# Patient Record
Sex: MALE | Race: BLACK OR AFRICAN AMERICAN | NOT HISPANIC OR LATINO | Employment: OTHER | URBAN - METROPOLITAN AREA
[De-identification: names, ages, dates, MRNs, and addresses within clinical notes are randomized per-mention and may not be internally consistent; named-entity substitution may affect disease eponyms.]

---

## 2017-03-13 RX ORDER — TAMSULOSIN HYDROCHLORIDE 0.4 MG/1
CAPSULE ORAL
Qty: 30 CAPSULE | Refills: 11 | Status: SHIPPED | OUTPATIENT
Start: 2017-03-13 | End: 2019-02-26

## 2017-04-25 ENCOUNTER — OFFICE VISIT (OUTPATIENT)
Dept: UROLOGY | Facility: CLINIC | Age: 72
End: 2017-04-25
Payer: MEDICARE

## 2017-04-25 VITALS
HEIGHT: 65 IN | SYSTOLIC BLOOD PRESSURE: 133 MMHG | WEIGHT: 145.5 LBS | DIASTOLIC BLOOD PRESSURE: 78 MMHG | HEART RATE: 53 BPM | BODY MASS INDEX: 24.24 KG/M2

## 2017-04-25 DIAGNOSIS — N40.1 BPH WITH URINARY OBSTRUCTION: Primary | ICD-10-CM

## 2017-04-25 DIAGNOSIS — N13.8 BPH WITH URINARY OBSTRUCTION: Primary | ICD-10-CM

## 2017-04-25 PROCEDURE — 1159F MED LIST DOCD IN RCRD: CPT | Mod: S$GLB,,, | Performed by: UROLOGY

## 2017-04-25 PROCEDURE — 1126F AMNT PAIN NOTED NONE PRSNT: CPT | Mod: S$GLB,,, | Performed by: UROLOGY

## 2017-04-25 PROCEDURE — 1160F RVW MEDS BY RX/DR IN RCRD: CPT | Mod: S$GLB,,, | Performed by: UROLOGY

## 2017-04-25 PROCEDURE — 99213 OFFICE O/P EST LOW 20 MIN: CPT | Mod: S$GLB,,, | Performed by: UROLOGY

## 2017-04-25 PROCEDURE — 99999 PR PBB SHADOW E&M-EST. PATIENT-LVL III: CPT | Mod: PBBFAC,,, | Performed by: UROLOGY

## 2017-04-25 NOTE — PROGRESS NOTES
Subjective:       Patient ID: Reed Torres is a 72 y.o. male.    Chief Complaint: Annual Exam (bph, weak urine stream, forgets to take flomax, flomax does help though)    HPI patient is here follow-up for BPH.  He has a slow stream and takes the Flomax intermittently.  His PVR is 390 cc and his urine is clear.  He had cystoscopy last year which demonstrated possible need for laser prostatectomy.  We discussed pros and cons of laser prostatectomy and he is ready to have this performed.  He is still very active cutting grass etc.  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     Social History Main Topics    Smoking status: Former Smoker    Smokeless tobacco: Former User      Comment: stopped smoking 20 yrs ago.    Alcohol use 3.0 oz/week     6 Standard drinks or equivalent per week      Comment: Daily    Drug use: No    Sexual activity: Yes     Partners: Female     Birth control/ protection: None     Other Topics Concern    Not on file     Social History Narrative       Allergies:  Review of patient's allergies indicates no known allergies.    Medications:    Current Outpatient Prescriptions:     simvastatin (ZOCOR) 10 MG tablet, Take 1 tablet (10 mg total) by mouth every evening., Disp: 30 tablet, Rfl: 5    tamsulosin (FLOMAX) 0.4 mg Cp24, TAKE ONE CAPSULE BY MOUTH EVERY DAY, Disp: 30 capsule, Rfl: 11    Review of  Systems   Constitutional: Negative for activity change, appetite change, chills, diaphoresis, fatigue, fever and unexpected weight change.   HENT: Negative for congestion, dental problem, hearing loss, mouth sores, postnasal drip, rhinorrhea, sinus pressure and trouble swallowing.    Eyes: Negative for pain, discharge and itching.   Respiratory: Negative for apnea, cough, choking, chest tightness, shortness of breath and wheezing.    Cardiovascular: Negative for chest pain, palpitations and leg swelling.   Gastrointestinal: Negative for abdominal distention, abdominal pain, anal bleeding, blood in stool, constipation, diarrhea, nausea, rectal pain and vomiting.   Endocrine: Negative for polydipsia and polyuria.   Genitourinary: Positive for decreased urine volume and difficulty urinating. Negative for discharge, dysuria, enuresis, flank pain, frequency, genital sores, hematuria, penile pain, penile swelling, scrotal swelling, testicular pain and urgency.   Musculoskeletal: Negative for arthralgias, back pain and myalgias.   Skin: Negative for color change, rash and wound.   Neurological: Negative for dizziness, syncope, speech difficulty, light-headedness and headaches.   Hematological: Negative for adenopathy. Does not bruise/bleed easily.   Psychiatric/Behavioral: Negative for behavioral problems, confusion, hallucinations and sleep disturbance.       Objective:      Physical Exam   Constitutional: He appears well-developed.   HENT:   Head: Normocephalic.   Cardiovascular: Normal rate.    Pulmonary/Chest: Effort normal.   Abdominal: Soft.   Genitourinary: Prostate normal.   Genitourinary Comments: 30 g benign   Neurological: He is alert.   Skin: Skin is warm.     Psychiatric: He has a normal mood and affect.       Assessment:       1. BPH with urinary obstruction        Plan:       Rede was seen today for annual exam.    Diagnoses and all orders for this visit:    BPH with urinary obstruction     schedule  removal with laser prostatectomy  transurethral prostatectomy  All risks and benefits including the risk of death and stroke secondary to anesthesia, urethral stricture need for future procedures incontinence erectile dysfunction and fistula were discussed

## 2017-04-26 ENCOUNTER — TELEPHONE (OUTPATIENT)
Dept: UROLOGY | Facility: CLINIC | Age: 72
End: 2017-04-26

## 2017-04-26 DIAGNOSIS — N40.0 BENIGN NODULAR PROSTATIC HYPERPLASIA WITHOUT LOWER URINARY TRACT SYMPTOMS: Primary | ICD-10-CM

## 2017-05-03 ENCOUNTER — ANESTHESIA EVENT (OUTPATIENT)
Dept: SURGERY | Facility: HOSPITAL | Age: 72
End: 2017-05-03
Payer: MEDICARE

## 2017-05-03 DIAGNOSIS — Z01.818 PRE-OP TESTING: Primary | ICD-10-CM

## 2017-05-03 NOTE — ANESTHESIA PREPROCEDURE EVALUATION
Anesthesia Assessment: Preoperative EQUATION    Planned Procedure: Procedure(s) (LRB):  TURP W LASER (N/A)  Requested Anesthesia Type:General  Surgeon: Charlie Walters Jr., MD  Service: Urology  Known or anticipated Date of Surgery:5/19/2017    Surgeon notes: reviewed    Triage considerations:     The patient has no apparent active cardiac condition (No unstable coronary Syndrome such as severe unstable angina or recent [<1 month] myocardial infarction, decompensated CHF, severe valvular   disease or significant arrhythmia)    Previous anesthesia records:Not available    Last PCP note: 3-6 months ago , outside [facility]  REQUESTED COPY OF LAST NOTE  Other important co-morbidities:      Tests already available:  No recent tests.            Instructions given. (See in Nurse's note)    Optimization:  Anesthesia Preop Clinic AssessmentNOT  Indicated  Consult      Plan:    Testing:  Hematology Profile, BMP and EKG 5/10results receivedd    Navigation: Tests Scheduled.                         Results will be tracked by Preop Clinic.                         No anesthesia preop clinic visit, or consult required.                                                                                                              05/03/2017  Reed Torres is a 72 y.o., male.    Anesthesia Evaluation    I have reviewed the Patient Summary Reports.    I have reviewed the Nursing Notes.   I have reviewed the Medications.     Review of Systems  Anesthesia Hx:  No problems with previous Anesthesia  History of prior surgery of interest to airway management or planning: Denies Family Hx of Anesthesia complications.   Denies Personal Hx of Anesthesia complications.   Social:  Social Alcohol Use, Former Smoker  Tobacco Use: Former smoker for 30 years, 1 pack per day, quit smoking >10 years ago Alcohol Use:    Hematology/Oncology:     Oncology Normal     EENT/Dental:EENT/Dental Normal   Cardiovascular:   Exercise tolerance: good ECG has  been reviewed.    Pulmonary:   Denies Shortness of breath.    Hepatic/GI:   GERD, well controlled    Musculoskeletal:   Arthritis     Neurological:  Neurology Normal    Endocrine:  Endocrine Normal        Physical Exam  General:  Well nourished    Airway/Jaw/Neck:  Airway Findings: Mouth Opening: Normal Tongue: Normal  General Airway Assessment: Adult  Mallampati: I      Dental:  Dental Findings: Edentulous   Chest/Lungs:  Chest/Lungs Findings: Clear to auscultation, Normal Respiratory Rate     Heart/Vascular:  Heart Findings: Rate: Normal, Bradycardia        Mental Status:  Mental Status Findings:  Cooperative, Alert and Oriented     TALON SALINAS 5/3/17    Anesthesia Plan  Type of Anesthesia, risks & benefits discussed:  Anesthesia Type:  general  Patient's Preference:   Intra-op Monitoring Plan:   Intra-op Monitoring Plan Comments:   Post Op Pain Control Plan:   Post Op Pain Control Plan Comments:   Induction:   IV  Beta Blocker:  Patient is not currently on a Beta-Blocker (No further documentation required).       Informed Consent: Patient understands risks and agrees with Anesthesia plan.  Questions answered. Anesthesia consent signed with patient.  ASA Score: 2     Day of Surgery Review of History & Physical:    H&P update referred to the surgeon.         Ready For Surgery From Anesthesia Perspective.

## 2017-05-08 ENCOUNTER — HOSPITAL ENCOUNTER (OUTPATIENT)
Dept: CARDIOLOGY | Facility: CLINIC | Age: 72
Discharge: HOME | End: 2017-05-08
Payer: MEDICARE

## 2017-05-08 ENCOUNTER — OFFICE VISIT (OUTPATIENT)
Dept: UROLOGY | Facility: CLINIC | Age: 72
End: 2017-05-08
Payer: MEDICARE

## 2017-05-08 DIAGNOSIS — N40.1 BPH WITH URINARY OBSTRUCTION: Primary | ICD-10-CM

## 2017-05-08 DIAGNOSIS — N13.8 BPH WITH URINARY OBSTRUCTION: Primary | ICD-10-CM

## 2017-05-08 DIAGNOSIS — Z01.818 PRE-OP TESTING: ICD-10-CM

## 2017-05-08 PROCEDURE — 99499 UNLISTED E&M SERVICE: CPT | Mod: S$GLB,,, | Performed by: STUDENT IN AN ORGANIZED HEALTH CARE EDUCATION/TRAINING PROGRAM

## 2017-05-08 PROCEDURE — 93000 ELECTROCARDIOGRAM COMPLETE: CPT | Mod: S$GLB,,, | Performed by: INTERNAL MEDICINE

## 2017-05-08 PROCEDURE — 99999 PR PBB SHADOW E&M-EST. PATIENT-LVL I: CPT | Mod: PBBFAC,,,

## 2017-05-08 NOTE — PROGRESS NOTES
Urology ([facility]) H&P  Staff: Dr. Charlie Walters MD    CC: LUTS    HPI: Reed Torres is a 72 y.o. male with incomplete bladder emptying. He complains of nocturia, frequency, urgency, urge incontinence. He takes Flomax inconsistently. PVR has been over 350 cc.     - Indications for TURP: obstructive urinary symptoms refractory to medical therapy.    - has not been any been on finasteride.  - has been on flomax.  - does not have a catheter in place.   - has had cystoscopy. This demonstrated 4 cm prostate with bilateral obstruction and high bladder neck, grade 3 trabeculations.  - has not urodynamics.   - has not had history of elevated PSA.     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    Alcohol use 3.0 oz/week     6 Standard drinks or equivalent per week      Comment: Daily    Drug use: No    Sexual activity: Yes     Partners: Female     Birth control/ protection: None     Other Topics Concern    None     Social History Narrative       Family History   Problem Relation Age of Onset    COPD Mother     Diabetes Mother     Hypertension Mother     COPD Father     Heart disease Brother        Review of patient's allergies indicates:  No Known Allergies    Current Outpatient Prescriptions on File Prior to Visit   Medication Sig Dispense Refill    simvastatin (ZOCOR) 10 MG tablet Take 1 tablet (10 mg total) by mouth every evening. 30 tablet 5    tamsulosin (FLOMAX) 0.4 mg Cp24 TAKE ONE CAPSULE BY  MOUTH EVERY DAY 30 capsule 11     No current facility-administered medications on file prior to visit.        Anticoagulation:  No    Physical Exam:  Weight 145lb/66kg  BMI 24.2    AAOx4, NAD, WDWN  NC/AT, EOMI, PER, sclerae anicteric, speech normal, tongue midline  Nl effort  RRR  Soft, non-tender, non-distended  Phallus is circumcised.    Labs:    Urine dipstick today - negative for all components    Lab Results   Component Value Date    WBC 5.49 06/04/2013    HGB 14.1 06/04/2013    HCT 42.1 06/04/2013    MCV 89.2 06/04/2013     06/04/2013       BMP  Lab Results   Component Value Date     11/26/2013    K 4.5 11/26/2013     11/26/2013    CO2 23 11/26/2013    BUN 16 11/26/2013    CREATININE 0.8 11/26/2013    CALCIUM 9.6 11/26/2013    ANIONGAP 13 11/26/2013    ESTGFRAFRICA >60 11/26/2013    EGFRNONAA >60 11/26/2013       Lab Results   Component Value Date    PSA 1.70 01/15/2013       Imaging:   US Retroperitoneal 2/12/16: no hydro or solid masses bilaterally. Bladder wall thickening.     Assessment: Reed Torres is a 72 y.o. male with LUTS secondary to BPH    Plan:     1. To OR on 5/19/17 for laser TURP.  2. Consents signed for surgery.  3. I have explained the risk, benefits, and alternatives of the procedure in detail. The patient voices understanding and all questions have been answered. The patient agrees to proceed as planned.        Anca Gant MD

## 2017-05-18 ENCOUNTER — TELEPHONE (OUTPATIENT)
Dept: UROLOGY | Facility: CLINIC | Age: 72
End: 2017-05-18

## 2017-05-19 ENCOUNTER — ANESTHESIA (OUTPATIENT)
Dept: SURGERY | Facility: HOSPITAL | Age: 72
End: 2017-05-19
Payer: MEDICARE

## 2017-05-19 ENCOUNTER — HOSPITAL ENCOUNTER (OUTPATIENT)
Facility: HOSPITAL | Age: 72
Discharge: HOME | End: 2017-05-19
Attending: UROLOGY | Admitting: UROLOGY
Payer: MEDICARE

## 2017-05-19 ENCOUNTER — SURGERY (OUTPATIENT)
Age: 72
End: 2017-05-19

## 2017-05-19 VITALS
SYSTOLIC BLOOD PRESSURE: 118 MMHG | HEIGHT: 64 IN | BODY MASS INDEX: 24.24 KG/M2 | TEMPERATURE: 98 F | HEART RATE: 50 BPM | DIASTOLIC BLOOD PRESSURE: 81 MMHG | OXYGEN SATURATION: 96 % | RESPIRATION RATE: 12 BRPM | WEIGHT: 142 LBS

## 2017-05-19 DIAGNOSIS — N40.1 BPH WITH URINARY OBSTRUCTION: ICD-10-CM

## 2017-05-19 DIAGNOSIS — N13.8 BPH WITH URINARY OBSTRUCTION: ICD-10-CM

## 2017-05-19 PROCEDURE — 63600175 PHARM REV CODE 636 W HCPCS: Performed by: STUDENT IN AN ORGANIZED HEALTH CARE EDUCATION/TRAINING PROGRAM

## 2017-05-19 PROCEDURE — 25000003 PHARM REV CODE 250: Performed by: NURSE ANESTHETIST, CERTIFIED REGISTERED

## 2017-05-19 PROCEDURE — D9220A PRA ANESTHESIA: Mod: CRNA,,, | Performed by: NURSE ANESTHETIST, CERTIFIED REGISTERED

## 2017-05-19 PROCEDURE — 63600175 PHARM REV CODE 636 W HCPCS: Performed by: NURSE ANESTHETIST, CERTIFIED REGISTERED

## 2017-05-19 PROCEDURE — 63600175 PHARM REV CODE 636 W HCPCS

## 2017-05-19 PROCEDURE — 71000015 HC POSTOP RECOV 1ST HR: Performed by: UROLOGY

## 2017-05-19 PROCEDURE — 36000706: Performed by: UROLOGY

## 2017-05-19 PROCEDURE — 37000009 HC ANESTHESIA EA ADD 15 MINS: Performed by: UROLOGY

## 2017-05-19 PROCEDURE — 36000707: Performed by: UROLOGY

## 2017-05-19 PROCEDURE — 71000039 HC RECOVERY, EACH ADD'L HOUR: Performed by: UROLOGY

## 2017-05-19 PROCEDURE — 52649 PROSTATE LASER ENUCLEATION: CPT | Mod: ,,, | Performed by: UROLOGY

## 2017-05-19 PROCEDURE — 25000003 PHARM REV CODE 250: Performed by: ANESTHESIOLOGY

## 2017-05-19 PROCEDURE — 63600175 PHARM REV CODE 636 W HCPCS: Performed by: ANESTHESIOLOGY

## 2017-05-19 PROCEDURE — 25000003 PHARM REV CODE 250: Performed by: STUDENT IN AN ORGANIZED HEALTH CARE EDUCATION/TRAINING PROGRAM

## 2017-05-19 PROCEDURE — D9220A PRA ANESTHESIA: Mod: ANES,,, | Performed by: ANESTHESIOLOGY

## 2017-05-19 PROCEDURE — 71000033 HC RECOVERY, INTIAL HOUR: Performed by: UROLOGY

## 2017-05-19 PROCEDURE — 37000008 HC ANESTHESIA 1ST 15 MINUTES: Performed by: UROLOGY

## 2017-05-19 RX ORDER — ONDANSETRON 2 MG/ML
INJECTION INTRAMUSCULAR; INTRAVENOUS
Status: DISCONTINUED | OUTPATIENT
Start: 2017-05-19 | End: 2017-05-19

## 2017-05-19 RX ORDER — HYDROMORPHONE HYDROCHLORIDE 1 MG/ML
INJECTION, SOLUTION INTRAMUSCULAR; INTRAVENOUS; SUBCUTANEOUS
Status: COMPLETED
Start: 2017-05-19 | End: 2017-05-19

## 2017-05-19 RX ORDER — FUROSEMIDE 10 MG/ML
INJECTION INTRAMUSCULAR; INTRAVENOUS
Status: DISCONTINUED | OUTPATIENT
Start: 2017-05-19 | End: 2017-05-19

## 2017-05-19 RX ORDER — HYDROMORPHONE HYDROCHLORIDE 1 MG/ML
0.2 INJECTION, SOLUTION INTRAMUSCULAR; INTRAVENOUS; SUBCUTANEOUS EVERY 5 MIN PRN
Status: DISCONTINUED | OUTPATIENT
Start: 2017-05-19 | End: 2017-05-19 | Stop reason: HOSPADM

## 2017-05-19 RX ORDER — LIDOCAINE HYDROCHLORIDE 10 MG/ML
1 INJECTION, SOLUTION EPIDURAL; INFILTRATION; INTRACAUDAL; PERINEURAL ONCE
Status: COMPLETED | OUTPATIENT
Start: 2017-05-19 | End: 2017-05-19

## 2017-05-19 RX ORDER — SODIUM CHLORIDE 0.9 % (FLUSH) 0.9 %
3 SYRINGE (ML) INJECTION
Status: DISCONTINUED | OUTPATIENT
Start: 2017-05-19 | End: 2017-05-19 | Stop reason: HOSPADM

## 2017-05-19 RX ORDER — MIDAZOLAM HYDROCHLORIDE 1 MG/ML
INJECTION, SOLUTION INTRAMUSCULAR; INTRAVENOUS
Status: DISCONTINUED | OUTPATIENT
Start: 2017-05-19 | End: 2017-05-19

## 2017-05-19 RX ORDER — SODIUM CHLORIDE 9 MG/ML
INJECTION, SOLUTION INTRAVENOUS CONTINUOUS
Status: DISCONTINUED | OUTPATIENT
Start: 2017-05-19 | End: 2017-05-19 | Stop reason: HOSPADM

## 2017-05-19 RX ORDER — ONDANSETRON 2 MG/ML
4 INJECTION INTRAMUSCULAR; INTRAVENOUS ONCE AS NEEDED
Status: DISCONTINUED | OUTPATIENT
Start: 2017-05-19 | End: 2017-05-19 | Stop reason: HOSPADM

## 2017-05-19 RX ORDER — PROPOFOL 10 MG/ML
VIAL (ML) INTRAVENOUS
Status: DISCONTINUED | OUTPATIENT
Start: 2017-05-19 | End: 2017-05-19

## 2017-05-19 RX ORDER — OXYCODONE AND ACETAMINOPHEN 5; 325 MG/1; MG/1
1-2 TABLET ORAL EVERY 4 HOURS PRN
Qty: 21 TABLET | Refills: 0 | Status: SHIPPED | OUTPATIENT
Start: 2017-05-19 | End: 2019-02-26

## 2017-05-19 RX ORDER — CEFAZOLIN SODIUM 2 G/50ML
2 SOLUTION INTRAVENOUS
Status: COMPLETED | OUTPATIENT
Start: 2017-05-19 | End: 2017-05-19

## 2017-05-19 RX ORDER — LIDOCAINE HCL/PF 100 MG/5ML
SYRINGE (ML) INTRAVENOUS
Status: DISCONTINUED | OUTPATIENT
Start: 2017-05-19 | End: 2017-05-19

## 2017-05-19 RX ORDER — POLYETHYLENE GLYCOL 3350 17 G/17G
17 POWDER, FOR SOLUTION ORAL DAILY
Qty: 30 PACKET | Refills: 0 | Status: SHIPPED | OUTPATIENT
Start: 2017-05-19 | End: 2019-02-26

## 2017-05-19 RX ORDER — FENTANYL CITRATE 50 UG/ML
INJECTION, SOLUTION INTRAMUSCULAR; INTRAVENOUS
Status: DISCONTINUED | OUTPATIENT
Start: 2017-05-19 | End: 2017-05-19

## 2017-05-19 RX ORDER — OXYCODONE AND ACETAMINOPHEN 5; 325 MG/1; MG/1
1 TABLET ORAL EVERY 4 HOURS PRN
Status: DISCONTINUED | OUTPATIENT
Start: 2017-05-19 | End: 2017-05-19 | Stop reason: HOSPADM

## 2017-05-19 RX ADMIN — SODIUM CHLORIDE, SODIUM GLUCONATE, SODIUM ACETATE, POTASSIUM CHLORIDE, MAGNESIUM CHLORIDE, SODIUM PHOSPHATE, DIBASIC, AND POTASSIUM PHOSPHATE: .53; .5; .37; .037; .03; .012; .00082 INJECTION, SOLUTION INTRAVENOUS at 07:05

## 2017-05-19 RX ADMIN — HYDROMORPHONE HYDROCHLORIDE 0.2 MG: 1 INJECTION, SOLUTION INTRAMUSCULAR; INTRAVENOUS; SUBCUTANEOUS at 09:05

## 2017-05-19 RX ADMIN — ONDANSETRON 4 MG: 2 INJECTION INTRAMUSCULAR; INTRAVENOUS at 07:05

## 2017-05-19 RX ADMIN — FENTANYL CITRATE 25 MCG: 50 INJECTION, SOLUTION INTRAMUSCULAR; INTRAVENOUS at 07:05

## 2017-05-19 RX ADMIN — DEXTROSE 2 G: 50 INJECTION, SOLUTION INTRAVENOUS at 07:05

## 2017-05-19 RX ADMIN — FUROSEMIDE 20 MG: 10 INJECTION, SOLUTION INTRAMUSCULAR; INTRAVENOUS at 08:05

## 2017-05-19 RX ADMIN — SODIUM CHLORIDE: 0.9 INJECTION, SOLUTION INTRAVENOUS at 06:05

## 2017-05-19 RX ADMIN — MIDAZOLAM HYDROCHLORIDE 2 MG: 1 INJECTION, SOLUTION INTRAMUSCULAR; INTRAVENOUS at 06:05

## 2017-05-19 RX ADMIN — OXYCODONE HYDROCHLORIDE AND ACETAMINOPHEN 1 TABLET: 5; 325 TABLET ORAL at 08:05

## 2017-05-19 RX ADMIN — LIDOCAINE HYDROCHLORIDE 0.1 ML: 10 INJECTION, SOLUTION EPIDURAL; INFILTRATION; INTRACAUDAL; PERINEURAL at 06:05

## 2017-05-19 RX ADMIN — LIDOCAINE HYDROCHLORIDE 100 MG: 20 INJECTION, SOLUTION INTRAVENOUS at 07:05

## 2017-05-19 RX ADMIN — HYDROMORPHONE HYDROCHLORIDE 0.2 MG: 1 INJECTION, SOLUTION INTRAMUSCULAR; INTRAVENOUS; SUBCUTANEOUS at 08:05

## 2017-05-19 RX ADMIN — PROPOFOL 150 MG: 10 INJECTION, EMULSION INTRAVENOUS at 07:05

## 2017-05-19 NOTE — OP NOTE
[facility]  Operative Note    Date: 05/19/2017    Pre-Op Diagnosis: BPH    Patient Active Problem List    Diagnosis Date Noted    BPH with urinary obstruction 05/19/2017    DJD (degenerative joint disease) of lumbar spine 07/21/2014    BPH (benign prostatic hyperplasia) 11/22/2013    Body mass index (BMI) of 23.0-23.9 in adult 11/22/2013    Hyperlipidemia     Tuberculosis exposure          Post-Op Diagnosis: same    Procedure(s) Performed:   1.  Laser vaporization of the prostate    Specimen(s): none    Staff Surgeon: Charlie Walters MD    Assistant Surgeon: Raghu Li MD    Anesthesia: Monitored Local Anesthesia with Sedation    Indications: Reed Torres is a 72 y.o. male with BPH    Findings:   - trilobar hypertrophy   - short prostate, approximately 4 cm  - severe trabeculations     Estimated Blood Loss: min    Drains: 24 Fr bustos catheter    Procedure in Detail:  After risks, benefits and possible complications of Laser TURP were discussed, the patient elected to undergo the procedure and informed consent was obtained. All questions were answered in the bharathi-operative area. The patient was transferred to the cystoscopy suite and placed on the fluoroscopy table in the supine position. Anesthesia was administered.  When the patient was adequately sedated he was placed in the dorsal lithotomy position and prepped and draped in the usual sterile fashion.  Time out was performed, bharathi-procedural antibiotics were confirmed.      A 22 Amharic revolix laser scope was introduced into the bladder per urethra. Trilobar hyperplasia was seen. Formal cystoscopy was performed which revealed no tumors or lesions suspicious for malignancy, no bladder stones, and severe trabeculations.  The right and left ureteral orifices were visualized in the normal anatomic position and clear efflux of urine seen bilaterally.     Our attention was first turned to enucleating the median lobe of the prostate. This  was accomplished with a 800 micro Quanta laser fiber set at 200 buck. An incision in the prostate was made with the laser fiber at the bladder neck at the 5 o'clock position and brought just proximal to the verumontanum to the depth of the prostatic capsule. A right counter incision was made in the prostate at the 7 o'clock position and brought to just proximal of the verumontanum. The median lobe was then vaporized.      Next lateral incisions were made from the 2 o'clock position and brought down to the proximal verumontanum to the level of the prostatic capsule. The lateral lobe was then vaporized superficially to deep in a stepwise fashion. This was repeated from the 10' o'clock position to just proximal to the veru. After all hyperplasia had been vaporized the bladder was drained. A good channel was seen. All bleeding was coagulated with the quanta laser and adequate hemostasis was obtained.      The patient tolerated the procedure well and was transferred to the recovery room in stable condition.      Follow up care:  The patient will follow up with Dr. Charlie Walters in 2 weeks.  He was given prescriptions for percocet and miralax.     Raghu Li MD

## 2017-05-19 NOTE — ANESTHESIA RELEASE NOTE
"Anesthesia Release from PACU Note    Patient: Reed Torres    Procedure(s) Performed: Procedure(s) (LRB):  TURP W LASER (N/A)    Anesthesia type: general    Post pain: Adequate analgesia    Post assessment: no apparent anesthetic complications and tolerated procedure well    Last Vitals:   Visit Vitals    /81 (BP Location: Right arm, Patient Position: Sitting, BP Method: Automatic)    Pulse (!) 50    Temp 36.5 °C (97.7 °F) (Temporal)    Resp 12    Ht 5' 4" (1.626 m)    Wt 64.4 kg (142 lb)    SpO2 96%    BMI 24.37 kg/m2       Post vital signs: stable    Level of consciousness: awake and alert     Nausea/Vomiting: no nausea/no vomiting    Complications: none    Airway Patency: patent    Respiratory: unassisted, spontaneous ventilation, room air    Cardiovascular: stable and blood pressure at baseline    Hydration: euvolemic  "

## 2017-05-19 NOTE — TRANSFER OF CARE
"Anesthesia Transfer of Care Note    Patient: Reed Torres    Procedure(s) Performed: Procedure(s) (LRB):  TURP W LASER (N/A)    Patient location: PACU    Anesthesia Type: general    Transport from OR: Transported from OR on 6-10 L/min O2 by face mask with adequate spontaneous ventilation    Post pain: adequate analgesia    Post assessment: no apparent anesthetic complications    Post vital signs: stable    Level of consciousness: sedated and responds to stimulation    Nausea/Vomiting: no nausea/vomiting    Complications: none          Last vitals:   Visit Vitals    /79 (BP Location: Left arm, Patient Position: Lying, BP Method: Automatic)    Pulse (!) 55    Temp 36.7 °C (98 °F) (Oral)    Resp 16    Ht 5' 4" (1.626 m)    Wt 64.4 kg (142 lb)    SpO2 98%    BMI 24.37 kg/m2     "

## 2017-05-19 NOTE — INTERVAL H&P NOTE
The patient has been examined and the H&P has been reviewed:    I concur with the findings and no changes have occurred since H&P was written.   To OR for laser TURP  Urine dipstick negative for all components       Anesthesia/Surgery risks, benefits and alternative options discussed and understood by patient/family.          Active Hospital Problems    Diagnosis  POA    BPH with urinary obstruction [N40.1, N13.8]  Yes      Resolved Hospital Problems    Diagnosis Date Resolved POA   No resolved problems to display.

## 2017-05-19 NOTE — DISCHARGE INSTRUCTIONS
Discharge Instructions: Caring for Your Indwelling Urinary Catheter  You have been discharged with an indwelling urinary catheter (also called a Nuñez catheter). A catheter is a thin, flexible tube. An indwelling urinary catheter has two parts. The first part is a tube that drains urine from your bladder. The second part is a bag or other device that collects the urine.  The most important thing to remember is that you want to prevent infection. Always wash your hands before handling your catheter bag or tubing.  Draining the bedside bag  · Wash your hands with soap and clean, running water or an alcohol-based hand  that contains at least 60% alcohol.  · Hold the drainage tube over a toilet or measuring container.  · Unclamp the tube and let the bag drain.  · Dont touch the tip of the drainage tube or let it touch the toilet or container.  Cleaning the drainage tube  · When the bag is empty, clean the tip of the drainage tube with an alcohol wipe.  · Clamp the tube.  · Reinsert the tube into the pocket on the drainage bag.  Cleaning your skin and tubing  · Clean the skin near the catheter with soap and water.  · Wash your genital area from front to back.  · Wash the catheter tubing. Always wash the catheter in the direction away from your body.  · You will be told when and how to change your bag and tubing.  · Dont try to remove the catheter by yourself.  · You may shower with the catheter in place.  Emptying a leg bag  · Wash your hands.  · Remove the stopper on the bag.  · Drain the bag into the toilet or a measuring container. Dont let the tip of the drainage tube touch anything, including your fingers.  · Clean the tip of the drainage tube with alcohol.  · Replace the stopper.  Follow-up  Make a follow-up appointment as directed by your healthcare provider  When to call your healthcare provider  Call your healthcare provider right away if you have any of the following:  · Chills or fever above  100.4°F (38°C)  · Leakage around the catheter insertion site  · Increased spasms (uncontrollable twitching) in your legs, abdomen, or bladder. Occasional mild spasms are normal.  · Burning in the urinary tract, penis, or genital area  · Nausea and vomiting  · Aching in the lower back  · Cloudy or bloody (pink or red) urine, sediment or mucus in the urine, or foul-smelling urine   Date Last Reviewed: 9/24/2014 © 2000-2016 [employer]. [address] [place] [address]. All rights reserved. This information is not intended as a substitute for professional medical care. Always follow your healthcare professional's instructions.        Transurethral Resection of the Prostate (TURP): Home Recovery  Take it easy for the first month or so while you heal. During the first few weeks, you may feel burning when you pass urine. You may also feel like you have to urinate often. These sensations will go away. If your urine becomes bright red, it means that the treated area is bleeding. This may happen on and off for a month or so after a TURP. If this occurs, rest and drink plenty of fluids until the bleeding stops.    While you are healing  To help prevent problems during the first month after your surgery, follow these tips:  · Drink plenty of fluids.  · Avoid strenuous exercise.  · Dont lift anything over 10 pounds.  · Avoid sexual activity and strenuous exercise.  · Avoid straining at stool. If you are constipated, take stool softeners or laxatives for a few days.  · Talk to your doctor about when you can return to work.  · Ask your doctor when you can begin driving again.  · Dont sit for more than 60 minutes without getting up.  · Check with your doctor before taking over-the-counter pain relievers. These include aspirin, ibuprofen, and naproxen.  Follow-up visits  You will visit your doctor to make sure you are healing without problems. If tests were done on your prostate tissue your doctor will  discuss the results with you.  When to call your doctor  · Youre not able to urinate, or notice a decrease in urine flow  · You have a fever of 100.4°F (38°C) or higher, or as directed by your doctor  · You have severe pain that is not relieved by prescription pain medication  · You have bleeding that doesnt stop within 12 hours  · You have bleeding with clots, or blood plugs up the catheter. (A little blood in the urine is normal.)  · The catheter falls out   Getting back to sex  BPH and its treatments rarely cause problems with sex. Even if you have retrograde ejaculation, your erection or orgasm shouldnt feel any different than it used to. Retrograde ejaculation can result in infertility, as the semen will not come out of the penis. If you notice any problems with sex, talk to your doctor. Help may be available.   Difficulty controlling your urine  Some men will have difficulty controlling their urine (urinary incontinence) after this surgery. This may last for a few days, weeks, or months, but it will improve with time. You may also pass your urine more often (urinary frequency), like you did before the surgery. This will also improve as you begin to heal.  Date Last Reviewed: 9/20/2014  © 0000-7006 [employer]. [address] [place] [address], [place] [address]. All rights reserved. This information is not intended as a substitute for professional medical care. Always follow your healthcare professional's instructions.

## 2017-05-19 NOTE — H&P (VIEW-ONLY)
Urology ([facility]) H&P  Staff: Dr. Charlie Walters MD    CC: LUTS    HPI: Reed Torres is a 72 y.o. male with incomplete bladder emptying. He complains of nocturia, frequency, urgency, urge incontinence. He takes Flomax inconsistently. PVR has been over 350 cc.     - Indications for TURP: obstructive urinary symptoms refractory to medical therapy.    - has not been any been on finasteride.  - has been on flomax.  - does not have a catheter in place.   - has had cystoscopy. This demonstrated 4 cm prostate with bilateral obstruction and high bladder neck, grade 3 trabeculations.  - has not urodynamics.   - has not had history of elevated PSA.     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    Alcohol use 3.0 oz/week     6 Standard drinks or equivalent per week      Comment: Daily    Drug use: No    Sexual activity: Yes     Partners: Female     Birth control/ protection: None     Other Topics Concern    None     Social History Narrative       Family History   Problem Relation Age of Onset    COPD Mother     Diabetes Mother     Hypertension Mother     COPD Father     Heart disease Brother        Review of patient's allergies indicates:  No Known Allergies    Current Outpatient Prescriptions on File Prior to Visit   Medication Sig Dispense Refill    simvastatin (ZOCOR) 10 MG tablet Take 1 tablet (10 mg total) by mouth every evening. 30 tablet 5    tamsulosin (FLOMAX) 0.4 mg Cp24 TAKE ONE CAPSULE BY  MOUTH EVERY DAY 30 capsule 11     No current facility-administered medications on file prior to visit.        Anticoagulation:  No    Physical Exam:  Weight 145lb/66kg  BMI 24.2    AAOx4, NAD, WDWN  NC/AT, EOMI, PER, sclerae anicteric, speech normal, tongue midline  Nl effort  RRR  Soft, non-tender, non-distended  Phallus is circumcised.    Labs:    Urine dipstick today - negative for all components    Lab Results   Component Value Date    WBC 5.49 06/04/2013    HGB 14.1 06/04/2013    HCT 42.1 06/04/2013    MCV 89.2 06/04/2013     06/04/2013       BMP  Lab Results   Component Value Date     11/26/2013    K 4.5 11/26/2013     11/26/2013    CO2 23 11/26/2013    BUN 16 11/26/2013    CREATININE 0.8 11/26/2013    CALCIUM 9.6 11/26/2013    ANIONGAP 13 11/26/2013    ESTGFRAFRICA >60 11/26/2013    EGFRNONAA >60 11/26/2013       Lab Results   Component Value Date    PSA 1.70 01/15/2013       Imaging:   US Retroperitoneal 2/12/16: no hydro or solid masses bilaterally. Bladder wall thickening.     Assessment: Reed Torres is a 72 y.o. male with LUTS secondary to BPH    Plan:     1. To OR on 5/19/17 for laser TURP.  2. Consents signed for surgery.  3. I have explained the risk, benefits, and alternatives of the procedure in detail. The patient voices understanding and all questions have been answered. The patient agrees to proceed as planned.        Anca Gant MD

## 2017-05-19 NOTE — DISCHARGE SUMMARY
[facility]  Discharge Note  Short Stay    Admit Date: 5/19/2017    Discharge Date and Time: 5/19/2017       Attending Physician: Charlie Walters Jr., MD     Discharge Provider: Raghu Li MD    Diagnoses:  Active Hospital Problems    Diagnosis  POA    BPH with urinary obstruction [N40.1, N13.8]  Yes      Resolved Hospital Problems    Diagnosis Date Resolved POA   No resolved problems to display.       Discharged Condition: good    Hospital Course: Patient was admitted for a laser TURP and tolerated the procedure well with no complications.    Final Diagnoses: Same as principal problem.    Disposition: Home or Self Care    Follow up/Patient Instructions:    Medications:  Reconciled Home Medications:   Current Discharge Medication List      START taking these medications    Details   oxycodone-acetaminophen (PERCOCET) 5-325 mg per tablet Take 1-2 tablets by mouth every 4 (four) hours as needed.  Qty: 21 tablet, Refills: 0      polyethylene glycol (GLYCOLAX) 17 gram PwPk Take 17 g by mouth once daily.  Qty: 30 packet, Refills: 0         CONTINUE these medications which have NOT CHANGED    Details   simvastatin (ZOCOR) 10 MG tablet Take 1 tablet (10 mg total) by mouth every evening.  Qty: 30 tablet, Refills: 5    Associated Diagnoses: Hyperlipidemia      tamsulosin (FLOMAX) 0.4 mg Cp24 TAKE ONE CAPSULE BY MOUTH EVERY DAY  Qty: 30 capsule, Refills: 11             Discharge Procedure Orders  Diet general     Activity as tolerated     Call MD for:  temperature >100.4     Call MD for:  persistent nausea and vomiting or diarrhea     Call MD for:  severe uncontrolled pain     Call MD for:  redness, tenderness, or signs of infection (pain, swelling, redness, odor or green/yellow discharge around incision site)     Call MD for:  difficulty breathing or increased cough     Call MD for:  severe persistent headache     Call MD for:  worsening rash     Call MD for:  persistent dizziness, light-headedness, or  visual disturbances     Call MD for:  increased confusion or weakness     No dressing needed       Follow-up Information     Follow up with Charlie Walters Jr, MD.    Specialty:  Urology    Why:  post op laser TURP    Contact information:    [address]FF[address]  [facility] [id number] [phone]            Discharge Procedure Orders (must include Diet, Follow-up, Activity):    Discharge Procedure Orders (must include Diet, Follow-up, Activity)  Diet general     Activity as tolerated     Call MD for:  temperature >100.4     Call MD for:  persistent nausea and vomiting or diarrhea     Call MD for:  severe uncontrolled pain     Call MD for:  redness, tenderness, or signs of infection (pain, swelling, redness, odor or green/yellow discharge around incision site)     Call MD for:  difficulty breathing or increased cough     Call MD for:  severe persistent headache     Call MD for:  worsening rash     Call MD for:  persistent dizziness, light-headedness, or visual disturbances     Call MD for:  increased confusion or weakness     No dressing needed

## 2017-05-19 NOTE — PLAN OF CARE
"Discharge instructions reviewed with pt and spouse, handouts/prescription/ bustos cathter supplies given (leg bag, alcohol wipes and urinal) given,  verbalized understanding with no further questions at this time. Bustos catheter to be removed in clinic on Monday morning scheduled  per AVS sheet with MD telephone number provided. Bustos catheter released from traction by MD, observation complete. VSS on RA, pain medication administered per MAR- now "5/10" on pain scale, states comfortable to go home, no nausea noted, tolerating po fluids without difficulty, no other complaints noted. Fall precautions reviewed, consents in chart, PIV removed.   "

## 2017-05-19 NOTE — ANESTHESIA POSTPROCEDURE EVALUATION
"Anesthesia Post Evaluation    Patient: Reed Torres    Procedure(s) Performed: Procedure(s) (LRB):  TURP W LASER (N/A)    Final Anesthesia Type: general  Patient location during evaluation: PACU  Patient participation: Yes- Able to Participate  Level of consciousness: awake and alert  Post-procedure vital signs: reviewed and stable  Pain management: adequate  Airway patency: patent  PONV status at discharge: No PONV  Anesthetic complications: no      Cardiovascular status: blood pressure returned to baseline  Respiratory status: unassisted, spontaneous ventilation and room air  Hydration status: euvolemic  Follow-up not needed.        Visit Vitals    /81 (BP Location: Right arm, Patient Position: Sitting, BP Method: Automatic)    Pulse (!) 50    Temp 36.5 °C (97.7 °F) (Temporal)    Resp 12    Ht 5' 4" (1.626 m)    Wt 64.4 kg (142 lb)    SpO2 96%    BMI 24.37 kg/m2       Pain/Nieves Score: Pain Assessment Performed: Yes (5/19/2017  8:28 AM)  Presence of Pain: non-verbal indicators absent (5/19/2017  8:28 AM)  Pain Rating Prior to Med Admin: 6 (5/19/2017  9:25 AM)  Nieves Score: 6 (5/19/2017  8:28 AM)      "

## 2017-05-22 ENCOUNTER — OFFICE VISIT (OUTPATIENT)
Dept: UROLOGY | Facility: CLINIC | Age: 72
End: 2017-05-22
Payer: MEDICARE

## 2017-05-22 VITALS
SYSTOLIC BLOOD PRESSURE: 132 MMHG | DIASTOLIC BLOOD PRESSURE: 76 MMHG | BODY MASS INDEX: 24.21 KG/M2 | WEIGHT: 145.31 LBS | HEART RATE: 59 BPM | HEIGHT: 65 IN

## 2017-05-22 DIAGNOSIS — Z98.890 POST-OPERATIVE STATE: Primary | ICD-10-CM

## 2017-05-22 PROCEDURE — 99999 PR PBB SHADOW E&M-EST. PATIENT-LVL III: CPT | Mod: PBBFAC,,, | Performed by: UROLOGY

## 2017-05-22 PROCEDURE — 87086 URINE CULTURE/COLONY COUNT: CPT

## 2017-05-22 PROCEDURE — 99024 POSTOP FOLLOW-UP VISIT: CPT | Mod: S$GLB,,, | Performed by: UROLOGY

## 2017-05-22 RX ORDER — SULFAMETHOXAZOLE AND TRIMETHOPRIM 800; 160 MG/1; MG/1
1 TABLET ORAL 2 TIMES DAILY
Qty: 14 TABLET | Refills: 0 | Status: SHIPPED | OUTPATIENT
Start: 2017-05-22 | End: 2017-05-27

## 2017-05-22 NOTE — PROGRESS NOTES
Subjective:       Patient ID: Reed Torres is a 72 y.o. male.    Chief Complaint: Post-op Evaluation (rtc turp w/laser poss removal of bustos catheter. c/io buring and feeling bloat, urine have a foul odor.)    HPI she is postop TURP with laser on 3 days ago.  He is here for Bustos catheter removal.  He had some burning over the weekend.  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     Social History Main Topics    Smoking status: Former Smoker    Smokeless tobacco: Former User      Comment: stopped smoking 20 yrs ago.    Alcohol use 3.0 oz/week     6 Standard drinks or equivalent per week      Comment: Daily    Drug use: No    Sexual activity: Yes     Partners: Female     Birth control/ protection: None     Other Topics Concern    Not on file     Social History Narrative    No narrative on file       Allergies:  Review of patient's allergies indicates no known allergies.    Medications:    Current Outpatient Prescriptions:     NAPROXEN SODIUM (ALEVE ORAL), Take by mouth., Disp: , Rfl:     oxycodone-acetaminophen (PERCOCET) 5-325 mg per tablet, Take 1-2 tablets by mouth every 4 (four) hours as needed., Disp: 21 tablet, Rfl: 0    polyethylene glycol (GLYCOLAX) 17 gram PwPk, Take 17 g by mouth once daily., Disp: 30 packet, Rfl: 0    simvastatin (ZOCOR) 10 MG tablet, Take 1 tablet (10  mg total) by mouth every evening., Disp: 30 tablet, Rfl: 5    tamsulosin (FLOMAX) 0.4 mg Cp24, TAKE ONE CAPSULE BY MOUTH EVERY DAY, Disp: 30 capsule, Rfl: 11    sulfamethoxazole-trimethoprim 800-160mg (BACTRIM DS) 800-160 mg Tab, Take 1 tablet by mouth 2 (two) times daily., Disp: 14 tablet, Rfl: 0    Review of Systems   Constitutional: Negative for activity change, appetite change, chills, diaphoresis, fatigue, fever and unexpected weight change.   HENT: Negative for congestion, dental problem, hearing loss, mouth sores, postnasal drip, rhinorrhea, sinus pressure and trouble swallowing.    Eyes: Negative for pain, discharge and itching.   Respiratory: Negative for apnea, cough, choking, chest tightness, shortness of breath and wheezing.    Cardiovascular: Negative for chest pain, palpitations and leg swelling.   Gastrointestinal: Negative for abdominal distention, abdominal pain, anal bleeding, blood in stool, constipation, diarrhea, nausea, rectal pain and vomiting.   Endocrine: Negative for polydipsia and polyuria.   Genitourinary: Positive for dysuria. Negative for decreased urine volume, difficulty urinating, discharge, enuresis, flank pain, frequency, genital sores, hematuria, penile pain, penile swelling, scrotal swelling, testicular pain and urgency.   Musculoskeletal: Negative for arthralgias, back pain and myalgias.   Skin: Negative for color change, rash and wound.   Neurological: Negative for dizziness, syncope, speech difficulty, light-headedness and headaches.   Hematological: Negative for adenopathy. Does not bruise/bleed easily.   Psychiatric/Behavioral: Negative for behavioral problems, confusion, hallucinations and sleep disturbance.       Objective:      Physical Exam   Constitutional: He appears well-developed.   HENT:   Head: Normocephalic.   Cardiovascular: Normal rate.    Pulmonary/Chest: Effort normal.   Abdominal: Soft.   Genitourinary:   Genitourinary Comments: Nuñez in place    Neurological: He is alert.   Skin: Skin is warm.     Psychiatric: He has a normal mood and affect.       Assessment:       1. Post-operative state        Plan:       Reed was seen today for post-op evaluation.    Diagnoses and all orders for this visit:    Post-operative state    Other orders  -     sulfamethoxazole-trimethoprim 800-160mg (BACTRIM DS) 800-160 mg Tab; Take 1 tablet by mouth 2 (two) times daily.     Nuñez catheter was removed.  Patient will take Bactrim and Azo-Standard and he will return to clinic in 1 month or sooner when necessary inability to void

## 2017-05-23 ENCOUNTER — TELEPHONE (OUTPATIENT)
Dept: UROLOGY | Facility: CLINIC | Age: 72
End: 2017-05-23

## 2017-05-23 LAB — BACTERIA UR CULT: NO GROWTH

## 2017-05-23 NOTE — TELEPHONE ENCOUNTER
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 Cell [phone]

## 2017-05-23 NOTE — TELEPHONE ENCOUNTER
Pt advised that p/  case he can cit grass with riding lawnmower but should avoid lifting for 1-2 weeks. Pt is agreeable

## 2017-06-22 ENCOUNTER — OFFICE VISIT (OUTPATIENT)
Dept: UROLOGY | Facility: CLINIC | Age: 72
End: 2017-06-22
Payer: MEDICARE

## 2017-06-22 VITALS
DIASTOLIC BLOOD PRESSURE: 78 MMHG | HEART RATE: 68 BPM | HEIGHT: 65 IN | SYSTOLIC BLOOD PRESSURE: 122 MMHG | BODY MASS INDEX: 23.88 KG/M2 | WEIGHT: 143.31 LBS

## 2017-06-22 DIAGNOSIS — Z98.890 POST-OPERATIVE STATE: Primary | ICD-10-CM

## 2017-06-22 DIAGNOSIS — N40.1 BPH WITH URINARY OBSTRUCTION: ICD-10-CM

## 2017-06-22 DIAGNOSIS — N13.8 BPH WITH URINARY OBSTRUCTION: ICD-10-CM

## 2017-06-22 PROCEDURE — 99999 PR PBB SHADOW E&M-EST. PATIENT-LVL III: CPT | Mod: PBBFAC,,, | Performed by: UROLOGY

## 2017-06-22 PROCEDURE — 99024 POSTOP FOLLOW-UP VISIT: CPT | Mod: S$GLB,,, | Performed by: UROLOGY

## 2017-06-22 PROCEDURE — 87086 URINE CULTURE/COLONY COUNT: CPT

## 2017-06-22 RX ORDER — PHENAZOPYRIDINE HYDROCHLORIDE 100 MG/1
100 TABLET, FILM COATED ORAL 2 TIMES DAILY
Qty: 60 TABLET | Refills: 0 | Status: SHIPPED | OUTPATIENT
Start: 2017-06-22 | End: 2017-08-21

## 2017-06-22 NOTE — PROGRESS NOTES
Subjective:       Patient ID: Reed Torres is a 72 y.o. male.    Chief Complaint: No chief complaint on file.    HPI she is postop TURP with laser on 5/19/17.  He has done very well since his procedure. He is complaining of some minor terminal dysuria and occasional light hematuria. Denies frequency and urgency. Has nocturia x 1 and still has minor weak stream however it is much improved. He is satisfied with his results. He is still taking flomax.     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     Social History Main Topics    Smoking status: Former Smoker    Smokeless tobacco: Former User      Comment: stopped smoking 20 yrs ago.    Alcohol use 3.0 oz/week     6 Standard drinks or equivalent per week      Comment: Daily    Drug use: No    Sexual activity: Yes     Partners: Female     Birth control/ protection: None     Other Topics Concern    Not on file     Social History Narrative    No narrative on file       Allergies:  Review of patient's allergies indicates no known allergies.    Medications:    Current Outpatient Prescriptions:     NAPROXEN SODIUM (ALEVE ORAL), Take by mouth., Disp: , Rfl:     simvastatin (ZOCOR) 10 MG tablet, Take 1 tablet (10 mg total) by mouth every evening., Disp: 30 tablet, Rfl: 5    tamsulosin (FLOMAX) 0.4 mg Cp24, TAKE ONE CAPSULE BY MOUTH EVERY DAY, Disp: 30 capsule, Rfl: 11    oxycodone-acetaminophen (PERCOCET) 5-325 mg per  tablet, Take 1-2 tablets by mouth every 4 (four) hours as needed., Disp: 21 tablet, Rfl: 0    phenazopyridine (PYRIDIUM) 100 MG tablet, Take 1 tablet (100 mg total) by mouth 2 (two) times daily., Disp: 60 tablet, Rfl: 0    polyethylene glycol (GLYCOLAX) 17 gram PwPk, Take 17 g by mouth once daily., Disp: 30 packet, Rfl: 0    Review of Systems   Constitutional: Negative for activity change, appetite change, chills, diaphoresis, fatigue, fever and unexpected weight change.   HENT: Negative for congestion, dental problem, hearing loss, mouth sores, postnasal drip, rhinorrhea, sinus pressure and trouble swallowing.    Eyes: Negative for pain, discharge and itching.   Respiratory: Negative for apnea, cough, choking, chest tightness, shortness of breath and wheezing.    Cardiovascular: Negative for chest pain, palpitations and leg swelling.   Gastrointestinal: Negative for abdominal distention, abdominal pain, anal bleeding, blood in stool, constipation, diarrhea, nausea, rectal pain and vomiting.   Endocrine: Negative for polydipsia and polyuria.   Genitourinary: Positive for dysuria. Negative for decreased urine volume, difficulty urinating, discharge, enuresis, flank pain, frequency, genital sores, hematuria, penile pain, penile swelling, scrotal swelling, testicular pain and urgency.   Musculoskeletal: Negative for arthralgias, back pain and myalgias.   Skin: Negative for color change, rash and wound.   Neurological: Negative for dizziness, syncope, speech difficulty, light-headedness and headaches.   Hematological: Negative for adenopathy. Does not bruise/bleed easily.   Psychiatric/Behavioral: Negative for behavioral problems, confusion, hallucinations and sleep disturbance.       Objective:      Physical Exam   Constitutional: He appears well-developed and well-nourished. No distress.   HENT:   Head: Normocephalic and atraumatic.   Eyes: No scleral icterus.   Cardiovascular: Normal rate.    Pulmonary/Chest:  Effort normal.   Abdominal: Soft. He exhibits no distension. There is no tenderness. There is no rebound.   Neurological: He is alert.   Skin: Skin is warm. He is not diaphoretic. No pallor.     Psychiatric: He has a normal mood and affect.       Assessment:       1. Post-operative state    2. BPH with urinary obstruction        Plan:       Diagnoses and all orders for this visit:    Post-operative state  -     Urine culture    BPH with urinary obstruction    Other orders  -     phenazopyridine (PYRIDIUM) 100 MG tablet; Take 1 tablet (100 mg total) by mouth 2 (two) times daily.    Urine sent for culture today  Prescription for pyridium given for dysuria.    patient seen by me and I agree with the above.  He will return in 3 months or sooner when necessary should continue taking the Flomax for now but I intend on stopping that

## 2017-06-23 LAB — BACTERIA UR CULT: NO GROWTH

## 2017-07-04 ENCOUNTER — HOSPITAL ENCOUNTER (INPATIENT)
Facility: HOSPITAL | Age: 72
LOS: 1 days | Discharge: HOME | DRG: 684 | End: 2017-07-05
Attending: EMERGENCY MEDICINE | Admitting: INTERNAL MEDICINE
Payer: MEDICARE

## 2017-07-04 DIAGNOSIS — N17.9 ACUTE RENAL FAILURE, UNSPECIFIED ACUTE RENAL FAILURE TYPE: Primary | ICD-10-CM

## 2017-07-04 DIAGNOSIS — R25.2 MUSCLE CRAMPS: ICD-10-CM

## 2017-07-04 DIAGNOSIS — N30.00 ACUTE CYSTITIS WITHOUT HEMATURIA: ICD-10-CM

## 2017-07-04 LAB
ALBUMIN SERPL BCP-MCNC: 4.3 G/DL
ALP SERPL-CCNC: 67 U/L
ALT SERPL W/O P-5'-P-CCNC: 15 U/L
ANION GAP SERPL CALC-SCNC: 14 MMOL/L
AST SERPL-CCNC: 13 U/L
BACTERIA #/AREA URNS HPF: ABNORMAL /HPF
BASOPHILS # BLD AUTO: 0.02 K/UL
BASOPHILS NFR BLD: 0.3 %
BILIRUB SERPL-MCNC: 1.7 MG/DL
BILIRUB UR QL STRIP: NEGATIVE
BUN SERPL-MCNC: 44 MG/DL
CALCIUM SERPL-MCNC: 9.6 MG/DL
CHLORIDE SERPL-SCNC: 103 MMOL/L
CK MB SERPL-MCNC: 3.6 NG/ML
CK MB SERPL-RTO: 3.6 %
CK SERPL-CCNC: 100 U/L
CLARITY UR: ABNORMAL
CO2 SERPL-SCNC: 18 MMOL/L
COLOR UR: YELLOW
CREAT SERPL-MCNC: 2.3 MG/DL
CREAT UR-MCNC: 141.2 MG/DL
DIFFERENTIAL METHOD: NORMAL
EOSINOPHIL # BLD AUTO: 0.1 K/UL
EOSINOPHIL NFR BLD: 0.9 %
ERYTHROCYTE [DISTWIDTH] IN BLOOD BY AUTOMATED COUNT: 14.2 %
EST. GFR  (AFRICAN AMERICAN): 31 ML/MIN/1.73 M^2
EST. GFR  (NON AFRICAN AMERICAN): 27 ML/MIN/1.73 M^2
GLUCOSE SERPL-MCNC: 131 MG/DL
GLUCOSE UR QL STRIP: NEGATIVE
HCT VFR BLD AUTO: 43.3 %
HGB BLD-MCNC: 15.2 G/DL
HGB UR QL STRIP: ABNORMAL
HYALINE CASTS #/AREA URNS LPF: 6 /LPF
KETONES UR QL STRIP: NEGATIVE
LACTATE SERPL-SCNC: 1.9 MMOL/L
LEUKOCYTE ESTERASE UR QL STRIP: ABNORMAL
LYMPHOCYTES # BLD AUTO: 2.3 K/UL
LYMPHOCYTES NFR BLD: 30.6 %
MAGNESIUM SERPL-MCNC: 2.6 MG/DL
MCH RBC QN AUTO: 30.6 PG
MCHC RBC AUTO-ENTMCNC: 35.1 %
MCV RBC AUTO: 87 FL
MICROSCOPIC COMMENT: ABNORMAL
MONOCYTES # BLD AUTO: 0.6 K/UL
MONOCYTES NFR BLD: 7.8 %
NEUTROPHILS # BLD AUTO: 4.6 K/UL
NEUTROPHILS NFR BLD: 60.4 %
NITRITE UR QL STRIP: NEGATIVE
PH UR STRIP: 5 [PH] (ref 5–8)
PLATELET # BLD AUTO: 195 K/UL
PMV BLD AUTO: 11.4 FL
POCT GLUCOSE: 135 MG/DL (ref 70–110)
POTASSIUM SERPL-SCNC: 4.5 MMOL/L
PROT SERPL-MCNC: 7.9 G/DL
PROT UR QL STRIP: ABNORMAL
RBC # BLD AUTO: 4.97 M/UL
RBC #/AREA URNS HPF: 15 /HPF (ref 0–4)
SODIUM SERPL-SCNC: 135 MMOL/L
SODIUM UR-SCNC: 42 MMOL/L
SP GR UR STRIP: 1.01 (ref 1–1.03)
SQUAMOUS #/AREA URNS HPF: 5 /HPF
TROPONIN I SERPL DL<=0.01 NG/ML-MCNC: <0.006 NG/ML
URN SPEC COLLECT METH UR: ABNORMAL
UROBILINOGEN UR STRIP-ACNC: NEGATIVE EU/DL
WBC # BLD AUTO: 7.61 K/UL
WBC #/AREA URNS HPF: 80 /HPF (ref 0–5)
WBC CASTS #/AREA URNS LPF: 1 /LPF

## 2017-07-04 PROCEDURE — 12000002 HC ACUTE/MED SURGE SEMI-PRIVATE ROOM

## 2017-07-04 PROCEDURE — 93005 ELECTROCARDIOGRAM TRACING: CPT

## 2017-07-04 PROCEDURE — 83735 ASSAY OF MAGNESIUM: CPT

## 2017-07-04 PROCEDURE — 84484 ASSAY OF TROPONIN QUANT: CPT

## 2017-07-04 PROCEDURE — 85025 COMPLETE CBC W/AUTO DIFF WBC: CPT

## 2017-07-04 PROCEDURE — 25000003 PHARM REV CODE 250: Performed by: EMERGENCY MEDICINE

## 2017-07-04 PROCEDURE — 82553 CREATINE MB FRACTION: CPT

## 2017-07-04 PROCEDURE — 82570 ASSAY OF URINE CREATININE: CPT

## 2017-07-04 PROCEDURE — 82962 GLUCOSE BLOOD TEST: CPT

## 2017-07-04 PROCEDURE — 83605 ASSAY OF LACTIC ACID: CPT

## 2017-07-04 PROCEDURE — 99284 EMERGENCY DEPT VISIT MOD MDM: CPT | Mod: 25

## 2017-07-04 PROCEDURE — 63600175 PHARM REV CODE 636 W HCPCS: Performed by: EMERGENCY MEDICINE

## 2017-07-04 PROCEDURE — 25000003 PHARM REV CODE 250: Performed by: INTERNAL MEDICINE

## 2017-07-04 PROCEDURE — 96374 THER/PROPH/DIAG INJ IV PUSH: CPT

## 2017-07-04 PROCEDURE — 80053 COMPREHEN METABOLIC PANEL: CPT

## 2017-07-04 PROCEDURE — 96361 HYDRATE IV INFUSION ADD-ON: CPT

## 2017-07-04 PROCEDURE — 84300 ASSAY OF URINE SODIUM: CPT

## 2017-07-04 PROCEDURE — 81000 URINALYSIS NONAUTO W/SCOPE: CPT

## 2017-07-04 RX ORDER — ONDANSETRON 2 MG/ML
8 INJECTION INTRAMUSCULAR; INTRAVENOUS
Status: COMPLETED | OUTPATIENT
Start: 2017-07-04 | End: 2017-07-04

## 2017-07-04 RX ORDER — ACETAMINOPHEN 500 MG
500 TABLET ORAL EVERY 6 HOURS PRN
Status: DISCONTINUED | OUTPATIENT
Start: 2017-07-04 | End: 2017-07-05 | Stop reason: HOSPADM

## 2017-07-04 RX ORDER — POLYETHYLENE GLYCOL 3350 17 G/17G
17 POWDER, FOR SOLUTION ORAL DAILY
Status: CANCELLED | OUTPATIENT
Start: 2017-07-05

## 2017-07-04 RX ORDER — HEPARIN SODIUM 5000 [USP'U]/ML
5000 INJECTION, SOLUTION INTRAVENOUS; SUBCUTANEOUS EVERY 12 HOURS
Status: DISCONTINUED | OUTPATIENT
Start: 2017-07-04 | End: 2017-07-05 | Stop reason: HOSPADM

## 2017-07-04 RX ORDER — ONDANSETRON 2 MG/ML
4 INJECTION INTRAMUSCULAR; INTRAVENOUS EVERY 8 HOURS PRN
Status: DISCONTINUED | OUTPATIENT
Start: 2017-07-04 | End: 2017-07-04

## 2017-07-04 RX ORDER — SIMVASTATIN 10 MG/1
10 TABLET, FILM COATED ORAL NIGHTLY
Status: DISCONTINUED | OUTPATIENT
Start: 2017-07-04 | End: 2017-07-05 | Stop reason: HOSPADM

## 2017-07-04 RX ORDER — PANTOPRAZOLE SODIUM 40 MG/1
80 TABLET, DELAYED RELEASE ORAL
Status: COMPLETED | OUTPATIENT
Start: 2017-07-04 | End: 2017-07-04

## 2017-07-04 RX ORDER — ONDANSETRON 2 MG/ML
8 INJECTION INTRAMUSCULAR; INTRAVENOUS EVERY 8 HOURS PRN
Status: DISCONTINUED | OUTPATIENT
Start: 2017-07-04 | End: 2017-07-05 | Stop reason: HOSPADM

## 2017-07-04 RX ORDER — TAMSULOSIN HYDROCHLORIDE 0.4 MG/1
1 CAPSULE ORAL DAILY
Status: DISCONTINUED | OUTPATIENT
Start: 2017-07-05 | End: 2017-07-05 | Stop reason: HOSPADM

## 2017-07-04 RX ORDER — RAMELTEON 8 MG/1
8 TABLET ORAL NIGHTLY PRN
Status: DISCONTINUED | OUTPATIENT
Start: 2017-07-04 | End: 2017-07-05 | Stop reason: HOSPADM

## 2017-07-04 RX ORDER — PANTOPRAZOLE SODIUM 40 MG/1
40 TABLET, DELAYED RELEASE ORAL DAILY
Status: DISCONTINUED | OUTPATIENT
Start: 2017-07-05 | End: 2017-07-05 | Stop reason: HOSPADM

## 2017-07-04 RX ORDER — SODIUM CHLORIDE 9 MG/ML
INJECTION, SOLUTION INTRAVENOUS CONTINUOUS
Status: DISCONTINUED | OUTPATIENT
Start: 2017-07-04 | End: 2017-07-05 | Stop reason: HOSPADM

## 2017-07-04 RX ORDER — HYDROCODONE BITARTRATE AND ACETAMINOPHEN 5; 325 MG/1; MG/1
1 TABLET ORAL EVERY 4 HOURS PRN
Status: CANCELLED | OUTPATIENT
Start: 2017-07-04

## 2017-07-04 RX ADMIN — SODIUM CHLORIDE: 0.9 INJECTION, SOLUTION INTRAVENOUS at 08:07

## 2017-07-04 RX ADMIN — ONDANSETRON 8 MG: 2 INJECTION INTRAMUSCULAR; INTRAVENOUS at 02:07

## 2017-07-04 RX ADMIN — SODIUM CHLORIDE 1000 ML: 0.9 INJECTION, SOLUTION INTRAVENOUS at 02:07

## 2017-07-04 RX ADMIN — PANTOPRAZOLE SODIUM 80 MG: 40 TABLET, DELAYED RELEASE ORAL at 02:07

## 2017-07-04 RX ADMIN — HEPARIN SODIUM 5000 UNITS: 5000 INJECTION, SOLUTION INTRAVENOUS; SUBCUTANEOUS at 08:07

## 2017-07-04 RX ADMIN — SIMVASTATIN 10 MG: 10 TABLET, FILM COATED ORAL at 08:07

## 2017-07-04 NOTE — ED TRIAGE NOTES
Pt presents to ED c/o generalized body cramping while working out in the yard today.  Pt thinks he is dehydrated.  Denies any nvd, ha, chest pains.

## 2017-07-04 NOTE — ED PROVIDER NOTES
"Encounter Date: 7/4/2017    SCRIBE #1 NOTE: I, Chantal Evans, am scribing for, and in the presence of,  Parminder Rey MD. I have scribed the following portions of the note - Other sections scribed: HPI/ROS.       History     Chief Complaint   Patient presents with    Muscle Pain     " I have been having muscle cramps all over, I have been in the heat, I am in lawn service." Pt reports nausea, denies vomiting     CC: Muscle Cramping    HPI: This 72 y.o. M who has history of HLD presents to the ED for evaluation of muscle cramps to the bilateral calves and bilateral arms with associated nausea after eating for 2 days. The pt reports an associated episode of moderate chest pain that lasted about 30 minutes around 0600 this morning. He is not currently experiencing chest pain. The pt's wife states that she noticed that his voice is hoarse, and he doesn't hear her as well when she speaks to him since they returned from [place] 3 days ago. The pt works in the lawn service cutting grass outside. He denies fever, chills, diaphoresis, vomiting, diarrhea, and any other associated symptoms. No alleviating or exacerbating factors reported.       The history is provided by the patient. No  was used.     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    Alcohol use 3.0 oz/week     6 Standard drinks or equivalent per week      Comment: " Daily     Review of Systems   Constitutional: Negative for chills, diaphoresis and fever.   HENT: Negative for ear pain and sore throat.    Eyes: Negative for pain.   Respiratory: Negative for cough and shortness of breath.    Cardiovascular: Negative for chest pain.   Gastrointestinal: Positive for nausea. Negative for abdominal pain, diarrhea and vomiting.   Genitourinary: Negative for dysuria.   Musculoskeletal: Positive for myalgias (cramping to the bilateral calves and arms). Negative for back pain.   Skin: Negative for rash.   Neurological: Negative for headaches.       Physical Exam     Initial Vitals [07/04/17 1342]   BP Pulse Resp Temp SpO2   113/70 84 18 97.9 °F (36.6 °C) 96 %      MAP       84.33         Physical Exam  The patient was examined specifically for the following:   General:No significant distress, Good color, Warm and dry. Head and neck:Scalp atraumatic, Neck supple. Neurological:Appropriate conversation, Gross motor deficits. Eyes:Conjugate gaze, Clear corneas. ENT: No epistaxis. Cardiac: Regular rate and rhythm, Grossly normal heart tones. Pulmonary: Wheezing, Rales. Gastrointestinal: Abdominal tenderness, Abdominal distention. Musculoskeletal: Extremity deformity, Apparent pain with range of motion of the joints. Skin: Rash.   The findings on examination were normal.  The lungs are clear.  The heart tones are normal.  The abdomen is soft.  Extremities are nontender.    ED Course   Critical Care  Date/Time: 7/15/2017 1:31 AM  Performed by: ROEL COE  Authorized by: ASH RAJPUT   Direct patient critical care time: 22 minutes  Additional history critical care time: 11 minutes  Ordering / reviewing critical care time: 11 minutes  Documentation critical care time: 17 minutes  Consulting other physicians critical care time: 5 minutes  Consult with family critical care time: 4 minutes  Total critical care time (exclusive of procedural time) : 70 minutes  Critical care time was  exclusive of separately billable procedures and treating other patients and teaching time.  Critical care was necessary to treat or prevent imminent or life-threatening deterioration of the following conditions: metabolic crisis and renal failure.  Critical care was time spent personally by me on the following activities: discussions with primary provider, examination of patient, obtaining history from patient or surrogate, ordering and performing treatments and interventions, ordering and review of laboratory studies, ordering and review of radiographic studies, re-evaluation of patient's condition and review of old charts.        Labs Reviewed   COMPREHENSIVE METABOLIC PANEL - Abnormal; Notable for the following:        Result Value    Sodium 135 (*)     CO2 18 (*)     Glucose 131 (*)     BUN, Bld 44 (*)     Creatinine 2.3 (*)     Total Bilirubin 1.7 (*)     eGFR if  31 (*)     eGFR if non  27 (*)     All other components within normal limits   URINALYSIS - Abnormal; Notable for the following:     Appearance, UA Cloudy (*)     Protein, UA 1+ (*)     Occult Blood UA 3+ (*)     Leukocytes, UA 3+ (*)     All other components within normal limits   URINALYSIS MICROSCOPIC - Abnormal; Notable for the following:     RBC, UA 15 (*)     WBC, UA 80 (*)     Hyaline Casts, UA 6 (*)     WBC Casts, UA 1 (*)     All other components within normal limits   LACTIC ACID, PLASMA   MAGNESIUM   TROPONIN I   CBC W/ AUTO DIFFERENTIAL   CK-MB     EKG Readings: (Independently Interpreted)   This patient is in a normal sinus rhythm with a heart rate is 72.  The UT QRS and QT intervals are normal.  There is no evidence of acute myocardial infarction or malignant arrhythmia.     Medical decision making: Given the above, this patient presents to the emergency room with weakness fatigue generalized body cramps.  The patient is found to have acute renal failure with a BUN of 44 creatinine 2.3, up from 0.7.  The  urinalysis reveals evidence of acute urinary tract infection.  The patient was treated with IV Rocephin and rehydrated.  I discussed this case at length with internal medicine.  I wrote orders on behalf of Dr. Cruz.  There is no evidence of rhabdomyolysis or hypomagnesemia or other significant electrolyte abnormalities.  This may be simple dehydration.                  Scribe Attestation:   Scribe #1: I performed the above scribed service and the documentation accurately describes the services I performed. I attest to the accuracy of the note.    Attending Attestation:           Physician Attestation for Scribe:  Physician Attestation Statement for Scribe #1: I, Parminder Rey MD, reviewed documentation, as scribed by Chantal Krueger in my presence, and it is both accurate and complete.                 ED Course     Clinical Impression:   The primary encounter diagnosis was Acute renal failure, unspecified acute renal failure type. Diagnoses of Acute cystitis without hematuria and Muscle cramps were also pertinent to this visit.                           Parminder Rey MD  07/04/17 5361       Parminder Rey MD  07/15/17 9731

## 2017-07-05 VITALS
OXYGEN SATURATION: 96 % | HEART RATE: 64 BPM | WEIGHT: 138.88 LBS | RESPIRATION RATE: 19 BRPM | SYSTOLIC BLOOD PRESSURE: 117 MMHG | BODY MASS INDEX: 22.32 KG/M2 | DIASTOLIC BLOOD PRESSURE: 76 MMHG | HEIGHT: 66 IN | TEMPERATURE: 98 F

## 2017-07-05 PROBLEM — N17.9 ACUTE RENAL FAILURE: Status: RESOLVED | Noted: 2017-07-04 | Resolved: 2017-07-05

## 2017-07-05 LAB
ALBUMIN SERPL BCP-MCNC: 3.5 G/DL
ALP SERPL-CCNC: 52 U/L
ALT SERPL W/O P-5'-P-CCNC: 14 U/L
ANION GAP SERPL CALC-SCNC: 6 MMOL/L
AST SERPL-CCNC: 12 U/L
BASOPHILS # BLD AUTO: 0.02 K/UL
BASOPHILS NFR BLD: 0.4 %
BILIRUB SERPL-MCNC: 1.5 MG/DL
BUN SERPL-MCNC: 30 MG/DL
CALCIUM SERPL-MCNC: 8.8 MG/DL
CHLORIDE SERPL-SCNC: 110 MMOL/L
CO2 SERPL-SCNC: 24 MMOL/L
CREAT SERPL-MCNC: 1 MG/DL
DIFFERENTIAL METHOD: ABNORMAL
EOSINOPHIL # BLD AUTO: 0.1 K/UL
EOSINOPHIL NFR BLD: 1.8 %
ERYTHROCYTE [DISTWIDTH] IN BLOOD BY AUTOMATED COUNT: 14.3 %
EST. GFR  (AFRICAN AMERICAN): >60 ML/MIN/1.73 M^2
EST. GFR  (NON AFRICAN AMERICAN): >60 ML/MIN/1.73 M^2
GLUCOSE SERPL-MCNC: 98 MG/DL
HCT VFR BLD AUTO: 40.1 %
HGB BLD-MCNC: 13.3 G/DL
LYMPHOCYTES # BLD AUTO: 1.9 K/UL
LYMPHOCYTES NFR BLD: 41.5 %
MAGNESIUM SERPL-MCNC: 2.4 MG/DL
MCH RBC QN AUTO: 29.5 PG
MCHC RBC AUTO-ENTMCNC: 33.2 %
MCV RBC AUTO: 89 FL
MONOCYTES # BLD AUTO: 0.4 K/UL
MONOCYTES NFR BLD: 9.3 %
NEUTROPHILS # BLD AUTO: 2.1 K/UL
NEUTROPHILS NFR BLD: 47 %
PHOSPHATE SERPL-MCNC: 3.4 MG/DL
PLATELET # BLD AUTO: 165 K/UL
PMV BLD AUTO: 11.6 FL
POTASSIUM SERPL-SCNC: 4.8 MMOL/L
PROT SERPL-MCNC: 6.3 G/DL
RBC # BLD AUTO: 4.51 M/UL
SODIUM SERPL-SCNC: 140 MMOL/L
WBC # BLD AUTO: 4.53 K/UL

## 2017-07-05 PROCEDURE — 25000003 PHARM REV CODE 250: Performed by: INTERNAL MEDICINE

## 2017-07-05 PROCEDURE — 87086 URINE CULTURE/COLONY COUNT: CPT

## 2017-07-05 PROCEDURE — 25000003 PHARM REV CODE 250: Performed by: EMERGENCY MEDICINE

## 2017-07-05 PROCEDURE — 84100 ASSAY OF PHOSPHORUS: CPT

## 2017-07-05 PROCEDURE — 80053 COMPREHEN METABOLIC PANEL: CPT

## 2017-07-05 PROCEDURE — 36415 COLL VENOUS BLD VENIPUNCTURE: CPT

## 2017-07-05 PROCEDURE — 85025 COMPLETE CBC W/AUTO DIFF WBC: CPT

## 2017-07-05 PROCEDURE — 83735 ASSAY OF MAGNESIUM: CPT

## 2017-07-05 RX ADMIN — TAMSULOSIN HYDROCHLORIDE 0.4 MG: 0.4 CAPSULE ORAL at 10:07

## 2017-07-05 RX ADMIN — SODIUM CHLORIDE: 0.9 INJECTION, SOLUTION INTRAVENOUS at 12:07

## 2017-07-05 RX ADMIN — HEPARIN SODIUM 5000 UNITS: 5000 INJECTION, SOLUTION INTRAVENOUS; SUBCUTANEOUS at 10:07

## 2017-07-05 RX ADMIN — PANTOPRAZOLE SODIUM 40 MG: 40 TABLET, DELAYED RELEASE ORAL at 10:07

## 2017-07-05 NOTE — NURSING
Received shift-report from BRAD Graves. Note that patient awake, alert and fully oriented. Denies pain at this time.

## 2017-07-05 NOTE — PLAN OF CARE
DYLAN reviewed with patient the things he will be responsible for to manage his healthcare at home by utilizing teach back method. Patient verbalized understanding. SW informed nurse Kim  completed all discharge planning with patient and she could complete her nursing education/discharge when ready.         07/05/17 1554   Final Note   Assessment Type Final Discharge Note   Discharge Disposition Home   Discharge plans and expectations educations in teach back method with documentation complete? No   Offered [facility] -- Bedside Delivery? n/a   Discharge/Hospital Encounter Summary to (non[facility]) PCP n/a   Referral to Outpatient Case Management complete? n/a   Referral to / orders for Home Health Complete? n/a   30 day supply of medicines given at discharge, if documented non-compliance / non-adherence? n/a   Any social issues identified prior to discharge? n/a   Did you assess the readiness or willingness of the family or caregiver to support self management of care? n/a   Right Care Referral Info   Post Acute Recommendation No Care

## 2017-07-05 NOTE — PLAN OF CARE
"SW met with patient to complete discharge needs assessment. SW provided and reviewed with patient '[facility] Packet", "Discharge Planning Begins on Admission" brochure and "Help At Home" handout. SW discussed with patient the things he will be responsible for to manage healthcare at home. Patient reported he prefers morning doctor appointments.        [facility] [place] [facility] - [place], [place] - [address]  [address] [address]  Phone: [phone] Fax: [phone]         07/05/17 8577   Discharge Assessment   Assessment Type Discharge Planning Assessment   Confirmed/corrected address and phone number on facesheet? Yes   Assessment information obtained from? Patient   Type of Healthcare Directive Received (None)   Prior to hospitilization cognitive status: Alert/Oriented;No Deficits   Prior to hospitalization functional status: Independent   Current cognitive status: Alert/Oriented;No Deficits   Current Functional Status: Independent   Arrived From home or self-care   Lives With spouse   Able to Return to Prior Arrangements yes   Is patient able to care for self after discharge? Yes   How many people do you have in your home that can help with your care after discharge? 1   Who are your caregiver(s) and their phone number(s)? Spouse   Patient's perception of discharge disposition home or selfcare   Readmission Within The Last 30 Days no previous admission in last 30 days   Patient currently being followed by outpatient case management? No   Patient currently receives home health services? No   Does the patient currently use HME? No   Patient currently receives private duty nursing? No   Patient currently receives any other outside agency services? No   Equipment Currently Used at Home none   Do you have any problems affording any of your prescribed medications? No   Is the patient taking medications as prescribed? yes   Do you have any financial concerns preventing you from receiving the " healthcare you need? No   Does the patient have transportation to healthcare appointments? Yes   Transportation Available car;family or friend will provide   On Dialysis? No   Does the patient receive services at the Coumadin Clinic? No   Are there any open cases? No   Discharge Plan A Home with family  (PCP F/U)   Discharge Plan B Home with family   Patient/Family In Agreement With Plan yes

## 2017-07-05 NOTE — H&P
[facility] Hospital Medicine  History & Physical    Patient Name: Reed Torres  MRN: [id number]  Admission Date: 7/4/2017  Attending Physician: Grace Noel MD   Primary Care Provider: Parminder Ortega MD         Patient information was obtained from patient.     Subjective:     Principal Problem:Acute renal failure    Chief Complaint: Cramps for two days.    HPI: Mr. Reed Torres is a 72 y.o. male with hyperlipidemia (.2 Nov 2013) and BPH who presents to [facility] ED with complaints of generalized myalgias for two days.  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  He was able to cut one lot on Saturday and two on Sunday.  Later that day he started to have cramping in both of his arms, hands, and calves.  He also started to feel weak and fatigued.  He actually came to the ED here last night around 9:00 PM but left after he was still sitting in the waiting room after three hours.  He went to his grandson's birthday party this afternoon but started to feel even worse, prompting his re-presentation to the ED tonight.  He has had some nausea but denies any vomiting, fevers, chills, chest pain, shortness of breath, diaphoresis, nor any palpitations.  His appetite is good and he has not experienced any abdominal pain or diarrhea.      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      Current Outpatient Prescriptions on File Prior to Encounter   Medication Sig    NAPROXEN SODIUM (ALEVE ORAL) Take by mouth.    oxycodone-acetaminophen (PERCOCET) 5-325 mg per tablet Take 1-2 tablets by mouth every 4 (four) hours as needed.    phenazopyridine (PYRIDIUM) 100 MG tablet Take 1 tablet (100 mg total) by mouth 2 (two) times daily.    polyethylene glycol (GLYCOLAX) 17 gram PwPk Take 17 g by mouth once daily.    simvastatin (ZOCOR) 10 MG tablet Take 1 tablet (10 mg total) by mouth every evening.    tamsulosin (FLOMAX) 0.4 mg Cp24 TAKE ONE CAPSULE BY MOUTH EVERY DAY     Family History     Problem Relation (Age of Onset)    COPD Mother, Father    Diabetes Mother    Heart disease Brother    Hypertension Mother        Social History Main Topics    Smoking status: Former Smoker    Smokeless tobacco: Former User      Comment: stopped smoking 20 yrs ago.    Alcohol use 3.0 oz/week     6 Standard drinks or equivalent per week      Comment: Daily    Drug use: No    Sexual activity: Yes     Partners: Female     Birth control/ protection: None     Review of Systems   Constitutional: Positive for activity change and fatigue. Negative for appetite change, chills, diaphoresis, fever and unexpected weight change.   HENT: Negative.    Eyes: Negative.    Respiratory: Negative for cough, chest tightness, shortness of breath and wheezing.    Cardiovascular: Negative for chest pain, palpitations and leg swelling.   Gastrointestinal: Negative for abdominal distention, abdominal pain, blood in stool, constipation, diarrhea, nausea and vomiting.   Genitourinary: Negative for dysuria and hematuria.   Musculoskeletal: Positive for myalgias.   Neurological: Positive for weakness. Negative for dizziness, seizures, syncope and light-headedness.   Psychiatric/Behavioral: Negative.      Objective:     Vital Signs (Most Recent):  Temp: 98 °F (36.7 °C)  (07/04/17 2133)  Pulse: 74 (07/04/17 2133)  Resp: 18 (07/04/17 2133)  BP: 120/79 (07/04/17 2133)  SpO2: 97 % (07/04/17 2133) Vital Signs (24h Range):  Temp:  [97.9 °F (36.6 °C)-98.4 °F (36.9 °C)] 98 °F (36.7 °C)  Pulse:  [58-84] 74  Resp:  [18] 18  SpO2:  [96 %-100 %] 97 %  BP: (104-124)/(68-79) 120/79     Weight: 64.9 kg (143 lb)  Body mass index is 23.08 kg/m².    Physical Exam   Constitutional: He is oriented to person, place, and time. He appears well-developed and well-nourished. No distress.   HENT:   Head: Normocephalic and atraumatic.   Right Ear: External ear normal.   Left Ear: External ear normal.   Nose: Nose normal.   Eyes: Right eye exhibits no discharge. Left eye exhibits no discharge.   Neck: Normal range of motion.   Cardiovascular: Normal rate, regular rhythm, normal heart sounds and intact distal pulses.  Exam reveals no gallop and no friction rub.    No murmur heard.  Pulmonary/Chest: Effort normal and breath sounds normal. No respiratory distress. He has no wheezes. He has no rales. He exhibits no tenderness.   Abdominal: Soft. Bowel sounds are normal. He exhibits no distension. There is no tenderness. There is no rebound and no guarding.   Musculoskeletal: Normal range of motion. He exhibits no edema.   Neurological: He is alert and oriented to person, place, and time.   Skin: Skin is warm and dry. He is not diaphoretic. No erythema.   Psychiatric: He has a normal mood and affect. His behavior is normal. Judgment and thought content normal.   Nursing note and vitals reviewed.       Significant Labs: All pertinent labs within the past 24 hours have been reviewed.    Significant Imaging: I have reviewed and interpreted all pertinent imaging results/findings within the past 24 hours.    Assessment/Plan:     * Acute renal failure    Patient's urinalysis is significant for a specific gravity of 1.015, cloudy appearance, 3+ occult blood, 3+ leukocytes, 80 WBCs, and 6 hyaline casts.  Urine output has  been fair.  Will obtain additional urine studies; provide aggressive IV fluid hydration; monitor the urine output; recheck the renal function in the morning; and avoid nephrotoxins.        Hyperlipidemia    Poorly-controlled; will continue patient's home regimen of simvastatin.        BPH (benign prostatic hyperplasia)    Stable; will continue his home regimen of tamsulosin.          VTE Risk Mitigation         Ordered     heparin (porcine) injection 5,000 Units  Every 12 hours     Route:  Subcutaneous        07/04/17 2011     Low Risk of VTE  Once      07/04/17 2011            Total time spent on case: 45 minutes.        Radha Bhakta M.D.  Staff Nocturnist  Department of Hospital Medicine  [facility]  Pager: [phone]

## 2017-07-05 NOTE — PROGRESS NOTES
DYLAN contacted Dr. Ortega office @ [phone] to schedule PCP follow-up, DYLAN was unable to speak with anyone to schedule appointment. SW met with patient to provide barrier to scheduling PCP follow-up, patient stated he could call and schedule his own appointment or he could drive by the office because he lives close by.

## 2017-07-05 NOTE — SUBJECTIVE & OBJECTIVE
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      Current Outpatient Prescriptions on File Prior to Encounter   Medication Sig    NAPROXEN SODIUM (ALEVE ORAL) Take by mouth.    oxycodone-acetaminophen (PERCOCET) 5-325 mg per tablet Take 1-2 tablets by mouth every 4 (four) hours as needed.    phenazopyridine (PYRIDIUM) 100 MG tablet Take 1 tablet (100 mg total) by mouth 2 (two) times daily.    polyethylene glycol (GLYCOLAX) 17 gram PwPk Take 17 g by mouth once daily.    simvastatin (ZOCOR) 10 MG tablet Take 1 tablet (10 mg total) by mouth every evening.    tamsulosin (FLOMAX) 0.4 mg Cp24 TAKE ONE CAPSULE BY MOUTH EVERY DAY     Family History     Problem Relation (Age of Onset)    COPD Mother, Father    Diabetes Mother    Heart disease Brother    Hypertension Mother        Social History Main Topics    Smoking status: Former Smoker    Smokeless tobacco: Former User      Comment: stopped smoking 20 yrs ago.    Alcohol use 3.0 oz/week     6 Standard drinks or equivalent per week      Comment: Daily    Drug use: No    Sexual activity: Yes     Partners: Female     Birth control/ protection: None     Review of Systems   Constitutional: Positive for activity change and fatigue. Negative for appetite change, chills, diaphoresis, fever and unexpected weight change.   HENT: Negative.    Eyes: Negative.    Respiratory: Negative for cough, chest tightness, shortness of breath and wheezing.    Cardiovascular: Negative for chest pain, palpitations and leg swelling.   Gastrointestinal: Negative for abdominal distention, abdominal pain, blood in stool, constipation, diarrhea, nausea  and vomiting.   Genitourinary: Negative for dysuria and hematuria.   Musculoskeletal: Positive for myalgias.   Neurological: Positive for weakness. Negative for dizziness, seizures, syncope and light-headedness.   Psychiatric/Behavioral: Negative.      Objective:     Vital Signs (Most Recent):  Temp: 98 °F (36.7 °C) (07/04/17 2133)  Pulse: 74 (07/04/17 2133)  Resp: 18 (07/04/17 2133)  BP: 120/79 (07/04/17 2133)  SpO2: 97 % (07/04/17 2133) Vital Signs (24h Range):  Temp:  [97.9 °F (36.6 °C)-98.4 °F (36.9 °C)] 98 °F (36.7 °C)  Pulse:  [58-84] 74  Resp:  [18] 18  SpO2:  [96 %-100 %] 97 %  BP: (104-124)/(68-79) 120/79     Weight: 64.9 kg (143 lb)  Body mass index is 23.08 kg/m².    Physical Exam   Constitutional: He is oriented to person, place, and time. He appears well-developed and well-nourished. No distress.   HENT:   Head: Normocephalic and atraumatic.   Right Ear: External ear normal.   Left Ear: External ear normal.   Nose: Nose normal.   Eyes: Right eye exhibits no discharge. Left eye exhibits no discharge.   Neck: Normal range of motion.   Cardiovascular: Normal rate, regular rhythm, normal heart sounds and intact distal pulses.  Exam reveals no gallop and no friction rub.    No murmur heard.  Pulmonary/Chest: Effort normal and breath sounds normal. No respiratory distress. He has no wheezes. He has no rales. He exhibits no tenderness.   Abdominal: Soft. Bowel sounds are normal. He exhibits no distension. There is no tenderness. There is no rebound and no guarding.   Musculoskeletal: Normal range of motion. He exhibits no edema.   Neurological: He is alert and oriented to person, place, and time.   Skin: Skin is warm and dry. He is not diaphoretic. No erythema.   Psychiatric: He has a normal mood and affect. His behavior is normal. Judgment and thought content normal.   Nursing note and vitals reviewed.       Significant Labs: All pertinent labs within the past 24 hours have been reviewed.    Significant  Imaging: I have reviewed and interpreted all pertinent imaging results/findings within the past 24 hours.

## 2017-07-05 NOTE — ASSESSMENT & PLAN NOTE
Patient's urinalysis is significant for a specific gravity of 1.015, cloudy appearance, 3+ occult blood, 3+ leukocytes, 80 WBCs, and 6 hyaline casts.  Urine output has been fair.  Will obtain additional urine studies; provide aggressive IV fluid hydration; monitor the urine output; recheck the renal function in the morning; and avoid nephrotoxins.

## 2017-07-06 LAB — POCT GLUCOSE: 184 MG/DL (ref 70–110)

## 2017-07-06 NOTE — HOSPITAL COURSE
He quickly improve, faster than expected, with IV fluid resuscitation. His renal function improved to normal, and he was discharged home to continue aggressive PO hydration and to follow up promptly with his PCP.

## 2017-07-06 NOTE — DISCHARGE SUMMARY
[facility] Hospital Medicine  Discharge Summary      Patient Name: Reed Torres  MRN: [id number]  Admission Date: 7/4/2017  Hospital Length of Stay: 1 days  Discharge Date and Time: 7/5/2017  4:36 PM  Attending Physician: No att. providers found   Discharging Provider: Grace Noel MD  Primary Care Provider: Parminder Ortega MD      HPI:   Mr. Reed Torres is a 72 y.o. male with hyperlipidemia (.2 Nov 2013) and BPH who presents to [facility] ED with complaints of generalized myalgias for two days.  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  He was able to cut one lot on Saturday and two on Sunday.  Later that day he started to have cramping in both of his arms, hands, and calves.  He also started to feel weak and fatigued.  He actually came to the ED here last night around 9:00 PM but left after he was still sitting in the waiting room after three hours.  He went to his grandson's birthday party this afternoon but started to feel even worse, prompting his re-presentation to the ED tonight.  He has had some nausea but denies any vomiting, fevers, chills, chest pain, shortness of breath, diaphoresis, nor any palpitations.  His appetite is good and he has not experienced any abdominal pain or diarrhea.      Hospital Course:   He quickly improve, faster than expected, with IV fluid resuscitation. His renal function improved to normal, and he was discharged home to continue aggressive PO hydration and to follow up promptly with his PCP.       Final Active Diagnoses:    Diagnosis Date Noted POA    BPH (benign prostatic hyperplasia) [N40.0] 11/22/2013 Yes     Chronic    Hyperlipidemia [E78.5]  Yes     Chronic      Problems Resolved During this Admission:    Diagnosis Date Noted Date Resolved POA    PRINCIPAL PROBLEM:  Acute renal failure [N17.9] 07/04/2017 07/05/2017 Yes      Discharged  Condition: stable    Disposition: Home or Self Care    Follow Up:  Follow-up Information     Parminder Ortega MD. Schedule an appointment as soon as possible for a visit in 5 days.    Specialty:  Internal Medicine  Why:  follow up on labs, Outpatient Services, PCP Follow-up Appointment  Contact information:  [address]  [address]  [place] [address]  [phone]                 Patient Instructions:     Diet general     Activity as tolerated     Call MD for:  temperature >100.4     Call MD for:  persistent nausea and vomiting or diarrhea     Call MD for:  severe uncontrolled pain     Call MD for:  redness, tenderness, or signs of infection (pain, swelling, redness, odor or green/yellow discharge around incision site)     Call MD for:  difficulty breathing or increased cough     Call MD for:  severe persistent headache     Call MD for:  worsening rash     Call MD for:  persistent dizziness, light-headedness, or visual disturbances     Call MD for:  increased confusion or weakness       Medications:  Reconciled Home Medications:   Discharge Medication List as of 7/5/2017  4:23 PM      CONTINUE these medications which have NOT CHANGED    Details   NAPROXEN SODIUM (ALEVE ORAL) Take by mouth., Historical Med      oxycodone-acetaminophen (PERCOCET) 5-325 mg per tablet Take 1-2 tablets by mouth every 4 (four) hours as needed., Starting 5/19/2017, Until Discontinued, Print      phenazopyridine (PYRIDIUM) 100 MG tablet Take 1 tablet (100 mg total) by mouth 2 (two) times daily., Starting Thu 6/22/2017, Until Mon 8/21/2017, Normal      polyethylene glycol (GLYCOLAX) 17 gram PwPk Take 17 g by mouth once daily., Starting 5/19/2017, Until Discontinued, Print      simvastatin (ZOCOR) 10 MG tablet Take 1 tablet (10 mg total) by mouth every evening., Starting 6/25/2014, Until Discontinued, Normal      tamsulosin (FLOMAX) 0.4 mg Cp24 TAKE ONE CAPSULE BY MOUTH EVERY DAY, Normal           Time spent on the discharge of  patient: 60 minutes    Grace Noel MD  Department of Hospital Medicine  [facility]

## 2017-07-07 LAB — BACTERIA UR CULT: NORMAL

## 2017-07-31 ENCOUNTER — TELEPHONE (OUTPATIENT)
Dept: UROLOGY | Facility: CLINIC | Age: 72
End: 2017-07-31

## 2018-02-07 ENCOUNTER — OFFICE VISIT (OUTPATIENT)
Dept: FAMILY MEDICINE | Facility: CLINIC | Age: 73
End: 2018-02-07
Payer: MEDICARE

## 2018-02-07 VITALS
RESPIRATION RATE: 18 BRPM | WEIGHT: 154.56 LBS | BODY MASS INDEX: 24.84 KG/M2 | HEART RATE: 58 BPM | TEMPERATURE: 98 F | DIASTOLIC BLOOD PRESSURE: 88 MMHG | OXYGEN SATURATION: 97 % | SYSTOLIC BLOOD PRESSURE: 136 MMHG | HEIGHT: 66 IN

## 2018-02-07 DIAGNOSIS — Z20.1 TUBERCULOSIS EXPOSURE: ICD-10-CM

## 2018-02-07 DIAGNOSIS — Z23 NEED FOR ZOSTAVAX ADMINISTRATION: ICD-10-CM

## 2018-02-07 DIAGNOSIS — Z23 FLU VACCINE NEED: ICD-10-CM

## 2018-02-07 DIAGNOSIS — Z23 NEED FOR VACCINATION WITH 13-POLYVALENT PNEUMOCOCCAL CONJUGATE VACCINE: ICD-10-CM

## 2018-02-07 DIAGNOSIS — M47.816 SPONDYLOSIS OF LUMBAR REGION WITHOUT MYELOPATHY OR RADICULOPATHY: ICD-10-CM

## 2018-02-07 DIAGNOSIS — N40.0 BENIGN PROSTATIC HYPERPLASIA WITHOUT LOWER URINARY TRACT SYMPTOMS: Chronic | ICD-10-CM

## 2018-02-07 DIAGNOSIS — E78.5 HYPERLIPIDEMIA, UNSPECIFIED HYPERLIPIDEMIA TYPE: Chronic | ICD-10-CM

## 2018-02-07 DIAGNOSIS — Z00.00 ANNUAL PHYSICAL EXAM: Primary | ICD-10-CM

## 2018-02-07 DIAGNOSIS — Z12.11 SPECIAL SCREENING FOR MALIGNANT NEOPLASMS, COLON: ICD-10-CM

## 2018-02-07 PROCEDURE — 90662 IIV NO PRSV INCREASED AG IM: CPT | Mod: S$GLB,,, | Performed by: FAMILY MEDICINE

## 2018-02-07 PROCEDURE — 90670 PCV13 VACCINE IM: CPT | Mod: S$GLB,,, | Performed by: FAMILY MEDICINE

## 2018-02-07 PROCEDURE — 99213 OFFICE O/P EST LOW 20 MIN: CPT | Mod: 25,S$GLB,, | Performed by: FAMILY MEDICINE

## 2018-02-07 PROCEDURE — G0008 ADMIN INFLUENZA VIRUS VAC: HCPCS | Mod: S$GLB,,, | Performed by: FAMILY MEDICINE

## 2018-02-07 PROCEDURE — G0009 ADMIN PNEUMOCOCCAL VACCINE: HCPCS | Mod: S$GLB,,, | Performed by: FAMILY MEDICINE

## 2018-02-07 PROCEDURE — 99999 PR PBB SHADOW E&M-EST. PATIENT-LVL III: CPT | Mod: PBBFAC,,, | Performed by: FAMILY MEDICINE

## 2018-02-07 NOTE — PROGRESS NOTES
Administered Pneumococcal 13 vaccine IM to right deltoid.  Patient tolerated injection well, no adverse reactions noted.   Administered High Dose Flu vaccine IM to right deltoid.  Patient tolerated injection well.  Patient advised to wait in lobby for 15 minutes for observation and to report any adverse reactions immediately.  Patient verbalized understanding.

## 2018-02-09 ENCOUNTER — LAB VISIT (OUTPATIENT)
Dept: LAB | Facility: HOSPITAL | Age: 73
End: 2018-02-09
Attending: FAMILY MEDICINE
Payer: MEDICARE

## 2018-02-09 DIAGNOSIS — Z00.00 ANNUAL PHYSICAL EXAM: ICD-10-CM

## 2018-02-09 LAB
BACTERIA #/AREA URNS HPF: ABNORMAL /HPF
BILIRUB UR QL STRIP: NEGATIVE
CLARITY UR: CLEAR
COLOR UR: YELLOW
GLUCOSE UR QL STRIP: NEGATIVE
HGB UR QL STRIP: NEGATIVE
KETONES UR QL STRIP: NEGATIVE
LEUKOCYTE ESTERASE UR QL STRIP: ABNORMAL
MICROSCOPIC COMMENT: ABNORMAL
NITRITE UR QL STRIP: NEGATIVE
PH UR STRIP: 5 [PH] (ref 5–8)
PROT UR QL STRIP: NEGATIVE
RBC #/AREA URNS HPF: 2 /HPF (ref 0–4)
SP GR UR STRIP: 1.02 (ref 1–1.03)
SQUAMOUS #/AREA URNS HPF: ABNORMAL /HPF
URN SPEC COLLECT METH UR: ABNORMAL
UROBILINOGEN UR STRIP-ACNC: NEGATIVE EU/DL
WBC #/AREA URNS HPF: 12 /HPF (ref 0–5)

## 2018-02-09 PROCEDURE — 81000 URINALYSIS NONAUTO W/SCOPE: CPT

## 2018-02-10 DIAGNOSIS — E80.6 HYPERBILIRUBINEMIA: ICD-10-CM

## 2018-02-10 PROBLEM — N13.8 BPH WITH URINARY OBSTRUCTION: Status: RESOLVED | Noted: 2017-05-19 | Resolved: 2018-02-10

## 2018-02-10 PROBLEM — N40.1 BPH WITH URINARY OBSTRUCTION: Status: RESOLVED | Noted: 2017-05-19 | Resolved: 2018-02-10

## 2018-02-14 ENCOUNTER — TELEPHONE (OUTPATIENT)
Dept: FAMILY MEDICINE | Facility: CLINIC | Age: 73
End: 2018-02-14

## 2018-02-14 NOTE — TELEPHONE ENCOUNTER
----- Message from Pj Gillette MD sent at 2/7/2018  7:51 PM CST -----  Schedule 6 month recall, thank you!

## 2018-02-23 ENCOUNTER — LAB VISIT (OUTPATIENT)
Dept: LAB | Facility: HOSPITAL | Age: 73
End: 2018-02-23
Attending: FAMILY MEDICINE
Payer: MEDICARE

## 2018-02-23 DIAGNOSIS — Z12.11 SPECIAL SCREENING FOR MALIGNANT NEOPLASMS, COLON: ICD-10-CM

## 2018-02-23 LAB — HEMOCCULT STL QL IA: NEGATIVE

## 2018-02-23 PROCEDURE — 82274 ASSAY TEST FOR BLOOD FECAL: CPT

## 2018-06-08 ENCOUNTER — OFFICE VISIT (OUTPATIENT)
Dept: FAMILY MEDICINE | Facility: CLINIC | Age: 73
End: 2018-06-08
Payer: MEDICARE

## 2018-06-08 VITALS
BODY MASS INDEX: 24.28 KG/M2 | OXYGEN SATURATION: 98 % | DIASTOLIC BLOOD PRESSURE: 70 MMHG | HEART RATE: 65 BPM | SYSTOLIC BLOOD PRESSURE: 124 MMHG | HEIGHT: 64 IN | TEMPERATURE: 98 F | WEIGHT: 142.19 LBS

## 2018-06-08 DIAGNOSIS — H61.22 LEFT EAR IMPACTED CERUMEN: ICD-10-CM

## 2018-06-08 DIAGNOSIS — J30.9 ALLERGIC SINUSITIS: Primary | ICD-10-CM

## 2018-06-08 PROCEDURE — 69210 REMOVE IMPACTED EAR WAX UNI: CPT | Mod: LT,S$GLB,, | Performed by: INTERNAL MEDICINE

## 2018-06-08 PROCEDURE — 99999 PR PBB SHADOW E&M-EST. PATIENT-LVL III: CPT | Mod: PBBFAC,,, | Performed by: INTERNAL MEDICINE

## 2018-06-08 PROCEDURE — 99214 OFFICE O/P EST MOD 30 MIN: CPT | Mod: 25,S$GLB,, | Performed by: INTERNAL MEDICINE

## 2018-06-08 PROCEDURE — 96372 THER/PROPH/DIAG INJ SC/IM: CPT | Mod: 59,S$GLB,, | Performed by: INTERNAL MEDICINE

## 2018-06-08 RX ORDER — TRIAMCINOLONE ACETONIDE 40 MG/ML
40 INJECTION, SUSPENSION INTRA-ARTICULAR; INTRAMUSCULAR
Status: COMPLETED | OUTPATIENT
Start: 2018-06-08 | End: 2018-06-08

## 2018-06-08 RX ORDER — METHYLPREDNISOLONE 4 MG/1
TABLET ORAL
Qty: 1 PACKAGE | Refills: 0 | Status: SHIPPED | OUTPATIENT
Start: 2018-06-09 | End: 2019-02-26

## 2018-06-08 RX ADMIN — TRIAMCINOLONE ACETONIDE 40 MG: 40 INJECTION, SUSPENSION INTRA-ARTICULAR; INTRAMUSCULAR at 02:06

## 2018-06-08 NOTE — PROGRESS NOTES
SUBJECTIVE     Chief Complaint   Patient presents with    Cough    Sore Throat    Nasal Congestion       HPI  Reed Torres is a 73 y.o. male with multiple medical diagnoses as listed in the medical history and problem list that presents for evaluation of URI x 3 days. Pt reports symptoms started with a scratchy throat and have now progressed to nasal congestion, runny nose, and dry cough. Denies any fever, chills, or night sweats. Denies any sick contacts/recent travel. Pt has been taking Mucinex with some improvement.     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     Social History Main Topics    Smoking status: Former Smoker    Smokeless tobacco: Former User      Comment: stopped smoking 20 yrs ago.    Alcohol use 3.0 oz/week     6 Standard drinks or equivalent per week      Comment: Daily    Drug use: No    Sexual activity: Yes     Partners: Female     Birth control/ protection: None     Other Topics Concern    Not on file     Social History Narrative    Still doing lawn service       FAMILY HISTORY:  Family History   Problem Relation Age of Onset    COPD Mother     Diabetes Mother     Hypertension Mother     COPD Father     Heart disease Brother        ALLERGIES AND MEDICATIONS: updated and reviewed.  Review of patient's allergies indicates:  No Known Allergies  Current Outpatient Prescriptions   Medication Sig Dispense Refill    NAPROXEN SODIUM (ALEVE ORAL) Take by mouth.      oxycodone-acetaminophen (PERCOCET) 5-325 mg per tablet Take 1-2 tablets  "by mouth every 4 (four) hours as needed. 21 tablet 0    polyethylene glycol (GLYCOLAX) 17 gram PwPk Take 17 g by mouth once daily. 30 packet 0    simvastatin (ZOCOR) 10 MG tablet Take 1 tablet (10 mg total) by mouth every evening. 30 tablet 5    tamsulosin (FLOMAX) 0.4 mg Cp24 TAKE ONE CAPSULE BY MOUTH EVERY DAY 30 capsule 11    methylPREDNISolone (MEDROL DOSEPACK) 4 mg tablet use as directed 1 Package 0     No current facility-administered medications for this visit.        ROS  Review of Systems   Constitutional: Negative for chills and fever.   HENT: Positive for congestion, rhinorrhea and sore throat. Negative for hearing loss.    Eyes: Negative for visual disturbance.   Respiratory: Positive for cough and chest tightness. Negative for shortness of breath.    Cardiovascular: Negative for chest pain, palpitations and leg swelling.   Gastrointestinal: Negative for abdominal pain, constipation, diarrhea, nausea and vomiting.   Genitourinary: Negative for dysuria, frequency and urgency.   Musculoskeletal: Negative for arthralgias, joint swelling and myalgias.   Skin: Negative for rash and wound.   Neurological: Positive for headaches.   Psychiatric/Behavioral: Negative for agitation and confusion. The patient is not nervous/anxious.          OBJECTIVE     Physical Exam  Vitals:    06/08/18 1340   BP: 124/70   Pulse: 65   Temp: 98.1 °F (36.7 °C)    Body mass index is 24.41 kg/m².  Weight: 64.5 kg (142 lb 3.2 oz)   Height: 5' 4" (162.6 cm)     Physical Exam   Constitutional: He is oriented to person, place, and time. He appears well-developed and well-nourished. No distress.   HENT:   Head: Normocephalic and atraumatic.   Right Ear: Hearing, tympanic membrane and external ear normal.   Left Ear: Hearing, tympanic membrane and external ear normal.   Nose: Nose normal. No rhinorrhea.   Mouth/Throat: No uvula swelling. Posterior oropharyngeal erythema present. No posterior oropharyngeal edema.   L ear cerumen " impaction   Eyes: Conjunctivae and EOM are normal. Right eye exhibits no discharge. Left eye exhibits no discharge. No scleral icterus.   Neck: Normal range of motion. Neck supple. No JVD present. No tracheal deviation present.   Cardiovascular: Normal rate, regular rhythm, normal heart sounds and intact distal pulses.  Exam reveals no gallop and no friction rub.    No murmur heard.  Pulmonary/Chest: Effort normal and breath sounds normal. No respiratory distress. He has no wheezes.   Abdominal: Soft. Bowel sounds are normal. He exhibits no distension and no mass. There is no tenderness. There is no rebound and no guarding.   Musculoskeletal: Normal range of motion. He exhibits no edema, tenderness or deformity.   Neurological: He is alert and oriented to person, place, and time. He exhibits normal muscle tone. Coordination normal.   Skin: Skin is warm and dry. No rash noted. No erythema.   Psychiatric: He has a normal mood and affect. His behavior is normal. Judgment and thought content normal.         Health Maintenance       Date Due Completion Date    Zoster Vaccine 03/04/2003 ---    Influenza Vaccine 08/01/2018 2/7/2018    Fecal Occult Blood Test (FOBT)/[employer]Kit 02/23/2019 2/23/2018    Lipid Panel 02/09/2023 2/9/2018    TETANUS VACCINE 07/05/2023 7/5/2013            ASSESSMENT     73 y.o. male with     1. Allergic sinusitis    2. Left ear impacted cerumen        PLAN:     1. Allergic sinusitis  - Continue symptomatic treatment with rest, increase fluid intake, tylenol or ibuprofen PRN fever(temp >/= 100.4) or body aches. Okay to take OTC antihistamines, i.e. Bendaryl, Claritin, Allegra, etc. as needed.  - Okay to gargle with warm, salt water or use throat lozenges as needed  - Pt to start OTC Delsym prn cough  - Pt to start po steroids tomorrow since received an injection today  - triamcinolone acetonide injection 40 mg; Inject 1 mL (40 mg total) into the muscle one time.  - methylPREDNISolone (MEDROL DOSEPACK) 4  mg tablet; use as directed  Dispense: 1 Package; Refill: 0    2. Left ear impacted cerumen  - s/p removal with curette on exam today        RTC in 1-2 week as needed for any acute worsening of current condition or failure to improve     Kinsey Barreto MD  06/09/2018 1:47 PM        No Follow-up on file.

## 2018-10-24 ENCOUNTER — TELEPHONE (OUTPATIENT)
Dept: FAMILY MEDICINE | Facility: CLINIC | Age: 73
End: 2018-10-24

## 2018-10-24 NOTE — TELEPHONE ENCOUNTER
----- Message from Fatemeh Rod sent at 10/23/2018  3:43 PM CDT -----  Contact: [facility]  States was going to fill medication for pt but pts spouse advise pt feels like he doesn't need the medications and doesn't take them so they wanted to advise Dr Gillette of this. Please follow  Up with pt.

## 2019-02-11 ENCOUNTER — LAB VISIT (OUTPATIENT)
Dept: LAB | Facility: HOSPITAL | Age: 74
End: 2019-02-11
Attending: FAMILY MEDICINE
Payer: MEDICARE

## 2019-02-11 ENCOUNTER — OFFICE VISIT (OUTPATIENT)
Dept: FAMILY MEDICINE | Facility: CLINIC | Age: 74
End: 2019-02-11
Payer: MEDICARE

## 2019-02-11 VITALS
DIASTOLIC BLOOD PRESSURE: 68 MMHG | SYSTOLIC BLOOD PRESSURE: 128 MMHG | TEMPERATURE: 96 F | HEIGHT: 62 IN | HEART RATE: 57 BPM | OXYGEN SATURATION: 98 % | WEIGHT: 147.06 LBS | BODY MASS INDEX: 27.06 KG/M2

## 2019-02-11 DIAGNOSIS — M47.816 SPONDYLOSIS OF LUMBAR REGION WITHOUT MYELOPATHY OR RADICULOPATHY: ICD-10-CM

## 2019-02-11 DIAGNOSIS — E78.5 HYPERLIPIDEMIA, UNSPECIFIED HYPERLIPIDEMIA TYPE: Chronic | ICD-10-CM

## 2019-02-11 DIAGNOSIS — Z00.00 ANNUAL PHYSICAL EXAM: Primary | ICD-10-CM

## 2019-02-11 DIAGNOSIS — N40.0 BENIGN PROSTATIC HYPERPLASIA WITHOUT LOWER URINARY TRACT SYMPTOMS: Chronic | ICD-10-CM

## 2019-02-11 DIAGNOSIS — Z20.1 TUBERCULOSIS EXPOSURE: ICD-10-CM

## 2019-02-11 DIAGNOSIS — Z00.00 ANNUAL PHYSICAL EXAM: ICD-10-CM

## 2019-02-11 DIAGNOSIS — R79.9 ABNORMAL FINDING OF BLOOD CHEMISTRY: ICD-10-CM

## 2019-02-11 DIAGNOSIS — E80.6 HYPERBILIRUBINEMIA: ICD-10-CM

## 2019-02-11 LAB
ALBUMIN SERPL BCP-MCNC: 3.8 G/DL
ALP SERPL-CCNC: 57 U/L
ALT SERPL W/O P-5'-P-CCNC: 13 U/L
ANION GAP SERPL CALC-SCNC: 6 MMOL/L
AST SERPL-CCNC: 13 U/L
BASOPHILS # BLD AUTO: 0.02 K/UL
BASOPHILS NFR BLD: 0.5 %
BILIRUB SERPL-MCNC: 1.7 MG/DL
BUN SERPL-MCNC: 16 MG/DL
CALCIUM SERPL-MCNC: 9.4 MG/DL
CHLORIDE SERPL-SCNC: 109 MMOL/L
CHOLEST SERPL-MCNC: 193 MG/DL
CHOLEST/HDLC SERPL: 3.7 {RATIO}
CO2 SERPL-SCNC: 27 MMOL/L
CREAT SERPL-MCNC: 0.8 MG/DL
DIFFERENTIAL METHOD: NORMAL
EOSINOPHIL # BLD AUTO: 0.1 K/UL
EOSINOPHIL NFR BLD: 1.7 %
ERYTHROCYTE [DISTWIDTH] IN BLOOD BY AUTOMATED COUNT: 13.2 %
EST. GFR  (AFRICAN AMERICAN): >60 ML/MIN/1.73 M^2
EST. GFR  (NON AFRICAN AMERICAN): >60 ML/MIN/1.73 M^2
GLUCOSE SERPL-MCNC: 79 MG/DL
HCT VFR BLD AUTO: 42.8 %
HDLC SERPL-MCNC: 52 MG/DL
HDLC SERPL: 26.9 %
HGB BLD-MCNC: 14.1 G/DL
LDLC SERPL CALC-MCNC: 127.4 MG/DL
LYMPHOCYTES # BLD AUTO: 1.8 K/UL
LYMPHOCYTES NFR BLD: 41.8 %
MCH RBC QN AUTO: 29.9 PG
MCHC RBC AUTO-ENTMCNC: 32.9 G/DL
MCV RBC AUTO: 91 FL
MONOCYTES # BLD AUTO: 0.4 K/UL
MONOCYTES NFR BLD: 8.4 %
NEUTROPHILS # BLD AUTO: 2 K/UL
NEUTROPHILS NFR BLD: 47.6 %
NONHDLC SERPL-MCNC: 141 MG/DL
PLATELET # BLD AUTO: 196 K/UL
PMV BLD AUTO: 11.3 FL
POTASSIUM SERPL-SCNC: 4.5 MMOL/L
PROT SERPL-MCNC: 6.8 G/DL
RBC # BLD AUTO: 4.71 M/UL
SODIUM SERPL-SCNC: 142 MMOL/L
T4 FREE SERPL-MCNC: 0.97 NG/DL
TRIGL SERPL-MCNC: 68 MG/DL
TSH SERPL DL<=0.005 MIU/L-ACNC: 0.38 UIU/ML
WBC # BLD AUTO: 4.19 K/UL

## 2019-02-11 PROCEDURE — 84439 ASSAY OF FREE THYROXINE: CPT

## 2019-02-11 PROCEDURE — 36415 COLL VENOUS BLD VENIPUNCTURE: CPT | Mod: PO

## 2019-02-11 PROCEDURE — 80061 LIPID PANEL: CPT

## 2019-02-11 PROCEDURE — 84443 ASSAY THYROID STIM HORMONE: CPT

## 2019-02-11 PROCEDURE — 83036 HEMOGLOBIN GLYCOSYLATED A1C: CPT

## 2019-02-11 PROCEDURE — 99999 PR PBB SHADOW E&M-EST. PATIENT-LVL III: ICD-10-PCS | Mod: PBBFAC,,, | Performed by: FAMILY MEDICINE

## 2019-02-11 PROCEDURE — 99499 RISK ADDL DX/OHS AUDIT: ICD-10-PCS | Mod: S$GLB,,, | Performed by: FAMILY MEDICINE

## 2019-02-11 PROCEDURE — 99397 PR PREVENTIVE VISIT,EST,65 & OVER: ICD-10-PCS | Mod: S$GLB,,, | Performed by: FAMILY MEDICINE

## 2019-02-11 PROCEDURE — 99499 UNLISTED E&M SERVICE: CPT | Mod: S$GLB,,, | Performed by: FAMILY MEDICINE

## 2019-02-11 PROCEDURE — 99397 PER PM REEVAL EST PAT 65+ YR: CPT | Mod: S$GLB,,, | Performed by: FAMILY MEDICINE

## 2019-02-11 PROCEDURE — 99999 PR PBB SHADOW E&M-EST. PATIENT-LVL III: CPT | Mod: PBBFAC,,, | Performed by: FAMILY MEDICINE

## 2019-02-11 PROCEDURE — 85025 COMPLETE CBC W/AUTO DIFF WBC: CPT

## 2019-02-11 PROCEDURE — 80053 COMPREHEN METABOLIC PANEL: CPT

## 2019-02-11 NOTE — PROGRESS NOTES
"Chief Complaint   Patient presents with    Annual Exam     SUBJECTIVE:   Reed Torres is a 73 y.o. male presenting for his annual checkup.  Current Outpatient Medications   Medication Sig Dispense Refill    NAPROXEN SODIUM (ALEVE ORAL) Take by mouth.      polyethylene glycol (GLYCOLAX) 17 gram PwPk Take 17 g by mouth once daily. 30 packet 0    simvastatin (ZOCOR) 10 MG tablet Take 1 tablet (10 mg total) by mouth every evening. 30 tablet 5    methylPREDNISolone (MEDROL DOSEPACK) 4 mg tablet use as directed 1 Package 0    oxycodone-acetaminophen (PERCOCET) 5-325 mg per tablet Take 1-2 tablets by mouth every 4 (four) hours as needed. 21 tablet 0    tamsulosin (FLOMAX) 0.4 mg Cp24 TAKE ONE CAPSULE BY MOUTH EVERY DAY 30 capsule 11     No current facility-administered medications for this visit.      Allergies: Patient has no known allergies.     ROS:  Feeling well. No dyspnea or chest pain on exertion. No abdominal pain, change in bowel habits, black or bloody stools. No urinary tract or prostatic symptoms. No neurological complaints.    OBJECTIVE:   The patient appears well, alert, oriented x 3, in no distress.   /68   Pulse (!) 57   Temp 96.3 °F (35.7 °C) (Oral)   Ht 5' 2" (1.575 m)   Wt 66.7 kg (147 lb 0.8 oz)   SpO2 98%   BMI 26.90 kg/m²   ENT normal.  Neck supple. No adenopathy or thyromegaly. ELISA. Lungs are clear, good air entry, no wheezes, rhonchi or rales. S1 and S2 normal, no murmurs, regular rate and rhythm. Abdomen is soft without tenderness, guarding, mass or organomegaly.  exam: deferred.  Extremities show no edema, normal peripheral pulses. Neurological is normal without focal findings.    ASSESSMENT:   1. Annual physical exam    2. Tuberculosis exposure    3. Spondylosis of lumbar region without myelopathy or radiculopathy    4. Hyperlipidemia, unspecified hyperlipidemia type    5. Hyperbilirubinemia, long standing, favor GILBERT    6. Benign prostatic hyperplasia without lower " urinary tract symptoms    7. Body mass index (BMI) of 23.0-23.9 in adult        PLAN:   Reed was seen today for annual exam.    Diagnoses and all orders for this visit:    Annual physical exam  Counseled on age appropriate medical preventative services, including age appropriate cancer screenings, over all nutritional health, need for a consistent exercise regimen and an over all push towards maintaining a vigorous and active lifestyle.  Counseled on age appropriate vaccines and discussed upcoming health care needs based on age/gender.  Spent time with patient counseling on need for a good patient/doctor relationship moving forward.  Discussed use of common OTC medications and supplements.  Discussed common dietary aids and use of caffeine and the need for good sleep hygiene and stress management.    Tuberculosis exposure  The current medical regimen is effective;  continue present plan and medications.    Spondylosis of lumbar region without myelopathy or radiculopathy  The current medical regimen is effective;  continue present plan and medications.    Hyperlipidemia, unspecified hyperlipidemia type  The current medical regimen is effective;  continue present plan and medications.    Hyperbilirubinemia, long standing, favor GILBERT  The current medical regimen is effective;  continue present plan and medications.    Benign prostatic hyperplasia without lower urinary tract symptoms  The current medical regimen is effective;  continue present plan and medications.    Body mass index (BMI) of 23.0-23.9 in adult  The patient is asked to make an attempt to improve diet and exercise patterns to aid in medical management of this problem.    Counseled on age appropriate medical preventative services, including age appropriate cancer screenings, over all nutritional health, need for a consistent exercise regimen and an over all push towards maintaining a vigorous and active lifestyle.  Counseled on age appropriate  vaccines and discussed upcoming health care needs based on age/gender.  Spent time with patient counseling on need for a good patient/doctor relationship moving forward.  Discussed use of common OTC medications and supplements.  Discussed common dietary aids and use of caffeine and the need for good sleep hygiene and stress management.

## 2019-02-12 ENCOUNTER — TELEPHONE (OUTPATIENT)
Dept: FAMILY MEDICINE | Facility: CLINIC | Age: 74
End: 2019-02-12

## 2019-02-12 LAB
ESTIMATED AVG GLUCOSE: 105 MG/DL
HBA1C MFR BLD HPLC: 5.3 %

## 2019-02-12 NOTE — TELEPHONE ENCOUNTER
----- Message from Pj Gillette MD sent at 2/12/2019 11:03 AM CST -----  Let him know minor changes from last one and we will repeat in 6 months.  But he is doing well

## 2019-02-26 ENCOUNTER — OFFICE VISIT (OUTPATIENT)
Dept: PODIATRY | Facility: CLINIC | Age: 74
End: 2019-02-26
Payer: MEDICARE

## 2019-02-26 ENCOUNTER — HOSPITAL ENCOUNTER (OUTPATIENT)
Dept: RADIOLOGY | Facility: HOSPITAL | Age: 74
Discharge: HOME | End: 2019-02-26
Attending: PODIATRIST
Payer: MEDICARE

## 2019-02-26 VITALS — HEIGHT: 62 IN | WEIGHT: 147 LBS | BODY MASS INDEX: 27.05 KG/M2

## 2019-02-26 DIAGNOSIS — M25.572 ARTHRALGIA OF LEFT FOOT: ICD-10-CM

## 2019-02-26 DIAGNOSIS — M20.41 HAMMER TOES OF BOTH FEET: ICD-10-CM

## 2019-02-26 DIAGNOSIS — M21.6X9 ACQUIRED CAVUS DEFORMITY OF FOOT: ICD-10-CM

## 2019-02-26 DIAGNOSIS — M20.42 HAMMER TOES OF BOTH FEET: ICD-10-CM

## 2019-02-26 DIAGNOSIS — M20.5X2 HALLUX LIMITUS, ACQUIRED, LEFT: ICD-10-CM

## 2019-02-26 DIAGNOSIS — M79.672 LEFT FOOT PAIN: Primary | ICD-10-CM

## 2019-02-26 DIAGNOSIS — M79.672 LEFT FOOT PAIN: ICD-10-CM

## 2019-02-26 PROCEDURE — 99203 PR OFFICE/OUTPT VISIT, NEW, LEVL III, 30-44 MIN: ICD-10-PCS | Mod: S$GLB,,, | Performed by: PODIATRIST

## 2019-02-26 PROCEDURE — 73630 XR FOOT COMPLETE 3 VIEW LEFT: ICD-10-PCS | Mod: 26,LT,, | Performed by: RADIOLOGY

## 2019-02-26 PROCEDURE — 99999 PR PBB SHADOW E&M-EST. PATIENT-LVL III: CPT | Mod: PBBFAC,,, | Performed by: PODIATRIST

## 2019-02-26 PROCEDURE — 99203 OFFICE O/P NEW LOW 30 MIN: CPT | Mod: S$GLB,,, | Performed by: PODIATRIST

## 2019-02-26 PROCEDURE — 99999 PR PBB SHADOW E&M-EST. PATIENT-LVL III: ICD-10-PCS | Mod: PBBFAC,,, | Performed by: PODIATRIST

## 2019-02-26 PROCEDURE — 73630 X-RAY EXAM OF FOOT: CPT | Mod: TC,FY,PO,LT

## 2019-02-26 PROCEDURE — 1101F PT FALLS ASSESS-DOCD LE1/YR: CPT | Mod: CPTII,S$GLB,, | Performed by: PODIATRIST

## 2019-02-26 PROCEDURE — 73630 X-RAY EXAM OF FOOT: CPT | Mod: 26,LT,, | Performed by: RADIOLOGY

## 2019-02-26 PROCEDURE — 1101F PR PT FALLS ASSESS DOC 0-1 FALLS W/OUT INJ PAST YR: ICD-10-PCS | Mod: CPTII,S$GLB,, | Performed by: PODIATRIST

## 2019-02-26 RX ORDER — MELOXICAM 15 MG/1
15 TABLET ORAL DAILY
Qty: 30 TABLET | Refills: 1 | Status: SHIPPED | OUTPATIENT
Start: 2019-02-26 | End: 2020-02-26

## 2019-02-26 NOTE — PROGRESS NOTES
Subjective:      Patient ID: Reed Torres is a 74 y.o. male.    Chief Complaint: Foot Pain (left foot pain from great toe to side of foot Pcp Dr. Gillette 2/11/19) and Foot Problem    Reed is a 74 y.o. male who presents to the podiatry clinic  with complaint of  left foot pain (medial; midfoot). Onset of the symptoms was several days ago. Precipitating event: none known. Current symptoms include: ability to bear weight, but with some pain and stiffness. Aggravating factors: standing and walking. Symptoms have progressed to a point and plateaued. Patient has had no prior foot problems. Evaluation to date: none. Treatment to date: none. Patients rates pain 4/10 on pain scale.      Current shoe gear:  Flexible tennis shoes    Patient Active Problem List   Diagnosis    Hyperlipidemia    Tuberculosis exposure    BPH (benign prostatic hyperplasia)    Body mass index (BMI) of 23.0-23.9 in adult    Osteoarthritis of lumbar spine    Hyperbilirubinemia, long standing, favor GILBERT       Current Outpatient Medications on File Prior to Visit   Medication Sig Dispense Refill    simvastatin (ZOCOR) 10 MG tablet Take 1 tablet (10 mg total) by mouth every evening. 30 tablet 5     No current facility-administered medications on file prior to visit.        Review of patient's allergies indicates:  No Known Allergies    Past Surgical History:   Procedure Laterality Date    APPENDECTOMY      SHOULDER ARTHROSCOPY W/ ROTATOR CUFF REPAIR      Left shoulder    TURP W LASER N/A 5/19/2017    Performed by Charlie Walters Jr., MD at [facility] OR [address] [place] [address]       Family History   Problem Relation Age of Onset    COPD Mother     Diabetes Mother     Hypertension Mother     COPD Father     Heart disease Brother        Social History     Socioeconomic History    Marital status:      Spouse name: Not on file    Number of children: Not on file    Years of education: Not on file    Highest education level: Not on file   Social Needs  "   Financial resource strain: Not on file    Food insecurity - worry: Not on file    Food insecurity - inability: Not on file    Transportation needs - medical: Not on file    Transportation needs - non-medical: Not on file   Occupational History    Not on file   Tobacco Use    Smoking status: Former Smoker    Smokeless tobacco: Former User    Tobacco comment: stopped smoking 20 yrs ago.   Substance and Sexual Activity    Alcohol use: Yes     Alcohol/week: 3.0 oz     Types: 6 Standard drinks or equivalent per week     Comment: Daily    Drug use: No    Sexual activity: Yes     Partners: Female     Birth control/protection: None   Other Topics Concern    Not on file   Social History Narrative    Still doing lawn service       Review of Systems   Constitution: Negative for chills, fever and weakness.   Cardiovascular: Negative for claudication and leg swelling.   Respiratory: Negative for cough and shortness of breath.    Skin: Positive for dry skin. Negative for itching, nail changes and rash.   Musculoskeletal: Positive for arthritis, back pain and joint pain. Negative for falls, joint swelling and muscle weakness.   Gastrointestinal: Negative for diarrhea, nausea and vomiting.   Neurological: Positive for paresthesias. Negative for numbness and tremors.   Psychiatric/Behavioral: Negative for altered mental status and hallucinations.           Objective:      Vitals:    02/26/19 0810   Weight: 66.7 kg (147 lb)   Height: 5' 2" (1.575 m)   PainSc:   4   PainLoc: Foot       Physical Exam   Constitutional:  Non-toxic appearance. He does not have a sickly appearance. No distress.   Cardiovascular:   Pulses:       Dorsalis pedis pulses are 2+ on the right side, and 2+ on the left side.        Posterior tibial pulses are 2+ on the right side, and 2+ on the left side.   Pulmonary/Chest: No respiratory distress.   Musculoskeletal:        Right ankle: He exhibits decreased range of motion. No tenderness. No " lateral malleolus, no medial malleolus, no AITFL, no CF ligament and no posterior TFL tenderness found. Achilles tendon exhibits no pain, no defect and normal Jacobo's test results.        Left ankle: He exhibits decreased range of motion. No tenderness. No lateral malleolus, no medial malleolus, no AITFL, no CF ligament and no posterior TFL tenderness found. Achilles tendon exhibits no pain, no defect and normal Jacobo's test results.        Right foot: There is no bony tenderness.        Left foot: There is tenderness (dorsal medial midfoot) and crepitus (midfoot). There is no bony tenderness.   Biomechanical exam: Patient has bilateral heel varus. There is a bilateral anterior cavus foot deformity that is rigid with minimal compensation. The patient has equinus deformity bilateral with decreased dorsiflexion at the ankle joint. First MPJ dorsiflexion is ~15 degrees bilateral on WB. Bilateral MTJ is is everted to the rear foot. With first metatarsal with foot loaded having and dorsal and plantar excursion of 5mm dorsiflexion and  5mm plantarflexion. Gait analysis reveals an early heel off with the fore foot bilateral striking longer in midstance. Patient shoes demonstrated medial heel counter wear bilateral.      Patient has hammertoes of digits 2-5 bilateral partially reducible        Skin: Skin is warm, dry and intact. No abrasion, no bruising, no burn, no ecchymosis, no laceration and no rash noted. He is not diaphoretic. No cyanosis or erythema. No pallor. Nails show no clubbing.   Psychiatric: His mood appears not anxious. His affect is not inappropriate. His speech is not slurred. He is not combative. He is communicative. He is attentive.   Nursing note reviewed.            Assessment:       Encounter Diagnoses   Name Primary?    Left foot pain Yes    Arthralgia of left foot     Hallux limitus, acquired, left     Hammer toes of both feet     Acquired cavus deformity of foot          Plan:      Problem List Items Addressed This Visit     None      Visit Diagnoses     Left foot pain    -  Primary    Relevant Orders    X-Ray Foot Complete Left (Completed)    Arthralgia of left foot        Relevant Orders    X-Ray Foot Complete Left (Completed)    Hallux limitus, acquired, left        Relevant Orders    X-Ray Foot Complete Left (Completed)    Hammer toes of both feet        Relevant Orders    X-Ray Foot Complete Left (Completed)    Acquired cavus deformity of foot        Relevant Orders    X-Ray Foot Complete Left (Completed)         Orders Placed This Encounter    X-Ray Foot Complete Left    meloxicam (MOBIC) 15 MG tablet         I counseled the patient on his conditions, their implications and medical management.    Patient education on arthralgias pf the foot. Discussed non-surgical treatment options, including injection, supportive shoegear, inserts.     I did  the patient in detail regarding surgical and conservative treatment measures for hallux limitus. I informed the  patient that the majority of pain is secondary to an arthritic joint with decreased joint spaces. Informed patient that outside of surgical intervention the main goal of therapy is to decreased the  range of motion at the first MPJ joint. This can be done so by utilizing either and extremely hard soled nonflexible shoe or forefoot rocker    I gave written and verbal instructions on stretching exercises. Patient expressed understanding. Discussed icing the affected area as needed and also wearing appropriate shoe gear and avoiding flats, slippers, sandals, and going barefoot. My recommendation for OTC supports is [employer]. Patient instructed on adequate icing techniques. Patient should ice the affected area at least once per day x 10 minutes for 10 days I advised the patient that extra icing would also be beneficial to ensure adequate anti inflammatory effect. We also discussed cortisone injections and NSAID  therapy. Mobic prescribed. Patient was instructed on dosing information. Discontinue if adverse effects occur     RTC in 6-9 weeks if no improvement, at this time patient will receive cortisone injections and referral to PT. Patient is amenable to plan.

## 2019-02-26 NOTE — PATIENT INSTRUCTIONS
Over the counter pain cream: Biofreeze, Bengay, tiger balm, two old goat, lidocaine gel,  Absorbine Veterinary Liniment Gel Topical Analgesic Sore Muscle and Joint Pain Relief    Recommend lotions: eucerin, eucerin for diabetics, aquaphor, A&D ointment, gold bond for diabetics, sween, Bartow's Bees all purpose baby ointment,  urea 40 with aloe (found on amazon.com)    Shoe recommendations: (try 6pm.com, [email or web address] , [email or web address], or shoes.[employer] for discounted prices) you can visit DSW shoes in [place]  or [employer] in the [facility] (there are also several shoe brand outlets in the [facility])    Asics (GT 2000 or gel foundations), new balance stability type shoes (such as the 940 series), saucony (stabil c3),  Garcia (GTS or Beast or transcend), propet (tennis shoe)    Myla Judd (women) Brett&Last (men), clarks, crocs, aerosoles, naturalizers, SAS, ecco, born, chad jeffrey, rockports (dress shoes)    Vionic, burkenstocks, fitflops, propet (sandals)  Nike comfort thong sandals, crocs, propet (house shoes)    Nail Home remedy:  Vicks Vapor rub to nails for easier manageability        What Is Arthritis in the Foot?  Degenerative arthritis is a condition that slowly wears away joints, the area where bones meet and move. In the beginning, you may notice that the affected joint seems stiff. It may even ache. As the joint lining (cartilage) breaks down, the bones rub against each other, causing pain and swelling. Over time, small pieces of rough or splintered bone (bone spurs) develop, and the joints range of motion becomes limited. But movement doesnt have to cause pain. The effects of arthritis can be reduced.    The big-toe joint  When arthritis affects your big toe, your foot hurts when it pushes off the ground. Arthritis often appears in the big-toe joint along with a bunion (a bony bump at the side of the joint) or a bone spur on top of the joint.    Other joints  When arthritis affects the rear or midfoot  joints, you feel pain when you put weight on your foot. Arthritis may affect the joint where the ankle and foot meet. It may also affect other joints nearby.  Date Last Reviewed: 7/1/2016  © 3988-1420 [employer]. [address] [place] [address], [place] [address]. All rights reserved. This information is not intended as a substitute for professional medical care. Always follow your healthcare professional's instructions.        Treating Arthritis in the Foot  If your symptoms are mild, medications may be enough to reduce pain and swelling. For more severe arthritis, surgery may be needed to improve the condition of the joint.    Medicine  Your doctor may prescribe medicine--pills or injections--to limit pain and swelling. Ice, aspirin, acetaminophen, or ibuprofen may help relieve mild symptoms that occur after activity.  Surgery and bone trimming  To ease movement and reduce pain, your doctor may trim damaged bone. If arthritis is severe, the joint may be fused or removed. If the bone is not damaged too badly, your doctor may simply shave away bone spurs. Any excess bone growth related to a bunion may also be trimmed.  Fusing joints  If damage is more severe, your doctor may fuse the joint to prevent the bones from rubbing. Afterward, staples, plates, or screws may hold the bones in place so they heal properly. In some cases, the joint may be removed and replaced with an implant.  After surgery  During the early stages of recovery, your foot is likely to be bandaged and immobilized for a while. For best results, follow up with your doctor as scheduled. These visits help ensure that your foot heals properly.  As you heal  After surgery, youll be told how to care for your incision and how soon to begin walking on the foot. Until the foot can bear weight, you may need to walk with crutches or a cane.  For surgery on the big toe, your foot may be splinted to limit movement for several weeks. Despite this, you  should be able to walk soon after surgery.  For surgery on rear or midfoot joints, you may need to wear a cast or surgical shoe. These joints are fairly large, so full recovery may take a few months. Once the bone has healed, any staples, plates, or screws may be removed.  Date Last Reviewed: 7/1/2016 © 2000-2017 [employer]. [address] [place] [address], [place] [address]. All rights reserved. This information is not intended as a substitute for professional medical care. Always follow your healthcare professional's instructions.        Foot Surgery: Degenerative Joint Disease    Degenerative joint disease (arthritis) often happens in the joint of a big toe. This bone growth may cause pain and stiffness in the joint. Left untreated, arthritis can break down the cartilage and destroy the joint. Your treatment choices depend on how damaged your joint is. There are many nonsurgical treatments, but if these are not helpful, surgery may be considered.    Cheilectomy  This is done when the arthritic joint and cartilage can be saved. A bone spur caused by arthritis may be symptomatic on the top of the big toe joint. The procedure involves removing this bone spur, usually with a small part of the top of the joint itself.  You will need to wear a surgical shoe for several weeks. Once the foot heals, joint movement is restored.    Fusion  In fusion, the cartilage and some bone on both sides of the joint are removed. Then, the big toe and metatarsal bones are held together with staples, screws, or a plate and screws. Your foot may be placed in a cast. While you heal, you will be asked not to bear weight on this foot. You may also need crutches for several weeks. Because the joint has been removed, your toe will be less flexible.    Arthroplasty  During surgery, bone growth caused by the arthritis is trimmed, and part of the joint is removed. A pin can be used to align the bones and to keep them from touching. The pin  is removed after several weeks. In some cases, the entire joint may be replaced with an implant. You may have to wear a splint or a surgical shoe for several weeks. When healed, the bones become connected with scar tissue.  Date Last Reviewed: 10/15/2015  © 1806-5825 [employer]. [address] [place] [address]. All rights reserved. This information is not intended as a substitute for professional medical care. Always follow your healthcare professional's instructions.

## 2019-04-16 ENCOUNTER — OFFICE VISIT (OUTPATIENT)
Dept: FAMILY MEDICINE | Facility: CLINIC | Age: 74
End: 2019-04-16
Payer: MEDICARE

## 2019-04-16 ENCOUNTER — TELEPHONE (OUTPATIENT)
Dept: ADMINISTRATIVE | Facility: HOSPITAL | Age: 74
End: 2019-04-16

## 2019-04-16 VITALS
OXYGEN SATURATION: 98 % | SYSTOLIC BLOOD PRESSURE: 118 MMHG | WEIGHT: 147.25 LBS | HEIGHT: 66 IN | TEMPERATURE: 98 F | HEART RATE: 57 BPM | BODY MASS INDEX: 23.66 KG/M2 | DIASTOLIC BLOOD PRESSURE: 82 MMHG

## 2019-04-16 DIAGNOSIS — J01.90 ACUTE SINUSITIS, RECURRENCE NOT SPECIFIED, UNSPECIFIED LOCATION: Primary | ICD-10-CM

## 2019-04-16 PROCEDURE — 99999 PR PBB SHADOW E&M-EST. PATIENT-LVL III: ICD-10-PCS | Mod: PBBFAC,,, | Performed by: PHYSICIAN ASSISTANT

## 2019-04-16 PROCEDURE — 1101F PT FALLS ASSESS-DOCD LE1/YR: CPT | Mod: CPTII,S$GLB,, | Performed by: PHYSICIAN ASSISTANT

## 2019-04-16 PROCEDURE — 99999 PR PBB SHADOW E&M-EST. PATIENT-LVL III: CPT | Mod: PBBFAC,,, | Performed by: PHYSICIAN ASSISTANT

## 2019-04-16 PROCEDURE — 99213 PR OFFICE/OUTPT VISIT, EST, LEVL III, 20-29 MIN: ICD-10-PCS | Mod: S$GLB,,, | Performed by: PHYSICIAN ASSISTANT

## 2019-04-16 PROCEDURE — 1101F PR PT FALLS ASSESS DOC 0-1 FALLS W/OUT INJ PAST YR: ICD-10-PCS | Mod: CPTII,S$GLB,, | Performed by: PHYSICIAN ASSISTANT

## 2019-04-16 PROCEDURE — 99213 OFFICE O/P EST LOW 20 MIN: CPT | Mod: S$GLB,,, | Performed by: PHYSICIAN ASSISTANT

## 2019-04-16 RX ORDER — ALBUTEROL SULFATE 90 UG/1
2 AEROSOL, METERED RESPIRATORY (INHALATION) EVERY 6 HOURS PRN
Qty: 1 EACH | Refills: 0 | Status: SHIPPED | OUTPATIENT
Start: 2019-04-16 | End: 2020-05-03

## 2019-04-16 RX ORDER — FLUTICASONE PROPIONATE 50 MCG
1 SPRAY, SUSPENSION (ML) NASAL 2 TIMES DAILY
Qty: 16 G | Refills: 0 | Status: SHIPPED | OUTPATIENT
Start: 2019-04-16 | End: 2019-05-16

## 2019-04-16 NOTE — PROGRESS NOTES
Subjective:       Patient ID: Reed Torres is a 74 y.o. male with multiple medical diagnoses as listed in the medical history and problem list that presents for Cough (x the past week. Coughing up mucus) and Wheezing  .    Chief Complaint: Cough (x the past week. Coughing up mucus) and Wheezing      Cough   This is a new problem. The current episode started in the past 7 days. The problem has been gradually improving. Associated symptoms include headaches (intermittnetly ), postnasal drip, rhinorrhea and wheezing. Pertinent negatives include no chest pain, chills, ear pain, eye redness, fever, rash, sore throat or shortness of breath. Treatments tried: mucinex    Wheezing    Associated symptoms include coughing, headaches (intermittnetly ) and rhinorrhea. Pertinent negatives include no abdominal pain, chest pain, chills, diarrhea, ear pain, fever, rash, shortness of breath, sore throat or vomiting.     Review of Systems   Constitutional: Negative for chills, fatigue and fever.   HENT: Positive for congestion, postnasal drip, rhinorrhea and tinnitus. Negative for ear pain, sinus pressure, sinus pain and sore throat.    Eyes: Negative for pain, discharge, redness and itching.   Respiratory: Positive for cough and wheezing. Negative for chest tightness and shortness of breath.    Cardiovascular: Negative for chest pain.   Gastrointestinal: Negative for abdominal pain, constipation, diarrhea, nausea and vomiting.   Skin: Negative for rash.   Neurological: Positive for headaches (intermittnetly ).         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       SOCIAL HISTORY:  Social History     Socioeconomic History    Marital status:      Spouse name: Not on file    Number of children: Not on file    Years of education: Not on file    Highest education level: Not on file   Occupational History    Not on file   Social Needs     "Financial resource strain: Not on file    Food insecurity:     Worry: Not on file     Inability: Not on file    Transportation needs:     Medical: Not on file     Non-medical: Not on file   Tobacco Use    Smoking status: Former Smoker    Smokeless tobacco: Former User    Tobacco comment: stopped smoking 20 yrs ago.   Substance and Sexual Activity    Alcohol use: Yes     Alcohol/week: 3.0 oz     Types: 6 Standard drinks or equivalent per week     Comment: Daily    Drug use: No    Sexual activity: Yes     Partners: Female     Birth control/protection: None   Lifestyle    Physical activity:     Days per week: Not on file     Minutes per session: Not on file    Stress: Not on file   Relationships    Social connections:     Talks on phone: Not on file     Gets together: Not on file     Attends Mormon service: Not on file     Active member of club or organization: Not on file     Attends meetings of clubs or organizations: Not on file     Relationship status: Not on file   Other Topics Concern    Not on file   Social History Narrative    Still doing lawn service       ALLERGIES AND MEDICATIONS: updated and reviewed.  Review of patient's allergies indicates:  No Known Allergies  Current Outpatient Medications   Medication Sig Dispense Refill    meloxicam (MOBIC) 15 MG tablet Take 1 tablet (15 mg total) by mouth once daily. 1 pill per day everyday for the next 3 weeks with food 30 tablet 1    simvastatin (ZOCOR) 10 MG tablet Take 1 tablet (10 mg total) by mouth every evening. 30 tablet 5    albuterol (VENTOLIN HFA) 90 mcg/actuation inhaler Inhale 2 puffs into the lungs every 6 (six) hours as needed for Wheezing. 1 each 0    fluticasone (FLONASE) 50 mcg/actuation nasal spray 1 spray (50 mcg total) by Each Nare route 2 (two) times daily. 16 g 0     No current facility-administered medications for this visit.          Objective:   /82   Pulse (!) 57   Temp 97.8 °F (36.6 °C) (Oral)   Ht 5' 6" (1.676 " m)   Wt 66.8 kg (147 lb 4.3 oz)   SpO2 98%   BMI 23.77 kg/m²      Physical Exam   Constitutional: He is oriented to person, place, and time. No distress.   HENT:   Head: Normocephalic and atraumatic.   Right Ear: External ear and ear canal normal. No middle ear effusion.   Left Ear: External ear and ear canal normal.  No middle ear effusion.   Nose: Rhinorrhea present. No mucosal edema. Right sinus exhibits no maxillary sinus tenderness and no frontal sinus tenderness. Left sinus exhibits no maxillary sinus tenderness and no frontal sinus tenderness.   Mouth/Throat: Uvula is midline and mucous membranes are normal. No oropharyngeal exudate or posterior oropharyngeal erythema. No tonsillar exudate.   Air fluid levels   PND   Eyes: Conjunctivae and EOM are normal.   Cardiovascular: Normal rate and regular rhythm.   Pulmonary/Chest: Effort normal and breath sounds normal. He has no wheezes.   Neg egophony    Lymphadenopathy:     He has no cervical adenopathy.   Neurological: He is alert and oriented to person, place, and time.   Skin: Skin is warm. No erythema.           Assessment:       1. Acute sinusitis, recurrence not specified, unspecified location        Plan:       Acute sinusitis, recurrence not specified, unspecified location  -     fluticasone (FLONASE) 50 mcg/actuation nasal spray; 1 spray (50 mcg total) by Each Nare route 2 (two) times daily.  Dispense: 16 g; Refill: 0  -     albuterol (VENTOLIN HFA) 90 mcg/actuation inhaler; Inhale 2 puffs into the lungs every 6 (six) hours as needed for Wheezing.  Dispense: 1 each; Refill: 0  Pt has anti-histamine he will take at home too   Call for fever 100.4 or higher, no improvement with medication plan in 2-3 days or if you feel better and start to feel worse again.         No follow-ups on file.

## 2019-04-16 NOTE — PATIENT INSTRUCTIONS
Call for fever 100.4 or higher, no improvement with medication plan in 2-3 days or if you feel better and start to feel worse again.

## 2019-05-07 ENCOUNTER — OFFICE VISIT (OUTPATIENT)
Dept: PODIATRY | Facility: CLINIC | Age: 74
End: 2019-05-07
Payer: MEDICARE

## 2019-05-07 VITALS
SYSTOLIC BLOOD PRESSURE: 119 MMHG | WEIGHT: 147 LBS | DIASTOLIC BLOOD PRESSURE: 69 MMHG | BODY MASS INDEX: 23.63 KG/M2 | HEIGHT: 66 IN

## 2019-05-07 DIAGNOSIS — M20.41 HAMMER TOES OF BOTH FEET: ICD-10-CM

## 2019-05-07 DIAGNOSIS — M20.42 HAMMER TOES OF BOTH FEET: ICD-10-CM

## 2019-05-07 DIAGNOSIS — M21.6X9 ACQUIRED CAVUS DEFORMITY OF FOOT: ICD-10-CM

## 2019-05-07 DIAGNOSIS — M79.672 LEFT FOOT PAIN: Primary | ICD-10-CM

## 2019-05-07 DIAGNOSIS — M20.5X2 HALLUX LIMITUS, ACQUIRED, LEFT: ICD-10-CM

## 2019-05-07 DIAGNOSIS — M25.572 ARTHRALGIA OF LEFT FOOT: ICD-10-CM

## 2019-05-07 PROCEDURE — 99212 PR OFFICE/OUTPT VISIT, EST, LEVL II, 10-19 MIN: ICD-10-PCS | Mod: S$GLB,,, | Performed by: PODIATRIST

## 2019-05-07 PROCEDURE — 99999 PR PBB SHADOW E&M-EST. PATIENT-LVL III: ICD-10-PCS | Mod: PBBFAC,,, | Performed by: PODIATRIST

## 2019-05-07 PROCEDURE — 99212 OFFICE O/P EST SF 10 MIN: CPT | Mod: S$GLB,,, | Performed by: PODIATRIST

## 2019-05-07 PROCEDURE — 1101F PR PT FALLS ASSESS DOC 0-1 FALLS W/OUT INJ PAST YR: ICD-10-PCS | Mod: CPTII,S$GLB,, | Performed by: PODIATRIST

## 2019-05-07 PROCEDURE — 99999 PR PBB SHADOW E&M-EST. PATIENT-LVL III: CPT | Mod: PBBFAC,,, | Performed by: PODIATRIST

## 2019-05-07 PROCEDURE — 1101F PT FALLS ASSESS-DOCD LE1/YR: CPT | Mod: CPTII,S$GLB,, | Performed by: PODIATRIST

## 2019-05-07 NOTE — PROGRESS NOTES
Subjective:      Patient ID: Reed Torres is a 74 y.o. male.    Chief Complaint: Foot Pain (left) and Foot Problem    Reed is a 74 y.o. male who presents to the podiatry clinic  with complaint of  left foot pain (medial; midfoot). Onset of the symptoms was several days ago. Precipitating event: none known. Current symptoms include: ability to bear weight, but with some pain and stiffness. Aggravating factors: standing and walking. Symptoms have progressed to a point and plateaued. Patient has had no prior foot problems. Evaluation to date: none. Treatment to date: none. Patients rates pain 4/10 on pain scale.      05/07/19 He has been doing some stretching and wearing supportive shoes while working and relates 70% improvement.    Current shoe gear:  Flexible boat shoes    Patient Active Problem List   Diagnosis    Hyperlipidemia    Tuberculosis exposure    BPH (benign prostatic hyperplasia)    Body mass index (BMI) of 23.0-23.9 in adult    Osteoarthritis of lumbar spine    Hyperbilirubinemia, long standing, favor GILBERT       Current Outpatient Medications on File Prior to Visit   Medication Sig Dispense Refill    albuterol (VENTOLIN HFA) 90 mcg/actuation inhaler Inhale 2 puffs into the lungs every 6 (six) hours as needed for Wheezing. 1 each 0    fluticasone (FLONASE) 50 mcg/actuation nasal spray 1 spray (50 mcg total) by Each Nare route 2 (two) times daily. 16 g 0    meloxicam (MOBIC) 15 MG tablet Take 1 tablet (15 mg total) by mouth once daily. 1 pill per day everyday for the next 3 weeks with food 30 tablet 1    simvastatin (ZOCOR) 10 MG tablet Take 1 tablet (10 mg total) by mouth every evening. 30 tablet 5     No current facility-administered medications on file prior to visit.        Review of patient's allergies indicates:  No Known Allergies    Past Surgical History:   Procedure Laterality Date    APPENDECTOMY      SHOULDER ARTHROSCOPY W/ ROTATOR CUFF REPAIR      Left shoulder    TURP W LASER  N/A 5/19/2017    Performed by Charlie Walters Jr., MD at [facility] [address] [place] [address]       Family History   Problem Relation Age of Onset    COPD Mother     Diabetes Mother     Hypertension Mother     COPD Father     Heart disease Brother        Social History     Socioeconomic History    Marital status:      Spouse name: Not on file    Number of children: Not on file    Years of education: Not on file    Highest education level: Not on file   Occupational History    Not on file   Social Needs    Financial resource strain: Not on file    Food insecurity:     Worry: Not on file     Inability: Not on file    Transportation needs:     Medical: Not on file     Non-medical: Not on file   Tobacco Use    Smoking status: Former Smoker    Smokeless tobacco: Former User    Tobacco comment: stopped smoking 20 yrs ago.   Substance and Sexual Activity    Alcohol use: Yes     Alcohol/week: 3.0 oz     Types: 6 Standard drinks or equivalent per week     Comment: Daily    Drug use: No    Sexual activity: Yes     Partners: Female     Birth control/protection: None   Lifestyle    Physical activity:     Days per week: Not on file     Minutes per session: Not on file    Stress: Not on file   Relationships    Social connections:     Talks on phone: Not on file     Gets together: Not on file     Attends Taoist service: Not on file     Active member of club or organization: Not on file     Attends meetings of clubs or organizations: Not on file     Relationship status: Not on file   Other Topics Concern    Not on file   Social History Narrative    Still doing lawn service       Review of Systems   Constitution: Negative for chills and fever.   Cardiovascular: Negative for claudication and leg swelling.   Respiratory: Negative for cough and shortness of breath.    Skin: Positive for dry skin. Negative for itching, nail changes and rash.   Musculoskeletal: Positive for arthritis, back pain and joint pain. Negative for  "falls, joint swelling and muscle weakness.   Gastrointestinal: Negative for diarrhea, nausea and vomiting.   Neurological: Positive for paresthesias. Negative for numbness, tremors and weakness.   Psychiatric/Behavioral: Negative for altered mental status and hallucinations.           Objective:      Vitals:    05/07/19 0840   BP: 119/69   Weight: 66.7 kg (147 lb)   Height: 5' 6" (1.676 m)   PainSc: 0-No pain       Physical Exam   Constitutional:  Non-toxic appearance. He does not have a sickly appearance. No distress.   Cardiovascular:   Pulses:       Dorsalis pedis pulses are 2+ on the right side, and 2+ on the left side.        Posterior tibial pulses are 2+ on the right side, and 2+ on the left side.   Pulmonary/Chest: No respiratory distress.   Musculoskeletal:        Right ankle: He exhibits decreased range of motion. No tenderness. No lateral malleolus, no medial malleolus, no AITFL, no CF ligament and no posterior TFL tenderness found. Achilles tendon exhibits no pain, no defect and normal Jacobo's test results.        Left ankle: He exhibits decreased range of motion. No tenderness. No lateral malleolus, no medial malleolus, no AITFL, no CF ligament and no posterior TFL tenderness found. Achilles tendon exhibits no pain, no defect and normal Jacobo's test results.        Right foot: There is no bony tenderness.        Left foot: There is tenderness (dorsal medial midfoot) and crepitus (midfoot). There is no bony tenderness.   Biomechanical exam: Patient has bilateral heel varus. There is a bilateral anterior cavus foot deformity that is rigid with minimal compensation. The patient has equinus deformity bilateral with decreased dorsiflexion at the ankle joint. First MPJ dorsiflexion is ~15 degrees bilateral on WB. Bilateral MTJ is is everted to the rear foot. With first metatarsal with foot loaded having and dorsal and plantar excursion of 5mm dorsiflexion and  5mm plantarflexion. Gait analysis reveals " an early heel off with the fore foot bilateral striking longer in midstance. Patient shoes demonstrated medial heel counter wear bilateral.      Patient has hammertoes of digits 2-5 bilateral partially reducible        Skin: Skin is warm, dry and intact. No abrasion, no bruising, no burn, no ecchymosis, no laceration and no rash noted. He is not diaphoretic. No cyanosis or erythema. No pallor. Nails show no clubbing.   Psychiatric: His mood appears not anxious. His affect is not inappropriate. His speech is not slurred. He is not combative. He is communicative. He is attentive.   Nursing note reviewed.            Assessment:       Encounter Diagnoses   Name Primary?    Left foot pain Yes    Arthralgia of left foot     Hallux limitus, acquired, left     Hammer toes of both feet     Acquired cavus deformity of foot          Plan:     Problem List Items Addressed This Visit     None      Visit Diagnoses     Left foot pain    -  Primary    Arthralgia of left foot        Hallux limitus, acquired, left        Hammer toes of both feet        Acquired cavus deformity of foot                    I counseled the patient on his conditions, their implications and medical management.    Significant difference in pain reaction on physical exam today than on previous encounter.    Encouraged patient to stretch aggressively. Continue to ice as needed. Mobic PRN pain.  Discussed wearing appropriate shoe gear and avoiding flats, slippers, sandals, and going barefoot.    RTC PRN

## 2019-09-07 ENCOUNTER — HOSPITAL ENCOUNTER (EMERGENCY)
Facility: HOSPITAL | Age: 74
Discharge: HOME | End: 2019-09-07
Attending: EMERGENCY MEDICINE
Payer: MEDICARE

## 2019-09-07 VITALS
HEIGHT: 65 IN | WEIGHT: 140 LBS | BODY MASS INDEX: 23.32 KG/M2 | DIASTOLIC BLOOD PRESSURE: 70 MMHG | OXYGEN SATURATION: 93 % | TEMPERATURE: 98 F | SYSTOLIC BLOOD PRESSURE: 106 MMHG | RESPIRATION RATE: 18 BRPM | HEART RATE: 68 BPM

## 2019-09-07 DIAGNOSIS — S61.412A LACERATION OF LEFT HAND WITHOUT FOREIGN BODY, INITIAL ENCOUNTER: Primary | ICD-10-CM

## 2019-09-07 PROCEDURE — 25000003 PHARM REV CODE 250: Performed by: EMERGENCY MEDICINE

## 2019-09-07 PROCEDURE — 99283 EMERGENCY DEPT VISIT LOW MDM: CPT | Mod: 25

## 2019-09-07 PROCEDURE — 12001 RPR S/N/AX/GEN/TRNK 2.5CM/<: CPT

## 2019-09-07 RX ORDER — LIDOCAINE HYDROCHLORIDE 10 MG/ML
5 INJECTION INFILTRATION; PERINEURAL
Status: COMPLETED | OUTPATIENT
Start: 2019-09-07 | End: 2019-09-07

## 2019-09-07 RX ADMIN — LIDOCAINE HYDROCHLORIDE 5 ML: 10 INJECTION, SOLUTION INFILTRATION; PERINEURAL at 07:09

## 2019-09-07 NOTE — ED TRIAGE NOTES
Patient was cutting tennis balls in half and sliced thru left 2nd (pointer) finger. 2 inch ragged cut to base of finger. No longer bleeding when paper towels removed.

## 2019-09-08 NOTE — ED PROVIDER NOTES
Encounter Date: 9/7/2019       History     Chief Complaint   Patient presents with    Laceration     cut left index finger with razor, while helping daughter with a project for school     74-year-old male with history of hyperlipidemia presents with laceration to left 2nd digit.  He was using a razor blade to help her his daughter with a school project, he was not wearing protective gloves and the razor slipped and cut him on his left finger.  He confirms he has had a tetanus shot in the last 5 years.  He denies other injuries.  He denies any numbness to his finger.  Bleeding currently controlled.        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     Past Surgical History:   Procedure Laterality Date    APPENDECTOMY      SHOULDER ARTHROSCOPY W/ ROTATOR CUFF REPAIR      Left shoulder    TURP W LASER N/A 5/19/2017    Performed by Charlie Walters Jr., MD at [facility] OR [address] [place] [address]     Family History   Problem Relation Age of Onset    COPD Mother     Diabetes Mother     Hypertension Mother     COPD Father     Heart disease Brother      Social History     Tobacco Use    Smoking status: Former Smoker    Smokeless tobacco: Former User    Tobacco comment: stopped smoking 20 yrs ago.   Substance Use Topics    Alcohol use: Yes     Alcohol/week: 3.0 oz     Types: 6 Standard drinks or equivalent per week     Comment: Daily    Drug use: No     Review of Systems   Constitutional: Negative for chills and fever.   Respiratory: Negative for shortness of breath.    Cardiovascular: Negative for chest pain.   Gastrointestinal: Negative for abdominal pain, nausea and vomiting.   Skin: Positive for wound.   Neurological: Negative for weakness and numbness.       Physical Exam     Initial Vitals [09/07/19 1839]   BP Pulse Resp Temp SpO2   108/70 66 20 98 °F (36.7 °C) (!) 93 %      MAP       --         Physical  Exam    Constitutional: He appears well-developed and well-nourished. He is not diaphoretic. No distress.   HENT:   Mouth/Throat: Oropharynx is clear and moist.   Eyes: Conjunctivae are normal.   Neck: Neck supple.   Cardiovascular: Normal rate and regular rhythm.   Normal cap refill in left 2nd finger distal to wound   Pulmonary/Chest: No respiratory distress.   Abdominal: Soft.   Musculoskeletal: He exhibits no edema.        Hands:  Patient is able to flex and extend against resistance at the MCP, PIP PIP of the left 2nd digit.   Neurological: He is alert.   Sensation to light touch intact in left 2nd digit distal to wound.   Skin: Skin is warm and dry.   2 cm laceration to the dorsal aspect of the left 2nd digit, in between the MCP and PIP joint.  There is no active bleeding, no exposed tendon.   Psychiatric: He has a normal mood and affect.         ED Course   Lac Repair  Date/Time: 9/7/2019 8:00 PM  Performed by: Rivka Esqueda MD  Authorized by: Rivka Esqueda MD   Laceration length: 2 cm  Tendon involvement: none  Anesthesia: local infiltration    Anesthesia:  Local Anesthetic: lidocaine 1% without epinephrine  Anesthetic total: 2 mL  Irrigation solution: saline  Irrigation method: jet lavage  Amount of cleaning: standard  Skin closure: 4-0 nylon  Number of sutures: 2  Technique: simple  Approximation: close  Approximation difficulty: simple  Dressing: 4x4 sterile gauze and antibiotic ointment  Patient tolerance: Patient tolerated the procedure well with no immediate complications        Labs Reviewed - No data to display       Imaging Results    None          Medical Decision Making:   Initial Assessment:   74-year-old male presents with laceration to left 2nd digit, neurovascularly intact, normal range of motion distal to the wound, no exposed tendon.  Wound was cleansed and irrigated, repaired as above.  I reviewed wound care instructions as well as return precautions for any signs of infection.   Patient states he understands and agrees with plan.                      Clinical Impression:       ICD-10-CM ICD-9-CM   1. Laceration of left hand without foreign body, initial encounter S61.412A 882.0                                Rivka Esqueda MD  09/07/19 3727

## 2019-09-17 ENCOUNTER — OFFICE VISIT (OUTPATIENT)
Dept: FAMILY MEDICINE | Facility: CLINIC | Age: 74
End: 2019-09-17
Payer: MEDICARE

## 2019-09-17 VITALS
HEIGHT: 69 IN | BODY MASS INDEX: 20.9 KG/M2 | HEART RATE: 62 BPM | WEIGHT: 141.13 LBS | DIASTOLIC BLOOD PRESSURE: 70 MMHG | OXYGEN SATURATION: 98 % | SYSTOLIC BLOOD PRESSURE: 120 MMHG | TEMPERATURE: 97 F

## 2019-09-17 DIAGNOSIS — E86.0 BODY WATER DEHYDRATION: ICD-10-CM

## 2019-09-17 DIAGNOSIS — Z48.02 VISIT FOR SUTURE REMOVAL: Primary | ICD-10-CM

## 2019-09-17 PROCEDURE — 1101F PR PT FALLS ASSESS DOC 0-1 FALLS W/OUT INJ PAST YR: ICD-10-PCS | Mod: CPTII,S$GLB,, | Performed by: FAMILY MEDICINE

## 2019-09-17 PROCEDURE — 99999 PR PBB SHADOW E&M-EST. PATIENT-LVL III: CPT | Mod: PBBFAC,,, | Performed by: FAMILY MEDICINE

## 2019-09-17 PROCEDURE — 99999 PR PBB SHADOW E&M-EST. PATIENT-LVL III: ICD-10-PCS | Mod: PBBFAC,,, | Performed by: FAMILY MEDICINE

## 2019-09-17 PROCEDURE — 1101F PT FALLS ASSESS-DOCD LE1/YR: CPT | Mod: CPTII,S$GLB,, | Performed by: FAMILY MEDICINE

## 2019-09-17 PROCEDURE — 99214 OFFICE O/P EST MOD 30 MIN: CPT | Mod: S$GLB,,, | Performed by: FAMILY MEDICINE

## 2019-09-17 PROCEDURE — 99214 PR OFFICE/OUTPT VISIT, EST, LEVL IV, 30-39 MIN: ICD-10-PCS | Mod: S$GLB,,, | Performed by: FAMILY MEDICINE

## 2019-09-17 RX ORDER — LATANOPROST 50 UG/ML
SOLUTION/ DROPS OPHTHALMIC NIGHTLY
COMMUNITY
Start: 2019-09-09

## 2019-09-18 ENCOUNTER — PATIENT OUTREACH (OUTPATIENT)
Dept: ADMINISTRATIVE | Facility: HOSPITAL | Age: 74
End: 2019-09-18

## 2019-09-18 PROBLEM — E86.0 BODY WATER DEHYDRATION: Status: ACTIVE | Noted: 2019-09-18

## 2019-10-02 ENCOUNTER — OFFICE VISIT (OUTPATIENT)
Dept: FAMILY MEDICINE | Facility: CLINIC | Age: 74
End: 2019-10-02
Payer: MEDICARE

## 2019-10-02 VITALS
HEART RATE: 65 BPM | SYSTOLIC BLOOD PRESSURE: 102 MMHG | DIASTOLIC BLOOD PRESSURE: 62 MMHG | OXYGEN SATURATION: 95 % | BODY MASS INDEX: 22.46 KG/M2 | TEMPERATURE: 97 F | WEIGHT: 139.75 LBS | HEIGHT: 66 IN

## 2019-10-02 DIAGNOSIS — E86.0 BODY WATER DEHYDRATION: ICD-10-CM

## 2019-10-02 DIAGNOSIS — N40.0 BENIGN PROSTATIC HYPERPLASIA WITHOUT LOWER URINARY TRACT SYMPTOMS: Primary | Chronic | ICD-10-CM

## 2019-10-02 DIAGNOSIS — R79.9 ABNORMAL FINDING OF BLOOD CHEMISTRY, UNSPECIFIED: ICD-10-CM

## 2019-10-02 DIAGNOSIS — Z23 NEEDS FLU SHOT: ICD-10-CM

## 2019-10-02 DIAGNOSIS — E78.5 HYPERLIPIDEMIA, UNSPECIFIED HYPERLIPIDEMIA TYPE: Chronic | ICD-10-CM

## 2019-10-02 PROCEDURE — 90662 IIV NO PRSV INCREASED AG IM: CPT | Mod: S$GLB,,, | Performed by: FAMILY MEDICINE

## 2019-10-02 PROCEDURE — 99214 PR OFFICE/OUTPT VISIT, EST, LEVL IV, 30-39 MIN: ICD-10-PCS | Mod: 25,S$GLB,, | Performed by: FAMILY MEDICINE

## 2019-10-02 PROCEDURE — 1101F PR PT FALLS ASSESS DOC 0-1 FALLS W/OUT INJ PAST YR: ICD-10-PCS | Mod: CPTII,S$GLB,, | Performed by: FAMILY MEDICINE

## 2019-10-02 PROCEDURE — G0008 ADMIN INFLUENZA VIRUS VAC: HCPCS | Mod: S$GLB,,, | Performed by: FAMILY MEDICINE

## 2019-10-02 PROCEDURE — 99999 PR PBB SHADOW E&M-EST. PATIENT-LVL III: CPT | Mod: PBBFAC,,, | Performed by: FAMILY MEDICINE

## 2019-10-02 PROCEDURE — 99999 PR PBB SHADOW E&M-EST. PATIENT-LVL III: ICD-10-PCS | Mod: PBBFAC,,, | Performed by: FAMILY MEDICINE

## 2019-10-02 PROCEDURE — 99499 RISK ADDL DX/OHS AUDIT: ICD-10-PCS | Mod: S$GLB,,, | Performed by: FAMILY MEDICINE

## 2019-10-02 PROCEDURE — 99214 OFFICE O/P EST MOD 30 MIN: CPT | Mod: 25,S$GLB,, | Performed by: FAMILY MEDICINE

## 2019-10-02 PROCEDURE — 1101F PT FALLS ASSESS-DOCD LE1/YR: CPT | Mod: CPTII,S$GLB,, | Performed by: FAMILY MEDICINE

## 2019-10-02 PROCEDURE — G0008 FLU VACCINE - HIGH DOSE (65+) PRESERVATIVE FREE IM: ICD-10-PCS | Mod: S$GLB,,, | Performed by: FAMILY MEDICINE

## 2019-10-02 PROCEDURE — 99499 UNLISTED E&M SERVICE: CPT | Mod: S$GLB,,, | Performed by: FAMILY MEDICINE

## 2019-10-02 PROCEDURE — 90662 FLU VACCINE - HIGH DOSE (65+) PRESERVATIVE FREE IM: ICD-10-PCS | Mod: S$GLB,,, | Performed by: FAMILY MEDICINE

## 2019-10-02 NOTE — PROGRESS NOTES
After obtaining consent, and per orders of Dr. Gillette, injection of high dose flu given into the left deltoid by Heike Dela Cruz. Patient instructed to remain in clinic for 20 minutes afterwards, and to report any adverse reaction to me immediately.

## 2019-10-02 NOTE — PROGRESS NOTES
HISTORY OF PRESENT ILLNESS:  Reed Torres is a 74 y.o. male who presents to the clinic today for Follow-up  .     He appears well, in no apparent distress.  Alert and oriented times three, pleasant and cooperative. Vital signs are as documented in vital signs section.  Doing well over all  The patient is taking hypertensive medications compliantly without side effects.  Denies chest pain, dyspnea, edema, or TIA's.    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       PAST SURGICAL HISTORY:  Past Surgical History:   Procedure Laterality Date    APPENDECTOMY      SHOULDER ARTHROSCOPY W/ ROTATOR CUFF REPAIR      Left shoulder       SOCIAL HISTORY:  Social History     Socioeconomic History    Marital status:      Spouse name: Not on file    Number of children: Not on file    Years of education: Not on file    Highest education level: Not on file   Occupational History    Not on file   Social Needs    Financial resource strain: Not on file    Food insecurity:     Worry: Not on file     Inability: Not on file    Transportation needs:     Medical: Not on file     Non-medical: Not on file   Tobacco Use    Smoking status: Former Smoker    Smokeless tobacco: Former User    Tobacco comment: stopped smoking 20 yrs ago.   Substance and Sexual Activity    Alcohol use: Yes     Alcohol/week: 5.0 standard drinks     Types: 6 Standard drinks or equivalent per week     Comment: Daily    Drug use: No    Sexual activity: Yes     Partners: Female     Birth control/protection: None   Lifestyle    Physical activity:     Days per week: Not on file     Minutes per session: Not on file    Stress: Not on file   Relationships    Social connections:     Talks on phone: Not on file     Gets together: Not on file     Attends [employer] service: Not on file     Active member of club or organization: Not on file      "Attends meetings of clubs or organizations: Not on file     Relationship status: Not on file   Other Topics Concern    Not on file   Social History Narrative    Still doing lawn service       FAMILY HISTORY:  Family History   Problem Relation Age of Onset    COPD Mother     Diabetes Mother     Hypertension Mother     COPD Father     Heart disease Brother        ALLERGIES AND MEDICATIONS: updated and reviewed.  Review of patient's allergies indicates:  No Known Allergies  Medication List with Changes/Refills   Current Medications    ALBUTEROL (VENTOLIN HFA) 90 MCG/ACTUATION INHALER    Inhale 2 puffs into the lungs every 6 (six) hours as needed for Wheezing.    LATANOPROST 0.005 % OPHTHALMIC SOLUTION        MELOXICAM (MOBIC) 15 MG TABLET    Take 1 tablet (15 mg total) by mouth once daily. 1 pill per day everyday for the next 3 weeks with food    SIMVASTATIN (ZOCOR) 10 MG TABLET    Take 1 tablet (10 mg total) by mouth every evening.          CARE TEAM:  Patient Care Team:  Pj Gillette MD as PCP - General (Family Medicine)  Charlie Walters Jr., MD as Consulting Physician (Urology)  Violet Rodriguez MA as Care Coordinator         REVIEW OF SYSTEMS:  Review of Systems   Constitutional: Negative.    HENT: Negative.    Eyes: Negative.    Respiratory: Negative.    Cardiovascular: Negative.    Gastrointestinal: Negative.    Genitourinary: Negative.    Musculoskeletal: Negative.    Neurological: Negative.    Psychiatric/Behavioral: Negative.          PHYSICAL EXAM:  Vitals:    10/02/19 1310   BP: 102/62   Pulse: 65   Temp: 97.4 °F (36.3 °C)     Weight: 63.4 kg (139 lb 12.4 oz)   Height: 5' 6" (167.6 cm)   Body mass index is 22.56 kg/m².    Physical Examination:     He appears well, in no apparent distress.  Alert and oriented times three, pleasant and cooperative. Vital signs are as documented in vital signs section.  S1 and S2 normal, no murmurs, clicks, gallops or rubs. Regular rate and rhythm. Chest is clear; no " wheezes or rales. No edema or JVD.  Laceration is healed       ASSESSMENT AND PLAN:  1. Benign prostatic hyperplasia without lower urinary tract symptoms  The current medical regimen is effective;  continue present plan and medications.    - CBC auto differential; Future  - Comprehensive metabolic panel; Future  - Hemoglobin A1c; Future  - Lipid panel; Future  - Uric acid; Future  - Urinalysis; Future    2. Hyperlipidemia, unspecified hyperlipidemia type  The current medical regimen is effective;  continue present plan and medications.    - CBC auto differential; Future  - Comprehensive metabolic panel; Future  - Hemoglobin A1c; Future  - Lipid panel; Future  - Uric acid; Future  - Urinalysis; Future    3. Body mass index (BMI) of 23.0-23.9 in adult  The current medical regimen is effective;  continue present plan and medications.    - CBC auto differential; Future  - Comprehensive metabolic panel; Future  - Hemoglobin A1c; Future  - Lipid panel; Future  - Uric acid; Future  - Urinalysis; Future    4. Body water dehydration  The current medical regimen is effective;  continue present plan and medications.    - CBC auto differential; Future  - Comprehensive metabolic panel; Future  - Hemoglobin A1c; Future  - Lipid panel; Future  - Uric acid; Future  - Urinalysis; Future    5. Needs flu shot    - Influenza - High Dose (65+) (PF) (IM)    6. Abnormal finding of blood chemistry, unspecified     - Hemoglobin A1c; Future         No future appointments.    No follow-ups on file. or sooner as needed.

## 2019-10-08 ENCOUNTER — LAB VISIT (OUTPATIENT)
Dept: LAB | Facility: HOSPITAL | Age: 74
End: 2019-10-08
Attending: FAMILY MEDICINE
Payer: MEDICARE

## 2019-10-08 DIAGNOSIS — E86.0 BODY WATER DEHYDRATION: ICD-10-CM

## 2019-10-08 DIAGNOSIS — N40.0 BENIGN PROSTATIC HYPERPLASIA WITHOUT LOWER URINARY TRACT SYMPTOMS: Chronic | ICD-10-CM

## 2019-10-08 DIAGNOSIS — R79.9 ABNORMAL FINDING OF BLOOD CHEMISTRY, UNSPECIFIED: ICD-10-CM

## 2019-10-08 DIAGNOSIS — E78.5 HYPERLIPIDEMIA, UNSPECIFIED HYPERLIPIDEMIA TYPE: Chronic | ICD-10-CM

## 2019-10-08 LAB
ALBUMIN SERPL BCP-MCNC: 4 G/DL (ref 3.5–5.2)
ALP SERPL-CCNC: 52 U/L (ref 55–135)
ALT SERPL W/O P-5'-P-CCNC: 12 U/L (ref 10–44)
ANION GAP SERPL CALC-SCNC: 11 MMOL/L (ref 8–16)
AST SERPL-CCNC: 11 U/L (ref 10–40)
BASOPHILS # BLD AUTO: 0.02 K/UL (ref 0–0.2)
BASOPHILS NFR BLD: 0.5 % (ref 0–1.9)
BILIRUB SERPL-MCNC: 1 MG/DL (ref 0.1–1)
BUN SERPL-MCNC: 17 MG/DL (ref 8–23)
CALCIUM SERPL-MCNC: 9 MG/DL (ref 8.7–10.5)
CHLORIDE SERPL-SCNC: 106 MMOL/L (ref 95–110)
CHOLEST SERPL-MCNC: 222 MG/DL (ref 120–199)
CHOLEST/HDLC SERPL: 3.8 {RATIO} (ref 2–5)
CO2 SERPL-SCNC: 23 MMOL/L (ref 23–29)
CREAT SERPL-MCNC: 0.8 MG/DL (ref 0.5–1.4)
DIFFERENTIAL METHOD: NORMAL
EOSINOPHIL # BLD AUTO: 0.1 K/UL (ref 0–0.5)
EOSINOPHIL NFR BLD: 1.2 % (ref 0–8)
ERYTHROCYTE [DISTWIDTH] IN BLOOD BY AUTOMATED COUNT: 13.1 % (ref 11.5–14.5)
EST. GFR  (AFRICAN AMERICAN): >60 ML/MIN/1.73 M^2
EST. GFR  (NON AFRICAN AMERICAN): >60 ML/MIN/1.73 M^2
ESTIMATED AVG GLUCOSE: 105 MG/DL (ref 68–131)
GLUCOSE SERPL-MCNC: 99 MG/DL (ref 70–110)
HBA1C MFR BLD HPLC: 5.3 % (ref 4–5.6)
HCT VFR BLD AUTO: 42.9 % (ref 40–54)
HDLC SERPL-MCNC: 58 MG/DL (ref 40–75)
HDLC SERPL: 26.1 % (ref 20–50)
HGB BLD-MCNC: 14.2 G/DL (ref 14–18)
LDLC SERPL CALC-MCNC: 145.2 MG/DL (ref 63–159)
LYMPHOCYTES # BLD AUTO: 1.7 K/UL (ref 1–4.8)
LYMPHOCYTES NFR BLD: 38.1 % (ref 18–48)
MCH RBC QN AUTO: 29.9 PG (ref 27–31)
MCHC RBC AUTO-ENTMCNC: 33.1 G/DL (ref 32–36)
MCV RBC AUTO: 90 FL (ref 82–98)
MONOCYTES # BLD AUTO: 0.3 K/UL (ref 0.3–1)
MONOCYTES NFR BLD: 6.2 % (ref 4–15)
NEUTROPHILS # BLD AUTO: 2.3 K/UL (ref 1.8–7.7)
NEUTROPHILS NFR BLD: 54 % (ref 38–73)
NONHDLC SERPL-MCNC: 164 MG/DL
PLATELET # BLD AUTO: 173 K/UL (ref 150–350)
PMV BLD AUTO: 11.8 FL (ref 9.2–12.9)
POTASSIUM SERPL-SCNC: 4 MMOL/L (ref 3.5–5.1)
PROT SERPL-MCNC: 6.9 G/DL (ref 6–8.4)
RBC # BLD AUTO: 4.75 M/UL (ref 4.6–6.2)
SODIUM SERPL-SCNC: 140 MMOL/L (ref 136–145)
TRIGL SERPL-MCNC: 94 MG/DL (ref 30–150)
URATE SERPL-MCNC: 7.6 MG/DL (ref 3.4–7)
WBC # BLD AUTO: 4.33 K/UL (ref 3.9–12.7)

## 2019-10-08 PROCEDURE — 83036 HEMOGLOBIN GLYCOSYLATED A1C: CPT

## 2019-10-08 PROCEDURE — 85025 COMPLETE CBC W/AUTO DIFF WBC: CPT

## 2019-10-08 PROCEDURE — 80061 LIPID PANEL: CPT

## 2019-10-08 PROCEDURE — 84550 ASSAY OF BLOOD/URIC ACID: CPT

## 2019-10-08 PROCEDURE — 80053 COMPREHEN METABOLIC PANEL: CPT

## 2019-10-08 PROCEDURE — 36415 COLL VENOUS BLD VENIPUNCTURE: CPT | Mod: PO

## 2019-10-15 ENCOUNTER — TELEPHONE (OUTPATIENT)
Dept: FAMILY MEDICINE | Facility: CLINIC | Age: 74
End: 2019-10-15

## 2019-10-15 DIAGNOSIS — E80.6 HYPERBILIRUBINEMIA: ICD-10-CM

## 2019-10-15 DIAGNOSIS — E86.0 BODY WATER DEHYDRATION: ICD-10-CM

## 2019-10-15 DIAGNOSIS — E78.5 HYPERLIPIDEMIA, UNSPECIFIED HYPERLIPIDEMIA TYPE: Primary | Chronic | ICD-10-CM

## 2019-10-15 NOTE — TELEPHONE ENCOUNTER
He is dehydrated because of too much fluid.  He needs to follow my water plan and then we will repeat labs in 6-8 weeks and then we will see him back after.

## 2019-10-15 NOTE — TELEPHONE ENCOUNTER
Last Office Visit Info:   The patient's last visit with Pj Gillette MD was on 10/2/2019.    The patient's last visit in current department was on 10/2/2019.        Last CBC Results:   Lab Results   Component Value Date    WBC 4.33 10/08/2019    HGB 14.2 10/08/2019    HCT 42.9 10/08/2019     10/08/2019       Last CMP Results  Lab Results   Component Value Date     10/08/2019    K 4.0 10/08/2019     10/08/2019    CO2 23 10/08/2019    BUN 17 10/08/2019    CREATININE 0.8 10/08/2019    CALCIUM 9.0 10/08/2019    ALBUMIN 4.0 10/08/2019    AST 11 10/08/2019    ALT 12 10/08/2019       Last Lipids  Lab Results   Component Value Date    CHOL 222 (H) 10/08/2019    TRIG 94 10/08/2019    HDL 58 10/08/2019    LDLCALC 145.2 10/08/2019       Last A1C  Lab Results   Component Value Date    HGBA1C 5.3 10/08/2019       Last TSH  Lab Results   Component Value Date    TSH 0.376 (L) 02/11/2019         Current Med Refills  Medication List with Changes/Refills   Current Medications    ALBUTEROL (VENTOLIN HFA) 90 MCG/ACTUATION INHALER    Inhale 2 puffs into the lungs every 6 (six) hours as needed for Wheezing.       Start Date: 4/16/2019 End Date: --    LATANOPROST 0.005 % OPHTHALMIC SOLUTION           Start Date: 9/9/2019  End Date: --    MELOXICAM (MOBIC) 15 MG TABLET    Take 1 tablet (15 mg total) by mouth once daily. 1 pill per day everyday for the next 3 weeks with food       Start Date: 2/26/2019 End Date: 2/26/2020    SIMVASTATIN (ZOCOR) 10 MG TABLET    Take 1 tablet (10 mg total) by mouth every evening.       Start Date: 6/25/2014 End Date: --

## 2019-10-15 NOTE — TELEPHONE ENCOUNTER
----- Message from April Banks sent at 10/15/2019  2:35 PM CDT -----  Contact: pt  Type: Patient Call Back    Who called:pt    What is the request in detail:please call pt with test results. Call pt    Can the clinic reply by [facility]?    Would the patient rather a call back or a response via [facility]? Call back    Best call back number:[phone]    Additional Information:

## 2019-10-16 NOTE — TELEPHONE ENCOUNTER
----- Message from rFanny Townsend sent at 10/16/2019  8:20 AM CDT -----  Contact: Patient ph  [phone]  Type:  Test Results    Who Called: Patient    Name of Test (Lab/Mammo/Etc): Blood test    Date of Test: 10-08-19    Where the test was performed: [facility]    Would the patient rather a call back or a response via [facility]? Call back    Best Call Back Number: [phone]    For Clinical Team:Has the provider reviewed the results?

## 2019-10-18 ENCOUNTER — TELEPHONE (OUTPATIENT)
Dept: FAMILY MEDICINE | Facility: CLINIC | Age: 74
End: 2019-10-18

## 2019-10-18 NOTE — TELEPHONE ENCOUNTER
Everything is ok, just needs to work on the fluids and repeat the tests in 6 weeks and then we will go over them.

## 2019-10-18 NOTE — TELEPHONE ENCOUNTER
----- Message from Delma Fong sent at 10/18/2019 12:49 PM CDT -----  Contact: Self   Type:  Test Results    Who Called:  Self     Name of Test (Lab/Mammo/Etc):  Lab     Date of Test: 10/8/19    Ordering Provider:  Dr. Gillette     Where the test was performed: [facility] lab    Would the patient rather a call back or a response via [facility]?  Call     Best Call Back Number: [phone]    Additional Information:      For Clinical Team:Has the provider reviewed the results?

## 2019-10-22 ENCOUNTER — TELEPHONE (OUTPATIENT)
Dept: FAMILY MEDICINE | Facility: CLINIC | Age: 74
End: 2019-10-22

## 2019-10-22 NOTE — TELEPHONE ENCOUNTER
Pt requesting lab results.     Last Office Visit Info:   The patient's last visit with Pj Gillette MD was on 10/2/2019.    The patient's last visit in current department was on 10/2/2019.        Last CBC Results:   Lab Results   Component Value Date    WBC 4.33 10/08/2019    HGB 14.2 10/08/2019    HCT 42.9 10/08/2019     10/08/2019       Last CMP Results  Lab Results   Component Value Date     10/08/2019    K 4.0 10/08/2019     10/08/2019    CO2 23 10/08/2019    BUN 17 10/08/2019    CREATININE 0.8 10/08/2019    CALCIUM 9.0 10/08/2019    ALBUMIN 4.0 10/08/2019    AST 11 10/08/2019    ALT 12 10/08/2019       Last Lipids  Lab Results   Component Value Date    CHOL 222 (H) 10/08/2019    TRIG 94 10/08/2019    HDL 58 10/08/2019    LDLCALC 145.2 10/08/2019       Last A1C  Lab Results   Component Value Date    HGBA1C 5.3 10/08/2019       Last TSH  Lab Results   Component Value Date    TSH 0.376 (L) 02/11/2019         Current Med Refills  Medication List with Changes/Refills   Current Medications    ALBUTEROL (VENTOLIN HFA) 90 MCG/ACTUATION INHALER    Inhale 2 puffs into the lungs every 6 (six) hours as needed for Wheezing.       Start Date: 4/16/2019 End Date: --    LATANOPROST 0.005 % OPHTHALMIC SOLUTION           Start Date: 9/9/2019  End Date: --    MELOXICAM (MOBIC) 15 MG TABLET    Take 1 tablet (15 mg total) by mouth once daily. 1 pill per day everyday for the next 3 weeks with food       Start Date: 2/26/2019 End Date: 2/26/2020    SIMVASTATIN (ZOCOR) 10 MG TABLET    Take 1 tablet (10 mg total) by mouth every evening.       Start Date: 6/25/2014 End Date: --       Order(s) placed per written order guidelines: none    Please advise.

## 2020-03-07 ENCOUNTER — HOSPITAL ENCOUNTER (EMERGENCY)
Facility: HOSPITAL | Age: 75
Discharge: HOME | End: 2020-03-07
Attending: EMERGENCY MEDICINE
Payer: MEDICARE

## 2020-03-07 VITALS
RESPIRATION RATE: 20 BRPM | OXYGEN SATURATION: 96 % | BODY MASS INDEX: 24.49 KG/M2 | TEMPERATURE: 98 F | HEART RATE: 54 BPM | HEIGHT: 65 IN | DIASTOLIC BLOOD PRESSURE: 86 MMHG | WEIGHT: 147 LBS | SYSTOLIC BLOOD PRESSURE: 146 MMHG

## 2020-03-07 DIAGNOSIS — R42 DIZZINESS: ICD-10-CM

## 2020-03-07 DIAGNOSIS — R00.1 SINUS BRADYCARDIA: Primary | ICD-10-CM

## 2020-03-07 LAB
ALBUMIN SERPL BCP-MCNC: 4.1 G/DL (ref 3.5–5.2)
ALP SERPL-CCNC: 65 U/L (ref 55–135)
ALT SERPL W/O P-5'-P-CCNC: 16 U/L (ref 10–44)
ANION GAP SERPL CALC-SCNC: 12 MMOL/L (ref 8–16)
AST SERPL-CCNC: 16 U/L (ref 10–40)
BASOPHILS # BLD AUTO: 0.05 K/UL (ref 0–0.2)
BASOPHILS NFR BLD: 0.8 % (ref 0–1.9)
BILIRUB SERPL-MCNC: 1 MG/DL (ref 0.1–1)
BILIRUB UR QL STRIP: NEGATIVE
BUN SERPL-MCNC: 16 MG/DL (ref 8–23)
CALCIUM SERPL-MCNC: 9.4 MG/DL (ref 8.7–10.5)
CHLORIDE SERPL-SCNC: 110 MMOL/L (ref 95–110)
CLARITY UR: CLEAR
CO2 SERPL-SCNC: 20 MMOL/L (ref 23–29)
COLOR UR: NORMAL
CREAT SERPL-MCNC: 0.7 MG/DL (ref 0.5–1.4)
DIFFERENTIAL METHOD: ABNORMAL
EOSINOPHIL # BLD AUTO: 0.1 K/UL (ref 0–0.5)
EOSINOPHIL NFR BLD: 2.2 % (ref 0–8)
ERYTHROCYTE [DISTWIDTH] IN BLOOD BY AUTOMATED COUNT: 13.3 % (ref 11.5–14.5)
EST. GFR  (AFRICAN AMERICAN): >60 ML/MIN/1.73 M^2
EST. GFR  (NON AFRICAN AMERICAN): >60 ML/MIN/1.73 M^2
GLUCOSE SERPL-MCNC: 85 MG/DL (ref 70–110)
GLUCOSE UR QL STRIP: NEGATIVE
HCT VFR BLD AUTO: 43 % (ref 40–54)
HGB BLD-MCNC: 14.2 G/DL (ref 14–18)
HGB UR QL STRIP: NEGATIVE
IMM GRANULOCYTES # BLD AUTO: 0.01 K/UL (ref 0–0.04)
IMM GRANULOCYTES NFR BLD AUTO: 0.2 % (ref 0–0.5)
INR PPP: 0.9 (ref 0.8–1.2)
KETONES UR QL STRIP: NEGATIVE
LEUKOCYTE ESTERASE UR QL STRIP: NEGATIVE
LYMPHOCYTES # BLD AUTO: 2 K/UL (ref 1–4.8)
LYMPHOCYTES NFR BLD: 34 % (ref 18–48)
MCH RBC QN AUTO: 30.3 PG (ref 27–31)
MCHC RBC AUTO-ENTMCNC: 33 G/DL (ref 32–36)
MCV RBC AUTO: 92 FL (ref 82–98)
MICROSCOPIC COMMENT: NORMAL
MONOCYTES # BLD AUTO: 0.4 K/UL (ref 0.3–1)
MONOCYTES NFR BLD: 7.1 % (ref 4–15)
NEUTROPHILS # BLD AUTO: 3.3 K/UL (ref 1.8–7.7)
NEUTROPHILS NFR BLD: 55.7 % (ref 38–73)
NITRITE UR QL STRIP: NEGATIVE
NRBC BLD-RTO: 0 /100 WBC
PH UR STRIP: 5 [PH] (ref 5–8)
PLATELET # BLD AUTO: 145 K/UL (ref 150–350)
PMV BLD AUTO: 11.4 FL (ref 9.2–12.9)
POTASSIUM SERPL-SCNC: 4.3 MMOL/L (ref 3.5–5.1)
PROT SERPL-MCNC: 6.7 G/DL (ref 6–8.4)
PROT UR QL STRIP: NEGATIVE
PROTHROMBIN TIME: 10.4 SEC (ref 9–12.5)
RBC # BLD AUTO: 4.69 M/UL (ref 4.6–6.2)
SODIUM SERPL-SCNC: 142 MMOL/L (ref 136–145)
SP GR UR STRIP: 1.01 (ref 1–1.03)
TROPONIN I SERPL DL<=0.01 NG/ML-MCNC: <0.006 NG/ML (ref 0–0.03)
TSH SERPL DL<=0.005 MIU/L-ACNC: 0.41 UIU/ML (ref 0.4–4)
URN SPEC COLLECT METH UR: NORMAL
UROBILINOGEN UR STRIP-ACNC: NEGATIVE EU/DL
WBC # BLD AUTO: 5.89 K/UL (ref 3.9–12.7)
WBC #/AREA URNS HPF: 1 /HPF (ref 0–5)

## 2020-03-07 PROCEDURE — 93010 ELECTROCARDIOGRAM REPORT: CPT | Mod: ,,, | Performed by: INTERNAL MEDICINE

## 2020-03-07 PROCEDURE — 93005 ELECTROCARDIOGRAM TRACING: CPT

## 2020-03-07 PROCEDURE — 93010 EKG 12-LEAD: ICD-10-PCS | Mod: ,,, | Performed by: INTERNAL MEDICINE

## 2020-03-07 PROCEDURE — 81000 URINALYSIS NONAUTO W/SCOPE: CPT

## 2020-03-07 PROCEDURE — 93005 ELECTROCARDIOGRAM TRACING: CPT | Performed by: INTERNAL MEDICINE

## 2020-03-07 PROCEDURE — 84443 ASSAY THYROID STIM HORMONE: CPT

## 2020-03-07 PROCEDURE — 80053 COMPREHEN METABOLIC PANEL: CPT

## 2020-03-07 PROCEDURE — 84484 ASSAY OF TROPONIN QUANT: CPT

## 2020-03-07 PROCEDURE — 85025 COMPLETE CBC W/AUTO DIFF WBC: CPT

## 2020-03-07 PROCEDURE — 99285 EMERGENCY DEPT VISIT HI MDM: CPT | Mod: 25

## 2020-03-07 PROCEDURE — 85610 PROTHROMBIN TIME: CPT

## 2020-03-07 NOTE — ED TRIAGE NOTES
Reports stabbing pains to the left side of face with lightheaded type dizziness for a couple weeks. Denies chest pains or SOB.

## 2020-03-07 NOTE — DISCHARGE INSTRUCTIONS
Exact cause of your symptoms is unclear at this time.  Your lab tests and CT scan are within normal ranges.  Is very important that you follow up with your primary physician as well as Cardiology.  Return to the emergency department for any new, worsening or concerning symptoms.  Continue your medications as you have been prescribed.    Thank you for coming to our Emergency Department today. It is important to remember that some problems are difficult to diagnose and may not be found during your first visit. Be sure to follow up with your primary care doctor and review any labs/imaging that was performed with them. If you do not have a primary care doctor, you may contact the one listed on your discharge paperwork or you may also call the [facility] Appointment Desk at [phone] to schedule an appointment with one.     All medications may potentially have side effects and it is impossible to predict which medications may give you side effects. If you feel that you are having a negative effect of any medication you should immediately stop taking them and seek medical attention.    Return to the ER with any questions/concerns, new/concerning symptoms, worsening or failure to improve. Do not drive or make any important decisions for 24 hours if you have received any pain medications, sedatives or mood altering drugs during your ER visit.

## 2020-03-07 NOTE — ED PROVIDER NOTES
Encounter Date: 3/7/2020    SCRIBE #1 NOTE: I, Brandyn Mccormack, am scribing for, and in the presence of,  Krystle Ye MD. I have scribed the following portions of the note - Other sections scribed: HPI, ROS, PE.       History     Chief Complaint   Patient presents with    Dizziness     patient came to the ED with complaint of dizziness, headache, numbness to left side. Patient was sent here from urgent care.    Headache    Numbness       CC: Dizziness    HPI: This is a 74 y/o male with PMHx HLD who presents to the ED complaining of dizziness (no room-spinning sensation) with associated intermittent sharp L sided HA, bilateral hand paresthesia (stinging) for past 1 week.  Pt was evaluated for this earlier today at , had an EKG done, and referred to the ED.   Reports an episode of constant HA one night that lasted until the next morning and was improved after he took Aleve. Last felt lightheaded this morning when he bent over to clean his vehicle that has since resolved.  He denies any HA at this time.  No CP, SOB.  No N/V.  No numbness, weakness, or vision changes.  No dysuria, darkened urine, or frequency changes.  He reports compliance with eye meds and drops.  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      The history is provided by the patient and medical records. No  was used.     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   Substance Use Topics    Alcohol use: Yes     Alcohol/week: 5.0 standard drinks     Types: 6 Standard drinks or equivalent per week     Comment: Daily    Drug use: No     Review of Systems   Constitutional: Negative for fever.   HENT: Negative for sore throat.    Eyes: Negative for visual disturbance.   Respiratory: Negative for shortness of breath.    Cardiovascular: Negative for chest pain.   Gastrointestinal: Negative for abdominal pain, nausea and vomiting.   Genitourinary: Negative for dysuria and frequency.   Musculoskeletal: Negative for back pain.   Skin: Negative for rash.   Neurological: Positive for dizziness and headaches. Negative for weakness, light-headedness and numbness.        (+) Bilateral hand paresthesia.       Physical Exam     Initial Vitals [03/07/20 1155]   BP Pulse Resp Temp SpO2   (!) 156/92 (!) 56 18 97.8 °F (36.6 °C) 99 %      MAP       --         Physical Exam    Nursing note and vitals reviewed.  Constitutional: He is not diaphoretic. No distress.   HENT:   Head: Normocephalic and atraumatic.   Mouth/Throat: Oropharynx is clear and moist.   No temporal artery tenderness.     Eyes: Conjunctivae and EOM are normal. Pupils are equal, round, and reactive to light. No scleral icterus.   Neck: Normal range of motion. Neck supple. Carotid bruit is not present. No JVD present.   Cardiovascular: Regular rhythm and intact distal pulses.   Bradycardic   Pulmonary/Chest: Breath sounds normal. No stridor. No respiratory distress.   Abdominal: Soft. Bowel sounds are normal. He exhibits no distension. There is no tenderness.   Musculoskeletal: Normal range of motion. He exhibits no edema or tenderness.   Neurological: He is alert. He has normal strength. No cranial nerve deficit or sensory deficit. GCS score is 15. GCS eye subscore is 4. GCS verbal subscore is 5. GCS motor subscore is 6.   Skin: Skin is warm and dry. No rash noted.   Psychiatric: He has a normal mood and  affect.         ED Course   Procedures  Labs Reviewed   CBC W/ AUTO DIFFERENTIAL - Abnormal; Notable for the following components:       Result Value    Platelets 145 (*)     All other components within normal limits   COMPREHENSIVE METABOLIC PANEL - Abnormal; Notable for the following components:    CO2 20 (*)     All other components within normal limits   PROTIME-INR   TSH   TROPONIN I   URINALYSIS, REFLEX TO URINE CULTURE    Narrative:     Preferred Collection Type->Urine, Clean Catch   URINALYSIS MICROSCOPIC    Narrative:     Preferred Collection Type->Urine, Clean Catch     EKG Readings: (Independently Interpreted)   Initial Reading: No STEMI. Previous EKG: Compared with most recent EKG Rhythm: Normal Sinus Rhythm. Heart Rate: 55 bpm. Ectopy: No Ectopy. ST Segments: Normal ST Segments. T Waves: Normal.       Imaging Results          CT Head Without Contrast (Final result)  Result time 03/07/20 13:36:42    Final result by Allen Blair MD (03/07/20 13:36:42)                 Impression:      No acute intracranial abnormality or interval change since 06/07/2013      Electronically signed by: Allen Blair MD  Date:    03/07/2020  Time:    13:36             Narrative:    EXAMINATION:  CT HEAD WITHOUT CONTRAST    CLINICAL HISTORY:  Dizziness;    TECHNIQUE:  Low dose axial images were obtained through the head.  Coronal and sagittal reformations were also performed. Contrast was not administered.    COMPARISON:  Head CT from 06/07/2013.    FINDINGS:  Ventricles and basilar cisterns are patent.  There is no mass effect or shift of the midline structures.  No acute intracranial bleed.  No extra-axial collection.  The gray-white matter differentiation is preserved.  Orbits are symmetric in appearance as visualized.  No paranasal sinus disease.  Mastoid air cells are pneumatized.  Skull appears intact.                               X-Ray Chest AP Portable (Final result)  Result time 03/07/20 12:29:30    Final result by  Allen Blair MD (03/07/20 12:29:30)                 Impression:      Stable chest.  No acute findings      Electronically signed by: Allen Blair MD  Date:    03/07/2020  Time:    12:29             Narrative:    EXAMINATION:  XR CHEST AP PORTABLE    CLINICAL HISTORY:  dizziness;    TECHNIQUE:  Single frontal view of the chest was performed.    COMPARISON:  Chest x-ray from 07/04/2017    FINDINGS:  EKG leads overlie chest obscuring detail.  Mild subsegmental atelectasis at the lung bases.  No focal consolidation.  Cardiomediastinal silhouette and osseous structures are stable in appearance.                                 Medical Decision Making:   History:   Old Medical Records: I decided to obtain old medical records.  Old Records Summarized: other records.       <> Summary of Records: Follows with PMD.  Differential Diagnosis:   Includes but not limited to:   Dehydration, orthostatics, electrolyte abnormality, ACS, symptomatic bradycardia, musculoskeletal pain, vertigo.  Independently Interpreted Test(s):   I have ordered and independently interpreted EKG Reading(s) - see prior notes  Clinical Tests:   Lab Tests: Ordered and Reviewed  Radiological Study: Ordered and Reviewed  Medical Tests: Ordered and Reviewed            Scribe Attestation:   Scribe #1: I performed the above scribed service and the documentation accurately describes the services I performed. I attest to the accuracy of the note.            ED Course as of Mar 07 1417   Sat Mar 07, 2020   1222 Patient is afebrile and in no acute distress at time history and physical.  He has no obvious focal neurological deficits.  Triage report of numbness noted.  At time history and physical patient denies history of facial or extremity numbness.  He does complain of occasional pain to the left side of the face.  This is not associated with vision changes.  He denies current headache chest pain, lightheadedness, or current numbness.  He reports symptoms  intermittent.  He has no tenderness over the temporal artery to suggest temporal arteritis.  Unclear etiology of patient's symptoms.  Will obtain lab testing and imaging to further evaluate.    [SG]   1408 labs within acceptable ranges without significant electrolyte abnormality.  UA is not indicative of UTI.  CT of the head does not demonstrate some evidence of acute intracranial abnormality.  Thyroid function is within normal ranges.  Patient remains mildly bradycardic but remains asymptomatic on reassessment.  Unclear etiology of patient's symptoms.  I have low clinical suspicion of ACS or acute CVA as the cause of patient's symptoms. Symptoms not appear consistent with TIA.  Patient appears hemodynamically stable and fit for discharge to follow up with his primary physician as well as Cardiology.  Patient and friend at bedside counseled on supportive care, appropriate medication usage, concerning symptoms for which to return to ER and the importance of follow up. Understanding and agreement with treatment plan was expressed.     [SG]      ED Course User Index  [SG] Krystle Ye MD          This chart was completed using dictation software, as a result there may be some transcription errors.       Clinical Impression:       ICD-10-CM ICD-9-CM   1. Sinus bradycardia R00.1 427.89   2. Dizziness R42 780.4       I, Krystle Ye , personally performed the services described in this documentation. All medical record entries made by the scribe were at my direction and in my presence. I have reviewed the chart and agree that the record reflects my personal performance and is accurate and complete.     Disposition:   Disposition: Discharged  Condition: Stable     ED Disposition Condition    Discharge Stable        ED Prescriptions     None        Follow-up Information     Follow up With Specialties Details Why Contact Info Additional Information    Pj Gillette MD Family Medicine Schedule an appointment as soon as  possible for a visit   [address] [address]  [phone]       [facility] - Cardiology Cardiology Schedule an appointment as soon as possible for a visit   [address] [facility] [address]  [facility] [address] [phone] [address]                                     Krystle Ye MD  03/07/20 2970

## 2020-03-09 NOTE — PROGRESS NOTES
CARDIOLOGY CONSULTATION    REASON FOR CONSULT:   Reed Torres is a 75 y.o. male who presents for for evaluation of bradycardia.      HISTORY OF PRESENT ILLNESS:     Reed Torres is a 75-year-old male who presents for evaluation of bradycardia.  Patient presented on 03/07/2020 to the emergency room with complaints of dizziness.  Associated left-sided headache in bilateral hand paresthesias.  Hx Hyperlipidemia.  EKG from March 7th showed sinus bradycardia, incomplete right bundle branch block.  No previous events.  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  No chest pain or shortness of breath.  No recurrence of the episode.  TSH within normal limits.  EKG from 2017 also shows a heart rate in 50s.  No history of syncope. Orthostatics negative.     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 If you have any questions, ask your nurse or doctor.               CONTINUE taking these medications    albuterol 90 mcg/actuation inhaler  Commonly known as:  VENTOLIN HFA  Inhale 2 puffs into the lungs every 6 (six) hours as needed for Wheezing.     latanoprost 0.005 % ophthalmic solution     simvastatin 10 MG tablet  Commonly known as:  ZOCOR  Take 1 tablet (10 mg total) by mouth every evening.            SOCIAL HISTORY:     Social History     Socioeconomic History    Marital status:      Spouse name: Not on file    Number of children: Not on file    Years of education: Not on file    Highest education level: Not on file   Occupational History    Not  "on file   Social Needs    Financial resource strain: Not on file    Food insecurity:     Worry: Not on file     Inability: Not on file    Transportation needs:     Medical: Not on file     Non-medical: Not on file   Tobacco Use    Smoking status: Former Smoker    Smokeless tobacco: Former User    Tobacco comment: stopped smoking 20 yrs ago.   Substance and Sexual Activity    Alcohol use: Yes     Alcohol/week: 5.0 standard drinks     Types: 6 Standard drinks or equivalent per week     Comment: Daily    Drug use: No    Sexual activity: Yes     Partners: Female     Birth control/protection: None   Lifestyle    Physical activity:     Days per week: Not on file     Minutes per session: Not on file    Stress: Not on file   Relationships    Social connections:     Talks on phone: Not on file     Gets together: Not on file     Attends Voodoo service: Not on file     Active member of club or organization: Not on file     Attends meetings of clubs or organizations: Not on file     Relationship status: Not on file   Other Topics Concern    Not on file   Social History Narrative    Still doing lawn service       FAMILY HISTORY:     Family History   Problem Relation Age of Onset    COPD Mother     Diabetes Mother     Hypertension Mother     COPD Father     Heart disease Brother        REVIEW OF SYSTEMS:   Review of Systems   Respiratory: Negative for sputum production, shortness of breath and wheezing.    Cardiovascular: Negative for chest pain, palpitations, orthopnea, claudication, leg swelling and PND.   Neurological: Positive for dizziness, tingling and headaches. Negative for focal weakness, loss of consciousness and weakness.       PHYSICAL EXAM:     Vitals:    03/10/20 0820   BP: (!) 142/84   Pulse: 72    Body mass index is 24.51 kg/m².  Weight: 66.8 kg (147 lb 4.3 oz)   Height: 5' 5" (165.1 cm)     Physical Exam   Constitutional: He is oriented to person, place, and time. He appears well-developed " and well-nourished. No distress.   Neck: No JVD present. Carotid bruit is not present.   Cardiovascular: Normal rate, regular rhythm, normal heart sounds and normal pulses.   Pulmonary/Chest: Effort normal and breath sounds normal.   Abdominal: Soft. Bowel sounds are normal. There is no tenderness.   Musculoskeletal:        Right lower leg: He exhibits no edema.        Left lower leg: He exhibits no edema.   Neurological: He is alert and oriented to person, place, and time.   Skin: He is not diaphoretic.   Psychiatric: He has a normal mood and affect. His speech is normal and behavior is normal.   Vitals reviewed.      DATA:   EKG: (personally reviewed tracing)  3/7/20 - sinus bradycardia, incomplete right bundle branch block  Laboratory:  CBC:  Recent Labs   Lab 02/11/19  0942 10/08/19  1139 03/07/20  1237   WBC 4.19 4.33 5.89   Hemoglobin 14.1 14.2 14.2   Hematocrit 42.8 42.9 43.0   Platelets 196 173 145 L       CHEMISTRIES:  Recent Labs   Lab 07/04/17  1402 07/05/17  0500  02/11/19  0942 10/08/19  0739 03/07/20  1237   Glucose 131 H 98   < > 79 99 85   Sodium 135 L 140   < > 142 140 142   Potassium 4.5 4.8   < > 4.5 4.0 4.3   BUN, Bld 44 H 30 H   < > 16 17 16   Creatinine 2.3 H 1.0   < > 0.8 0.8 0.7   eGFR if  31 A >60   < > >60 >60 >60   eGFR if non  27 A >60   < > >60 >60 >60   Calcium 9.6 8.8   < > 9.4 9.0 9.4   Magnesium 2.6 2.4  --   --   --   --     < > = values in this interval not displayed.       CARDIAC BIOMARKERS:  Recent Labs   Lab 07/04/17  1402 03/07/20  1237     --    CPK MB 3.6  --    Troponin I <0.006 <0.006       COAGS:  Recent Labs   Lab 03/07/20  1237   INR 0.9       LIPIDS/LFTS:  Recent Labs   Lab 02/09/18  0808 02/11/19  0942 10/08/19  0739 03/07/20  1237   Cholesterol 200 H 193 222 H  --    Triglycerides 78 68 94  --    HDL 53 52 58  --    LDL Cholesterol 131.4 127.4 145.2  --    Non-HDL Cholesterol 147 141 164  --    AST 16 13 11 16   ALT 17 13 12 16        Cardiovascular Testing:    None    ASSESSMENT:     1.  Bradycardia  2.  Dizziness  3.  Hyperlipidemia:   4.  Abnormal EKG  5.  Elevated blood pressure without diagnosis of hypertension    PLAN:     1.  24 hr Holter  2.  2D echocardiogram  3.  Neurology referral  4.  Return to clinic in 1 month.      Keenan Avila MD, MPH, FACC, [facility]

## 2020-03-10 ENCOUNTER — OFFICE VISIT (OUTPATIENT)
Dept: CARDIOLOGY | Facility: CLINIC | Age: 75
End: 2020-03-10
Payer: MEDICARE

## 2020-03-10 VITALS
HEART RATE: 72 BPM | OXYGEN SATURATION: 99 % | HEIGHT: 65 IN | DIASTOLIC BLOOD PRESSURE: 84 MMHG | BODY MASS INDEX: 24.53 KG/M2 | WEIGHT: 147.25 LBS | SYSTOLIC BLOOD PRESSURE: 142 MMHG

## 2020-03-10 DIAGNOSIS — R42 DIZZINESS: ICD-10-CM

## 2020-03-10 DIAGNOSIS — R00.1 SINUS BRADYCARDIA: Primary | ICD-10-CM

## 2020-03-10 DIAGNOSIS — E78.00 PURE HYPERCHOLESTEROLEMIA: ICD-10-CM

## 2020-03-10 DIAGNOSIS — R03.0 ELEVATED BLOOD-PRESSURE READING WITHOUT DIAGNOSIS OF HYPERTENSION: ICD-10-CM

## 2020-03-10 DIAGNOSIS — R51.9 NONINTRACTABLE HEADACHE, UNSPECIFIED CHRONICITY PATTERN, UNSPECIFIED HEADACHE TYPE: ICD-10-CM

## 2020-03-10 PROCEDURE — 99203 PR OFFICE/OUTPT VISIT, NEW, LEVL III, 30-44 MIN: ICD-10-PCS | Mod: S$GLB,,, | Performed by: INTERNAL MEDICINE

## 2020-03-10 PROCEDURE — 1159F PR MEDICATION LIST DOCUMENTED IN MEDICAL RECORD: ICD-10-PCS | Mod: S$GLB,,, | Performed by: INTERNAL MEDICINE

## 2020-03-10 PROCEDURE — 1126F AMNT PAIN NOTED NONE PRSNT: CPT | Mod: S$GLB,,, | Performed by: INTERNAL MEDICINE

## 2020-03-10 PROCEDURE — 1101F PR PT FALLS ASSESS DOC 0-1 FALLS W/OUT INJ PAST YR: ICD-10-PCS | Mod: CPTII,S$GLB,, | Performed by: INTERNAL MEDICINE

## 2020-03-10 PROCEDURE — 99999 PR PBB SHADOW E&M-EST. PATIENT-LVL III: CPT | Mod: PBBFAC,,, | Performed by: INTERNAL MEDICINE

## 2020-03-10 PROCEDURE — 1159F MED LIST DOCD IN RCRD: CPT | Mod: S$GLB,,, | Performed by: INTERNAL MEDICINE

## 2020-03-10 PROCEDURE — 1126F PR PAIN SEVERITY QUANTIFIED, NO PAIN PRESENT: ICD-10-PCS | Mod: S$GLB,,, | Performed by: INTERNAL MEDICINE

## 2020-03-10 PROCEDURE — 1101F PT FALLS ASSESS-DOCD LE1/YR: CPT | Mod: CPTII,S$GLB,, | Performed by: INTERNAL MEDICINE

## 2020-03-10 PROCEDURE — 99203 OFFICE O/P NEW LOW 30 MIN: CPT | Mod: S$GLB,,, | Performed by: INTERNAL MEDICINE

## 2020-03-10 PROCEDURE — 99999 PR PBB SHADOW E&M-EST. PATIENT-LVL III: ICD-10-PCS | Mod: PBBFAC,,, | Performed by: INTERNAL MEDICINE

## 2020-03-10 NOTE — LETTER
March 10, 2020      Krystle Ye MD  [address]  [place] [address]           [facility] - Cardiology  [address]  [facility] [place] [address]  Phone: [phone]          Patient: Reed Torres   MR Number: [id number]   YOB: 1945   Date of Visit: 3/10/2020       Dear Dr. Krystle Ye:    Thank you for referring Reed Torres to me for evaluation. Attached you will find relevant portions of my assessment and plan of care.    If you have questions, please do not hesitate to call me. I look forward to following Reed Torres along with you.    Sincerely,    Keenan Avila MD    Enclosure  CC:  No Recipients    If you would like to receive this communication electronically, please contact [email or web address] or [phone] to request more information on [facility] Link access.    For providers and/or their staff who would like to refer a patient to [facility], please contact us through our one-stop-shop provider referral line, [facility], at [phone].    If you feel you have received this communication in error or would no longer like to receive these types of communications, please e-mail [email or web address]

## 2020-03-17 ENCOUNTER — HOSPITAL ENCOUNTER (OUTPATIENT)
Dept: CARDIOLOGY | Facility: HOSPITAL | Age: 75
Discharge: HOME | End: 2020-03-17
Attending: INTERNAL MEDICINE
Payer: MEDICARE

## 2020-03-17 VITALS
WEIGHT: 147 LBS | SYSTOLIC BLOOD PRESSURE: 142 MMHG | HEART RATE: 62 BPM | HEIGHT: 65 IN | DIASTOLIC BLOOD PRESSURE: 84 MMHG | BODY MASS INDEX: 24.49 KG/M2

## 2020-03-17 DIAGNOSIS — R00.1 SINUS BRADYCARDIA: ICD-10-CM

## 2020-03-17 DIAGNOSIS — R42 DIZZINESS: ICD-10-CM

## 2020-03-17 DIAGNOSIS — R03.0 ELEVATED BLOOD-PRESSURE READING WITHOUT DIAGNOSIS OF HYPERTENSION: ICD-10-CM

## 2020-03-17 LAB
AORTIC ROOT ANNULUS: 3.51 CM
AORTIC VALVE CUSP SEPERATION: 2.39 CM
AV INDEX (PROSTH): 0.88
AV MEAN GRADIENT: 3 MMHG
AV PEAK GRADIENT: 4 MMHG
AV VALVE AREA: 4.25 CM2
AV VELOCITY RATIO: 0.93
BSA FOR ECHO PROCEDURE: 1.75 M2
CV ECHO LV RWT: 0.42 CM
DOP CALC AO PEAK VEL: 1.04 M/S
DOP CALC AO VTI: 20.3 CM
DOP CALC LVOT AREA: 4.8 CM2
DOP CALC LVOT DIAMETER: 2.48 CM
DOP CALC LVOT PEAK VEL: 0.97 M/S
DOP CALC LVOT STROKE VOLUME: 86.28 CM3
DOP CALCLVOT PEAK VEL VTI: 17.87 CM
E WAVE DECELERATION TIME: 305.33 MSEC
E/A RATIO: 0.64
ECHO LV POSTERIOR WALL: 0.98 CM (ref 0.6–1.1)
FRACTIONAL SHORTENING: 41 % (ref 28–44)
INTERVENTRICULAR SEPTUM: 1.13 CM (ref 0.6–1.1)
IVRT: 80.74 MSEC
LA MAJOR: 4.65 CM
LA MINOR: 4.79 CM
LA WIDTH: 4.19 CM
LEFT ATRIUM SIZE: 3.27 CM
LEFT ATRIUM VOLUME INDEX: 31.7 ML/M2
LEFT ATRIUM VOLUME: 54.96 CM3
LEFT INTERNAL DIMENSION IN SYSTOLE: 2.76 CM (ref 2.1–4)
LEFT VENTRICLE DIASTOLIC VOLUME INDEX: 58.32 ML/M2
LEFT VENTRICLE DIASTOLIC VOLUME: 101.22 ML
LEFT VENTRICLE MASS INDEX: 101 G/M2
LEFT VENTRICLE SYSTOLIC VOLUME INDEX: 16.4 ML/M2
LEFT VENTRICLE SYSTOLIC VOLUME: 28.45 ML
LEFT VENTRICULAR INTERNAL DIMENSION IN DIASTOLE: 4.68 CM (ref 3.5–6)
LEFT VENTRICULAR MASS: 175.77 G
MV PEAK A VEL: 0.72 M/S
MV PEAK E VEL: 0.46 M/S
PISA TR MAX VEL: 2.49 M/S
PULM VEIN S/D RATIO: 1.9
PV PEAK D VEL: 0.31 M/S
PV PEAK S VEL: 0.59 M/S
PV PEAK VELOCITY: 0.95 CM/S
RA MAJOR: 4.73 CM
RA PRESSURE: 3 MMHG
RA WIDTH: 3.55 CM
RIGHT VENTRICULAR END-DIASTOLIC DIMENSION: 2.5 CM
RV TISSUE DOPPLER FREE WALL SYSTOLIC VELOCITY 1 (APICAL 4 CHAMBER VIEW): 10.23 CM/S
SINUS: 3.91 CM
STJ: 3.36 CM
TR MAX PG: 25 MMHG
TRICUSPID ANNULAR PLANE SYSTOLIC EXCURSION: 2.19 CM
TV REST PULMONARY ARTERY PRESSURE: 28 MMHG

## 2020-03-17 PROCEDURE — 93227 XTRNL ECG REC<48 HR R&I: CPT | Mod: ,,, | Performed by: INTERNAL MEDICINE

## 2020-03-17 PROCEDURE — 93306 TTE W/DOPPLER COMPLETE: CPT

## 2020-03-17 PROCEDURE — 93306 ECHO (CUPID ONLY): ICD-10-PCS | Mod: 26,,, | Performed by: INTERNAL MEDICINE

## 2020-03-17 PROCEDURE — 93306 TTE W/DOPPLER COMPLETE: CPT | Mod: 26,,, | Performed by: INTERNAL MEDICINE

## 2020-03-17 PROCEDURE — 93227 HOLTER MONITOR - 24 HOUR (CUPID ONLY): ICD-10-PCS | Mod: ,,, | Performed by: INTERNAL MEDICINE

## 2020-03-17 PROCEDURE — 93226 XTRNL ECG REC<48 HR SCAN A/R: CPT

## 2020-03-18 LAB
OHS CV EVENT MONITOR DAY: 0
OHS CV HOLTER LENGTH DECIMAL HOURS: 24.98
OHS CV HOLTER LENGTH HOURS: 24
OHS CV HOLTER LENGTH MINUTES: 59

## 2020-03-29 ENCOUNTER — TELEPHONE (OUTPATIENT)
Dept: FAMILY MEDICINE | Facility: CLINIC | Age: 75
End: 2020-03-29

## 2020-03-29 NOTE — TELEPHONE ENCOUNTER
Pt was scheduled for tomorrow with Dr Gillette. appt need to be reschedule. Pt was upset. Letter mailed.

## 2020-04-07 ENCOUNTER — OFFICE VISIT (OUTPATIENT)
Dept: CARDIOLOGY | Facility: CLINIC | Age: 75
End: 2020-04-07
Payer: MEDICARE

## 2020-04-07 DIAGNOSIS — Z20.822 CLOSE EXPOSURE TO COVID-19 VIRUS: ICD-10-CM

## 2020-04-07 DIAGNOSIS — R03.0 ELEVATED BLOOD-PRESSURE READING WITHOUT DIAGNOSIS OF HYPERTENSION: ICD-10-CM

## 2020-04-07 DIAGNOSIS — R00.1 SINUS BRADYCARDIA: ICD-10-CM

## 2020-04-07 DIAGNOSIS — I47.10 SVT (SUPRAVENTRICULAR TACHYCARDIA): ICD-10-CM

## 2020-04-07 DIAGNOSIS — E78.00 PURE HYPERCHOLESTEROLEMIA: ICD-10-CM

## 2020-04-07 DIAGNOSIS — R42 DIZZINESS: Primary | ICD-10-CM

## 2020-04-07 PROCEDURE — 99499 RISK ADDL DX/OHS AUDIT: ICD-10-PCS | Mod: 95,,, | Performed by: INTERNAL MEDICINE

## 2020-04-07 PROCEDURE — 99499 UNLISTED E&M SERVICE: CPT | Mod: 95,,, | Performed by: INTERNAL MEDICINE

## 2020-04-07 PROCEDURE — 1101F PR PT FALLS ASSESS DOC 0-1 FALLS W/OUT INJ PAST YR: ICD-10-PCS | Mod: CPTII,95,, | Performed by: INTERNAL MEDICINE

## 2020-04-07 PROCEDURE — 99212 PR OFFICE/OUTPT VISIT, EST, LEVL II, 10-19 MIN: ICD-10-PCS | Mod: 95,,, | Performed by: INTERNAL MEDICINE

## 2020-04-07 PROCEDURE — 99212 OFFICE O/P EST SF 10 MIN: CPT | Mod: 95,,, | Performed by: INTERNAL MEDICINE

## 2020-04-07 PROCEDURE — 1159F MED LIST DOCD IN RCRD: CPT | Mod: 95,,, | Performed by: INTERNAL MEDICINE

## 2020-04-07 PROCEDURE — 1159F PR MEDICATION LIST DOCUMENTED IN MEDICAL RECORD: ICD-10-PCS | Mod: 95,,, | Performed by: INTERNAL MEDICINE

## 2020-04-07 PROCEDURE — 1101F PT FALLS ASSESS-DOCD LE1/YR: CPT | Mod: CPTII,95,, | Performed by: INTERNAL MEDICINE

## 2020-04-07 NOTE — PROGRESS NOTES
CARDIOLOGY CLINIC VISIT        HISTORY OF PRESENT ILLNESS:     The patient location is: home  The chief complaint leading to consultation is: follow up  Visit type: Virtual visit with synchronous audio and video  Total time spent with patient: 20 minutes  Each patient to whom he or she provides medical services by telemedicine is:  (1) informed of the relationship between the physician and patient and the respective role of any other health care provider with respect to management of the patient; and (2) notified that he or she may decline to receive medical services by telemedicine and may withdraw from such care at any time.    Notes:     Reed Torres is a 75-year-old male who presents for continued care. Seen on 3/10 for evaluation of bradycardia on 03/07/2020. EKG from March 7th showed sinus bradycardia, incomplete right bundle branch block.  Echo from 3/17/20 showed low normal LVEF of 50%.  Normal PASP. 24 hour holter showed NSR, avg HR 77. Occasional runs SVT. Diary also showed entries of headache that correlated to sinus rhythm. 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  No chest pain or shortness of breath.  No recurrence of the episode.  TSH within normal limits.  He states that he has been feeling well.  His main concern however is roughly 3 weeks ago he came in contact with a friend of his who subsequently tested positive for COVID-19 and passed away.  He has had no fevers.  No shortness of breath.  No GI symptomatology.  He is self isolated for the past number of weeks.  He has been monitoring his blood pressure at home and values are within normal limits.      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 If you have any questions, ask your nurse or doctor.               CONTINUE taking these medications    albuterol 90 mcg/actuation inhaler  Commonly known as:  VENTOLIN HFA  Inhale 2 puffs into the lungs every 6 (six) hours as needed for Wheezing.     latanoprost 0.005 % ophthalmic solution     simvastatin 10 MG tablet  Commonly known as:  ZOCOR  Take 1 tablet (10 mg total) by mouth every evening.            SOCIAL HISTORY:     Social History     Socioeconomic History    Marital status:      Spouse name: Not on file    Number of children: Not on file    Years of education: Not on file    Highest education level: Not on file   Occupational History    Not on file   Social Needs    Financial resource strain: Not on file    Food insecurity:     Worry: Not on file     Inability: Not on file    Transportation needs:     Medical: Not on file     Non-medical: Not on file   Tobacco Use    Smoking status: Former Smoker    Smokeless tobacco: Former User    Tobacco comment: stopped smoking 20 yrs ago.   Substance and Sexual Activity    Alcohol use: Yes     Alcohol/week: 5.0 standard drinks     Types: 6 Standard drinks or equivalent per week     Comment: Daily    Drug use: No    Sexual activity: Yes     Partners: Female     Birth control/protection: None   Lifestyle    Physical activity:     Days per week: Not on file     Minutes per session: Not on file    Stress: Not on file   Relationships    Social connections:     Talks on phone: Not on file     Gets together: Not on file     Attends [employer] service: Not on file     Active member of club or organization: Not on file     Attends meetings of clubs or organizations: Not on file     Relationship status: Not on file   Other Topics Concern    Not on file   Social History Narrative     Still doing lawn service       FAMILY HISTORY:     Family History   Problem Relation Age of Onset    COPD Mother     Diabetes Mother     Hypertension Mother     COPD Father     Heart disease Brother        REVIEW OF SYSTEMS:   Review of Systems   Constitutional: Negative for chills, diaphoresis, fever, malaise/fatigue and weight loss.   Respiratory: Negative for cough, sputum production, shortness of breath and wheezing.    Cardiovascular: Negative for chest pain, palpitations, orthopnea, claudication, leg swelling and PND.   Neurological: Negative for dizziness, sensory change, focal weakness, loss of consciousness, weakness and headaches.       PHYSICAL EXAM:   There were no vitals filed for this visit. There is no height or weight on file to calculate BMI.            Physical Exam    DATA:     Laboratory:  CBC:  Recent Labs   Lab 02/11/19  0942 10/08/19  1139 03/07/20  1237   WBC 4.19 4.33 5.89   Hemoglobin 14.1 14.2 14.2   Hematocrit 42.8 42.9 43.0   Platelets 196 173 145 L       CHEMISTRIES:  Recent Labs   Lab 07/04/17  1402 07/05/17  0500  02/11/19  0942 10/08/19  0739 03/07/20  1237   Glucose 131 H 98   < > 79 99 85   Sodium 135 L 140   < > 142 140 142   Potassium 4.5 4.8   < > 4.5 4.0 4.3   BUN, Bld 44 H 30 H   < > 16 17 16   Creatinine 2.3 H 1.0   < > 0.8 0.8 0.7   eGFR if  31 A >60   < > >60 >60 >60   eGFR if non  27 A >60   < > >60 >60 >60   Calcium 9.6 8.8   < > 9.4 9.0 9.4   Magnesium 2.6 2.4  --   --   --   --     < > = values in this interval not displayed.       CARDIAC BIOMARKERS:  Recent Labs   Lab 07/04/17  1402 03/07/20  1237     --    CPK MB 3.6  --    Troponin I <0.006 <0.006       COAGS:  Recent Labs   Lab 03/07/20  1237   INR 0.9       LIPIDS/LFTS:  Recent Labs   Lab 02/09/18  0808 02/11/19  0942 10/08/19  0739 03/07/20  1237   Cholesterol 200 H 193 222 H  --    Triglycerides 78 68 94  --    HDL 53 52 58  --    LDL Cholesterol 131.4 127.4 145.2  --   "  Non-HDL Cholesterol 147 141 164  --    AST 16 13 11 16   ALT 17 13 12 16       Cardiovascular Testing:    Holter 3/17/20:    · Sinus rhythm with heart rates varying between 48 and 118 bpm with an average of 77 bpm.  · There were occasional PVCs totalling 278 and averaging 11.13 per hour.  · There were rare PACs totalling 81 and averaging 3.24 per hour.  · There were occasional runs of non-sustained SVT and lasting for 2 to 7 beats.  · Diary events ("headache") did not correspond to any arrhythmic events.    Echo 3/17/20:    · Low normal left ventricular systolic function. The estimated ejection fraction is 50%.  · No wall motion abnormalities.  · Normal right ventricular systolic function.  · The sinuses of Valsalva is mildly dilated. 3.9cm.  · The estimated PA systolic pressure is 28 mmHg.    ASSESSMENT:     1.  Bradycardia  2.  Dizziness:  No recurrence  3.  Hyperlipidemia:   4.  COVID-19 exposure  5.  Elevated blood pressure without diagnosis of hypertension:  Patient has been monitoring at home and values have been within normal limits.    PLAN:     1. Continue to monitor blood pressure.  2.  Increase activity as tolerated.  Patient owns a lawn care service.  Discussed as he notes symptoms that recur we will proceed with a stress test.  3.  Tentatively return to clinic 2-3 months.      Keenan Avila MD, MPH, FACC, [facility]  "

## 2020-04-27 ENCOUNTER — HOSPITAL ENCOUNTER (EMERGENCY)
Facility: HOSPITAL | Age: 75
Discharge: HOME | End: 2020-04-27
Attending: EMERGENCY MEDICINE
Payer: MEDICARE

## 2020-04-27 VITALS
BODY MASS INDEX: 24.32 KG/M2 | DIASTOLIC BLOOD PRESSURE: 76 MMHG | SYSTOLIC BLOOD PRESSURE: 126 MMHG | WEIGHT: 146 LBS | RESPIRATION RATE: 16 BRPM | HEART RATE: 54 BPM | TEMPERATURE: 98 F | OXYGEN SATURATION: 95 % | HEIGHT: 65 IN

## 2020-04-27 DIAGNOSIS — R07.89 ATYPICAL CHEST PAIN: Primary | ICD-10-CM

## 2020-04-27 DIAGNOSIS — R07.9 CHEST PAIN: ICD-10-CM

## 2020-04-27 LAB
ALBUMIN SERPL BCP-MCNC: 3.9 G/DL (ref 3.5–5.2)
ALP SERPL-CCNC: 58 U/L (ref 55–135)
ALT SERPL W/O P-5'-P-CCNC: 14 U/L (ref 10–44)
ANION GAP SERPL CALC-SCNC: 12 MMOL/L (ref 8–16)
AST SERPL-CCNC: 12 U/L (ref 10–40)
BASOPHILS # BLD AUTO: 0.03 K/UL (ref 0–0.2)
BASOPHILS NFR BLD: 0.6 % (ref 0–1.9)
BILIRUB SERPL-MCNC: 0.9 MG/DL (ref 0.1–1)
BNP SERPL-MCNC: 49 PG/ML (ref 0–99)
BUN SERPL-MCNC: 20 MG/DL (ref 8–23)
CALCIUM SERPL-MCNC: 8.8 MG/DL (ref 8.7–10.5)
CHLORIDE SERPL-SCNC: 106 MMOL/L (ref 95–110)
CO2 SERPL-SCNC: 24 MMOL/L (ref 23–29)
CREAT SERPL-MCNC: 1.4 MG/DL (ref 0.5–1.4)
DIFFERENTIAL METHOD: ABNORMAL
EOSINOPHIL # BLD AUTO: 0.1 K/UL (ref 0–0.5)
EOSINOPHIL NFR BLD: 1.8 % (ref 0–8)
ERYTHROCYTE [DISTWIDTH] IN BLOOD BY AUTOMATED COUNT: 12.8 % (ref 11.5–14.5)
EST. GFR  (AFRICAN AMERICAN): 56 ML/MIN/1.73 M^2
EST. GFR  (NON AFRICAN AMERICAN): 48 ML/MIN/1.73 M^2
GLUCOSE SERPL-MCNC: 111 MG/DL (ref 70–110)
HCT VFR BLD AUTO: 40.1 % (ref 40–54)
HGB BLD-MCNC: 13.3 G/DL (ref 14–18)
IMM GRANULOCYTES # BLD AUTO: 0.01 K/UL (ref 0–0.04)
IMM GRANULOCYTES NFR BLD AUTO: 0.2 % (ref 0–0.5)
LIPASE SERPL-CCNC: 15 U/L (ref 4–60)
LYMPHOCYTES # BLD AUTO: 2.1 K/UL (ref 1–4.8)
LYMPHOCYTES NFR BLD: 42.5 % (ref 18–48)
MCH RBC QN AUTO: 29.5 PG (ref 27–31)
MCHC RBC AUTO-ENTMCNC: 33.2 G/DL (ref 32–36)
MCV RBC AUTO: 89 FL (ref 82–98)
MONOCYTES # BLD AUTO: 0.3 K/UL (ref 0.3–1)
MONOCYTES NFR BLD: 6.2 % (ref 4–15)
NEUTROPHILS # BLD AUTO: 2.4 K/UL (ref 1.8–7.7)
NEUTROPHILS NFR BLD: 48.7 % (ref 38–73)
NRBC BLD-RTO: 0 /100 WBC
PLATELET # BLD AUTO: 166 K/UL (ref 150–350)
PMV BLD AUTO: 11.2 FL (ref 9.2–12.9)
POTASSIUM SERPL-SCNC: 3.4 MMOL/L (ref 3.5–5.1)
PROT SERPL-MCNC: 6.6 G/DL (ref 6–8.4)
RBC # BLD AUTO: 4.51 M/UL (ref 4.6–6.2)
SARS-COV-2 RDRP RESP QL NAA+PROBE: NEGATIVE
SODIUM SERPL-SCNC: 142 MMOL/L (ref 136–145)
TROPONIN I SERPL DL<=0.01 NG/ML-MCNC: <0.006 NG/ML (ref 0–0.03)
WBC # BLD AUTO: 4.87 K/UL (ref 3.9–12.7)

## 2020-04-27 PROCEDURE — 83690 ASSAY OF LIPASE: CPT

## 2020-04-27 PROCEDURE — 93005 ELECTROCARDIOGRAM TRACING: CPT

## 2020-04-27 PROCEDURE — 84484 ASSAY OF TROPONIN QUANT: CPT

## 2020-04-27 PROCEDURE — 83880 ASSAY OF NATRIURETIC PEPTIDE: CPT

## 2020-04-27 PROCEDURE — 85025 COMPLETE CBC W/AUTO DIFF WBC: CPT

## 2020-04-27 PROCEDURE — 93010 ELECTROCARDIOGRAM REPORT: CPT | Mod: ,,, | Performed by: INTERNAL MEDICINE

## 2020-04-27 PROCEDURE — 36000 PLACE NEEDLE IN VEIN: CPT

## 2020-04-27 PROCEDURE — U0002 COVID-19 LAB TEST NON-CDC: HCPCS

## 2020-04-27 PROCEDURE — 99285 EMERGENCY DEPT VISIT HI MDM: CPT | Mod: 25

## 2020-04-27 PROCEDURE — 80053 COMPREHEN METABOLIC PANEL: CPT

## 2020-04-27 PROCEDURE — 25000003 PHARM REV CODE 250: Performed by: EMERGENCY MEDICINE

## 2020-04-27 PROCEDURE — 93010 EKG 12-LEAD: ICD-10-PCS | Mod: ,,, | Performed by: INTERNAL MEDICINE

## 2020-04-27 RX ORDER — LIDOCAINE HYDROCHLORIDE 20 MG/ML
10 SOLUTION OROPHARYNGEAL
Status: COMPLETED | OUTPATIENT
Start: 2020-04-27 | End: 2020-04-27

## 2020-04-27 RX ORDER — SUCRALFATE 1 G/1
1 TABLET ORAL 4 TIMES DAILY
Qty: 120 TABLET | Refills: 0 | Status: SHIPPED | OUTPATIENT
Start: 2020-04-27 | End: 2020-05-03

## 2020-04-27 RX ORDER — FAMOTIDINE 20 MG/1
20 TABLET, FILM COATED ORAL 2 TIMES DAILY
Qty: 20 TABLET | Refills: 0 | Status: SHIPPED | OUTPATIENT
Start: 2020-04-27 | End: 2020-07-20 | Stop reason: CLARIF

## 2020-04-27 RX ORDER — MAG HYDROX/ALUMINUM HYD/SIMETH 200-200-20
30 SUSPENSION, ORAL (FINAL DOSE FORM) ORAL
Status: COMPLETED | OUTPATIENT
Start: 2020-04-27 | End: 2020-04-27

## 2020-04-27 RX ADMIN — LIDOCAINE HYDROCHLORIDE 10 ML: 20 SOLUTION ORAL; TOPICAL at 07:04

## 2020-04-27 RX ADMIN — ALUMINUM HYDROXIDE, MAGNESIUM HYDROXIDE, AND SIMETHICONE 30 ML: 200; 200; 20 SUSPENSION ORAL at 07:04

## 2020-04-28 NOTE — ED PROVIDER NOTES
Encounter Date: 4/27/2020    SCRIBE #1 NOTE: I, Alaina Mayes, am scribing for, and in the presence of,  Larry Abraham MD. I have scribed the following portions of the note - Other sections scribed: HPI, ROS, PE.       History     Chief Complaint   Patient presents with    Chest Pain     C/o of ingestion after eating and lasting 15-20 minutes.x 1 week     CC: Chest Pain    HPI:  This is a 75 y.o. male who presents to the Emergency Department with a cc of chest pain that began one week ago. The patient reports that his first episode lasted 15-20 minutes after eating. He denies fever, chills, cough, rhinorrhea, or sore throat. The patient reporting taking Rosana-seltzer for his symptoms with some relief. He mentions that he spoke with Dr. Avila about his symptoms and was referred to the ED. The patient was seen by Dr. Avila 3 weeks ago and presented with sinus bradycardia.    The history is provided by the patient.     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   Substance Use Topics    Alcohol use: Yes     Alcohol/week: 5.0 standard drinks     Types: 6 Standard drinks or equivalent per week     Comment: Daily    Drug use: No     Review of Systems   Constitutional: Negative.    HENT: Negative.    Eyes: Negative.    Respiratory: Negative.    Cardiovascular: Positive for chest pain.   Gastrointestinal: Negative.    Genitourinary: Negative.     Musculoskeletal: Negative.    Skin: Negative.    Neurological: Negative.    Psychiatric/Behavioral: Negative.        Physical Exam     Initial Vitals [04/27/20 1847]   BP Pulse Resp Temp SpO2   120/77 63 18 97.7 °F (36.5 °C) 95 %      MAP       --         Physical Exam    Nursing note and vitals reviewed.  Constitutional: He appears well-developed and well-nourished. He is not diaphoretic. No distress.   HENT:   Head: Normocephalic and atraumatic.   Nose: Nose normal.   Mouth/Throat: No oropharyngeal exudate.   Eyes: EOM are normal. Pupils are equal, round, and reactive to light.   Neck: Normal range of motion. Neck supple. No tracheal deviation present. No JVD present.   Cardiovascular: Normal rate, regular rhythm and normal heart sounds.   No murmur heard.  Pulmonary/Chest: Breath sounds normal. No respiratory distress. He has no wheezes. He has no rhonchi. He has no rales.   Abdominal: Soft. Bowel sounds are normal. He exhibits no distension. There is no tenderness. There is no rebound and no guarding.   Musculoskeletal: Normal range of motion. He exhibits no edema or tenderness.   Neurological: He is alert and oriented to person, place, and time. He has normal strength. No cranial nerve deficit.   Skin: Skin is warm and dry. Capillary refill takes less than 2 seconds. No rash noted. No erythema.         ED Course   Procedures  Labs Reviewed   CBC W/ AUTO DIFFERENTIAL - Abnormal; Notable for the following components:       Result Value    RBC 4.51 (*)     Hemoglobin 13.3 (*)     All other components within normal limits   COMPREHENSIVE METABOLIC PANEL - Abnormal; Notable for the following components:    Potassium 3.4 (*)     Glucose 111 (*)     eGFR if  56 (*)     eGFR if non  48 (*)     All other components within normal limits   TROPONIN I   B-TYPE NATRIURETIC PEPTIDE   LIPASE   SARS-COV-2 RNA AMPLIFICATION, QUAL    Narrative:     What symptom criteria does the patient  meet?->Shortness of  breath or difficulty breathing        ECG Results          EKG 12-lead (In process)  Result time 04/28/20 06:19:36    In process by Interface, Lab In [facility] (04/28/20 06:19:36)                 Narrative:    Test Reason : R07.9,    Vent. Rate : 077 BPM     Atrial Rate : 077 BPM     P-R Int : 170 ms          QRS Dur : 094 ms      QT Int : 388 ms       P-R-T Axes : 080 -16 043 degrees     QTc Int : 439 ms    Sinus rhythm with Premature atrial complexes  Incomplete right bundle branch block  Borderline Abnormal ECG  When compared with ECG of 07-MAR-2020 12:24,  Significant changes have occurred    Referred By: [email or web address]   SELF           Confirmed By:                   In process by Interface, Lab In [facility] (04/28/20 06:19:02)                 Narrative:    Test Reason : R07.9,    Vent. Rate : 077 BPM     Atrial Rate : 077 BPM     P-R Int : 170 ms          QRS Dur : 094 ms      QT Int : 388 ms       P-R-T Axes : 080 -16 043 degrees     QTc Int : 439 ms    Sinus rhythm with Premature atrial complexes  Incomplete right bundle branch block  Borderline Abnormal ECG  When compared with ECG of 07-MAR-2020 12:24,  Significant changes have occurred    Referred By: [email or web address]   SELF           Confirmed By:                             Imaging Results          X-Ray Chest AP Portable (Final result)  Result time 04/27/20 19:50:02    Final result by Escobar Willoughby MD (04/27/20 19:50:02)                 Impression:      No detrimental change or radiographic acute intrathoracic process seen on this single view.      Electronically signed by: Escobar Willoughby MD  Date:    04/27/2020  Time:    19:50             Narrative:    EXAMINATION:  XR CHEST AP PORTABLE    CLINICAL HISTORY:  Chest pain, unspecified    TECHNIQUE:  Single frontal view of the chest was performed.    COMPARISON:  Chest radiograph 03/07/2020    FINDINGS:  Monitoring leads overlie the chest.  Chronic mild nonspecific elevation the right hemidiaphragm  similar to prior.  Cardiomediastinal silhouette is midline and within normal limits.  Pulmonary vasculature and hilar contours are within normal limits.  Few scattered linear opacities consistent with platelike scarring versus atelectasis.  The lungs are otherwise well expanded without focal consolidation, pleural effusion or pneumothorax.  No acute osseous process seen.  PA and lateral views can be obtained.                              MDM:    75 y.o.male with PMHx as noted above, presents with chest pain. Physical exam remarkable for well appearing male, conversing with ease, abdomen soft, nt/nd, CTAB, RRR.  ED workup remarkable for EKG - nsr, rate 77bpm, nonspecific ST changes, no ischemic pattern noted, no STEMI, trop - neg, BNP - 49, CBC/CMP - wnl, Lipase - 15, CXR - unremarkable. Pt presentation consistent with chest pain, atypical in presentation, suggesting GERD as etiology with unremarkable workup here.  Discussed further with Dr. Avila and agrees with assessment and labwork and will f/u patient in the next week.  Discussed with patient as well and will start trial of carafate/pepcid.  At this time given patient's history, physical exam, and ED workup do not suspect arrhythmia, MI/ACS, PE, PTX, aortic dissection, pericarditis, PNA, shingles, or any further malignant cause.  Discussed diagnosis and further treatment with patient, return precautions given and all questions answered.  Patient in understanding of plan.  Pt discharged to home improved and stable.                  Scribe Attestation:   Scribe #1: I performed the above scribed service and the documentation accurately describes the services I performed. I attest to the accuracy of the note.                          Clinical Impression:       ICD-10-CM ICD-9-CM   1. Atypical chest pain R07.89 786.59   2. Chest pain R07.9 786.50             ED Disposition Condition    Discharge Stable       Scribe attestation: I, Larry Abraham M.D., personally  performed the services described in this documentation. All medical record entries made by the scribe were at my direction and in my presence. I have reviewed the chart and agree that the record reflects my personal performance and is accurate and complete      ED Prescriptions     Medication Sig Dispense Start Date End Date Auth. Provider    sucralfate (CARAFATE) 1 gram tablet Take 1 tablet (1 g total) by mouth 4 (four) times daily. 120 tablet 4/27/2020 5/27/2020 Larry Abraham MD    famotidine (PEPCID) 20 MG tablet Take 1 tablet (20 mg total) by mouth 2 (two) times daily. 20 tablet 4/27/2020 4/27/2021 Larry Abraham MD        Follow-up Information     Follow up With Specialties Details Why Contact Info    [facility] Emergency Medicine Go to  If symptoms worsen [address]  [facility] [address] [phone]    Pj Gillette MD Family Medicine Go in 1 week As needed [address]  [place] [address]  [phone]                                       Larry Abraham MD  04/28/20 [id number]

## 2020-04-28 NOTE — ED TRIAGE NOTES
Pt presents to er with complaints if intermittent chest discomfort since February. Denies any discomfort at this time.

## 2020-05-03 ENCOUNTER — HOSPITAL ENCOUNTER (EMERGENCY)
Facility: HOSPITAL | Age: 75
Discharge: HOME | End: 2020-05-03
Attending: EMERGENCY MEDICINE
Payer: MEDICARE

## 2020-05-03 VITALS
OXYGEN SATURATION: 99 % | TEMPERATURE: 98 F | HEART RATE: 52 BPM | BODY MASS INDEX: 24.32 KG/M2 | WEIGHT: 146 LBS | RESPIRATION RATE: 20 BRPM | HEIGHT: 65 IN | DIASTOLIC BLOOD PRESSURE: 80 MMHG | SYSTOLIC BLOOD PRESSURE: 139 MMHG

## 2020-05-03 DIAGNOSIS — R10.9 ABDOMINAL PAIN: ICD-10-CM

## 2020-05-03 DIAGNOSIS — R14.2 BELCHING SYMPTOM: Primary | ICD-10-CM

## 2020-05-03 LAB
ALBUMIN SERPL BCP-MCNC: 3.8 G/DL (ref 3.5–5.2)
ALP SERPL-CCNC: 47 U/L (ref 55–135)
ALT SERPL W/O P-5'-P-CCNC: 12 U/L (ref 10–44)
ANION GAP SERPL CALC-SCNC: 8 MMOL/L (ref 8–16)
AST SERPL-CCNC: 10 U/L (ref 10–40)
BACTERIA #/AREA URNS HPF: NORMAL /HPF
BASOPHILS # BLD AUTO: 0.02 K/UL (ref 0–0.2)
BASOPHILS NFR BLD: 0.5 % (ref 0–1.9)
BILIRUB SERPL-MCNC: 1.6 MG/DL (ref 0.1–1)
BILIRUB UR QL STRIP: NEGATIVE
BUN SERPL-MCNC: 13 MG/DL (ref 8–23)
CALCIUM SERPL-MCNC: 8.9 MG/DL (ref 8.7–10.5)
CHLORIDE SERPL-SCNC: 110 MMOL/L (ref 95–110)
CLARITY UR: CLEAR
CO2 SERPL-SCNC: 24 MMOL/L (ref 23–29)
COLOR UR: YELLOW
CREAT SERPL-MCNC: 0.8 MG/DL (ref 0.5–1.4)
DIFFERENTIAL METHOD: ABNORMAL
EOSINOPHIL # BLD AUTO: 0.1 K/UL (ref 0–0.5)
EOSINOPHIL NFR BLD: 2 % (ref 0–8)
ERYTHROCYTE [DISTWIDTH] IN BLOOD BY AUTOMATED COUNT: 12.8 % (ref 11.5–14.5)
EST. GFR  (AFRICAN AMERICAN): >60 ML/MIN/1.73 M^2
EST. GFR  (NON AFRICAN AMERICAN): >60 ML/MIN/1.73 M^2
GLUCOSE SERPL-MCNC: 100 MG/DL (ref 70–110)
GLUCOSE UR QL STRIP: NEGATIVE
HCT VFR BLD AUTO: 41.2 % (ref 40–54)
HGB BLD-MCNC: 13.6 G/DL (ref 14–18)
HGB UR QL STRIP: NEGATIVE
IMM GRANULOCYTES # BLD AUTO: 0.01 K/UL (ref 0–0.04)
IMM GRANULOCYTES NFR BLD AUTO: 0.3 % (ref 0–0.5)
KETONES UR QL STRIP: NEGATIVE
LEUKOCYTE ESTERASE UR QL STRIP: NEGATIVE
LIPASE SERPL-CCNC: 9 U/L (ref 4–60)
LYMPHOCYTES # BLD AUTO: 1.3 K/UL (ref 1–4.8)
LYMPHOCYTES NFR BLD: 33.6 % (ref 18–48)
MCH RBC QN AUTO: 29.8 PG (ref 27–31)
MCHC RBC AUTO-ENTMCNC: 33 G/DL (ref 32–36)
MCV RBC AUTO: 90 FL (ref 82–98)
MICROSCOPIC COMMENT: NORMAL
MONOCYTES # BLD AUTO: 0.3 K/UL (ref 0.3–1)
MONOCYTES NFR BLD: 7.1 % (ref 4–15)
NEUTROPHILS # BLD AUTO: 2.2 K/UL (ref 1.8–7.7)
NEUTROPHILS NFR BLD: 56.5 % (ref 38–73)
NITRITE UR QL STRIP: NEGATIVE
NRBC BLD-RTO: 0 /100 WBC
PH UR STRIP: 5 [PH] (ref 5–8)
PLATELET # BLD AUTO: 150 K/UL (ref 150–350)
PMV BLD AUTO: 11.2 FL (ref 9.2–12.9)
POTASSIUM SERPL-SCNC: 3.6 MMOL/L (ref 3.5–5.1)
PROT SERPL-MCNC: 6.5 G/DL (ref 6–8.4)
PROT UR QL STRIP: NEGATIVE
RBC # BLD AUTO: 4.56 M/UL (ref 4.6–6.2)
RBC #/AREA URNS HPF: 1 /HPF (ref 0–4)
SODIUM SERPL-SCNC: 142 MMOL/L (ref 136–145)
SP GR UR STRIP: 1.01 (ref 1–1.03)
SQUAMOUS #/AREA URNS HPF: 3 /HPF
TROPONIN I SERPL DL<=0.01 NG/ML-MCNC: <0.006 NG/ML (ref 0–0.03)
URN SPEC COLLECT METH UR: NORMAL
UROBILINOGEN UR STRIP-ACNC: NEGATIVE EU/DL
WBC # BLD AUTO: 3.93 K/UL (ref 3.9–12.7)
WBC #/AREA URNS HPF: 2 /HPF (ref 0–5)

## 2020-05-03 PROCEDURE — 80053 COMPREHEN METABOLIC PANEL: CPT

## 2020-05-03 PROCEDURE — 93010 EKG 12-LEAD: ICD-10-PCS | Mod: ,,, | Performed by: INTERNAL MEDICINE

## 2020-05-03 PROCEDURE — 84484 ASSAY OF TROPONIN QUANT: CPT

## 2020-05-03 PROCEDURE — 81000 URINALYSIS NONAUTO W/SCOPE: CPT

## 2020-05-03 PROCEDURE — 83690 ASSAY OF LIPASE: CPT

## 2020-05-03 PROCEDURE — 93010 ELECTROCARDIOGRAM REPORT: CPT | Mod: ,,, | Performed by: INTERNAL MEDICINE

## 2020-05-03 PROCEDURE — 93005 ELECTROCARDIOGRAM TRACING: CPT

## 2020-05-03 PROCEDURE — 85025 COMPLETE CBC W/AUTO DIFF WBC: CPT

## 2020-05-03 PROCEDURE — 99285 EMERGENCY DEPT VISIT HI MDM: CPT | Mod: 25

## 2020-05-03 RX ORDER — SUCRALFATE 1 G/10ML
1 SUSPENSION ORAL 4 TIMES DAILY PRN
Qty: 414 ML | Refills: 0 | Status: SHIPPED | OUTPATIENT
Start: 2020-05-03 | End: 2020-07-20 | Stop reason: CLARIF

## 2020-05-03 NOTE — ED TRIAGE NOTES
"Pt reports to ED via personal transportation; Pt appears and seems to be worried about "the Corona virus"; pt verbalizes that is his biggest concern and reason for coming; pt reports being around someone who "coughed" and then a week later they  from Covid; pt denies fever or cough and states "I just have a funny feeling"; pt was seen recently in ED for abdominal pain, given RX for Carafate & Pepcid, and reports taking them but still intermittently has the abdominal pain; pt currently denies any abdominal pain; pt AAOx4; pt ambulates with steady gait, no distress noted  "

## 2020-05-03 NOTE — ED PROVIDER NOTES
"Encounter Date: 5/3/2020    SCRIBE #1 NOTE: I, Rito Abreu, am scribing for, and in the presence of, Rivka Esqueda MD.       History     Chief Complaint   Patient presents with    Abdominal Pain     Patient arrived with c/o epigastric pain, nausea, and nasal congestion x 1 month.  Reports that he was seen here in the ED on 2020 for same symptoms.  Reports that he was exposed to "a person that  with CoVid."  States, "maybe I'm just stressed and thinking about this coronavirus and keep thinking about my grandchildren."  No aucte distress       Time seen by provider: 6:02 AM on 2020    Reed Torres is a 75 y.o. male who presents to the ED with an onset of upper abdominal discomfort which occasionally radiates to the lower chest for two to three weeks. He has a difficult time describing his symptoms. He does not have any pain currently. He has been eating and drinking well. He was seen in the ED one week ago () with similar symptoms and discharged on Pepcid and Carafate. The patient has upcoming follow-up appointments with neurology and primary care in two and three days respectively. He also expressed concerns for exposure to a patient with COVID-19 three months ago, and reports increasing stress as this person has since . The patient states that he is belching more frequently after meals and has increased urination frequency. He denies any fevers, cough, nausea, vomiting, or current abdominal pain. No prior medical history of acid reflux or cardiopulmonary problems.    The history is provided by the patient.     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   Substance Use Topics    Alcohol use: Yes     Alcohol/week: 5.0 standard drinks     Types: 6 Standard drinks or equivalent per week     Comment: Daily    Drug use: No     Review of Systems   Constitutional: Negative for fever.   HENT: Negative for sore throat.    Respiratory: Negative for cough and shortness of breath.    Cardiovascular: Negative for chest pain.   Gastrointestinal: Positive for abdominal pain (resolved). Negative for diarrhea, nausea and vomiting.   Genitourinary: Positive for frequency. Negative for dysuria.   Musculoskeletal: Negative for back pain.   Skin: Negative for rash.   Neurological: Negative for weakness.   Psychiatric/Behavioral: The patient is nervous/anxious.        Physical Exam     Initial Vitals [05/03/20 0549]   BP Pulse Resp Temp SpO2   (!) 140/88 (!) 58 18 97.7 °F (36.5 °C) 98 %      MAP       --         Physical Exam    Nursing note and vitals reviewed.  Constitutional: He appears well-developed and well-nourished. He is not diaphoretic. No distress.   HENT:   Head: Normocephalic and atraumatic.   Mouth/Throat: Oropharynx is clear and moist.   Eyes: EOM are normal. Pupils are equal, round, and reactive to light.   Neck: Neck supple.   Cardiovascular: Regular rhythm. Bradycardia present.    Pulmonary/Chest: Breath sounds normal. No respiratory distress.   Abdominal: Soft. Bowel sounds are normal. He exhibits no distension. There is no tenderness. There is no rebound and no guarding.   Musculoskeletal: He exhibits no edema.   Neurological: He is alert and oriented to person, place, and time.   Skin: Skin is warm and dry.         ED Course   Procedures  Labs Reviewed   CBC W/ AUTO DIFFERENTIAL - Abnormal; Notable for the following components:        Result Value    RBC 4.56 (*)     Hemoglobin 13.6 (*)     All other components within normal limits   COMPREHENSIVE METABOLIC PANEL - Abnormal; Notable for the following components:    Total Bilirubin 1.6 (*)     Alkaline Phosphatase 47 (*)     All other components within normal limits   TROPONIN I   URINALYSIS, REFLEX TO URINE CULTURE    Narrative:     Preferred Collection Type->Urine, Clean Catch   LIPASE   URINALYSIS MICROSCOPIC    Narrative:     Preferred Collection Type->Urine, Clean Catch        ECG Results          EKG 12-lead (Preliminary result)  Result time 05/03/20 06:27:44    ED Interpretation by Rivka Esqueda MD (05/03/20 06:27:44)    Sinus bradycardia, rate 50 beats per minute, no STEMI.  Normal IN interval,  milliseconds.                            Imaging Results          X-Ray Chest AP Portable (Final result)  Result time 05/03/20 07:19:44    Final result by Cortney Rubalcava MD (05/03/20 07:19:44)                 Impression:      No acute intrathoracic abnormality identified on this single radiographic view of the chest.      Electronically signed by: Cortney Rubalcava MD  Date:    05/03/2020  Time:    07:19             Narrative:    EXAMINATION:  XR CHEST AP PORTABLE    CLINICAL HISTORY:  abdominal pain;    TECHNIQUE:  Single frontal view of the chest was performed.    COMPARISON:  04/27/2020    FINDINGS:  Cardiac monitoring leads overlie the chest.  The cardiomediastinal silhouette is within normal limits. The visualized airway is unremarkable.  The lungs appear symmetrically aerated without definite focal alveolar consolidation. No large pleural effusion or pneumothorax is appreciated.Visualized osseous structures are intact.                                 Medical Decision Making:   Initial Assessment:   75-year-old male presenting with vague abdominal discomfort, belching.  He is having a difficult time describing his symptoms.  Evaluated last week for similar symptoms.  He is  concerned about COVID infection although he tested negative on April 27th.  On exam, he is well-appearing in no distress.  His abdomen is soft and nontender.  Unclear etiology of his symptoms due to vague report.  He seems very anxious about the corona virus infection in the community as he knows someone who passed away pretty suddenly.  He is not hypoxic or febrile here, he has no URI symptoms.  I plan to assess this patient with basic labs including a lipase and a urinalysis, chest x-ray an EKG.  ED Management:  Patient's workup reveals mild elevation in his bilirubin, other LFTs normal, urinalysis negative for infection.  Troponin negative, EKG shows sinus bradycardia without evidence of ischemia.  Chest x-ray without focal finding.  We reviewed his current medications, Pepcid in sucralfate.  He states it may be better to have sucralfate in liquid form which I will prescribe.  He has close outpatient follow-up with his primary care physician on Wednesday.  I advised him to return to the emergency department if needed for any new or worsening symptoms.            Scribe Attestation:   Scribe #1: I performed the above scribed service and the documentation accurately describes the services I performed. I attest to the accuracy of the note.                          Clinical Impression:       ICD-10-CM ICD-9-CM   1. Belching symptom R14.2 787.3   2. Abdominal pain R10.9 789.00         Disposition:   Disposition: Discharged  Condition: Stable     ED Disposition Condition    Discharge Stable        ED Prescriptions     Medication Sig Dispense Start Date End Date Auth. Provider    sucralfate (CARAFATE) 100 mg/mL suspension Take 10 mLs (1 g total) by mouth 4 (four) times daily as needed. 414 mL 5/3/2020  Rivka Esqueda MD        Follow-up Information     Follow up With Specialties Details Why Contact Info    Pj Gillette MD Family Medicine On 5/6/2020  [address] [facility]  [place] [address]  [phone]       Fox Yin MD Neurology On 5/5/2020  [address]  [address]  [facility] [address]  [phone]      [facility] Emergency Medicine  As needed, If symptoms worsen [address]  [facility] [id number] [phone]                      I, Rivka Esqueda MD, personally performed the services described in this documentation. All medical record entries made by the scribe were at my direction and in my presence. I have reviewed the chart and agree that the record reflects my personal performance and is accurate and complete.        This dictation has been generated using M-Modal Fluency Direct dictation; some phonetic errors may occur.          Rivka Esqueda MD  05/03/20 0825

## 2020-05-04 ENCOUNTER — PES CALL (OUTPATIENT)
Dept: ADMINISTRATIVE | Facility: CLINIC | Age: 75
End: 2020-05-04

## 2020-05-06 ENCOUNTER — OFFICE VISIT (OUTPATIENT)
Dept: FAMILY MEDICINE | Facility: CLINIC | Age: 75
End: 2020-05-06
Payer: MEDICARE

## 2020-05-06 ENCOUNTER — PATIENT MESSAGE (OUTPATIENT)
Dept: FAMILY MEDICINE | Facility: CLINIC | Age: 75
End: 2020-05-06

## 2020-05-06 ENCOUNTER — LAB VISIT (OUTPATIENT)
Dept: LAB | Facility: HOSPITAL | Age: 75
End: 2020-05-06
Attending: FAMILY MEDICINE
Payer: MEDICARE

## 2020-05-06 ENCOUNTER — LAB VISIT (OUTPATIENT)
Dept: FAMILY MEDICINE | Facility: CLINIC | Age: 75
End: 2020-05-06
Payer: MEDICARE

## 2020-05-06 DIAGNOSIS — G44.209 TENSION-TYPE HEADACHE, NOT INTRACTABLE, UNSPECIFIED CHRONICITY PATTERN: ICD-10-CM

## 2020-05-06 DIAGNOSIS — Z20.822 EXPOSURE TO COVID-19 VIRUS: ICD-10-CM

## 2020-05-06 DIAGNOSIS — Z20.822 EXPOSURE TO COVID-19 VIRUS: Primary | ICD-10-CM

## 2020-05-06 LAB — SARS-COV-2 IGG SERPLBLD QL IA.RAPID: NEGATIVE

## 2020-05-06 PROCEDURE — 86769 SARS-COV-2 COVID-19 ANTIBODY: CPT

## 2020-05-06 PROCEDURE — 99214 PR OFFICE/OUTPT VISIT, EST, LEVL IV, 30-39 MIN: ICD-10-PCS | Mod: 95,,, | Performed by: FAMILY MEDICINE

## 2020-05-06 PROCEDURE — U0002 COVID-19 LAB TEST NON-CDC: HCPCS

## 2020-05-06 PROCEDURE — 1101F PR PT FALLS ASSESS DOC 0-1 FALLS W/OUT INJ PAST YR: ICD-10-PCS | Mod: CPTII,95,, | Performed by: FAMILY MEDICINE

## 2020-05-06 PROCEDURE — 36415 COLL VENOUS BLD VENIPUNCTURE: CPT

## 2020-05-06 PROCEDURE — 1159F PR MEDICATION LIST DOCUMENTED IN MEDICAL RECORD: ICD-10-PCS | Mod: 95,,, | Performed by: FAMILY MEDICINE

## 2020-05-06 PROCEDURE — 1159F MED LIST DOCD IN RCRD: CPT | Mod: 95,,, | Performed by: FAMILY MEDICINE

## 2020-05-06 PROCEDURE — 1101F PT FALLS ASSESS-DOCD LE1/YR: CPT | Mod: CPTII,95,, | Performed by: FAMILY MEDICINE

## 2020-05-06 PROCEDURE — 99214 OFFICE O/P EST MOD 30 MIN: CPT | Mod: 95,,, | Performed by: FAMILY MEDICINE

## 2020-05-06 RX ORDER — MIRTAZAPINE 7.5 MG/1
7.5 TABLET, FILM COATED ORAL NIGHTLY
Qty: 30 TABLET | Refills: 0 | Status: SHIPPED | OUTPATIENT
Start: 2020-05-06 | End: 2020-05-28

## 2020-05-06 NOTE — PROGRESS NOTES
Chief Complaint   Patient presents with    Headache       SUBJECTIVE:  Reed Torres is a 75 y.o. male here for new problem of UC with the stomach and thought to have PUD and he was treated with sucralfate and famotidine.  He is doing a little better and the chest heaviness and headache is still there..  Currently has co-morbidities including per problem list.        The [place] location is: [place]  The chief complaint leading to consultation is: f/u with the stomach and the headache  Visit type: audiovisual  Total time spent with patient: 12  Each patient to whom he or she provides medical services by telemedicine is:  (1) informed of the relationship between the physician and patient and the respective role of any other health care provider with respect to management of the patient; and (2) notified that he or she may decline to receive medical services by telemedicine and may withdraw from such care at any time.    Notes: he is worried about this with exposure in February.    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     Past Surgical History:   Procedure Laterality Date    APPENDECTOMY      SHOULDER ARTHROSCOPY W/ ROTATOR CUFF REPAIR      Left shoulder     Social History     Socioeconomic History    Marital status:      Spouse name: Not on file    Number of children: Not on file    Years of education: Not on file    Highest education level: Not on file   Occupational History    Not on file   Social Needs    Financial resource strain: Not on file    Food insecurity:     Worry: Not on file     Inability: Not on file    Transportation needs:     Medical: Not on file     Non-medical: Not on file   Tobacco Use    Smoking status: Former Smoker    Smokeless tobacco: Former User    Tobacco comment: stopped smoking 20 yrs ago.   Substance and Sexual Activity    Alcohol use: Yes     Alcohol/week: 5.0 standard drinks     Types: 6 Standard  drinks or equivalent per week     Comment: Daily    Drug use: No    Sexual activity: Yes     Partners: Female     Birth control/protection: None   Lifestyle    Physical activity:     Days per week: Not on file     Minutes per session: Not on file    Stress: Not on file   Relationships    Social connections:     Talks on phone: Not on file     Gets together: Not on file     Attends Caodaism service: Not on file     Active member of club or organization: Not on file     Attends meetings of clubs or organizations: Not on file     Relationship status: Not on file   Other Topics Concern    Not on file   Social History Narrative    Still doing lawn service     Family History   Problem Relation Age of Onset    COPD Mother     Diabetes Mother     Hypertension Mother     COPD Father     Heart disease Brother      Current Outpatient Medications on File Prior to Visit   Medication Sig Dispense Refill    famotidine (PEPCID) 20 MG tablet Take 1 tablet (20 mg total) by mouth 2 (two) times daily. 20 tablet 0    latanoprost 0.005 % ophthalmic solution       sucralfate (CARAFATE) 100 mg/mL suspension Take 10 mLs (1 g total) by mouth 4 (four) times daily as needed. 414 mL 0     No current facility-administered medications on file prior to visit.      Review of patient's allergies indicates:  No Known Allergies      Review of Systems   HENT: Negative for hearing loss.    Eyes: Negative for discharge.   Respiratory: Negative for wheezing.    Cardiovascular: Negative for chest pain and palpitations.   Gastrointestinal: Negative for blood in stool, constipation, diarrhea and vomiting.   Genitourinary: Negative for hematuria and urgency.   Musculoskeletal: Negative for neck pain.   Neurological: Positive for headaches. Negative for weakness.   Endo/Heme/Allergies: Negative for polydipsia.       OBJECTIVE:  There were no vitals taken for this visit.    Wt Readings from Last 3 Encounters:   05/03/20 66.2 kg (146 lb)    04/27/20 66.2 kg (146 lb)   03/17/20 66.7 kg (147 lb)     BP Readings from Last 3 Encounters:   05/03/20 139/80   04/27/20 126/76   03/17/20 (!) 142/84       He appears well, in no apparent distress.  Alert and oriented times three, pleasant and cooperative. Vital signs are as documented in vital signs section.  No abdominal pressure noted.    Review of old Records:  Reviewed per epic and [facility]    Review of old labs:  Lab Results   Component Value Date    TSH 0.412 03/07/2020     Lab Results   Component Value Date    WBC 3.93 05/03/2020    HGB 13.6 (L) 05/03/2020    HCT 41.2 05/03/2020    MCV 90 05/03/2020     05/03/2020       Chemistry        Component Value Date/Time     05/03/2020 0610    K 3.6 05/03/2020 0610     05/03/2020 0610    CO2 24 05/03/2020 0610    BUN 13 05/03/2020 0610    CREATININE 0.8 05/03/2020 0610     05/03/2020 0610        Component Value Date/Time    CALCIUM 8.9 05/03/2020 0610    ALKPHOS 47 (L) 05/03/2020 0610    AST 10 05/03/2020 0610    ALT 12 05/03/2020 0610    BILITOT 1.6 (H) 05/03/2020 0610    ESTGFRAFRICA >60 05/03/2020 0610    EGFRNONAA >60 05/03/2020 0610        Lab Results   Component Value Date    CHOL 222 (H) 10/08/2019    CHOL 193 02/11/2019    CHOL 200 (H) 02/09/2018     Lab Results   Component Value Date    HDL 58 10/08/2019    HDL 52 02/11/2019    HDL 53 02/09/2018     Lab Results   Component Value Date    LDLCALC 145.2 10/08/2019    LDLCALC 127.4 02/11/2019    LDLCALC 131.4 02/09/2018     Lab Results   Component Value Date    TRIG 94 10/08/2019    TRIG 68 02/11/2019    TRIG 78 02/09/2018     Lab Results   Component Value Date    CHOLHDL 26.1 10/08/2019    CHOLHDL 26.9 02/11/2019    CHOLHDL 26.5 02/09/2018         Review of old imaging:  n/a    ASSESSMENT:  Problem List Items Addressed This Visit     None      Visit Diagnoses     Exposure to Covid-19 Virus    -  Primary    Relevant Medications    mirtazapine (REMERON) 7.5 MG Tab    Other Relevant Orders     COVID-19 (SARS CoV-2) IgG Antibody    COVID-19 Routine Screening    Tension-type headache, not intractable, unspecified chronicity pattern        Relevant Medications    mirtazapine (REMERON) 7.5 MG Tab          ICD-10-CM ICD-9-CM   1. Exposure to Covid-19 Virus Z20.828    2. Tension-type headache, not intractable, unspecified chronicity pattern G44.209 339.10         PLAN:  Problem List Items Addressed This Visit     None      Visit Diagnoses     Exposure to Covid-19 Virus    -  Primary    Relevant Medications    mirtazapine (REMERON) 7.5 MG Tab    Other Relevant Orders    COVID-19 (SARS CoV-2) IgG Antibody    COVID-19 Routine Screening    Tension-type headache, not intractable, unspecified chronicity pattern        Relevant Medications    mirtazapine (REMERON) 7.5 MG Tab      seems like stress.  We will have him start remeron to help with mood and sleep.  carafate and famotidine to continue until the 11th.  We had prolonged talk about stress, he will check in in 1 week, if not sleeping better will increase remeron.  Potential medication side effects were discussed with the patient; let me know if any occur.      Medication List with Changes/Refills   New Medications    MIRTAZAPINE (REMERON) 7.5 MG TAB    Take 1 tablet (7.5 mg total) by mouth every evening.   Current Medications    FAMOTIDINE (PEPCID) 20 MG TABLET    Take 1 tablet (20 mg total) by mouth 2 (two) times daily.    LATANOPROST 0.005 % OPHTHALMIC SOLUTION        SUCRALFATE (CARAFATE) 100 MG/ML SUSPENSION    Take 10 mLs (1 g total) by mouth 4 (four) times daily as needed.         No follow-ups on file.

## 2020-05-07 LAB — SARS-COV-2 RNA RESP QL NAA+PROBE: NOT DETECTED

## 2020-05-08 ENCOUNTER — OFFICE VISIT (OUTPATIENT)
Dept: FAMILY MEDICINE | Facility: CLINIC | Age: 75
End: 2020-05-08
Payer: MEDICARE

## 2020-05-08 DIAGNOSIS — R05.9 COUGH: Primary | ICD-10-CM

## 2020-05-08 PROCEDURE — 1159F MED LIST DOCD IN RCRD: CPT | Mod: 95,,, | Performed by: FAMILY MEDICINE

## 2020-05-08 PROCEDURE — 1101F PR PT FALLS ASSESS DOC 0-1 FALLS W/OUT INJ PAST YR: ICD-10-PCS | Mod: CPTII,95,, | Performed by: FAMILY MEDICINE

## 2020-05-08 PROCEDURE — 99212 PR OFFICE/OUTPT VISIT, EST, LEVL II, 10-19 MIN: ICD-10-PCS | Mod: 95,,, | Performed by: FAMILY MEDICINE

## 2020-05-08 PROCEDURE — 1101F PT FALLS ASSESS-DOCD LE1/YR: CPT | Mod: CPTII,95,, | Performed by: FAMILY MEDICINE

## 2020-05-08 PROCEDURE — 1159F PR MEDICATION LIST DOCUMENTED IN MEDICAL RECORD: ICD-10-PCS | Mod: 95,,, | Performed by: FAMILY MEDICINE

## 2020-05-08 PROCEDURE — 99212 OFFICE O/P EST SF 10 MIN: CPT | Mod: 95,,, | Performed by: FAMILY MEDICINE

## 2020-05-08 NOTE — PROGRESS NOTES
HISTORY OF PRESENT ILLNESS:  Reed Torres is a 75 y.o. male who presents to the clinic today for Follow-up  .     He is negative  Per problem list.    [place] is: [place]  The chief complaint leading to consultation is: recheck cough and his results    Visit type: audiovisual  8Face to Face time with patient: 5  8 minutes of total time spent on the encounter, which includes face to face time and non-face to face time preparing to see the patient (eg, review of tests), Obtaining and/or reviewing separately obtained history, Documenting clinical information in the electronic or other health record, Independently interpreting results (not separately reported) and communicating results to the patient/family/caregiver, or Care coordination (not separately reported).         Each patient to whom he or she provides medical services by telemedicine is:  (1) informed of the relationship between the physician and patient and the respective role of any other health care provider with respect to management of the patient; and (2) notified that he or she may decline to receive medical services by telemedicine and may withdraw from such care at any time.    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       PAST SURGICAL HISTORY:  Past Surgical History:   Procedure Laterality Date    APPENDECTOMY      SHOULDER ARTHROSCOPY W/ ROTATOR CUFF REPAIR      Left shoulder       SOCIAL HISTORY:  Social History     Socioeconomic History    Marital status:      Spouse name: Not on file    Number of children: Not on file    Years of education: Not on file    Highest education level: Not on file   Occupational History    Not on file   Social Needs    Financial resource strain: Not on file    Food insecurity:     Worry: Not on file     Inability: Not on file    Transportation needs:     Medical: Not on file      Non-medical: Not on file   Tobacco Use    Smoking status: Former Smoker    Smokeless tobacco: Former User    Tobacco comment: stopped smoking 20 yrs ago.   Substance and Sexual Activity    Alcohol use: Yes     Alcohol/week: 5.0 standard drinks     Types: 6 Standard drinks or equivalent per week     Comment: Daily    Drug use: No    Sexual activity: Yes     Partners: Female     Birth control/protection: None   Lifestyle    Physical activity:     Days per week: Not on file     Minutes per session: Not on file    Stress: Not on file   Relationships    Social connections:     Talks on phone: Not on file     Gets together: Not on file     Attends Congregational service: Not on file     Active member of club or organization: Not on file     Attends meetings of clubs or organizations: Not on file     Relationship status: Not on file   Other Topics Concern    Not on file   Social History Narrative    Still doing lawn service       FAMILY HISTORY:  Family History   Problem Relation Age of Onset    COPD Mother     Diabetes Mother     Hypertension Mother     COPD Father     Heart disease Brother        ALLERGIES AND MEDICATIONS: updated and reviewed.  Review of patient's allergies indicates:  No Known Allergies  Medication List with Changes/Refills   Current Medications    FAMOTIDINE (PEPCID) 20 MG TABLET    Take 1 tablet (20 mg total) by mouth 2 (two) times daily.    LATANOPROST 0.005 % OPHTHALMIC SOLUTION        MIRTAZAPINE (REMERON) 7.5 MG TAB    Take 1 tablet (7.5 mg total) by mouth every evening.    SUCRALFATE (CARAFATE) 100 MG/ML SUSPENSION    Take 10 mLs (1 g total) by mouth 4 (four) times daily as needed.          CARE TEAM:  Patient Care Team:  Pj Gillette MD as PCP - General (Family Medicine)  Charlie Walters Jr., MD as Consulting Physician (Urology)  Violet Rodriguez MA as Care Coordinator         REVIEW OF SYSTEMS:  Review of Systems   Constitutional: Negative for activity change and unexpected weight  change.   HENT: Negative for hearing loss, rhinorrhea and trouble swallowing.    Eyes: Negative for discharge and visual disturbance.   Respiratory: Negative for chest tightness and wheezing.    Cardiovascular: Negative for chest pain and palpitations.   Gastrointestinal: Negative for blood in stool, constipation, diarrhea and vomiting.   Endocrine: Negative for polydipsia and polyuria.   Genitourinary: Negative for difficulty urinating, hematuria and urgency.   Musculoskeletal: Negative for arthralgias, joint swelling and neck pain.   Neurological: Negative for weakness and headaches.   Psychiatric/Behavioral: Negative for confusion and dysphoric mood.         PHYSICAL EXAM:  There were no vitals filed for this visit.          There is no height or weight on file to calculate BMI.    Physical Examination: General appearance -   Mental status -   Eyes -   Nose -   Mouth -   Neck -   Lymphatics - Chest -   Heart -   Abdomen -   Back exam -   Neurological -   Musculoskeletal -   Skin -   Doing well      Medication List with Changes/Refills   Current Medications    FAMOTIDINE (PEPCID) 20 MG TABLET    Take 1 tablet (20 mg total) by mouth 2 (two) times daily.    LATANOPROST 0.005 % OPHTHALMIC SOLUTION        MIRTAZAPINE (REMERON) 7.5 MG TAB    Take 1 tablet (7.5 mg total) by mouth every evening.    SUCRALFATE (CARAFATE) 100 MG/ML SUSPENSION    Take 10 mLs (1 g total) by mouth 4 (four) times daily as needed.       ASSESSMENT AND PLAN:    Continue the remeron.  Problem List Items Addressed This Visit     None          Future Appointments   Date Time Provider Department Center   6/17/2020  8:40 AM Fox Yin MD [facility]       Follow up in about 4 weeks (around 6/5/2020) for assess treatment plan. or sooner as needed.

## 2020-05-28 DIAGNOSIS — Z20.822 EXPOSURE TO COVID-19 VIRUS: ICD-10-CM

## 2020-05-28 DIAGNOSIS — G44.209 TENSION-TYPE HEADACHE, NOT INTRACTABLE, UNSPECIFIED CHRONICITY PATTERN: ICD-10-CM

## 2020-05-28 RX ORDER — MIRTAZAPINE 7.5 MG/1
TABLET, FILM COATED ORAL
Qty: 90 TABLET | Refills: 0 | Status: SHIPPED | OUTPATIENT
Start: 2020-05-28 | End: 2020-07-20 | Stop reason: CLARIF

## 2020-06-17 ENCOUNTER — OFFICE VISIT (OUTPATIENT)
Dept: NEUROLOGY | Facility: CLINIC | Age: 75
End: 2020-06-17
Payer: MEDICARE

## 2020-06-17 VITALS
BODY MASS INDEX: 22.96 KG/M2 | DIASTOLIC BLOOD PRESSURE: 78 MMHG | SYSTOLIC BLOOD PRESSURE: 111 MMHG | WEIGHT: 137.81 LBS | HEART RATE: 75 BPM | HEIGHT: 65 IN

## 2020-06-17 DIAGNOSIS — R51.9 NONINTRACTABLE HEADACHE, UNSPECIFIED CHRONICITY PATTERN, UNSPECIFIED HEADACHE TYPE: ICD-10-CM

## 2020-06-17 DIAGNOSIS — R42 DIZZINESS: ICD-10-CM

## 2020-06-17 PROCEDURE — 99204 OFFICE O/P NEW MOD 45 MIN: CPT | Mod: S$GLB,,, | Performed by: NEUROLOGICAL SURGERY

## 2020-06-17 PROCEDURE — 99999 PR PBB SHADOW E&M-EST. PATIENT-LVL III: ICD-10-PCS | Mod: PBBFAC,,, | Performed by: NEUROLOGICAL SURGERY

## 2020-06-17 PROCEDURE — 1101F PT FALLS ASSESS-DOCD LE1/YR: CPT | Mod: CPTII,S$GLB,, | Performed by: NEUROLOGICAL SURGERY

## 2020-06-17 PROCEDURE — 1126F AMNT PAIN NOTED NONE PRSNT: CPT | Mod: S$GLB,,, | Performed by: NEUROLOGICAL SURGERY

## 2020-06-17 PROCEDURE — 1126F PR PAIN SEVERITY QUANTIFIED, NO PAIN PRESENT: ICD-10-PCS | Mod: S$GLB,,, | Performed by: NEUROLOGICAL SURGERY

## 2020-06-17 PROCEDURE — 99204 PR OFFICE/OUTPT VISIT, NEW, LEVL IV, 45-59 MIN: ICD-10-PCS | Mod: S$GLB,,, | Performed by: NEUROLOGICAL SURGERY

## 2020-06-17 PROCEDURE — 1159F MED LIST DOCD IN RCRD: CPT | Mod: S$GLB,,, | Performed by: NEUROLOGICAL SURGERY

## 2020-06-17 PROCEDURE — 99999 PR PBB SHADOW E&M-EST. PATIENT-LVL III: CPT | Mod: PBBFAC,,, | Performed by: NEUROLOGICAL SURGERY

## 2020-06-17 PROCEDURE — 1101F PR PT FALLS ASSESS DOC 0-1 FALLS W/OUT INJ PAST YR: ICD-10-PCS | Mod: CPTII,S$GLB,, | Performed by: NEUROLOGICAL SURGERY

## 2020-06-17 PROCEDURE — 1159F PR MEDICATION LIST DOCUMENTED IN MEDICAL RECORD: ICD-10-PCS | Mod: S$GLB,,, | Performed by: NEUROLOGICAL SURGERY

## 2020-06-17 RX ORDER — PANTOPRAZOLE SODIUM 40 MG/1
40 TABLET, DELAYED RELEASE ORAL DAILY
COMMUNITY
End: 2022-09-27 | Stop reason: SDUPTHER

## 2020-06-17 NOTE — PROGRESS NOTES
Chief Complaint   Patient presents with    Headache        Reed Torres is a 75 y.o. male with a history of multiple medical diagnoses as listed below that presents for evaluation of headache.  The patient was previously following with his cardiologist, Dr. Avila to discuss episodes of dizziness in sinus bradycardia.  During the course of the workup been observing the chart that the patient was making he mentioned episodes of headache.  He says that he has a 3/10 pain that can occur anywhere within the head.  The pain has been typically responsive to Excedrin or other over-the-counter NSAIDs.  On occasion headaches have occurred throughout the night.  He says that the symptoms have been provoked by his level of stress.  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  This person subsequently was diagnosed with COVID-19 and eventually succumb to the disease.  Since that time he has been tested twice with nasal swab and with PCR both of which were negative.  He said that his level of stress about tatum disease has been weighing heavily on him as has the death of his close friend.  He does feel that his level of stress could be related to the headaches that he has been experiencing.    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       PAST SURGICAL HISTORY:  Past Surgical History:   Procedure Laterality Date    APPENDECTOMY      SHOULDER ARTHROSCOPY W/ ROTATOR CUFF REPAIR      Left shoulder       SOCIAL HISTORY:  Social History     Socioeconomic History    Marital status:      Spouse name: Not on file    Number of children: Not on file    Years of education: Not on file    Highest education level: Not on file   Occupational History    Not on file   Social Needs    Financial resource strain: Not on file    Food insecurity     Worry: Not on  file     Inability: Not on file    Transportation needs     Medical: Not on file     Non-medical: Not on file   Tobacco Use    Smoking status: Former Smoker    Smokeless tobacco: Former User    Tobacco comment: stopped smoking 20 yrs ago.   Substance and Sexual Activity    Alcohol use: Yes     Alcohol/week: 5.0 standard drinks     Types: 6 Standard drinks or equivalent per week     Comment: Daily    Drug use: No    Sexual activity: Yes     Partners: Female     Birth control/protection: None   Lifestyle    Physical activity     Days per week: Not on file     Minutes per session: Not on file    Stress: Not on file   Relationships    Social connections     Talks on phone: Not on file     Gets together: Not on file     Attends Muslim service: Not on file     Active member of club or organization: Not on file     Attends meetings of clubs or organizations: Not on file     Relationship status: Not on file   Other Topics Concern    Not on file   Social History Narrative    Still doing lawn service       FAMILY HISTORY:  Family History   Problem Relation Age of Onset    COPD Mother     Diabetes Mother     Hypertension Mother     COPD Father     Heart disease Brother        ALLERGIES AND MEDICATIONS: updated and reviewed.  Review of patient's allergies indicates:  No Known Allergies  Current Outpatient Medications   Medication Sig Dispense Refill    pantoprazole (PROTONIX) 40 MG tablet Take 40 mg by mouth once daily.      famotidine (PEPCID) 20 MG tablet Take 1 tablet (20 mg total) by mouth 2 (two) times daily. 20 tablet 0    latanoprost 0.005 % ophthalmic solution       mirtazapine (REMERON) 7.5 MG Tab TAKE 1 TABLET BY MOUTH EVERY EVENING 90 tablet 0    sucralfate (CARAFATE) 100 mg/mL suspension Take 10 mLs (1 g total) by mouth 4 (four) times daily as needed. 414 mL 0     No current facility-administered medications for this visit.        Review of Systems   Constitutional: Negative for activity  "change, fatigue and unexpected weight change.   HENT: Negative for trouble swallowing and voice change.    Eyes: Negative for photophobia, pain and visual disturbance.   Respiratory: Negative for apnea and shortness of breath.    Cardiovascular: Negative for chest pain and palpitations.   Gastrointestinal: Positive for abdominal pain. Negative for constipation, nausea and vomiting.   Genitourinary: Negative for difficulty urinating.   Musculoskeletal: Negative for arthralgias, back pain, gait problem, myalgias and neck pain.   Skin: Negative for color change and rash.   Neurological: Positive for dizziness and headaches. Negative for seizures, syncope, speech difficulty, weakness, light-headedness and numbness.   Psychiatric/Behavioral: Negative for agitation, behavioral problems and confusion.       Neurologic Exam    Physical Exam    Vitals:    06/17/20 0834   BP: 111/78   BP Location: Right arm   Patient Position: Sitting   BP Method: Large (Automatic)   Pulse: 75   Weight: 62.5 kg (137 lb 12.6 oz)   Height: 5' 5" (1.651 m)       Assessment & Plan:    Problem List Items Addressed This Visit     None      Visit Diagnoses     Dizziness        Nonintractable headache, unspecified chronicity pattern, unspecified headache type            More than 50% of this 45 minute encounter was spent in counseling and coordinating care of dizziness  Follow-up: Follow up if symptoms worsen or fail to improve.    This note was done with the assistance of voice recognition software. Some errors may be present after proofreading.        "

## 2020-06-17 NOTE — LETTER
June 17, 2020      Keenan Avila MD  [address] [facility] [address]  [address]  [facility] [address]           [facility] [place]  [address]  [place] [address]  Phone: [phone]  Fax: [phone]          Patient: Reed Torres   MR Number: [id number]   YOB: 1945   Date of Visit: 6/17/2020       Dear Dr. Keenan Avila:    Thank you for referring Reed Torres to me for evaluation. Attached you will find relevant portions of my assessment and plan of care.    If you have questions, please do not hesitate to call me. I look forward to following Reed Torres along with you.    Sincerely,    Fox Yin MD    Enclosure  CC:  No Recipients    If you would like to receive this communication electronically, please contact [email or web address] or [phone] to request more information on [facility] access.    For providers and/or their staff who would like to refer a patient to [facility], please contact us through our one-stop-shop provider referral line, [facility], at [phone].    If you feel you have received this communication in error or would no longer like to receive these types of communications, please e-mail [email or web address]

## 2020-06-22 ENCOUNTER — TELEPHONE (OUTPATIENT)
Dept: FAMILY MEDICINE | Facility: CLINIC | Age: 75
End: 2020-06-22

## 2020-06-22 NOTE — TELEPHONE ENCOUNTER
----- Message from Lorrie Rai sent at 6/22/2020 10:29 AM CDT -----  Type: Patient Call Back    Who called: Self     What is the request in detail: calling in regards to scheduling a sooner appt. With DR. Limon Stated his anxiety is really bad and is seeking medication if he cant be seen sooner.    Can the clinic reply by [facility]? Call back     Would the patient rather a call back or a response via [facility]? Call back     Best call back number:  [phone]

## 2020-06-22 NOTE — TELEPHONE ENCOUNTER
----- Message from Lorrie Rai sent at 6/22/2020 10:29 AM CDT -----  Type: Patient Call Back    Who called: Self     What is the request in detail: calling in regards to scheduling a sooner appt. With DR. Limon Stated his anxiety is really bad and is seeking medication if he cant be seen sooner.    Can the clinic reply by [facility]? Call back     Would the patient rather a call back or a response via [facility]? Call back     Best call back number:  [phone]

## 2020-06-23 RX ORDER — SERTRALINE HYDROCHLORIDE 50 MG/1
50 TABLET, FILM COATED ORAL DAILY
Qty: 30 TABLET | Refills: 11 | Status: SHIPPED | OUTPATIENT
Start: 2020-06-23 | End: 2020-07-01 | Stop reason: SDUPTHER

## 2020-06-24 ENCOUNTER — OFFICE VISIT (OUTPATIENT)
Dept: CARDIOLOGY | Facility: CLINIC | Age: 75
End: 2020-06-24
Payer: MEDICARE

## 2020-06-24 VITALS
BODY MASS INDEX: 24.21 KG/M2 | HEART RATE: 70 BPM | OXYGEN SATURATION: 96 % | WEIGHT: 145.31 LBS | SYSTOLIC BLOOD PRESSURE: 112 MMHG | DIASTOLIC BLOOD PRESSURE: 60 MMHG | HEIGHT: 65 IN

## 2020-06-24 DIAGNOSIS — R03.0 ELEVATED BLOOD-PRESSURE READING WITHOUT DIAGNOSIS OF HYPERTENSION: ICD-10-CM

## 2020-06-24 DIAGNOSIS — E78.00 PURE HYPERCHOLESTEROLEMIA: ICD-10-CM

## 2020-06-24 DIAGNOSIS — R07.9 CHEST PAIN, UNSPECIFIED TYPE: Primary | ICD-10-CM

## 2020-06-24 DIAGNOSIS — I47.10 SVT (SUPRAVENTRICULAR TACHYCARDIA): ICD-10-CM

## 2020-06-24 DIAGNOSIS — K21.9 GASTROESOPHAGEAL REFLUX DISEASE, ESOPHAGITIS PRESENCE NOT SPECIFIED: ICD-10-CM

## 2020-06-24 PROCEDURE — 1159F MED LIST DOCD IN RCRD: CPT | Mod: S$GLB,,, | Performed by: INTERNAL MEDICINE

## 2020-06-24 PROCEDURE — 99499 UNLISTED E&M SERVICE: CPT | Mod: S$GLB,,, | Performed by: INTERNAL MEDICINE

## 2020-06-24 PROCEDURE — 1101F PT FALLS ASSESS-DOCD LE1/YR: CPT | Mod: CPTII,S$GLB,, | Performed by: INTERNAL MEDICINE

## 2020-06-24 PROCEDURE — 99499 RISK ADDL DX/OHS AUDIT: ICD-10-PCS | Mod: S$GLB,,, | Performed by: INTERNAL MEDICINE

## 2020-06-24 PROCEDURE — 99213 PR OFFICE/OUTPT VISIT, EST, LEVL III, 20-29 MIN: ICD-10-PCS | Mod: S$GLB,,, | Performed by: INTERNAL MEDICINE

## 2020-06-24 PROCEDURE — 99999 PR PBB SHADOW E&M-EST. PATIENT-LVL III: CPT | Mod: PBBFAC,,, | Performed by: INTERNAL MEDICINE

## 2020-06-24 PROCEDURE — 99213 OFFICE O/P EST LOW 20 MIN: CPT | Mod: S$GLB,,, | Performed by: INTERNAL MEDICINE

## 2020-06-24 PROCEDURE — 1101F PR PT FALLS ASSESS DOC 0-1 FALLS W/OUT INJ PAST YR: ICD-10-PCS | Mod: CPTII,S$GLB,, | Performed by: INTERNAL MEDICINE

## 2020-06-24 PROCEDURE — 1126F AMNT PAIN NOTED NONE PRSNT: CPT | Mod: S$GLB,,, | Performed by: INTERNAL MEDICINE

## 2020-06-24 PROCEDURE — 99999 PR PBB SHADOW E&M-EST. PATIENT-LVL III: ICD-10-PCS | Mod: PBBFAC,,, | Performed by: INTERNAL MEDICINE

## 2020-06-24 PROCEDURE — 1159F PR MEDICATION LIST DOCUMENTED IN MEDICAL RECORD: ICD-10-PCS | Mod: S$GLB,,, | Performed by: INTERNAL MEDICINE

## 2020-06-24 PROCEDURE — 1126F PR PAIN SEVERITY QUANTIFIED, NO PAIN PRESENT: ICD-10-PCS | Mod: S$GLB,,, | Performed by: INTERNAL MEDICINE

## 2020-06-24 NOTE — PROGRESS NOTES
CARDIOLOGY CLINIC VISIT        HISTORY OF PRESENT ILLNESS:     Reed Torres presents for continued care. Last visit was a virtual visit on 20. He continues to have some episodic chest pain. He has GERD but this is different. Left sided chest pressure. Can last minutes. Waxes and wanes over course of day. He feels a lot of this occurred after his friend  of COVID. He still cuts grass.  Seen on 3/10 for evaluation of bradycardia on 2020. EKG from  showed sinus bradycardia, incomplete right bundle branch block.  Echo from 3/17/20 showed low normal LVEF of 50%.  Normal PASP. 24 hour holter showed NSR, avg HR 77. Occasional runs SVT.     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 If you have any questions, ask your nurse or doctor.            CONTINUE taking these medications    famotidine 20 MG tablet  Commonly known as: PEPCID  Take 1 tablet (20 mg total) by mouth 2 (two) times daily.     latanoprost 0.005 % ophthalmic solution     mirtazapine 7.5 MG Tab  Commonly known as: REMERON  TAKE 1 TABLET BY MOUTH EVERY EVENING     pantoprazole 40 MG tablet  Commonly known as: PROTONIX     sertraline 50 MG tablet  Commonly known as: ZOLOFT  Take 1 tablet (50 mg total) by mouth once daily.     sucralfate 100 mg/mL suspension  Commonly known as: CARAFATE  Take 10 mLs (1 g total) by mouth 4 (four) times daily as needed.            SOCIAL HISTORY:     Social History     Socioeconomic History    Marital status:      Spouse name: Not on file    Number of  children: Not on file    Years of education: Not on file    Highest education level: Not on file   Occupational History    Not on file   Social Needs    Financial resource strain: Not on file    Food insecurity     Worry: Not on file     Inability: Not on file    Transportation needs     Medical: Not on file     Non-medical: Not on file   Tobacco Use    Smoking status: Former Smoker    Smokeless tobacco: Former User    Tobacco comment: stopped smoking 20 yrs ago.   Substance and Sexual Activity    Alcohol use: Yes     Alcohol/week: 5.0 standard drinks     Types: 6 Standard drinks or equivalent per week     Comment: Daily    Drug use: No    Sexual activity: Yes     Partners: Female     Birth control/protection: None   Lifestyle    Physical activity     Days per week: Not on file     Minutes per session: Not on file    Stress: Not on file   Relationships    Social connections     Talks on phone: Not on file     Gets together: Not on file     Attends Holiness service: Not on file     Active member of club or organization: Not on file     Attends meetings of clubs or organizations: Not on file     Relationship status: Not on file   Other Topics Concern    Not on file   Social History Narrative    Still doing lawn service       FAMILY HISTORY:     Family History   Problem Relation Age of Onset    COPD Mother     Diabetes Mother     Hypertension Mother     COPD Father     Heart disease Brother        REVIEW OF SYSTEMS:   Review of Systems   Respiratory: Negative for sputum production and shortness of breath.    Cardiovascular: Positive for chest pain. Negative for palpitations, orthopnea, claudication, leg swelling and PND.   Gastrointestinal: Positive for heartburn. Negative for abdominal pain, nausea and vomiting.   Musculoskeletal: Negative for back pain, joint pain and neck pain.   Neurological: Negative for dizziness, tremors, sensory change, speech change, focal weakness, seizures, loss of  "consciousness, weakness and headaches.       PHYSICAL EXAM:     Vitals:    06/24/20 1516   BP: 112/60   Pulse: 70    Body mass index is 24.18 kg/m².  Weight: 65.9 kg (145 lb 4.5 oz)   Height: 5' 5" (165.1 cm)     Physical Exam  Vitals signs reviewed.   Constitutional:       General: He is not in acute distress.     Appearance: He is well-developed. He is not diaphoretic.   Neck:      Vascular: No carotid bruit or JVD.   Cardiovascular:      Rate and Rhythm: Normal rate and regular rhythm.      Pulses: Normal pulses.      Heart sounds: Normal heart sounds.   Pulmonary:      Effort: Pulmonary effort is normal.      Breath sounds: Normal breath sounds.   Abdominal:      General: Bowel sounds are normal.      Palpations: Abdomen is soft.      Tenderness: There is no abdominal tenderness.   Musculoskeletal:      Right lower leg: No edema.      Left lower leg: No edema.   Neurological:      Mental Status: He is alert and oriented to person, place, and time.   Psychiatric:         Speech: Speech normal.         Behavior: Behavior normal.         DATA:     Laboratory:  CBC:  Recent Labs   Lab 03/07/20  1237 04/27/20 1920 05/03/20  0610   WBC 5.89 4.87 3.93   Hemoglobin 14.2 13.3 L 13.6 L   Hematocrit 43.0 40.1 41.2   Platelets 145 L 166 150       CHEMISTRIES:  Recent Labs   Lab 07/04/17  1402 07/05/17  0500  03/07/20 1237 04/27/20 1920 05/03/20  0610   Glucose 131 H 98   < > 85 111 H 100   Sodium 135 L 140   < > 142 142 142   Potassium 4.5 4.8   < > 4.3 3.4 L 3.6   BUN, Bld 44 H 30 H   < > 16 20 13   Creatinine 2.3 H 1.0   < > 0.7 1.4 0.8   eGFR if  31 A >60   < > >60 56 A >60   eGFR if non  27 A >60   < > >60 48 A >60   Calcium 9.6 8.8   < > 9.4 8.8 8.9   Magnesium 2.6 2.4  --   --   --   --     < > = values in this interval not displayed.       CARDIAC BIOMARKERS:  Recent Labs   Lab 07/04/17  1402 03/07/20  1237 04/27/20 1920 05/03/20  0610     --   --   --    CPK MB 3.6  --   -- " "  --    Troponin I <0.006 <0.006 <0.006 <0.006       COAGS:  Recent Labs   Lab 03/07/20  1237   INR 0.9       LIPIDS/LFTS:  Recent Labs   Lab 02/09/18  0808 02/11/19  0942 10/08/19  0739 03/07/20  1237 04/27/20  1920 05/03/20  0610   Cholesterol 200 H 193 222 H  --   --   --    Triglycerides 78 68 94  --   --   --    HDL 53 52 58  --   --   --    LDL Cholesterol 131.4 127.4 145.2  --   --   --    Non-HDL Cholesterol 147 141 164  --   --   --    AST 16 13 11 16 12 10   ALT 17 13 12 16 14 12       Cardiovascular Testing:    Holter 3/17/20:     · Sinus rhythm with heart rates varying between 48 and 118 bpm with an average of 77 bpm.  · There were occasional PVCs totalling 278 and averaging 11.13 per hour.  · There were rare PACs totalling 81 and averaging 3.24 per hour.  · There were occasional runs of non-sustained SVT and lasting for 2 to 7 beats.  · Diary events ("headache") did not correspond to any arrhythmic events.     Echo 3/17/20:     · Low normal left ventricular systolic function. The estimated ejection fraction is 50%.  · No wall motion abnormalities.  · Normal right ventricular systolic function.  · The sinuses of Valsalva is mildly dilated. 3.9cm.  · The estimated PA systolic pressure is 28 mmHg.    ASSESSMENT:     1.  Chest pain  2.  Hyperlipidemia:   3.  Elevated blood pressure without diagnosis of hypertension:      PLAN:     1. Exercise MPS  2. RTC one month.      Keenan Avila MD, MPH, FACC, [facility]    "

## 2020-07-01 ENCOUNTER — OFFICE VISIT (OUTPATIENT)
Dept: FAMILY MEDICINE | Facility: CLINIC | Age: 75
End: 2020-07-01
Payer: MEDICARE

## 2020-07-01 VITALS
OXYGEN SATURATION: 98 % | TEMPERATURE: 98 F | HEIGHT: 70 IN | HEART RATE: 64 BPM | WEIGHT: 143.31 LBS | SYSTOLIC BLOOD PRESSURE: 110 MMHG | BODY MASS INDEX: 20.52 KG/M2 | DIASTOLIC BLOOD PRESSURE: 80 MMHG

## 2020-07-01 DIAGNOSIS — F41.9 ANXIETY AND DEPRESSION: ICD-10-CM

## 2020-07-01 DIAGNOSIS — F32.A ANXIETY AND DEPRESSION: ICD-10-CM

## 2020-07-01 DIAGNOSIS — Z00.00 ANNUAL PHYSICAL EXAM: Primary | ICD-10-CM

## 2020-07-01 PROCEDURE — 99999 PR PBB SHADOW E&M-EST. PATIENT-LVL III: CPT | Mod: PBBFAC,,, | Performed by: FAMILY MEDICINE

## 2020-07-01 PROCEDURE — 99999 PR PBB SHADOW E&M-EST. PATIENT-LVL III: ICD-10-PCS | Mod: PBBFAC,,, | Performed by: FAMILY MEDICINE

## 2020-07-01 PROCEDURE — 99213 PR OFFICE/OUTPT VISIT, EST, LEVL III, 20-29 MIN: ICD-10-PCS | Mod: S$GLB,,, | Performed by: FAMILY MEDICINE

## 2020-07-01 PROCEDURE — 99213 OFFICE O/P EST LOW 20 MIN: CPT | Mod: S$GLB,,, | Performed by: FAMILY MEDICINE

## 2020-07-01 RX ORDER — SERTRALINE HCL 50 MG
TABLET ORAL
COMMUNITY
Start: 2020-06-23 | End: 2020-07-01 | Stop reason: SDUPTHER

## 2020-07-01 RX ORDER — SERTRALINE HCL 50 MG
50 TABLET ORAL NIGHTLY
Qty: 90 TABLET | Refills: 3 | Status: SHIPPED | OUTPATIENT
Start: 2020-07-01 | End: 2020-08-28 | Stop reason: ALTCHOICE

## 2020-07-01 RX ORDER — TRAZODONE HYDROCHLORIDE 150 MG/1
150-300 TABLET ORAL NIGHTLY
Qty: 60 TABLET | Refills: 11 | Status: SHIPPED | OUTPATIENT
Start: 2020-07-01 | End: 2021-05-12 | Stop reason: ALTCHOICE

## 2020-07-01 NOTE — PROGRESS NOTES
"Chief Complaint   Patient presents with    Annual Exam     SUBJECTIVE:   Reed Torres is a 75 y.o. male presenting for his annual checkup.  Current Outpatient Medications   Medication Sig Dispense Refill    famotidine (PEPCID) 20 MG tablet Take 1 tablet (20 mg total) by mouth 2 (two) times daily. 20 tablet 0    latanoprost 0.005 % ophthalmic solution       pantoprazole (PROTONIX) 40 MG tablet Take 40 mg by mouth once daily.      atorvastatin (LIPITOR) 40 MG tablet Take 1 tablet (40 mg total) by mouth once daily. 90 tablet 3    mirtazapine (REMERON) 7.5 MG Tab TAKE 1 TABLET BY MOUTH EVERY EVENING 90 tablet 0    sucralfate (CARAFATE) 100 mg/mL suspension Take 10 mLs (1 g total) by mouth 4 (four) times daily as needed. 414 mL 0    traZODone (DESYREL) 150 MG tablet Take 1-2 tablets (150-300 mg total) by mouth every evening. 60 tablet 11    ZOLOFT 50 mg tablet Take 1 tablet (50 mg total) by mouth every evening. 90 tablet 3     No current facility-administered medications for this visit.      Allergies: Patient has no known allergies.     ROS:  Feeling well. No dyspnea or chest pain on exertion. No abdominal pain, change in bowel habits, black or bloody stools. No urinary tract or prostatic symptoms. No neurological complaints.  Anxiety and atypical chest pain  OBJECTIVE:   The patient appears well, alert, oriented x 3, in no distress.   /80   Pulse 64   Temp 98 °F (36.7 °C) (Oral)   Ht 5' 10" (1.778 m)   Wt 65 kg (143 lb 4.8 oz)   SpO2 98%   BMI 20.56 kg/m²   ENT normal.  Neck supple. No adenopathy or thyromegaly. ELISA. Lungs are clear, good air entry, no wheezes, rhonchi or rales. S1 and S2 normal, no murmurs, regular rate and rhythm. Abdomen is soft without tenderness, guarding, mass or organomegaly.  exam: .  Extremities show no edema, normal peripheral pulses. Neurological is normal without focal findings.    ASSESSMENT:   healthy adult male    PLAN:   Reed was seen today for annual " exam.    Diagnoses and all orders for this visit:    Anxiety and depression  -     Ambulatory referral/consult to Psychiatry; Future  -     ZOLOFT 50 mg tablet; Take 1 tablet (50 mg total) by mouth every evening.  -     traZODone (DESYREL) 150 MG tablet; Take 1-2 tablets (150-300 mg total) by mouth every evening.      And annual    Counseled on age appropriate medical preventative services, including age appropriate cancer screenings, over all nutritional health, need for a consistent exercise regimen and an over all push towards maintaining a vigorous and active lifestyle.  Counseled on age appropriate vaccines and discussed upcoming health care needs based on age/gender.  Spent time with patient counseling on need for a good patient/doctor relationship moving forward.  Discussed use of common OTC medications and supplements.  Discussed common dietary aids and use of caffeine and the need for good sleep hygiene and stress management.

## 2020-07-02 ENCOUNTER — HOSPITAL ENCOUNTER (OUTPATIENT)
Dept: CARDIOLOGY | Facility: HOSPITAL | Age: 75
Discharge: HOME | End: 2020-07-02
Attending: INTERNAL MEDICINE
Payer: MEDICARE

## 2020-07-02 ENCOUNTER — HOSPITAL ENCOUNTER (OUTPATIENT)
Dept: RADIOLOGY | Facility: HOSPITAL | Age: 75
Discharge: HOME | End: 2020-07-02
Attending: INTERNAL MEDICINE
Payer: MEDICARE

## 2020-07-02 DIAGNOSIS — R07.9 CHEST PAIN, UNSPECIFIED TYPE: ICD-10-CM

## 2020-07-02 LAB
CV STRESS BASE HR: 56 BPM
DIASTOLIC BLOOD PRESSURE: 89 MMHG
NUC REST EJECTION FRACTION: 57
OHS CV CPX 1 MINUTE RECOVERY HEART RATE: 106 BPM
OHS CV CPX 85 PERCENT MAX PREDICTED HEART RATE MALE: 122
OHS CV CPX ESTIMATED METS: 10.5
OHS CV CPX MAX PREDICTED HEART RATE: 143
OHS CV CPX PATIENT IS FEMALE: 0
OHS CV CPX PATIENT IS MALE: 1
OHS CV CPX PEAK DIASTOLIC BLOOD PRESSURE: 83 MMHG
OHS CV CPX PEAK HEAR RATE: 126 BPM
OHS CV CPX PEAK RATE PRESSURE PRODUCT: NORMAL
OHS CV CPX PEAK SYSTOLIC BLOOD PRESSURE: 175 MMHG
OHS CV CPX PERCENT MAX PREDICTED HEART RATE ACHIEVED: 88
OHS CV CPX RATE PRESSURE PRODUCT PRESENTING: 7392
STRESS ECHO POST EXERCISE DUR MIN: 9 MINUTES
STRESS ECHO POST EXERCISE DUR SEC: 10 SECONDS
SYSTOLIC BLOOD PRESSURE: 132 MMHG

## 2020-07-02 PROCEDURE — 93016 STRESS TEST WITH MYOCARDIAL PERFUSION (CUPID ONLY): ICD-10-PCS | Mod: ,,, | Performed by: INTERNAL MEDICINE

## 2020-07-02 PROCEDURE — A9502 TC99M TETROFOSMIN: HCPCS

## 2020-07-02 PROCEDURE — 93018 CV STRESS TEST I&R ONLY: CPT | Mod: ,,, | Performed by: INTERNAL MEDICINE

## 2020-07-02 PROCEDURE — 78452 STRESS TEST WITH MYOCARDIAL PERFUSION (CUPID ONLY): ICD-10-PCS | Mod: 26,,, | Performed by: INTERNAL MEDICINE

## 2020-07-02 PROCEDURE — 93017 CV STRESS TEST TRACING ONLY: CPT

## 2020-07-02 PROCEDURE — 78452 HT MUSCLE IMAGE SPECT MULT: CPT | Mod: 26,,, | Performed by: INTERNAL MEDICINE

## 2020-07-02 PROCEDURE — 93018 STRESS TEST WITH MYOCARDIAL PERFUSION (CUPID ONLY): ICD-10-PCS | Mod: ,,, | Performed by: INTERNAL MEDICINE

## 2020-07-02 PROCEDURE — 93016 CV STRESS TEST SUPVJ ONLY: CPT | Mod: ,,, | Performed by: INTERNAL MEDICINE

## 2020-07-06 ENCOUNTER — TELEPHONE (OUTPATIENT)
Dept: FAMILY MEDICINE | Facility: CLINIC | Age: 75
End: 2020-07-06

## 2020-07-06 NOTE — TELEPHONE ENCOUNTER
----- Message from Susie Almanzar sent at 7/6/2020  7:34 AM CDT -----  Regarding: call back  Pt requesting a call back in regards to appt that's scheduled  on tomorrow    Please call and advise    Phone [phone]

## 2020-07-07 ENCOUNTER — OFFICE VISIT (OUTPATIENT)
Dept: FAMILY MEDICINE | Facility: CLINIC | Age: 75
End: 2020-07-07
Payer: MEDICARE

## 2020-07-07 VITALS
TEMPERATURE: 98 F | HEIGHT: 65 IN | WEIGHT: 143.31 LBS | SYSTOLIC BLOOD PRESSURE: 114 MMHG | DIASTOLIC BLOOD PRESSURE: 60 MMHG | HEART RATE: 61 BPM | OXYGEN SATURATION: 97 % | BODY MASS INDEX: 23.88 KG/M2

## 2020-07-07 DIAGNOSIS — E78.00 PURE HYPERCHOLESTEROLEMIA: Primary | Chronic | ICD-10-CM

## 2020-07-07 PROCEDURE — 1101F PR PT FALLS ASSESS DOC 0-1 FALLS W/OUT INJ PAST YR: ICD-10-PCS | Mod: CPTII,S$GLB,, | Performed by: FAMILY MEDICINE

## 2020-07-07 PROCEDURE — 99213 PR OFFICE/OUTPT VISIT, EST, LEVL III, 20-29 MIN: ICD-10-PCS | Mod: S$GLB,,, | Performed by: FAMILY MEDICINE

## 2020-07-07 PROCEDURE — 1159F MED LIST DOCD IN RCRD: CPT | Mod: S$GLB,,, | Performed by: FAMILY MEDICINE

## 2020-07-07 PROCEDURE — 1101F PT FALLS ASSESS-DOCD LE1/YR: CPT | Mod: CPTII,S$GLB,, | Performed by: FAMILY MEDICINE

## 2020-07-07 PROCEDURE — 99213 OFFICE O/P EST LOW 20 MIN: CPT | Mod: S$GLB,,, | Performed by: FAMILY MEDICINE

## 2020-07-07 PROCEDURE — 1159F PR MEDICATION LIST DOCUMENTED IN MEDICAL RECORD: ICD-10-PCS | Mod: S$GLB,,, | Performed by: FAMILY MEDICINE

## 2020-07-07 PROCEDURE — 99999 PR PBB SHADOW E&M-EST. PATIENT-LVL III: ICD-10-PCS | Mod: PBBFAC,,, | Performed by: FAMILY MEDICINE

## 2020-07-07 PROCEDURE — 1126F PR PAIN SEVERITY QUANTIFIED, NO PAIN PRESENT: ICD-10-PCS | Mod: S$GLB,,, | Performed by: FAMILY MEDICINE

## 2020-07-07 PROCEDURE — 1126F AMNT PAIN NOTED NONE PRSNT: CPT | Mod: S$GLB,,, | Performed by: FAMILY MEDICINE

## 2020-07-07 PROCEDURE — 99999 PR PBB SHADOW E&M-EST. PATIENT-LVL III: CPT | Mod: PBBFAC,,, | Performed by: FAMILY MEDICINE

## 2020-07-07 RX ORDER — ATORVASTATIN CALCIUM 40 MG/1
40 TABLET, FILM COATED ORAL DAILY
Qty: 90 TABLET | Refills: 3 | Status: SHIPPED | OUTPATIENT
Start: 2020-07-07 | End: 2021-04-12

## 2020-07-07 NOTE — PROGRESS NOTES
HISTORY OF PRESENT ILLNESS:  Reed Torres is a 75 y.o. male who presents to the clinic today for Annual Exam  .     Here for f/u and the medicaiton is helping and is up to see cardiology and has positive stress test and will undergo evaluation and see from there.  Still having some chest symptoms at times, but not often.  Per problem list.      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       PAST SURGICAL HISTORY:  Past Surgical History:   Procedure Laterality Date    APPENDECTOMY      SHOULDER ARTHROSCOPY W/ ROTATOR CUFF REPAIR      Left shoulder       SOCIAL HISTORY:  Social History     Socioeconomic History    Marital status:      Spouse name: Not on file    Number of children: Not on file    Years of education: Not on file    Highest education level: Not on file   Occupational History    Not on file   Social Needs    Financial resource strain: Not on file    Food insecurity     Worry: Not on file     Inability: Not on file    Transportation needs     Medical: Not on file     Non-medical: Not on file   Tobacco Use    Smoking status: Former Smoker    Smokeless tobacco: Former User    Tobacco comment: stopped smoking 20 yrs ago.   Substance and Sexual Activity    Alcohol use: Yes     Alcohol/week: 5.0 standard drinks     Types: 6 Standard drinks or equivalent per week     Comment: Daily    Drug use: No    Sexual activity: Yes     Partners: Female     Birth control/protection: None   Lifestyle    Physical activity     Days per week: Not on file     Minutes per session: Not on file    Stress: Not on file   Relationships    Social connections     Talks on phone: Not on file     Gets together: Not on file     Attends Episcopalian service: Not on file     Active member of club or organization: Not on file     Attends meetings of clubs or organizations: Not on file     Relationship status: Not on file   Other  Topics Concern    Not on file   Social History Narrative    Still doing lawn service       FAMILY HISTORY:  Family History   Problem Relation Age of Onset    COPD Mother     Diabetes Mother     Hypertension Mother     COPD Father     Heart disease Brother        ALLERGIES AND MEDICATIONS: updated and reviewed.  Review of patient's allergies indicates:  No Known Allergies  Medication List with Changes/Refills   New Medications    ATORVASTATIN (LIPITOR) 40 MG TABLET    Take 1 tablet (40 mg total) by mouth once daily.   Current Medications    FAMOTIDINE (PEPCID) 20 MG TABLET    Take 1 tablet (20 mg total) by mouth 2 (two) times daily.    LATANOPROST 0.005 % OPHTHALMIC SOLUTION        MIRTAZAPINE (REMERON) 7.5 MG TAB    TAKE 1 TABLET BY MOUTH EVERY EVENING    PANTOPRAZOLE (PROTONIX) 40 MG TABLET    Take 40 mg by mouth once daily.    SUCRALFATE (CARAFATE) 100 MG/ML SUSPENSION    Take 10 mLs (1 g total) by mouth 4 (four) times daily as needed.    TRAZODONE (DESYREL) 150 MG TABLET    Take 1-2 tablets (150-300 mg total) by mouth every evening.    ZOLOFT 50 MG TABLET    Take 1 tablet (50 mg total) by mouth every evening.          CARE TEAM:  Patient Care Team:  Pj Gillette MD as PCP - General (Family Medicine)  Charlie Walters Jr., MD as Consulting Physician (Urology)  Violet Rodriguez MA as Care Coordinator         REVIEW OF SYSTEMS:still with some atypical chest type symptoms  Review of Systems      PHYSICAL EXAM:  Vitals:    07/07/20 1605   BP: 114/60   Pulse: 61   Temp: 98.3 °F (36.8 °C)     Wt Readings from Last 15 Encounters:   07/10/20 63.4 kg (139 lb 12.4 oz)   07/07/20 65 kg (143 lb 4.8 oz)   07/01/20 65 kg (143 lb 4.8 oz)   06/24/20 65.9 kg (145 lb 4.5 oz)   06/17/20 62.5 kg (137 lb 12.6 oz)   05/03/20 66.2 kg (146 lb)   04/27/20 66.2 kg (146 lb)   03/17/20 66.7 kg (147 lb)   03/10/20 66.8 kg (147 lb 4.3 oz)   03/07/20 66.7 kg (147 lb)   10/02/19 63.4 kg (139 lb 12.4 oz)   09/17/19 64 kg (141 lb 1.5 oz)  "  09/07/19 63.5 kg (140 lb)   05/07/19 66.7 kg (147 lb)   04/16/19 66.8 kg (147 lb 4.3 oz)       Weight: 65 kg (143 lb 4.8 oz)   Height: 5' 5" (165.1 cm)   Body mass index is 23.85 kg/m².    Physical Examination: General appearance - alert, well appearing, and in no distress      Medication List with Changes/Refills   New Medications    ATORVASTATIN (LIPITOR) 40 MG TABLET    Take 1 tablet (40 mg total) by mouth once daily.   Current Medications    FAMOTIDINE (PEPCID) 20 MG TABLET    Take 1 tablet (20 mg total) by mouth 2 (two) times daily.    LATANOPROST 0.005 % OPHTHALMIC SOLUTION        MIRTAZAPINE (REMERON) 7.5 MG TAB    TAKE 1 TABLET BY MOUTH EVERY EVENING    PANTOPRAZOLE (PROTONIX) 40 MG TABLET    Take 40 mg by mouth once daily.    SUCRALFATE (CARAFATE) 100 MG/ML SUSPENSION    Take 10 mLs (1 g total) by mouth 4 (four) times daily as needed.    TRAZODONE (DESYREL) 150 MG TABLET    Take 1-2 tablets (150-300 mg total) by mouth every evening.    ZOLOFT 50 MG TABLET    Take 1 tablet (50 mg total) by mouth every evening.       ASSESSMENT AND PLAN:    Problem List Items Addressed This Visit     Hyperlipidemia - Primary (Chronic)    Relevant Medications    atorvastatin (LIPITOR) 40 MG tablet          Future Appointments   Date Time Provider Department Center   7/20/2020 12:30 PM [facility]   8/5/2020  3:00 PM Keenan Avila MD [facility] CARDIO [facility]     Await cardiology assessment and treatment.  No follow-ups on file. or sooner as needed.          "

## 2020-07-10 ENCOUNTER — OFFICE VISIT (OUTPATIENT)
Dept: CARDIOLOGY | Facility: CLINIC | Age: 75
End: 2020-07-10
Payer: MEDICARE

## 2020-07-10 VITALS
WEIGHT: 139.75 LBS | BODY MASS INDEX: 23.28 KG/M2 | OXYGEN SATURATION: 96 % | SYSTOLIC BLOOD PRESSURE: 106 MMHG | HEIGHT: 65 IN | HEART RATE: 78 BPM | DIASTOLIC BLOOD PRESSURE: 72 MMHG

## 2020-07-10 DIAGNOSIS — R94.39 ABNORMAL NUCLEAR STRESS TEST: Primary | ICD-10-CM

## 2020-07-10 DIAGNOSIS — E78.00 PURE HYPERCHOLESTEROLEMIA: ICD-10-CM

## 2020-07-10 DIAGNOSIS — I47.10 SVT (SUPRAVENTRICULAR TACHYCARDIA): ICD-10-CM

## 2020-07-10 DIAGNOSIS — R07.9 CHEST PAIN, UNSPECIFIED TYPE: ICD-10-CM

## 2020-07-10 PROCEDURE — 1126F AMNT PAIN NOTED NONE PRSNT: CPT | Mod: S$GLB,,, | Performed by: INTERNAL MEDICINE

## 2020-07-10 PROCEDURE — 1101F PT FALLS ASSESS-DOCD LE1/YR: CPT | Mod: CPTII,S$GLB,, | Performed by: INTERNAL MEDICINE

## 2020-07-10 PROCEDURE — 99213 PR OFFICE/OUTPT VISIT, EST, LEVL III, 20-29 MIN: ICD-10-PCS | Mod: S$GLB,,, | Performed by: INTERNAL MEDICINE

## 2020-07-10 PROCEDURE — 99213 OFFICE O/P EST LOW 20 MIN: CPT | Mod: S$GLB,,, | Performed by: INTERNAL MEDICINE

## 2020-07-10 PROCEDURE — 1159F PR MEDICATION LIST DOCUMENTED IN MEDICAL RECORD: ICD-10-PCS | Mod: S$GLB,,, | Performed by: INTERNAL MEDICINE

## 2020-07-10 PROCEDURE — 1101F PR PT FALLS ASSESS DOC 0-1 FALLS W/OUT INJ PAST YR: ICD-10-PCS | Mod: CPTII,S$GLB,, | Performed by: INTERNAL MEDICINE

## 2020-07-10 PROCEDURE — 1159F MED LIST DOCD IN RCRD: CPT | Mod: S$GLB,,, | Performed by: INTERNAL MEDICINE

## 2020-07-10 PROCEDURE — 99499 RISK ADDL DX/OHS AUDIT: ICD-10-PCS | Mod: S$GLB,,, | Performed by: INTERNAL MEDICINE

## 2020-07-10 PROCEDURE — 99999 PR PBB SHADOW E&M-EST. PATIENT-LVL IV: CPT | Mod: PBBFAC,,, | Performed by: INTERNAL MEDICINE

## 2020-07-10 PROCEDURE — 1126F PR PAIN SEVERITY QUANTIFIED, NO PAIN PRESENT: ICD-10-PCS | Mod: S$GLB,,, | Performed by: INTERNAL MEDICINE

## 2020-07-10 PROCEDURE — 99999 PR PBB SHADOW E&M-EST. PATIENT-LVL IV: ICD-10-PCS | Mod: PBBFAC,,, | Performed by: INTERNAL MEDICINE

## 2020-07-10 PROCEDURE — 99499 UNLISTED E&M SERVICE: CPT | Mod: S$GLB,,, | Performed by: INTERNAL MEDICINE

## 2020-07-10 RX ORDER — SODIUM CHLORIDE 0.9 % (FLUSH) 0.9 %
3 SYRINGE (ML) INJECTION EVERY 8 HOURS PRN
Status: DISCONTINUED | OUTPATIENT
Start: 2020-07-24 | End: 2023-01-03

## 2020-07-10 RX ORDER — SODIUM CHLORIDE 9 MG/ML
INJECTION, SOLUTION INTRAVENOUS ONCE
Status: CANCELLED | OUTPATIENT
Start: 2020-07-24

## 2020-07-10 NOTE — PROGRESS NOTES
CARDIOLOGY CLINIC VISIT        HISTORY OF PRESENT ILLNESS:     Reed Torres presents for continued care. Seen 6/24/20 for chest pain. He exercised for 9:10. 10.5 METs. BP went to 175/83. C/o sob. Moderate sized, moderate intensity, mostly reversible defect with some fixed areas in the inferior wall. He continues to have some episodic chest pain. Seen on 3/10 for evaluation of bradycardia on 03/07/2020. EKG from March 7th showed sinus bradycardia, incomplete right bundle branch block.  Echo from 3/17/20 showed low normal LVEF of 50%.  Normal PASP. 24 hour holter showed NSR, avg HR 77. Occasional runs SVT.     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 If you have any questions, ask your nurse or doctor.            CONTINUE taking these medications    atorvastatin 40 MG tablet  Commonly known as: LIPITOR  Take 1 tablet (40 mg total) by mouth once daily.     famotidine 20 MG tablet  Commonly known as: PEPCID  Take 1 tablet (20 mg total) by mouth 2 (two) times daily.     latanoprost 0.005 % ophthalmic solution     mirtazapine 7.5 MG Tab  Commonly known as: REMERON  TAKE 1 TABLET BY MOUTH EVERY EVENING     pantoprazole 40 MG tablet  Commonly known as: PROTONIX     sucralfate 100 mg/mL suspension  Commonly known as: CARAFATE  Take 10 mLs (1 g total) by mouth 4 (four) times daily as needed.     traZODone 150 MG tablet  Commonly known as: DESYREL  Take 1-2 tablets (150-300 mg total) by mouth every evening.     ZOLOFT 50 MG tablet  Generic drug: sertraline  Take 1 tablet (50 mg  total) by mouth every evening.            SOCIAL HISTORY:     Social History     Socioeconomic History    Marital status:      Spouse name: Not on file    Number of children: Not on file    Years of education: Not on file    Highest education level: Not on file   Occupational History    Not on file   Social Needs    Financial resource strain: Not on file    Food insecurity     Worry: Not on file     Inability: Not on file    Transportation needs     Medical: Not on file     Non-medical: Not on file   Tobacco Use    Smoking status: Former Smoker    Smokeless tobacco: Former User    Tobacco comment: stopped smoking 20 yrs ago.   Substance and Sexual Activity    Alcohol use: Yes     Alcohol/week: 5.0 standard drinks     Types: 6 Standard drinks or equivalent per week     Comment: Daily    Drug use: No    Sexual activity: Yes     Partners: Female     Birth control/protection: None   Lifestyle    Physical activity     Days per week: Not on file     Minutes per session: Not on file    Stress: Not on file   Relationships    Social connections     Talks on phone: Not on file     Gets together: Not on file     Attends [employer] service: Not on file     Active member of club or organization: Not on file     Attends meetings of clubs or organizations: Not on file     Relationship status: Not on file   Other Topics Concern    Not on file   Social History Narrative    Still doing lawn service       FAMILY HISTORY:     Family History   Problem Relation Age of Onset    COPD Mother     Diabetes Mother     Hypertension Mother     COPD Father     Heart disease Brother        REVIEW OF SYSTEMS:   Review of Systems   Constitutional: Negative for diaphoresis, malaise/fatigue and weight loss.   Respiratory: Negative for sputum production, shortness of breath and wheezing.    Cardiovascular: Positive for chest pain. Negative for palpitations, orthopnea, claudication, leg swelling and PND.   Gastrointestinal:  "Negative for abdominal pain, heartburn, nausea and vomiting.   Neurological: Negative for dizziness, sensory change, speech change, focal weakness, seizures, weakness and headaches.       PHYSICAL EXAM:     Vitals:    07/10/20 1434   BP: 106/72   Pulse: 78    Body mass index is 23.26 kg/m².  Weight: 63.4 kg (139 lb 12.4 oz)   Height: 5' 5" (165.1 cm)     Physical Exam  Vitals signs reviewed.   Constitutional:       General: He is not in acute distress.     Appearance: He is well-developed. He is not diaphoretic.   Neck:      Vascular: No carotid bruit or JVD.   Cardiovascular:      Rate and Rhythm: Normal rate and regular rhythm.      Pulses: Normal pulses.      Heart sounds: Normal heart sounds.   Pulmonary:      Effort: Pulmonary effort is normal.      Breath sounds: Normal breath sounds.   Abdominal:      General: Bowel sounds are normal.      Palpations: Abdomen is soft.      Tenderness: There is no abdominal tenderness.   Musculoskeletal:      Right lower leg: No edema.      Left lower leg: No edema.   Neurological:      Mental Status: He is alert and oriented to person, place, and time.   Psychiatric:         Speech: Speech normal.         Behavior: Behavior normal.         DATA:     Laboratory:  CBC:  Recent Labs   Lab 03/07/20  1237 04/27/20 1920 05/03/20  0610   WBC 5.89 4.87 3.93   Hemoglobin 14.2 13.3 L 13.6 L   Hematocrit 43.0 40.1 41.2   Platelets 145 L 166 150       CHEMISTRIES:  Recent Labs   Lab 03/07/20  1237 04/27/20 1920 05/03/20  0610   Glucose 85 111 H 100   Sodium 142 142 142   Potassium 4.3 3.4 L 3.6   BUN, Bld 16 20 13   Creatinine 0.7 1.4 0.8   eGFR if  >60 56 A >60   eGFR if non  >60 48 A >60   Calcium 9.4 8.8 8.9       CARDIAC BIOMARKERS:  Recent Labs   Lab 03/07/20  1237 04/27/20  1920 05/03/20  0610   Troponin I <0.006 <0.006 <0.006       COAGS:  Recent Labs   Lab 03/07/20  1237   INR 0.9       LIPIDS/LFTS:  Recent Labs   Lab 02/09/18  0808 " "02/11/19  0942 10/08/19  0739 03/07/20  1237 04/27/20  1920 05/03/20  0610   Cholesterol 200 H 193 222 H  --   --   --    Triglycerides 78 68 94  --   --   --    HDL 53 52 58  --   --   --    LDL Cholesterol 131.4 127.4 145.2  --   --   --    Non-HDL Cholesterol 147 141 164  --   --   --    AST 16 13 11 16 12 10   ALT 17 13 12 16 14 12       Cardiovascular Testing:    Exercise MPS 7/2/20:      Abnormal myocardial perfusion study.    There is a moderate sized, moderate intensity, mostly reversible defect with some fixed areas in the inferior wall(s).    Gated perfusion images showed an ejection fraction of 57%    The EKG portion of this study is negative for ischemia.    The patient reported no chest pain during the stress test.    There were no arrhythmias during stress.    Holter 3/17/20:     · Sinus rhythm with heart rates varying between 48 and 118 bpm with an average of 77 bpm.  · There were occasional PVCs totalling 278 and averaging 11.13 per hour.  · There were rare PACs totalling 81 and averaging 3.24 per hour.  · There were occasional runs of non-sustained SVT and lasting for 2 to 7 beats.  · Diary events ("headache") did not correspond to any arrhythmic events.     Echo 3/17/20:     · Low normal left ventricular systolic function. The estimated ejection fraction is 50%.  · No wall motion abnormalities.  · Normal right ventricular systolic function.  · The sinuses of Valsalva is mildly dilated. 3.9cm.  · The estimated PA systolic pressure is 28 mmHg.    ASSESSMENT:     1.  Chest pain  2.  Abnormal nuclear stress test: inferior ischemia  3.  Hyperlipidemia:       PLAN:     1. Recommend LHC +/- PCI  Risks, benefits and alternatives of the catheterization procedure were discussed with the patient.The risks of coronary angiography include but are not limited to: bleeding, infection, death, heart attack, arrhythmia, kidney injury or failure, potential need for dialysis, allergic reactions, stroke, " need for emergency surgery, hematoma, pseudoaneurysm etc.  Should stenting be indicated, the patient has agreed to dual anti-platelet therapy for 1-consecutive year with a drug-eluting stent and a minimum of 1-month with the use of a bare metal stent. Additionally, pt is aware that non-compliance is likely to result in stent clotting with heart attack, heart failure, and/or death  The risks of moderate sedation include hypotension, respiratory depression, arrhythmias, bronchospasm, and death. Informed consent was obtained and the  patient is agreeable to proceed with the procedure. Consent was placed on the chart.  2. RTC post procedure.        Keenan Avila MD, MPH, FACC, [facility]

## 2020-07-10 NOTE — H&P (VIEW-ONLY)
CARDIOLOGY CLINIC VISIT        HISTORY OF PRESENT ILLNESS:     Reed Torres presents for continued care. Seen 6/24/20 for chest pain. He exercised for 9:10. 10.5 METs. BP went to 175/83. C/o sob. Moderate sized, moderate intensity, mostly reversible defect with some fixed areas in the inferior wall. He continues to have some episodic chest pain. Seen on 3/10 for evaluation of bradycardia on 03/07/2020. EKG from March 7th showed sinus bradycardia, incomplete right bundle branch block.  Echo from 3/17/20 showed low normal LVEF of 50%.  Normal PASP. 24 hour holter showed NSR, avg HR 77. Occasional runs SVT.     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 If you have any questions, ask your nurse or doctor.            CONTINUE taking these medications    atorvastatin 40 MG tablet  Commonly known as: LIPITOR  Take 1 tablet (40 mg total) by mouth once daily.     famotidine 20 MG tablet  Commonly known as: PEPCID  Take 1 tablet (20 mg total) by mouth 2 (two) times daily.     latanoprost 0.005 % ophthalmic solution     mirtazapine 7.5 MG Tab  Commonly known as: REMERON  TAKE 1 TABLET BY MOUTH EVERY EVENING     pantoprazole 40 MG tablet  Commonly known as: PROTONIX     sucralfate 100 mg/mL suspension  Commonly known as: CARAFATE  Take 10 mLs (1 g total) by mouth 4 (four) times daily as needed.     traZODone 150 MG tablet  Commonly known as: DESYREL  Take 1-2 tablets (150-300 mg total) by mouth every evening.     ZOLOFT 50 MG tablet  Generic drug: sertraline  Take 1 tablet (50 mg  total) by mouth every evening.            SOCIAL HISTORY:     Social History     Socioeconomic History    Marital status:      Spouse name: Not on file    Number of children: Not on file    Years of education: Not on file    Highest education level: Not on file   Occupational History    Not on file   Social Needs    Financial resource strain: Not on file    Food insecurity     Worry: Not on file     Inability: Not on file    Transportation needs     Medical: Not on file     Non-medical: Not on file   Tobacco Use    Smoking status: Former Smoker    Smokeless tobacco: Former User    Tobacco comment: stopped smoking 20 yrs ago.   Substance and Sexual Activity    Alcohol use: Yes     Alcohol/week: 5.0 standard drinks     Types: 6 Standard drinks or equivalent per week     Comment: Daily    Drug use: No    Sexual activity: Yes     Partners: Female     Birth control/protection: None   Lifestyle    Physical activity     Days per week: Not on file     Minutes per session: Not on file    Stress: Not on file   Relationships    Social connections     Talks on phone: Not on file     Gets together: Not on file     Attends Sabianism service: Not on file     Active member of club or organization: Not on file     Attends meetings of clubs or organizations: Not on file     Relationship status: Not on file   Other Topics Concern    Not on file   Social History Narrative    Still doing lawn service       FAMILY HISTORY:     Family History   Problem Relation Age of Onset    COPD Mother     Diabetes Mother     Hypertension Mother     COPD Father     Heart disease Brother        REVIEW OF SYSTEMS:   Review of Systems   Constitutional: Negative for diaphoresis, malaise/fatigue and weight loss.   Respiratory: Negative for sputum production, shortness of breath and wheezing.    Cardiovascular: Positive for chest pain. Negative for palpitations, orthopnea, claudication, leg swelling and PND.   Gastrointestinal:  "Negative for abdominal pain, heartburn, nausea and vomiting.   Neurological: Negative for dizziness, sensory change, speech change, focal weakness, seizures, weakness and headaches.       PHYSICAL EXAM:     Vitals:    07/10/20 1434   BP: 106/72   Pulse: 78    Body mass index is 23.26 kg/m².  Weight: 63.4 kg (139 lb 12.4 oz)   Height: 5' 5" (165.1 cm)     Physical Exam  Vitals signs reviewed.   Constitutional:       General: He is not in acute distress.     Appearance: He is well-developed. He is not diaphoretic.   Neck:      Vascular: No carotid bruit or JVD.   Cardiovascular:      Rate and Rhythm: Normal rate and regular rhythm.      Pulses: Normal pulses.      Heart sounds: Normal heart sounds.   Pulmonary:      Effort: Pulmonary effort is normal.      Breath sounds: Normal breath sounds.   Abdominal:      General: Bowel sounds are normal.      Palpations: Abdomen is soft.      Tenderness: There is no abdominal tenderness.   Musculoskeletal:      Right lower leg: No edema.      Left lower leg: No edema.   Neurological:      Mental Status: He is alert and oriented to person, place, and time.   Psychiatric:         Speech: Speech normal.         Behavior: Behavior normal.         DATA:     Laboratory:  CBC:  Recent Labs   Lab 03/07/20  1237 04/27/20 1920 05/03/20  0610   WBC 5.89 4.87 3.93   Hemoglobin 14.2 13.3 L 13.6 L   Hematocrit 43.0 40.1 41.2   Platelets 145 L 166 150       CHEMISTRIES:  Recent Labs   Lab 03/07/20  1237 04/27/20 1920 05/03/20  0610   Glucose 85 111 H 100   Sodium 142 142 142   Potassium 4.3 3.4 L 3.6   BUN, Bld 16 20 13   Creatinine 0.7 1.4 0.8   eGFR if  >60 56 A >60   eGFR if non  >60 48 A >60   Calcium 9.4 8.8 8.9       CARDIAC BIOMARKERS:  Recent Labs   Lab 03/07/20  1237 04/27/20  1920 05/03/20  0610   Troponin I <0.006 <0.006 <0.006       COAGS:  Recent Labs   Lab 03/07/20  1237   INR 0.9       LIPIDS/LFTS:  Recent Labs   Lab 02/09/18  0808 " "02/11/19  0942 10/08/19  0739 03/07/20  1237 04/27/20  1920 05/03/20  0610   Cholesterol 200 H 193 222 H  --   --   --    Triglycerides 78 68 94  --   --   --    HDL 53 52 58  --   --   --    LDL Cholesterol 131.4 127.4 145.2  --   --   --    Non-HDL Cholesterol 147 141 164  --   --   --    AST 16 13 11 16 12 10   ALT 17 13 12 16 14 12       Cardiovascular Testing:    Exercise MPS 7/2/20:      Abnormal myocardial perfusion study.    There is a moderate sized, moderate intensity, mostly reversible defect with some fixed areas in the inferior wall(s).    Gated perfusion images showed an ejection fraction of 57%    The EKG portion of this study is negative for ischemia.    The patient reported no chest pain during the stress test.    There were no arrhythmias during stress.    Holter 3/17/20:     · Sinus rhythm with heart rates varying between 48 and 118 bpm with an average of 77 bpm.  · There were occasional PVCs totalling 278 and averaging 11.13 per hour.  · There were rare PACs totalling 81 and averaging 3.24 per hour.  · There were occasional runs of non-sustained SVT and lasting for 2 to 7 beats.  · Diary events ("headache") did not correspond to any arrhythmic events.     Echo 3/17/20:     · Low normal left ventricular systolic function. The estimated ejection fraction is 50%.  · No wall motion abnormalities.  · Normal right ventricular systolic function.  · The sinuses of Valsalva is mildly dilated. 3.9cm.  · The estimated PA systolic pressure is 28 mmHg.    ASSESSMENT:     1.  Chest pain  2.  Abnormal nuclear stress test: inferior ischemia  3.  Hyperlipidemia:       PLAN:     1. Recommend LHC +/- PCI  Risks, benefits and alternatives of the catheterization procedure were discussed with the patient.The risks of coronary angiography include but are not limited to: bleeding, infection, death, heart attack, arrhythmia, kidney injury or failure, potential need for dialysis, allergic reactions, stroke, " need for emergency surgery, hematoma, pseudoaneurysm etc.  Should stenting be indicated, the patient has agreed to dual anti-platelet therapy for 1-consecutive year with a drug-eluting stent and a minimum of 1-month with the use of a bare metal stent. Additionally, pt is aware that non-compliance is likely to result in stent clotting with heart attack, heart failure, and/or death  The risks of moderate sedation include hypotension, respiratory depression, arrhythmias, bronchospasm, and death. Informed consent was obtained and the  patient is agreeable to proceed with the procedure. Consent was placed on the chart.  2. RTC post procedure.        Keenan Avila MD, MPH, FACC, [facility]

## 2020-07-20 ENCOUNTER — HOSPITAL ENCOUNTER (OUTPATIENT)
Dept: PREADMISSION TESTING | Facility: HOSPITAL | Age: 75
Discharge: HOME | End: 2020-07-20
Attending: INTERNAL MEDICINE
Payer: MEDICARE

## 2020-07-20 VITALS
TEMPERATURE: 98 F | DIASTOLIC BLOOD PRESSURE: 74 MMHG | BODY MASS INDEX: 22.48 KG/M2 | WEIGHT: 134.94 LBS | OXYGEN SATURATION: 96 % | RESPIRATION RATE: 16 BRPM | HEART RATE: 74 BPM | HEIGHT: 65 IN | SYSTOLIC BLOOD PRESSURE: 114 MMHG

## 2020-07-20 DIAGNOSIS — R07.9 CHEST PAIN, UNSPECIFIED TYPE: ICD-10-CM

## 2020-07-20 DIAGNOSIS — Z01.818 PRE-OP TESTING: Primary | ICD-10-CM

## 2020-07-20 DIAGNOSIS — R94.39 ABNORMAL NUCLEAR STRESS TEST: ICD-10-CM

## 2020-07-20 LAB
APTT BLDCRRT: 27.3 SEC (ref 21–32)
BASOPHILS # BLD AUTO: 0.03 K/UL (ref 0–0.2)
BASOPHILS NFR BLD: 0.5 % (ref 0–1.9)
DIFFERENTIAL METHOD: ABNORMAL
EOSINOPHIL # BLD AUTO: 0 K/UL (ref 0–0.5)
EOSINOPHIL NFR BLD: 0.7 % (ref 0–8)
ERYTHROCYTE [DISTWIDTH] IN BLOOD BY AUTOMATED COUNT: 13 % (ref 11.5–14.5)
HCT VFR BLD AUTO: 40.3 % (ref 40–54)
HGB BLD-MCNC: 13.2 G/DL (ref 14–18)
IMM GRANULOCYTES # BLD AUTO: 0.01 K/UL (ref 0–0.04)
IMM GRANULOCYTES NFR BLD AUTO: 0.2 % (ref 0–0.5)
INR PPP: 1 (ref 0.8–1.2)
LYMPHOCYTES # BLD AUTO: 1.8 K/UL (ref 1–4.8)
LYMPHOCYTES NFR BLD: 29.3 % (ref 18–48)
MCH RBC QN AUTO: 29.9 PG (ref 27–31)
MCHC RBC AUTO-ENTMCNC: 32.8 G/DL (ref 32–36)
MCV RBC AUTO: 91 FL (ref 82–98)
MONOCYTES # BLD AUTO: 0.4 K/UL (ref 0.3–1)
MONOCYTES NFR BLD: 6.2 % (ref 4–15)
NEUTROPHILS # BLD AUTO: 3.9 K/UL (ref 1.8–7.7)
NEUTROPHILS NFR BLD: 63.1 % (ref 38–73)
NRBC BLD-RTO: 0 /100 WBC
PLATELET # BLD AUTO: 169 K/UL (ref 150–350)
PMV BLD AUTO: 11.1 FL (ref 9.2–12.9)
PROTHROMBIN TIME: 10.7 SEC (ref 9–12.5)
RBC # BLD AUTO: 4.42 M/UL (ref 4.6–6.2)
WBC # BLD AUTO: 6.15 K/UL (ref 3.9–12.7)

## 2020-07-20 PROCEDURE — 36415 COLL VENOUS BLD VENIPUNCTURE: CPT

## 2020-07-20 PROCEDURE — 85730 THROMBOPLASTIN TIME PARTIAL: CPT

## 2020-07-20 PROCEDURE — 85610 PROTHROMBIN TIME: CPT

## 2020-07-20 PROCEDURE — 93010 EKG 12-LEAD: ICD-10-PCS | Mod: ,,, | Performed by: INTERNAL MEDICINE

## 2020-07-20 PROCEDURE — 93010 ELECTROCARDIOGRAM REPORT: CPT | Mod: ,,, | Performed by: INTERNAL MEDICINE

## 2020-07-20 PROCEDURE — 85025 COMPLETE CBC W/AUTO DIFF WBC: CPT

## 2020-07-20 PROCEDURE — 93005 ELECTROCARDIOGRAM TRACING: CPT

## 2020-07-20 NOTE — DISCHARGE INSTRUCTIONS
Your surgery is scheduled for _Friday July 24, 2020___________________.      Please report to SAME DAY SURGERY UNIT on the 2nd FLOOR at _6:00_ a.m.        INSTRUCTIONS IMPORTANT!!!  ¨ Do not eat or drink after 12 midnight-including water. OK to brush teeth, no   gum, candy or mints!    ¨ Take only these medicines with a small swallow of water-morning of surgery.  Take Pantoprazole with water morning of procedure.      __x__  Return to Hospital Lab on _Wednesday July 22, 2020 at 8:30 AM__for additional blood test.    __x__  Prep instructions:   SHOWER     __x__  Please shower using Hibiclens soap the night before AND  the morning of  your surgery/procedure. Do ____  No powder, lotions or creams to your body.  __x__  You may wear only deodorant on the day of surgery.  __x__  Please remove all jewelry, including piercings and leave at home.  __x__  No money or valuables needed. Please leave at home.  You may bring your cell phone.    __x__  If going home the same day, arrange for a ride home. You will not be able to   drive if Anesthesia was used.    __x__  Wear loose fitting clothing. Allow for dressings, bandages.  __x__  Stop Aspirin, Ibuprofen, Motrin and Aleve at least 3-5 days before  surgery, unless otherwise instructed by your doctor, or the nurse.         x     You MAY use Tylenol/acetaminophen until day of surgery.    __x__  Call MD for temperature above 101 degrees.        __x__ Stop taking any Fish Oil supplement or any Vitamins that contain Vitamin  E at least 5 days prior to surgery.          I have read or had read and explained to me, and understand the above information.  Additional comments or instructions:Please call   [phone] if you have any questions regarding the instructions above.

## 2020-07-22 ENCOUNTER — HOSPITAL ENCOUNTER (OUTPATIENT)
Dept: PREADMISSION TESTING | Facility: HOSPITAL | Age: 75
Discharge: HOME | End: 2020-07-22
Attending: INTERNAL MEDICINE
Payer: MEDICARE

## 2020-07-22 DIAGNOSIS — Z01.818 PRE-OP TESTING: ICD-10-CM

## 2020-07-22 LAB — SARS-COV-2 RDRP RESP QL NAA+PROBE: NEGATIVE

## 2020-07-22 PROCEDURE — U0002 COVID-19 LAB TEST NON-CDC: HCPCS

## 2020-07-23 NOTE — NURSING
Dr. Avila notified about last BMP done on 5/3/20 and orders received to draw a BMP tomorrow morning pre angiogram.

## 2020-07-24 ENCOUNTER — HOSPITAL ENCOUNTER (OUTPATIENT)
Facility: HOSPITAL | Age: 75
Discharge: HOME | End: 2020-07-24
Attending: INTERNAL MEDICINE | Admitting: INTERNAL MEDICINE
Payer: MEDICARE

## 2020-07-24 VITALS
DIASTOLIC BLOOD PRESSURE: 69 MMHG | HEART RATE: 58 BPM | TEMPERATURE: 98 F | RESPIRATION RATE: 18 BRPM | OXYGEN SATURATION: 100 % | SYSTOLIC BLOOD PRESSURE: 112 MMHG | WEIGHT: 134.94 LBS | BODY MASS INDEX: 22.45 KG/M2

## 2020-07-24 DIAGNOSIS — R07.9 CHEST PAIN, UNSPECIFIED TYPE: ICD-10-CM

## 2020-07-24 DIAGNOSIS — R94.39 ABNORMAL STRESS TEST: ICD-10-CM

## 2020-07-24 DIAGNOSIS — Z01.818 PRE-OP TESTING: Primary | ICD-10-CM

## 2020-07-24 DIAGNOSIS — R94.39 ABNORMAL NUCLEAR STRESS TEST: ICD-10-CM

## 2020-07-24 LAB
ANION GAP SERPL CALC-SCNC: 5 MMOL/L (ref 8–16)
BUN SERPL-MCNC: 22 MG/DL (ref 8–23)
CALCIUM SERPL-MCNC: 9 MG/DL (ref 8.7–10.5)
CHLORIDE SERPL-SCNC: 108 MMOL/L (ref 95–110)
CO2 SERPL-SCNC: 27 MMOL/L (ref 23–29)
CREAT SERPL-MCNC: 0.8 MG/DL (ref 0.5–1.4)
EST. GFR  (AFRICAN AMERICAN): >60 ML/MIN/1.73 M^2
EST. GFR  (NON AFRICAN AMERICAN): >60 ML/MIN/1.73 M^2
GLUCOSE SERPL-MCNC: 111 MG/DL (ref 70–110)
POTASSIUM SERPL-SCNC: 4.2 MMOL/L (ref 3.5–5.1)
SODIUM SERPL-SCNC: 140 MMOL/L (ref 136–145)

## 2020-07-24 PROCEDURE — 99152 PR MOD CONSCIOUS SEDATION, SAME PHYS, 5+ YRS, FIRST 15 MIN: ICD-10-PCS | Mod: ,,, | Performed by: INTERNAL MEDICINE

## 2020-07-24 PROCEDURE — 25000003 PHARM REV CODE 250: Performed by: INTERNAL MEDICINE

## 2020-07-24 PROCEDURE — 25500020 PHARM REV CODE 255: Performed by: INTERNAL MEDICINE

## 2020-07-24 PROCEDURE — C1887 CATHETER, GUIDING: HCPCS | Performed by: INTERNAL MEDICINE

## 2020-07-24 PROCEDURE — 99217 PR OBSERVATION CARE DISCHARGE: CPT | Mod: 25,,, | Performed by: INTERNAL MEDICINE

## 2020-07-24 PROCEDURE — 93458 PR CATH PLACE/CORON ANGIO, IMG SUPER/INTERP,W LEFT HEART VENTRICULOGRAPHY: ICD-10-PCS | Mod: 26,,, | Performed by: INTERNAL MEDICINE

## 2020-07-24 PROCEDURE — C1894 INTRO/SHEATH, NON-LASER: HCPCS | Performed by: INTERNAL MEDICINE

## 2020-07-24 PROCEDURE — 93458 L HRT ARTERY/VENTRICLE ANGIO: CPT | Performed by: INTERNAL MEDICINE

## 2020-07-24 PROCEDURE — 99152 MOD SED SAME PHYS/QHP 5/>YRS: CPT | Mod: ,,, | Performed by: INTERNAL MEDICINE

## 2020-07-24 PROCEDURE — 63600175 PHARM REV CODE 636 W HCPCS: Performed by: INTERNAL MEDICINE

## 2020-07-24 PROCEDURE — 80048 BASIC METABOLIC PNL TOTAL CA: CPT

## 2020-07-24 PROCEDURE — 93458 L HRT ARTERY/VENTRICLE ANGIO: CPT | Mod: 26,,, | Performed by: INTERNAL MEDICINE

## 2020-07-24 PROCEDURE — C1769 GUIDE WIRE: HCPCS | Performed by: INTERNAL MEDICINE

## 2020-07-24 PROCEDURE — 99217 PR OBSERVATION CARE DISCHARGE: ICD-10-PCS | Mod: 25,,, | Performed by: INTERNAL MEDICINE

## 2020-07-24 RX ORDER — MIDAZOLAM HYDROCHLORIDE 1 MG/ML
INJECTION, SOLUTION INTRAMUSCULAR; INTRAVENOUS
Status: DISCONTINUED | OUTPATIENT
Start: 2020-07-24 | End: 2020-07-24 | Stop reason: HOSPADM

## 2020-07-24 RX ORDER — SODIUM CHLORIDE 9 MG/ML
INJECTION, SOLUTION INTRAVENOUS CONTINUOUS
Status: ACTIVE | OUTPATIENT
Start: 2020-07-24 | End: 2020-07-24

## 2020-07-24 RX ORDER — NITROGLYCERIN 20 MG/100ML
INJECTION INTRAVENOUS
Status: DISCONTINUED | OUTPATIENT
Start: 2020-07-24 | End: 2020-07-24 | Stop reason: HOSPADM

## 2020-07-24 RX ORDER — SODIUM CHLORIDE 9 MG/ML
INJECTION, SOLUTION INTRAVENOUS ONCE
Status: COMPLETED | OUTPATIENT
Start: 2020-07-24 | End: 2020-07-24

## 2020-07-24 RX ORDER — VERAPAMIL HYDROCHLORIDE 2.5 MG/ML
INJECTION, SOLUTION INTRAVENOUS
Status: DISCONTINUED | OUTPATIENT
Start: 2020-07-24 | End: 2020-07-24 | Stop reason: HOSPADM

## 2020-07-24 RX ORDER — HEPARIN SODIUM 1000 [USP'U]/ML
INJECTION, SOLUTION INTRAVENOUS; SUBCUTANEOUS
Status: DISCONTINUED | OUTPATIENT
Start: 2020-07-24 | End: 2020-07-24 | Stop reason: HOSPADM

## 2020-07-24 RX ORDER — LIDOCAINE HYDROCHLORIDE 10 MG/ML
INJECTION, SOLUTION EPIDURAL; INFILTRATION; INTRACAUDAL; PERINEURAL
Status: DISCONTINUED | OUTPATIENT
Start: 2020-07-24 | End: 2020-07-24 | Stop reason: HOSPADM

## 2020-07-24 RX ORDER — FENTANYL CITRATE 50 UG/ML
INJECTION, SOLUTION INTRAMUSCULAR; INTRAVENOUS
Status: DISCONTINUED | OUTPATIENT
Start: 2020-07-24 | End: 2020-07-24 | Stop reason: HOSPADM

## 2020-07-24 RX ADMIN — SODIUM CHLORIDE: 0.9 INJECTION, SOLUTION INTRAVENOUS at 06:07

## 2020-07-24 RX ADMIN — SODIUM CHLORIDE: 0.9 INJECTION, SOLUTION INTRAVENOUS at 12:07

## 2020-07-24 NOTE — NURSING
Lab at bedside collecting BMP sample from [facility] nurse.  asked to expedite testing for BMP.

## 2020-07-24 NOTE — INTERVAL H&P NOTE
The patient has been examined and the H&P has been reviewed:    I concur with the findings and no changes have occurred since H&P was written.    Anesthesia/Surgery risks, benefits and alternative options discussed and understood by patient/family.          Active Hospital Problems    Diagnosis  POA    Abnormal stress test [R94.39]  Yes      Resolved Hospital Problems   No resolved problems to display.

## 2020-07-24 NOTE — DISCHARGE SUMMARY
[facility]  Cardiology  Discharge Summary      Patient Name: Reed Torres  MRN: [id number]  Admission Date: 7/24/2020  Hospital Length of Stay: 0 days  Discharge Date and Time:  07/24/2020 1:53 PM  Attending Physician: Keenan Avila MD  Discharging Provider: Keenan Avila MD  Primary Care Physician: Pj Gillette MD    HPI: Reed Torres presents for coronary angiography secondary to abnormal stress test.  The patient exercised for 9:10. 10.5 METs. BP went to 175/83. C/o sob. Moderate sized, moderate intensity, mostly reversible defect with some fixed areas in the inferior wall.  procedure today revealed no high-grade stenosis.    Procedure(s) (LRB):  Left heart cath (Left)     Indwelling Lines/Drains at time of discharge:  Lines/Drains/Airways     None                 Hospital Course (synopsis of major diagnoses, care, treatment, and services provided during the course of the hospital stay):  The patient underwent coronary angiography on 07/24.  Revealed no no high-grade lesions.  Mild to moderate coronary artery disease noted.  Normal left ventricular end-diastolic pressure.    Consults:     Significant Diagnostic Studies: Angiography:  See report for full details    Pending Diagnostic Studies:     None          Final Active Diagnoses:    Diagnosis Date Noted POA    Abnormal stress test [R94.39] 07/24/2020 Yes      Problems Resolved During this Admission:       Discharged Condition: good    Follow Up:    Patient Instructions:      Diet Cardiac     Call MD for:  temperature >100.4     Call MD for:  severe uncontrolled pain     Call MD for:  redness, tenderness, or signs of infection (pain, swelling, redness, odor or green/yellow discharge around incision site)     Remove dressing in 24 hours     Medications:  Reconciled Home Medications:      Medication List      CONTINUE taking these medications    atorvastatin 40 MG tablet  Commonly known as: LIPITOR  Take 1 tablet (40 mg total) by mouth  once daily.     latanoprost 0.005 % ophthalmic solution     pantoprazole 40 MG tablet  Commonly known as: PROTONIX  Take 40 mg by mouth once daily.     traZODone 150 MG tablet  Commonly known as: DESYREL  Take 1-2 tablets (150-300 mg total) by mouth every evening.     ZOLOFT 50 MG tablet  Generic drug: sertraline  Take 1 tablet (50 mg total) by mouth every evening.            Time spent on the discharge of patient: 30 minutes    Keenan Avila MD  Cardiology  [facility]

## 2020-07-24 NOTE — NURSING
Lab at bedside collecting BMP sample from [facility] nurse.  asked to expedite testing for BMP.

## 2020-07-24 NOTE — DISCHARGE INSTRUCTIONS
Limit movement of the wrist.  Do not bend wrist or perform heavy lifting for 24 hours.      NO blood pressure cuff or venipuncture to affected arm for 24 hours.    If bleeding occurs, apply pressure to site for 20 minutes. Apply band aid once bleeding stops.  Call 911 if unable to stop bleeding with pressure.     Keep your follow up appointment.      Do not drive, drink alcohol, or sign legal documents for 24 hours, or if taking narcotic pain medication.      DIET: You may resume your home diet. If nausea is present, increase your diet gradually with fluids and bland foods    ACTIVITY LEVEL: You have received sedation or an anesthetic, you may feel sleepy for several hours. Rest until you are more awake. Gradually resume your normal activities    Medications: Pain medication should be taken only if needed and as directed. If antibiotics are prescribed, the medication should be taken until completed.     No driving, alcoholic beverages or signing legal documents for next 24 hours or while taking pain medication.       CALL THE DOCTOR:    For any obvious bleeding (some dried blood over the incision is normal).      Redness, swelling, foul smell around incision or fever over 101.   Shortness of breath, Coughing up Bloody sputum, Pains or Swelling in your Calves .   Persistent pain or nausea not relieved by medication.    If any unusual problems or difficulties occur contact your doctor. If you cannot contact your doctor but feel your signs and symptoms warrant a physicians attention return to the emergency room.

## 2020-07-24 NOTE — NURSING
Pt and family instructed on plan of care and about scheduled angiogram this morning pending BMP results. Pt and family verbalize understanding. Pt laying in bed in no apparent distress and has no complaints of pain. Pt has +2 pulses in all extremities. ALEXANDRA Nurse Audra finishing pre op assessment. Pt instructed cath lab team will come back to get him in a few minutes.

## 2020-07-31 ENCOUNTER — TELEPHONE (OUTPATIENT)
Dept: FAMILY MEDICINE | Facility: CLINIC | Age: 75
End: 2020-07-31

## 2020-07-31 NOTE — TELEPHONE ENCOUNTER
LOV  7/7/2020  Last refill trazodone  7/1/2020                  zoloft 7/1/2020    Patient informed of refills at pharmacy. Patient verbalized understanding.

## 2020-07-31 NOTE — TELEPHONE ENCOUNTER
----- Message from Jaki Pak sent at 7/31/2020  1:40 PM CDT -----  Contact: SUDHIR MILLS [[id number]]  Type: RX Refill Request    Who Called: SUDHIR MILLS [[id number]]    RX Name and Strength: traZODone (DESYREL) 150 MG tablet  sertraline (ZOLOFT) 50 MG tablet       Preferred Pharmacy with phone number: [facility]/PHARMACY #[id number] - [place] - [address]      Best Call Back Number: [phone]    Additional Information: N/A

## 2020-08-05 ENCOUNTER — OFFICE VISIT (OUTPATIENT)
Dept: CARDIOLOGY | Facility: CLINIC | Age: 75
End: 2020-08-05
Payer: MEDICARE

## 2020-08-05 VITALS
OXYGEN SATURATION: 98 % | BODY MASS INDEX: 23.52 KG/M2 | SYSTOLIC BLOOD PRESSURE: 117 MMHG | WEIGHT: 141.13 LBS | HEIGHT: 65 IN | HEART RATE: 67 BPM | DIASTOLIC BLOOD PRESSURE: 73 MMHG

## 2020-08-05 DIAGNOSIS — R03.0 ELEVATED BLOOD-PRESSURE READING WITHOUT DIAGNOSIS OF HYPERTENSION: ICD-10-CM

## 2020-08-05 DIAGNOSIS — R10.10 PAIN OF UPPER ABDOMEN: Primary | ICD-10-CM

## 2020-08-05 DIAGNOSIS — E78.00 PURE HYPERCHOLESTEROLEMIA: ICD-10-CM

## 2020-08-05 DIAGNOSIS — I47.10 SVT (SUPRAVENTRICULAR TACHYCARDIA): ICD-10-CM

## 2020-08-05 DIAGNOSIS — R94.39 ABNORMAL STRESS TEST: ICD-10-CM

## 2020-08-05 PROCEDURE — 99999 PR PBB SHADOW E&M-EST. PATIENT-LVL III: ICD-10-PCS | Mod: PBBFAC,,, | Performed by: INTERNAL MEDICINE

## 2020-08-05 PROCEDURE — 1126F AMNT PAIN NOTED NONE PRSNT: CPT | Mod: S$GLB,,, | Performed by: INTERNAL MEDICINE

## 2020-08-05 PROCEDURE — 1159F MED LIST DOCD IN RCRD: CPT | Mod: S$GLB,,, | Performed by: INTERNAL MEDICINE

## 2020-08-05 PROCEDURE — 1101F PR PT FALLS ASSESS DOC 0-1 FALLS W/OUT INJ PAST YR: ICD-10-PCS | Mod: CPTII,S$GLB,, | Performed by: INTERNAL MEDICINE

## 2020-08-05 PROCEDURE — 99214 PR OFFICE/OUTPT VISIT, EST, LEVL IV, 30-39 MIN: ICD-10-PCS | Mod: S$GLB,,, | Performed by: INTERNAL MEDICINE

## 2020-08-05 PROCEDURE — 1126F PR PAIN SEVERITY QUANTIFIED, NO PAIN PRESENT: ICD-10-PCS | Mod: S$GLB,,, | Performed by: INTERNAL MEDICINE

## 2020-08-05 PROCEDURE — 1159F PR MEDICATION LIST DOCUMENTED IN MEDICAL RECORD: ICD-10-PCS | Mod: S$GLB,,, | Performed by: INTERNAL MEDICINE

## 2020-08-05 PROCEDURE — 99999 PR PBB SHADOW E&M-EST. PATIENT-LVL III: CPT | Mod: PBBFAC,,, | Performed by: INTERNAL MEDICINE

## 2020-08-05 PROCEDURE — 99214 OFFICE O/P EST MOD 30 MIN: CPT | Mod: S$GLB,,, | Performed by: INTERNAL MEDICINE

## 2020-08-05 PROCEDURE — 99499 UNLISTED E&M SERVICE: CPT | Mod: S$GLB,,, | Performed by: INTERNAL MEDICINE

## 2020-08-05 PROCEDURE — 1101F PT FALLS ASSESS-DOCD LE1/YR: CPT | Mod: CPTII,S$GLB,, | Performed by: INTERNAL MEDICINE

## 2020-08-05 PROCEDURE — 99499 RISK ADDL DX/OHS AUDIT: ICD-10-PCS | Mod: S$GLB,,, | Performed by: INTERNAL MEDICINE

## 2020-08-05 NOTE — PROGRESS NOTES
CARDIOLOGY CLINIC VISIT        HISTORY OF PRESENT ILLNESS:     Reed Torres presents for continued care.  Recent heart catheterization revealed nonobstructive coronary artery disease.  He was being evaluated for chest pain.  He exercised for 9:10. 10.5 METs. BP went to 175/83 on his stress test. Moderate sized, moderate intensity, mostly reversible defect with some fixed areas in the inferior wall. Echo from 3/17/20 showed low normal LVEF of 50%.  Normal PASP. 24 hour holter showed NSR, avg HR 77. Occasional runs SVT.  He still has complaints of some abdominal discomfort.  Pain in upper abdomen.  He feels it is worse after eating.  Associated belching.  He has seen his GI.  He is taking his PPI.    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 [place]    SHOULDER ARTHROSCOPY W/ ROTATOR CUFF REPAIR      Left shoulder       ALLERGIES AND MEDICATION:   Review of patient's allergies indicates:  No Known Allergies     Medication List          Accurate as of August 5, 2020  3:12 PM. If you have any questions, ask your nurse or doctor.            CONTINUE taking these medications    atorvastatin 40 MG tablet  Commonly known as: LIPITOR  Take 1 tablet (40 mg total) by mouth once daily.     latanoprost 0.005 % ophthalmic solution     pantoprazole 40 MG tablet  Commonly known as: PROTONIX     traZODone 150 MG tablet  Commonly known as: DESYREL  Take 1-2 tablets (150-300 mg total) by mouth every evening.     ZOLOFT 50 MG tablet  Generic drug: sertraline  Take 1 tablet (50 mg total) by  mouth every evening.            SOCIAL HISTORY:     Social History     Socioeconomic History    Marital status:      Spouse name: Not on file    Number of children: Not on file    Years of education: Not on file    Highest education level: Not on file   Occupational History    Not on file   Social Needs    Financial resource strain: Not on file    Food insecurity     Worry: Not on file     Inability: Not on file    Transportation needs     Medical: Not on file     Non-medical: Not on file   Tobacco Use    Smoking status: Former Smoker     Quit date:      Years since quittin.6    Smokeless tobacco: Never Used    Tobacco comment: stopped smoking 20 yrs ago.   Substance and Sexual Activity    Alcohol use: Not Currently     Alcohol/week: 5.0 standard drinks     Types: 6 Standard drinks or equivalent per week    Drug use: No    Sexual activity: Yes     Partners: Female     Birth control/protection: None   Lifestyle    Physical activity     Days per week: Not on file     Minutes per session: Not on file    Stress: Not on file   Relationships    Social connections     Talks on phone: Not on file     Gets together: Not on file     Attends Pentecostalism service: Not on file     Active member of club or organization: Not on file     Attends meetings of clubs or organizations: Not on file     Relationship status: Not on file   Other Topics Concern    Not on file   Social History Narrative    Still doing lawn service       FAMILY HISTORY:     Family History   Problem Relation Age of Onset    COPD Mother     Diabetes Mother     Hypertension Mother     COPD Father     Heart disease Brother        REVIEW OF SYSTEMS:   Review of Systems   Respiratory: Negative for cough, sputum production, shortness of breath and wheezing.    Cardiovascular: Negative for chest pain, orthopnea, claudication and leg swelling.   Gastrointestinal: Positive for abdominal pain and heartburn. Negative for constipation,  "diarrhea, nausea and vomiting.   Neurological: Negative for dizziness, tingling, tremors, speech change, focal weakness, seizures, loss of consciousness, weakness and headaches.       PHYSICAL EXAM:     Vitals:    08/05/20 1444   BP: 117/73   Pulse: 67    Body mass index is 23.48 kg/m².  Weight: 64 kg (141 lb 1.5 oz)   Height: 5' 5" (165.1 cm)     Physical Exam  Vitals signs reviewed.   Constitutional:       General: He is not in acute distress.     Appearance: He is well-developed. He is not diaphoretic.   Neck:      Vascular: No carotid bruit or JVD.   Cardiovascular:      Rate and Rhythm: Normal rate and regular rhythm.      Pulses: Normal pulses.      Heart sounds: Normal heart sounds.   Pulmonary:      Effort: Pulmonary effort is normal.      Breath sounds: Normal breath sounds.   Abdominal:      General: Bowel sounds are normal.      Palpations: Abdomen is soft.      Tenderness: There is abdominal tenderness.   Musculoskeletal:      Right lower leg: No edema.      Left lower leg: No edema.   Neurological:      Mental Status: He is alert and oriented to person, place, and time.   Psychiatric:         Speech: Speech normal.         Behavior: Behavior normal.         DATA:     Laboratory:  CBC:  Recent Labs   Lab 04/27/20 1920 05/03/20  0610 07/20/20  1335   WBC 4.87 3.93 6.15   Hemoglobin 13.3 L 13.6 L 13.2 L   Hematocrit 40.1 41.2 40.3   Platelets 166 150 169       CHEMISTRIES:  Recent Labs   Lab 04/27/20 1920 05/03/20  0610 07/24/20  0648   Glucose 111 H 100 111 H   Sodium 142 142 140   Potassium 3.4 L 3.6 4.2   BUN, Bld 20 13 22   Creatinine 1.4 0.8 0.8   eGFR if  56 A >60 >60   eGFR if non  48 A >60 >60   Calcium 8.8 8.9 9.0       CARDIAC BIOMARKERS:  Recent Labs   Lab 03/07/20 1237 04/27/20 1920 05/03/20  0610   Troponin I <0.006 <0.006 <0.006       COAGS:  Recent Labs   Lab 03/07/20  1237 07/20/20  1335   INR 0.9 1.0       LIPIDS/LFTS:  Recent Labs   Lab 02/09/18  0808 " "02/11/19  0942 10/08/19  0739 03/07/20  1237 04/27/20  1920 05/03/20  0610   Cholesterol 200 H 193 222 H  --   --   --    Triglycerides 78 68 94  --   --   --    HDL 53 52 58  --   --   --    LDL Cholesterol 131.4 127.4 145.2  --   --   --    Non-HDL Cholesterol 147 141 164  --   --   --    AST 16 13 11 16 12 10   ALT 17 13 12 16 14 12       Cardiovascular Testing:    Cardiac catheterization 07/24/2020:    1.  Mild-to-moderate nonobstructive coronary artery disease.  2.  Normal left ventricular end-diastolic pressure.  3.  No aortic stenosis.    Exercise MPS 7/2/20:       Abnormal myocardial perfusion study.    There is a moderate sized, moderate intensity, mostly reversible defect with some fixed areas in the inferior wall(s).    Gated perfusion images showed an ejection fraction of 57%    The EKG portion of this study is negative for ischemia.    The patient reported no chest pain during the stress test.    There were no arrhythmias during stress.     Holter 3/17/20:     · Sinus rhythm with heart rates varying between 48 and 118 bpm with an average of 77 bpm.  · There were occasional PVCs totalling 278 and averaging 11.13 per hour.  · There were rare PACs totalling 81 and averaging 3.24 per hour.  · There were occasional runs of non-sustained SVT and lasting for 2 to 7 beats.  · Diary events ("headache") did not correspond to any arrhythmic events.     Echo 3/17/20:     · Low normal left ventricular systolic function. The estimated ejection fraction is 50%.  · No wall motion abnormalities.  · Normal right ventricular systolic function.  · The sinuses of Valsalva is mildly dilated. 3.9cm.  · The estimated PA systolic pressure is 28 mmHg.    ASSESSMENT:     1.  Abdominal pain   2.  Chest pain  2.  Hyperlipidemia:     PLAN:     1.  Abdominal ultrasound  2.  Continue current management  3.  Return to clinic 3 months.      Keenan Avila MD, MPH, FACC, [facility]    "

## 2020-08-11 ENCOUNTER — HOSPITAL ENCOUNTER (OUTPATIENT)
Dept: RADIOLOGY | Facility: HOSPITAL | Age: 75
Discharge: HOME | End: 2020-08-11
Attending: INTERNAL MEDICINE
Payer: MEDICARE

## 2020-08-11 DIAGNOSIS — R10.10 PAIN OF UPPER ABDOMEN: ICD-10-CM

## 2020-08-11 PROCEDURE — 76700 US EXAM ABDOM COMPLETE: CPT | Mod: TC

## 2020-08-11 PROCEDURE — 76700 US ABDOMEN COMPLETE: ICD-10-PCS | Mod: 26,,, | Performed by: RADIOLOGY

## 2020-08-11 PROCEDURE — 76700 US EXAM ABDOM COMPLETE: CPT | Mod: 26,,, | Performed by: RADIOLOGY

## 2020-08-14 DIAGNOSIS — Z11.59 NEED FOR HEPATITIS C SCREENING TEST: ICD-10-CM

## 2020-08-28 ENCOUNTER — OFFICE VISIT (OUTPATIENT)
Dept: FAMILY MEDICINE | Facility: CLINIC | Age: 75
End: 2020-08-28
Payer: MEDICARE

## 2020-08-28 VITALS
OXYGEN SATURATION: 96 % | DIASTOLIC BLOOD PRESSURE: 80 MMHG | BODY MASS INDEX: 20.04 KG/M2 | WEIGHT: 132.25 LBS | HEIGHT: 68 IN | HEART RATE: 60 BPM | SYSTOLIC BLOOD PRESSURE: 102 MMHG | TEMPERATURE: 98 F

## 2020-08-28 DIAGNOSIS — F41.9 ANXIETY AND DEPRESSION: ICD-10-CM

## 2020-08-28 DIAGNOSIS — F32.A ANXIETY AND DEPRESSION: ICD-10-CM

## 2020-08-28 PROCEDURE — 99999 PR PBB SHADOW E&M-EST. PATIENT-LVL III: ICD-10-PCS | Mod: PBBFAC,,, | Performed by: FAMILY MEDICINE

## 2020-08-28 PROCEDURE — 1126F AMNT PAIN NOTED NONE PRSNT: CPT | Mod: S$GLB,,, | Performed by: FAMILY MEDICINE

## 2020-08-28 PROCEDURE — 99213 PR OFFICE/OUTPT VISIT, EST, LEVL III, 20-29 MIN: ICD-10-PCS | Mod: S$GLB,,, | Performed by: FAMILY MEDICINE

## 2020-08-28 PROCEDURE — 1101F PR PT FALLS ASSESS DOC 0-1 FALLS W/OUT INJ PAST YR: ICD-10-PCS | Mod: CPTII,S$GLB,, | Performed by: FAMILY MEDICINE

## 2020-08-28 PROCEDURE — 1101F PT FALLS ASSESS-DOCD LE1/YR: CPT | Mod: CPTII,S$GLB,, | Performed by: FAMILY MEDICINE

## 2020-08-28 PROCEDURE — 1159F MED LIST DOCD IN RCRD: CPT | Mod: S$GLB,,, | Performed by: FAMILY MEDICINE

## 2020-08-28 PROCEDURE — 1159F PR MEDICATION LIST DOCUMENTED IN MEDICAL RECORD: ICD-10-PCS | Mod: S$GLB,,, | Performed by: FAMILY MEDICINE

## 2020-08-28 PROCEDURE — 1126F PR PAIN SEVERITY QUANTIFIED, NO PAIN PRESENT: ICD-10-PCS | Mod: S$GLB,,, | Performed by: FAMILY MEDICINE

## 2020-08-28 PROCEDURE — 99999 PR PBB SHADOW E&M-EST. PATIENT-LVL III: CPT | Mod: PBBFAC,,, | Performed by: FAMILY MEDICINE

## 2020-08-28 PROCEDURE — 99213 OFFICE O/P EST LOW 20 MIN: CPT | Mod: S$GLB,,, | Performed by: FAMILY MEDICINE

## 2020-08-28 RX ORDER — SERTRALINE HYDROCHLORIDE 100 MG/1
100 TABLET, FILM COATED ORAL DAILY
Qty: 90 TABLET | Refills: 3 | Status: SHIPPED | OUTPATIENT
Start: 2020-08-28 | End: 2020-11-23

## 2020-08-28 NOTE — PROGRESS NOTES
HISTORY OF PRESENT ILLNESS:  Reed Torres is a 75 y.o. male who presents to the clinic today for Results  .     Reviewed results.  Increase the zoloft.  Per problem list.      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 [place]    SHOULDER ARTHROSCOPY W/ ROTATOR CUFF REPAIR      Left shoulder       SOCIAL HISTORY:  Social History     Socioeconomic History    Marital status:      Spouse name: Not on file    Number of children: Not on file    Years of education: Not on file    Highest education level: Not on file   Occupational History    Not on file   Social Needs    Financial resource strain: Not on file    Food insecurity     Worry: Not on file     Inability: Not on file    Transportation needs     Medical: Not on file     Non-medical: Not on file   Tobacco Use    Smoking status: Former Smoker     Quit date:      Years since quittin.6    Smokeless tobacco: Never Used    Tobacco comment: stopped smoking 20 yrs ago.   Substance and Sexual Activity    Alcohol use: Not Currently     Alcohol/week: 5.0 standard drinks     Types: 6 Standard drinks or equivalent per week    Drug use: No    Sexual activity: Yes     Partners: Female     Birth control/protection: None   Lifestyle    Physical activity     Days per week: Not on file     Minutes per session: Not on file    Stress: Not on file   Relationships    Social connections     Talks on phone: Not on file     Gets together: Not on file     Attends Hoahaoism service: Not on file     Active member of club or organization: Not on file      "Attends meetings of clubs or organizations: Not on file     Relationship status: Not on file   Other Topics Concern    Not on file   Social History Narrative    Still doing lawn service       FAMILY HISTORY:  Family History   Problem Relation Age of Onset    COPD Mother     Diabetes Mother     Hypertension Mother     COPD Father     Heart disease Brother        ALLERGIES AND MEDICATIONS: updated and reviewed.  Review of patient's allergies indicates:  No Known Allergies  Medication List with Changes/Refills   New Medications    SERTRALINE (ZOLOFT) 100 MG TABLET    Take 1 tablet (100 mg total) by mouth once daily.   Current Medications    ATORVASTATIN (LIPITOR) 40 MG TABLET    Take 1 tablet (40 mg total) by mouth once daily.    LATANOPROST 0.005 % OPHTHALMIC SOLUTION        PANTOPRAZOLE (PROTONIX) 40 MG TABLET    Take 40 mg by mouth once daily.    TRAZODONE (DESYREL) 150 MG TABLET    Take 1-2 tablets (150-300 mg total) by mouth every evening.   Discontinued Medications    ZOLOFT 50 MG TABLET    Take 1 tablet (50 mg total) by mouth every evening.          CARE TEAM:  Patient Care Team:  Pj Gillette MD as PCP - General (Family Medicine)  Charlie Walters Jr., MD as Consulting Physician (Urology)  Violet Rodriguez MA as Care Coordinator         REVIEW OF SYSTEMS:  Review of Systems      PHYSICAL EXAM:   Vitals:    08/28/20 1136   BP: 102/80   Pulse: 60   Temp: 98 °F (36.7 °C)     Weight: 60 kg (132 lb 4.4 oz)   Height: 5' 8" (172.7 cm)   Body mass index is 20.11 kg/m².     General appearance - alert, well appearing, and in no distress  Mental status - alert, oriented to person, place, and time      Medication List with Changes/Refills   New Medications    SERTRALINE (ZOLOFT) 100 MG TABLET    Take 1 tablet (100 mg total) by mouth once daily.   Current Medications    ATORVASTATIN (LIPITOR) 40 MG TABLET    Take 1 tablet (40 mg total) by mouth once daily.    LATANOPROST 0.005 % OPHTHALMIC SOLUTION        PANTOPRAZOLE " (PROTONIX) 40 MG TABLET    Take 40 mg by mouth once daily.    TRAZODONE (DESYREL) 150 MG TABLET    Take 1-2 tablets (150-300 mg total) by mouth every evening.   Discontinued Medications    ZOLOFT 50 MG TABLET    Take 1 tablet (50 mg total) by mouth every evening.         ASSESSMENT AND PLAN:    Problem List Items Addressed This Visit     None      Visit Diagnoses     Anxiety and depression        Relevant Medications    sertraline (ZOLOFT) 100 MG tablet        Potential medication side effects were discussed with the patient; let me know if any occur.      No future appointments.    No follow-ups on file. or sooner as needed.

## 2020-09-28 ENCOUNTER — OFFICE VISIT (OUTPATIENT)
Dept: FAMILY MEDICINE | Facility: CLINIC | Age: 75
End: 2020-09-28
Payer: MEDICARE

## 2020-09-28 VITALS
HEART RATE: 61 BPM | SYSTOLIC BLOOD PRESSURE: 116 MMHG | HEIGHT: 68 IN | OXYGEN SATURATION: 98 % | BODY MASS INDEX: 20.72 KG/M2 | DIASTOLIC BLOOD PRESSURE: 74 MMHG | TEMPERATURE: 98 F | WEIGHT: 136.69 LBS

## 2020-09-28 DIAGNOSIS — F32.A ANXIETY AND DEPRESSION: Primary | ICD-10-CM

## 2020-09-28 DIAGNOSIS — F41.9 ANXIETY AND DEPRESSION: Primary | ICD-10-CM

## 2020-09-28 PROCEDURE — 99999 PR PBB SHADOW E&M-EST. PATIENT-LVL III: CPT | Mod: PBBFAC,,, | Performed by: FAMILY MEDICINE

## 2020-09-28 PROCEDURE — 1159F PR MEDICATION LIST DOCUMENTED IN MEDICAL RECORD: ICD-10-PCS | Mod: S$GLB,,, | Performed by: FAMILY MEDICINE

## 2020-09-28 PROCEDURE — 99214 PR OFFICE/OUTPT VISIT, EST, LEVL IV, 30-39 MIN: ICD-10-PCS | Mod: S$GLB,,, | Performed by: FAMILY MEDICINE

## 2020-09-28 PROCEDURE — 99999 PR PBB SHADOW E&M-EST. PATIENT-LVL III: ICD-10-PCS | Mod: PBBFAC,,, | Performed by: FAMILY MEDICINE

## 2020-09-28 PROCEDURE — 1126F AMNT PAIN NOTED NONE PRSNT: CPT | Mod: S$GLB,,, | Performed by: FAMILY MEDICINE

## 2020-09-28 PROCEDURE — 1126F PR PAIN SEVERITY QUANTIFIED, NO PAIN PRESENT: ICD-10-PCS | Mod: S$GLB,,, | Performed by: FAMILY MEDICINE

## 2020-09-28 PROCEDURE — 1101F PT FALLS ASSESS-DOCD LE1/YR: CPT | Mod: CPTII,S$GLB,, | Performed by: FAMILY MEDICINE

## 2020-09-28 PROCEDURE — 1101F PR PT FALLS ASSESS DOC 0-1 FALLS W/OUT INJ PAST YR: ICD-10-PCS | Mod: CPTII,S$GLB,, | Performed by: FAMILY MEDICINE

## 2020-09-28 PROCEDURE — 1159F MED LIST DOCD IN RCRD: CPT | Mod: S$GLB,,, | Performed by: FAMILY MEDICINE

## 2020-09-28 PROCEDURE — 99214 OFFICE O/P EST MOD 30 MIN: CPT | Mod: S$GLB,,, | Performed by: FAMILY MEDICINE

## 2020-09-28 NOTE — PROGRESS NOTES
Chief Complaint   Patient presents with    Medication Problem       SUBJECTIVE:  Reed Torres is a 75 y.o. male here for new problem of still not felling himself, hard to describe, wants to trial off of medication and needs help doing that..  Currently has co-morbidities including per problem list.      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 [place]    SHOULDER ARTHROSCOPY W/ ROTATOR CUFF REPAIR      Left shoulder     Social History     Socioeconomic History    Marital status:      Spouse name: Not on file    Number of children: Not on file    Years of education: Not on file    Highest education level: Not on file   Occupational History    Not on file   Social Needs    Financial resource strain: Not on file    Food insecurity     Worry: Not on file     Inability: Not on file    Transportation needs     Medical: Not on file     Non-medical: Not on file   Tobacco Use    Smoking status: Former Smoker     Quit date:      Years since quittin.    Smokeless tobacco: Never Used    Tobacco comment: stopped smoking 20 yrs ago.   Substance and Sexual Activity    Alcohol use: Not Currently     Alcohol/week: 5.0 standard drinks     Types: 6 Standard drinks or equivalent per week    Drug use: No    Sexual activity: Yes     Partners: Female     Birth control/protection: None   Lifestyle    Physical activity     Days per week: Not on file     Minutes per session: Not on file    Stress: Rather much   Relationships    Social connections     Talks on phone: Not on file     Gets together: Not on file     Attends Denominational service: Not on file  "    Active member of club or organization: Not on file     Attends meetings of clubs or organizations: Not on file     Relationship status: Not on file   Other Topics Concern    Not on file   Social History Narrative    Still doing lawn service     Family History   Problem Relation Age of Onset    COPD Mother     Diabetes Mother     Hypertension Mother     COPD Father     Heart disease Brother      Current Outpatient Medications on File Prior to Visit   Medication Sig Dispense Refill    atorvastatin (LIPITOR) 40 MG tablet Take 1 tablet (40 mg total) by mouth once daily. 90 tablet 3    latanoprost 0.005 % ophthalmic solution       pantoprazole (PROTONIX) 40 MG tablet Take 40 mg by mouth once daily.      sertraline (ZOLOFT) 100 MG tablet Take 1 tablet (100 mg total) by mouth once daily. 90 tablet 3    traZODone (DESYREL) 150 MG tablet Take 1-2 tablets (150-300 mg total) by mouth every evening. 60 tablet 11     Current Facility-Administered Medications on File Prior to Visit   Medication Dose Route Frequency Provider Last Rate Last Dose    sodium chloride 0.9% flush 3 mL  3 mL Intravenous Q8H PRN Keenan Avila MD         Review of patient's allergies indicates:  No Known Allergies      ROS    OBJECTIVE:  /74   Pulse 61   Temp 98.2 °F (36.8 °C) (Oral)   Ht 5' 8" (1.727 m)   Wt 62 kg (136 lb 11 oz)   SpO2 98%   BMI 20.78 kg/m²     Wt Readings from Last 3 Encounters:   09/28/20 62 kg (136 lb 11 oz)   08/28/20 60 kg (132 lb 4.4 oz)   08/05/20 64 kg (141 lb 1.5 oz)     BP Readings from Last 3 Encounters:   09/28/20 116/74   08/28/20 102/80   08/05/20 117/73       He appears well, in no apparent distress.  Alert and oriented times three, pleasant and cooperative. Vital signs are as documented in vital signs section.  S1 and S2 normal, no murmurs, clicks, gallops or rubs. Regular rate and rhythm. Chest is clear; no wheezes or rales. No edema or JVD.      Review of old Records:  Reviewed per " epic    Review of old labs:  Lab Results   Component Value Date    TSH 0.412 03/07/2020     Lab Results   Component Value Date    WBC 6.15 07/20/2020    HGB 13.2 (L) 07/20/2020    HCT 40.3 07/20/2020    MCV 91 07/20/2020     07/20/2020       Chemistry        Component Value Date/Time     07/24/2020 0648    K 4.2 07/24/2020 0648     07/24/2020 0648    CO2 27 07/24/2020 0648    BUN 22 07/24/2020 0648    CREATININE 0.8 07/24/2020 0648     (H) 07/24/2020 0648        Component Value Date/Time    CALCIUM 9.0 07/24/2020 0648    ALKPHOS 47 (L) 05/03/2020 0610    AST 10 05/03/2020 0610    ALT 12 05/03/2020 0610    BILITOT 1.6 (H) 05/03/2020 0610    ESTGFRAFRICA >60 07/24/2020 0648    EGFRNONAA >60 07/24/2020 0648        Lab Results   Component Value Date    CHOL 222 (H) 10/08/2019    CHOL 193 02/11/2019    CHOL 200 (H) 02/09/2018     Lab Results   Component Value Date    HDL 58 10/08/2019    HDL 52 02/11/2019    HDL 53 02/09/2018     Lab Results   Component Value Date    LDLCALC 145.2 10/08/2019    LDLCALC 127.4 02/11/2019    LDLCALC 131.4 02/09/2018     Lab Results   Component Value Date    TRIG 94 10/08/2019    TRIG 68 02/11/2019    TRIG 78 02/09/2018     Lab Results   Component Value Date    CHOLHDL 26.1 10/08/2019    CHOLHDL 26.9 02/11/2019    CHOLHDL 26.5 02/09/2018         Review of old imaging:  n/a    ASSESSMENT:  Problem List Items Addressed This Visit     None      Visit Diagnoses     Anxiety and depression    -  Primary          ICD-10-CM ICD-9-CM   1. Anxiety and depression  F41.9 300.00    F32.9 311         PLAN:  Problem List Items Addressed This Visit     None      Visit Diagnoses     Anxiety and depression    -  Primary        Time spent in counseling on weaning zoloft to see if he has more anxiety getting off  3 week wean.  I spent 25 minutes with patient with half in face to face counseling about the above.    Medication List with Changes/Refills   Current Medications     ATORVASTATIN (LIPITOR) 40 MG TABLET    Take 1 tablet (40 mg total) by mouth once daily.    LATANOPROST 0.005 % OPHTHALMIC SOLUTION        PANTOPRAZOLE (PROTONIX) 40 MG TABLET    Take 40 mg by mouth once daily.    SERTRALINE (ZOLOFT) 100 MG TABLET    Take 1 tablet (100 mg total) by mouth once daily.    TRAZODONE (DESYREL) 150 MG TABLET    Take 1-2 tablets (150-300 mg total) by mouth every evening.         No follow-ups on file.

## 2020-11-23 ENCOUNTER — OFFICE VISIT (OUTPATIENT)
Dept: FAMILY MEDICINE | Facility: CLINIC | Age: 75
End: 2020-11-23
Payer: MEDICARE

## 2020-11-23 VITALS
DIASTOLIC BLOOD PRESSURE: 66 MMHG | TEMPERATURE: 98 F | BODY MASS INDEX: 22.77 KG/M2 | OXYGEN SATURATION: 100 % | HEIGHT: 65 IN | HEART RATE: 74 BPM | WEIGHT: 136.69 LBS | SYSTOLIC BLOOD PRESSURE: 110 MMHG

## 2020-11-23 DIAGNOSIS — K55.059 ACUTE (REVERSIBLE) ISCHEMIA OF INTESTINE, PART AND EXTENT UNSPECIFIED: ICD-10-CM

## 2020-11-23 DIAGNOSIS — I71.9 ANEURYSM OF DESCENDING AORTA: Primary | ICD-10-CM

## 2020-11-23 PROCEDURE — 1159F MED LIST DOCD IN RCRD: CPT | Mod: S$GLB,,, | Performed by: FAMILY MEDICINE

## 2020-11-23 PROCEDURE — 1101F PT FALLS ASSESS-DOCD LE1/YR: CPT | Mod: CPTII,S$GLB,, | Performed by: FAMILY MEDICINE

## 2020-11-23 PROCEDURE — 1126F PR PAIN SEVERITY QUANTIFIED, NO PAIN PRESENT: ICD-10-PCS | Mod: S$GLB,,, | Performed by: FAMILY MEDICINE

## 2020-11-23 PROCEDURE — 3288F PR FALLS RISK ASSESSMENT DOCUMENTED: ICD-10-PCS | Mod: CPTII,S$GLB,, | Performed by: FAMILY MEDICINE

## 2020-11-23 PROCEDURE — 99214 PR OFFICE/OUTPT VISIT, EST, LEVL IV, 30-39 MIN: ICD-10-PCS | Mod: S$GLB,,, | Performed by: FAMILY MEDICINE

## 2020-11-23 PROCEDURE — 99999 PR PBB SHADOW E&M-EST. PATIENT-LVL III: ICD-10-PCS | Mod: PBBFAC,,, | Performed by: FAMILY MEDICINE

## 2020-11-23 PROCEDURE — 1101F PR PT FALLS ASSESS DOC 0-1 FALLS W/OUT INJ PAST YR: ICD-10-PCS | Mod: CPTII,S$GLB,, | Performed by: FAMILY MEDICINE

## 2020-11-23 PROCEDURE — 3288F FALL RISK ASSESSMENT DOCD: CPT | Mod: CPTII,S$GLB,, | Performed by: FAMILY MEDICINE

## 2020-11-23 PROCEDURE — 1126F AMNT PAIN NOTED NONE PRSNT: CPT | Mod: S$GLB,,, | Performed by: FAMILY MEDICINE

## 2020-11-23 PROCEDURE — 1159F PR MEDICATION LIST DOCUMENTED IN MEDICAL RECORD: ICD-10-PCS | Mod: S$GLB,,, | Performed by: FAMILY MEDICINE

## 2020-11-23 PROCEDURE — 99214 OFFICE O/P EST MOD 30 MIN: CPT | Mod: S$GLB,,, | Performed by: FAMILY MEDICINE

## 2020-11-23 PROCEDURE — 99999 PR PBB SHADOW E&M-EST. PATIENT-LVL III: CPT | Mod: PBBFAC,,, | Performed by: FAMILY MEDICINE

## 2020-11-23 NOTE — PROGRESS NOTES
HISTORY OF PRESENT ILLNESS:  Reed Torres is a 75 y.o. male who presents to the clinic today for Follow-up  .     With conitnued left upper abdominal pain and discomfort that is hard to characterize but has been consistent despite GERD treatment, cardiac work up.  He has tried constipation treatment as well and we have tried anxiety and stress treatment.  US showed some aortic change that was thought to be not relevant in his case but he continues to suffer and he has some links to eating as well.  He has overall weight loss uncharacteristic of him despite good appetite and intake.  This is concerning for blood flow and ischemia issues.  Per problem list.      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 [place]    SHOULDER ARTHROSCOPY W/ ROTATOR CUFF REPAIR      Left shoulder       SOCIAL HISTORY:  Social History     Socioeconomic History    Marital status:      Spouse name: Not on file    Number of children: Not on file    Years of education: Not on file    Highest education level: Not on file   Occupational History    Not on file   Social Needs    Financial resource strain: Not on file    Food insecurity     Worry: Not on file     Inability: Not on file    Transportation needs     Medical: Not on file     Non-medical: Not on file   Tobacco Use    Smoking status: Former Smoker     Quit date: 2000     Years since quittin.9    Smokeless tobacco: Never Used    Tobacco comment: stopped smoking 20 yrs ago.   Substance and Sexual Activity    Alcohol use: Not Currently     Alcohol/week: 5.0  "standard drinks     Types: 6 Standard drinks or equivalent per week    Drug use: No    Sexual activity: Yes     Partners: Female     Birth control/protection: None   Lifestyle    Physical activity     Days per week: Not on file     Minutes per session: Not on file    Stress: Rather much   Relationships    Social connections     Talks on phone: Not on file     Gets together: Not on file     Attends Anglican service: Not on file     Active member of club or organization: Not on file     Attends meetings of clubs or organizations: Not on file     Relationship status: Not on file   Other Topics Concern    Not on file   Social History Narrative    Still doing lawn service       FAMILY HISTORY:  Family History   Problem Relation Age of Onset    COPD Mother     Diabetes Mother     Hypertension Mother     COPD Father     Heart disease Brother        ALLERGIES AND MEDICATIONS: updated and reviewed.  Review of patient's allergies indicates:  No Known Allergies  Medication List with Changes/Refills   Current Medications    ATORVASTATIN (LIPITOR) 40 MG TABLET    Take 1 tablet (40 mg total) by mouth once daily.    LATANOPROST 0.005 % OPHTHALMIC SOLUTION        PANTOPRAZOLE (PROTONIX) 40 MG TABLET    Take 40 mg by mouth once daily.    TRAZODONE (DESYREL) 150 MG TABLET    Take 1-2 tablets (150-300 mg total) by mouth every evening.   Discontinued Medications    SERTRALINE (ZOLOFT) 100 MG TABLET    Take 1 tablet (100 mg total) by mouth once daily.          CARE TEAM:  Patient Care Team:  Pj Gillette MD as PCP - General (Family Medicine)  Charlie Waltesr Jr., MD as Consulting Physician (Urology)  Violet Rodriguez MA as Care Coordinator         REVIEW OF SYSTEMS:  Review of Systems      PHYSICAL EXAM:  Vitals:    11/23/20 0946   BP: 110/66   Pulse: 74   Temp: 98.2 °F (36.8 °C)     Weight: 62 kg (136 lb 11 oz)   Height: 5' 5" (165.1 cm)   Body mass index is 22.75 kg/m².    Wt Readings from Last 15 Encounters:   11/23/20 62 " kg (136 lb 11 oz)   09/28/20 62 kg (136 lb 11 oz)   08/28/20 60 kg (132 lb 4.4 oz)   08/05/20 64 kg (141 lb 1.5 oz)   07/20/20 61.2 kg (134 lb 14.7 oz)   07/20/20 61.2 kg (134 lb 14.7 oz)   07/10/20 63.4 kg (139 lb 12.4 oz)   07/07/20 65 kg (143 lb 4.8 oz)   07/01/20 65 kg (143 lb 4.8 oz)   06/24/20 65.9 kg (145 lb 4.5 oz)   06/17/20 62.5 kg (137 lb 12.6 oz)   05/03/20 66.2 kg (146 lb)   04/27/20 66.2 kg (146 lb)   03/17/20 66.7 kg (147 lb)   03/10/20 66.8 kg (147 lb 4.3 oz)       Physical Examination: General appearance - alert, well appearing, and in no distress  The abdomen is soft without tenderness, guarding, mass, rebound or organomegaly. Bowel sounds are normal. No CVA tenderness or inguinal adenopathy noted.  I can't feel a pulsatile mass or illeicit pain  Per problem list.      Medication List with Changes/Refills   Current Medications    ATORVASTATIN (LIPITOR) 40 MG TABLET    Take 1 tablet (40 mg total) by mouth once daily.    LATANOPROST 0.005 % OPHTHALMIC SOLUTION        PANTOPRAZOLE (PROTONIX) 40 MG TABLET    Take 40 mg by mouth once daily.    TRAZODONE (DESYREL) 150 MG TABLET    Take 1-2 tablets (150-300 mg total) by mouth every evening.   Discontinued Medications    SERTRALINE (ZOLOFT) 100 MG TABLET    Take 1 tablet (100 mg total) by mouth once daily.       ASSESSMENT AND PLAN:    Problem List Items Addressed This Visit     None      Visit Diagnoses     Aneurysm of descending aorta    -  Primary    Relevant Orders    Creatinine, serum    Acute (reversible) ischemia of intestine, part and extent unspecified        Relevant Orders    Creatinine, serum    CTA Abdomen and Pelvis        After careful consideration I think we must look at the US findings and see if he has mesenteric ischemia or aneursym in the aorta that could be a part of his presentation.  Explained this to him at length he understands.    I spent 25 minutes with patient with half in face to face counseling about the above.    No future  appointments.    No follow-ups on file. or sooner as needed.

## 2020-12-08 ENCOUNTER — OFFICE VISIT (OUTPATIENT)
Dept: CARDIOLOGY | Facility: CLINIC | Age: 75
End: 2020-12-08
Payer: MEDICARE

## 2020-12-08 VITALS
HEIGHT: 65 IN | BODY MASS INDEX: 23.14 KG/M2 | SYSTOLIC BLOOD PRESSURE: 140 MMHG | WEIGHT: 138.88 LBS | OXYGEN SATURATION: 95 % | HEART RATE: 69 BPM | DIASTOLIC BLOOD PRESSURE: 80 MMHG

## 2020-12-08 DIAGNOSIS — R10.10 PAIN OF UPPER ABDOMEN: Primary | ICD-10-CM

## 2020-12-08 DIAGNOSIS — Z76.89 ESTABLISHING CARE WITH NEW DOCTOR, ENCOUNTER FOR: ICD-10-CM

## 2020-12-08 DIAGNOSIS — I47.10 SVT (SUPRAVENTRICULAR TACHYCARDIA): ICD-10-CM

## 2020-12-08 DIAGNOSIS — E78.00 PURE HYPERCHOLESTEROLEMIA: ICD-10-CM

## 2020-12-08 DIAGNOSIS — R07.9 CHEST PAIN, UNSPECIFIED TYPE: ICD-10-CM

## 2020-12-08 DIAGNOSIS — R03.0 ELEVATED BLOOD-PRESSURE READING WITHOUT DIAGNOSIS OF HYPERTENSION: ICD-10-CM

## 2020-12-08 DIAGNOSIS — I71.40 ABDOMINAL ANEURYSM: ICD-10-CM

## 2020-12-08 PROCEDURE — 99999 PR PBB SHADOW E&M-EST. PATIENT-LVL III: ICD-10-PCS | Mod: PBBFAC,,, | Performed by: INTERNAL MEDICINE

## 2020-12-08 PROCEDURE — 1126F AMNT PAIN NOTED NONE PRSNT: CPT | Mod: S$GLB,,, | Performed by: INTERNAL MEDICINE

## 2020-12-08 PROCEDURE — 93000 ELECTROCARDIOGRAM COMPLETE: CPT | Mod: S$GLB,,, | Performed by: INTERNAL MEDICINE

## 2020-12-08 PROCEDURE — 3288F PR FALLS RISK ASSESSMENT DOCUMENTED: ICD-10-PCS | Mod: CPTII,S$GLB,, | Performed by: INTERNAL MEDICINE

## 2020-12-08 PROCEDURE — 1126F PR PAIN SEVERITY QUANTIFIED, NO PAIN PRESENT: ICD-10-PCS | Mod: S$GLB,,, | Performed by: INTERNAL MEDICINE

## 2020-12-08 PROCEDURE — 99999 PR PBB SHADOW E&M-EST. PATIENT-LVL III: CPT | Mod: PBBFAC,,, | Performed by: INTERNAL MEDICINE

## 2020-12-08 PROCEDURE — 1159F PR MEDICATION LIST DOCUMENTED IN MEDICAL RECORD: ICD-10-PCS | Mod: S$GLB,,, | Performed by: INTERNAL MEDICINE

## 2020-12-08 PROCEDURE — 99499 RISK ADDL DX/OHS AUDIT: ICD-10-PCS | Mod: S$GLB,,, | Performed by: INTERNAL MEDICINE

## 2020-12-08 PROCEDURE — 99214 PR OFFICE/OUTPT VISIT, EST, LEVL IV, 30-39 MIN: ICD-10-PCS | Mod: S$GLB,,, | Performed by: INTERNAL MEDICINE

## 2020-12-08 PROCEDURE — 99499 UNLISTED E&M SERVICE: CPT | Mod: S$GLB,,, | Performed by: INTERNAL MEDICINE

## 2020-12-08 PROCEDURE — 1101F PR PT FALLS ASSESS DOC 0-1 FALLS W/OUT INJ PAST YR: ICD-10-PCS | Mod: CPTII,S$GLB,, | Performed by: INTERNAL MEDICINE

## 2020-12-08 PROCEDURE — 1159F MED LIST DOCD IN RCRD: CPT | Mod: S$GLB,,, | Performed by: INTERNAL MEDICINE

## 2020-12-08 PROCEDURE — 1101F PT FALLS ASSESS-DOCD LE1/YR: CPT | Mod: CPTII,S$GLB,, | Performed by: INTERNAL MEDICINE

## 2020-12-08 PROCEDURE — 3288F FALL RISK ASSESSMENT DOCD: CPT | Mod: CPTII,S$GLB,, | Performed by: INTERNAL MEDICINE

## 2020-12-08 PROCEDURE — 93000 EKG 12-LEAD: ICD-10-PCS | Mod: S$GLB,,, | Performed by: INTERNAL MEDICINE

## 2020-12-08 PROCEDURE — 99214 OFFICE O/P EST MOD 30 MIN: CPT | Mod: S$GLB,,, | Performed by: INTERNAL MEDICINE

## 2020-12-08 NOTE — PROGRESS NOTES
CARDIOLOGY CLINIC VISIT        HISTORY OF PRESENT ILLNESS:     Reed Torres presents for continued care, last seen 08/05/2020.  Still with occasional left-sided discomfort.  Not necessarily exertional.  States that actually feels better when he is working.  His blood pressure is a little elevated today.  Abdominal ultrasound from August showed a mild fusiform aneurysmal dilatation of the distal abdominal aorta.  CTA of the abdomen and pelvis scheduled.  Heart catheterization revealed nonobstructive coronary artery disease.  Stress test -  9:10. 10.5 METs. BP went to 175/83 on his stress test. Moderate sized, moderate intensity, mostly reversible defect with some fixed areas in the inferior wall. Echo from 3/17/20 showed low normal LVEF of 50%.  Normal PASP. 24 hour holter showed NSR, avg HR 77. Occasional runs SVT.     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 [place]    SHOULDER ARTHROSCOPY W/ ROTATOR CUFF REPAIR      Left shoulder       ALLERGIES AND MEDICATION:   Review of patient's allergies indicates:  No Known Allergies     Medication List          Accurate as of December 8, 2020  8:37 AM. If you have any questions, ask your nurse or doctor.            CONTINUE taking these medications    atorvastatin 40 MG tablet  Commonly known as: LIPITOR  Take 1 tablet (40 mg total) by mouth once daily.     latanoprost 0.005 % ophthalmic solution     pantoprazole 40 MG tablet  Commonly known as: PROTONIX     traZODone 150 MG tablet  Commonly known as: DESYREL  Take 1-2 tablets  (150-300 mg total) by mouth every evening.            SOCIAL HISTORY:     Social History     Socioeconomic History    Marital status:      Spouse name: Not on file    Number of children: Not on file    Years of education: Not on file    Highest education level: Not on file   Occupational History    Not on file   Social Needs    Financial resource strain: Not on file    Food insecurity     Worry: Not on file     Inability: Not on file    Transportation needs     Medical: Not on file     Non-medical: Not on file   Tobacco Use    Smoking status: Former Smoker     Quit date: 2000     Years since quittin.9    Smokeless tobacco: Never Used    Tobacco comment: stopped smoking 20 yrs ago.   Substance and Sexual Activity    Alcohol use: Not Currently     Alcohol/week: 5.0 standard drinks     Types: 6 Standard drinks or equivalent per week    Drug use: No    Sexual activity: Yes     Partners: Female     Birth control/protection: None   Lifestyle    Physical activity     Days per week: Not on file     Minutes per session: Not on file    Stress: Rather much   Relationships    Social connections     Talks on phone: Not on file     Gets together: Not on file     Attends Hoahaoism service: Not on file     Active member of club or organization: Not on file     Attends meetings of clubs or organizations: Not on file     Relationship status: Not on file   Other Topics Concern    Not on file   Social History Narrative    Still doing lawn service       FAMILY HISTORY:     Family History   Problem Relation Age of Onset    COPD Mother     Diabetes Mother     Hypertension Mother     COPD Father     Heart disease Brother        REVIEW OF SYSTEMS:   Review of Systems   Respiratory: Negative for cough, sputum production, shortness of breath and wheezing.    Cardiovascular: Positive for chest pain. Negative for orthopnea, claudication and leg swelling.   Gastrointestinal: Positive for abdominal pain and  "heartburn. Negative for constipation, diarrhea, nausea and vomiting.   Neurological: Negative for dizziness, tingling, tremors, speech change, focal weakness, seizures, loss of consciousness, weakness and headaches.       PHYSICAL EXAM:     Vitals:    12/08/20 0826   BP: (!) 140/80   Pulse: 69    Body mass index is 23.11 kg/m².  Weight: 63 kg (138 lb 14.2 oz)   Height: 5' 5" (165.1 cm)     Physical Exam  Vitals signs reviewed.   Constitutional:       General: He is not in acute distress.     Appearance: He is well-developed. He is not diaphoretic.   Neck:      Vascular: No carotid bruit or JVD.   Cardiovascular:      Rate and Rhythm: Normal rate and regular rhythm.      Pulses: Normal pulses.      Heart sounds: Normal heart sounds.   Pulmonary:      Effort: Pulmonary effort is normal.      Breath sounds: Normal breath sounds.   Abdominal:      General: Bowel sounds are normal.      Palpations: Abdomen is soft.      Tenderness: There is abdominal tenderness.   Musculoskeletal:      Right lower leg: No edema.      Left lower leg: No edema.   Neurological:      Mental Status: He is alert and oriented to person, place, and time.   Psychiatric:         Speech: Speech normal.         Behavior: Behavior normal.         DATA:   EKG: (personally reviewed tracing)  12/08/2020-sinus rhythm with PVC  Laboratory:  CBC:  Recent Labs   Lab 04/27/20 1920 05/03/20  0610 07/20/20  1335   WBC 4.87 3.93 6.15   Hemoglobin 13.3 L 13.6 L 13.2 L   Hematocrit 40.1 41.2 40.3   Platelets 166 150 169       CHEMISTRIES:  Recent Labs   Lab 04/27/20 1920 05/03/20  0610 07/24/20  0648   Glucose 111 H 100 111 H   Sodium 142 142 140   Potassium 3.4 L 3.6 4.2   BUN 20 13 22   Creatinine 1.4 0.8 0.8   eGFR if  56 A >60 >60   eGFR if non  48 A >60 >60   Calcium 8.8 8.9 9.0       CARDIAC BIOMARKERS:  Recent Labs   Lab 03/07/20  1237 04/27/20 1920 05/03/20  0610   Troponin I <0.006 <0.006 <0.006       COAGS:  Recent " "Labs   Lab 03/07/20  1237 07/20/20  1335   INR 0.9 1.0       LIPIDS/LFTS:  Recent Labs   Lab 02/09/18  0808 02/11/19  0942 10/08/19  0739 03/07/20  1237 04/27/20  1920 05/03/20  0610   Cholesterol 200 H 193 222 H  --   --   --    Triglycerides 78 68 94  --   --   --    HDL 53 52 58  --   --   --    LDL Cholesterol 131.4 127.4 145.2  --   --   --    Non-HDL Cholesterol 147 141 164  --   --   --    AST 16 13 11 16 12 10   ALT 17 13 12 16 14 12       Cardiovascular Testing:    Cardiac catheterization 07/24/2020:     1.  Mild-to-moderate nonobstructive coronary artery disease.  2.  Normal left ventricular end-diastolic pressure.  3.  No aortic stenosis.     Exercise MPS 7/2/20:       Abnormal myocardial perfusion study.    There is a moderate sized, moderate intensity, mostly reversible defect with some fixed areas in the inferior wall(s).    Gated perfusion images showed an ejection fraction of 57%    The EKG portion of this study is negative for ischemia.    The patient reported no chest pain during the stress test.    There were no arrhythmias during stress.     Holter 3/17/20:     · Sinus rhythm with heart rates varying between 48 and 118 bpm with an average of 77 bpm.  · There were occasional PVCs totalling 278 and averaging 11.13 per hour.  · There were rare PACs totalling 81 and averaging 3.24 per hour.  · There were occasional runs of non-sustained SVT and lasting for 2 to 7 beats.  · Diary events ("headache") did not correspond to any arrhythmic events.     Echo 3/17/20:     · Low normal left ventricular systolic function. The estimated ejection fraction is 50%.  · No wall motion abnormalities.  · Normal right ventricular systolic function.  · The sinuses of Valsalva is mildly dilated. 3.9cm.  · The estimated PA systolic pressure is 28 mmHg.    ASSESSMENT:     1.  Abdominal pain   2.  Chest pain  3.  Elevated blood pressure without diagnosis of hypertension  4.  Hyperlipidemia:   5.  Abdominal " aneurysm    PLAN:     1.  Abdominal pain/abdominal aneurysm:  Plans for CT of abdomen pelvis     2.  Chest pain/Elevated blood pressure without diagnosis of hypertension:  Home blood pressure log  3.  Hyperlipidemia:  Lipids on return  4.  Return to clinic in 3 months.      Keenan Aivla MD, MPH, FACC, [facility]

## 2020-12-09 ENCOUNTER — HOSPITAL ENCOUNTER (OUTPATIENT)
Dept: RADIOLOGY | Facility: HOSPITAL | Age: 75
Discharge: HOME | End: 2020-12-09
Attending: FAMILY MEDICINE
Payer: MEDICARE

## 2020-12-09 DIAGNOSIS — K55.059 ACUTE (REVERSIBLE) ISCHEMIA OF INTESTINE, PART AND EXTENT UNSPECIFIED: ICD-10-CM

## 2020-12-09 PROCEDURE — 74174 CTA ABD&PLVS W/CONTRAST: CPT | Mod: TC

## 2020-12-09 PROCEDURE — 74174 CTA ABD&PLVS W/CONTRAST: CPT | Mod: 26,,, | Performed by: RADIOLOGY

## 2020-12-09 PROCEDURE — 25500020 PHARM REV CODE 255: Performed by: FAMILY MEDICINE

## 2020-12-09 PROCEDURE — 74174 CTA ABDOMEN AND PELVIS: ICD-10-PCS | Mod: 26,,, | Performed by: RADIOLOGY

## 2020-12-09 RX ADMIN — IOHEXOL 80 ML: 350 INJECTION, SOLUTION INTRAVENOUS at 08:12

## 2020-12-10 ENCOUNTER — TELEPHONE (OUTPATIENT)
Dept: VASCULAR SURGERY | Facility: CLINIC | Age: 75
End: 2020-12-10

## 2020-12-10 NOTE — TELEPHONE ENCOUNTER
Left message for pt re: appt with  1/6/21 @9:15am and appt letter mailed to pt.        ----- Message from Mairo Springer MD sent at 12/9/2020 10:12 PM CST -----  It looks like you got everything, thanks!  Please schedule this patient for an appointment next available, thank you    Alonso  ----- Message -----  From: Pj Gillette MD  Sent: 12/9/2020  10:14 AM CST  To: Mario Springer MD    Just wanted to see any next steps or just send him straight over to you.  Mild type symptoms.  JR

## 2020-12-28 ENCOUNTER — CLINICAL SUPPORT (OUTPATIENT)
Dept: URGENT CARE | Facility: CLINIC | Age: 75
End: 2020-12-28
Payer: MEDICARE

## 2020-12-28 DIAGNOSIS — Z11.9 SCREENING EXAMINATION FOR INFECTIOUS DISEASE: Primary | ICD-10-CM

## 2020-12-28 LAB
CTP QC/QA: YES
SARS-COV-2 RDRP RESP QL NAA+PROBE: NEGATIVE

## 2020-12-28 PROCEDURE — U0002 COVID-19 LAB TEST NON-CDC: HCPCS | Mod: QW,S$GLB,, | Performed by: PHYSICIAN ASSISTANT

## 2020-12-28 PROCEDURE — U0002: ICD-10-PCS | Mod: QW,S$GLB,, | Performed by: PHYSICIAN ASSISTANT

## 2020-12-28 NOTE — PATIENT INSTRUCTIONS
Your test was NEGATIVE for COVID-19 (coronavirus).      You may leave home and/or return to work when the following conditions are met:  · 24 hours fever free without fever-reducing medications AND  · Improved symptoms  · You have not met the conditions of a close contact     What counts as a close contact?  · You were within 6 feet of someone who has COVID-19 for a total of 15 minutes or more (masked or unmasked).  · You provided care at home to someone who is sick with COVID-19.  · You had direct physical contact with the person (hugged or kissed them).  · You shared eating or drinking utensils.  · They sneezed, coughed, or somehow got respiratory droplets on you.     If you had a close contact:  · If possible, it is recommended that you quarantine for 14 days from the time of contact regardless of your test status.  · If you have no symptoms, quarantine may be stopped early at 10 days, but this carries a small risk of spreading the virus.  · If you have no symptoms and you have a negative COVID test on day 5 or later, quarantine may be stopped after day 7, but this also carries a small risk of spreading the virus.     Additional instructions:  · Social distance per your local guidelines  · Call ahead before visiting your doctor.  · Wear a mask when around others who do not live in your household.  · Cover your coughs and sneezes.  · Wash hands or use hand  often.      If your symptoms worsen or if you have any other concerns, please contact [facility] at [phone].     Sincerely,    Mac Melvin RT

## 2021-01-06 ENCOUNTER — INITIAL CONSULT (OUTPATIENT)
Dept: VASCULAR SURGERY | Facility: CLINIC | Age: 76
End: 2021-01-06
Payer: MEDICARE

## 2021-01-06 VITALS
SYSTOLIC BLOOD PRESSURE: 128 MMHG | BODY MASS INDEX: 24.12 KG/M2 | HEIGHT: 65 IN | WEIGHT: 144.81 LBS | DIASTOLIC BLOOD PRESSURE: 82 MMHG

## 2021-01-06 DIAGNOSIS — I77.1 CELIAC ARTERY STENOSIS: Primary | ICD-10-CM

## 2021-01-06 DIAGNOSIS — I72.3 ANEURYSM OF RIGHT COMMON ILIAC ARTERY: ICD-10-CM

## 2021-01-06 PROCEDURE — 99204 OFFICE O/P NEW MOD 45 MIN: CPT | Mod: S$GLB,,, | Performed by: SURGERY

## 2021-01-06 PROCEDURE — 99204 PR OFFICE/OUTPT VISIT, NEW, LEVL IV, 45-59 MIN: ICD-10-PCS | Mod: S$GLB,,, | Performed by: SURGERY

## 2021-01-06 PROCEDURE — 99999 PR PBB SHADOW E&M-EST. PATIENT-LVL III: CPT | Mod: PBBFAC,,, | Performed by: SURGERY

## 2021-01-06 PROCEDURE — 1159F PR MEDICATION LIST DOCUMENTED IN MEDICAL RECORD: ICD-10-PCS | Mod: S$GLB,,, | Performed by: SURGERY

## 2021-01-06 PROCEDURE — 99999 PR PBB SHADOW E&M-EST. PATIENT-LVL III: ICD-10-PCS | Mod: PBBFAC,,, | Performed by: SURGERY

## 2021-01-06 PROCEDURE — 1159F MED LIST DOCD IN RCRD: CPT | Mod: S$GLB,,, | Performed by: SURGERY

## 2021-01-08 ENCOUNTER — IMMUNIZATION (OUTPATIENT)
Dept: PHARMACY | Facility: CLINIC | Age: 76
End: 2021-01-08

## 2021-01-08 DIAGNOSIS — Z23 NEED FOR VACCINATION: ICD-10-CM

## 2021-01-13 ENCOUNTER — TELEPHONE (OUTPATIENT)
Dept: FAMILY MEDICINE | Facility: CLINIC | Age: 76
End: 2021-01-13

## 2021-01-13 DIAGNOSIS — J32.9 RECURRENT SINUSITIS: Primary | ICD-10-CM

## 2021-01-20 ENCOUNTER — HOSPITAL ENCOUNTER (OUTPATIENT)
Dept: RADIOLOGY | Facility: HOSPITAL | Age: 76
Discharge: HOME | End: 2021-01-20
Attending: SURGERY
Payer: MEDICARE

## 2021-01-20 PROCEDURE — 93976 VASCULAR STUDY: CPT | Mod: 26,,, | Performed by: RADIOLOGY

## 2021-01-20 PROCEDURE — 76775 US MESENTERIC ISCHEMIA STUDY (XPD): ICD-10-PCS | Mod: 26,XS,, | Performed by: RADIOLOGY

## 2021-01-20 PROCEDURE — 76775 US EXAM ABDO BACK WALL LIM: CPT | Mod: 26,XS,, | Performed by: RADIOLOGY

## 2021-01-20 PROCEDURE — 93976 VASCULAR STUDY: CPT | Mod: TC

## 2021-01-20 PROCEDURE — 93976 US MESENTERIC ISCHEMIA STUDY (XPD): ICD-10-PCS | Mod: 26,,, | Performed by: RADIOLOGY

## 2021-01-20 PROCEDURE — 76775 US EXAM ABDO BACK WALL LIM: CPT | Mod: TC,59

## 2021-01-25 ENCOUNTER — OFFICE VISIT (OUTPATIENT)
Dept: VASCULAR SURGERY | Facility: CLINIC | Age: 76
End: 2021-01-25
Payer: MEDICARE

## 2021-01-25 VITALS
BODY MASS INDEX: 24.48 KG/M2 | HEIGHT: 65 IN | HEART RATE: 70 BPM | OXYGEN SATURATION: 98 % | WEIGHT: 146.94 LBS | SYSTOLIC BLOOD PRESSURE: 140 MMHG | DIASTOLIC BLOOD PRESSURE: 88 MMHG

## 2021-01-25 DIAGNOSIS — I72.3 ANEURYSM OF RIGHT COMMON ILIAC ARTERY: Primary | ICD-10-CM

## 2021-01-25 PROCEDURE — 99499 RISK ADDL DX/OHS AUDIT: ICD-10-PCS | Mod: S$GLB,,, | Performed by: SURGERY

## 2021-01-25 PROCEDURE — 1159F MED LIST DOCD IN RCRD: CPT | Mod: S$GLB,,, | Performed by: SURGERY

## 2021-01-25 PROCEDURE — 99499 UNLISTED E&M SERVICE: CPT | Mod: S$GLB,,, | Performed by: SURGERY

## 2021-01-25 PROCEDURE — 99999 PR PBB SHADOW E&M-EST. PATIENT-LVL III: ICD-10-PCS | Mod: PBBFAC,,, | Performed by: SURGERY

## 2021-01-25 PROCEDURE — 99214 OFFICE O/P EST MOD 30 MIN: CPT | Mod: S$GLB,,, | Performed by: SURGERY

## 2021-01-25 PROCEDURE — 1126F AMNT PAIN NOTED NONE PRSNT: CPT | Mod: S$GLB,,, | Performed by: SURGERY

## 2021-01-25 PROCEDURE — 1101F PR PT FALLS ASSESS DOC 0-1 FALLS W/OUT INJ PAST YR: ICD-10-PCS | Mod: CPTII,S$GLB,, | Performed by: SURGERY

## 2021-01-25 PROCEDURE — 99999 PR PBB SHADOW E&M-EST. PATIENT-LVL III: CPT | Mod: PBBFAC,,, | Performed by: SURGERY

## 2021-01-25 PROCEDURE — 99214 PR OFFICE/OUTPT VISIT, EST, LEVL IV, 30-39 MIN: ICD-10-PCS | Mod: S$GLB,,, | Performed by: SURGERY

## 2021-01-25 PROCEDURE — 3288F PR FALLS RISK ASSESSMENT DOCUMENTED: ICD-10-PCS | Mod: CPTII,S$GLB,, | Performed by: SURGERY

## 2021-01-25 PROCEDURE — 3288F FALL RISK ASSESSMENT DOCD: CPT | Mod: CPTII,S$GLB,, | Performed by: SURGERY

## 2021-01-25 PROCEDURE — 1101F PT FALLS ASSESS-DOCD LE1/YR: CPT | Mod: CPTII,S$GLB,, | Performed by: SURGERY

## 2021-01-25 PROCEDURE — 1159F PR MEDICATION LIST DOCUMENTED IN MEDICAL RECORD: ICD-10-PCS | Mod: S$GLB,,, | Performed by: SURGERY

## 2021-01-25 PROCEDURE — 1126F PR PAIN SEVERITY QUANTIFIED, NO PAIN PRESENT: ICD-10-PCS | Mod: S$GLB,,, | Performed by: SURGERY

## 2021-02-02 ENCOUNTER — TELEPHONE (OUTPATIENT)
Dept: BEHAVIORAL HEALTH | Facility: CLINIC | Age: 76
End: 2021-02-02

## 2021-02-02 DIAGNOSIS — F41.9 ANXIETY AND DEPRESSION: Primary | ICD-10-CM

## 2021-02-02 DIAGNOSIS — F32.A ANXIETY AND DEPRESSION: Primary | ICD-10-CM

## 2021-02-05 ENCOUNTER — IMMUNIZATION (OUTPATIENT)
Dept: PHARMACY | Facility: CLINIC | Age: 76
End: 2021-02-05

## 2021-02-05 DIAGNOSIS — Z23 NEED FOR VACCINATION: Primary | ICD-10-CM

## 2021-02-08 ENCOUNTER — HOSPITAL ENCOUNTER (OUTPATIENT)
Dept: CARDIOLOGY | Facility: HOSPITAL | Age: 76
Discharge: HOME | End: 2021-02-08
Attending: SURGERY
Payer: MEDICARE

## 2021-02-08 DIAGNOSIS — I72.3 ANEURYSM OF RIGHT COMMON ILIAC ARTERY: ICD-10-CM

## 2021-02-08 PROCEDURE — 93925 LOWER EXTREMITY STUDY: CPT | Mod: 26,,, | Performed by: SURGERY

## 2021-02-08 PROCEDURE — 93925 CV US DOPPLER ARTERIAL LEGS BILATERAL (CUPID ONLY): ICD-10-PCS | Mod: 26,,, | Performed by: SURGERY

## 2021-02-08 PROCEDURE — 93925 LOWER EXTREMITY STUDY: CPT

## 2021-02-09 ENCOUNTER — PATIENT MESSAGE (OUTPATIENT)
Dept: BEHAVIORAL HEALTH | Facility: CLINIC | Age: 76
End: 2021-02-09

## 2021-02-09 ENCOUNTER — OFFICE VISIT (OUTPATIENT)
Dept: BEHAVIORAL HEALTH | Facility: CLINIC | Age: 76
End: 2021-02-09
Payer: MEDICARE

## 2021-02-09 DIAGNOSIS — F41.9 ANXIETY DISORDER, UNSPECIFIED TYPE: ICD-10-CM

## 2021-02-09 PROCEDURE — 99999 PR PBB SHADOW E&M-EST. PATIENT-LVL I: CPT | Mod: PBBFAC,,, | Performed by: SOCIAL WORKER

## 2021-02-09 PROCEDURE — 99999 PR PBB SHADOW E&M-EST. PATIENT-LVL I: ICD-10-PCS | Mod: PBBFAC,,, | Performed by: SOCIAL WORKER

## 2021-02-09 PROCEDURE — 90791 PSYCH DIAGNOSTIC EVALUATION: CPT | Mod: S$GLB,,, | Performed by: SOCIAL WORKER

## 2021-02-09 PROCEDURE — 90791 PR PSYCHIATRIC DIAGNOSTIC EVALUATION: ICD-10-PCS | Mod: S$GLB,,, | Performed by: SOCIAL WORKER

## 2021-02-10 LAB
LEFT ANT TIBIAL SYS PSV: 83 CM/S
LEFT CFA PSV: 48 CM/S
LEFT EXTERNAL ILIAC PSV: 63 CM/S
LEFT PERONEAL SYS PSV: 64 CM/S
LEFT POPLITEAL PSV: 40 CM/S
LEFT POST TIBIAL SYS PSV: 68 CM/S
LEFT PROFUNDA SYS PSV: 48 CM/S
LEFT SUPER FEMORAL DIST SYS PSV: 56 CM/S
LEFT SUPER FEMORAL MID SYS PSV: 80 CM/S
LEFT SUPER FEMORAL OSTIAL SYS PSV: 112 CM/S
LEFT SUPER FEMORAL PROX SYS PSV: 89 CM/S
LEFT TIB/PER TRUNK SYS PSV: 58 CM/S
OHS CV LEFT COMMON ILIAC ARTERY PSV: 61 CM/S
OHS CV LEFT LOWER EXTREMITY ABI (NO CALC): 1.2
OHS CV RIGHT ABI LOWER EXTREMITY (NO CALC): 1.2
OHS CV US RIGHT COMMON ILIAC PSV: 42 CM/S
RIGHT ANT TIBIAL SYS PSV: 58 CM/S
RIGHT CFA PSV: 77 CM/S
RIGHT EXTERNAL ILLIAC PSV: 93 CM/S
RIGHT PERONEAL SYS PSV: 40 CM/S
RIGHT POPLITEAL PSV: 54 CM/S
RIGHT POST TIBIAL SYS PSV: 51 CM/S
RIGHT PROFUNDA SYS PSV: 54 CM/S
RIGHT SUPER FEMORAL DIST SYS PSV: 64 CM/S
RIGHT SUPER FEMORAL MID SYS PSV: 83 CM/S
RIGHT SUPER FEMORAL OSTIAL SYS PSV: 102 CM/S
RIGHT SUPER FEMORAL PROX SYS PSV: 71 CM/S
RIGHT TIB/PER TRUNK SYS PSV: 57 CM/S

## 2021-02-17 ENCOUNTER — OFFICE VISIT (OUTPATIENT)
Dept: BEHAVIORAL HEALTH | Facility: CLINIC | Age: 76
End: 2021-02-17
Payer: MEDICARE

## 2021-02-17 DIAGNOSIS — F41.9 ANXIETY DISORDER, UNSPECIFIED TYPE: Primary | ICD-10-CM

## 2021-02-17 PROCEDURE — 90832 PR PSYCHOTHERAPY W/PATIENT, 30 MIN: ICD-10-PCS | Mod: S$GLB,,, | Performed by: SOCIAL WORKER

## 2021-02-17 PROCEDURE — 90832 PSYTX W PT 30 MINUTES: CPT | Mod: S$GLB,,, | Performed by: SOCIAL WORKER

## 2021-02-18 ENCOUNTER — TELEPHONE (OUTPATIENT)
Dept: OTOLARYNGOLOGY | Facility: CLINIC | Age: 76
End: 2021-02-18

## 2021-02-18 DIAGNOSIS — J32.9 RECURRENT SINUSITIS: Primary | ICD-10-CM

## 2021-02-24 ENCOUNTER — OFFICE VISIT (OUTPATIENT)
Dept: BEHAVIORAL HEALTH | Facility: CLINIC | Age: 76
End: 2021-02-24
Payer: COMMERCIAL

## 2021-02-24 DIAGNOSIS — F41.9 ANXIETY DISORDER, UNSPECIFIED TYPE: Primary | ICD-10-CM

## 2021-02-24 PROCEDURE — 99999 PR PBB SHADOW E&M-EST. PATIENT-LVL I: CPT | Mod: PBBFAC,,, | Performed by: SOCIAL WORKER

## 2021-02-24 PROCEDURE — 99484 CARE MGMT SVC BHVL HLTH COND: CPT | Mod: S$GLB,,, | Performed by: SOCIAL WORKER

## 2021-02-24 PROCEDURE — 99999 PR PBB SHADOW E&M-EST. PATIENT-LVL I: ICD-10-PCS | Mod: PBBFAC,,, | Performed by: SOCIAL WORKER

## 2021-02-24 PROCEDURE — 90832 PSYTX W PT 30 MINUTES: CPT | Mod: S$GLB,,, | Performed by: SOCIAL WORKER

## 2021-02-24 PROCEDURE — 90832 PR PSYCHOTHERAPY W/PATIENT, 30 MIN: ICD-10-PCS | Mod: S$GLB,,, | Performed by: SOCIAL WORKER

## 2021-02-24 PROCEDURE — 99484 PR CARE MGMT SVCS, BEHAVIORAL HEALTH, >= 20 MIN: ICD-10-PCS | Mod: S$GLB,,, | Performed by: SOCIAL WORKER

## 2021-02-25 ENCOUNTER — OFFICE VISIT (OUTPATIENT)
Dept: FAMILY MEDICINE | Facility: CLINIC | Age: 76
End: 2021-02-25
Payer: MEDICARE

## 2021-02-25 VITALS
OXYGEN SATURATION: 97 % | DIASTOLIC BLOOD PRESSURE: 66 MMHG | BODY MASS INDEX: 24.24 KG/M2 | HEART RATE: 71 BPM | SYSTOLIC BLOOD PRESSURE: 112 MMHG | HEIGHT: 65 IN | WEIGHT: 145.5 LBS | TEMPERATURE: 98 F

## 2021-02-25 DIAGNOSIS — G62.9 NEUROPATHY: Primary | ICD-10-CM

## 2021-02-25 PROCEDURE — 3288F FALL RISK ASSESSMENT DOCD: CPT | Mod: CPTII,S$GLB,, | Performed by: FAMILY MEDICINE

## 2021-02-25 PROCEDURE — 1159F MED LIST DOCD IN RCRD: CPT | Mod: S$GLB,,, | Performed by: FAMILY MEDICINE

## 2021-02-25 PROCEDURE — 1101F PR PT FALLS ASSESS DOC 0-1 FALLS W/OUT INJ PAST YR: ICD-10-PCS | Mod: CPTII,S$GLB,, | Performed by: FAMILY MEDICINE

## 2021-02-25 PROCEDURE — 3288F PR FALLS RISK ASSESSMENT DOCUMENTED: ICD-10-PCS | Mod: CPTII,S$GLB,, | Performed by: FAMILY MEDICINE

## 2021-02-25 PROCEDURE — 99999 PR PBB SHADOW E&M-EST. PATIENT-LVL III: CPT | Mod: PBBFAC,,, | Performed by: FAMILY MEDICINE

## 2021-02-25 PROCEDURE — 1159F PR MEDICATION LIST DOCUMENTED IN MEDICAL RECORD: ICD-10-PCS | Mod: S$GLB,,, | Performed by: FAMILY MEDICINE

## 2021-02-25 PROCEDURE — 99999 PR PBB SHADOW E&M-EST. PATIENT-LVL III: ICD-10-PCS | Mod: PBBFAC,,, | Performed by: FAMILY MEDICINE

## 2021-02-25 PROCEDURE — 99214 OFFICE O/P EST MOD 30 MIN: CPT | Mod: S$GLB,,, | Performed by: FAMILY MEDICINE

## 2021-02-25 PROCEDURE — 99214 PR OFFICE/OUTPT VISIT, EST, LEVL IV, 30-39 MIN: ICD-10-PCS | Mod: S$GLB,,, | Performed by: FAMILY MEDICINE

## 2021-02-25 PROCEDURE — 1101F PT FALLS ASSESS-DOCD LE1/YR: CPT | Mod: CPTII,S$GLB,, | Performed by: FAMILY MEDICINE

## 2021-02-25 PROCEDURE — 1126F PR PAIN SEVERITY QUANTIFIED, NO PAIN PRESENT: ICD-10-PCS | Mod: S$GLB,,, | Performed by: FAMILY MEDICINE

## 2021-02-25 PROCEDURE — 1126F AMNT PAIN NOTED NONE PRSNT: CPT | Mod: S$GLB,,, | Performed by: FAMILY MEDICINE

## 2021-02-25 RX ORDER — GABAPENTIN 300 MG/1
300-900 CAPSULE ORAL NIGHTLY
Qty: 90 CAPSULE | Refills: 11 | Status: SHIPPED | OUTPATIENT
Start: 2021-02-25 | End: 2022-03-24

## 2021-02-26 ENCOUNTER — TELEPHONE (OUTPATIENT)
Dept: OTOLARYNGOLOGY | Facility: CLINIC | Age: 76
End: 2021-02-26

## 2021-03-10 ENCOUNTER — OFFICE VISIT (OUTPATIENT)
Dept: BEHAVIORAL HEALTH | Facility: CLINIC | Age: 76
End: 2021-03-10
Payer: COMMERCIAL

## 2021-03-10 DIAGNOSIS — F41.9 ANXIETY DISORDER, UNSPECIFIED TYPE: Primary | ICD-10-CM

## 2021-03-10 PROCEDURE — 90834 PR PSYCHOTHERAPY W/PATIENT, 45 MIN: ICD-10-PCS | Mod: S$GLB,,, | Performed by: SOCIAL WORKER

## 2021-03-10 PROCEDURE — 90834 PSYTX W PT 45 MINUTES: CPT | Mod: S$GLB,,, | Performed by: SOCIAL WORKER

## 2021-03-12 ENCOUNTER — LAB VISIT (OUTPATIENT)
Dept: FAMILY MEDICINE | Facility: CLINIC | Age: 76
End: 2021-03-12
Payer: MEDICARE

## 2021-03-12 DIAGNOSIS — J32.9 RECURRENT SINUSITIS: ICD-10-CM

## 2021-03-12 LAB — SARS-COV-2 RNA RESP QL NAA+PROBE: NOT DETECTED

## 2021-03-12 PROCEDURE — U0003 INFECTIOUS AGENT DETECTION BY NUCLEIC ACID (DNA OR RNA); SEVERE ACUTE RESPIRATORY SYNDROME CORONAVIRUS 2 (SARS-COV-2) (CORONAVIRUS DISEASE [COVID-19]), AMPLIFIED PROBE TECHNIQUE, MAKING USE OF HIGH THROUGHPUT TECHNOLOGIES AS DESCRIBED BY CMS-2020-01-R: HCPCS | Performed by: OTOLARYNGOLOGY

## 2021-03-12 PROCEDURE — U0005 INFEC AGEN DETEC AMPLI PROBE: HCPCS | Performed by: OTOLARYNGOLOGY

## 2021-03-15 ENCOUNTER — OFFICE VISIT (OUTPATIENT)
Dept: OTOLARYNGOLOGY | Facility: CLINIC | Age: 76
End: 2021-03-15
Payer: MEDICARE

## 2021-03-15 VITALS
DIASTOLIC BLOOD PRESSURE: 70 MMHG | WEIGHT: 143.75 LBS | HEIGHT: 65 IN | TEMPERATURE: 99 F | SYSTOLIC BLOOD PRESSURE: 112 MMHG | BODY MASS INDEX: 23.95 KG/M2

## 2021-03-15 DIAGNOSIS — H61.21 IMPACTED CERUMEN OF RIGHT EAR: ICD-10-CM

## 2021-03-15 DIAGNOSIS — J34.2 DEVIATED NASAL SEPTUM: ICD-10-CM

## 2021-03-15 DIAGNOSIS — J32.9 RECURRENT SINUSITIS: Primary | ICD-10-CM

## 2021-03-15 DIAGNOSIS — J32.9 RECURRENT SINUSITIS: ICD-10-CM

## 2021-03-15 DIAGNOSIS — M95.0 NASAL VALVE COLLAPSE: Primary | ICD-10-CM

## 2021-03-15 DIAGNOSIS — H93.13 TINNITUS OF BOTH EARS: ICD-10-CM

## 2021-03-15 DIAGNOSIS — J34.3 HYPERTROPHY OF INFERIOR NASAL TURBINATE: ICD-10-CM

## 2021-03-15 DIAGNOSIS — J30.2 SEASONAL ALLERGIC RHINITIS, UNSPECIFIED TRIGGER: ICD-10-CM

## 2021-03-15 PROCEDURE — 1101F PT FALLS ASSESS-DOCD LE1/YR: CPT | Mod: CPTII,S$GLB,, | Performed by: OTOLARYNGOLOGY

## 2021-03-15 PROCEDURE — 99204 PR OFFICE/OUTPT VISIT, NEW, LEVL IV, 45-59 MIN: ICD-10-PCS | Mod: 25,S$GLB,, | Performed by: OTOLARYNGOLOGY

## 2021-03-15 PROCEDURE — 1159F PR MEDICATION LIST DOCUMENTED IN MEDICAL RECORD: ICD-10-PCS | Mod: S$GLB,,, | Performed by: OTOLARYNGOLOGY

## 2021-03-15 PROCEDURE — 1126F PR PAIN SEVERITY QUANTIFIED, NO PAIN PRESENT: ICD-10-PCS | Mod: S$GLB,,, | Performed by: OTOLARYNGOLOGY

## 2021-03-15 PROCEDURE — 1159F MED LIST DOCD IN RCRD: CPT | Mod: S$GLB,,, | Performed by: OTOLARYNGOLOGY

## 2021-03-15 PROCEDURE — 1101F PR PT FALLS ASSESS DOC 0-1 FALLS W/OUT INJ PAST YR: ICD-10-PCS | Mod: CPTII,S$GLB,, | Performed by: OTOLARYNGOLOGY

## 2021-03-15 PROCEDURE — 31231 NASAL ENDOSCOPY DX: CPT | Mod: S$GLB,,, | Performed by: OTOLARYNGOLOGY

## 2021-03-15 PROCEDURE — 99204 OFFICE O/P NEW MOD 45 MIN: CPT | Mod: 25,S$GLB,, | Performed by: OTOLARYNGOLOGY

## 2021-03-15 PROCEDURE — 3288F PR FALLS RISK ASSESSMENT DOCUMENTED: ICD-10-PCS | Mod: CPTII,S$GLB,, | Performed by: OTOLARYNGOLOGY

## 2021-03-15 PROCEDURE — 3288F FALL RISK ASSESSMENT DOCD: CPT | Mod: CPTII,S$GLB,, | Performed by: OTOLARYNGOLOGY

## 2021-03-15 PROCEDURE — 1126F AMNT PAIN NOTED NONE PRSNT: CPT | Mod: S$GLB,,, | Performed by: OTOLARYNGOLOGY

## 2021-03-15 PROCEDURE — 31231 PR NASAL ENDOSCOPY, DX: ICD-10-PCS | Mod: S$GLB,,, | Performed by: OTOLARYNGOLOGY

## 2021-03-15 RX ORDER — AZELASTINE 1 MG/ML
1 SPRAY, METERED NASAL 2 TIMES DAILY
Qty: 30 ML | Refills: 3 | Status: SHIPPED | OUTPATIENT
Start: 2021-03-15 | End: 2022-09-28

## 2021-03-15 RX ORDER — PANTOPRAZOLE SODIUM 20 MG/1
20 TABLET, DELAYED RELEASE ORAL EVERY MORNING
COMMUNITY
Start: 2021-01-26 | End: 2021-12-15 | Stop reason: ALTCHOICE

## 2021-03-15 RX ORDER — FLUTICASONE PROPIONATE 50 MCG
1 SPRAY, SUSPENSION (ML) NASAL 2 TIMES DAILY
Qty: 18.2 ML | Refills: 3 | Status: SHIPPED | OUTPATIENT
Start: 2021-03-15 | End: 2021-12-16 | Stop reason: SDUPTHER

## 2021-03-25 ENCOUNTER — CLINICAL SUPPORT (OUTPATIENT)
Dept: BEHAVIORAL HEALTH | Facility: CLINIC | Age: 76
End: 2021-03-25
Payer: COMMERCIAL

## 2021-03-25 DIAGNOSIS — F41.9 ANXIETY DISORDER, UNSPECIFIED TYPE: Primary | ICD-10-CM

## 2021-03-25 PROCEDURE — 99999 PR PBB SHADOW E&M-EST. PATIENT-LVL I: CPT | Mod: PBBFAC,,, | Performed by: SOCIAL WORKER

## 2021-03-25 PROCEDURE — 90834 PR PSYCHOTHERAPY W/PATIENT, 45 MIN: ICD-10-PCS | Mod: S$GLB,,, | Performed by: SOCIAL WORKER

## 2021-03-25 PROCEDURE — 99999 PR PBB SHADOW E&M-EST. PATIENT-LVL I: ICD-10-PCS | Mod: PBBFAC,,, | Performed by: SOCIAL WORKER

## 2021-03-25 PROCEDURE — 90834 PSYTX W PT 45 MINUTES: CPT | Mod: S$GLB,,, | Performed by: SOCIAL WORKER

## 2021-03-26 ENCOUNTER — PATIENT MESSAGE (OUTPATIENT)
Dept: BEHAVIORAL HEALTH | Facility: CLINIC | Age: 76
End: 2021-03-26

## 2021-04-13 ENCOUNTER — TELEPHONE (OUTPATIENT)
Dept: OTOLARYNGOLOGY | Facility: CLINIC | Age: 76
End: 2021-04-13

## 2021-04-19 ENCOUNTER — CLINICAL SUPPORT (OUTPATIENT)
Dept: BEHAVIORAL HEALTH | Facility: CLINIC | Age: 76
End: 2021-04-19
Payer: COMMERCIAL

## 2021-04-19 DIAGNOSIS — F41.9 ANXIETY DISORDER, UNSPECIFIED TYPE: Primary | ICD-10-CM

## 2021-04-19 PROCEDURE — 99999 PR PBB SHADOW E&M-EST. PATIENT-LVL II: ICD-10-PCS | Mod: PBBFAC,,, | Performed by: SOCIAL WORKER

## 2021-04-19 PROCEDURE — 90832 PSYTX W PT 30 MINUTES: CPT | Mod: S$GLB,,, | Performed by: SOCIAL WORKER

## 2021-04-19 PROCEDURE — 99999 PR PBB SHADOW E&M-EST. PATIENT-LVL II: CPT | Mod: PBBFAC,,, | Performed by: SOCIAL WORKER

## 2021-04-19 PROCEDURE — 90832 PR PSYCHOTHERAPY W/PATIENT, 30 MIN: ICD-10-PCS | Mod: S$GLB,,, | Performed by: SOCIAL WORKER

## 2021-04-26 ENCOUNTER — CLINICAL SUPPORT (OUTPATIENT)
Dept: AUDIOLOGY | Facility: CLINIC | Age: 76
End: 2021-04-26
Payer: MEDICARE

## 2021-04-26 DIAGNOSIS — H93.13 TINNITUS OF BOTH EARS: ICD-10-CM

## 2021-04-26 DIAGNOSIS — H90.3 SENSORINEURAL HEARING LOSS, BILATERAL: Primary | ICD-10-CM

## 2021-04-26 PROCEDURE — 92550 PR TYMPANOMETRY AND REFLEX THRESHOLD MEASUREMENTS: ICD-10-PCS | Mod: S$GLB,,, | Performed by: AUDIOLOGIST

## 2021-04-26 PROCEDURE — 92550 TYMPANOMETRY & REFLEX THRESH: CPT | Mod: S$GLB,,, | Performed by: AUDIOLOGIST

## 2021-04-26 PROCEDURE — 92557 COMPREHENSIVE HEARING TEST: CPT | Mod: S$GLB,,, | Performed by: AUDIOLOGIST

## 2021-04-26 PROCEDURE — 92557 PR COMPREHENSIVE HEARING TEST: ICD-10-PCS | Mod: S$GLB,,, | Performed by: AUDIOLOGIST

## 2021-05-05 ENCOUNTER — CLINICAL SUPPORT (OUTPATIENT)
Dept: BEHAVIORAL HEALTH | Facility: CLINIC | Age: 76
End: 2021-05-05
Payer: COMMERCIAL

## 2021-05-05 DIAGNOSIS — F41.9 ANXIETY DISORDER, UNSPECIFIED TYPE: Primary | ICD-10-CM

## 2021-05-05 PROCEDURE — 90832 PSYTX W PT 30 MINUTES: CPT | Mod: S$GLB,,, | Performed by: SOCIAL WORKER

## 2021-05-05 PROCEDURE — 90832 PR PSYCHOTHERAPY W/PATIENT, 30 MIN: ICD-10-PCS | Mod: S$GLB,,, | Performed by: SOCIAL WORKER

## 2021-05-05 PROCEDURE — 99999 PR PBB SHADOW E&M-EST. PATIENT-LVL I: ICD-10-PCS | Mod: PBBFAC,,, | Performed by: SOCIAL WORKER

## 2021-05-05 PROCEDURE — 99999 PR PBB SHADOW E&M-EST. PATIENT-LVL I: CPT | Mod: PBBFAC,,, | Performed by: SOCIAL WORKER

## 2021-05-07 ENCOUNTER — OFFICE VISIT (OUTPATIENT)
Dept: OTOLARYNGOLOGY | Facility: CLINIC | Age: 76
End: 2021-05-07
Payer: MEDICARE

## 2021-05-07 VITALS
SYSTOLIC BLOOD PRESSURE: 120 MMHG | BODY MASS INDEX: 24.32 KG/M2 | DIASTOLIC BLOOD PRESSURE: 78 MMHG | HEIGHT: 65 IN | TEMPERATURE: 98 F | WEIGHT: 145.94 LBS

## 2021-05-07 DIAGNOSIS — H93.13 TINNITUS OF BOTH EARS: ICD-10-CM

## 2021-05-07 DIAGNOSIS — J34.3 HYPERTROPHY OF INFERIOR NASAL TURBINATE: ICD-10-CM

## 2021-05-07 DIAGNOSIS — H90.3 SENSORINEURAL HEARING LOSS (SNHL) OF BOTH EARS: ICD-10-CM

## 2021-05-07 DIAGNOSIS — J30.2 SEASONAL ALLERGIC RHINITIS, UNSPECIFIED TRIGGER: Primary | ICD-10-CM

## 2021-05-07 PROCEDURE — 1126F PR PAIN SEVERITY QUANTIFIED, NO PAIN PRESENT: ICD-10-PCS | Mod: S$GLB,,, | Performed by: OTOLARYNGOLOGY

## 2021-05-07 PROCEDURE — 3288F PR FALLS RISK ASSESSMENT DOCUMENTED: ICD-10-PCS | Mod: CPTII,S$GLB,, | Performed by: OTOLARYNGOLOGY

## 2021-05-07 PROCEDURE — 1101F PR PT FALLS ASSESS DOC 0-1 FALLS W/OUT INJ PAST YR: ICD-10-PCS | Mod: CPTII,S$GLB,, | Performed by: OTOLARYNGOLOGY

## 2021-05-07 PROCEDURE — 1159F MED LIST DOCD IN RCRD: CPT | Mod: S$GLB,,, | Performed by: OTOLARYNGOLOGY

## 2021-05-07 PROCEDURE — 1126F AMNT PAIN NOTED NONE PRSNT: CPT | Mod: S$GLB,,, | Performed by: OTOLARYNGOLOGY

## 2021-05-07 PROCEDURE — 99213 OFFICE O/P EST LOW 20 MIN: CPT | Mod: S$GLB,,, | Performed by: OTOLARYNGOLOGY

## 2021-05-07 PROCEDURE — 99213 PR OFFICE/OUTPT VISIT, EST, LEVL III, 20-29 MIN: ICD-10-PCS | Mod: S$GLB,,, | Performed by: OTOLARYNGOLOGY

## 2021-05-07 PROCEDURE — 1101F PT FALLS ASSESS-DOCD LE1/YR: CPT | Mod: CPTII,S$GLB,, | Performed by: OTOLARYNGOLOGY

## 2021-05-07 PROCEDURE — 3288F FALL RISK ASSESSMENT DOCD: CPT | Mod: CPTII,S$GLB,, | Performed by: OTOLARYNGOLOGY

## 2021-05-07 PROCEDURE — 1159F PR MEDICATION LIST DOCUMENTED IN MEDICAL RECORD: ICD-10-PCS | Mod: S$GLB,,, | Performed by: OTOLARYNGOLOGY

## 2021-05-12 ENCOUNTER — OFFICE VISIT (OUTPATIENT)
Dept: FAMILY MEDICINE | Facility: CLINIC | Age: 76
End: 2021-05-12
Payer: MEDICARE

## 2021-05-12 VITALS
WEIGHT: 145.94 LBS | DIASTOLIC BLOOD PRESSURE: 68 MMHG | TEMPERATURE: 97 F | BODY MASS INDEX: 20.89 KG/M2 | HEART RATE: 68 BPM | SYSTOLIC BLOOD PRESSURE: 108 MMHG | OXYGEN SATURATION: 98 % | HEIGHT: 70 IN

## 2021-05-12 DIAGNOSIS — I47.10 SVT (SUPRAVENTRICULAR TACHYCARDIA): ICD-10-CM

## 2021-05-12 DIAGNOSIS — J43.9 PULMONARY EMPHYSEMA, UNSPECIFIED EMPHYSEMA TYPE: ICD-10-CM

## 2021-05-12 PROCEDURE — 1126F PR PAIN SEVERITY QUANTIFIED, NO PAIN PRESENT: ICD-10-PCS | Mod: S$GLB,,, | Performed by: FAMILY MEDICINE

## 2021-05-12 PROCEDURE — 3288F PR FALLS RISK ASSESSMENT DOCUMENTED: ICD-10-PCS | Mod: CPTII,S$GLB,, | Performed by: FAMILY MEDICINE

## 2021-05-12 PROCEDURE — 1126F AMNT PAIN NOTED NONE PRSNT: CPT | Mod: S$GLB,,, | Performed by: FAMILY MEDICINE

## 2021-05-12 PROCEDURE — 3288F FALL RISK ASSESSMENT DOCD: CPT | Mod: CPTII,S$GLB,, | Performed by: FAMILY MEDICINE

## 2021-05-12 PROCEDURE — 1159F PR MEDICATION LIST DOCUMENTED IN MEDICAL RECORD: ICD-10-PCS | Mod: S$GLB,,, | Performed by: FAMILY MEDICINE

## 2021-05-12 PROCEDURE — 99999 PR PBB SHADOW E&M-EST. PATIENT-LVL III: CPT | Mod: PBBFAC,,, | Performed by: FAMILY MEDICINE

## 2021-05-12 PROCEDURE — 99214 OFFICE O/P EST MOD 30 MIN: CPT | Mod: S$GLB,,, | Performed by: FAMILY MEDICINE

## 2021-05-12 PROCEDURE — 1101F PT FALLS ASSESS-DOCD LE1/YR: CPT | Mod: CPTII,S$GLB,, | Performed by: FAMILY MEDICINE

## 2021-05-12 PROCEDURE — 99999 PR PBB SHADOW E&M-EST. PATIENT-LVL III: ICD-10-PCS | Mod: PBBFAC,,, | Performed by: FAMILY MEDICINE

## 2021-05-12 PROCEDURE — 1159F MED LIST DOCD IN RCRD: CPT | Mod: S$GLB,,, | Performed by: FAMILY MEDICINE

## 2021-05-12 PROCEDURE — 1101F PR PT FALLS ASSESS DOC 0-1 FALLS W/OUT INJ PAST YR: ICD-10-PCS | Mod: CPTII,S$GLB,, | Performed by: FAMILY MEDICINE

## 2021-05-12 PROCEDURE — 99214 PR OFFICE/OUTPT VISIT, EST, LEVL IV, 30-39 MIN: ICD-10-PCS | Mod: S$GLB,,, | Performed by: FAMILY MEDICINE

## 2021-05-12 RX ORDER — HYDROCODONE BITARTRATE AND ACETAMINOPHEN 5; 325 MG/1; MG/1
1 TABLET ORAL EVERY 4 HOURS PRN
COMMUNITY
Start: 2021-03-30 | End: 2021-12-16

## 2021-05-12 RX ORDER — PAROXETINE HYDROCHLORIDE 20 MG/1
20 TABLET, FILM COATED ORAL EVERY MORNING
Qty: 30 TABLET | Refills: 0 | Status: SHIPPED | OUTPATIENT
Start: 2021-05-12 | End: 2022-09-28

## 2021-05-21 ENCOUNTER — TELEPHONE (OUTPATIENT)
Dept: FAMILY MEDICINE | Facility: CLINIC | Age: 76
End: 2021-05-21

## 2021-05-26 ENCOUNTER — CLINICAL SUPPORT (OUTPATIENT)
Dept: BEHAVIORAL HEALTH | Facility: CLINIC | Age: 76
End: 2021-05-26
Payer: MEDICARE

## 2021-05-26 DIAGNOSIS — F41.9 ANXIETY DISORDER, UNSPECIFIED TYPE: Primary | ICD-10-CM

## 2021-05-26 PROCEDURE — 90832 PR PSYCHOTHERAPY W/PATIENT, 30 MIN: ICD-10-PCS | Mod: S$GLB,,, | Performed by: SOCIAL WORKER

## 2021-05-26 PROCEDURE — 90832 PSYTX W PT 30 MINUTES: CPT | Mod: S$GLB,,, | Performed by: SOCIAL WORKER

## 2021-06-17 ENCOUNTER — OFFICE VISIT (OUTPATIENT)
Dept: FAMILY MEDICINE | Facility: CLINIC | Age: 76
End: 2021-06-17
Payer: MEDICARE

## 2021-06-17 VITALS
OXYGEN SATURATION: 96 % | DIASTOLIC BLOOD PRESSURE: 72 MMHG | WEIGHT: 145.06 LBS | HEIGHT: 65 IN | BODY MASS INDEX: 24.17 KG/M2 | HEART RATE: 64 BPM | TEMPERATURE: 97 F | SYSTOLIC BLOOD PRESSURE: 111 MMHG

## 2021-06-17 DIAGNOSIS — G47.00 INSOMNIA, UNSPECIFIED TYPE: ICD-10-CM

## 2021-06-17 DIAGNOSIS — F41.9 ANXIETY DISORDER, UNSPECIFIED TYPE: Primary | ICD-10-CM

## 2021-06-17 PROCEDURE — 3288F PR FALLS RISK ASSESSMENT DOCUMENTED: ICD-10-PCS | Mod: CPTII,S$GLB,, | Performed by: FAMILY MEDICINE

## 2021-06-17 PROCEDURE — 99999 PR PBB SHADOW E&M-EST. PATIENT-LVL III: ICD-10-PCS | Mod: PBBFAC,,, | Performed by: FAMILY MEDICINE

## 2021-06-17 PROCEDURE — 1126F AMNT PAIN NOTED NONE PRSNT: CPT | Mod: S$GLB,,, | Performed by: FAMILY MEDICINE

## 2021-06-17 PROCEDURE — 1159F PR MEDICATION LIST DOCUMENTED IN MEDICAL RECORD: ICD-10-PCS | Mod: S$GLB,,, | Performed by: FAMILY MEDICINE

## 2021-06-17 PROCEDURE — 3288F FALL RISK ASSESSMENT DOCD: CPT | Mod: CPTII,S$GLB,, | Performed by: FAMILY MEDICINE

## 2021-06-17 PROCEDURE — 99214 OFFICE O/P EST MOD 30 MIN: CPT | Mod: S$GLB,,, | Performed by: FAMILY MEDICINE

## 2021-06-17 PROCEDURE — 1126F PR PAIN SEVERITY QUANTIFIED, NO PAIN PRESENT: ICD-10-PCS | Mod: S$GLB,,, | Performed by: FAMILY MEDICINE

## 2021-06-17 PROCEDURE — 99999 PR PBB SHADOW E&M-EST. PATIENT-LVL III: CPT | Mod: PBBFAC,,, | Performed by: FAMILY MEDICINE

## 2021-06-17 PROCEDURE — 1101F PR PT FALLS ASSESS DOC 0-1 FALLS W/OUT INJ PAST YR: ICD-10-PCS | Mod: CPTII,S$GLB,, | Performed by: FAMILY MEDICINE

## 2021-06-17 PROCEDURE — 99214 PR OFFICE/OUTPT VISIT, EST, LEVL IV, 30-39 MIN: ICD-10-PCS | Mod: S$GLB,,, | Performed by: FAMILY MEDICINE

## 2021-06-17 PROCEDURE — 1159F MED LIST DOCD IN RCRD: CPT | Mod: S$GLB,,, | Performed by: FAMILY MEDICINE

## 2021-06-17 PROCEDURE — 1101F PT FALLS ASSESS-DOCD LE1/YR: CPT | Mod: CPTII,S$GLB,, | Performed by: FAMILY MEDICINE

## 2021-06-17 RX ORDER — QUETIAPINE FUMARATE 25 MG/1
25 TABLET, FILM COATED ORAL NIGHTLY
Qty: 30 TABLET | Refills: 0 | Status: SHIPPED | OUTPATIENT
Start: 2021-06-17 | End: 2021-08-25 | Stop reason: SDUPTHER

## 2021-06-23 ENCOUNTER — CLINICAL SUPPORT (OUTPATIENT)
Dept: BEHAVIORAL HEALTH | Facility: CLINIC | Age: 76
End: 2021-06-23
Payer: COMMERCIAL

## 2021-06-23 DIAGNOSIS — F41.9 ANXIETY DISORDER, UNSPECIFIED TYPE: Primary | ICD-10-CM

## 2021-06-23 PROCEDURE — 90832 PR PSYCHOTHERAPY W/PATIENT, 30 MIN: ICD-10-PCS | Mod: S$GLB,,, | Performed by: SOCIAL WORKER

## 2021-06-23 PROCEDURE — 90832 PSYTX W PT 30 MINUTES: CPT | Mod: S$GLB,,, | Performed by: SOCIAL WORKER

## 2021-07-04 ENCOUNTER — OFFICE VISIT (OUTPATIENT)
Dept: URGENT CARE | Facility: CLINIC | Age: 76
End: 2021-07-04
Payer: MEDICARE

## 2021-07-04 VITALS
HEART RATE: 64 BPM | RESPIRATION RATE: 16 BRPM | OXYGEN SATURATION: 97 % | SYSTOLIC BLOOD PRESSURE: 122 MMHG | TEMPERATURE: 98 F | WEIGHT: 145 LBS | BODY MASS INDEX: 24.16 KG/M2 | HEIGHT: 65 IN | DIASTOLIC BLOOD PRESSURE: 80 MMHG

## 2021-07-04 DIAGNOSIS — H65.01 NON-RECURRENT ACUTE SEROUS OTITIS MEDIA OF RIGHT EAR: Primary | ICD-10-CM

## 2021-07-04 PROCEDURE — 99213 OFFICE O/P EST LOW 20 MIN: CPT | Mod: S$GLB,,, | Performed by: NURSE PRACTITIONER

## 2021-07-04 PROCEDURE — 99213 PR OFFICE/OUTPT VISIT, EST, LEVL III, 20-29 MIN: ICD-10-PCS | Mod: S$GLB,,, | Performed by: NURSE PRACTITIONER

## 2021-07-04 PROCEDURE — 1101F PT FALLS ASSESS-DOCD LE1/YR: CPT | Mod: CPTII,S$GLB,, | Performed by: NURSE PRACTITIONER

## 2021-07-04 PROCEDURE — 1101F PR PT FALLS ASSESS DOC 0-1 FALLS W/OUT INJ PAST YR: ICD-10-PCS | Mod: CPTII,S$GLB,, | Performed by: NURSE PRACTITIONER

## 2021-07-04 PROCEDURE — 1126F AMNT PAIN NOTED NONE PRSNT: CPT | Mod: S$GLB,,, | Performed by: NURSE PRACTITIONER

## 2021-07-04 PROCEDURE — 3288F FALL RISK ASSESSMENT DOCD: CPT | Mod: CPTII,S$GLB,, | Performed by: NURSE PRACTITIONER

## 2021-07-04 PROCEDURE — 1126F PR PAIN SEVERITY QUANTIFIED, NO PAIN PRESENT: ICD-10-PCS | Mod: S$GLB,,, | Performed by: NURSE PRACTITIONER

## 2021-07-04 PROCEDURE — 3288F PR FALLS RISK ASSESSMENT DOCUMENTED: ICD-10-PCS | Mod: CPTII,S$GLB,, | Performed by: NURSE PRACTITIONER

## 2021-07-04 RX ORDER — PREDNISONE 20 MG/1
40 TABLET ORAL DAILY
Qty: 10 TABLET | Refills: 0 | Status: SHIPPED | OUTPATIENT
Start: 2021-07-04 | End: 2021-07-09

## 2021-07-12 DIAGNOSIS — I72.3 ANEURYSM OF RIGHT COMMON ILIAC ARTERY: Primary | ICD-10-CM

## 2021-07-14 ENCOUNTER — LAB VISIT (OUTPATIENT)
Dept: LAB | Facility: HOSPITAL | Age: 76
End: 2021-07-14
Attending: SURGERY
Payer: MEDICARE

## 2021-07-14 DIAGNOSIS — I72.3 ANEURYSM OF RIGHT COMMON ILIAC ARTERY: ICD-10-CM

## 2021-07-14 LAB
CREAT SERPL-MCNC: 0.8 MG/DL (ref 0.5–1.4)
EST. GFR  (AFRICAN AMERICAN): >60 ML/MIN/1.73 M^2
EST. GFR  (NON AFRICAN AMERICAN): >60 ML/MIN/1.73 M^2

## 2021-07-14 PROCEDURE — 36415 COLL VENOUS BLD VENIPUNCTURE: CPT | Performed by: SURGERY

## 2021-07-14 PROCEDURE — 82565 ASSAY OF CREATININE: CPT | Performed by: SURGERY

## 2021-07-16 ENCOUNTER — HOSPITAL ENCOUNTER (OUTPATIENT)
Dept: RADIOLOGY | Facility: HOSPITAL | Age: 76
Discharge: HOME | End: 2021-07-16
Attending: SURGERY
Payer: MEDICARE

## 2021-07-16 DIAGNOSIS — I72.3 ANEURYSM OF RIGHT COMMON ILIAC ARTERY: ICD-10-CM

## 2021-07-16 PROCEDURE — 25500020 PHARM REV CODE 255: Performed by: SURGERY

## 2021-07-16 PROCEDURE — 74174 CTA ABD&PLVS W/CONTRAST: CPT | Mod: TC

## 2021-07-16 PROCEDURE — 74174 CTA ABDOMEN AND PELVIS: ICD-10-PCS | Mod: 26,,, | Performed by: RADIOLOGY

## 2021-07-16 PROCEDURE — 74174 CTA ABD&PLVS W/CONTRAST: CPT | Mod: 26,,, | Performed by: RADIOLOGY

## 2021-07-16 RX ADMIN — IOHEXOL 75 ML: 350 INJECTION, SOLUTION INTRAVENOUS at 08:07

## 2021-07-26 ENCOUNTER — OFFICE VISIT (OUTPATIENT)
Dept: VASCULAR SURGERY | Facility: CLINIC | Age: 76
End: 2021-07-26
Payer: MEDICARE

## 2021-07-26 VITALS
DIASTOLIC BLOOD PRESSURE: 70 MMHG | SYSTOLIC BLOOD PRESSURE: 102 MMHG | HEIGHT: 65 IN | WEIGHT: 143.94 LBS | BODY MASS INDEX: 23.98 KG/M2

## 2021-07-26 DIAGNOSIS — I72.3 ANEURYSM OF RIGHT COMMON ILIAC ARTERY: Primary | ICD-10-CM

## 2021-07-26 PROCEDURE — 99214 OFFICE O/P EST MOD 30 MIN: CPT | Mod: S$GLB,,, | Performed by: SURGERY

## 2021-07-26 PROCEDURE — 3288F PR FALLS RISK ASSESSMENT DOCUMENTED: ICD-10-PCS | Mod: CPTII,S$GLB,, | Performed by: SURGERY

## 2021-07-26 PROCEDURE — 99999 PR PBB SHADOW E&M-EST. PATIENT-LVL III: ICD-10-PCS | Mod: PBBFAC,,, | Performed by: SURGERY

## 2021-07-26 PROCEDURE — 1126F AMNT PAIN NOTED NONE PRSNT: CPT | Mod: CPTII,S$GLB,, | Performed by: SURGERY

## 2021-07-26 PROCEDURE — 1101F PR PT FALLS ASSESS DOC 0-1 FALLS W/OUT INJ PAST YR: ICD-10-PCS | Mod: CPTII,S$GLB,, | Performed by: SURGERY

## 2021-07-26 PROCEDURE — 1159F MED LIST DOCD IN RCRD: CPT | Mod: CPTII,S$GLB,, | Performed by: SURGERY

## 2021-07-26 PROCEDURE — 1160F PR REVIEW ALL MEDS BY PRESCRIBER/CLIN PHARMACIST DOCUMENTED: ICD-10-PCS | Mod: CPTII,S$GLB,, | Performed by: SURGERY

## 2021-07-26 PROCEDURE — 1101F PT FALLS ASSESS-DOCD LE1/YR: CPT | Mod: CPTII,S$GLB,, | Performed by: SURGERY

## 2021-07-26 PROCEDURE — 1160F RVW MEDS BY RX/DR IN RCRD: CPT | Mod: CPTII,S$GLB,, | Performed by: SURGERY

## 2021-07-26 PROCEDURE — 3288F FALL RISK ASSESSMENT DOCD: CPT | Mod: CPTII,S$GLB,, | Performed by: SURGERY

## 2021-07-26 PROCEDURE — 1126F PR PAIN SEVERITY QUANTIFIED, NO PAIN PRESENT: ICD-10-PCS | Mod: CPTII,S$GLB,, | Performed by: SURGERY

## 2021-07-26 PROCEDURE — 99999 PR PBB SHADOW E&M-EST. PATIENT-LVL III: CPT | Mod: PBBFAC,,, | Performed by: SURGERY

## 2021-07-26 PROCEDURE — 1159F PR MEDICATION LIST DOCUMENTED IN MEDICAL RECORD: ICD-10-PCS | Mod: CPTII,S$GLB,, | Performed by: SURGERY

## 2021-07-26 PROCEDURE — 99214 PR OFFICE/OUTPT VISIT, EST, LEVL IV, 30-39 MIN: ICD-10-PCS | Mod: S$GLB,,, | Performed by: SURGERY

## 2021-08-09 ENCOUNTER — PATIENT MESSAGE (OUTPATIENT)
Dept: OTOLARYNGOLOGY | Facility: CLINIC | Age: 76
End: 2021-08-09

## 2021-08-20 ENCOUNTER — OFFICE VISIT (OUTPATIENT)
Dept: CARDIOLOGY | Facility: CLINIC | Age: 76
End: 2021-08-20
Payer: MEDICARE

## 2021-08-20 VITALS
SYSTOLIC BLOOD PRESSURE: 102 MMHG | HEART RATE: 70 BPM | OXYGEN SATURATION: 94 % | WEIGHT: 143.94 LBS | DIASTOLIC BLOOD PRESSURE: 73 MMHG | HEIGHT: 65 IN | BODY MASS INDEX: 23.98 KG/M2

## 2021-08-20 DIAGNOSIS — E78.00 PURE HYPERCHOLESTEROLEMIA: ICD-10-CM

## 2021-08-20 DIAGNOSIS — I70.0 AORTIC ATHEROSCLEROSIS: ICD-10-CM

## 2021-08-20 DIAGNOSIS — I25.10 CORONARY ARTERY DISEASE DUE TO LIPID RICH PLAQUE: Primary | ICD-10-CM

## 2021-08-20 DIAGNOSIS — I71.40 ABDOMINAL ANEURYSM: ICD-10-CM

## 2021-08-20 DIAGNOSIS — R03.0 ELEVATED BLOOD-PRESSURE READING WITHOUT DIAGNOSIS OF HYPERTENSION: ICD-10-CM

## 2021-08-20 DIAGNOSIS — I25.83 CORONARY ARTERY DISEASE DUE TO LIPID RICH PLAQUE: Primary | ICD-10-CM

## 2021-08-20 DIAGNOSIS — I72.3 ANEURYSM OF RIGHT COMMON ILIAC ARTERY: ICD-10-CM

## 2021-08-20 DIAGNOSIS — I47.10 SVT (SUPRAVENTRICULAR TACHYCARDIA): ICD-10-CM

## 2021-08-20 PROCEDURE — 1101F PR PT FALLS ASSESS DOC 0-1 FALLS W/OUT INJ PAST YR: ICD-10-PCS | Mod: CPTII,S$GLB,, | Performed by: INTERNAL MEDICINE

## 2021-08-20 PROCEDURE — 1160F PR REVIEW ALL MEDS BY PRESCRIBER/CLIN PHARMACIST DOCUMENTED: ICD-10-PCS | Mod: CPTII,S$GLB,, | Performed by: INTERNAL MEDICINE

## 2021-08-20 PROCEDURE — 3074F SYST BP LT 130 MM HG: CPT | Mod: CPTII,S$GLB,, | Performed by: INTERNAL MEDICINE

## 2021-08-20 PROCEDURE — 1101F PT FALLS ASSESS-DOCD LE1/YR: CPT | Mod: CPTII,S$GLB,, | Performed by: INTERNAL MEDICINE

## 2021-08-20 PROCEDURE — 1160F RVW MEDS BY RX/DR IN RCRD: CPT | Mod: CPTII,S$GLB,, | Performed by: INTERNAL MEDICINE

## 2021-08-20 PROCEDURE — 99999 PR PBB SHADOW E&M-EST. PATIENT-LVL IV: CPT | Mod: PBBFAC,,, | Performed by: INTERNAL MEDICINE

## 2021-08-20 PROCEDURE — 99214 OFFICE O/P EST MOD 30 MIN: CPT | Mod: S$GLB,,, | Performed by: INTERNAL MEDICINE

## 2021-08-20 PROCEDURE — 1126F AMNT PAIN NOTED NONE PRSNT: CPT | Mod: CPTII,S$GLB,, | Performed by: INTERNAL MEDICINE

## 2021-08-20 PROCEDURE — 3078F PR MOST RECENT DIASTOLIC BLOOD PRESSURE < 80 MM HG: ICD-10-PCS | Mod: CPTII,S$GLB,, | Performed by: INTERNAL MEDICINE

## 2021-08-20 PROCEDURE — 3078F DIAST BP <80 MM HG: CPT | Mod: CPTII,S$GLB,, | Performed by: INTERNAL MEDICINE

## 2021-08-20 PROCEDURE — 99999 PR PBB SHADOW E&M-EST. PATIENT-LVL IV: ICD-10-PCS | Mod: PBBFAC,,, | Performed by: INTERNAL MEDICINE

## 2021-08-20 PROCEDURE — 3288F FALL RISK ASSESSMENT DOCD: CPT | Mod: CPTII,S$GLB,, | Performed by: INTERNAL MEDICINE

## 2021-08-20 PROCEDURE — 1126F PR PAIN SEVERITY QUANTIFIED, NO PAIN PRESENT: ICD-10-PCS | Mod: CPTII,S$GLB,, | Performed by: INTERNAL MEDICINE

## 2021-08-20 PROCEDURE — 3288F PR FALLS RISK ASSESSMENT DOCUMENTED: ICD-10-PCS | Mod: CPTII,S$GLB,, | Performed by: INTERNAL MEDICINE

## 2021-08-20 PROCEDURE — 3074F PR MOST RECENT SYSTOLIC BLOOD PRESSURE < 130 MM HG: ICD-10-PCS | Mod: CPTII,S$GLB,, | Performed by: INTERNAL MEDICINE

## 2021-08-20 PROCEDURE — 1159F PR MEDICATION LIST DOCUMENTED IN MEDICAL RECORD: ICD-10-PCS | Mod: CPTII,S$GLB,, | Performed by: INTERNAL MEDICINE

## 2021-08-20 PROCEDURE — 99214 PR OFFICE/OUTPT VISIT, EST, LEVL IV, 30-39 MIN: ICD-10-PCS | Mod: S$GLB,,, | Performed by: INTERNAL MEDICINE

## 2021-08-20 PROCEDURE — 1159F MED LIST DOCD IN RCRD: CPT | Mod: CPTII,S$GLB,, | Performed by: INTERNAL MEDICINE

## 2021-08-25 ENCOUNTER — OFFICE VISIT (OUTPATIENT)
Dept: FAMILY MEDICINE | Facility: CLINIC | Age: 76
End: 2021-08-25
Payer: MEDICARE

## 2021-08-25 VITALS
BODY MASS INDEX: 22.84 KG/M2 | TEMPERATURE: 98 F | HEIGHT: 67 IN | OXYGEN SATURATION: 97 % | SYSTOLIC BLOOD PRESSURE: 109 MMHG | HEART RATE: 68 BPM | WEIGHT: 145.5 LBS | DIASTOLIC BLOOD PRESSURE: 70 MMHG

## 2021-08-25 DIAGNOSIS — G47.00 INSOMNIA, UNSPECIFIED TYPE: ICD-10-CM

## 2021-08-25 DIAGNOSIS — F41.9 ANXIETY DISORDER, UNSPECIFIED TYPE: ICD-10-CM

## 2021-08-25 PROCEDURE — 3078F PR MOST RECENT DIASTOLIC BLOOD PRESSURE < 80 MM HG: ICD-10-PCS | Mod: CPTII,S$GLB,, | Performed by: FAMILY MEDICINE

## 2021-08-25 PROCEDURE — 1126F PR PAIN SEVERITY QUANTIFIED, NO PAIN PRESENT: ICD-10-PCS | Mod: CPTII,S$GLB,, | Performed by: FAMILY MEDICINE

## 2021-08-25 PROCEDURE — 3074F SYST BP LT 130 MM HG: CPT | Mod: CPTII,S$GLB,, | Performed by: FAMILY MEDICINE

## 2021-08-25 PROCEDURE — 99213 OFFICE O/P EST LOW 20 MIN: CPT | Mod: S$GLB,,, | Performed by: FAMILY MEDICINE

## 2021-08-25 PROCEDURE — 99999 PR PBB SHADOW E&M-EST. PATIENT-LVL III: CPT | Mod: PBBFAC,,, | Performed by: FAMILY MEDICINE

## 2021-08-25 PROCEDURE — 1101F PR PT FALLS ASSESS DOC 0-1 FALLS W/OUT INJ PAST YR: ICD-10-PCS | Mod: CPTII,S$GLB,, | Performed by: FAMILY MEDICINE

## 2021-08-25 PROCEDURE — 99213 PR OFFICE/OUTPT VISIT, EST, LEVL III, 20-29 MIN: ICD-10-PCS | Mod: S$GLB,,, | Performed by: FAMILY MEDICINE

## 2021-08-25 PROCEDURE — 3288F FALL RISK ASSESSMENT DOCD: CPT | Mod: CPTII,S$GLB,, | Performed by: FAMILY MEDICINE

## 2021-08-25 PROCEDURE — 3078F DIAST BP <80 MM HG: CPT | Mod: CPTII,S$GLB,, | Performed by: FAMILY MEDICINE

## 2021-08-25 PROCEDURE — 3288F PR FALLS RISK ASSESSMENT DOCUMENTED: ICD-10-PCS | Mod: CPTII,S$GLB,, | Performed by: FAMILY MEDICINE

## 2021-08-25 PROCEDURE — 1159F MED LIST DOCD IN RCRD: CPT | Mod: CPTII,S$GLB,, | Performed by: FAMILY MEDICINE

## 2021-08-25 PROCEDURE — 1101F PT FALLS ASSESS-DOCD LE1/YR: CPT | Mod: CPTII,S$GLB,, | Performed by: FAMILY MEDICINE

## 2021-08-25 PROCEDURE — 1159F PR MEDICATION LIST DOCUMENTED IN MEDICAL RECORD: ICD-10-PCS | Mod: CPTII,S$GLB,, | Performed by: FAMILY MEDICINE

## 2021-08-25 PROCEDURE — 99999 PR PBB SHADOW E&M-EST. PATIENT-LVL III: ICD-10-PCS | Mod: PBBFAC,,, | Performed by: FAMILY MEDICINE

## 2021-08-25 PROCEDURE — 1126F AMNT PAIN NOTED NONE PRSNT: CPT | Mod: CPTII,S$GLB,, | Performed by: FAMILY MEDICINE

## 2021-08-25 PROCEDURE — 3074F PR MOST RECENT SYSTOLIC BLOOD PRESSURE < 130 MM HG: ICD-10-PCS | Mod: CPTII,S$GLB,, | Performed by: FAMILY MEDICINE

## 2021-08-25 RX ORDER — QUETIAPINE FUMARATE 25 MG/1
25 TABLET, FILM COATED ORAL NIGHTLY
Qty: 30 TABLET | Refills: 2 | Status: SHIPPED | OUTPATIENT
Start: 2021-08-25 | End: 2021-09-16

## 2021-10-22 DIAGNOSIS — M25.561 RIGHT KNEE PAIN, UNSPECIFIED CHRONICITY: Primary | ICD-10-CM

## 2021-10-25 ENCOUNTER — OFFICE VISIT (OUTPATIENT)
Dept: ORTHOPEDICS | Facility: CLINIC | Age: 76
End: 2021-10-25
Payer: MEDICARE

## 2021-10-25 VITALS
HEART RATE: 60 BPM | BODY MASS INDEX: 22.39 KG/M2 | DIASTOLIC BLOOD PRESSURE: 70 MMHG | OXYGEN SATURATION: 96 % | SYSTOLIC BLOOD PRESSURE: 120 MMHG | WEIGHT: 142.63 LBS | HEIGHT: 67 IN

## 2021-10-25 DIAGNOSIS — M25.561 RIGHT KNEE PAIN, UNSPECIFIED CHRONICITY: Primary | ICD-10-CM

## 2021-10-25 PROCEDURE — 1101F PT FALLS ASSESS-DOCD LE1/YR: CPT | Mod: CPTII,S$GLB,, | Performed by: ORTHOPAEDIC SURGERY

## 2021-10-25 PROCEDURE — 1101F PR PT FALLS ASSESS DOC 0-1 FALLS W/OUT INJ PAST YR: ICD-10-PCS | Mod: CPTII,S$GLB,, | Performed by: ORTHOPAEDIC SURGERY

## 2021-10-25 PROCEDURE — 1126F AMNT PAIN NOTED NONE PRSNT: CPT | Mod: CPTII,S$GLB,, | Performed by: ORTHOPAEDIC SURGERY

## 2021-10-25 PROCEDURE — 99999 PR PBB SHADOW E&M-EST. PATIENT-LVL III: ICD-10-PCS | Mod: PBBFAC,,, | Performed by: ORTHOPAEDIC SURGERY

## 2021-10-25 PROCEDURE — 99999 PR PBB SHADOW E&M-EST. PATIENT-LVL III: CPT | Mod: PBBFAC,,, | Performed by: ORTHOPAEDIC SURGERY

## 2021-10-25 PROCEDURE — 1160F PR REVIEW ALL MEDS BY PRESCRIBER/CLIN PHARMACIST DOCUMENTED: ICD-10-PCS | Mod: CPTII,S$GLB,, | Performed by: ORTHOPAEDIC SURGERY

## 2021-10-25 PROCEDURE — 1159F PR MEDICATION LIST DOCUMENTED IN MEDICAL RECORD: ICD-10-PCS | Mod: CPTII,S$GLB,, | Performed by: ORTHOPAEDIC SURGERY

## 2021-10-25 PROCEDURE — 99213 OFFICE O/P EST LOW 20 MIN: CPT | Mod: S$GLB,,, | Performed by: ORTHOPAEDIC SURGERY

## 2021-10-25 PROCEDURE — 3078F PR MOST RECENT DIASTOLIC BLOOD PRESSURE < 80 MM HG: ICD-10-PCS | Mod: CPTII,S$GLB,, | Performed by: ORTHOPAEDIC SURGERY

## 2021-10-25 PROCEDURE — 1160F RVW MEDS BY RX/DR IN RCRD: CPT | Mod: CPTII,S$GLB,, | Performed by: ORTHOPAEDIC SURGERY

## 2021-10-25 PROCEDURE — 3074F SYST BP LT 130 MM HG: CPT | Mod: CPTII,S$GLB,, | Performed by: ORTHOPAEDIC SURGERY

## 2021-10-25 PROCEDURE — 3074F PR MOST RECENT SYSTOLIC BLOOD PRESSURE < 130 MM HG: ICD-10-PCS | Mod: CPTII,S$GLB,, | Performed by: ORTHOPAEDIC SURGERY

## 2021-10-25 PROCEDURE — 1159F MED LIST DOCD IN RCRD: CPT | Mod: CPTII,S$GLB,, | Performed by: ORTHOPAEDIC SURGERY

## 2021-10-25 PROCEDURE — 3288F PR FALLS RISK ASSESSMENT DOCUMENTED: ICD-10-PCS | Mod: CPTII,S$GLB,, | Performed by: ORTHOPAEDIC SURGERY

## 2021-10-25 PROCEDURE — 99213 PR OFFICE/OUTPT VISIT, EST, LEVL III, 20-29 MIN: ICD-10-PCS | Mod: S$GLB,,, | Performed by: ORTHOPAEDIC SURGERY

## 2021-10-25 PROCEDURE — 1126F PR PAIN SEVERITY QUANTIFIED, NO PAIN PRESENT: ICD-10-PCS | Mod: CPTII,S$GLB,, | Performed by: ORTHOPAEDIC SURGERY

## 2021-10-25 PROCEDURE — 3288F FALL RISK ASSESSMENT DOCD: CPT | Mod: CPTII,S$GLB,, | Performed by: ORTHOPAEDIC SURGERY

## 2021-10-25 PROCEDURE — 3078F DIAST BP <80 MM HG: CPT | Mod: CPTII,S$GLB,, | Performed by: ORTHOPAEDIC SURGERY

## 2021-12-07 ENCOUNTER — HOSPITAL ENCOUNTER (EMERGENCY)
Facility: HOSPITAL | Age: 76
Discharge: HOME | End: 2021-12-07
Attending: EMERGENCY MEDICINE
Payer: MEDICARE

## 2021-12-07 VITALS
RESPIRATION RATE: 18 BRPM | OXYGEN SATURATION: 99 % | BODY MASS INDEX: 24.32 KG/M2 | DIASTOLIC BLOOD PRESSURE: 80 MMHG | SYSTOLIC BLOOD PRESSURE: 139 MMHG | TEMPERATURE: 98 F | HEIGHT: 65 IN | HEART RATE: 56 BPM | WEIGHT: 146 LBS

## 2021-12-07 DIAGNOSIS — I10 HYPERTENSION: ICD-10-CM

## 2021-12-07 DIAGNOSIS — R07.9 CHEST PAIN: ICD-10-CM

## 2021-12-07 DIAGNOSIS — R51.9 ACUTE NONINTRACTABLE HEADACHE, UNSPECIFIED HEADACHE TYPE: Primary | ICD-10-CM

## 2021-12-07 LAB
ALBUMIN SERPL BCP-MCNC: 4.1 G/DL (ref 3.5–5.2)
ALP SERPL-CCNC: 66 U/L (ref 55–135)
ALT SERPL W/O P-5'-P-CCNC: 17 U/L (ref 10–44)
ANION GAP SERPL CALC-SCNC: 10 MMOL/L (ref 8–16)
AST SERPL-CCNC: 17 U/L (ref 10–40)
BASOPHILS # BLD AUTO: 0.04 K/UL (ref 0–0.2)
BASOPHILS NFR BLD: 0.7 % (ref 0–1.9)
BILIRUB SERPL-MCNC: 1.7 MG/DL (ref 0.1–1)
BUN SERPL-MCNC: 19 MG/DL (ref 8–23)
CALCIUM SERPL-MCNC: 8.8 MG/DL (ref 8.7–10.5)
CHLORIDE SERPL-SCNC: 110 MMOL/L (ref 95–110)
CO2 SERPL-SCNC: 23 MMOL/L (ref 23–29)
CREAT SERPL-MCNC: 0.7 MG/DL (ref 0.5–1.4)
CTP QC/QA: YES
DIFFERENTIAL METHOD: ABNORMAL
EOSINOPHIL # BLD AUTO: 0.1 K/UL (ref 0–0.5)
EOSINOPHIL NFR BLD: 1 % (ref 0–8)
ERYTHROCYTE [DISTWIDTH] IN BLOOD BY AUTOMATED COUNT: 13.4 % (ref 11.5–14.5)
EST. GFR  (AFRICAN AMERICAN): >60 ML/MIN/1.73 M^2
EST. GFR  (NON AFRICAN AMERICAN): >60 ML/MIN/1.73 M^2
GLUCOSE SERPL-MCNC: 75 MG/DL (ref 70–110)
HCT VFR BLD AUTO: 41 % (ref 40–54)
HGB BLD-MCNC: 13.8 G/DL (ref 14–18)
IMM GRANULOCYTES # BLD AUTO: 0.01 K/UL (ref 0–0.04)
IMM GRANULOCYTES NFR BLD AUTO: 0.2 % (ref 0–0.5)
LIPASE SERPL-CCNC: 10 U/L (ref 4–60)
LYMPHOCYTES # BLD AUTO: 1.7 K/UL (ref 1–4.8)
LYMPHOCYTES NFR BLD: 30.1 % (ref 18–48)
MCH RBC QN AUTO: 29.5 PG (ref 27–31)
MCHC RBC AUTO-ENTMCNC: 33.7 G/DL (ref 32–36)
MCV RBC AUTO: 88 FL (ref 82–98)
MONOCYTES # BLD AUTO: 0.5 K/UL (ref 0.3–1)
MONOCYTES NFR BLD: 9 % (ref 4–15)
NEUTROPHILS # BLD AUTO: 3.4 K/UL (ref 1.8–7.7)
NEUTROPHILS NFR BLD: 59 % (ref 38–73)
NRBC BLD-RTO: 0 /100 WBC
PLATELET # BLD AUTO: 154 K/UL (ref 150–450)
PMV BLD AUTO: 11.5 FL (ref 9.2–12.9)
POTASSIUM SERPL-SCNC: 3.6 MMOL/L (ref 3.5–5.1)
PROT SERPL-MCNC: 6.9 G/DL (ref 6–8.4)
RBC # BLD AUTO: 4.68 M/UL (ref 4.6–6.2)
SARS-COV-2 RDRP RESP QL NAA+PROBE: NEGATIVE
SODIUM SERPL-SCNC: 143 MMOL/L (ref 136–145)
TROPONIN I SERPL DL<=0.01 NG/ML-MCNC: <0.006 NG/ML (ref 0–0.03)
WBC # BLD AUTO: 5.75 K/UL (ref 3.9–12.7)

## 2021-12-07 PROCEDURE — U0002 COVID-19 LAB TEST NON-CDC: HCPCS | Performed by: PHYSICIAN ASSISTANT

## 2021-12-07 PROCEDURE — 84484 ASSAY OF TROPONIN QUANT: CPT | Performed by: PHYSICIAN ASSISTANT

## 2021-12-07 PROCEDURE — 93005 ELECTROCARDIOGRAM TRACING: CPT

## 2021-12-07 PROCEDURE — 85025 COMPLETE CBC W/AUTO DIFF WBC: CPT | Performed by: PHYSICIAN ASSISTANT

## 2021-12-07 PROCEDURE — 93010 EKG 12-LEAD: ICD-10-PCS | Mod: ,,, | Performed by: INTERNAL MEDICINE

## 2021-12-07 PROCEDURE — 96360 HYDRATION IV INFUSION INIT: CPT

## 2021-12-07 PROCEDURE — 99285 EMERGENCY DEPT VISIT HI MDM: CPT | Mod: 25

## 2021-12-07 PROCEDURE — 93010 ELECTROCARDIOGRAM REPORT: CPT | Mod: ,,, | Performed by: INTERNAL MEDICINE

## 2021-12-07 PROCEDURE — 25000003 PHARM REV CODE 250: Performed by: PHYSICIAN ASSISTANT

## 2021-12-07 PROCEDURE — 83690 ASSAY OF LIPASE: CPT | Performed by: PHYSICIAN ASSISTANT

## 2021-12-07 PROCEDURE — 80053 COMPREHEN METABOLIC PANEL: CPT | Performed by: PHYSICIAN ASSISTANT

## 2021-12-07 RX ORDER — CLONAZEPAM 1 MG/1
0.25 TABLET ORAL 2 TIMES DAILY PRN
COMMUNITY
End: 2021-12-16

## 2021-12-07 RX ADMIN — SODIUM CHLORIDE 500 ML: 0.9 INJECTION, SOLUTION INTRAVENOUS at 06:12

## 2021-12-15 ENCOUNTER — OFFICE VISIT (OUTPATIENT)
Dept: FAMILY MEDICINE | Facility: CLINIC | Age: 76
End: 2021-12-15
Payer: MEDICARE

## 2021-12-15 VITALS
TEMPERATURE: 98 F | HEART RATE: 70 BPM | OXYGEN SATURATION: 98 % | SYSTOLIC BLOOD PRESSURE: 106 MMHG | BODY MASS INDEX: 24.1 KG/M2 | WEIGHT: 144.63 LBS | HEIGHT: 65 IN | DIASTOLIC BLOOD PRESSURE: 60 MMHG

## 2021-12-15 DIAGNOSIS — I47.10 SVT (SUPRAVENTRICULAR TACHYCARDIA): Primary | ICD-10-CM

## 2021-12-15 PROCEDURE — 99999 PR PBB SHADOW E&M-EST. PATIENT-LVL III: ICD-10-PCS | Mod: PBBFAC,,, | Performed by: FAMILY MEDICINE

## 2021-12-15 PROCEDURE — 99214 OFFICE O/P EST MOD 30 MIN: CPT | Mod: S$GLB,,, | Performed by: FAMILY MEDICINE

## 2021-12-15 PROCEDURE — 99214 PR OFFICE/OUTPT VISIT, EST, LEVL IV, 30-39 MIN: ICD-10-PCS | Mod: S$GLB,,, | Performed by: FAMILY MEDICINE

## 2021-12-15 PROCEDURE — 99999 PR PBB SHADOW E&M-EST. PATIENT-LVL III: CPT | Mod: PBBFAC,,, | Performed by: FAMILY MEDICINE

## 2021-12-16 ENCOUNTER — OFFICE VISIT (OUTPATIENT)
Dept: OTOLARYNGOLOGY | Facility: CLINIC | Age: 76
End: 2021-12-16
Payer: MEDICARE

## 2021-12-16 VITALS
WEIGHT: 142.88 LBS | BODY MASS INDEX: 23.81 KG/M2 | SYSTOLIC BLOOD PRESSURE: 116 MMHG | DIASTOLIC BLOOD PRESSURE: 70 MMHG | HEIGHT: 65 IN

## 2021-12-16 DIAGNOSIS — H93.13 TINNITUS OF BOTH EARS: Primary | ICD-10-CM

## 2021-12-16 DIAGNOSIS — J30.2 SEASONAL ALLERGIC RHINITIS, UNSPECIFIED TRIGGER: ICD-10-CM

## 2021-12-16 DIAGNOSIS — J34.3 HYPERTROPHY OF INFERIOR NASAL TURBINATE: ICD-10-CM

## 2021-12-16 DIAGNOSIS — H90.3 SENSORINEURAL HEARING LOSS (SNHL) OF BOTH EARS: ICD-10-CM

## 2021-12-16 PROCEDURE — 99213 PR OFFICE/OUTPT VISIT, EST, LEVL III, 20-29 MIN: ICD-10-PCS | Mod: S$GLB,,, | Performed by: NURSE PRACTITIONER

## 2021-12-16 PROCEDURE — 99213 OFFICE O/P EST LOW 20 MIN: CPT | Mod: S$GLB,,, | Performed by: NURSE PRACTITIONER

## 2021-12-16 RX ORDER — FLUTICASONE PROPIONATE 50 MCG
1 SPRAY, SUSPENSION (ML) NASAL 2 TIMES DAILY
Qty: 18.2 ML | Refills: 3 | Status: SHIPPED | OUTPATIENT
Start: 2021-12-16 | End: 2023-01-03

## 2021-12-17 ENCOUNTER — TELEPHONE (OUTPATIENT)
Dept: OTOLARYNGOLOGY | Facility: CLINIC | Age: 76
End: 2021-12-17
Payer: MEDICARE

## 2021-12-27 ENCOUNTER — PES CALL (OUTPATIENT)
Dept: ADMINISTRATIVE | Facility: CLINIC | Age: 76
End: 2021-12-27
Payer: MEDICARE

## 2022-01-10 ENCOUNTER — HOSPITAL ENCOUNTER (OUTPATIENT)
Dept: RADIOLOGY | Facility: HOSPITAL | Age: 77
Discharge: HOME | End: 2022-01-10
Attending: SURGERY
Payer: MEDICARE

## 2022-01-10 DIAGNOSIS — I72.3 ANEURYSM OF RIGHT COMMON ILIAC ARTERY: ICD-10-CM

## 2022-01-10 PROCEDURE — 74176 CT ABD & PELVIS W/O CONTRAST: CPT | Mod: 26,,, | Performed by: RADIOLOGY

## 2022-01-10 PROCEDURE — 74176 CT ABD & PELVIS W/O CONTRAST: CPT | Mod: TC

## 2022-01-10 PROCEDURE — 74176 CT ABDOMEN PELVIS WITHOUT CONTRAST: ICD-10-PCS | Mod: 26,,, | Performed by: RADIOLOGY

## 2022-01-11 ENCOUNTER — LAB VISIT (OUTPATIENT)
Dept: PRIMARY CARE CLINIC | Facility: OTHER | Age: 77
End: 2022-01-11
Attending: INTERNAL MEDICINE
Payer: MEDICARE

## 2022-01-11 DIAGNOSIS — Z20.822 ENCOUNTER FOR LABORATORY TESTING FOR COVID-19 VIRUS: ICD-10-CM

## 2022-01-11 PROCEDURE — U0003 INFECTIOUS AGENT DETECTION BY NUCLEIC ACID (DNA OR RNA); SEVERE ACUTE RESPIRATORY SYNDROME CORONAVIRUS 2 (SARS-COV-2) (CORONAVIRUS DISEASE [COVID-19]), AMPLIFIED PROBE TECHNIQUE, MAKING USE OF HIGH THROUGHPUT TECHNOLOGIES AS DESCRIBED BY CMS-2020-01-R: HCPCS | Performed by: INTERNAL MEDICINE

## 2022-01-12 LAB
SARS-COV-2 RNA RESP QL NAA+PROBE: NOT DETECTED
SARS-COV-2- CYCLE NUMBER: NORMAL

## 2022-01-24 ENCOUNTER — OFFICE VISIT (OUTPATIENT)
Dept: VASCULAR SURGERY | Facility: CLINIC | Age: 77
End: 2022-01-24
Payer: MEDICARE

## 2022-01-24 VITALS — DIASTOLIC BLOOD PRESSURE: 86 MMHG | WEIGHT: 148.5 LBS | SYSTOLIC BLOOD PRESSURE: 134 MMHG | BODY MASS INDEX: 24.71 KG/M2

## 2022-01-24 DIAGNOSIS — I77.1 CELIAC ARTERY STENOSIS: ICD-10-CM

## 2022-01-24 DIAGNOSIS — I72.3 ANEURYSM OF RIGHT COMMON ILIAC ARTERY: Primary | ICD-10-CM

## 2022-01-24 PROCEDURE — 99499 RISK ADDL DX/OHS AUDIT: ICD-10-PCS | Mod: S$GLB,,, | Performed by: SURGERY

## 2022-01-24 PROCEDURE — 3075F SYST BP GE 130 - 139MM HG: CPT | Mod: CPTII,S$GLB,, | Performed by: SURGERY

## 2022-01-24 PROCEDURE — 3079F DIAST BP 80-89 MM HG: CPT | Mod: CPTII,S$GLB,, | Performed by: SURGERY

## 2022-01-24 PROCEDURE — 3075F PR MOST RECENT SYSTOLIC BLOOD PRESS GE 130-139MM HG: ICD-10-PCS | Mod: CPTII,S$GLB,, | Performed by: SURGERY

## 2022-01-24 PROCEDURE — 99999 PR PBB SHADOW E&M-EST. PATIENT-LVL II: ICD-10-PCS | Mod: PBBFAC,,, | Performed by: SURGERY

## 2022-01-24 PROCEDURE — 99499 UNLISTED E&M SERVICE: CPT | Mod: S$GLB,,, | Performed by: SURGERY

## 2022-01-24 PROCEDURE — 1159F MED LIST DOCD IN RCRD: CPT | Mod: CPTII,S$GLB,, | Performed by: SURGERY

## 2022-01-24 PROCEDURE — 3079F PR MOST RECENT DIASTOLIC BLOOD PRESSURE 80-89 MM HG: ICD-10-PCS | Mod: CPTII,S$GLB,, | Performed by: SURGERY

## 2022-01-24 PROCEDURE — 1159F PR MEDICATION LIST DOCUMENTED IN MEDICAL RECORD: ICD-10-PCS | Mod: CPTII,S$GLB,, | Performed by: SURGERY

## 2022-01-24 PROCEDURE — 99214 PR OFFICE/OUTPT VISIT, EST, LEVL IV, 30-39 MIN: ICD-10-PCS | Mod: S$GLB,,, | Performed by: SURGERY

## 2022-01-24 PROCEDURE — 99999 PR PBB SHADOW E&M-EST. PATIENT-LVL II: CPT | Mod: PBBFAC,,, | Performed by: SURGERY

## 2022-01-24 PROCEDURE — 99214 OFFICE O/P EST MOD 30 MIN: CPT | Mod: S$GLB,,, | Performed by: SURGERY

## 2022-01-24 NOTE — LETTER
January 25, 2022        Pj Gillette MD  [address]  [place] [address]             [facility]  [address]  [facility] [place] [address]  Phone: [phone]  Fax: [phone]   Patient: Reed Torres   MR Number: [id number]   YOB: 1945   Date of Visit: 1/24/2022       Dear Dr. Gillette:    Thank you for referring Reed Torres to me for evaluation. Below are the relevant portions of my assessment and plan of care.            If you have questions, please do not hesitate to call me. I look forward to following Reed along with you.    Sincerely,      Mario Springer MD           CC  No Recipients

## 2022-01-25 DIAGNOSIS — I72.3 ANEURYSM OF RIGHT COMMON ILIAC ARTERY: Primary | ICD-10-CM

## 2022-01-25 NOTE — PROGRESS NOTES
Mario Springer MD [facility]                         01/25/2022    HPI:  Reed Torres is a 76 y.o. male with   Patient Active Problem List   Diagnosis    Hyperlipidemia    Tuberculosis exposure    BPH (benign prostatic hyperplasia)    Body mass index (BMI) of 23.0-23.9 in adult    Osteoarthritis of lumbar spine    Hyperbilirubinemia, long standing, favor GILBERT    Body water dehydration    Abnormal stress test    Anxiety disorder    SVT (supraventricular tachycardia)    Pulmonary emphysema    being managed by PCP and specialists who is here today for evaluation of mesenteric ischemia.  Pt with c/o LUQ and L chest discomfort intermittent without known triggers.  Patient states location is LUQ and L chest under breastbone occurring for a few months.  Associated signs and symptoms include belching.  No food fear or significant weight loss.  Quality is pressure and severity is 3/10.  Symptoms began a few mo ago.  Alleviating factors include activity.  Worsening factors include none.    no MI  no Stroke  Tobacco use: denies    1/2021:  States he has symptoms of belching and bloating.  Denies food fear and weight loss.  Symptoms are worsened when he bends forward.  States he has not seen GI in about 4 months where he was diagnosed with reflux although his dosage of his medication has been decreased.  Denies pelvic pain or abdominal pain or back pain.  No functional limitation or leg pain.    7/2021:  C/o LUQ pain, R thigh intermittent pain, no abd or pelvic pain.  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    1/2022:  No complaints.    Past Medical History:   Diagnosis Date    Hyperlipidemia     Pulmonary emphysema 5/12/2021    Tuberculosis exposure     Post treatment     Past Surgical History:   Procedure Laterality Date    APPENDECTOMY      LEFT HEART CATHETERIZATION Left 7/24/2020    Procedure: Left heart cath;  Surgeon: Keenan Avila MD;   Location: [facility] CATH LAB;  Service: Cardiology;  Laterality: Left;  RN PRE OP 2020.---COVID NEGATIVE ON-- 2020. [place]    SHOULDER ARTHROSCOPY W/ ROTATOR CUFF REPAIR      Left shoulder     Family History   Problem Relation Age of Onset    COPD Mother     Diabetes Mother     Hypertension Mother     COPD Father     Heart disease Brother      Social History     Socioeconomic History    Marital status:    Tobacco Use    Smoking status: Former Smoker     Quit date:      Years since quittin.0    Smokeless tobacco: Never Used    Tobacco comment: stopped smoking 20 yrs ago.   Substance and Sexual Activity    Alcohol use: Not Currently     Alcohol/week: 5.0 standard drinks     Types: 6 Standard drinks or equivalent per week    Drug use: No    Sexual activity: Yes     Partners: Female     Birth control/protection: None   Social History Narrative    Still doing lawn service       Current Outpatient Medications:     atorvastatin (LIPITOR) 40 MG tablet, TAKE 1 TABLET BY MOUTH EVERY DAY, Disp: 90 tablet, Rfl: 3    fluticasone propionate (FLONASE) 50 mcg/actuation nasal spray, 1 spray (50 mcg total) by Each Nostril route 2 (two) times daily., Disp: 18.2 mL, Rfl: 3    gabapentin (NEURONTIN) 300 MG capsule, Take 1-3 capsules (300-900 mg total) by mouth every evening., Disp: 90 capsule, Rfl: 11    latanoprost 0.005 % ophthalmic solution, , Disp: , Rfl:     pantoprazole (PROTONIX) 40 MG tablet, Take 40 mg by mouth once daily., Disp: , Rfl:     azelastine (ASTELIN) 137 mcg (0.1 %) nasal spray, 1 spray (137 mcg total) by Nasal route 2 (two) times daily. (Patient not taking: No sig reported), Disp: 30 mL, Rfl: 3    paroxetine (PAXIL) 20 MG tablet, Take 1 tablet (20 mg total) by mouth every morning. (Patient not taking: Reported on 2022), Disp: 30 tablet, Rfl: 0    QUEtiapine (SEROQUEL) 25 MG Tab, TAKE 1 TABLET BY MOUTH EVERY EVENING (Patient not taking: Reported on 2022), Disp: 30  tablet, Rfl: 2    Current Facility-Administered Medications:     sodium chloride 0.9% flush 3 mL, 3 mL, Intravenous, Q8H PRN, Keenan Avila MD    REVIEW OF SYSTEMS:  General: No fevers or chills; ENT: No sore throat; Allergy and Immunology: no persistent infections; Hematological and Lymphatic: No history of bleeding or easy bruising; Endocrine: negative; Respiratory: no cough, shortness of breath, or wheezing; Cardiovascular: no chest pain or dyspnea on exertion; Gastrointestinal: no abdominal pain/back, change in bowel habits, or bloody stools; Genito-Urinary: no dysuria, trouble voiding, or hematuria; Musculoskeletal: negative; Neurological: no TIA or stroke symptoms; Psychiatric: no nervousness, anxiety or depression.    PHYSICAL EXAM:                General appearance:  Alert, well-appearing, and in no distress.  Oriented to person, place, and time                    Neurological: Normal speech, no focal findings noted; CN II - XII grossly intact. RLE with sensation to light touch, LLE with sensation to light touch.            Musculoskeletal: Digits/nail without cyanosis/clubbing.  Strength 5/5 BLE.                    Neck: Supple, no significant adenopathy, no carotid bruit can be auscultated                  Chest:  Clear to auscultation, no wheezes, rales or rhonchi, symmetric air entry. No use of accessory muscles               Cardiac: Normal rate and regular rhythm, S1 and S2 normal            Abdomen: Soft, min epigastric tenderness, nondistended, no masses or organomegaly, no hernia     No rebound tenderness noted; bowel sounds normal     Pulsatile aortic mass is non palpable.     No groin adenopathy      Extremities:   2+ R femoral pulse, 2+ L femoral pulse     2+ R popliteal pulse, 2+ L popliteal pulse     2+ R PT pulse, 2+ L PT pulse     2+ R DP pulse, 2+ L DP pulse     no RLE edema, no LLE edema    Skin: RLE without tissue loss; LLE without tissue loss    LAB RESULTS:  No results found for:  CBC  Lab Results   Component Value Date    LABPROT 10.7 07/20/2020    INR 1.0 07/20/2020     Lab Results   Component Value Date     12/07/2021    K 3.6 12/07/2021     12/07/2021    CO2 23 12/07/2021    GLU 75 12/07/2021    BUN 19 12/07/2021    CREATININE 0.7 12/07/2021    CALCIUM 8.8 12/07/2021    ANIONGAP 10 12/07/2021    EGFRNONAA >60 12/07/2021     Lab Results   Component Value Date    WBC 5.75 12/07/2021    RBC 4.68 12/07/2021    HGB 13.8 (L) 12/07/2021    HCT 41.0 12/07/2021    MCV 88 12/07/2021    MCH 29.5 12/07/2021    MCHC 33.7 12/07/2021    RDW 13.4 12/07/2021     12/07/2021    MPV 11.5 12/07/2021    GRAN 3.4 12/07/2021    GRAN 59.0 12/07/2021    LYMPH 1.7 12/07/2021    LYMPH 30.1 12/07/2021    MONO 0.5 12/07/2021    MONO 9.0 12/07/2021    EOS 0.1 12/07/2021    BASO 0.04 12/07/2021    EOSINOPHIL 1.0 12/07/2021    BASOPHIL 0.7 12/07/2021    DIFFMETHOD Automated 12/07/2021     .  Lab Results   Component Value Date    HGBA1C 5.3 10/08/2019       IMAGING:  All pertinent imaging has been reviewed and interpreted independently.    -BLE arterial US negative for popliteal aneurysm    IMP/PLAN:  76 y.o. male with   Patient Active Problem List   Diagnosis    Hyperlipidemia    Tuberculosis exposure    BPH (benign prostatic hyperplasia)    Body mass index (BMI) of 23.0-23.9 in adult    Osteoarthritis of lumbar spine    Hyperbilirubinemia, long standing, favor GILBERT    Body water dehydration    Abnormal stress test    Anxiety disorder    SVT (supraventricular tachycardia)    Pulmonary emphysema    being managed by PCP and specialists who is here today for evaluation of mesenteric ischemia.    - Mesenteric US without HD significant stenosis.  Recommend continuing evaluation and management for etiology of left upper quadrant by PCP and GI.    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  All questions were answered and patient stated understanding with agreement with the above treatment plan.    Mario Springer MD [facility]  Vascular and Endovascular Surgery

## 2022-02-01 ENCOUNTER — OFFICE VISIT (OUTPATIENT)
Dept: CARDIOLOGY | Facility: CLINIC | Age: 77
End: 2022-02-01
Payer: MEDICARE

## 2022-02-01 ENCOUNTER — HOSPITAL ENCOUNTER (OUTPATIENT)
Dept: RADIOLOGY | Facility: HOSPITAL | Age: 77
Discharge: HOME | End: 2022-02-01
Attending: INTERNAL MEDICINE
Payer: MEDICARE

## 2022-02-01 VITALS
OXYGEN SATURATION: 99 % | WEIGHT: 148.56 LBS | HEART RATE: 71 BPM | DIASTOLIC BLOOD PRESSURE: 80 MMHG | HEIGHT: 65 IN | BODY MASS INDEX: 24.75 KG/M2 | SYSTOLIC BLOOD PRESSURE: 150 MMHG

## 2022-02-01 DIAGNOSIS — G62.9 NEUROPATHY: Primary | ICD-10-CM

## 2022-02-01 DIAGNOSIS — R03.0 ELEVATED BLOOD-PRESSURE READING WITHOUT DIAGNOSIS OF HYPERTENSION: ICD-10-CM

## 2022-02-01 DIAGNOSIS — E78.00 PURE HYPERCHOLESTEROLEMIA: ICD-10-CM

## 2022-02-01 DIAGNOSIS — G62.9 NEUROPATHY: ICD-10-CM

## 2022-02-01 DIAGNOSIS — I70.0 AORTIC ATHEROSCLEROSIS: ICD-10-CM

## 2022-02-01 DIAGNOSIS — I25.83 CORONARY ARTERY DISEASE DUE TO LIPID RICH PLAQUE: ICD-10-CM

## 2022-02-01 DIAGNOSIS — I25.10 CORONARY ARTERY DISEASE DUE TO LIPID RICH PLAQUE: ICD-10-CM

## 2022-02-01 DIAGNOSIS — R26.9 GAIT ABNORMALITY: ICD-10-CM

## 2022-02-01 DIAGNOSIS — I71.40 ABDOMINAL ANEURYSM: ICD-10-CM

## 2022-02-01 PROCEDURE — 72050 XR CERVICAL SPINE COMPLETE 5 VIEW: ICD-10-PCS | Mod: 26,,, | Performed by: RADIOLOGY

## 2022-02-01 PROCEDURE — 3288F FALL RISK ASSESSMENT DOCD: CPT | Mod: CPTII,S$GLB,, | Performed by: INTERNAL MEDICINE

## 2022-02-01 PROCEDURE — 3079F DIAST BP 80-89 MM HG: CPT | Mod: CPTII,S$GLB,, | Performed by: INTERNAL MEDICINE

## 2022-02-01 PROCEDURE — 1126F AMNT PAIN NOTED NONE PRSNT: CPT | Mod: CPTII,S$GLB,, | Performed by: INTERNAL MEDICINE

## 2022-02-01 PROCEDURE — 3079F PR MOST RECENT DIASTOLIC BLOOD PRESSURE 80-89 MM HG: ICD-10-PCS | Mod: CPTII,S$GLB,, | Performed by: INTERNAL MEDICINE

## 2022-02-01 PROCEDURE — 1159F MED LIST DOCD IN RCRD: CPT | Mod: CPTII,S$GLB,, | Performed by: INTERNAL MEDICINE

## 2022-02-01 PROCEDURE — 1160F RVW MEDS BY RX/DR IN RCRD: CPT | Mod: CPTII,S$GLB,, | Performed by: INTERNAL MEDICINE

## 2022-02-01 PROCEDURE — 99214 OFFICE O/P EST MOD 30 MIN: CPT | Mod: S$GLB,,, | Performed by: INTERNAL MEDICINE

## 2022-02-01 PROCEDURE — 72050 X-RAY EXAM NECK SPINE 4/5VWS: CPT | Mod: TC,FY

## 2022-02-01 PROCEDURE — 1101F PT FALLS ASSESS-DOCD LE1/YR: CPT | Mod: CPTII,S$GLB,, | Performed by: INTERNAL MEDICINE

## 2022-02-01 PROCEDURE — 72050 X-RAY EXAM NECK SPINE 4/5VWS: CPT | Mod: 26,,, | Performed by: RADIOLOGY

## 2022-02-01 PROCEDURE — 1160F PR REVIEW ALL MEDS BY PRESCRIBER/CLIN PHARMACIST DOCUMENTED: ICD-10-PCS | Mod: CPTII,S$GLB,, | Performed by: INTERNAL MEDICINE

## 2022-02-01 PROCEDURE — 3077F PR MOST RECENT SYSTOLIC BLOOD PRESSURE >= 140 MM HG: ICD-10-PCS | Mod: CPTII,S$GLB,, | Performed by: INTERNAL MEDICINE

## 2022-02-01 PROCEDURE — 99214 PR OFFICE/OUTPT VISIT, EST, LEVL IV, 30-39 MIN: ICD-10-PCS | Mod: S$GLB,,, | Performed by: INTERNAL MEDICINE

## 2022-02-01 PROCEDURE — 3288F PR FALLS RISK ASSESSMENT DOCUMENTED: ICD-10-PCS | Mod: CPTII,S$GLB,, | Performed by: INTERNAL MEDICINE

## 2022-02-01 PROCEDURE — 3077F SYST BP >= 140 MM HG: CPT | Mod: CPTII,S$GLB,, | Performed by: INTERNAL MEDICINE

## 2022-02-01 PROCEDURE — 99999 PR PBB SHADOW E&M-EST. PATIENT-LVL IV: ICD-10-PCS | Mod: PBBFAC,,, | Performed by: INTERNAL MEDICINE

## 2022-02-01 PROCEDURE — 99999 PR PBB SHADOW E&M-EST. PATIENT-LVL IV: CPT | Mod: PBBFAC,,, | Performed by: INTERNAL MEDICINE

## 2022-02-01 PROCEDURE — 1126F PR PAIN SEVERITY QUANTIFIED, NO PAIN PRESENT: ICD-10-PCS | Mod: CPTII,S$GLB,, | Performed by: INTERNAL MEDICINE

## 2022-02-01 PROCEDURE — 1159F PR MEDICATION LIST DOCUMENTED IN MEDICAL RECORD: ICD-10-PCS | Mod: CPTII,S$GLB,, | Performed by: INTERNAL MEDICINE

## 2022-02-01 PROCEDURE — 1101F PR PT FALLS ASSESS DOC 0-1 FALLS W/OUT INJ PAST YR: ICD-10-PCS | Mod: CPTII,S$GLB,, | Performed by: INTERNAL MEDICINE

## 2022-02-01 NOTE — PROGRESS NOTES
CARDIOLOGY CLINIC VISIT        HISTORY OF PRESENT ILLNESS:     Reed Torres who presents for continued care.  Last seen 08/20/2021.  He has complaints of upper extremity, bilateral numbness/tingling.  No weakness.  Also complaints of gait instability.  Went to the emergency room in December 2021 with complaints of feeling off balance and lightheaded.  CT of the head showed mild cerebral atrophy and chronic small-vessel ischemic changes.     Coronary angiography from 07/24/2020 showed mild-to-moderate nonobstructive coronary artery disease.  Holter from 03/17/2020 showed average heart rate of 77 beats per minute.  Occasional PVCs.  Rare PACs.  Nonsustained SVT.  Longest was 7 beats.  Echocardiogram at that time showed ejection fraction of 50%.  No complaints.    CARDIOVASCULAR HISTORY:     SVT  Nonobstructive coronary artery disease    PAST MEDICAL HISTORY:     Past Medical History:   Diagnosis Date    Hyperlipidemia     Pulmonary emphysema 5/12/2021    Tuberculosis exposure     Post treatment       PAST SURGICAL HISTORY:     Past Surgical History:   Procedure Laterality Date    APPENDECTOMY      LEFT HEART CATHETERIZATION Left 7/24/2020    Procedure: Left heart cath;  Surgeon: Keenan Avila MD;  Location: [facility] CATH LAB;  Service: Cardiology;  Laterality: Left;  RN PRE OP 7-.---COVID NEGATIVE ON-- 7-. [place]    SHOULDER ARTHROSCOPY W/ ROTATOR CUFF REPAIR      Left shoulder       ALLERGIES AND MEDICATION:   Review of patient's allergies indicates:  No Known Allergies     Medication List          Accurate as of February 1, 2022  9:11 AM. If you have any questions, ask your nurse or doctor.            CONTINUE taking these medications    atorvastatin 40 MG tablet  Commonly known as: LIPITOR  TAKE 1 TABLET BY MOUTH EVERY DAY     azelastine 137 mcg (0.1 %) nasal spray  Commonly known as: ASTELIN  1 spray (137 mcg total) by Nasal route 2 (two) times daily.     fluticasone propionate 50 mcg/actuation  "nasal spray  Commonly known as: FLONASE  1 spray (50 mcg total) by Each Nostril route 2 (two) times daily.     gabapentin 300 MG capsule  Commonly known as: NEURONTIN  Take 1-3 capsules (300-900 mg total) by mouth every evening.     latanoprost 0.005 % ophthalmic solution     pantoprazole 40 MG tablet  Commonly known as: PROTONIX     paroxetine 20 MG tablet  Commonly known as: PAXIL  Take 1 tablet (20 mg total) by mouth every morning.     QUEtiapine 25 MG Tab  Commonly known as: SEROQUEL  TAKE 1 TABLET BY MOUTH EVERY EVENING            SOCIAL HISTORY:     Social History     Socioeconomic History    Marital status:    Tobacco Use    Smoking status: Former Smoker     Quit date:      Years since quittin.    Smokeless tobacco: Never Used    Tobacco comment: stopped smoking 20 yrs ago.   Substance and Sexual Activity    Alcohol use: Not Currently     Alcohol/week: 5.0 standard drinks     Types: 6 Standard drinks or equivalent per week    Drug use: No    Sexual activity: Yes     Partners: Female     Birth control/protection: None   Social History Narrative    Still doing lawn service       FAMILY HISTORY:     Family History   Problem Relation Age of Onset    COPD Mother     Diabetes Mother     Hypertension Mother     COPD Father     Heart disease Brother        REVIEW OF SYSTEMS:   Review of Systems   Respiratory: Negative for cough, sputum production, shortness of breath and wheezing.    Cardiovascular: Negative for chest pain, orthopnea, claudication and leg swelling.   Gastrointestinal: Negative for abdominal pain, constipation, diarrhea, heartburn, nausea and vomiting.   Neurological: Positive for dizziness and tingling. Negative for tremors, speech change, focal weakness, seizures, loss of consciousness, weakness and headaches.       PHYSICAL EXAM:     Vitals:    22 0837   BP: (!) 150/80   Pulse: 71    Body mass index is 24.73 kg/m².  Weight: 67.4 kg (148 lb 9.4 oz)   Height: 5' 5" " (165.1 cm)     Physical Exam  Vitals reviewed.   Constitutional:       General: He is not in acute distress.     Appearance: He is well-developed. He is not diaphoretic.   Neck:      Vascular: No carotid bruit or JVD.   Cardiovascular:      Rate and Rhythm: Normal rate and regular rhythm.      Pulses: Normal pulses.      Heart sounds: Normal heart sounds.   Pulmonary:      Effort: Pulmonary effort is normal.      Breath sounds: Normal breath sounds.   Abdominal:      General: Bowel sounds are normal.      Palpations: Abdomen is soft.      Tenderness: There is no abdominal tenderness.   Musculoskeletal:      Right lower leg: No edema.      Left lower leg: No edema.   Neurological:      Mental Status: He is alert and oriented to person, place, and time.   Psychiatric:         Speech: Speech normal.         Behavior: Behavior normal.         DATA:     Laboratory:  CBC:  Recent Labs   Lab 05/03/20  0610 07/20/20  1335 12/07/21  1739   WBC 3.93 6.15 5.75   Hemoglobin 13.6 L 13.2 L 13.8 L   Hematocrit 41.2 40.3 41.0   Platelets 150 169 154       CHEMISTRIES:  Recent Labs   Lab 05/03/20  0610 05/03/20  0610 07/24/20  0648 07/24/20  0648 12/09/20  0815 07/14/21  0945 12/07/21  1739   Glucose 100  --  111 H  --   --   --  75   Sodium 142  --  140  --   --   --  143   Potassium 3.6  --  4.2  --   --   --  3.6   BUN 13  --  22  --   --   --  19   Creatinine 0.8   < > 0.8   < > 0.7 0.8 0.7   eGFR if African American >60   < > >60   < > >60 >60 >60   eGFR if non African American >60   < > >60   < > >60 >60 >60   Calcium 8.9  --  9.0  --   --   --  8.8    < > = values in this interval not displayed.       CARDIAC BIOMARKERS:  Recent Labs   Lab 04/27/20  1920 05/03/20  0610 12/07/21  1739   Troponin I <0.006 <0.006 <0.006       COAGS:  Recent Labs   Lab 03/07/20  1237 07/20/20  1335   INR 0.9 1.0       LIPIDS/LFTS:  Recent Labs   Lab 02/11/19  0942 02/11/19  0942 10/08/19  0739 03/07/20  1237 04/27/20  1920 05/03/20  0610  "12/07/21  1739   Cholesterol 193  --  222 H  --   --   --   --    Triglycerides 68  --  94  --   --   --   --    HDL 52  --  58  --   --   --   --    LDL Cholesterol 127.4  --  145.2  --   --   --   --    Non-HDL Cholesterol 141  --  164  --   --   --   --    AST 13   < > 11   < > 12 10 17   ALT 13   < > 12   < > 14 12 17    < > = values in this interval not displayed.       Cardiovascular Testing:    Cardiac catheterization 07/24/2020:     1.  Mild-to-moderate nonobstructive coronary artery disease.  2.  Normal left ventricular end-diastolic pressure.  3.  No aortic stenosis.     Exercise MPS 7/2/20:       Abnormal myocardial perfusion study.    There is a moderate sized, moderate intensity, mostly reversible defect with some fixed areas in the inferior wall(s).    Gated perfusion images showed an ejection fraction of 57%    The EKG portion of this study is negative for ischemia.    The patient reported no chest pain during the stress test.    There were no arrhythmias during stress.     Holter 3/17/20:     · Sinus rhythm with heart rates varying between 48 and 118 bpm with an average of 77 bpm.  · There were occasional PVCs totalling 278 and averaging 11.13 per hour.  · There were rare PACs totalling 81 and averaging 3.24 per hour.  · There were occasional runs of non-sustained SVT and lasting for 2 to 7 beats.  · Diary events ("headache") did not correspond to any arrhythmic events.     Echo 3/17/20:     · Low normal left ventricular systolic function. The estimated ejection fraction is 50%.  · No wall motion abnormalities.  · Normal right ventricular systolic function.  · The sinuses of Valsalva is mildly dilated. 3.9cm.  · The estimated PA systolic pressure is 28 mmHg.    ASSESSMENT:     1. Nonobstructive coronary artery disease  2. Elevated blood pressure without diagnosis of hypertension  3. Hyperlipidemia  4. SVT: no recurrence  5. Aortic atherosclerosis  6. Right common iliac artery aneurysm   7. Gait " instability  8. Neuropathy    PLAN:     1. Nonobstructive coronary artery disease:  Risk factor modification  2. Elevated blood pressure without diagnosis of hypertension:  Monitor  3. Hyperlipidemia:  Follow-up lipids.  4. Neuropathy:  Neurology referral.  X-ray of neck.  5. Gait instability:  Neurology referral.  6. Return to clinic 3 months.         Keenan Avila MD, MPH, FACC, [facility]

## 2022-02-18 ENCOUNTER — PES CALL (OUTPATIENT)
Dept: ADMINISTRATIVE | Facility: CLINIC | Age: 77
End: 2022-02-18
Payer: MEDICARE

## 2022-03-03 ENCOUNTER — LAB VISIT (OUTPATIENT)
Dept: LAB | Facility: HOSPITAL | Age: 77
End: 2022-03-03
Attending: FAMILY MEDICINE
Payer: MEDICARE

## 2022-03-03 ENCOUNTER — OFFICE VISIT (OUTPATIENT)
Dept: FAMILY MEDICINE | Facility: CLINIC | Age: 77
End: 2022-03-03
Payer: MEDICARE

## 2022-03-03 VITALS
OXYGEN SATURATION: 98 % | DIASTOLIC BLOOD PRESSURE: 90 MMHG | TEMPERATURE: 97 F | RESPIRATION RATE: 16 BRPM | BODY MASS INDEX: 24.1 KG/M2 | SYSTOLIC BLOOD PRESSURE: 141 MMHG | HEIGHT: 65 IN | WEIGHT: 144.63 LBS | HEART RATE: 52 BPM

## 2022-03-03 DIAGNOSIS — R79.9 ABNORMAL FINDING OF BLOOD CHEMISTRY: ICD-10-CM

## 2022-03-03 DIAGNOSIS — Z00.00 ANNUAL PHYSICAL EXAM: Primary | ICD-10-CM

## 2022-03-03 DIAGNOSIS — J43.9 PULMONARY EMPHYSEMA, UNSPECIFIED EMPHYSEMA TYPE: ICD-10-CM

## 2022-03-03 DIAGNOSIS — I47.10 SVT (SUPRAVENTRICULAR TACHYCARDIA): ICD-10-CM

## 2022-03-03 LAB
ALBUMIN SERPL BCP-MCNC: 4.1 G/DL (ref 3.5–5.2)
ALP SERPL-CCNC: 64 U/L (ref 55–135)
ALT SERPL W/O P-5'-P-CCNC: 30 U/L (ref 10–44)
ANION GAP SERPL CALC-SCNC: 9 MMOL/L (ref 8–16)
AST SERPL-CCNC: 23 U/L (ref 10–40)
BASOPHILS # BLD AUTO: 0.05 K/UL (ref 0–0.2)
BASOPHILS NFR BLD: 0.9 % (ref 0–1.9)
BILIRUB SERPL-MCNC: 2.1 MG/DL (ref 0.1–1)
BUN SERPL-MCNC: 13 MG/DL (ref 8–23)
CALCIUM SERPL-MCNC: 9.2 MG/DL (ref 8.7–10.5)
CHLORIDE SERPL-SCNC: 107 MMOL/L (ref 95–110)
CHOLEST SERPL-MCNC: 150 MG/DL (ref 120–199)
CHOLEST/HDLC SERPL: 2.3 {RATIO} (ref 2–5)
CO2 SERPL-SCNC: 26 MMOL/L (ref 23–29)
CREAT SERPL-MCNC: 0.7 MG/DL (ref 0.5–1.4)
D DIMER PPP IA.FEU-MCNC: 0.35 MG/L FEU
DIFFERENTIAL METHOD: ABNORMAL
EOSINOPHIL # BLD AUTO: 0.1 K/UL (ref 0–0.5)
EOSINOPHIL NFR BLD: 1.7 % (ref 0–8)
ERYTHROCYTE [DISTWIDTH] IN BLOOD BY AUTOMATED COUNT: 14.4 % (ref 11.5–14.5)
EST. GFR  (AFRICAN AMERICAN): >60 ML/MIN/1.73 M^2
EST. GFR  (NON AFRICAN AMERICAN): >60 ML/MIN/1.73 M^2
ESTIMATED AVG GLUCOSE: 100 MG/DL (ref 68–131)
GLUCOSE SERPL-MCNC: 82 MG/DL (ref 70–110)
HBA1C MFR BLD: 5.1 % (ref 4–5.6)
HCT VFR BLD AUTO: 43.1 % (ref 40–54)
HDLC SERPL-MCNC: 65 MG/DL (ref 40–75)
HDLC SERPL: 43.3 % (ref 20–50)
HGB BLD-MCNC: 13.6 G/DL (ref 14–18)
IMM GRANULOCYTES # BLD AUTO: 0.01 K/UL (ref 0–0.04)
IMM GRANULOCYTES NFR BLD AUTO: 0.2 % (ref 0–0.5)
LDLC SERPL CALC-MCNC: 73.2 MG/DL (ref 63–159)
LYMPHOCYTES # BLD AUTO: 1.6 K/UL (ref 1–4.8)
LYMPHOCYTES NFR BLD: 30 % (ref 18–48)
MCH RBC QN AUTO: 29.7 PG (ref 27–31)
MCHC RBC AUTO-ENTMCNC: 31.6 G/DL (ref 32–36)
MCV RBC AUTO: 94 FL (ref 82–98)
MONOCYTES # BLD AUTO: 0.4 K/UL (ref 0.3–1)
MONOCYTES NFR BLD: 8.1 % (ref 4–15)
NEUTROPHILS # BLD AUTO: 3.1 K/UL (ref 1.8–7.7)
NEUTROPHILS NFR BLD: 59.1 % (ref 38–73)
NONHDLC SERPL-MCNC: 85 MG/DL
NRBC BLD-RTO: 0 /100 WBC
PLATELET # BLD AUTO: 148 K/UL (ref 150–450)
PMV BLD AUTO: 11.1 FL (ref 9.2–12.9)
POTASSIUM SERPL-SCNC: 4.6 MMOL/L (ref 3.5–5.1)
PROT SERPL-MCNC: 7.2 G/DL (ref 6–8.4)
RBC # BLD AUTO: 4.58 M/UL (ref 4.6–6.2)
SODIUM SERPL-SCNC: 142 MMOL/L (ref 136–145)
TRIGL SERPL-MCNC: 59 MG/DL (ref 30–150)
TSH SERPL DL<=0.005 MIU/L-ACNC: 0.72 UIU/ML (ref 0.4–4)
URATE SERPL-MCNC: 6.4 MG/DL (ref 3.4–7)
WBC # BLD AUTO: 5.3 K/UL (ref 3.9–12.7)

## 2022-03-03 PROCEDURE — 87389 HIV-1 AG W/HIV-1&-2 AB AG IA: CPT | Performed by: FAMILY MEDICINE

## 2022-03-03 PROCEDURE — 1126F AMNT PAIN NOTED NONE PRSNT: CPT | Mod: CPTII,S$GLB,, | Performed by: FAMILY MEDICINE

## 2022-03-03 PROCEDURE — 99999 PR PBB SHADOW E&M-EST. PATIENT-LVL III: CPT | Mod: PBBFAC,,, | Performed by: FAMILY MEDICINE

## 2022-03-03 PROCEDURE — 85025 COMPLETE CBC W/AUTO DIFF WBC: CPT | Performed by: FAMILY MEDICINE

## 2022-03-03 PROCEDURE — 80061 LIPID PANEL: CPT | Performed by: FAMILY MEDICINE

## 2022-03-03 PROCEDURE — 84550 ASSAY OF BLOOD/URIC ACID: CPT | Performed by: FAMILY MEDICINE

## 2022-03-03 PROCEDURE — 99397 PER PM REEVAL EST PAT 65+ YR: CPT | Mod: S$GLB,,, | Performed by: FAMILY MEDICINE

## 2022-03-03 PROCEDURE — 3080F PR MOST RECENT DIASTOLIC BLOOD PRESSURE >= 90 MM HG: ICD-10-PCS | Mod: CPTII,S$GLB,, | Performed by: FAMILY MEDICINE

## 2022-03-03 PROCEDURE — 3288F FALL RISK ASSESSMENT DOCD: CPT | Mod: CPTII,S$GLB,, | Performed by: FAMILY MEDICINE

## 2022-03-03 PROCEDURE — 1159F PR MEDICATION LIST DOCUMENTED IN MEDICAL RECORD: ICD-10-PCS | Mod: CPTII,S$GLB,, | Performed by: FAMILY MEDICINE

## 2022-03-03 PROCEDURE — 36415 COLL VENOUS BLD VENIPUNCTURE: CPT | Mod: PO | Performed by: FAMILY MEDICINE

## 2022-03-03 PROCEDURE — 99499 RISK ADDL DX/OHS AUDIT: ICD-10-PCS | Mod: S$GLB,,, | Performed by: FAMILY MEDICINE

## 2022-03-03 PROCEDURE — 1101F PT FALLS ASSESS-DOCD LE1/YR: CPT | Mod: CPTII,S$GLB,, | Performed by: FAMILY MEDICINE

## 2022-03-03 PROCEDURE — 84443 ASSAY THYROID STIM HORMONE: CPT | Performed by: FAMILY MEDICINE

## 2022-03-03 PROCEDURE — 3080F DIAST BP >= 90 MM HG: CPT | Mod: CPTII,S$GLB,, | Performed by: FAMILY MEDICINE

## 2022-03-03 PROCEDURE — 1159F MED LIST DOCD IN RCRD: CPT | Mod: CPTII,S$GLB,, | Performed by: FAMILY MEDICINE

## 2022-03-03 PROCEDURE — 1160F PR REVIEW ALL MEDS BY PRESCRIBER/CLIN PHARMACIST DOCUMENTED: ICD-10-PCS | Mod: CPTII,S$GLB,, | Performed by: FAMILY MEDICINE

## 2022-03-03 PROCEDURE — 3077F SYST BP >= 140 MM HG: CPT | Mod: CPTII,S$GLB,, | Performed by: FAMILY MEDICINE

## 2022-03-03 PROCEDURE — 1160F RVW MEDS BY RX/DR IN RCRD: CPT | Mod: CPTII,S$GLB,, | Performed by: FAMILY MEDICINE

## 2022-03-03 PROCEDURE — 80053 COMPREHEN METABOLIC PANEL: CPT | Performed by: FAMILY MEDICINE

## 2022-03-03 PROCEDURE — 3288F PR FALLS RISK ASSESSMENT DOCUMENTED: ICD-10-PCS | Mod: CPTII,S$GLB,, | Performed by: FAMILY MEDICINE

## 2022-03-03 PROCEDURE — 83036 HEMOGLOBIN GLYCOSYLATED A1C: CPT | Performed by: FAMILY MEDICINE

## 2022-03-03 PROCEDURE — 1126F PR PAIN SEVERITY QUANTIFIED, NO PAIN PRESENT: ICD-10-PCS | Mod: CPTII,S$GLB,, | Performed by: FAMILY MEDICINE

## 2022-03-03 PROCEDURE — 99999 PR PBB SHADOW E&M-EST. PATIENT-LVL III: ICD-10-PCS | Mod: PBBFAC,,, | Performed by: FAMILY MEDICINE

## 2022-03-03 PROCEDURE — 99499 UNLISTED E&M SERVICE: CPT | Mod: S$GLB,,, | Performed by: FAMILY MEDICINE

## 2022-03-03 PROCEDURE — 99397 PR PREVENTIVE VISIT,EST,65 & OVER: ICD-10-PCS | Mod: S$GLB,,, | Performed by: FAMILY MEDICINE

## 2022-03-03 PROCEDURE — 85379 FIBRIN DEGRADATION QUANT: CPT | Performed by: FAMILY MEDICINE

## 2022-03-03 PROCEDURE — 1101F PR PT FALLS ASSESS DOC 0-1 FALLS W/OUT INJ PAST YR: ICD-10-PCS | Mod: CPTII,S$GLB,, | Performed by: FAMILY MEDICINE

## 2022-03-03 PROCEDURE — 3077F PR MOST RECENT SYSTOLIC BLOOD PRESSURE >= 140 MM HG: ICD-10-PCS | Mod: CPTII,S$GLB,, | Performed by: FAMILY MEDICINE

## 2022-03-03 NOTE — PROGRESS NOTES
Chief Complaint   Patient presents with    Annual Exam     Annual/ labs       SUBJECTIVE:   Reed Torres is a 77 y.o. male presenting for his annual checkup.   Still dealing with anxiety from the [facility]  Current Outpatient Medications   Medication Sig Dispense Refill    atorvastatin (LIPITOR) 40 MG tablet TAKE 1 TABLET BY MOUTH EVERY DAY 90 tablet 3    azelastine (ASTELIN) 137 mcg (0.1 %) nasal spray 1 spray (137 mcg total) by Nasal route 2 (two) times daily. 30 mL 3    fluticasone propionate (FLONASE) 50 mcg/actuation nasal spray 1 spray (50 mcg total) by Each Nostril route 2 (two) times daily. 18.2 mL 3    latanoprost 0.005 % ophthalmic solution       pantoprazole (PROTONIX) 40 MG tablet Take 40 mg by mouth once daily.      paroxetine (PAXIL) 20 MG tablet Take 1 tablet (20 mg total) by mouth every morning. 30 tablet 0    QUEtiapine (SEROQUEL) 25 MG Tab TAKE 1 TABLET BY MOUTH EVERY EVENING 90 tablet 3    gabapentin (NEURONTIN) 300 MG capsule Take 1-3 capsules (300-900 mg total) by mouth every evening. 90 capsule 11     Current Facility-Administered Medications   Medication Dose Route Frequency Provider Last Rate Last Admin    sodium chloride 0.9% flush 3 mL  3 mL Intravenous Q8H PRN Keenan Avila MD         Allergies: Patient has no known allergies.   Patient Active Problem List    Diagnosis Date Noted    SVT (supraventricular tachycardia) 05/12/2021    Pulmonary emphysema 05/12/2021    Anxiety disorder 02/09/2021    Abnormal stress test 07/24/2020    Body water dehydration 09/18/2019    Hyperbilirubinemia, long standing, favor GILBERT 02/10/2018    Osteoarthritis of lumbar spine 07/21/2014    BPH (benign prostatic hyperplasia) 11/22/2013    Body mass index (BMI) of 23.0-23.9 in adult 11/22/2013    Hyperlipidemia     Tuberculosis exposure        ROS:  Feeling well. No dyspnea or chest pain on exertion. No abdominal pain, change in bowel habits, black or bloody stools. No urinary tract or  "prostatic symptoms. No neurological complaints.    OBJECTIVE:   The patient appears well, alert, oriented x 3, in no distress.   BP (!) 141/90 (BP Location: Right arm, Patient Position: Sitting, BP Method: Medium (Manual))   Pulse (!) 52   Temp 97 °F (36.1 °C) (Oral)   Resp 16   Ht 5' 5" (1.651 m)   Wt 65.6 kg (144 lb 10 oz)   SpO2 98%   BMI 24.07 kg/m²   Wt Readings from Last 5 Encounters:   03/03/22 65.6 kg (144 lb 10 oz)   02/01/22 67.4 kg (148 lb 9.4 oz)   01/24/22 67.4 kg (148 lb 7.7 oz)   12/16/21 64.8 kg (142 lb 13.7 oz)   12/15/21 65.6 kg (144 lb 10 oz)       ENT normal.  Neck supple. No adenopathy or thyromegaly. ELISA. Lungs are clear, good air entry, no wheezes, rhonchi or rales. S1 and S2 normal, no murmurs, regular rate and rhythm. Abdomen is soft without tenderness, guarding, mass or organomegaly.  exam: deferred.  Extremities show no edema, normal peripheral pulses. Neurological is normal without focal findings.    ASSESSMENT:   1. Annual physical exam    2. SVT (supraventricular tachycardia)    3. Pulmonary emphysema, unspecified emphysema type    4. Abnormal finding of blood chemistry          PLAN:   Counseled on age appropriate medical preventative services, including age appropriate cancer screenings, over all nutritional health, need for a consistent exercise regimen and an over all push towards maintaining a vigorous and active lifestyle.  Counseled on age appropriate vaccines and discussed upcoming health care needs based on age/gender.  Spent time with patient counseling on need for a good patient/doctor relationship moving forward.  Discussed use of common OTC medications and supplements.  Discussed common dietary aids and use of caffeine and the need for good sleep hygiene and stress management.    Problem List Items Addressed This Visit     SVT (supraventricular tachycardia)    Relevant Orders    CBC Auto Differential (Completed)    Comprehensive Metabolic Panel (Completed)    " Hemoglobin A1C (Completed)    HIV 1/2 Ag/Ab (4th Gen) (Completed)    Lipid Panel (Completed)    Urinalysis (Completed)    Uric Acid (Completed)    TSH (Completed)    D dimer, quantitative (Completed)    Pulmonary emphysema    Relevant Orders    CBC Auto Differential (Completed)    Comprehensive Metabolic Panel (Completed)    Hemoglobin A1C (Completed)    HIV 1/2 Ag/Ab (4th Gen) (Completed)    Lipid Panel (Completed)    Urinalysis (Completed)    Uric Acid (Completed)    TSH (Completed)    D dimer, quantitative (Completed)      Other Visit Diagnoses     Annual physical exam    -  Primary    Abnormal finding of blood chemistry        Relevant Orders    CBC Auto Differential (Completed)    Comprehensive Metabolic Panel (Completed)    Hemoglobin A1C (Completed)    HIV 1/2 Ag/Ab (4th Gen) (Completed)    Lipid Panel (Completed)    Urinalysis (Completed)    Uric Acid (Completed)    TSH (Completed)    D dimer, quantitative (Completed)

## 2022-03-04 LAB — HIV 1+2 AB+HIV1 P24 AG SERPL QL IA: NEGATIVE

## 2022-03-07 ENCOUNTER — TELEPHONE (OUTPATIENT)
Dept: FAMILY MEDICINE | Facility: CLINIC | Age: 77
End: 2022-03-07
Payer: MEDICARE

## 2022-03-07 NOTE — PROGRESS NOTES
Patient, Reed Torres (MRN #[id number]), presented with a recent Platelet count less than 150 K/uL consistent with the definition of thrombocytopenia (ICD10 - D69.6).    Platelets   Date Value Ref Range Status   03/03/2022 148 (L) 150 - 450 K/uL Final     The patient's thrombocytopenia was monitored, evaluated, addressed and/or treated. This addendum to the medical record is made on 03/07/2022.

## 2022-03-07 NOTE — TELEPHONE ENCOUNTER
----- Message from Pj Gillette MD sent at 3/6/2022  3:47 PM CST -----  Let him know his blood work is great

## 2022-03-20 ENCOUNTER — HOSPITAL ENCOUNTER (EMERGENCY)
Facility: HOSPITAL | Age: 77
Discharge: HOME | End: 2022-03-20
Attending: EMERGENCY MEDICINE | Admitting: EMERGENCY MEDICINE
Payer: MEDICARE

## 2022-03-20 VITALS
HEIGHT: 65 IN | HEART RATE: 96 BPM | RESPIRATION RATE: 25 BRPM | SYSTOLIC BLOOD PRESSURE: 121 MMHG | OXYGEN SATURATION: 92 % | TEMPERATURE: 98 F | WEIGHT: 146 LBS | DIASTOLIC BLOOD PRESSURE: 69 MMHG | BODY MASS INDEX: 24.32 KG/M2

## 2022-03-20 DIAGNOSIS — K62.5 BRIGHT RED BLOOD PER RECTUM: ICD-10-CM

## 2022-03-20 DIAGNOSIS — I48.91 ATRIAL FIBRILLATION WITH RAPID VENTRICULAR RESPONSE: Primary | ICD-10-CM

## 2022-03-20 DIAGNOSIS — R10.13 EPIGASTRIC ABDOMINAL PAIN: ICD-10-CM

## 2022-03-20 LAB
ALBUMIN SERPL BCP-MCNC: 3.7 G/DL (ref 3.5–5.2)
ALP SERPL-CCNC: 57 U/L (ref 55–135)
ALT SERPL W/O P-5'-P-CCNC: 23 U/L (ref 10–44)
ANION GAP SERPL CALC-SCNC: 10 MMOL/L (ref 8–16)
AST SERPL-CCNC: 20 U/L (ref 10–40)
BASOPHILS # BLD AUTO: 0.02 K/UL (ref 0–0.2)
BASOPHILS NFR BLD: 0.4 % (ref 0–1.9)
BILIRUB SERPL-MCNC: 1.2 MG/DL (ref 0.1–1)
BUN SERPL-MCNC: 15 MG/DL (ref 8–23)
CALCIUM SERPL-MCNC: 8.8 MG/DL (ref 8.7–10.5)
CHLORIDE SERPL-SCNC: 110 MMOL/L (ref 95–110)
CO2 SERPL-SCNC: 23 MMOL/L (ref 23–29)
CREAT SERPL-MCNC: 0.7 MG/DL (ref 0.5–1.4)
CTP QC/QA: YES
DIFFERENTIAL METHOD: ABNORMAL
EOSINOPHIL # BLD AUTO: 0.1 K/UL (ref 0–0.5)
EOSINOPHIL NFR BLD: 1.1 % (ref 0–8)
ERYTHROCYTE [DISTWIDTH] IN BLOOD BY AUTOMATED COUNT: 13.6 % (ref 11.5–14.5)
EST. GFR  (AFRICAN AMERICAN): >60 ML/MIN/1.73 M^2
EST. GFR  (NON AFRICAN AMERICAN): >60 ML/MIN/1.73 M^2
GLUCOSE SERPL-MCNC: 95 MG/DL (ref 70–110)
HCT VFR BLD AUTO: 41.3 % (ref 40–54)
HGB BLD-MCNC: 13.8 G/DL (ref 14–18)
IMM GRANULOCYTES # BLD AUTO: 0 K/UL (ref 0–0.04)
IMM GRANULOCYTES NFR BLD AUTO: 0 % (ref 0–0.5)
LIPASE SERPL-CCNC: 8 U/L (ref 4–60)
LYMPHOCYTES # BLD AUTO: 1 K/UL (ref 1–4.8)
LYMPHOCYTES NFR BLD: 20.6 % (ref 18–48)
MCH RBC QN AUTO: 30.5 PG (ref 27–31)
MCHC RBC AUTO-ENTMCNC: 33.4 G/DL (ref 32–36)
MCV RBC AUTO: 91 FL (ref 82–98)
MONOCYTES # BLD AUTO: 0.3 K/UL (ref 0.3–1)
MONOCYTES NFR BLD: 6.7 % (ref 4–15)
NEUTROPHILS # BLD AUTO: 3.4 K/UL (ref 1.8–7.7)
NEUTROPHILS NFR BLD: 71.2 % (ref 38–73)
NRBC BLD-RTO: 0 /100 WBC
PLATELET # BLD AUTO: 145 K/UL (ref 150–450)
PMV BLD AUTO: 11.3 FL (ref 9.2–12.9)
POTASSIUM SERPL-SCNC: 4.1 MMOL/L (ref 3.5–5.1)
PROT SERPL-MCNC: 6.4 G/DL (ref 6–8.4)
RBC # BLD AUTO: 4.52 M/UL (ref 4.6–6.2)
SARS-COV-2 RDRP RESP QL NAA+PROBE: NEGATIVE
SODIUM SERPL-SCNC: 143 MMOL/L (ref 136–145)
WBC # BLD AUTO: 4.75 K/UL (ref 3.9–12.7)

## 2022-03-20 PROCEDURE — 80053 COMPREHEN METABOLIC PANEL: CPT | Performed by: EMERGENCY MEDICINE

## 2022-03-20 PROCEDURE — 96365 THER/PROPH/DIAG IV INF INIT: CPT

## 2022-03-20 PROCEDURE — 99284 EMERGENCY DEPT VISIT MOD MDM: CPT | Mod: 25

## 2022-03-20 PROCEDURE — 93010 EKG 12-LEAD: ICD-10-PCS | Mod: ,,, | Performed by: INTERNAL MEDICINE

## 2022-03-20 PROCEDURE — 25000003 PHARM REV CODE 250: Performed by: EMERGENCY MEDICINE

## 2022-03-20 PROCEDURE — 96375 TX/PRO/DX INJ NEW DRUG ADDON: CPT

## 2022-03-20 PROCEDURE — 83690 ASSAY OF LIPASE: CPT | Performed by: EMERGENCY MEDICINE

## 2022-03-20 PROCEDURE — 93005 ELECTROCARDIOGRAM TRACING: CPT

## 2022-03-20 PROCEDURE — 85025 COMPLETE CBC W/AUTO DIFF WBC: CPT | Performed by: EMERGENCY MEDICINE

## 2022-03-20 PROCEDURE — U0002 COVID-19 LAB TEST NON-CDC: HCPCS | Performed by: EMERGENCY MEDICINE

## 2022-03-20 PROCEDURE — 63600175 PHARM REV CODE 636 W HCPCS: Performed by: EMERGENCY MEDICINE

## 2022-03-20 PROCEDURE — 93010 ELECTROCARDIOGRAM REPORT: CPT | Mod: ,,, | Performed by: INTERNAL MEDICINE

## 2022-03-20 RX ORDER — HYDROCORTISONE ACETATE 25 MG/1
25 SUPPOSITORY RECTAL 2 TIMES DAILY
Qty: 20 SUPPOSITORY | Refills: 0 | Status: SHIPPED | OUTPATIENT
Start: 2022-03-20 | End: 2022-03-30

## 2022-03-20 RX ORDER — ASPIRIN 325 MG
325 TABLET, DELAYED RELEASE (ENTERIC COATED) ORAL DAILY
Qty: 30 TABLET | Refills: 0 | Status: SHIPPED | OUTPATIENT
Start: 2022-03-20 | End: 2022-03-20 | Stop reason: SDUPTHER

## 2022-03-20 RX ORDER — METOPROLOL SUCCINATE 25 MG/1
25 TABLET, EXTENDED RELEASE ORAL DAILY
Qty: 30 TABLET | Refills: 1 | Status: SHIPPED | OUTPATIENT
Start: 2022-03-20 | End: 2022-05-11 | Stop reason: SDUPTHER

## 2022-03-20 RX ORDER — METOPROLOL SUCCINATE 25 MG/1
25 TABLET, EXTENDED RELEASE ORAL DAILY
Qty: 30 TABLET | Refills: 1 | Status: SHIPPED | OUTPATIENT
Start: 2022-03-20 | End: 2022-03-20 | Stop reason: SDUPTHER

## 2022-03-20 RX ORDER — ASPIRIN 325 MG
325 TABLET, DELAYED RELEASE (ENTERIC COATED) ORAL DAILY
Qty: 30 TABLET | Refills: 0 | Status: SHIPPED | OUTPATIENT
Start: 2022-03-20 | End: 2022-06-14 | Stop reason: ALTCHOICE

## 2022-03-20 RX ORDER — MAGNESIUM SULFATE 1 G/100ML
1 INJECTION INTRAVENOUS
Status: COMPLETED | OUTPATIENT
Start: 2022-03-20 | End: 2022-03-20

## 2022-03-20 RX ORDER — METOPROLOL SUCCINATE 25 MG/1
25 TABLET, EXTENDED RELEASE ORAL
Status: COMPLETED | OUTPATIENT
Start: 2022-03-20 | End: 2022-03-20

## 2022-03-20 RX ORDER — METOPROLOL TARTRATE 1 MG/ML
5 INJECTION, SOLUTION INTRAVENOUS
Status: COMPLETED | OUTPATIENT
Start: 2022-03-20 | End: 2022-03-20

## 2022-03-20 RX ORDER — POLYETHYLENE GLYCOL 3350 17 G/17G
17 POWDER, FOR SOLUTION ORAL 2 TIMES DAILY
Qty: 289 G | Refills: 2 | Status: SHIPPED | OUTPATIENT
Start: 2022-03-20 | End: 2022-09-28

## 2022-03-20 RX ORDER — METOPROLOL TARTRATE 1 MG/ML
5 INJECTION, SOLUTION INTRAVENOUS
Status: DISCONTINUED | OUTPATIENT
Start: 2022-03-20 | End: 2022-03-20

## 2022-03-20 RX ADMIN — METOPROLOL SUCCINATE 25 MG: 25 TABLET, EXTENDED RELEASE ORAL at 11:03

## 2022-03-20 RX ADMIN — SODIUM CHLORIDE, SODIUM LACTATE, POTASSIUM CHLORIDE, AND CALCIUM CHLORIDE 1000 ML: .6; .31; .03; .02 INJECTION, SOLUTION INTRAVENOUS at 10:03

## 2022-03-20 RX ADMIN — METOROPROLOL TARTRATE 5 MG: 5 INJECTION, SOLUTION INTRAVENOUS at 10:03

## 2022-03-20 RX ADMIN — MAGNESIUM SULFATE 1 G: 1 INJECTION INTRAVENOUS at 10:03

## 2022-03-20 NOTE — DISCHARGE INSTRUCTIONS
Please take all medications as prescribed.  As discussed, if you start developing lightheadedness, chest pain, shortness of breath, passing out, or severe increase in the volume of blood with your stools or any stools with black or dark maroon blood, please return to the emergency department.   Please follow-up with Dr. Mcclendon or with [facility] listed on his discharge instructions for likely colonoscopy/ office follow-up evaluation.  Please do that this week.    Please follow-up with Dr. Hdz this week for further evaluation management of new arrhythmia which is atrial fibrillation.  I am prescribing a medication in conjunction with Cardiology recommendations which is called metoprolol succinate or Toprol XL.  You will take this 25 mg tablet once daily.  Please increase your aspirin dose from 81 mg to 325 mg by mouth daily.

## 2022-03-20 NOTE — ED NOTES
76 yo male to ED w/ complaints of bright red rectal bleeding this morning x 3 episodes. Pt Denies N/V/D, CP, SOB. Ambulatory to ED, VSS, NAD, AAOx4

## 2022-03-20 NOTE — ED PROVIDER NOTES
"Encounter Date: 3/20/2022    SCRIBE #1 NOTE: I, Leonard Glynn, am scribing for, and in the presence of, Lonnie Pearl Jr., MD.       History     Chief Complaint   Patient presents with    Abdominal Pain     Patient reports a burning epigastric pain that started last night, and 3 episodes of bright red stools this morning. Denies GI hx. Denies nausea, vomiting.      Reed Torres is a 77 y.o. male who presents to the Emergency Department for evaluation of 3 episodes of bright red bloody stool since midnight. Patient describes there is a small amount of blood directly in his stool, but reports most of the blood is on the toilet paper when wiping. He is also complaining of a 1/10 in severity epigastric discomfort. Patient endorses straining hard and forcing himself to have bowel movements. He denies any black stool, fever, nausea, vomiting, diarrhea, constipation, chest pain, shortness of breath, urinary problems, known sick contacts, or other associated symptoms. Patient denies dizziness or lightheadedness, but states "sometimes he feels funny". He does not endorse any recent antibiotic usage, recent travel outside the country, or NSAID usage. Patient recalls no history of hemorrhoids. He endorses compliance with his pantoprazole regimen. Patient explains he takes a daily baby aspirin, but no other blood thinners.      The history is provided by the patient.     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 CA    SHOULDER ARTHROSCOPY W/ ROTATOR CUFF REPAIR      " Left shoulder     Family History   Problem Relation Age of Onset    COPD Mother     Diabetes Mother     Hypertension Mother     COPD Father     Heart disease Brother      Social History     Tobacco Use    Smoking status: Former Smoker     Quit date: 2000     Years since quittin.2    Smokeless tobacco: Never Used    Tobacco comment: stopped smoking 20 yrs ago.   Substance Use Topics    Alcohol use: Not Currently     Alcohol/week: 5.0 standard drinks     Types: 6 Standard drinks or equivalent per week    Drug use: No     Review of Systems   Constitutional: Negative for chills and fever.   HENT: Negative for sore throat.    Eyes: Negative for pain.   Respiratory: Negative for shortness of breath.    Cardiovascular: Negative for chest pain.   Gastrointestinal: Positive for abdominal pain and blood in stool. Negative for constipation, diarrhea, nausea and vomiting.   Genitourinary: Negative for dysuria.   Musculoskeletal: Negative for back pain.   Skin: Negative for rash.   Neurological: Negative for dizziness and light-headedness.   All other systems reviewed and are negative.      Physical Exam     Initial Vitals [22 0809]   BP Pulse Resp Temp SpO2   139/83 83 18 98 °F (36.7 °C) 98 %      MAP       --         Physical Exam    Nursing note and vitals reviewed.  Constitutional: He appears well-developed and well-nourished. He is not diaphoretic. No distress.   HENT:   Head: Normocephalic.   Right Ear: External ear normal.   Left Ear: External ear normal.   Nose: Nose normal.   Mouth/Throat: Oropharynx is clear and moist.   Eyes: Conjunctivae and EOM are normal. Pupils are equal, round, and reactive to light. Right eye exhibits no discharge. Left eye exhibits no discharge. No scleral icterus.   Neck: Neck supple. No JVD present.   Normal range of motion.  Cardiovascular: Normal rate, regular rhythm, normal heart sounds and intact distal pulses. Exam reveals no gallop and no friction rub.    No murmur  heard.  Pulmonary/Chest: Breath sounds normal. No stridor. No respiratory distress. He has no wheezes. He has no rhonchi. He has no rales. He exhibits no tenderness.   Abdominal: Abdomen is soft. Bowel sounds are normal. He exhibits no distension and no mass. There is no abdominal tenderness.   Patient's abdomen is soft and nondistended.  No hernias or masses.  No tenderness in any of the areas of the abdomen on palpation. There is no rebound and no guarding.   Genitourinary: Rectum:      Guaiac result positive.   Guaiac positive stool. : Acceptable.   Genitourinary Comments: Female chaperone in the room.  Rectal examination reveals reddish tinged stool with bright red color in the rectal vault and a small amount.  It is guaiac positive on examination.     Musculoskeletal:         General: No tenderness or edema. Normal range of motion.      Cervical back: Normal range of motion and neck supple.     Neurological: He is alert and oriented to person, place, and time. He has normal strength. No cranial nerve deficit or sensory deficit.   Skin: Skin is warm and dry. No rash noted. No erythema. No pallor.   Psychiatric: He has a normal mood and affect. His behavior is normal. Judgment and thought content normal.         ED Course   Procedures  Labs Reviewed   CBC W/ AUTO DIFFERENTIAL - Abnormal; Notable for the following components:       Result Value    RBC 4.52 (*)     Hemoglobin 13.8 (*)     Platelets 145 (*)     All other components within normal limits   COMPREHENSIVE METABOLIC PANEL - Abnormal; Notable for the following components:    Total Bilirubin 1.2 (*)     All other components within normal limits   LIPASE   SARS-COV-2 RDRP GENE     EKG Readings: (Independently Interpreted)   Initial Reading: No STEMI. Ectopy: No Ectopy.   EKG reviewed and interpreted by me shows atrial fibrillation with rapid ventricular response at a rate of 107 beats per minute.  The QRS and QTC intervals are normal.   There is a left axis deviation.  There is normal R-wave progression across the precordium.  There are no ST segment or T-wave ischemic findings.         Imaging Results    None          Medications   metoprolol injection 5 mg (5 mg Intravenous Given 3/20/22 1029)   lactated ringers bolus 1,000 mL (0 mLs Intravenous Stopped 3/20/22 1131)   magnesium sulfate in dextrose IVPB (premix) 1 g (0 g Intravenous Stopped 3/20/22 1131)   metoprolol succinate (TOPROL-XL) 24 hr tablet 25 mg (25 mg Oral Given 3/20/22 1131)     Medical Decision Making:   History:   Old Medical Records: I decided to obtain old medical records.  Clinical Tests:   Lab Tests: Ordered and Reviewed  Medical Tests: Reviewed and Ordered  ED Management:  This is the emergent evaluation of a 77-year-old male presents emergency department for evaluation of 3 episodes of bright red blood per rectum coating the stool.  Patient states he noted blood on the toilet paper.  Differential diagnosis at the time of initial evaluation included, but was not limited to:  Internal hemorrhoids, diverticulosis, arteriovenous malformation, angiodysplasia.  I considered but doubt infectious cause or ischemic bowel etiology.  This patient states that he does strain often when he has a bowel movement.  He denies regular the being constipated.  He denies any diarrhea.  No fever.  No lightheadedness.  Patient states he has never happened to him before.  He does have some epigastric pain but states that this is something that has been going on for years.  He sees Dr. Mcclendon from [facility].  He takes pantoprazole 40 mg daily.  He does not take anticoagulants.  He is only on 81 aspirin but does not take any other antiplatelet agents.  No NSAID use.  He denies black stools.  He has not had any vomiting.  No fever or chills.  He states his diet has been regular and his appetite has been very good.        Patient's EKG shows atrial fibrillation.  Heart rate is 107. Right now, patient's  heart rate is 105 on the monitor.  He denies symptoms associated with this such as chest pain or shortness of breath.  He is not feeling weak.  I reviewed the patient's medical record and spoke with the patient.  He apparently does not have any diagnosis in the past of atrial fibrillation.  He is not on any anticoagulation medications.  He takes 81 mg of aspirin daily only.  He states that his regular cardiologist is Dr. Avila. [id number] score is 2-3.    I discussed this case with Dr. Hudson from [facility].  She advises regular treatment for possible hemorrhoidal bleed.  In we know that there are other possible etiologies of this plate be given small volume and given lack of patient's significant symptoms, she and I are comfortable sending this patient now with plan for close follow-up and colonoscopy.  [facility]  but [facility] or can set him up as well.  I will discuss this with the patient.  Additionally, the patient does have new onset atrial fibrillation.  He has had spontaneous cardioversion to sinus rhythm while in the emergency department.  He is currently in atrial fibrillation at a rate of 108. I discussed this case with Dr. Rojas from cardiology.  The patient sees Dr. Avila regularly.  He does not advise anticoagulating this patient though Dr. Hudson does feel comfortable anticoagulating this patient if that were to be cardiology's recommendation.  You would advise increasing aspirin to 325 mg daily and having the patient follow-up closely in a short period of time with Dr. Avila in the office.  He also advises starting Toprol-XL 25 daily.  Additionally, we discussed given the patient a dose of metoprolol tartrate 5 mg IV in the emergency department for better rate control.  The patient's hemoglobin and hematocrit appear stable.    Unclear whether patient will follow up with [facility] or [facility] given his prior relationship with Dr. Mcclendon.   He does likely require follow-up colonoscopy.  I have explained all the above after rate control in the emergency department.  We will change his medications as above.  Advised return for new or worsening symptoms such as severe worsening bleeding, lightheadedness, chest pain, shortness of breath.              Scribe Attestation:   Scribe #1: I performed the above scribed service and the documentation accurately describes the services I performed. I attest to the accuracy of the note.                 Clinical Impression:   Final diagnoses:  [R10.13] Epigastric abdominal pain  [I48.91] Atrial fibrillation with rapid ventricular response (Primary)  [K62.5] Bright red blood per rectum          ED Disposition Condition    Discharge Stable        ED Prescriptions     Medication Sig Dispense Start Date End Date Auth. Provider    metoprolol succinate (TOPROL-XL) 25 MG 24 hr tablet  (Status: Discontinued) Take 1 tablet (25 mg total) by mouth once daily. 30 tablet 3/20/2022 3/20/2022 Lonnie Pearl Jr., MD    aspirin (ECOTRIN) 325 MG EC tablet  (Status: Discontinued) Take 1 tablet (325 mg total) by mouth once daily. 30 tablet 3/20/2022 3/20/2022 Lonnie Pearl Jr., MD    aspirin (ECOTRIN) 325 MG EC tablet Take 1 tablet (325 mg total) by mouth once daily. 30 tablet 3/20/2022 3/20/2023 Lonnie Pearl Jr., MD    metoprolol succinate (TOPROL-XL) 25 MG 24 hr tablet Take 1 tablet (25 mg total) by mouth once daily. 30 tablet 3/20/2022 3/20/2023 Lonnie Pearl Jr., MD    polyethylene glycol (GLYCOLAX) 17 gram/dose powder Take 17 g by mouth 2 (two) times daily. 289 g 3/20/2022  Lonnie Pearl Jr., MD    hydrocortisone (ANUSOL-HC) 25 mg suppository Place 1 suppository (25 mg total) rectally 2 (two) times daily. for 10 days 20 suppository 3/20/2022 3/30/2022 Lonnie Pearl Jr., MD        Follow-up Information     Follow up With Specialties Details Why Contact Info    Pj Gillette MD Family Medicine Schedule an  appointment as soon as possible for a visit in 2 weeks  [address] [facility]  [place] [address]  [phone]      Tiffany Hudson MD Gastroenterology Schedule an appointment as soon as possible for a visit in 1 week  [address]  [facility] [address]  [phone]      Keenan Avila MD Cardiovascular Disease, Interventional Cardiology, Cardiology Schedule an appointment as soon as possible for a visit in 3 days  [address]  [address]  [facility] [address]  [phone]           I, Lonnie Pearl MD, personally performed the services described in this documentation. All medical record entries made by the scribe were at my direction and in my presence. I have reviewed the chart and agree that the record reflects my personal performance and is accurate and complete.     Lonnie Pearl Jr., MD  03/20/22 5548       Lonnie Pearl Jr., MD  03/20/22 4402

## 2022-03-21 ENCOUNTER — PATIENT OUTREACH (OUTPATIENT)
Dept: ADMINISTRATIVE | Facility: OTHER | Age: 77
End: 2022-03-21
Payer: MEDICARE

## 2022-03-22 ENCOUNTER — OFFICE VISIT (OUTPATIENT)
Dept: CARDIOLOGY | Facility: CLINIC | Age: 77
End: 2022-03-22
Payer: MEDICARE

## 2022-03-22 VITALS
BODY MASS INDEX: 24.24 KG/M2 | HEIGHT: 65 IN | DIASTOLIC BLOOD PRESSURE: 80 MMHG | SYSTOLIC BLOOD PRESSURE: 132 MMHG | WEIGHT: 145.5 LBS | RESPIRATION RATE: 15 BRPM | OXYGEN SATURATION: 98 % | HEART RATE: 68 BPM

## 2022-03-22 DIAGNOSIS — I25.83 CORONARY ARTERY DISEASE DUE TO LIPID RICH PLAQUE: ICD-10-CM

## 2022-03-22 DIAGNOSIS — I72.3 ANEURYSM OF RIGHT COMMON ILIAC ARTERY: ICD-10-CM

## 2022-03-22 DIAGNOSIS — G62.9 NEUROPATHY: ICD-10-CM

## 2022-03-22 DIAGNOSIS — I47.10 SVT (SUPRAVENTRICULAR TACHYCARDIA): ICD-10-CM

## 2022-03-22 DIAGNOSIS — I71.40 ABDOMINAL ANEURYSM: ICD-10-CM

## 2022-03-22 DIAGNOSIS — I25.10 CORONARY ARTERY DISEASE DUE TO LIPID RICH PLAQUE: ICD-10-CM

## 2022-03-22 DIAGNOSIS — I48.0 PAF (PAROXYSMAL ATRIAL FIBRILLATION): Primary | ICD-10-CM

## 2022-03-22 DIAGNOSIS — I70.0 AORTIC ATHEROSCLEROSIS: ICD-10-CM

## 2022-03-22 DIAGNOSIS — E78.00 PURE HYPERCHOLESTEROLEMIA: ICD-10-CM

## 2022-03-22 DIAGNOSIS — R10.10 PAIN OF UPPER ABDOMEN: ICD-10-CM

## 2022-03-22 DIAGNOSIS — R26.9 GAIT ABNORMALITY: ICD-10-CM

## 2022-03-22 DIAGNOSIS — R03.0 ELEVATED BLOOD-PRESSURE READING WITHOUT DIAGNOSIS OF HYPERTENSION: ICD-10-CM

## 2022-03-22 PROCEDURE — 1126F PR PAIN SEVERITY QUANTIFIED, NO PAIN PRESENT: ICD-10-PCS | Mod: CPTII,S$GLB,, | Performed by: INTERNAL MEDICINE

## 2022-03-22 PROCEDURE — 99999 PR PBB SHADOW E&M-EST. PATIENT-LVL IV: ICD-10-PCS | Mod: PBBFAC,,, | Performed by: INTERNAL MEDICINE

## 2022-03-22 PROCEDURE — 3079F PR MOST RECENT DIASTOLIC BLOOD PRESSURE 80-89 MM HG: ICD-10-PCS | Mod: CPTII,S$GLB,, | Performed by: INTERNAL MEDICINE

## 2022-03-22 PROCEDURE — 3079F DIAST BP 80-89 MM HG: CPT | Mod: CPTII,S$GLB,, | Performed by: INTERNAL MEDICINE

## 2022-03-22 PROCEDURE — 1101F PR PT FALLS ASSESS DOC 0-1 FALLS W/OUT INJ PAST YR: ICD-10-PCS | Mod: CPTII,S$GLB,, | Performed by: INTERNAL MEDICINE

## 2022-03-22 PROCEDURE — 1159F PR MEDICATION LIST DOCUMENTED IN MEDICAL RECORD: ICD-10-PCS | Mod: CPTII,S$GLB,, | Performed by: INTERNAL MEDICINE

## 2022-03-22 PROCEDURE — 3288F FALL RISK ASSESSMENT DOCD: CPT | Mod: CPTII,S$GLB,, | Performed by: INTERNAL MEDICINE

## 2022-03-22 PROCEDURE — 1159F MED LIST DOCD IN RCRD: CPT | Mod: CPTII,S$GLB,, | Performed by: INTERNAL MEDICINE

## 2022-03-22 PROCEDURE — 99214 PR OFFICE/OUTPT VISIT, EST, LEVL IV, 30-39 MIN: ICD-10-PCS | Mod: S$GLB,,, | Performed by: INTERNAL MEDICINE

## 2022-03-22 PROCEDURE — 1101F PT FALLS ASSESS-DOCD LE1/YR: CPT | Mod: CPTII,S$GLB,, | Performed by: INTERNAL MEDICINE

## 2022-03-22 PROCEDURE — 3075F SYST BP GE 130 - 139MM HG: CPT | Mod: CPTII,S$GLB,, | Performed by: INTERNAL MEDICINE

## 2022-03-22 PROCEDURE — 99999 PR PBB SHADOW E&M-EST. PATIENT-LVL IV: CPT | Mod: PBBFAC,,, | Performed by: INTERNAL MEDICINE

## 2022-03-22 PROCEDURE — 3288F PR FALLS RISK ASSESSMENT DOCUMENTED: ICD-10-PCS | Mod: CPTII,S$GLB,, | Performed by: INTERNAL MEDICINE

## 2022-03-22 PROCEDURE — 99214 OFFICE O/P EST MOD 30 MIN: CPT | Mod: S$GLB,,, | Performed by: INTERNAL MEDICINE

## 2022-03-22 PROCEDURE — 1126F AMNT PAIN NOTED NONE PRSNT: CPT | Mod: CPTII,S$GLB,, | Performed by: INTERNAL MEDICINE

## 2022-03-22 PROCEDURE — 3075F PR MOST RECENT SYSTOLIC BLOOD PRESS GE 130-139MM HG: ICD-10-PCS | Mod: CPTII,S$GLB,, | Performed by: INTERNAL MEDICINE

## 2022-03-22 NOTE — PROGRESS NOTES
CARDIOLOGY CLINIC VISIT        HISTORY OF PRESENT ILLNESS:     Reed Torres who presents for continued care.  Last seen 02/1/2022.  He had complaints of upper extremity, bilateral numbness/tingling.  No weakness.  Also complaints of gait instability.   Went to the emergency room in December 2021 with complaints of feeling off balance and lightheaded.  CT of the head showed mild cerebral atrophy and chronic small-vessel ischemic changes.Referred to neuro.    Recently went to the emergency room with complaints of bright red blood per rectum.  Patient had an EKG that showed atrial fibrillation with rapid ventricular response.  Spontaneously converted to sinus rhythm.  History of SVT.     Coronary angiography from 07/24/2020 showed mild-to-moderate nonobstructive coronary artery disease.  Holter from 03/17/2020 showed average heart rate of 77 beats per minute.  Occasional PVCs.  Rare PACs.  Nonsustained SVT.  Longest was 7 beats.  Echocardiogram at that time showed ejection fraction of 50%.      Has GI appointment tomorrow.  Still with complaints of abdominal discomfort.    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 CA    SHOULDER ARTHROSCOPY W/ ROTATOR CUFF REPAIR      Left shoulder       ALLERGIES AND MEDICATION:   Review of patient's allergies indicates:  No Known Allergies     Medication List          Accurate as of March 22, 2022 10:36 AM. If you have any questions, ask your nurse or doctor.             CONTINUE taking these medications    aspirin 325 MG EC tablet  Commonly known as: ECOTRIN  Take 1 tablet (325 mg total) by mouth once daily.     atorvastatin 40 MG tablet  Commonly known as: LIPITOR  TAKE 1 TABLET BY MOUTH EVERY DAY     azelastine 137 mcg (0.1 %) nasal spray  Commonly known as: ASTELIN  1 spray (137 mcg total) by Nasal route 2 (two) times daily.     fluticasone propionate 50 mcg/actuation nasal spray  Commonly known as: FLONASE  1 spray (50 mcg total) by Each Nostril route 2 (two) times daily.     gabapentin 300 MG capsule  Commonly known as: NEURONTIN  Take 1-3 capsules (300-900 mg total) by mouth every evening.     hydrocortisone 25 mg suppository  Commonly known as: ANUSOL-HC  Place 1 suppository (25 mg total) rectally 2 (two) times daily. for 10 days     latanoprost 0.005 % ophthalmic solution     metoprolol succinate 25 MG 24 hr tablet  Commonly known as: TOPROL-XL  Take 1 tablet (25 mg total) by mouth once daily.     pantoprazole 40 MG tablet  Commonly known as: PROTONIX     paroxetine 20 MG tablet  Commonly known as: PAXIL  Take 1 tablet (20 mg total) by mouth every morning.     polyethylene glycol 17 gram/dose powder  Commonly known as: GLYCOLAX  Take 17 g by mouth 2 (two) times daily.     QUEtiapine 25 MG Tab  Commonly known as: SEROQUEL  TAKE 1 TABLET BY MOUTH EVERY EVENING            SOCIAL HISTORY:     Social History     Socioeconomic History    Marital status:    Tobacco Use    Smoking status: Former Smoker     Quit date: 2000     Years since quittin.2    Smokeless tobacco: Never Used    Tobacco comment: stopped smoking 20 yrs ago.   Substance and Sexual Activity    Alcohol use: Not Currently     Alcohol/week: 5.0 standard drinks     Types: 6 Standard drinks or equivalent per week    Drug use: No    Sexual activity: Yes     Partners: Female     Birth control/protection: None   Social History Narrative    Still doing lawn service       FAMILY HISTORY:  "    Family History   Problem Relation Age of Onset    COPD Mother     Diabetes Mother     Hypertension Mother     COPD Father     Heart disease Brother        REVIEW OF SYSTEMS:   Review of Systems   Respiratory: Negative for cough, sputum production, shortness of breath and wheezing.    Cardiovascular: Negative for chest pain, orthopnea, claudication and leg swelling.   Gastrointestinal: Positive for abdominal pain. Negative for constipation, diarrhea, heartburn, nausea and vomiting.   Neurological: Negative for dizziness, tingling, tremors, speech change, focal weakness, seizures, loss of consciousness, weakness and headaches.       PHYSICAL EXAM:     Vitals:    03/22/22 0957   BP: 132/80   Pulse: 68   Resp: 15    Body mass index is 24.21 kg/m².  Weight: 66 kg (145 lb 8.1 oz)   Height: 5' 5" (165.1 cm)     Physical Exam  Vitals reviewed.   Constitutional:       General: He is not in acute distress.     Appearance: He is well-developed. He is not diaphoretic.   Neck:      Vascular: No carotid bruit or JVD.   Cardiovascular:      Rate and Rhythm: Normal rate and regular rhythm.      Pulses: Normal pulses.      Heart sounds: Murmur heard.    Systolic murmur is present with a grade of 2/6.  Pulmonary:      Effort: Pulmonary effort is normal.      Breath sounds: Normal breath sounds.   Abdominal:      General: Bowel sounds are normal.      Palpations: Abdomen is soft.      Tenderness: There is no abdominal tenderness.   Musculoskeletal:      Right lower leg: No edema.      Left lower leg: No edema.   Neurological:      Mental Status: He is alert and oriented to person, place, and time.   Psychiatric:         Speech: Speech normal.         Behavior: Behavior normal.         DATA:     Laboratory:  CBC:  Recent Labs   Lab 12/07/21  1739 03/03/22  1030 03/20/22  0826   WBC 5.75 5.30 4.75   Hemoglobin 13.8 L 13.6 L 13.8 L   Hematocrit 41.0 43.1 41.3   Platelets 154 148 L 145 L       CHEMISTRIES:  Recent Labs   Lab " "12/07/21  1739 03/03/22  1030 03/20/22  0826   Glucose 75 82 95   Sodium 143 142 143   Potassium 3.6 4.6 4.1   BUN 19 13 15   Creatinine 0.7 0.7 0.7   eGFR if  >60 >60 >60   eGFR if non African American >60 >60 >60   Calcium 8.8 9.2 8.8       CARDIAC BIOMARKERS:  Recent Labs   Lab 04/27/20  1920 05/03/20  0610 12/07/21  1739   Troponin I <0.006 <0.006 <0.006       COAGS:  Recent Labs   Lab 03/07/20  1237 07/20/20  1335   INR 0.9 1.0       LIPIDS/LFTS:  Recent Labs   Lab 10/08/19  0739 03/07/20  1237 12/07/21  1739 03/03/22  1030 03/20/22  0826   Cholesterol 222 H  --   --  150  --    Triglycerides 94  --   --  59  --    HDL 58  --   --  65  --    LDL Cholesterol 145.2  --   --  73.2  --    Non-HDL Cholesterol 164  --   --  85  --    AST 11   < > 17 23 20   ALT 12   < > 17 30 23    < > = values in this interval not displayed.       Cardiovascular Testing:    Cardiac catheterization 07/24/2020:     1.  Mild-to-moderate nonobstructive coronary artery disease.  2.  Normal left ventricular end-diastolic pressure.  3.  No aortic stenosis.     Exercise MPS 7/2/20:       Abnormal myocardial perfusion study.    There is a moderate sized, moderate intensity, mostly reversible defect with some fixed areas in the inferior wall(s).    Gated perfusion images showed an ejection fraction of 57%    The EKG portion of this study is negative for ischemia.    The patient reported no chest pain during the stress test.    There were no arrhythmias during stress.     Holter 3/17/20:     · Sinus rhythm with heart rates varying between 48 and 118 bpm with an average of 77 bpm.  · There were occasional PVCs totalling 278 and averaging 11.13 per hour.  · There were rare PACs totalling 81 and averaging 3.24 per hour.  · There were occasional runs of non-sustained SVT and lasting for 2 to 7 beats.  · Diary events ("headache") did not correspond to any arrhythmic events.     Echo 3/17/20:     · Low normal left ventricular " systolic function. The estimated ejection fraction is 50%.  · No wall motion abnormalities.  · Normal right ventricular systolic function.  · The sinuses of Valsalva is mildly dilated. 3.9cm.  · The estimated PA systolic pressure is 28 mmHg.    ASSESSMENT:     1. Nonobstructive coronary artery disease  2. Paroxysmal atrial fibrillation:  Chads Vasc score of 3  3. Hyperlipidemia:  LDL 73  4. SVT: episode PAF  5. Aortic atherosclerosis  6. Right common iliac artery aneurysm   7. Gait instability/neuropathy  8.  Abdominal pain/bright red blood per rectum    PLAN:     1. Nonobstructive coronary artery disease:  Last ejection fraction of 50%.  Follow-up echocardiogram.  2. Paroxysmal atrial fibrillation:  Event monitor.  Continue aspirin for now.  Patient is seen GI and may have to undergo colonoscopy.  On return would like to start Eliquis 5 mg p.o. b.i.d. if able.  Would decrease aspirin to 81 mg p.o. q.d..    3. Hyperlipidemia:  Follow-up lipids favorable  4. Neuropathy/gait instability:  Referred to neuro  5. Abdominal pain/brbpr: GI appt  6. Return to clinic 6 weeks.         Keenan Avila MD, MPH, FACC, [facility]

## 2022-03-24 LAB
LEFT EYE DM RETINOPATHY: NEGATIVE
RIGHT EYE DM RETINOPATHY: NEGATIVE

## 2022-03-28 ENCOUNTER — PATIENT OUTREACH (OUTPATIENT)
Dept: ADMINISTRATIVE | Facility: HOSPITAL | Age: 77
End: 2022-03-28
Payer: MEDICARE

## 2022-03-29 ENCOUNTER — CLINICAL SUPPORT (OUTPATIENT)
Dept: CARDIOLOGY | Facility: HOSPITAL | Age: 77
End: 2022-03-29
Attending: INTERNAL MEDICINE
Payer: MEDICARE

## 2022-03-29 DIAGNOSIS — I47.10 SVT (SUPRAVENTRICULAR TACHYCARDIA): ICD-10-CM

## 2022-03-29 DIAGNOSIS — I48.0 PAF (PAROXYSMAL ATRIAL FIBRILLATION): ICD-10-CM

## 2022-03-29 PROCEDURE — 93271 ECG/MONITORING AND ANALYSIS: CPT

## 2022-03-31 ENCOUNTER — HOSPITAL ENCOUNTER (OUTPATIENT)
Dept: CARDIOLOGY | Facility: HOSPITAL | Age: 77
Discharge: HOME | End: 2022-03-31
Attending: INTERNAL MEDICINE
Payer: MEDICARE

## 2022-03-31 DIAGNOSIS — I25.83 CORONARY ARTERY DISEASE DUE TO LIPID RICH PLAQUE: ICD-10-CM

## 2022-03-31 DIAGNOSIS — I70.0 AORTIC ATHEROSCLEROSIS: ICD-10-CM

## 2022-03-31 DIAGNOSIS — I25.10 CORONARY ARTERY DISEASE DUE TO LIPID RICH PLAQUE: ICD-10-CM

## 2022-03-31 DIAGNOSIS — I71.40 ABDOMINAL ANEURYSM: ICD-10-CM

## 2022-03-31 DIAGNOSIS — I47.10 SVT (SUPRAVENTRICULAR TACHYCARDIA): ICD-10-CM

## 2022-03-31 DIAGNOSIS — E78.00 PURE HYPERCHOLESTEROLEMIA: ICD-10-CM

## 2022-03-31 DIAGNOSIS — I48.0 PAF (PAROXYSMAL ATRIAL FIBRILLATION): ICD-10-CM

## 2022-03-31 LAB
AORTIC ROOT ANNULUS: 3.43 CM
AORTIC VALVE CUSP SEPERATION: 2.55 CM
ASCENDING AORTA: 3.21 CM
AV INDEX (PROSTH): 0.9
AV MEAN GRADIENT: 3 MMHG
AV PEAK GRADIENT: 5 MMHG
AV VALVE AREA: 3.42 CM2
AV VELOCITY RATIO: 0.95
CV ECHO LV RWT: 0.46 CM
DOP CALC AO PEAK VEL: 1.12 M/S
DOP CALC AO VTI: 22.48 CM
DOP CALC LVOT AREA: 3.8 CM2
DOP CALC LVOT DIAMETER: 2.2 CM
DOP CALC LVOT PEAK VEL: 1.06 M/S
DOP CALC LVOT STROKE VOLUME: 76.94 CM3
DOP CALCLVOT PEAK VEL VTI: 20.25 CM
E WAVE DECELERATION TIME: 231.13 MSEC
E/A RATIO: 0.64
E/E' RATIO: 5.78 M/S
ECHO LV POSTERIOR WALL: 0.97 CM (ref 0.6–1.1)
EJECTION FRACTION: 65 %
FRACTIONAL SHORTENING: 24 % (ref 28–44)
INTERVENTRICULAR SEPTUM: 0.77 CM (ref 0.6–1.1)
LA MAJOR: 4.44 CM
LA MINOR: 4.52 CM
LA WIDTH: 4.42 CM
LEFT ATRIUM SIZE: 3.29 CM
LEFT ATRIUM VOLUME: 55.37 CM3
LEFT INTERNAL DIMENSION IN SYSTOLE: 3.19 CM (ref 2.1–4)
LEFT VENTRICLE DIASTOLIC VOLUME: 79.65 ML
LEFT VENTRICLE SYSTOLIC VOLUME: 40.79 ML
LEFT VENTRICULAR INTERNAL DIMENSION IN DIASTOLE: 4.22 CM (ref 3.5–6)
LEFT VENTRICULAR MASS: 114.22 G
LV LATERAL E/E' RATIO: 5.78 M/S
LV SEPTAL E/E' RATIO: 5.78 M/S
MV PEAK A VEL: 0.81 M/S
MV PEAK E VEL: 0.52 M/S
MV STENOSIS PRESSURE HALF TIME: 67.03 MS
MV VALVE AREA P 1/2 METHOD: 3.28 CM2
PISA TR MAX VEL: 2.21 M/S
PV PEAK VELOCITY: 1.06 CM/S
RA MAJOR: 4.74 CM
RA PRESSURE: 3 MMHG
RA WIDTH: 3.32 CM
RIGHT VENTRICULAR END-DIASTOLIC DIMENSION: 3.38 CM
SINUS: 3.75 CM
STJ: 2.83 CM
TDI LATERAL: 0.09 M/S
TDI SEPTAL: 0.09 M/S
TDI: 0.09 M/S
TR MAX PG: 20 MMHG
TRICUSPID ANNULAR PLANE SYSTOLIC EXCURSION: 2.39 CM
TV REST PULMONARY ARTERY PRESSURE: 23 MMHG

## 2022-03-31 PROCEDURE — 93306 TTE W/DOPPLER COMPLETE: CPT | Mod: 26,,, | Performed by: INTERNAL MEDICINE

## 2022-03-31 PROCEDURE — 93306 TTE W/DOPPLER COMPLETE: CPT

## 2022-03-31 PROCEDURE — 93306 ECHO (CUPID ONLY): ICD-10-PCS | Mod: 26,,, | Performed by: INTERNAL MEDICINE

## 2022-04-28 DIAGNOSIS — E78.00 PURE HYPERCHOLESTEROLEMIA: Chronic | ICD-10-CM

## 2022-04-28 RX ORDER — ATORVASTATIN CALCIUM 40 MG/1
TABLET, FILM COATED ORAL
Qty: 90 TABLET | Refills: 3 | Status: SHIPPED | OUTPATIENT
Start: 2022-04-28 | End: 2023-05-12

## 2022-04-28 NOTE — TELEPHONE ENCOUNTER
No new care gaps identified.  Powered by [employer]. Reference number: [id number].   4/28/2022 12:38:27 AM CDT

## 2022-04-28 NOTE — TELEPHONE ENCOUNTER
Refill Routing Note   Medication(s) are not appropriate for processing by [facility] for the following reason(s):      - Patient has been seen in the ED/Hospital since the last PCP visit    [facility] action(s):  Defer          Medication reconciliation completed: No     Appointments  past 12m or future 3m with PCP    Date Provider   Last Visit   3/3/2022 Pj Gillette MD   Next Visit   Visit date not found Pj Gillette MD   ED visits in past 90 days: 1        Note composed:6:36 AM 04/28/2022

## 2022-05-02 ENCOUNTER — OFFICE VISIT (OUTPATIENT)
Dept: NEUROLOGY | Facility: CLINIC | Age: 77
End: 2022-05-02
Payer: MEDICARE

## 2022-05-02 ENCOUNTER — TELEPHONE (OUTPATIENT)
Dept: NEUROLOGY | Facility: CLINIC | Age: 77
End: 2022-05-02

## 2022-05-02 VITALS
HEIGHT: 65 IN | SYSTOLIC BLOOD PRESSURE: 123 MMHG | BODY MASS INDEX: 24.17 KG/M2 | DIASTOLIC BLOOD PRESSURE: 74 MMHG | HEART RATE: 57 BPM | WEIGHT: 145.06 LBS

## 2022-05-02 DIAGNOSIS — G62.9 NEUROPATHY: ICD-10-CM

## 2022-05-02 DIAGNOSIS — R26.9 GAIT ABNORMALITY: ICD-10-CM

## 2022-05-02 DIAGNOSIS — G47.00 INSOMNIA, UNSPECIFIED TYPE: Primary | ICD-10-CM

## 2022-05-02 PROCEDURE — 3074F SYST BP LT 130 MM HG: CPT | Mod: CPTII,S$GLB,, | Performed by: NEUROLOGICAL SURGERY

## 2022-05-02 PROCEDURE — 99999 PR PBB SHADOW E&M-EST. PATIENT-LVL III: ICD-10-PCS | Mod: PBBFAC,,, | Performed by: NEUROLOGICAL SURGERY

## 2022-05-02 PROCEDURE — 3288F PR FALLS RISK ASSESSMENT DOCUMENTED: ICD-10-PCS | Mod: CPTII,S$GLB,, | Performed by: NEUROLOGICAL SURGERY

## 2022-05-02 PROCEDURE — 1126F AMNT PAIN NOTED NONE PRSNT: CPT | Mod: CPTII,S$GLB,, | Performed by: NEUROLOGICAL SURGERY

## 2022-05-02 PROCEDURE — 99214 PR OFFICE/OUTPT VISIT, EST, LEVL IV, 30-39 MIN: ICD-10-PCS | Mod: S$GLB,,, | Performed by: NEUROLOGICAL SURGERY

## 2022-05-02 PROCEDURE — 1126F PR PAIN SEVERITY QUANTIFIED, NO PAIN PRESENT: ICD-10-PCS | Mod: CPTII,S$GLB,, | Performed by: NEUROLOGICAL SURGERY

## 2022-05-02 PROCEDURE — 1101F PT FALLS ASSESS-DOCD LE1/YR: CPT | Mod: CPTII,S$GLB,, | Performed by: NEUROLOGICAL SURGERY

## 2022-05-02 PROCEDURE — 99999 PR PBB SHADOW E&M-EST. PATIENT-LVL III: CPT | Mod: PBBFAC,,, | Performed by: NEUROLOGICAL SURGERY

## 2022-05-02 PROCEDURE — 3078F DIAST BP <80 MM HG: CPT | Mod: CPTII,S$GLB,, | Performed by: NEUROLOGICAL SURGERY

## 2022-05-02 PROCEDURE — 1101F PR PT FALLS ASSESS DOC 0-1 FALLS W/OUT INJ PAST YR: ICD-10-PCS | Mod: CPTII,S$GLB,, | Performed by: NEUROLOGICAL SURGERY

## 2022-05-02 PROCEDURE — 1159F MED LIST DOCD IN RCRD: CPT | Mod: CPTII,S$GLB,, | Performed by: NEUROLOGICAL SURGERY

## 2022-05-02 PROCEDURE — 99214 OFFICE O/P EST MOD 30 MIN: CPT | Mod: S$GLB,,, | Performed by: NEUROLOGICAL SURGERY

## 2022-05-02 PROCEDURE — 3288F FALL RISK ASSESSMENT DOCD: CPT | Mod: CPTII,S$GLB,, | Performed by: NEUROLOGICAL SURGERY

## 2022-05-02 PROCEDURE — 3074F PR MOST RECENT SYSTOLIC BLOOD PRESSURE < 130 MM HG: ICD-10-PCS | Mod: CPTII,S$GLB,, | Performed by: NEUROLOGICAL SURGERY

## 2022-05-02 PROCEDURE — 1159F PR MEDICATION LIST DOCUMENTED IN MEDICAL RECORD: ICD-10-PCS | Mod: CPTII,S$GLB,, | Performed by: NEUROLOGICAL SURGERY

## 2022-05-02 PROCEDURE — 3078F PR MOST RECENT DIASTOLIC BLOOD PRESSURE < 80 MM HG: ICD-10-PCS | Mod: CPTII,S$GLB,, | Performed by: NEUROLOGICAL SURGERY

## 2022-05-02 RX ORDER — LEMBOREXANT 5 MG/1
5 TABLET, FILM COATED ORAL NIGHTLY
Qty: 30 TABLET | Refills: 1 | Status: SHIPPED | OUTPATIENT
Start: 2022-05-02 | End: 2022-05-02 | Stop reason: ALTCHOICE

## 2022-05-02 RX ORDER — SUVOREXANT 10 MG/1
10 TABLET, FILM COATED ORAL NIGHTLY
Qty: 30 TABLET | Refills: 2 | Status: SHIPPED | OUTPATIENT
Start: 2022-05-02 | End: 2022-08-05 | Stop reason: ALTCHOICE

## 2022-05-02 NOTE — PROGRESS NOTES
Chief Complaint   Patient presents with    Headache        Reed Torres is a 77 y.o. male with a history of multiple medical diagnoses as listed below that presents for evaluation and management of insomnia.  He says that he has been having issues with falling asleep and staying asleep.  He tries to take melatonin to help with sleep initiation which has been intermittently helpful.  He says that after going to bed will take approximately 15-30 minutes room fall asleep.  After the fall asleep he tends to wake after only a few hours.  Sometimes it is very difficult for him to go back to sleep.  He does admit that when he wakes up he has had several things on his mind that makes it difficult for him to relax once again.  When he sleeps he says that he sleeps fairly well and feels very refreshed and restored when he wakes up the next day..     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 [place]    SHOULDER ARTHROSCOPY W/ ROTATOR CUFF REPAIR      Left shoulder       SOCIAL HISTORY:  Social History     Socioeconomic History    Marital status:    Tobacco Use    Smoking status: Former Smoker     Quit date:      Years since quittin.3    Smokeless tobacco: Never Used    Tobacco comment: stopped smoking 20 yrs ago.   Substance and Sexual Activity    Alcohol use: Not Currently     Alcohol/week: 5.0 standard drinks     Types: 6 Standard drinks or equivalent per week    Drug use: No    Sexual activity: Yes     Partners: Female     Birth control/protection: None   Social  History Narrative    Still doing lawn service       FAMILY HISTORY:  Family History   Problem Relation Age of Onset    COPD Mother     Diabetes Mother     Hypertension Mother     COPD Father     Heart disease Brother        ALLERGIES AND MEDICATIONS: updated and reviewed.  Review of patient's allergies indicates:  No Known Allergies  Current Outpatient Medications   Medication Sig Dispense Refill    aspirin (ECOTRIN) 325 MG EC tablet Take 1 tablet (325 mg total) by mouth once daily. 30 tablet 0    atorvastatin (LIPITOR) 40 MG tablet TAKE 1 TABLET BY MOUTH EVERY DAY 90 tablet 3    azelastine (ASTELIN) 137 mcg (0.1 %) nasal spray 1 spray (137 mcg total) by Nasal route 2 (two) times daily. 30 mL 3    fluticasone propionate (FLONASE) 50 mcg/actuation nasal spray 1 spray (50 mcg total) by Each Nostril route 2 (two) times daily. 18.2 mL 3    gabapentin (NEURONTIN) 300 MG capsule TAKE 1-3 CAPSULES BY MOUTH EVERY EVENING 90 capsule 11    latanoprost 0.005 % ophthalmic solution       lemborexant (DAYVIGO) 5 mg Tab Take 5 mg by mouth every evening. 30 tablet 1    metoprolol succinate (TOPROL-XL) 25 MG 24 hr tablet Take 1 tablet (25 mg total) by mouth once daily. 30 tablet 1    pantoprazole (PROTONIX) 40 MG tablet Take 40 mg by mouth once daily.      paroxetine (PAXIL) 20 MG tablet Take 1 tablet (20 mg total) by mouth every morning. 30 tablet 0    polyethylene glycol (GLYCOLAX) 17 gram/dose powder Take 17 g by mouth 2 (two) times daily. 289 g 2    QUEtiapine (SEROQUEL) 25 MG Tab TAKE 1 TABLET BY MOUTH EVERY EVENING 90 tablet 3     Current Facility-Administered Medications   Medication Dose Route Frequency Provider Last Rate Last Admin    sodium chloride 0.9% flush 3 mL  3 mL Intravenous Q8H PRN Keenan Avila MD           Review of Systems   Constitutional: Negative for activity change, fatigue and unexpected weight change.   HENT: Negative for trouble swallowing and voice change.    Eyes: Negative for  photophobia, pain and visual disturbance.   Respiratory: Negative for apnea and shortness of breath.    Cardiovascular: Negative for chest pain and palpitations.   Gastrointestinal: Negative for constipation, nausea and vomiting.   Genitourinary: Negative for difficulty urinating.   Musculoskeletal: Negative for arthralgias, back pain, gait problem, myalgias and neck pain.   Skin: Negative for color change and rash.   Neurological: Negative for dizziness, seizures, syncope, speech difficulty, weakness, light-headedness, numbness and headaches.   Psychiatric/Behavioral: Positive for sleep disturbance. Negative for agitation, behavioral problems and confusion.       Neurologic Exam     Mental Status   Oriented to person, place, and time.   Registration: recalls 3 of 3 objects.   Attention: normal. Concentration: normal.   Speech: speech is normal   Level of consciousness: alert  Knowledge: good.     Cranial Nerves     CN II   Right visual field deficit: none  Left visual field deficit: none     CN III, IV, VI   Pupils are equal, round, and reactive to light.  Extraocular motions are normal.   Right pupil: Size: 3 mm. Shape: regular.   Left pupil: Size: 3 mm. Shape: regular.   CN III: no CN III palsy  CN VI: no CN VI palsy  Nystagmus: none   Diplopia: none  Ophthalmoparesis: none  Upgaze: normal  Downgaze: normal  Conjugate gaze: present    CN VII   Facial expression full, symmetric.   Right facial weakness: none  Left facial weakness: none    CN VIII   CN VIII normal.     CN XI   CN XI normal.     CN XII   CN XII normal.   Tongue deviation: none    Motor Exam   Muscle bulk: normal  Overall muscle tone: normal  Right arm tone: normal  Left arm tone: normal  Right leg tone: normal  Left leg tone: normal    Gait, Coordination, and Reflexes     Gait  Gait: normal    Coordination   Finger to nose coordination: normal    Tremor   Resting tremor: absent      Physical Exam  Constitutional:       Appearance: He is  "well-developed.   HENT:      Head: Normocephalic and atraumatic.   Eyes:      Extraocular Movements: EOM normal.      Pupils: Pupils are equal, round, and reactive to light.   Pulmonary:      Effort: Pulmonary effort is normal. No respiratory distress.   Musculoskeletal:         General: Normal range of motion.   Neurological:      Mental Status: He is alert and oriented to person, place, and time.      Coordination: Finger-Nose-Finger Test normal.      Gait: Gait is intact.   Psychiatric:         Speech: Speech normal.         Behavior: Behavior normal.         Vitals:    05/02/22 0903   BP: 123/74   Pulse: (!) 57   Weight: 65.8 kg (145 lb 1 oz)   Height: 5' 5" (1.651 m)       Assessment & Plan:    Problem List Items Addressed This Visit    None     Visit Diagnoses     Insomnia, unspecified type    -  Primary    Relevant Medications    lemborexant (DAYVIGO) 5 mg Tab    Neuropathy        Gait abnormality              Follow-up: No follow-ups on file.    This note was done with the assistance of voice recognition software. Some errors may be present after proofreading.        "

## 2022-05-02 NOTE — TELEPHONE ENCOUNTER
----- Message from Evan Braxton sent at 5/2/2022  2:30 PM CDT -----   Type: Patient Call Back    Who called:Self    What is the request in detail: Pt states lemborexant (DAYVIGO) 5 mg Tab medication is not covered by insurance and would like doctor to prescribe something else.    Can the clinic reply by [facility]?no    Would the patient rather a call back or a response via [facility]? call    Best call back number:[phone] (home)

## 2022-05-03 ENCOUNTER — LAB VISIT (OUTPATIENT)
Dept: LAB | Facility: HOSPITAL | Age: 77
End: 2022-05-03
Attending: INTERNAL MEDICINE
Payer: MEDICARE

## 2022-05-03 ENCOUNTER — OFFICE VISIT (OUTPATIENT)
Dept: CARDIOLOGY | Facility: CLINIC | Age: 77
End: 2022-05-03
Payer: MEDICARE

## 2022-05-03 VITALS
BODY MASS INDEX: 23.93 KG/M2 | RESPIRATION RATE: 15 BRPM | OXYGEN SATURATION: 99 % | SYSTOLIC BLOOD PRESSURE: 136 MMHG | HEIGHT: 65 IN | WEIGHT: 143.63 LBS | DIASTOLIC BLOOD PRESSURE: 78 MMHG | HEART RATE: 55 BPM

## 2022-05-03 DIAGNOSIS — I72.3 ANEURYSM OF RIGHT COMMON ILIAC ARTERY: ICD-10-CM

## 2022-05-03 DIAGNOSIS — I47.10 SVT (SUPRAVENTRICULAR TACHYCARDIA): ICD-10-CM

## 2022-05-03 DIAGNOSIS — E78.00 PURE HYPERCHOLESTEROLEMIA: ICD-10-CM

## 2022-05-03 DIAGNOSIS — I70.0 AORTIC ATHEROSCLEROSIS: ICD-10-CM

## 2022-05-03 DIAGNOSIS — I48.0 PAF (PAROXYSMAL ATRIAL FIBRILLATION): ICD-10-CM

## 2022-05-03 DIAGNOSIS — I25.10 CORONARY ARTERY DISEASE DUE TO LIPID RICH PLAQUE: Primary | ICD-10-CM

## 2022-05-03 DIAGNOSIS — I25.83 CORONARY ARTERY DISEASE DUE TO LIPID RICH PLAQUE: Primary | ICD-10-CM

## 2022-05-03 LAB
CHOLEST SERPL-MCNC: 126 MG/DL (ref 120–199)
CHOLEST/HDLC SERPL: 2.9 {RATIO} (ref 2–5)
HDLC SERPL-MCNC: 44 MG/DL (ref 40–75)
HDLC SERPL: 34.9 % (ref 20–50)
LDLC SERPL CALC-MCNC: 64 MG/DL (ref 63–159)
NONHDLC SERPL-MCNC: 82 MG/DL
TRIGL SERPL-MCNC: 90 MG/DL (ref 30–150)

## 2022-05-03 PROCEDURE — 3075F PR MOST RECENT SYSTOLIC BLOOD PRESS GE 130-139MM HG: ICD-10-PCS | Mod: CPTII,S$GLB,, | Performed by: INTERNAL MEDICINE

## 2022-05-03 PROCEDURE — 3288F PR FALLS RISK ASSESSMENT DOCUMENTED: ICD-10-PCS | Mod: CPTII,S$GLB,, | Performed by: INTERNAL MEDICINE

## 2022-05-03 PROCEDURE — 99999 PR PBB SHADOW E&M-EST. PATIENT-LVL IV: ICD-10-PCS | Mod: PBBFAC,,, | Performed by: INTERNAL MEDICINE

## 2022-05-03 PROCEDURE — 1126F AMNT PAIN NOTED NONE PRSNT: CPT | Mod: CPTII,S$GLB,, | Performed by: INTERNAL MEDICINE

## 2022-05-03 PROCEDURE — 99214 PR OFFICE/OUTPT VISIT, EST, LEVL IV, 30-39 MIN: ICD-10-PCS | Mod: S$GLB,,, | Performed by: INTERNAL MEDICINE

## 2022-05-03 PROCEDURE — 3078F PR MOST RECENT DIASTOLIC BLOOD PRESSURE < 80 MM HG: ICD-10-PCS | Mod: CPTII,S$GLB,, | Performed by: INTERNAL MEDICINE

## 2022-05-03 PROCEDURE — 36415 COLL VENOUS BLD VENIPUNCTURE: CPT | Performed by: INTERNAL MEDICINE

## 2022-05-03 PROCEDURE — 1101F PT FALLS ASSESS-DOCD LE1/YR: CPT | Mod: CPTII,S$GLB,, | Performed by: INTERNAL MEDICINE

## 2022-05-03 PROCEDURE — 99214 OFFICE O/P EST MOD 30 MIN: CPT | Mod: S$GLB,,, | Performed by: INTERNAL MEDICINE

## 2022-05-03 PROCEDURE — 3288F FALL RISK ASSESSMENT DOCD: CPT | Mod: CPTII,S$GLB,, | Performed by: INTERNAL MEDICINE

## 2022-05-03 PROCEDURE — 1126F PR PAIN SEVERITY QUANTIFIED, NO PAIN PRESENT: ICD-10-PCS | Mod: CPTII,S$GLB,, | Performed by: INTERNAL MEDICINE

## 2022-05-03 PROCEDURE — 80061 LIPID PANEL: CPT | Performed by: INTERNAL MEDICINE

## 2022-05-03 PROCEDURE — 99499 RISK ADDL DX/OHS AUDIT: ICD-10-PCS | Mod: S$GLB,,, | Performed by: INTERNAL MEDICINE

## 2022-05-03 PROCEDURE — 99499 UNLISTED E&M SERVICE: CPT | Mod: S$GLB,,, | Performed by: INTERNAL MEDICINE

## 2022-05-03 PROCEDURE — 1159F PR MEDICATION LIST DOCUMENTED IN MEDICAL RECORD: ICD-10-PCS | Mod: CPTII,S$GLB,, | Performed by: INTERNAL MEDICINE

## 2022-05-03 PROCEDURE — 1159F MED LIST DOCD IN RCRD: CPT | Mod: CPTII,S$GLB,, | Performed by: INTERNAL MEDICINE

## 2022-05-03 PROCEDURE — 3075F SYST BP GE 130 - 139MM HG: CPT | Mod: CPTII,S$GLB,, | Performed by: INTERNAL MEDICINE

## 2022-05-03 PROCEDURE — 3078F DIAST BP <80 MM HG: CPT | Mod: CPTII,S$GLB,, | Performed by: INTERNAL MEDICINE

## 2022-05-03 PROCEDURE — 1101F PR PT FALLS ASSESS DOC 0-1 FALLS W/OUT INJ PAST YR: ICD-10-PCS | Mod: CPTII,S$GLB,, | Performed by: INTERNAL MEDICINE

## 2022-05-03 PROCEDURE — 99999 PR PBB SHADOW E&M-EST. PATIENT-LVL IV: CPT | Mod: PBBFAC,,, | Performed by: INTERNAL MEDICINE

## 2022-05-03 NOTE — PROGRESS NOTES
CARDIOLOGY CLINIC VISIT        HISTORY OF PRESENT ILLNESS:     Reed Torres who presents for continued care.  Last seen 03/22/2022.      3/22/22: Complaints of upper extremity, bilateral numbness/tingling.  No weakness.  Also complaints of gait instability.   Went to the emergency room in December 2021 with complaints of feeling off balance and lightheaded.  CT of the head showed mild cerebral atrophy and chronic small-vessel ischemic changes.Referred to neuro.    Recently went to the emergency room with complaints of bright red blood per rectum.  Patient had an EKG that showed atrial fibrillation with rapid ventricular response.  Spontaneously converted to sinus rhythm.  History of SVT.     Coronary angiography from 07/24/2020 showed mild-to-moderate nonobstructive coronary artery disease.  Holter from 03/17/2020 showed average heart rate of 77 beats per minute.  Occasional PVCs.  Rare PACs.  Nonsustained SVT.  Longest was 7 beats.  Echocardiogram at that time showed ejection fraction of 50%.      5/3/22:  Event monitor pending.  Echocardiogram showed normal function.  Overall doing well.  Has seen Neurology.    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 [place]    SHOULDER ARTHROSCOPY W/ ROTATOR CUFF REPAIR      Left shoulder       ALLERGIES AND MEDICATION:   Review of patient's allergies indicates:  No Known Allergies     Medication  List          Accurate as of May 3, 2022  9:35 AM. If you have any questions, ask your nurse or doctor.            CONTINUE taking these medications    aspirin 325 MG EC tablet  Commonly known as: ECOTRIN  Take 1 tablet (325 mg total) by mouth once daily.     atorvastatin 40 MG tablet  Commonly known as: LIPITOR  TAKE 1 TABLET BY MOUTH EVERY DAY     azelastine 137 mcg (0.1 %) nasal spray  Commonly known as: ASTELIN  1 spray (137 mcg total) by Nasal route 2 (two) times daily.     BELSOMRA 10 mg Tab  Generic drug: suvorexant  Take 10 mg by mouth every evening.     fluticasone propionate 50 mcg/actuation nasal spray  Commonly known as: FLONASE  1 spray (50 mcg total) by Each Nostril route 2 (two) times daily.     gabapentin 300 MG capsule  Commonly known as: NEURONTIN  TAKE 1-3 CAPSULES BY MOUTH EVERY EVENING     latanoprost 0.005 % ophthalmic solution     metoprolol succinate 25 MG 24 hr tablet  Commonly known as: TOPROL-XL  Take 1 tablet (25 mg total) by mouth once daily.     pantoprazole 40 MG tablet  Commonly known as: PROTONIX     paroxetine 20 MG tablet  Commonly known as: PAXIL  Take 1 tablet (20 mg total) by mouth every morning.     polyethylene glycol 17 gram/dose powder  Commonly known as: GLYCOLAX  Take 17 g by mouth 2 (two) times daily.     QUEtiapine 25 MG Tab  Commonly known as: SEROQUEL  TAKE 1 TABLET BY MOUTH EVERY EVENING            SOCIAL HISTORY:     Social History     Socioeconomic History    Marital status:    Tobacco Use    Smoking status: Former Smoker     Quit date: 2000     Years since quittin.3    Smokeless tobacco: Never Used    Tobacco comment: stopped smoking 20 yrs ago.   Substance and Sexual Activity    Alcohol use: Not Currently     Alcohol/week: 5.0 standard drinks     Types: 6 Standard drinks or equivalent per week    Drug use: No    Sexual activity: Yes     Partners: Female     Birth control/protection: None   Social History Narrative    Still doing lawn service  "      FAMILY HISTORY:     Family History   Problem Relation Age of Onset    COPD Mother     Diabetes Mother     Hypertension Mother     COPD Father     Heart disease Brother        REVIEW OF SYSTEMS:   Review of Systems   Respiratory: Negative for cough, sputum production, shortness of breath and wheezing.    Cardiovascular: Negative for chest pain, orthopnea, claudication and leg swelling.   Gastrointestinal: Positive for abdominal pain. Negative for constipation, diarrhea, heartburn, nausea and vomiting.   Neurological: Negative for dizziness, tingling, tremors, speech change, focal weakness, seizures, loss of consciousness, weakness and headaches.       PHYSICAL EXAM:     Vitals:    05/03/22 0834   BP: 136/78   Pulse: (!) 55   Resp: 15    Body mass index is 23.9 kg/m².  Weight: 65.2 kg (143 lb 10.1 oz)   Height: 5' 5" (165.1 cm)     Physical Exam  Vitals reviewed.   Constitutional:       General: He is not in acute distress.     Appearance: He is well-developed. He is not diaphoretic.   Neck:      Vascular: No carotid bruit or JVD.   Cardiovascular:      Rate and Rhythm: Normal rate and regular rhythm.      Pulses: Normal pulses.      Heart sounds: Murmur heard.    Systolic murmur is present with a grade of 2/6.  Pulmonary:      Effort: Pulmonary effort is normal.      Breath sounds: Normal breath sounds.   Abdominal:      General: Bowel sounds are normal.      Palpations: Abdomen is soft.      Tenderness: There is no abdominal tenderness.   Musculoskeletal:      Right lower leg: No edema.      Left lower leg: No edema.   Neurological:      Mental Status: He is alert and oriented to person, place, and time.   Psychiatric:         Speech: Speech normal.         Behavior: Behavior normal.         DATA:     Laboratory:  CBC:  Recent Labs   Lab 12/07/21  1739 03/03/22  1030 03/20/22  0826   WBC 5.75 5.30 4.75   Hemoglobin 13.8 L 13.6 L 13.8 L   Hematocrit 41.0 43.1 41.3   Platelets 154 148 L 145 L "       CHEMISTRIES:  Recent Labs   Lab 12/07/21  1739 03/03/22  1030 03/20/22  0826   Glucose 75 82 95   Sodium 143 142 143   Potassium 3.6 4.6 4.1   BUN 19 13 15   Creatinine 0.7 0.7 0.7   eGFR if  >60 >60 >60   eGFR if non African American >60 >60 >60   Calcium 8.8 9.2 8.8       CARDIAC BIOMARKERS:  Recent Labs   Lab 04/27/20  1920 05/03/20  0610 12/07/21  1739   Troponin I <0.006 <0.006 <0.006       COAGS:  Recent Labs   Lab 03/07/20  1237 07/20/20  1335   INR 0.9 1.0       LIPIDS/LFTS:  Recent Labs   Lab 10/08/19  0739 03/07/20  1237 12/07/21  1739 03/03/22  1030 03/20/22  0826 05/03/22  0819   Cholesterol 222 H  --   --  150  --  126   Triglycerides 94  --   --  59  --  90   HDL 58  --   --  65  --  44   LDL Cholesterol 145.2  --   --  73.2  --  64.0   Non-HDL Cholesterol 164  --   --  85  --  82   AST 11   < > 17 23 20  --    ALT 12   < > 17 30 23  --     < > = values in this interval not displayed.       Cardiovascular Testing:    Echocardiogram 3/31/22:    · The left ventricle is normal in size with normal systolic function.  · The estimated ejection fraction is 65%.  · Normal right ventricular size with normal right ventricular systolic function.  · The sinuses of Valsalva is mildly dilated, 3.8cm.  · The estimated PA systolic pressure is 23 mmHg.    Cardiac catheterization 07/24/2020:     1.  Mild-to-moderate nonobstructive coronary artery disease.  2.  Normal left ventricular end-diastolic pressure.  3.  No aortic stenosis.     Exercise MPS 7/2/20:       Abnormal myocardial perfusion study.    There is a moderate sized, moderate intensity, mostly reversible defect with some fixed areas in the inferior wall(s).    Gated perfusion images showed an ejection fraction of 57%    The EKG portion of this study is negative for ischemia.    The patient reported no chest pain during the stress test.    There were no arrhythmias during stress.     Holter 3/17/20:     · Sinus rhythm with heart  "rates varying between 48 and 118 bpm with an average of 77 bpm.  · There were occasional PVCs totalling 278 and averaging 11.13 per hour.  · There were rare PACs totalling 81 and averaging 3.24 per hour.  · There were occasional runs of non-sustained SVT and lasting for 2 to 7 beats.  · Diary events ("headache") did not correspond to any arrhythmic events.     Echo 3/17/20:     · Low normal left ventricular systolic function. The estimated ejection fraction is 50%.  · No wall motion abnormalities.  · Normal right ventricular systolic function.  · The sinuses of Valsalva is mildly dilated. 3.9cm.  · The estimated PA systolic pressure is 28 mmHg.    ASSESSMENT:     1. Nonobstructive coronary artery disease  2. Paroxysmal atrial fibrillation:  Chads Vasc score of 3  3. Hyperlipidemia:  LDL 64  4. SVT: episode PAF  5. Aortic atherosclerosis  6. Right common iliac artery aneurysm       PLAN:     1. Nonobstructive coronary artery disease: Risk factor modification. Ejection fraction of 65%.    2. PAF: event monitor pending. Decrease aspirin to 81 mg p.o. q.d..    3. Hyperlipidemia:  Favorable lipid profile  4. Return to clinic 3 months.         Keenan Avila MD, MPH, FACC, [facility]    "

## 2022-05-11 RX ORDER — METOPROLOL SUCCINATE 25 MG/1
25 TABLET, EXTENDED RELEASE ORAL DAILY
Qty: 90 TABLET | Refills: 3 | OUTPATIENT
Start: 2022-05-11 | End: 2022-05-13 | Stop reason: SDUPTHER

## 2022-05-11 NOTE — TELEPHONE ENCOUNTER
Spoke with patient on today I explained to patient that I would pass the message to  Once I get a response ill call him back.

## 2022-05-11 NOTE — TELEPHONE ENCOUNTER
----- Message from Shawnee Poon sent at 5/11/2022  3:23 PM CDT -----  Type: Patient Call Back    Who called: self     What is the request in detail: Pt is asking should he still take the medication metoprolol succinate (TOPROL-XL) 25 MG 24 hr tablet. He was recently seen in the ER for elevated heart rate,  but cant remember if they told him to stop it. Please advise asap     Can the clinic reply by [facility]?    Would the patient rather a call back or a response via [facility]? Call     Best call back number: [phone] (home)

## 2022-05-13 RX ORDER — METOPROLOL SUCCINATE 25 MG/1
25 TABLET, EXTENDED RELEASE ORAL DAILY
Qty: 90 TABLET | Refills: 3 | Status: SHIPPED | OUTPATIENT
Start: 2022-05-13 | End: 2022-07-03

## 2022-05-16 ENCOUNTER — TELEPHONE (OUTPATIENT)
Dept: CARDIOLOGY | Facility: CLINIC | Age: 77
End: 2022-05-16
Payer: MEDICARE

## 2022-05-16 NOTE — TELEPHONE ENCOUNTER
----- Message from Marci Graham sent at 5/11/2022  8:43 AM CDT -----  Type: Patient Call Back    Who called: self     What is the request in detail: patient would like to know to do he still needs to take metoprolol succinate (TOPROL-XL) 25 MG 24 hr tablet. Please call    Can the clinic reply by [facility]? no    Would the patient rather a call back or a response via [facility]? call    Best call back number: .[phone] (home)

## 2022-06-14 ENCOUNTER — OFFICE VISIT (OUTPATIENT)
Dept: FAMILY MEDICINE | Facility: CLINIC | Age: 77
End: 2022-06-14
Payer: MEDICARE

## 2022-06-14 VITALS
DIASTOLIC BLOOD PRESSURE: 90 MMHG | RESPIRATION RATE: 16 BRPM | HEIGHT: 65 IN | TEMPERATURE: 98 F | BODY MASS INDEX: 23.14 KG/M2 | SYSTOLIC BLOOD PRESSURE: 120 MMHG | WEIGHT: 138.88 LBS | HEART RATE: 60 BPM | OXYGEN SATURATION: 97 %

## 2022-06-14 DIAGNOSIS — J32.9 SINUSITIS, UNSPECIFIED CHRONICITY, UNSPECIFIED LOCATION: ICD-10-CM

## 2022-06-14 DIAGNOSIS — D69.6 THROMBOCYTOPENIA, UNSPECIFIED: Primary | ICD-10-CM

## 2022-06-14 PROCEDURE — 99214 PR OFFICE/OUTPT VISIT, EST, LEVL IV, 30-39 MIN: ICD-10-PCS | Mod: S$GLB,,, | Performed by: FAMILY MEDICINE

## 2022-06-14 PROCEDURE — 3080F DIAST BP >= 90 MM HG: CPT | Mod: CPTII,S$GLB,, | Performed by: FAMILY MEDICINE

## 2022-06-14 PROCEDURE — 99214 OFFICE O/P EST MOD 30 MIN: CPT | Mod: S$GLB,,, | Performed by: FAMILY MEDICINE

## 2022-06-14 PROCEDURE — 3074F PR MOST RECENT SYSTOLIC BLOOD PRESSURE < 130 MM HG: ICD-10-PCS | Mod: CPTII,S$GLB,, | Performed by: FAMILY MEDICINE

## 2022-06-14 PROCEDURE — 3080F PR MOST RECENT DIASTOLIC BLOOD PRESSURE >= 90 MM HG: ICD-10-PCS | Mod: CPTII,S$GLB,, | Performed by: FAMILY MEDICINE

## 2022-06-14 PROCEDURE — 99999 PR PBB SHADOW E&M-EST. PATIENT-LVL III: CPT | Mod: PBBFAC,,, | Performed by: FAMILY MEDICINE

## 2022-06-14 PROCEDURE — 99999 PR PBB SHADOW E&M-EST. PATIENT-LVL III: ICD-10-PCS | Mod: PBBFAC,,, | Performed by: FAMILY MEDICINE

## 2022-06-14 PROCEDURE — 3074F SYST BP LT 130 MM HG: CPT | Mod: CPTII,S$GLB,, | Performed by: FAMILY MEDICINE

## 2022-06-14 RX ORDER — METHYLPREDNISOLONE 4 MG/1
TABLET ORAL
Qty: 1 EACH | Refills: 0 | Status: ON HOLD | OUTPATIENT
Start: 2022-06-14 | End: 2022-07-03 | Stop reason: HOSPADM

## 2022-06-14 RX ORDER — ASPIRIN 81 MG/1
81 TABLET ORAL EVERY MORNING
COMMUNITY

## 2022-06-14 NOTE — PROGRESS NOTES
"HISTORY OF PRESENT ILLNESS:  Reed Torres is a 77 y.o. male who presents to the clinic today for Follow-up (Urine is sometimes different colors. Mostly clear than orange color )  .     See above  Intermittent  Still with the left sided upper abdominal discomfort at times.      Patient Active Problem List   Diagnosis    Hyperlipidemia    Tuberculosis exposure    BPH (benign prostatic hyperplasia)    Body mass index (BMI) of 23.0-23.9 in adult    Osteoarthritis of lumbar spine    Hyperbilirubinemia, long standing, favor GILBERT    Body water dehydration    Abnormal stress test    Anxiety disorder    SVT (supraventricular tachycardia)    Pulmonary emphysema    Coronary artery disease due to lipid rich plaque    PAF (paroxysmal atrial fibrillation)    Pure hypercholesterolemia    Aortic atherosclerosis    Aneurysm of right common iliac artery    Thrombocytopenia, unspecified           CARE TEAM:  Patient Care Team:  Pj Gillette MD as PCP - General (Family Medicine)  Charlie Walters Jr., MD as Consulting Physician (Urology)  Bryanna Strauss LPN as Care Coordinator         ROS        PHYSICAL EXAM:  BP (!) 120/90 (BP Location: Left arm, Patient Position: Sitting, BP Method: Small (Manual))   Pulse 60   Temp 97.8 °F (36.6 °C) (Oral)   Resp 16   Ht 5' 5" (1.651 m)   Wt 63 kg (138 lb 14.2 oz)   SpO2 97%   BMI 23.11 kg/m²   Wt Readings from Last 5 Encounters:   07/06/22 63.9 kg (140 lb 14 oz)   07/02/22 58.9 kg (129 lb 13.6 oz)   06/14/22 63 kg (138 lb 14.2 oz)   05/03/22 65.2 kg (143 lb 10.1 oz)   05/02/22 65.8 kg (145 lb 1 oz)     BP Readings from Last 5 Encounters:   07/06/22 117/70   07/03/22 113/76   06/14/22 (!) 120/90   05/03/22 136/78   05/02/22 123/74           He appears well, in no apparent distress.  Alert and oriented times three, pleasant and cooperative. Vital signs are as documented in vital signs section.  S1 and S2 normal, no murmurs, clicks, gallops or rubs. Regular rate and " rhythm. Chest is clear; no wheezes or rales. No edema or JVD.        Medication List with Changes/Refills   New Medications    AMIODARONE (PACERONE) 200 MG TAB    Take 1 tablet (200 mg total) by mouth once daily.    APIXABAN (ELIQUIS) 5 MG TAB    Take 1 tablet (5 mg total) by mouth 2 (two) times daily.   Current Medications    ASPIRIN (ECOTRIN) 81 MG EC TABLET    Take 81 mg by mouth once daily.    ATORVASTATIN (LIPITOR) 40 MG TABLET    TAKE 1 TABLET BY MOUTH EVERY DAY    AZELASTINE (ASTELIN) 137 MCG (0.1 %) NASAL SPRAY    1 spray (137 mcg total) by Nasal route 2 (two) times daily.    FLUTICASONE PROPIONATE (FLONASE) 50 MCG/ACTUATION NASAL SPRAY    1 spray (50 mcg total) by Each Nostril route 2 (two) times daily.    GABAPENTIN (NEURONTIN) 300 MG CAPSULE    TAKE 1-3 CAPSULES BY MOUTH EVERY EVENING    LATANOPROST 0.005 % OPHTHALMIC SOLUTION        PANTOPRAZOLE (PROTONIX) 40 MG TABLET    Take 40 mg by mouth once daily.    PAROXETINE (PAXIL) 20 MG TABLET    Take 1 tablet (20 mg total) by mouth every morning.    POLYETHYLENE GLYCOL (GLYCOLAX) 17 GRAM/DOSE POWDER    Take 17 g by mouth 2 (two) times daily.    QUETIAPINE (SEROQUEL) 25 MG TAB    TAKE 1 TABLET BY MOUTH EVERY EVENING    SUVOREXANT (BELSOMRA) 10 MG TAB    Take 10 mg by mouth every evening.   Discontinued Medications    ASPIRIN (ECOTRIN) 325 MG EC TABLET    Take 1 tablet (325 mg total) by mouth once daily.    METOPROLOL SUCCINATE (TOPROL-XL) 25 MG 24 HR TABLET    Take 1 tablet (25 mg total) by mouth once daily.       ASSESSMENT AND PLAN:    Problem List Items Addressed This Visit     Thrombocytopenia, unspecified - Primary    Current Assessment & Plan     The current medical regimen is effective;  continue present plan and medications.               Other Visit Diagnoses     Sinusitis, unspecified chronicity, unspecified location            Get work up  He has sinusitis on exam  Not sure if this is related to the urine  But bring down inflammation  Future  Appointments   Date Time Provider Department Center   8/5/2022  9:00 AM Fox Yin MD [facility] NEURO [facility]   8/5/2022 10:20 AM Pj Gillette MD [facility]   10/10/2022  9:00 AM Keenan Avila MD [facility] CARDIO [facility]       Follow up in about 4 weeks (around 7/12/2022) for assess treatment plan. or sooner as needed.

## 2022-07-02 ENCOUNTER — HOSPITAL ENCOUNTER (INPATIENT)
Facility: HOSPITAL | Age: 77
LOS: 1 days | Discharge: HOME | DRG: 310 | End: 2022-07-03
Attending: EMERGENCY MEDICINE | Admitting: HOSPITALIST
Payer: MEDICARE

## 2022-07-02 DIAGNOSIS — R07.9 CHEST PAIN: ICD-10-CM

## 2022-07-02 DIAGNOSIS — I48.91 ATRIAL FIBRILLATION WITH RAPID VENTRICULAR RESPONSE: ICD-10-CM

## 2022-07-02 DIAGNOSIS — I48.91 A-FIB: ICD-10-CM

## 2022-07-02 LAB
ALBUMIN SERPL BCP-MCNC: 3.7 G/DL (ref 3.5–5.2)
ALP SERPL-CCNC: 56 U/L (ref 55–135)
ALT SERPL W/O P-5'-P-CCNC: 33 U/L (ref 10–44)
ANION GAP SERPL CALC-SCNC: 10 MMOL/L (ref 8–16)
AORTIC ROOT ANNULUS: 4.1 CM
AORTIC VALVE CUSP SEPERATION: 2.73 CM
AST SERPL-CCNC: 20 U/L (ref 10–40)
AV INDEX (PROSTH): 0.72
AV MEAN GRADIENT: 2 MMHG
AV PEAK GRADIENT: 3 MMHG
AV VALVE AREA: 3.61 CM2
AV VELOCITY RATIO: 0.65
BASOPHILS # BLD AUTO: 0.03 K/UL (ref 0–0.2)
BASOPHILS NFR BLD: 0.6 % (ref 0–1.9)
BILIRUB SERPL-MCNC: 1.4 MG/DL (ref 0.1–1)
BNP SERPL-MCNC: 260 PG/ML (ref 0–99)
BSA FOR ECHO PROCEDURE: 1.7 M2
BUN SERPL-MCNC: 20 MG/DL (ref 8–23)
CALCIUM SERPL-MCNC: 8.9 MG/DL (ref 8.7–10.5)
CHLORIDE SERPL-SCNC: 108 MMOL/L (ref 95–110)
CO2 SERPL-SCNC: 25 MMOL/L (ref 23–29)
CREAT SERPL-MCNC: 0.8 MG/DL (ref 0.5–1.4)
CTP QC/QA: YES
CV ECHO LV RWT: 0.44 CM
DIFFERENTIAL METHOD: ABNORMAL
DOP CALC AO PEAK VEL: 0.83 M/S
DOP CALC AO VTI: 12.75 CM
DOP CALC LVOT AREA: 5 CM2
DOP CALC LVOT DIAMETER: 2.52 CM
DOP CALC LVOT PEAK VEL: 0.54 M/S
DOP CALC LVOT STROKE VOLUME: 46.06 CM3
DOP CALCLVOT PEAK VEL VTI: 9.24 CM
ECHO LV POSTERIOR WALL: 0.89 CM (ref 0.6–1.1)
EJECTION FRACTION: 60 %
EOSINOPHIL # BLD AUTO: 0.1 K/UL (ref 0–0.5)
EOSINOPHIL NFR BLD: 1.6 % (ref 0–8)
ERYTHROCYTE [DISTWIDTH] IN BLOOD BY AUTOMATED COUNT: 14.4 % (ref 11.5–14.5)
EST. GFR  (AFRICAN AMERICAN): >60 ML/MIN/1.73 M^2
EST. GFR  (NON AFRICAN AMERICAN): >60 ML/MIN/1.73 M^2
FRACTIONAL SHORTENING: 31 % (ref 28–44)
GLUCOSE SERPL-MCNC: 113 MG/DL (ref 70–110)
HCT VFR BLD AUTO: 42.2 % (ref 40–54)
HGB BLD-MCNC: 14.6 G/DL (ref 14–18)
IMM GRANULOCYTES # BLD AUTO: 0.01 K/UL (ref 0–0.04)
IMM GRANULOCYTES NFR BLD AUTO: 0.2 % (ref 0–0.5)
INTERVENTRICULAR SEPTUM: 0.76 CM (ref 0.6–1.1)
IVRT: 102.76 MSEC
LA MAJOR: 5.36 CM
LA MINOR: 4.61 CM
LA WIDTH: 3.43 CM
LEFT ATRIUM SIZE: 3.16 CM
LEFT ATRIUM VOLUME INDEX: 27 ML/M2
LEFT ATRIUM VOLUME: 45.67 CM3
LEFT INTERNAL DIMENSION IN SYSTOLE: 2.82 CM (ref 2.1–4)
LEFT VENTRICLE DIASTOLIC VOLUME INDEX: 42.86 ML/M2
LEFT VENTRICLE DIASTOLIC VOLUME: 72.44 ML
LEFT VENTRICLE MASS INDEX: 59 G/M2
LEFT VENTRICLE SYSTOLIC VOLUME INDEX: 17.8 ML/M2
LEFT VENTRICLE SYSTOLIC VOLUME: 30.02 ML
LEFT VENTRICULAR INTERNAL DIMENSION IN DIASTOLE: 4.06 CM (ref 3.5–6)
LEFT VENTRICULAR MASS: 99.81 G
LIPASE SERPL-CCNC: 7 U/L (ref 4–60)
LYMPHOCYTES # BLD AUTO: 1.5 K/UL (ref 1–4.8)
LYMPHOCYTES NFR BLD: 29.9 % (ref 18–48)
MAGNESIUM SERPL-MCNC: 2.2 MG/DL (ref 1.6–2.6)
MCH RBC QN AUTO: 30.5 PG (ref 27–31)
MCHC RBC AUTO-ENTMCNC: 34.6 G/DL (ref 32–36)
MCV RBC AUTO: 88 FL (ref 82–98)
MONOCYTES # BLD AUTO: 0.4 K/UL (ref 0.3–1)
MONOCYTES NFR BLD: 7.3 % (ref 4–15)
MV PEAK E VEL: 0.64 M/S
NEUTROPHILS # BLD AUTO: 3.1 K/UL (ref 1.8–7.7)
NEUTROPHILS NFR BLD: 60.4 % (ref 38–73)
NRBC BLD-RTO: 0 /100 WBC
PISA TR MAX VEL: 1.9 M/S
PLATELET # BLD AUTO: 145 K/UL (ref 150–450)
PMV BLD AUTO: 11.9 FL (ref 9.2–12.9)
POTASSIUM SERPL-SCNC: 4.2 MMOL/L (ref 3.5–5.1)
PROT SERPL-MCNC: 6.4 G/DL (ref 6–8.4)
PV PEAK VELOCITY: 0.66 CM/S
RA MAJOR: 5.13 CM
RA WIDTH: 2.68 CM
RBC # BLD AUTO: 4.78 M/UL (ref 4.6–6.2)
RIGHT VENTRICULAR END-DIASTOLIC DIMENSION: 3.19 CM
SARS-COV-2 RDRP RESP QL NAA+PROBE: NEGATIVE
SODIUM SERPL-SCNC: 143 MMOL/L (ref 136–145)
STJ: 2.73 CM
T4 FREE SERPL-MCNC: 0.87 NG/DL (ref 0.71–1.51)
TR MAX PG: 14 MMHG
TRICUSPID ANNULAR PLANE SYSTOLIC EXCURSION: 1.92 CM
TROPONIN I SERPL DL<=0.01 NG/ML-MCNC: 0.01 NG/ML (ref 0–0.03)
TROPONIN I SERPL DL<=0.01 NG/ML-MCNC: <0.006 NG/ML (ref 0–0.03)
TSH SERPL DL<=0.005 MIU/L-ACNC: 0.37 UIU/ML (ref 0.4–4)
WBC # BLD AUTO: 5.09 K/UL (ref 3.9–12.7)

## 2022-07-02 PROCEDURE — 63600175 PHARM REV CODE 636 W HCPCS: Performed by: EMERGENCY MEDICINE

## 2022-07-02 PROCEDURE — 83690 ASSAY OF LIPASE: CPT | Performed by: EMERGENCY MEDICINE

## 2022-07-02 PROCEDURE — 84484 ASSAY OF TROPONIN QUANT: CPT | Performed by: EMERGENCY MEDICINE

## 2022-07-02 PROCEDURE — 93010 EKG 12-LEAD: ICD-10-PCS | Mod: ,,, | Performed by: INTERNAL MEDICINE

## 2022-07-02 PROCEDURE — 25000003 PHARM REV CODE 250: Performed by: INTERNAL MEDICINE

## 2022-07-02 PROCEDURE — 63600175 PHARM REV CODE 636 W HCPCS: Performed by: INTERNAL MEDICINE

## 2022-07-02 PROCEDURE — 96365 THER/PROPH/DIAG IV INF INIT: CPT

## 2022-07-02 PROCEDURE — 93010 ELECTROCARDIOGRAM REPORT: CPT | Mod: ,,, | Performed by: INTERNAL MEDICINE

## 2022-07-02 PROCEDURE — 80053 COMPREHEN METABOLIC PANEL: CPT | Performed by: EMERGENCY MEDICINE

## 2022-07-02 PROCEDURE — 84439 ASSAY OF FREE THYROXINE: CPT | Performed by: EMERGENCY MEDICINE

## 2022-07-02 PROCEDURE — 83735 ASSAY OF MAGNESIUM: CPT | Performed by: EMERGENCY MEDICINE

## 2022-07-02 PROCEDURE — 99291 CRITICAL CARE FIRST HOUR: CPT | Mod: 25

## 2022-07-02 PROCEDURE — 25000003 PHARM REV CODE 250: Performed by: HOSPITALIST

## 2022-07-02 PROCEDURE — 99291 CRITICAL CARE FIRST HOUR: CPT | Mod: ,,, | Performed by: INTERNAL MEDICINE

## 2022-07-02 PROCEDURE — 20000000 HC ICU ROOM

## 2022-07-02 PROCEDURE — 85025 COMPLETE CBC W/AUTO DIFF WBC: CPT | Performed by: EMERGENCY MEDICINE

## 2022-07-02 PROCEDURE — 25000003 PHARM REV CODE 250: Performed by: EMERGENCY MEDICINE

## 2022-07-02 PROCEDURE — 84443 ASSAY THYROID STIM HORMONE: CPT | Performed by: EMERGENCY MEDICINE

## 2022-07-02 PROCEDURE — 99291 PR CRITICAL CARE, E/M 30-74 MINUTES: ICD-10-PCS | Mod: ,,, | Performed by: INTERNAL MEDICINE

## 2022-07-02 PROCEDURE — 96368 THER/DIAG CONCURRENT INF: CPT

## 2022-07-02 PROCEDURE — U0002 COVID-19 LAB TEST NON-CDC: HCPCS | Performed by: EMERGENCY MEDICINE

## 2022-07-02 PROCEDURE — 93005 ELECTROCARDIOGRAM TRACING: CPT

## 2022-07-02 PROCEDURE — 83880 ASSAY OF NATRIURETIC PEPTIDE: CPT | Performed by: EMERGENCY MEDICINE

## 2022-07-02 PROCEDURE — 25000242 PHARM REV CODE 250 ALT 637 W/ HCPCS: Performed by: HOSPITALIST

## 2022-07-02 PROCEDURE — 96375 TX/PRO/DX INJ NEW DRUG ADDON: CPT

## 2022-07-02 PROCEDURE — C9113 INJ PANTOPRAZOLE SODIUM, VIA: HCPCS | Performed by: EMERGENCY MEDICINE

## 2022-07-02 RX ORDER — HYDROMORPHONE HYDROCHLORIDE 2 MG/ML
0.5 INJECTION, SOLUTION INTRAMUSCULAR; INTRAVENOUS; SUBCUTANEOUS
Status: COMPLETED | OUTPATIENT
Start: 2022-07-02 | End: 2022-07-02

## 2022-07-02 RX ORDER — SODIUM CHLORIDE 9 MG/ML
INJECTION, SOLUTION INTRAVENOUS CONTINUOUS
Status: DISCONTINUED | OUTPATIENT
Start: 2022-07-02 | End: 2022-07-02

## 2022-07-02 RX ORDER — PAROXETINE HYDROCHLORIDE 20 MG/1
20 TABLET, FILM COATED ORAL EVERY MORNING
Status: DISCONTINUED | OUTPATIENT
Start: 2022-07-02 | End: 2022-07-03 | Stop reason: HOSPADM

## 2022-07-02 RX ORDER — LATANOPROST 50 UG/ML
1 SOLUTION/ DROPS OPHTHALMIC NIGHTLY
Status: DISCONTINUED | OUTPATIENT
Start: 2022-07-02 | End: 2022-07-03 | Stop reason: HOSPADM

## 2022-07-02 RX ORDER — HYDROMORPHONE HYDROCHLORIDE 2 MG/ML
0.5 INJECTION, SOLUTION INTRAMUSCULAR; INTRAVENOUS; SUBCUTANEOUS
Status: DISCONTINUED | OUTPATIENT
Start: 2022-07-02 | End: 2022-07-02

## 2022-07-02 RX ORDER — QUETIAPINE FUMARATE 25 MG/1
25 TABLET, FILM COATED ORAL NIGHTLY
Status: DISCONTINUED | OUTPATIENT
Start: 2022-07-02 | End: 2022-07-03 | Stop reason: HOSPADM

## 2022-07-02 RX ORDER — DIPHENHYDRAMINE HYDROCHLORIDE 50 MG/ML
25 INJECTION INTRAMUSCULAR; INTRAVENOUS
Status: COMPLETED | OUTPATIENT
Start: 2022-07-02 | End: 2022-07-02

## 2022-07-02 RX ORDER — PANTOPRAZOLE SODIUM 40 MG/10ML
80 INJECTION, POWDER, LYOPHILIZED, FOR SOLUTION INTRAVENOUS
Status: COMPLETED | OUTPATIENT
Start: 2022-07-02 | End: 2022-07-02

## 2022-07-02 RX ORDER — FLUTICASONE PROPIONATE 50 MCG
1 SPRAY, SUSPENSION (ML) NASAL 2 TIMES DAILY
Status: DISCONTINUED | OUTPATIENT
Start: 2022-07-02 | End: 2022-07-03 | Stop reason: HOSPADM

## 2022-07-02 RX ORDER — ASPIRIN 81 MG/1
81 TABLET ORAL DAILY
Status: DISCONTINUED | OUTPATIENT
Start: 2022-07-02 | End: 2022-07-03 | Stop reason: HOSPADM

## 2022-07-02 RX ORDER — MUPIROCIN 20 MG/G
OINTMENT TOPICAL 2 TIMES DAILY
Status: DISCONTINUED | OUTPATIENT
Start: 2022-07-02 | End: 2022-07-03 | Stop reason: HOSPADM

## 2022-07-02 RX ORDER — SODIUM CHLORIDE 0.9 % (FLUSH) 0.9 %
10 SYRINGE (ML) INJECTION
Status: DISCONTINUED | OUTPATIENT
Start: 2022-07-02 | End: 2022-07-03 | Stop reason: HOSPADM

## 2022-07-02 RX ORDER — AMIODARONE HYDROCHLORIDE 200 MG/1
200 TABLET ORAL 2 TIMES DAILY
Status: DISCONTINUED | OUTPATIENT
Start: 2022-07-02 | End: 2022-07-03

## 2022-07-02 RX ORDER — ENOXAPARIN SODIUM 100 MG/ML
1 INJECTION SUBCUTANEOUS
Status: DISCONTINUED | OUTPATIENT
Start: 2022-07-02 | End: 2022-07-03

## 2022-07-02 RX ORDER — ASPIRIN 325 MG
325 TABLET ORAL
Status: CANCELLED | OUTPATIENT
Start: 2022-07-02 | End: 2022-07-02

## 2022-07-02 RX ORDER — GABAPENTIN 300 MG/1
300 CAPSULE ORAL NIGHTLY
Status: DISCONTINUED | OUTPATIENT
Start: 2022-07-02 | End: 2022-07-03 | Stop reason: HOSPADM

## 2022-07-02 RX ORDER — TALC
6 POWDER (GRAM) TOPICAL NIGHTLY PRN
Status: DISCONTINUED | OUTPATIENT
Start: 2022-07-02 | End: 2022-07-03 | Stop reason: HOSPADM

## 2022-07-02 RX ORDER — ATORVASTATIN CALCIUM 40 MG/1
40 TABLET, FILM COATED ORAL DAILY
Status: DISCONTINUED | OUTPATIENT
Start: 2022-07-02 | End: 2022-07-03 | Stop reason: HOSPADM

## 2022-07-02 RX ADMIN — SODIUM CHLORIDE 500 ML: 0.9 INJECTION, SOLUTION INTRAVENOUS at 11:07

## 2022-07-02 RX ADMIN — SODIUM CHLORIDE 500 ML: 0.9 INJECTION, SOLUTION INTRAVENOUS at 06:07

## 2022-07-02 RX ADMIN — DIPHENHYDRAMINE HYDROCHLORIDE 25 MG: 50 INJECTION, SOLUTION INTRAMUSCULAR; INTRAVENOUS at 07:07

## 2022-07-02 RX ADMIN — FLUTICASONE PROPIONATE 50 MCG: 50 SPRAY, METERED NASAL at 08:07

## 2022-07-02 RX ADMIN — ENOXAPARIN SODIUM 60 MG: 100 INJECTION SUBCUTANEOUS at 08:07

## 2022-07-02 RX ADMIN — HYDROMORPHONE HYDROCHLORIDE 0.5 MG: 2 INJECTION INTRAMUSCULAR; INTRAVENOUS; SUBCUTANEOUS at 07:07

## 2022-07-02 RX ADMIN — AMIODARONE HYDROCHLORIDE 0.5 MG/MIN: 1.8 INJECTION, SOLUTION INTRAVENOUS at 11:07

## 2022-07-02 RX ADMIN — QUETIAPINE FUMARATE 25 MG: 25 TABLET ORAL at 08:07

## 2022-07-02 RX ADMIN — ATORVASTATIN CALCIUM 40 MG: 40 TABLET, FILM COATED ORAL at 12:07

## 2022-07-02 RX ADMIN — FLUTICASONE PROPIONATE 50 MCG: 50 SPRAY, METERED NASAL at 01:07

## 2022-07-02 RX ADMIN — PANTOPRAZOLE SODIUM 80 MG: 40 INJECTION, POWDER, FOR SOLUTION INTRAVENOUS at 07:07

## 2022-07-02 RX ADMIN — AMIODARONE HYDROCHLORIDE 150 MG: 1.5 INJECTION, SOLUTION INTRAVENOUS at 06:07

## 2022-07-02 RX ADMIN — MUPIROCIN: 20 OINTMENT TOPICAL at 09:07

## 2022-07-02 RX ADMIN — PROMETHAZINE HYDROCHLORIDE 12.5 MG: 25 INJECTION INTRAMUSCULAR; INTRAVENOUS at 07:07

## 2022-07-02 RX ADMIN — AMIODARONE HYDROCHLORIDE 200 MG: 200 TABLET ORAL at 08:07

## 2022-07-02 RX ADMIN — ASPIRIN 81 MG: 81 TABLET, COATED ORAL at 12:07

## 2022-07-02 RX ADMIN — ENOXAPARIN SODIUM 60 MG: 100 INJECTION SUBCUTANEOUS at 11:07

## 2022-07-02 RX ADMIN — AMIODARONE HYDROCHLORIDE 1 MG/MIN: 1.8 INJECTION, SOLUTION INTRAVENOUS at 07:07

## 2022-07-02 RX ADMIN — PAROXETINE HYDROCHLORIDE 20 MG: 20 TABLET, FILM COATED ORAL at 01:07

## 2022-07-02 RX ADMIN — SODIUM CHLORIDE: 0.9 INJECTION, SOLUTION INTRAVENOUS at 10:07

## 2022-07-02 RX ADMIN — LATANOPROST 1 DROP: 50 SOLUTION OPHTHALMIC at 09:07

## 2022-07-02 RX ADMIN — AMIODARONE HYDROCHLORIDE 0.5 MG/MIN: 1.8 INJECTION, SOLUTION INTRAVENOUS at 01:07

## 2022-07-02 RX ADMIN — GABAPENTIN 300 MG: 300 CAPSULE ORAL at 08:07

## 2022-07-02 NOTE — ASSESSMENT & PLAN NOTE
Patient with Paroxysmal (<7 days) atrial fibrillation which is uncontrolled currently with Amiodarone. Patient is currently in atrial fibrillation.[id number] Score: 3. Anticoagulation indicated. Anticoagulation done with Lovenox.  Patient presents with Afib with RVR.  Recent diagnosis of Afib.  Started on Amio drip and Cardiology consulted.  HR much better controlled.  Somewhat hypotensive.  Will give IVF bolus and closely monitor.

## 2022-07-02 NOTE — HPI
76 y/o male with a PMHx of CAD, hyperlipidemia, and PAF comes to the ER complaining of severe heartburn.  Patient complains of constant left inframammary burning pain that began around 12 PM yesterday.  The patient reports history of acid reflux, endorising his pain is similar to past episodes.  Patient also states he had been belching frequently.  Symptoms did improve overnight.  Patient presented to ER because he felt irregular heart beat this morning.  The patient states he was recently seen a couple of months ago due to a rapid heartbeat.  He presents to ER where he was noted to be in rapid AFib.  Denies any shortness of breath, nausea, vomiting or diaphoresis.  Patient with no other complaints.

## 2022-07-02 NOTE — HPI
"Reed Torres is a 77 y. o male with a PMHx of CAD, hyperlipidemia, and pulmonary emphysema, that comes to the ED complaining of constant left inframammary burning pain beginning around 12 PM yesterday. The patient reports history of acid reflux, endorising his pain is similar to past episodes, and notes "he has been belching a lot". Patient endorses his pain is not exacerbated by deep breathing. Compliant with blood pressure medication. The patient states he was recently seen a couple of months ago due to a rapid heartbeat. No other alleviating or exacerbating factors noted. Patient denies cough, shortness of breath, fever, chills, abdominal pain, nausea, vomiting, diarrhea, dysuria, headaches, congestion, sore throat, arm or leg trouble, eye pain, ear pain, rash, or other associated symptoms. No known allergies.    Denies CP, some abdominal pain feels like GERD  EKG A-fib 123 NSSTT changes  Troponin negative    Currently HR 80s A-fib on IV amiodarone  Hx PAF but on ASA 81 qd only - Hx BRBPR    Followed by Dr Avila  Echocardiogram 3/31/22:     The left ventricle is normal in size with normal systolic function.  The estimated ejection fraction is 65%.  Normal right ventricular size with normal right ventricular systolic function.  The sinuses of Valsalva is mildly dilated, 3.8cm.  The estimated PA systolic pressure is 23 mmHg.     Cardiac catheterization 07/24/2020:     1.  Mild-to-moderate nonobstructive coronary artery disease.  2.  Normal left ventricular end-diastolic pressure.  3.  No aortic stenosis.     Exercise MPS 7/2/20:       Abnormal myocardial perfusion study.    There is a moderate sized, moderate intensity, mostly reversible defect with some fixed areas in the inferior wall(s).    Gated perfusion images showed an ejection fraction of 57%    The EKG portion of this study is negative for ischemia.    The patient reported no chest pain during the stress test.    There were no arrhythmias during " "stress.     Holter 3/17/20:     Sinus rhythm with heart rates varying between 48 and 118 bpm with an average of 77 bpm.  There were occasional PVCs totalling 278 and averaging 11.13 per hour.  There were rare PACs totalling 81 and averaging 3.24 per hour.  There were occasional runs of non-sustained SVT and lasting for 2 to 7 beats.  Diary events ("headache") did not correspond to any arrhythmic events.     Echo 3/17/20:     Low normal left ventricular systolic function. The estimated ejection fraction is 50%.  No wall motion abnormalities.  Normal right ventricular systolic function.  The sinuses of Valsalva is mildly dilated. 3.9cm.  The estimated PA systolic pressure is 28 mmHg.     ASSESSMENT:      1. Nonobstructive coronary artery disease  2. Paroxysmal atrial fibrillation:  Chads Vasc score of 3  3. Hyperlipidemia:  LDL 64  4. SVT: episode PAF  5. Aortic atherosclerosis  6. Right common iliac artery aneurysm         PLAN:      1. Nonobstructive coronary artery disease: Risk factor modification. Ejection fraction of 65%.    2. PAF: event monitor pending. Decrease aspirin to 81 mg p.o. q.d..    3. Hyperlipidemia:  Favorable lipid profile  4. Return to clinic 3 months.        "

## 2022-07-02 NOTE — CARE UPDATE
[facility]  ICU Shift Summary  Date: 7/2/2022      Prehospitalization: Home  Admit Date / LOS : 7/2/2022/ 0 days    Diagnosis: PAF (paroxysmal atrial fibrillation)    Consults:        Active: Cardio       Needed: N/A     Code Status: Full Code   Advanced Directive: <no information>    LDA:  Lines/Drains/Airways     Peripheral Intravenous Line  Duration                Peripheral IV - Single Lumen 07/02/22 0607 20 G Right Antecubital <1 day         Peripheral IV - Single Lumen 07/02/22 0625 20 G Left Antecubital <1 day              Central Lines/Site/Justification:Patient Does Not Have Central Line  Urinary Cath/Order/Justification:Patient Does Not Have Urinary Catheter    Vasopressors/Infusions:        GOALS: Volume/ Hemodynamic: N/A                     RASS: 0  alert and calm    Pain Management: none       Pain Controlled: not applicable     Rhythm: SB    Respiratory Device: Room Air                  Most Recent SBT/ SAT: N/A       MOVE Screen: PASS  ICU Liberation: not applicable    VTE Prophylaxis: Pharm  Mobility: Ambulatory  Stress Ulcer Prophylaxis: Yes    Isolation: No active isolations    Dietary:   Current Diet Order   Procedures    Diet Cardiac      Tolerance: yes  Advancement: yes    I & O (24h):    Intake/Output Summary (Last 24 hours) at 7/2/2022 1739  Last data filed at 7/2/2022 1546  Gross per 24 hour   Intake 1309.11 ml   Output 575 ml   Net 734.11 ml        Restraints: No    Significant Dates:  Post Op Date: N/A  Rescue Date: N/A  Imaging/ Diagnostics: N/A    Noteworthy Labs:  none    COVID Test: (--)  CBC/Anemia Labs: Coags:    Recent Labs   Lab 07/02/22  0611   WBC 5.09   HGB 14.6   HCT 42.2   *   MCV 88   RDW 14.4    No results for input(s): PT, INR, APTT in the last 168 hours.     Chemistries:   Recent Labs   Lab 07/02/22  0611      K 4.2      CO2 25   BUN 20   CREATININE 0.8   CALCIUM 8.9   PROT 6.4   BILITOT 1.4*   ALKPHOS 56   ALT 33   AST 20   MG 2.2         Cardiac Enzymes: Ejection Fractions:    Recent Labs     07/02/22  0611 07/02/22  0913   TROPONINI 0.006 <0.006    EF   Date Value Ref Range Status   07/02/2022 60 % Final        POCT Glucose: HbA1c:    No results for input(s): POCTGLUCOSE in the last 168 hours. Hemoglobin A1C   Date Value Ref Range Status   03/03/2022 5.1 4.0 - 5.6 % Final     Comment:     ADA Screening Guidelines:  5.7-6.4%  Consistent with prediabetes  >or=6.5%  Consistent with diabetes    High levels of fetal hemoglobin interfere with the HbA1C  assay. Heterozygous hemoglobin variants (HbS, HgC, etc)do  not significantly interfere with this assay.   However, presence of multiple variants may affect accuracy.             ICU LOS 8h  Level of Care: Critical Care    Chart Check: 12 HR Done  Shift Summary/Plan for the shift:     Pt remains in ICU on room air and PO amiodarone. Pt sinus kuldip on monitor all other VS WNL. Wife at bedside, updated on plan of care per care team. No falls, injuries, or skin breakdown. Precautions continued for all.

## 2022-07-02 NOTE — ASSESSMENT & PLAN NOTE
Hx PAF on ASA 81 qd. Rates improved with IV amiodarone. Full dose lovenox with transition to DOAC if tolerated. May not tolerated BB with low BP. Check echo

## 2022-07-02 NOTE — CONSULTS
[place] [place]  Consult Note    Patient Name: Reed Torres  MRN: [id number]  Admission Date: 7/2/2022  Hospital Length of Stay: 0 days  Code Status: Prior   Attending Provider: Parminder Rey MD   Consulting Provider: Guillaume Rojas MD  Primary Care Physician: Pj Gillette MD  Principal Problem:<principal problem not specified>    Patient information was obtained from patient and ER records.     Consults  Subjective:     Chief Complaint:  A-fib RVR     HPI:   Reed Torres is a 77 y. o male with a PMHx of CAD, hyperlipidemia, and pulmonary emphysema, that comes to the ED complaining of constant left inframammary burning pain beginning around 12 PM yesterday. The patient reports history of acid reflux, endorising his pain is similar to past episodes, and notes "he has been belching a lot". Patient endorses his pain is not exacerbated by deep breathing. Compliant with blood pressure medication. The patient states he was recently seen a couple of months ago due to a rapid heartbeat. No other alleviating or exacerbating factors noted. Patient denies cough, shortness of breath, fever, chills, abdominal pain, nausea, vomiting, diarrhea, dysuria, headaches, congestion, sore throat, arm or leg trouble, eye pain, ear pain, rash, or other associated symptoms. No known allergies.    Denies CP, some abdominal pain feels like GERD  EKG A-fib 123 NSSTT changes  Troponin negative    Currently HR 80s A-fib on IV amiodarone  Hx PAF but on ASA 81 qd only - Hx BRBPR    Followed by Dr Avila  Echocardiogram 3/31/22:     · The left ventricle is normal in size with normal systolic function.  · The estimated ejection fraction is 65%.  · Normal right ventricular size with normal right ventricular systolic function.  · The sinuses of Valsalva is mildly dilated, 3.8cm.  · The estimated PA systolic pressure is 23 mmHg.     Cardiac catheterization 07/24/2020:     1.  Mild-to-moderate nonobstructive coronary artery " "disease.  2.  Normal left ventricular end-diastolic pressure.  3.  No aortic stenosis.     Exercise MPS 7/2/20:       Abnormal myocardial perfusion study.    There is a moderate sized, moderate intensity, mostly reversible defect with some fixed areas in the inferior wall(s).    Gated perfusion images showed an ejection fraction of 57%    The EKG portion of this study is negative for ischemia.    The patient reported no chest pain during the stress test.    There were no arrhythmias during stress.     Holter 3/17/20:     · Sinus rhythm with heart rates varying between 48 and 118 bpm with an average of 77 bpm.  · There were occasional PVCs totalling 278 and averaging 11.13 per hour.  · There were rare PACs totalling 81 and averaging 3.24 per hour.  · There were occasional runs of non-sustained SVT and lasting for 2 to 7 beats.  · Diary events ("headache") did not correspond to any arrhythmic events.     Echo 3/17/20:     · Low normal left ventricular systolic function. The estimated ejection fraction is 50%.  · No wall motion abnormalities.  · Normal right ventricular systolic function.  · The sinuses of Valsalva is mildly dilated. 3.9cm.  · The estimated PA systolic pressure is 28 mmHg.     ASSESSMENT:      1. Nonobstructive coronary artery disease  2. Paroxysmal atrial fibrillation:  Chads Vasc score of 3  3. Hyperlipidemia:  LDL 64  4. SVT: episode PAF  5. Aortic atherosclerosis  6. Right common iliac artery aneurysm         PLAN:      1. Nonobstructive coronary artery disease: Risk factor modification. Ejection fraction of 65%.    2. PAF: event monitor pending. Decrease aspirin to 81 mg p.o. q.d..    3. Hyperlipidemia:  Favorable lipid profile  4. Return to clinic 3 months.            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 CA    SHOULDER ARTHROSCOPY W/ ROTATOR CUFF REPAIR      Left shoulder       Review of patient's allergies indicates:  No Known Allergies    Current Facility-Administered Medications on File Prior to Encounter   Medication    sodium chloride 0.9% flush 3 mL     Current Outpatient Medications on File Prior to Encounter   Medication Sig    aspirin (ECOTRIN) 81 MG EC tablet Take 81 mg by mouth once daily.    atorvastatin (LIPITOR) 40 MG tablet TAKE 1 TABLET BY MOUTH EVERY DAY    azelastine (ASTELIN) 137 mcg (0.1 %) nasal spray 1 spray (137 mcg total) by Nasal route 2 (two) times daily.    fluticasone propionate (FLONASE) 50 mcg/actuation nasal spray 1 spray (50 mcg total) by Each Nostril route 2 (two) times daily.    gabapentin (NEURONTIN) 300 MG capsule TAKE 1-3 CAPSULES BY MOUTH EVERY EVENING    latanoprost 0.005 % ophthalmic solution     methylPREDNISolone (MEDROL DOSEPACK) 4 mg tablet use as directed    metoprolol succinate (TOPROL-XL) 25 MG 24 hr tablet Take 1 tablet (25 mg total) by mouth once daily.    pantoprazole (PROTONIX) 40 MG tablet Take 40 mg by mouth once daily.    paroxetine (PAXIL) 20 MG tablet Take 1 tablet (20 mg total) by mouth every morning.    polyethylene glycol (GLYCOLAX) 17 gram/dose powder Take 17 g by mouth 2 (two) times daily.    QUEtiapine (SEROQUEL) 25 MG Tab TAKE 1 TABLET BY MOUTH EVERY EVENING    suvorexant (BELSOMRA) 10 mg Tab Take 10 mg by mouth every evening.     Family History       Problem Relation (Age of Onset)    COPD Mother, Father    Diabetes Mother    Heart disease Brother    Hypertension Mother          Tobacco Use    Smoking status: Former Smoker     Quit date: 2000     Years  since quittin.5    Smokeless tobacco: Never Used    Tobacco comment: stopped smoking 20 yrs ago.   Substance and Sexual Activity    Alcohol use: Not Currently     Alcohol/week: 5.0 standard drinks     Types: 6 Standard drinks or equivalent per week    Drug use: No    Sexual activity: Yes     Partners: Female     Birth control/protection: None     Review of Systems   Constitutional: Negative for decreased appetite.   HENT:  Negative for ear discharge.    Eyes:  Negative for blurred vision.   Endocrine: Negative for polyphagia.   Skin:  Negative for nail changes.   Genitourinary:  Negative for bladder incontinence.   Neurological:  Negative for aphonia.   Psychiatric/Behavioral:  Negative for hallucinations.    Allergic/Immunologic: Negative for hives.   Objective:     Vital Signs (Most Recent):  Temp: 98.1 °F (36.7 °C) (22 05)  Pulse: 78 (22)  Resp: 12 (22)  BP: 94/65 (22)  SpO2: 95 % (22) Vital Signs (24h Range):  Temp:  [98.1 °F (36.7 °C)] 98.1 °F (36.7 °C)  Pulse:  [] 78  Resp:  [12-19] 12  SpO2:  [95 %-99 %] 95 %  BP: ()/(57-68) 94/65     Weight: 63 kg (139 lb)  Body mass index is 23.13 kg/m².    SpO2: 95 %  O2 Device (Oxygen Therapy): room air      Intake/Output Summary (Last 24 hours) at 2022 0911  Last data filed at 2022 0759  Gross per 24 hour   Intake 650 ml   Output --   Net 650 ml       Lines/Drains/Airways       Peripheral Intravenous Line  Duration                  Peripheral IV - Single Lumen 22 0607 20 G Right Antecubital <1 day         Peripheral IV - Single Lumen 22 0625 20 G Left Antecubital <1 day                    Physical Exam  Constitutional:       Appearance: He is well-developed.   HENT:      Head: Normocephalic and atraumatic.   Eyes:      Conjunctiva/sclera: Conjunctivae normal.      Pupils: Pupils are equal, round, and reactive to light.   Cardiovascular:      Rate and Rhythm: Normal rate. Rhythm  irregular.      Pulses: Intact distal pulses.      Heart sounds: Normal heart sounds.   Pulmonary:      Effort: Pulmonary effort is normal.      Breath sounds: Normal breath sounds.   Abdominal:      General: Bowel sounds are normal.      Palpations: Abdomen is soft.   Musculoskeletal:         General: Normal range of motion.      Cervical back: Normal range of motion and neck supple.   Skin:     General: Skin is warm and dry.   Neurological:      Mental Status: He is alert and oriented to person, place, and time.       Significant Labs: All pertinent lab results from the last 24 hours have been reviewed.    Significant Imaging: Echocardiogram: Transthoracic echo (TTE) complete (Cupid Only):   Results for orders placed or performed during the hospital encounter of 03/31/22   Echo   Result Value Ref Range    Ascending aorta 3.21 cm    STJ 2.83 cm    AV mean gradient 3 mmHg    Ao peak epi 1.12 m/s    Ao VTI 22.48 cm    IVS 0.77 0.6 - 1.1 cm    LA size 3.29 cm    Left Atrium Major Axis 4.44 cm    Left Atrium Minor Axis 4.52 cm    LVIDd 4.22 3.5 - 6.0 cm    LVIDs 3.19 2.1 - 4.0 cm    LVOT diameter 2.20 cm    LVOT peak VTI 20.25 cm    Posterior Wall 0.97 0.6 - 1.1 cm    MV Peak A Epi 0.81 m/s    E wave deceleration time 231.13 msec    MV Peak E Epi 0.52 m/s    RA Major Axis 4.74 cm    RA Width 3.32 cm    RVDD 3.38 cm    Sinus 3.75 cm    TAPSE 2.39 cm    TR Max Epi 2.21 m/s    TDI LATERAL 0.09 m/s    TDI SEPTAL 0.09 m/s    LA WIDTH 4.42 cm    Ao root annulus 3.43 cm    AORTIC VALVE CUSP SEPERATION 2.55 cm    PV PEAK VELOCITY 1.06 cm/s    MV stenosis pressure 1/2 time 67.03 ms    LV Diastolic Volume 79.65 mL    LV Systolic Volume 40.79 mL    LVOT peak epi 1.06 m/s    LV LATERAL E/E' RATIO 5.78 m/s    LV SEPTAL E/E' RATIO 5.78 m/s    FS 24 %    LA volume 55.37 cm3    LV mass 114.22 g    Left Ventricle Relative Wall Thickness 0.46 cm    AV valve area 3.42 cm2    AV Velocity Ratio 0.95     AV index (prosthetic) 0.90     MV  valve area p 1/2 method 3.28 cm2    E/A ratio 0.64     Mean e' 0.09 m/s    LVOT area 3.8 cm2    LVOT stroke volume 76.94 cm3    AV peak gradient 5 mmHg    E/E' ratio 5.78 m/s    Triscuspid Valve Regurgitation Peak Gradient 20 mmHg    Right Atrial Pressure (from IVC) 3 mmHg    EF 65 %    TV rest pulmonary artery pressure 23 mmHg    Narrative    · The left ventricle is normal in size with normal systolic function.  · The estimated ejection fraction is 65%.  · Normal right ventricular size with normal right ventricular systolic   function.  · The sinuses of Valsalva is mildly dilated, 3.8cm.  · The estimated PA systolic pressure is 23 mmHg.        Assessment and Plan:     PAF (paroxysmal atrial fibrillation)  Hx PAF on ASA 81 qd. Rates improved with IV amiodarone. Full dose lovenox with transition to DOAC if tolerated. May not tolerated BB with low BP. Check echo    Coronary artery disease due to lipid rich plaque  Non-obstructive CAD by [facility] 7/4/20. CP atypical. No MI    Hyperlipidemia  On statin        VTE Risk Mitigation (From admission, onward)         Ordered     enoxaparin injection 60 mg  Every 12 hours (non-standard times)         07/02/22 0912              35 minutes spent with patient in ICU    Thank you for your consult. I will follow-up with patient. Please contact us if you have any additional questions.    Guillaume Rojas MD  Cardiology   [facility] [place] - Emergency Dept

## 2022-07-02 NOTE — ED PROVIDER NOTES
"Encounter Date: 7/2/2022    SCRIBE #1 NOTE: I, Miah Blank, am scribing for, and in the presence of,  Parminder Rey MD. I have scribed the following portions of the note - Other sections scribed: HPI, ROS.       History     Chief Complaint   Patient presents with    Abdominal Pain     Presents with complaint of epigastric pain radiating across chest described as a burning sensation, reports history of acid reflux     Reed Torres is a 77 y. o male with a PMHx of CAD, hyperlipidemia, and pulmonary emphysema, that comes to the ED complaining of constant left inframammary burning pain beginning around 12 PM yesterday. The patient reports history of acid reflux, endorising his pain is similar to past episodes, and notes "he has been belching a lot". Patient endorses his pain is not exacerbated by deep breathing. Compliant with blood pressure medication. The patient states he was recently seen a couple of months ago due to a rapid heartbeat. No other alleviating or exacerbating factors noted. Patient denies cough, shortness of breath, fever, chills, abdominal pain, nausea, vomiting, diarrhea, dysuria, headaches, congestion, sore throat, arm or leg trouble, eye pain, ear pain, rash, or other associated symptoms. No known allergies.    The history is provided by the patient. No  was used.     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 [place]    SHOULDER ARTHROSCOPY W/ ROTATOR CUFF REPAIR   "    Left shoulder     Family History   Problem Relation Age of Onset    COPD Mother     Diabetes Mother     Hypertension Mother     COPD Father     Heart disease Brother      Social History     Tobacco Use    Smoking status: Former Smoker     Quit date: 2000     Years since quittin.5    Smokeless tobacco: Never Used    Tobacco comment: stopped smoking 20 yrs ago.   Substance Use Topics    Alcohol use: Not Currently     Alcohol/week: 5.0 standard drinks     Types: 6 Standard drinks or equivalent per week    Drug use: No     Review of Systems   Constitutional: Negative for chills, diaphoresis and fever.   HENT: Negative for ear pain and sore throat.    Eyes: Negative for pain.   Respiratory: Negative for cough and shortness of breath.    Cardiovascular: Positive for chest pain (left inframammary).   Gastrointestinal: Negative for abdominal pain, diarrhea, nausea and vomiting.   Genitourinary: Negative for dysuria.   Musculoskeletal: Negative for back pain.        (-) Arm or leg trouble.    Skin: Negative for rash.   Neurological: Negative for headaches.   Psychiatric/Behavioral: Negative for confusion.       Physical Exam     Initial Vitals [22 0537]   BP Pulse Resp Temp SpO2   (!) 104/57 86 19 98.1 °F (36.7 °C) 99 %      MAP       --         Physical Exam  The patient was examined specifically for the following:   General:No significant distress, Good color, Warm and dry. Head and neck:Scalp atraumatic, Neck supple. Neurological:Appropriate conversation, Gross motor deficits. Eyes:Conjugate gaze, Clear corneas. ENT: No epistaxis. Cardiac: Regular rate and rhythm, Grossly normal heart tones. Pulmonary: Wheezing, Rales. Gastrointestinal: Abdominal tenderness, Abdominal distention. Musculoskeletal: Extremity deformity, Apparent pain with range of motion of the joints. Skin: Rash.   The findings on examination were normal except for the following:  The heart rate ranges between 100-135 during the  physical examination.  Lungs are clear and free of wheezing rales rubs or rhonchi.  Heart tones are normal.  The patient has regular rate and rhythm.  There is no significant abdominal tenderness.  Extremities are nontender there is no pain with range of motion any joints.  The patient is awake alert and bright.  ED Course   Critical Care    Date/Time: 7/2/2022 3:53 PM  Performed by: Parminder Rey MD  Authorized by: Enzo Trejo MD   Direct patient critical care time: 22 minutes  Additional history critical care time: 11 minutes  Ordering / reviewing critical care time: 11 minutes  Documentation critical care time: 11 minutes  Consulting other physicians critical care time: 11 minutes  Total critical care time (exclusive of procedural time) : 66 minutes  Critical care time was exclusive of separately billable procedures and treating other patients and teaching time.  Critical care was necessary to treat or prevent imminent or life-threatening deterioration of the following conditions: cardiac failure and circulatory failure.  Critical care was time spent personally by me on the following activities: discussions with consultants, evaluation of patient's response to treatment, obtaining history from patient or surrogate, ordering and review of laboratory studies, pulse oximetry, review of old charts, development of treatment plan with patient or surrogate, discussions with primary provider, examination of patient, ordering and performing treatments and interventions, ordering and review of radiographic studies and re-evaluation of patient's condition.        Labs Reviewed   CBC W/ AUTO DIFFERENTIAL - Abnormal; Notable for the following components:       Result Value    Platelets 145 (*)     All other components within normal limits   COMPREHENSIVE METABOLIC PANEL - Abnormal; Notable for the following components:    Glucose 113 (*)     Total Bilirubin 1.4 (*)     All other components within normal limits    B-TYPE NATRIURETIC PEPTIDE - Abnormal; Notable for the following components:     (*)     All other components within normal limits   TROPONIN I   MAGNESIUM   LIPASE     EKG Readings: (Independently Interpreted)   This patient is in atrial fibrillation with rapid ventricular response.  The heart rate is 123. There nonspecific ST segment changes.  There are no T-wave abnormalities.  There is poor R-wave progression across precordium.  There is no definite evidence of acute myocardial infarction or malignant arrhythmia.       Imaging Results          X-Ray Chest 1 View (Final result)  Result time 07/02/22 07:36:01    Final result by Juaquin Alarcon MD (07/02/22 07:36:01)                 Impression:      No convincing evidence of acute cardiopulmonary disease.      Electronically signed by: Juaquin Alarcon  Date:    07/02/2022  Time:    07:36             Narrative:    EXAMINATION:  XR CHEST 1 VIEW    CLINICAL HISTORY:  Unspecified atrial fibrillation    TECHNIQUE:  Single frontal view of the chest was performed.    COMPARISON:  Chest radiograph 12/07/2021    FINDINGS:  Monitoring leads and resuscitation pads are noted.  Grossly unchanged cardiomediastinal contours.  Minor background coarsened interstitial increased attenuation relatively similar to prior examination performed 12/07/2021.  Suspect left basilar atelectasis.  No definite pneumothorax or large volume pleural effusion appreciated.  No acute findings identified in the visualized abdomen.  Osseous and soft tissue structures appear without definite acute abnormality.                                 Medications   amiodarone 360 mg/200 mL (1.8 mg/mL) infusion (0 mg/min Intravenous Stopped 7/2/22 1153)   amiodarone 360 mg/200 mL (1.8 mg/mL) infusion (0.5 mg/min Intravenous Verify Only 7/2/22 1501)   enoxaparin injection 60 mg (60 mg Subcutaneous Given 7/2/22 1116)   sodium chloride 0.9% flush 10 mL (has no administration in time range)   melatonin  tablet 6 mg (has no administration in time range)   aspirin EC tablet 81 mg (81 mg Oral Given 7/2/22 1200)   atorvastatin tablet 40 mg (40 mg Oral Given 7/2/22 1200)   fluticasone propionate 50 mcg/actuation nasal spray 50 mcg (50 mcg Each Nostril Given 7/2/22 1300)   gabapentin capsule 300 mg (has no administration in time range)   paroxetine tablet 20 mg (20 mg Oral Given 7/2/22 1300)   QUEtiapine tablet 25 mg (has no administration in time range)   amiodarone in dextrose 150 mg/100 mL (1.5 mg/mL) loading dose 150 mg (0 mg Intravenous Stopped 7/2/22 0711)   sodium chloride 0.9% bolus 500 mL (0 mLs Intravenous Stopped 7/2/22 0759)   pantoprazole injection 80 mg (80 mg Intravenous Given 7/2/22 0704)   promethazine (PHENERGAN) 12.5 mg in dextrose 5 % 50 mL IVPB (0 mg Intravenous Stopped 7/2/22 0759)   diphenhydrAMINE injection 25 mg (25 mg Intravenous Given 7/2/22 0705)   HYDROmorphone (PF) injection 0.5 mg (0.5 mg Intravenous Given 7/2/22 0701)   sodium chloride 0.9% bolus 500 mL (0 mLs Intravenous Stopped 7/2/22 1216)              Scribe Attestation:   Scribe #1: I performed the above scribed service and the documentation accurately describes the services I performed. I attest to the accuracy of the note.                 Clinical Impression:   Final diagnoses:  [R07.9] Chest pain  [I48.91] Atrial fibrillation with rapid ventricular response          ED Disposition Condition    Admit        I personally performed the services described in this documentation.  All medical record  entries made by the scribe are at my direction and in my presence.  Signed, Dr. Candido Rey MD  07/02/22 1040

## 2022-07-02 NOTE — ED TRIAGE NOTES
"76 yo male presents to ED with c/o of burning in chest. Pt states that it is "more of an uncomfortable feeling than a pain".Pt reports that he was in bed and started to have the pain. Pt describes the pain as a burning sensation and rates it at a 3 on a PRS of 0-10.  "

## 2022-07-02 NOTE — H&P
[facility] Medicine  History & Physical    Patient Name: Reed Torres  MRN: [id number]  Patient Class: IP- Inpatient  Admission Date: 7/2/2022  Attending Physician: Enzo Trejo MD   Primary Care Provider: Pj Gillette MD         Patient information was obtained from patient and ER records.     Subjective:     Principal Problem:PAF (paroxysmal atrial fibrillation)    Chief Complaint:   Chief Complaint   Patient presents with    Abdominal Pain     Presents with complaint of epigastric pain radiating across chest described as a burning sensation, reports history of acid reflux        HPI: 76 y/o male with a PMHx of CAD, hyperlipidemia, and PAF comes to the ER complaining of severe heartburn.  Patient complains of constant left inframammary burning pain that began around 12 PM yesterday.  The patient reports history of acid reflux, endorising his pain is similar to past episodes.  Patient also states he had been belching frequently.  Symptoms did improve overnight.  Patient presented to ER because he felt irregular heart beat this morning.  The patient states he was recently seen a couple of months ago due to a rapid heartbeat.  He presents to ER where he was noted to be in rapid AFib.  Denies any shortness of breath, nausea, vomiting or diaphoresis.  Patient with no other complaints.      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 [place]    SHOULDER ARTHROSCOPY W/ ROTATOR CUFF REPAIR      Left shoulder       Review of patient's allergies  indicates:  No Known Allergies    No current facility-administered medications on file prior to encounter.     Current Outpatient Medications on File Prior to Encounter   Medication Sig    aspirin (ECOTRIN) 81 MG EC tablet Take 81 mg by mouth once daily.    atorvastatin (LIPITOR) 40 MG tablet TAKE 1 TABLET BY MOUTH EVERY DAY    azelastine (ASTELIN) 137 mcg (0.1 %) nasal spray 1 spray (137 mcg total) by Nasal route 2 (two) times daily.    fluticasone propionate (FLONASE) 50 mcg/actuation nasal spray 1 spray (50 mcg total) by Each Nostril route 2 (two) times daily.    gabapentin (NEURONTIN) 300 MG capsule TAKE 1-3 CAPSULES BY MOUTH EVERY EVENING    latanoprost 0.005 % ophthalmic solution     methylPREDNISolone (MEDROL DOSEPACK) 4 mg tablet use as directed    metoprolol succinate (TOPROL-XL) 25 MG 24 hr tablet Take 1 tablet (25 mg total) by mouth once daily.    pantoprazole (PROTONIX) 40 MG tablet Take 40 mg by mouth once daily.    paroxetine (PAXIL) 20 MG tablet Take 1 tablet (20 mg total) by mouth every morning.    polyethylene glycol (GLYCOLAX) 17 gram/dose powder Take 17 g by mouth 2 (two) times daily.    QUEtiapine (SEROQUEL) 25 MG Tab TAKE 1 TABLET BY MOUTH EVERY EVENING    suvorexant (BELSOMRA) 10 mg Tab Take 10 mg by mouth every evening.     Family History       Problem Relation (Age of Onset)    COPD Mother, Father    Diabetes Mother    Heart disease Brother    Hypertension Mother          Tobacco Use    Smoking status: Former Smoker     Quit date: 2000     Years since quittin.5    Smokeless tobacco: Never Used    Tobacco comment: stopped smoking 20 yrs ago.   Substance and Sexual Activity    Alcohol use: Not Currently     Alcohol/week: 5.0 standard drinks     Types: 6 Standard drinks or equivalent per week    Drug use: No    Sexual activity: Yes     Partners: Female     Birth control/protection: None     Review of Systems   Constitutional:  Negative for chills and fever.   HENT:   Negative for ear discharge and ear pain.    Eyes:  Negative for discharge and itching.   Respiratory:  Negative for cough and shortness of breath.    Cardiovascular:  Positive for chest pain and palpitations.   Gastrointestinal:  Negative for abdominal distention and abdominal pain.   Endocrine: Negative for cold intolerance and heat intolerance.   Genitourinary:  Negative for difficulty urinating and dysuria.   Musculoskeletal:  Negative for neck pain and neck stiffness.   Skin:  Negative for rash and wound.   Neurological:  Negative for seizures and syncope.   Psychiatric/Behavioral:  Negative for agitation and hallucinations.    Objective:     Vital Signs (Most Recent):  Temp: 98.1 °F (36.7 °C) (07/02/22 1101)  Pulse: (!) 54 (07/02/22 1345)  Resp: (!) 23 (07/02/22 1345)  BP: 94/60 (07/02/22 1345)  SpO2: 97 % (07/02/22 1345)   Vital Signs (24h Range):  Temp:  [97.9 °F (36.6 °C)-98.1 °F (36.7 °C)] 98.1 °F (36.7 °C)  Pulse:  [] 54  Resp:  [12-23] 23  SpO2:  [95 %-99 %] 97 %  BP: ()/(57-68) 94/60     Weight: 58.9 kg (129 lb 13.6 oz)  Body mass index is 21.61 kg/m².    Physical Exam  Constitutional:       Appearance: He is not toxic-appearing or diaphoretic.   HENT:      Head: Normocephalic and atraumatic.      Mouth/Throat:      Pharynx: Oropharynx is clear. No oropharyngeal exudate or posterior oropharyngeal erythema.   Cardiovascular:      Rate and Rhythm: Normal rate. Rhythm irregular.   Pulmonary:      Effort: No respiratory distress.      Breath sounds: Normal breath sounds.   Abdominal:      General: Bowel sounds are normal.      Palpations: Abdomen is soft.   Musculoskeletal:         General: No deformity or signs of injury.   Skin:     General: Skin is warm and dry.   Neurological:      Mental Status: He is oriented to person, place, and time.      Cranial Nerves: No cranial nerve deficit.           Significant Labs: All pertinent labs within the past 24 hours have been reviewed.  BMP:   Recent  Labs   Lab 07/02/22  0611   *      K 4.2      CO2 25   BUN 20   CREATININE 0.8   CALCIUM 8.9   MG 2.2     CBC:   Recent Labs   Lab 07/02/22  0611   WBC 5.09   HGB 14.6   HCT 42.2   *       Significant Imaging: I have reviewed all pertinent imaging results/findings within the past 24 hours.    Assessment/Plan:     * PAF (paroxysmal atrial fibrillation)  Patient with Paroxysmal (<7 days) atrial fibrillation which is uncontrolled currently with Amiodarone. Patient is currently in atrial fibrillation.[id number] Score: 3. Anticoagulation indicated. Anticoagulation done with Lovenox.  Patient presents with Afib with RVR.  Recent diagnosis of Afib.  Started on Amio drip and Cardiology consulted.  HR much better controlled.  Somewhat hypotensive.  Will give IVF bolus and closely monitor.        Coronary artery disease due to lipid rich plaque  Troponin negative.  Continue to trend.  Continue ASA and Statin.      Anxiety disorder  Continue home medications.      Hyperlipidemia  Continue Statin.        VTE Risk Mitigation (From admission, onward)         Ordered     enoxaparin injection 60 mg  Every 12 hours (non-standard times)         07/02/22 0912              Enzo Trejo MD  Department of Hospital Medicine   [facility] [place] [facility]

## 2022-07-02 NOTE — SUBJECTIVE & OBJECTIVE
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 [place]    SHOULDER ARTHROSCOPY W/ ROTATOR CUFF REPAIR      Left shoulder       Review of patient's allergies indicates:  No Known Allergies    No current facility-administered medications on file prior to encounter.     Current Outpatient Medications on File Prior to Encounter   Medication Sig    aspirin (ECOTRIN) 81 MG EC tablet Take 81 mg by mouth once daily.    atorvastatin (LIPITOR) 40 MG tablet TAKE 1 TABLET BY MOUTH EVERY DAY    azelastine (ASTELIN) 137 mcg (0.1 %) nasal spray 1 spray (137 mcg total) by Nasal route 2 (two) times daily.    fluticasone propionate (FLONASE) 50 mcg/actuation nasal spray 1 spray (50 mcg total) by Each Nostril route 2 (two) times daily.    gabapentin (NEURONTIN) 300 MG capsule TAKE 1-3 CAPSULES BY MOUTH EVERY EVENING    latanoprost 0.005 % ophthalmic solution     methylPREDNISolone (MEDROL DOSEPACK) 4 mg tablet use as directed    metoprolol succinate (TOPROL-XL) 25 MG 24 hr tablet Take 1 tablet (25 mg total) by mouth once daily.    pantoprazole (PROTONIX) 40 MG tablet Take 40 mg by mouth once daily.    paroxetine (PAXIL) 20 MG tablet Take 1 tablet (20 mg total) by mouth every morning.    polyethylene glycol (GLYCOLAX) 17 gram/dose powder Take 17 g by mouth 2 (two) times daily.    QUEtiapine (SEROQUEL) 25 MG Tab TAKE 1 TABLET BY MOUTH EVERY EVENING    suvorexant (BELSOMRA) 10 mg Tab Take 10 mg by mouth every evening.     Family History       Problem Relation (Age of Onset)    COPD Mother, Father     Diabetes Mother    Heart disease Brother    Hypertension Mother          Tobacco Use    Smoking status: Former Smoker     Quit date:      Years since quittin.5    Smokeless tobacco: Never Used    Tobacco comment: stopped smoking 20 yrs ago.   Substance and Sexual Activity    Alcohol use: Not Currently     Alcohol/week: 5.0 standard drinks     Types: 6 Standard drinks or equivalent per week    Drug use: No    Sexual activity: Yes     Partners: Female     Birth control/protection: None     Review of Systems   Constitutional:  Negative for chills and fever.   HENT:  Negative for ear discharge and ear pain.    Eyes:  Negative for discharge and itching.   Respiratory:  Negative for cough and shortness of breath.    Cardiovascular:  Positive for chest pain and palpitations.   Gastrointestinal:  Negative for abdominal distention and abdominal pain.   Endocrine: Negative for cold intolerance and heat intolerance.   Genitourinary:  Negative for difficulty urinating and dysuria.   Musculoskeletal:  Negative for neck pain and neck stiffness.   Skin:  Negative for rash and wound.   Neurological:  Negative for seizures and syncope.   Psychiatric/Behavioral:  Negative for agitation and hallucinations.    Objective:     Vital Signs (Most Recent):  Temp: 98.1 °F (36.7 °C) (22 1101)  Pulse: (!) 54 (22 1345)  Resp: (!) 23 (22 1345)  BP: 94/60 (22 1345)  SpO2: 97 % (22 1345)   Vital Signs (24h Range):  Temp:  [97.9 °F (36.6 °C)-98.1 °F (36.7 °C)] 98.1 °F (36.7 °C)  Pulse:  [] 54  Resp:  [12-23] 23  SpO2:  [95 %-99 %] 97 %  BP: ()/(57-68) 94/60     Weight: 58.9 kg (129 lb 13.6 oz)  Body mass index is 21.61 kg/m².    Physical Exam  Constitutional:       Appearance: He is not toxic-appearing or diaphoretic.   HENT:      Head: Normocephalic and atraumatic.      Mouth/Throat:      Pharynx: Oropharynx is clear. No oropharyngeal exudate or posterior oropharyngeal erythema.    Cardiovascular:      Rate and Rhythm: Normal rate. Rhythm irregular.   Pulmonary:      Effort: No respiratory distress.      Breath sounds: Normal breath sounds.   Abdominal:      General: Bowel sounds are normal.      Palpations: Abdomen is soft.   Musculoskeletal:         General: No deformity or signs of injury.   Skin:     General: Skin is warm and dry.   Neurological:      Mental Status: He is oriented to person, place, and time.      Cranial Nerves: No cranial nerve deficit.           Significant Labs: All pertinent labs within the past 24 hours have been reviewed.  BMP:   Recent Labs   Lab 07/02/22  0611   *      K 4.2      CO2 25   BUN 20   CREATININE 0.8   CALCIUM 8.9   MG 2.2     CBC:   Recent Labs   Lab 07/02/22  0611   WBC 5.09   HGB 14.6   HCT 42.2   *       Significant Imaging: I have reviewed all pertinent imaging results/findings within the past 24 hours.

## 2022-07-02 NOTE — SUBJECTIVE & OBJECTIVE
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 CA    SHOULDER ARTHROSCOPY W/ ROTATOR CUFF REPAIR      Left shoulder       Review of patient's allergies indicates:  No Known Allergies    Current Facility-Administered Medications on File Prior to Encounter   Medication    sodium chloride 0.9% flush 3 mL     Current Outpatient Medications on File Prior to Encounter   Medication Sig    aspirin (ECOTRIN) 81 MG EC tablet Take 81 mg by mouth once daily.    atorvastatin (LIPITOR) 40 MG tablet TAKE 1 TABLET BY MOUTH EVERY DAY    azelastine (ASTELIN) 137 mcg (0.1 %) nasal spray 1 spray (137 mcg total) by Nasal route 2 (two) times daily.    fluticasone propionate (FLONASE) 50 mcg/actuation nasal spray 1 spray (50 mcg total) by Each Nostril route 2 (two) times daily.    gabapentin (NEURONTIN) 300 MG capsule TAKE 1-3 CAPSULES BY MOUTH EVERY EVENING    latanoprost 0.005 % ophthalmic solution     methylPREDNISolone (MEDROL DOSEPACK) 4 mg tablet use as directed    metoprolol succinate (TOPROL-XL) 25 MG 24 hr tablet Take 1 tablet (25 mg total) by mouth once daily.    pantoprazole (PROTONIX) 40 MG tablet Take 40 mg by mouth once daily.    paroxetine (PAXIL) 20 MG tablet Take 1 tablet (20 mg total) by mouth every morning.    polyethylene glycol (GLYCOLAX) 17 gram/dose powder Take 17 g by mouth 2 (two) times daily.    QUEtiapine (SEROQUEL) 25 MG Tab TAKE 1 TABLET BY MOUTH EVERY EVENING    suvorexant (BELSOMRA) 10 mg Tab Take 10 mg by mouth every evening.     Family History       Problem  Relation (Age of Onset)    COPD Mother, Father    Diabetes Mother    Heart disease Brother    Hypertension Mother          Tobacco Use    Smoking status: Former Smoker     Quit date:      Years since quittin.5    Smokeless tobacco: Never Used    Tobacco comment: stopped smoking 20 yrs ago.   Substance and Sexual Activity    Alcohol use: Not Currently     Alcohol/week: 5.0 standard drinks     Types: 6 Standard drinks or equivalent per week    Drug use: No    Sexual activity: Yes     Partners: Female     Birth control/protection: None     Review of Systems   Constitutional: Negative for decreased appetite.   HENT:  Negative for ear discharge.    Eyes:  Negative for blurred vision.   Endocrine: Negative for polyphagia.   Skin:  Negative for nail changes.   Genitourinary:  Negative for bladder incontinence.   Neurological:  Negative for aphonia.   Psychiatric/Behavioral:  Negative for hallucinations.    Allergic/Immunologic: Negative for hives.   Objective:     Vital Signs (Most Recent):  Temp: 98.1 °F (36.7 °C) (22)  Pulse: 78 (22)  Resp: 12 (22)  BP: 94/65 (22)  SpO2: 95 % (22) Vital Signs (24h Range):  Temp:  [98.1 °F (36.7 °C)] 98.1 °F (36.7 °C)  Pulse:  [] 78  Resp:  [12-19] 12  SpO2:  [95 %-99 %] 95 %  BP: ()/(57-68) 94/65     Weight: 63 kg (139 lb)  Body mass index is 23.13 kg/m².    SpO2: 95 %  O2 Device (Oxygen Therapy): room air      Intake/Output Summary (Last 24 hours) at 2022 0911  Last data filed at 2022 0759  Gross per 24 hour   Intake 650 ml   Output --   Net 650 ml       Lines/Drains/Airways       Peripheral Intravenous Line  Duration                  Peripheral IV - Single Lumen 22 0607 20 G Right Antecubital <1 day         Peripheral IV - Single Lumen 22 0625 20 G Left Antecubital <1 day                    Physical Exam  Constitutional:       Appearance: He is well-developed.   HENT:      Head:  Normocephalic and atraumatic.   Eyes:      Conjunctiva/sclera: Conjunctivae normal.      Pupils: Pupils are equal, round, and reactive to light.   Cardiovascular:      Rate and Rhythm: Normal rate. Rhythm irregular.      Pulses: Intact distal pulses.      Heart sounds: Normal heart sounds.   Pulmonary:      Effort: Pulmonary effort is normal.      Breath sounds: Normal breath sounds.   Abdominal:      General: Bowel sounds are normal.      Palpations: Abdomen is soft.   Musculoskeletal:         General: Normal range of motion.      Cervical back: Normal range of motion and neck supple.   Skin:     General: Skin is warm and dry.   Neurological:      Mental Status: He is alert and oriented to person, place, and time.       Significant Labs: All pertinent lab results from the last 24 hours have been reviewed.    Significant Imaging: Echocardiogram: Transthoracic echo (TTE) complete (Cupid Only):   Results for orders placed or performed during the hospital encounter of 03/31/22   Echo   Result Value Ref Range    Ascending aorta 3.21 cm    STJ 2.83 cm    AV mean gradient 3 mmHg    Ao peak epi 1.12 m/s    Ao VTI 22.48 cm    IVS 0.77 0.6 - 1.1 cm    LA size 3.29 cm    Left Atrium Major Axis 4.44 cm    Left Atrium Minor Axis 4.52 cm    LVIDd 4.22 3.5 - 6.0 cm    LVIDs 3.19 2.1 - 4.0 cm    LVOT diameter 2.20 cm    LVOT peak VTI 20.25 cm    Posterior Wall 0.97 0.6 - 1.1 cm    MV Peak A Epi 0.81 m/s    E wave deceleration time 231.13 msec    MV Peak E Epi 0.52 m/s    RA Major Axis 4.74 cm    RA Width 3.32 cm    RVDD 3.38 cm    Sinus 3.75 cm    TAPSE 2.39 cm    TR Max Epi 2.21 m/s    TDI LATERAL 0.09 m/s    TDI SEPTAL 0.09 m/s    LA WIDTH 4.42 cm    Ao root annulus 3.43 cm    AORTIC VALVE CUSP SEPERATION 2.55 cm    PV PEAK VELOCITY 1.06 cm/s    MV stenosis pressure 1/2 time 67.03 ms    LV Diastolic Volume 79.65 mL    LV Systolic Volume 40.79 mL    LVOT peak epi 1.06 m/s    LV LATERAL E/E' RATIO 5.78 m/s    LV SEPTAL E/E' RATIO  5.78 m/s    FS 24 %    LA volume 55.37 cm3    LV mass 114.22 g    Left Ventricle Relative Wall Thickness 0.46 cm    AV valve area 3.42 cm2    AV Velocity Ratio 0.95     AV index (prosthetic) 0.90     MV valve area p 1/2 method 3.28 cm2    E/A ratio 0.64     Mean e' 0.09 m/s    LVOT area 3.8 cm2    LVOT stroke volume 76.94 cm3    AV peak gradient 5 mmHg    E/E' ratio 5.78 m/s    Triscuspid Valve Regurgitation Peak Gradient 20 mmHg    Right Atrial Pressure (from IVC) 3 mmHg    EF 65 %    TV rest pulmonary artery pressure 23 mmHg    Narrative    · The left ventricle is normal in size with normal systolic function.  · The estimated ejection fraction is 65%.  · Normal right ventricular size with normal right ventricular systolic   function.  · The sinuses of Valsalva is mildly dilated, 3.8cm.  · The estimated PA systolic pressure is 23 mmHg.

## 2022-07-03 VITALS
HEART RATE: 60 BPM | DIASTOLIC BLOOD PRESSURE: 76 MMHG | HEIGHT: 65 IN | OXYGEN SATURATION: 98 % | RESPIRATION RATE: 32 BRPM | BODY MASS INDEX: 21.64 KG/M2 | TEMPERATURE: 98 F | WEIGHT: 129.88 LBS | SYSTOLIC BLOOD PRESSURE: 113 MMHG

## 2022-07-03 LAB
ALBUMIN SERPL BCP-MCNC: 3.1 G/DL (ref 3.5–5.2)
ALP SERPL-CCNC: 48 U/L (ref 55–135)
ALT SERPL W/O P-5'-P-CCNC: 37 U/L (ref 10–44)
ANION GAP SERPL CALC-SCNC: 8 MMOL/L (ref 8–16)
AST SERPL-CCNC: 26 U/L (ref 10–40)
BILIRUB SERPL-MCNC: 0.9 MG/DL (ref 0.1–1)
BUN SERPL-MCNC: 18 MG/DL (ref 8–23)
CALCIUM SERPL-MCNC: 8.3 MG/DL (ref 8.7–10.5)
CHLORIDE SERPL-SCNC: 112 MMOL/L (ref 95–110)
CO2 SERPL-SCNC: 23 MMOL/L (ref 23–29)
CREAT SERPL-MCNC: 0.7 MG/DL (ref 0.5–1.4)
EST. GFR  (AFRICAN AMERICAN): >60 ML/MIN/1.73 M^2
EST. GFR  (NON AFRICAN AMERICAN): >60 ML/MIN/1.73 M^2
GLUCOSE SERPL-MCNC: 88 MG/DL (ref 70–110)
MAGNESIUM SERPL-MCNC: 2.1 MG/DL (ref 1.6–2.6)
PHOSPHATE SERPL-MCNC: 3.2 MG/DL (ref 2.7–4.5)
POTASSIUM SERPL-SCNC: 3.9 MMOL/L (ref 3.5–5.1)
PROT SERPL-MCNC: 5.2 G/DL (ref 6–8.4)
SODIUM SERPL-SCNC: 143 MMOL/L (ref 136–145)

## 2022-07-03 PROCEDURE — 36415 COLL VENOUS BLD VENIPUNCTURE: CPT | Performed by: HOSPITALIST

## 2022-07-03 PROCEDURE — 25000003 PHARM REV CODE 250: Performed by: INTERNAL MEDICINE

## 2022-07-03 PROCEDURE — 83735 ASSAY OF MAGNESIUM: CPT | Performed by: HOSPITALIST

## 2022-07-03 PROCEDURE — 63600175 PHARM REV CODE 636 W HCPCS: Performed by: INTERNAL MEDICINE

## 2022-07-03 PROCEDURE — 99233 PR SUBSEQUENT HOSPITAL CARE,LEVL III: ICD-10-PCS | Mod: ,,, | Performed by: INTERNAL MEDICINE

## 2022-07-03 PROCEDURE — 94761 N-INVAS EAR/PLS OXIMETRY MLT: CPT

## 2022-07-03 PROCEDURE — 84100 ASSAY OF PHOSPHORUS: CPT | Performed by: HOSPITALIST

## 2022-07-03 PROCEDURE — 25000003 PHARM REV CODE 250: Performed by: HOSPITALIST

## 2022-07-03 PROCEDURE — 99233 SBSQ HOSP IP/OBS HIGH 50: CPT | Mod: ,,, | Performed by: INTERNAL MEDICINE

## 2022-07-03 PROCEDURE — 80053 COMPREHEN METABOLIC PANEL: CPT | Performed by: HOSPITALIST

## 2022-07-03 RX ORDER — ONDANSETRON 2 MG/ML
8 INJECTION INTRAMUSCULAR; INTRAVENOUS EVERY 6 HOURS PRN
Status: DISCONTINUED | OUTPATIENT
Start: 2022-07-03 | End: 2022-07-03 | Stop reason: HOSPADM

## 2022-07-03 RX ORDER — AMIODARONE HYDROCHLORIDE 200 MG/1
200 TABLET ORAL DAILY
Qty: 90 TABLET | Refills: 3 | Status: SHIPPED | OUTPATIENT
Start: 2022-07-04 | End: 2023-07-10 | Stop reason: SDUPTHER

## 2022-07-03 RX ORDER — POLYETHYLENE GLYCOL 3350 17 G/17G
17 POWDER, FOR SOLUTION ORAL 2 TIMES DAILY PRN
Status: DISCONTINUED | OUTPATIENT
Start: 2022-07-03 | End: 2022-07-03 | Stop reason: HOSPADM

## 2022-07-03 RX ORDER — AMIODARONE HYDROCHLORIDE 200 MG/1
200 TABLET ORAL DAILY
Status: DISCONTINUED | OUTPATIENT
Start: 2022-07-04 | End: 2022-07-03 | Stop reason: HOSPADM

## 2022-07-03 RX ORDER — ACETAMINOPHEN 325 MG/1
650 TABLET ORAL EVERY 4 HOURS PRN
Status: DISCONTINUED | OUTPATIENT
Start: 2022-07-03 | End: 2022-07-03 | Stop reason: HOSPADM

## 2022-07-03 RX ADMIN — ASPIRIN 81 MG: 81 TABLET, COATED ORAL at 08:07

## 2022-07-03 RX ADMIN — MUPIROCIN: 20 OINTMENT TOPICAL at 08:07

## 2022-07-03 RX ADMIN — ATORVASTATIN CALCIUM 40 MG: 40 TABLET, FILM COATED ORAL at 08:07

## 2022-07-03 RX ADMIN — PAROXETINE HYDROCHLORIDE 20 MG: 20 TABLET, FILM COATED ORAL at 08:07

## 2022-07-03 RX ADMIN — FLUTICASONE PROPIONATE 50 MCG: 50 SPRAY, METERED NASAL at 08:07

## 2022-07-03 RX ADMIN — ENOXAPARIN SODIUM 60 MG: 100 INJECTION SUBCUTANEOUS at 08:07

## 2022-07-03 RX ADMIN — AMIODARONE HYDROCHLORIDE 200 MG: 200 TABLET ORAL at 08:07

## 2022-07-03 NOTE — ASSESSMENT & PLAN NOTE
Hx PAF on ASA 81 qd. Rates improved with IV amiodarone. Full dose lovenox with transition to DOAC if tolerated. May not tolerated BB with low BP. Check echo    7/3/22 Back in NSR. On po amiodarone 200 qd. Change lovenox to eliquis. Echo with good EF. Ok for d/c. OV with Dr Avila 1 week

## 2022-07-03 NOTE — PLAN OF CARE
07/03/22 1256   Post-Acute Status   Post-Acute Authorization Other  (Home; follow-ups)   Other Status No Post-Acute Service Needs   Hospital Resources/Appts/Education Provided Provided patient/caregiver with written discharge plan information;Appointments scheduled and added to AVS;Appointments scheduled by Navigator/Coordinator;Provided education on problems/symptoms using teachback   Discharge Delays None known at this time   Discharge Plan   Discharge Plan A Home with family  (Follow-Ups)   Discharge Plan B Home with family  (Follow-ups)

## 2022-07-03 NOTE — SUBJECTIVE & OBJECTIVE
Interval History:     Review of Systems   Constitutional: Negative for decreased appetite.   HENT:  Negative for ear discharge.    Eyes:  Negative for blurred vision.   Endocrine: Negative for polyphagia.   Skin:  Negative for nail changes.   Genitourinary:  Negative for bladder incontinence.   Neurological:  Negative for aphonia.   Psychiatric/Behavioral:  Negative for hallucinations.    Allergic/Immunologic: Negative for hives.   Objective:     Vital Signs (Most Recent):  Temp: 97.9 °F (36.6 °C) (07/03/22 0701)  Pulse: (!) 56 (07/03/22 1045)  Resp: (!) 28 (07/03/22 1045)  BP: 119/77 (07/03/22 1030)  SpO2: 97 % (07/03/22 1045)   Vital Signs (24h Range):  Temp:  [97.7 °F (36.5 °C)-98.1 °F (36.7 °C)] 97.9 °F (36.6 °C)  Pulse:  [46-77] 56  Resp:  [11-35] 28  SpO2:  [92 %-100 %] 97 %  BP: ()/(57-93) 119/77     Weight: 58.9 kg (129 lb 13.6 oz)  Body mass index is 21.61 kg/m².     SpO2: 97 %  O2 Device (Oxygen Therapy): room air      Intake/Output Summary (Last 24 hours) at 7/3/2022 1100  Last data filed at 7/2/2022 1546  Gross per 24 hour   Intake 659.11 ml   Output 575 ml   Net 84.11 ml       Lines/Drains/Airways       Peripheral Intravenous Line  Duration                  Peripheral IV - Single Lumen 07/02/22 0607 20 G Right Antecubital 1 day         Peripheral IV - Single Lumen 07/02/22 0625 20 G Left Antecubital 1 day                    Physical Exam  Constitutional:       Appearance: He is well-developed.   HENT:      Head: Normocephalic and atraumatic.   Eyes:      Conjunctiva/sclera: Conjunctivae normal.      Pupils: Pupils are equal, round, and reactive to light.   Cardiovascular:      Rate and Rhythm: Bradycardia present.      Pulses: Intact distal pulses.      Heart sounds: Normal heart sounds.   Pulmonary:      Effort: Pulmonary effort is normal.      Breath sounds: Normal breath sounds.   Abdominal:      General: Bowel sounds are normal.      Palpations: Abdomen is soft.   Musculoskeletal:          General: Normal range of motion.      Cervical back: Normal range of motion and neck supple.   Skin:     General: Skin is warm and dry.   Neurological:      Mental Status: He is alert and oriented to person, place, and time.       Significant Labs: All pertinent lab results from the last 24 hours have been reviewed.    Significant Imaging: Echocardiogram: 2D echo with color flow doppler: No results found for this or any previous visit.

## 2022-07-03 NOTE — HOSPITAL COURSE
76 y/o male with apparent recent diagnosis of PAF presents on Afib with RVR.  Admitted to ICU on Amiodarone drip.  BP remained borderline and patient not tolerating B blocker.  Patient converted to NSR on Amio drip.  He has remained afebrile and hemodynamically stable.  Echo unremarkable.  Amio switched to PO.  Patient has been started on Eliquis.  Risks and benefits of NOAC discussed with patient and all questions answered.  He will be discharged home to follow up with Cardiology and PCP.

## 2022-07-03 NOTE — CARE UPDATE
Pt discharged. VS WNL. Pt disconnected from monitor. All IV catheters removed. Pt belongings sent w/ pt and wheeled outside to car.

## 2022-07-03 NOTE — PROGRESS NOTES
[facility]: [facility]  Case Management Services    WRITTEN HEALTHCARE DISCHARGE INFORMATION      Things that YOU are RESPONSIBLE for to Manage Your Care At Home:    WRITTEN DISCHARGE INSTURCTIONS  These are your WRITTEN DISCHARGE INSTRUCTIONS from your hospital stay. Please be sure to know and understand that you are responsible for the following, so that you will be able to better manage your care at home:   1.  all medications that have been called in for you or those that were written on prescription paper and provided to you to get filled.  2. BE SURE TO TAKE YOUR MEDICATION AS PRESCRIBED.  3. Be sure to attend your follow-up appointments that were scheduled for you or to schedule any appointments that you will be scheduling.     If you are unable to make your follow up appointments, please call the number listed and reschedule this appointment.      ________[HELP AT HOME]________    Be sure that you will have someone to help you at home during your recovery.     Experiencing any SIGNS or SYMPTOMS: YOU CAN     Schedule a same day appointment with your Primary Care Doctor or  you can call [facility] On Call Nurse Care Line for 24/7 assistance at [phone]     If you are experience any signs or symptoms that have become severe, Call 911 and come to your nearest Emergency Room.     Thank you for choosing [facility] and allowing us to care for you.     From your care management team:   You should receive a call from [facility] Discharge Department within 48-72 hours to help manage your care after discharge. Please try to make sure that you answer your phone for this important phone call.    Your follow-up appointments have been made according to your preferences. The following are your scheduled appointments or those that you will need to schedule for yourself.    FOLLOW-UPS   Follow-up Information     Keenan Avila MD. Go on 7/6/2022.    Specialties: Cardiovascular Disease, Interventional  Cardiology, Cardiology  Why: @ 9:20am  Out-Patient Cardiology Hospital Follow-Up previously scheduled.  Contact information:  [address]  [address]  [place] [address] [phone]             Pj Gillette MD Follow up in 2 week(s).    Specialty: Family Medicine  Contact information:  [address]  [place] [id number] [phone]

## 2022-07-03 NOTE — PLAN OF CARE
[facility] - Intensive Care  Discharge Final Note    Primary Care Provider: Pj Gillette MD    Expected Discharge Date: 7/3/2022    Final Discharge Note (most recent)     Final Note - 07/03/22 1257        Final Note    Assessment Type Final Discharge Note     Anticipated Discharge Disposition Home or Self Care     What phone number can be called within the next 1-3 days to see how you are doing after discharge? --   [phone]    Hospital Resources/Appts/Education Provided Appointments scheduled by Navigator/Coordinator;Provided education on problems/symptoms using teachback;Provided patient/caregiver with written discharge plan information;Appointments scheduled and added to AVS        Post-Acute Status    Post-Acute Authorization Other   Home with follow-ups    Other Status No Post-Acute Service Needs     Discharge Delays None known at this time                 Important Message from Medicare             Contact Info     Keenan Avila MD   Specialty: Cardiovascular Disease, Interventional Cardiology, Cardiology    [address]  [address]  [facility] [address]   Phone: [phone]       Next Steps: Go on 7/6/2022    Instructions: @ 9:20am  Out-Patient Cardiology Hospital Follow-Up previously scheduled.    Pj Gillette MD   Specialty: Family Medicine   Relationship: PCP - General    [address] [place] [address]   Phone: [phone]       Next Steps: Follow up in 2 week(s)

## 2022-07-03 NOTE — EICU
Rounding (VideoAssessment):  No    Intervention Initiated From:  Bedside    Sabrina Communicated with Bedside Nurse regarding:  Medication    Nurse Notified:  Yes    Doctor Notified:  Yes    Comments: Pt takes    latanoprost 0.005 % ophthalmic solution        At home.  RN called to have medication added.  Informed MD

## 2022-07-03 NOTE — DISCHARGE SUMMARY
[facility] Medicine  Discharge Summary      Patient Name: Reed Torres  MRN: [id number]  Patient Class: IP- Inpatient  Admission Date: 7/2/2022  Hospital Length of Stay: 1 days  Discharge Date and Time:  07/03/2022 1:05 PM  Attending Physician: Enzo Trejo MD   Discharging Provider: Enzo Trejo MD  Primary Care Provider: Pj Gillette MD      HPI:   78 y/o male with a PMHx of CAD, hyperlipidemia, and PAF comes to the ER complaining of severe heartburn.  Patient complains of constant left inframammary burning pain that began around 12 PM yesterday.  The patient reports history of acid reflux, endorising his pain is similar to past episodes.  Patient also states he had been belching frequently.  Symptoms did improve overnight.  Patient presented to ER because he felt irregular heart beat this morning.  The patient states he was recently seen a couple of months ago due to a rapid heartbeat.  He presents to ER where he was noted to be in rapid AFib.  Denies any shortness of breath, nausea, vomiting or diaphoresis.  Patient with no other complaints.      * No surgery found *      Hospital Course:   78 y/o male with apparent recent diagnosis of PAF presents on Afib with RVR.  Admitted to ICU on Amiodarone drip.  BP remained borderline and patient not tolerating B blocker.  Patient converted to NSR on Amio drip.  He has remained afebrile and hemodynamically stable.  Echo unremarkable.  Amio switched to PO.  Patient has been started on Eliquis.  Risks and benefits of NOAC discussed with patient and all questions answered.  He will be discharged home to follow up with Cardiology and PCP.       Goals of Care Treatment Preferences:  Code Status: Full Code      Consults:   Consults (From admission, onward)        Status Ordering Provider     Inpatient consult to Cardiology  Once        Provider:  Guillaume Rojas MD    Acknowledged ROEL COE          No new Assessment & Plan notes have  been filed under this hospital service since the last note was generated.  Service: Hospital Medicine    Final Active Diagnoses:    Diagnosis Date Noted POA    PRINCIPAL PROBLEM:  PAF (paroxysmal atrial fibrillation) [I48.0] 05/03/2022 Yes    Coronary artery disease due to lipid rich plaque [I25.10, I25.83] 05/03/2022 Yes    Anxiety disorder [F41.9] 02/09/2021 Yes    Hyperlipidemia [E78.5]  Yes     Chronic      Problems Resolved During this Admission:       Discharged Condition: stable    Disposition: Home or Self Care    Follow Up:   Follow-up Information     Keenan Avila MD. Go on 7/6/2022.    Specialties: Cardiovascular Disease, Interventional Cardiology, Cardiology  Why: @ 9:20am  Out-Patient Cardiology Hospital Follow-Up previously scheduled.  Contact information:  [address]  [address]  [place] [id number] [phone]             Pj Gillette MD Follow up in 2 week(s).    Specialty: Family Medicine  Contact information:  [address] [facility]  [place] [id number] [phone]                       Patient Instructions:      Diet Cardiac     Notify your health care provider if you experience any of the following:  temperature >100.4     Notify your health care provider if you experience any of the following:  persistent nausea and vomiting or diarrhea     Notify your health care provider if you experience any of the following:  severe uncontrolled pain     Notify your health care provider if you experience any of the following:  difficulty breathing or increased cough     Notify your health care provider if you experience any of the following:  persistent dizziness, light-headedness, or visual disturbances     Notify your health care provider if you experience any of the following:  increased confusion or weakness     Activity as tolerated         Pending Diagnostic Studies:     None         Medications:  Reconciled Home Medications:      Medication List      START taking these medications     amiodarone 200 MG Tab  Commonly known as: PACERONE  Take 1 tablet (200 mg total) by mouth once daily.  Start taking on: July 4, 2022     apixaban 5 mg Tab  Commonly known as: ELIQUIS  Take 1 tablet (5 mg total) by mouth 2 (two) times daily.        CONTINUE taking these medications    aspirin 81 MG EC tablet  Commonly known as: ECOTRIN  Take 81 mg by mouth once daily.     atorvastatin 40 MG tablet  Commonly known as: LIPITOR  TAKE 1 TABLET BY MOUTH EVERY DAY     azelastine 137 mcg (0.1 %) nasal spray  Commonly known as: ASTELIN  1 spray (137 mcg total) by Nasal route 2 (two) times daily.     BELSOMRA 10 mg Tab  Generic drug: suvorexant  Take 10 mg by mouth every evening.     fluticasone propionate 50 mcg/actuation nasal spray  Commonly known as: FLONASE  1 spray (50 mcg total) by Each Nostril route 2 (two) times daily.     gabapentin 300 MG capsule  Commonly known as: NEURONTIN  TAKE 1-3 CAPSULES BY MOUTH EVERY EVENING     latanoprost 0.005 % ophthalmic solution     pantoprazole 40 MG tablet  Commonly known as: PROTONIX  Take 40 mg by mouth once daily.     paroxetine 20 MG tablet  Commonly known as: PAXIL  Take 1 tablet (20 mg total) by mouth every morning.     polyethylene glycol 17 gram/dose powder  Commonly known as: GLYCOLAX  Take 17 g by mouth 2 (two) times daily.     QUEtiapine 25 MG Tab  Commonly known as: SEROQUEL  TAKE 1 TABLET BY MOUTH EVERY EVENING        STOP taking these medications    methylPREDNISolone 4 mg tablet  Commonly known as: MEDROL DOSEPACK     metoprolol succinate 25 MG 24 hr tablet  Commonly known as: TOPROL-XL            Indwelling Lines/Drains at time of discharge:   Lines/Drains/Airways     None                 Time spent on the discharge of patient: >30 minutes      Enzo Trejo MD  Department of Hospital Medicine  [facility]

## 2022-07-03 NOTE — HOSPITAL COURSE
7/3/22 Converted yesterday to sinus bradycardia 50s - amiodarone changed to po. Denies CP or SOB. Wants to go home    Echo 7/2/22  The left ventricle is normal in size with normal systolic function.  The estimated ejection fraction is 60%.  Normal left ventricular diastolic function.  Normal right ventricular size with normal right ventricular systolic function.

## 2022-07-03 NOTE — PROGRESS NOTES
Patient is ready and clear for discharge from Case Management perspective; informed patients nurse, Deepali and discussed discharge. Reviewed with and provided patient with Written Discharge Instructions.

## 2022-07-04 ENCOUNTER — NURSE TRIAGE (OUTPATIENT)
Dept: ADMINISTRATIVE | Facility: CLINIC | Age: 77
End: 2022-07-04
Payer: MEDICARE

## 2022-07-04 NOTE — TELEPHONE ENCOUNTER
"Pt escalated for responding to post discharge text for day 1 of symptoms. Pt states he is having pain in right leg. And his head "feels funny." Pt also having "funny feeling" in the left side of his stomach right under rib cage he describes as pressure. States it has been there for a while, even while in the hospital. He has been checked for it before and drs never found anything. His head feeling funny he states sometimes he feels light headed but he can't really explain it. This is another symptom that has been going on for a while. Pt states he rode his bike this morning and that went well. Lightheadedness not present currently. Pressure in left side is present, very rarely goes away, has not worsened since discharge. Rates pain/pressure 2/10. Denies any CP, abdominal pain, pain with urination, fever, blood in urine, difficulty urination, rash. Dispo is see PCP within 3 days. Unable to schedule appt with PCP. Pt does have HOSFU appt with cards on 7/6. Still needs to schedule HOSFU with PCP. None available within the next week. Will send message to PCP office. Advised pt to call PCP office tomorrow for them to schedule. Gave number to OOC for further concerns.    Reason for Disposition   [1] MILD pain (i.e., scale 1-3; does not interfere with normal activities) AND [2] present > 3 days    Additional Information   Negative: Passed out (i.e., lost consciousness, collapsed and was not responding)   Negative: Shock suspected (e.g., cold/pale/clammy skin, too weak to stand, low BP, rapid pulse)   Negative: Difficult to awaken or acting confused (e.g., disoriented, slurred speech)   Negative: Sounds like a life-threatening emergency to the triager   Negative: [1] SEVERE pain (e.g., excruciating, scale 8-10) AND [2] present > 1 hour   Negative: [1] SEVERE pain (e.g., excruciating, scale 8-10) AND [2] not improved after pain medicine   Negative: [1] Sudden onset of severe flank pain AND [2] age > 60 years   " "Negative: [1] Abdominal pain AND [2] age > 60 years   Negative: [1] Unable to urinate (or only a few drops) > 4 hours AND [2] bladder feels very full (e.g., palpable bladder or strong urge to urinate)   Negative: Vomiting   Negative: Weakness of a leg or foot (e.g., unable to bear weight, dragging foot)   Negative: Patient sounds very sick or weak to the triager   Negative: Fever > 100.4 F (38.0 C)   Negative: Pain or burning with passing urine (urination)   Negative: MODERATE pain (e.g., interferes with normal activities or awakens from sleep)   Negative: Pain radiates into groin, scrotum   Negative: Blood in urine (red, pink, or tea-colored)   Negative: Pregnant  (Exception: mild pain that is only present with movement)   Negative: Diabetes mellitus or weak immune system (e.g., HIV positive, cancer chemo, splenectomy, organ transplant, chronic steroids) (Exception: mild pain that is only present with movement)   Negative: Rash in same area as pain (may be described as "small blisters")    Protocols used: FLANK PAIN-A-AH      "

## 2022-07-05 ENCOUNTER — TELEPHONE (OUTPATIENT)
Dept: FAMILY MEDICINE | Facility: CLINIC | Age: 77
End: 2022-07-05
Payer: MEDICARE

## 2022-07-05 NOTE — TELEPHONE ENCOUNTER
----- Message from Scarlet Luna sent at 7/5/2022 10:32 AM CDT -----  Type:  Sooner Appointment Request    Patient is requesting a sooner appointment.  Patient declined first available appointment listed as well as another facility and provider .  Patient will not accept being placed on the waitlist and is requesting a message be sent to doctor.    Name of Caller: self     When is the first available appointment? 7/22     Symptoms: hospital follow up/ unavailable tomorrow due to another appointment.     Would the patient rather a call back or a response via [facility]? Call back     Best Call Back Number: [phone]

## 2022-07-06 ENCOUNTER — OFFICE VISIT (OUTPATIENT)
Dept: CARDIOLOGY | Facility: CLINIC | Age: 77
End: 2022-07-06
Payer: MEDICARE

## 2022-07-06 VITALS
HEIGHT: 65 IN | BODY MASS INDEX: 23.47 KG/M2 | RESPIRATION RATE: 15 BRPM | OXYGEN SATURATION: 99 % | SYSTOLIC BLOOD PRESSURE: 117 MMHG | HEART RATE: 71 BPM | DIASTOLIC BLOOD PRESSURE: 70 MMHG | WEIGHT: 140.88 LBS

## 2022-07-06 DIAGNOSIS — I71.40 ABDOMINAL ANEURYSM: ICD-10-CM

## 2022-07-06 DIAGNOSIS — Z79.01 CHRONIC ANTICOAGULATION: ICD-10-CM

## 2022-07-06 DIAGNOSIS — I48.0 PAF (PAROXYSMAL ATRIAL FIBRILLATION): ICD-10-CM

## 2022-07-06 DIAGNOSIS — I25.83 CORONARY ARTERY DISEASE DUE TO LIPID RICH PLAQUE: Primary | ICD-10-CM

## 2022-07-06 DIAGNOSIS — I25.10 CORONARY ARTERY DISEASE DUE TO LIPID RICH PLAQUE: Primary | ICD-10-CM

## 2022-07-06 DIAGNOSIS — E78.00 PURE HYPERCHOLESTEROLEMIA: ICD-10-CM

## 2022-07-06 DIAGNOSIS — I47.10 SVT (SUPRAVENTRICULAR TACHYCARDIA): ICD-10-CM

## 2022-07-06 DIAGNOSIS — I70.0 AORTIC ATHEROSCLEROSIS: ICD-10-CM

## 2022-07-06 DIAGNOSIS — I72.3 ANEURYSM OF RIGHT COMMON ILIAC ARTERY: ICD-10-CM

## 2022-07-06 PROCEDURE — 1101F PT FALLS ASSESS-DOCD LE1/YR: CPT | Mod: CPTII,S$GLB,, | Performed by: INTERNAL MEDICINE

## 2022-07-06 PROCEDURE — 3074F SYST BP LT 130 MM HG: CPT | Mod: CPTII,S$GLB,, | Performed by: INTERNAL MEDICINE

## 2022-07-06 PROCEDURE — 99999 PR PBB SHADOW E&M-EST. PATIENT-LVL III: ICD-10-PCS | Mod: PBBFAC,,, | Performed by: INTERNAL MEDICINE

## 2022-07-06 PROCEDURE — 1126F AMNT PAIN NOTED NONE PRSNT: CPT | Mod: CPTII,S$GLB,, | Performed by: INTERNAL MEDICINE

## 2022-07-06 PROCEDURE — 99999 PR PBB SHADOW E&M-EST. PATIENT-LVL III: CPT | Mod: PBBFAC,,, | Performed by: INTERNAL MEDICINE

## 2022-07-06 PROCEDURE — 3078F PR MOST RECENT DIASTOLIC BLOOD PRESSURE < 80 MM HG: ICD-10-PCS | Mod: CPTII,S$GLB,, | Performed by: INTERNAL MEDICINE

## 2022-07-06 PROCEDURE — 1111F PR DISCHARGE MEDS RECONCILED W/ CURRENT OUTPATIENT MED LIST: ICD-10-PCS | Mod: CPTII,S$GLB,, | Performed by: INTERNAL MEDICINE

## 2022-07-06 PROCEDURE — 3288F PR FALLS RISK ASSESSMENT DOCUMENTED: ICD-10-PCS | Mod: CPTII,S$GLB,, | Performed by: INTERNAL MEDICINE

## 2022-07-06 PROCEDURE — 93000 ELECTROCARDIOGRAM COMPLETE: CPT | Mod: S$GLB,,, | Performed by: INTERNAL MEDICINE

## 2022-07-06 PROCEDURE — 3078F DIAST BP <80 MM HG: CPT | Mod: CPTII,S$GLB,, | Performed by: INTERNAL MEDICINE

## 2022-07-06 PROCEDURE — 1111F DSCHRG MED/CURRENT MED MERGE: CPT | Mod: CPTII,S$GLB,, | Performed by: INTERNAL MEDICINE

## 2022-07-06 PROCEDURE — 1101F PR PT FALLS ASSESS DOC 0-1 FALLS W/OUT INJ PAST YR: ICD-10-PCS | Mod: CPTII,S$GLB,, | Performed by: INTERNAL MEDICINE

## 2022-07-06 PROCEDURE — 1159F PR MEDICATION LIST DOCUMENTED IN MEDICAL RECORD: ICD-10-PCS | Mod: CPTII,S$GLB,, | Performed by: INTERNAL MEDICINE

## 2022-07-06 PROCEDURE — 1159F MED LIST DOCD IN RCRD: CPT | Mod: CPTII,S$GLB,, | Performed by: INTERNAL MEDICINE

## 2022-07-06 PROCEDURE — 3288F FALL RISK ASSESSMENT DOCD: CPT | Mod: CPTII,S$GLB,, | Performed by: INTERNAL MEDICINE

## 2022-07-06 PROCEDURE — 99214 PR OFFICE/OUTPT VISIT, EST, LEVL IV, 30-39 MIN: ICD-10-PCS | Mod: S$GLB,,, | Performed by: INTERNAL MEDICINE

## 2022-07-06 PROCEDURE — 3074F PR MOST RECENT SYSTOLIC BLOOD PRESSURE < 130 MM HG: ICD-10-PCS | Mod: CPTII,S$GLB,, | Performed by: INTERNAL MEDICINE

## 2022-07-06 PROCEDURE — 99214 OFFICE O/P EST MOD 30 MIN: CPT | Mod: S$GLB,,, | Performed by: INTERNAL MEDICINE

## 2022-07-06 PROCEDURE — 1126F PR PAIN SEVERITY QUANTIFIED, NO PAIN PRESENT: ICD-10-PCS | Mod: CPTII,S$GLB,, | Performed by: INTERNAL MEDICINE

## 2022-07-06 PROCEDURE — 93000 EKG 12-LEAD: ICD-10-PCS | Mod: S$GLB,,, | Performed by: INTERNAL MEDICINE

## 2022-07-06 NOTE — PROGRESS NOTES
CARDIOLOGY CLINIC VISIT        HISTORY OF PRESENT ILLNESS:     Reed Torres who presents for continued care.  Last seen 05/03/2022.      3/22/22: Complaints of upper extremity, bilateral numbness/tingling.  No weakness.  Also complaints of gait instability.   Went to the emergency room in December 2021 with complaints of feeling off balance and lightheaded.  CT of the head showed mild cerebral atrophy and chronic small-vessel ischemic changes.Referred to neuro.    Recently went to the emergency room with complaints of bright red blood per rectum.  Patient had an EKG that showed atrial fibrillation with rapid ventricular response.  Spontaneously converted to sinus rhythm.  History of SVT.     Coronary angiography from 07/24/2020 showed mild-to-moderate nonobstructive coronary artery disease.  Holter from 03/17/2020 showed average heart rate of 77 beats per minute.  Occasional PVCs.  Rare PACs.  Nonsustained SVT.  Longest was 7 beats.  Echocardiogram at that time showed ejection fraction of 50%.      5/3/22:  Event monitor pending.  Echocardiogram showed normal function.  Overall doing well.  Has seen Neurology.    07/06/2022:  Recent admission with new onset atrial fibrillation.  Noted irregular heartbeats.  Palpitations.  Placed on amiodarone.  Converted to sinus.  Placed on Eliquis.  Echocardiogram showed normal function.  Event monitor from earlier this year was negative.  EKG today shows sinus bradycardia.        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 [place]    SHOULDER ARTHROSCOPY W/ ROTATOR CUFF REPAIR      Left shoulder       ALLERGIES AND MEDICATION:   Review of patient's allergies indicates:  No Known Allergies     Medication List          Accurate as of 2022  9:35 AM. If you have any questions, ask your nurse or doctor.            CONTINUE taking these medications    amiodarone 200 MG Tab  Commonly known as: PACERONE  Take 1 tablet (200 mg total) by mouth once daily.     apixaban 5 mg Tab  Commonly known as: ELIQUIS  Take 1 tablet (5 mg total) by mouth 2 (two) times daily.     aspirin 81 MG EC tablet  Commonly known as: ECOTRIN     atorvastatin 40 MG tablet  Commonly known as: LIPITOR  TAKE 1 TABLET BY MOUTH EVERY DAY     azelastine 137 mcg (0.1 %) nasal spray  Commonly known as: ASTELIN  1 spray (137 mcg total) by Nasal route 2 (two) times daily.     BELSOMRA 10 mg Tab  Generic drug: suvorexant  Take 10 mg by mouth every evening.     fluticasone propionate 50 mcg/actuation nasal spray  Commonly known as: FLONASE  1 spray (50 mcg total) by Each Nostril route 2 (two) times daily.     gabapentin 300 MG capsule  Commonly known as: NEURONTIN  TAKE 1-3 CAPSULES BY MOUTH EVERY EVENING     latanoprost 0.005 % ophthalmic solution     pantoprazole 40 MG tablet  Commonly known as: PROTONIX     paroxetine 20 MG tablet  Commonly known as: PAXIL  Take 1 tablet (20 mg total) by mouth every morning.     polyethylene glycol 17 gram/dose powder  Commonly known as: GLYCOLAX  Take 17 g by mouth 2 (two) times daily.     QUEtiapine 25 MG Tab  Commonly known as: SEROQUEL  TAKE 1 TABLET BY MOUTH EVERY EVENING            SOCIAL HISTORY:     Social History     Socioeconomic History    Marital status:    Tobacco Use    Smoking status: Former Smoker     Quit date: 2000     Years since quittin.5    Smokeless tobacco: Never Used    Tobacco  "comment: stopped smoking 20 yrs ago.   Substance and Sexual Activity    Alcohol use: Not Currently     Alcohol/week: 5.0 standard drinks     Types: 6 Standard drinks or equivalent per week    Drug use: No    Sexual activity: Yes     Partners: Female     Birth control/protection: None   Social History Narrative    Still doing lawn service       FAMILY HISTORY:     Family History   Problem Relation Age of Onset    COPD Mother     Diabetes Mother     Hypertension Mother     COPD Father     Heart disease Brother        REVIEW OF SYSTEMS:   Review of Systems   Respiratory: Negative for cough, sputum production, shortness of breath and wheezing.    Cardiovascular: Negative for chest pain, orthopnea, claudication and leg swelling.   Gastrointestinal: Negative for abdominal pain, constipation, diarrhea, heartburn, nausea and vomiting.   Neurological: Negative for dizziness, tingling, tremors, speech change, focal weakness, seizures, loss of consciousness, weakness and headaches.       PHYSICAL EXAM:     Vitals:    07/06/22 0910   BP: 117/70   Pulse: 71   Resp: 15    Body mass index is 23.44 kg/m².  Weight: 63.9 kg (140 lb 14 oz)   Height: 5' 5" (165.1 cm)     Physical Exam  Vitals reviewed.   Constitutional:       General: He is not in acute distress.     Appearance: He is well-developed. He is not diaphoretic.   Neck:      Vascular: No carotid bruit or JVD.   Cardiovascular:      Rate and Rhythm: Normal rate and regular rhythm.      Pulses: Normal pulses.      Heart sounds: Murmur heard.    Systolic murmur is present with a grade of 2/6.  Pulmonary:      Effort: Pulmonary effort is normal.      Breath sounds: Normal breath sounds.   Abdominal:      General: Bowel sounds are normal.      Palpations: Abdomen is soft.      Tenderness: There is no abdominal tenderness.   Musculoskeletal:      Right lower leg: No edema.      Left lower leg: No edema.   Neurological:      Mental Status: He is alert and oriented to " person, place, and time.   Psychiatric:         Speech: Speech normal.         Behavior: Behavior normal.         DATA:     Laboratory:  CBC:  Recent Labs   Lab 03/03/22  1030 03/20/22  0826 07/02/22  0611   WBC 5.30 4.75 5.09   Hemoglobin 13.6 L 13.8 L 14.6   Hematocrit 43.1 41.3 42.2   Platelets 148 L 145 L 145 L       CHEMISTRIES:  Recent Labs   Lab 03/20/22  0826 07/02/22  0611 07/03/22  0358   Glucose 95 113 H 88   Sodium 143 143 143   Potassium 4.1 4.2 3.9   BUN 15 20 18   Creatinine 0.7 0.8 0.7   eGFR if  >60 >60 >60   eGFR if non African American >60 >60 >60   Calcium 8.8 8.9 8.3 L   Magnesium  --  2.2 2.1       CARDIAC BIOMARKERS:  Recent Labs   Lab 12/07/21  1739 07/02/22  0611 07/02/22  0913   Troponin I <0.006 0.006 <0.006       COAGS:  Recent Labs   Lab 03/07/20  1237 07/20/20  1335   INR 0.9 1.0       LIPIDS/LFTS:  Recent Labs   Lab 10/08/19  0739 03/07/20  1237 03/03/22  1030 03/20/22  0826 05/03/22  0819 07/02/22  0611 07/03/22  0358   Cholesterol 222 H  --  150  --  126  --   --    Triglycerides 94  --  59  --  90  --   --    HDL 58  --  65  --  44  --   --    LDL Cholesterol 145.2  --  73.2  --  64.0  --   --    Non-HDL Cholesterol 164  --  85  --  82  --   --    AST 11   < > 23 20  --  20 26   ALT 12   < > 30 23  --  33 37    < > = values in this interval not displayed.       Cardiovascular Testing:    Echocardiogram 07/02/2022:    · The left ventricle is normal in size with normal systolic function.  · The estimated ejection fraction is 60%.  · Normal left ventricular diastolic function.  · Normal right ventricular size with normal right ventricular systolic function.    Echocardiogram 3/31/22:    · The left ventricle is normal in size with normal systolic function.  · The estimated ejection fraction is 65%.  · Normal right ventricular size with normal right ventricular systolic function.  · The sinuses of Valsalva is mildly dilated, 3.8cm.  · The estimated PA systolic pressure is  "23 mmHg.    Cardiac event monitor 03/29/2022:    · Negative event monitor with no clinical arrhythmias.    Lower extremity arterial ultrasound 02/08/2021:    1. Right lower extremity arterial ultrasound shows no hemodynamically significant stenosis.  Pressures indicate no arterial occlusive disease.  2. Left lower extremity arterial ultrasound shows approximately 50% SFA origin stenosis without hemodynamically significant stenosis.  Pressures indicate no arterial occlusive disease.      Cardiac catheterization 07/24/2020:     1.  Mild-to-moderate nonobstructive coronary artery disease.  2.  Normal left ventricular end-diastolic pressure.  3.  No aortic stenosis.     Exercise MPS 7/2/20:       Abnormal myocardial perfusion study.    There is a moderate sized, moderate intensity, mostly reversible defect with some fixed areas in the inferior wall(s).    Gated perfusion images showed an ejection fraction of 57%    The EKG portion of this study is negative for ischemia.    The patient reported no chest pain during the stress test.    There were no arrhythmias during stress.     Holter 3/17/20:     · Sinus rhythm with heart rates varying between 48 and 118 bpm with an average of 77 bpm.  · There were occasional PVCs totalling 278 and averaging 11.13 per hour.  · There were rare PACs totalling 81 and averaging 3.24 per hour.  · There were occasional runs of non-sustained SVT and lasting for 2 to 7 beats.  · Diary events ("headache") did not correspond to any arrhythmic events.     Echo 3/17/20:     · Low normal left ventricular systolic function. The estimated ejection fraction is 50%.  · No wall motion abnormalities.  · Normal right ventricular systolic function.  · The sinuses of Valsalva is mildly dilated. 3.9cm.  · The estimated PA systolic pressure is 28 mmHg.    ASSESSMENT:     1. Nonobstructive coronary artery disease  2. Paroxysmal atrial fibrillation:  Chads Vasc score of 3  3. Hyperlipidemia:  LDL 64  4. " Aortic atherosclerosis  5. Right common iliac artery aneurysm   6. Thrombocytopenia      PLAN:     1. Nonobstructive coronary artery disease: Risk factor modification.   2. PAF:  Continue Eliquis.  Continue amiodarone.  3. Hyperlipidemia:  Favorable lipid profile  4. Return to clinic 3 months.         Keenan Avila MD, MPH, FACC, [facility]

## 2022-07-11 ENCOUNTER — TELEPHONE (OUTPATIENT)
Dept: FAMILY MEDICINE | Facility: CLINIC | Age: 77
End: 2022-07-11
Payer: MEDICARE

## 2022-07-11 NOTE — TELEPHONE ENCOUNTER
Patient is scheduled for this Thursday for a sinuplasty and was told to stop taking his blood thinner starting tomorrow until time of surgery. Patient would like to know if this is safe. Also would like to know if patient need to stop any other medication.

## 2022-07-11 NOTE — TELEPHONE ENCOUNTER
----- Message from TarunAngelitoskyler Fong sent at 7/11/2022 12:46 PM CDT -----  Regarding: Call  Type: Patient Call Back    Who called:Patient    What is the request in detail: Patient is requesting a call back to discuss meds and procedure. medardo advise.    Can the clinic reply by [facility]? No    Would the patient rather a call back or a response via [facility]? Call    Best call back number: [phone]    Additional Information:    Thanks

## 2022-08-05 ENCOUNTER — OFFICE VISIT (OUTPATIENT)
Dept: NEUROLOGY | Facility: CLINIC | Age: 77
End: 2022-08-05
Payer: MEDICARE

## 2022-08-05 ENCOUNTER — OFFICE VISIT (OUTPATIENT)
Dept: FAMILY MEDICINE | Facility: CLINIC | Age: 77
End: 2022-08-05
Payer: MEDICARE

## 2022-08-05 VITALS
WEIGHT: 143.31 LBS | HEIGHT: 68 IN | BODY MASS INDEX: 21.72 KG/M2 | TEMPERATURE: 98 F | OXYGEN SATURATION: 97 % | HEART RATE: 58 BPM | DIASTOLIC BLOOD PRESSURE: 60 MMHG | SYSTOLIC BLOOD PRESSURE: 100 MMHG

## 2022-08-05 VITALS
WEIGHT: 144.81 LBS | HEIGHT: 65 IN | HEART RATE: 68 BPM | BODY MASS INDEX: 24.12 KG/M2 | DIASTOLIC BLOOD PRESSURE: 79 MMHG | SYSTOLIC BLOOD PRESSURE: 117 MMHG

## 2022-08-05 DIAGNOSIS — R26.9 GAIT ABNORMALITY: Primary | ICD-10-CM

## 2022-08-05 DIAGNOSIS — I48.0 PAF (PAROXYSMAL ATRIAL FIBRILLATION): Primary | ICD-10-CM

## 2022-08-05 DIAGNOSIS — R30.0 DYSURIA: ICD-10-CM

## 2022-08-05 PROCEDURE — 99213 PR OFFICE/OUTPT VISIT, EST, LEVL III, 20-29 MIN: ICD-10-PCS | Mod: S$GLB,,, | Performed by: NEUROLOGICAL SURGERY

## 2022-08-05 PROCEDURE — 99214 OFFICE O/P EST MOD 30 MIN: CPT | Mod: S$GLB,,, | Performed by: FAMILY MEDICINE

## 2022-08-05 PROCEDURE — 99214 PR OFFICE/OUTPT VISIT, EST, LEVL IV, 30-39 MIN: ICD-10-PCS | Mod: S$GLB,,, | Performed by: FAMILY MEDICINE

## 2022-08-05 PROCEDURE — 99999 PR PBB SHADOW E&M-EST. PATIENT-LVL III: ICD-10-PCS | Mod: PBBFAC,,, | Performed by: FAMILY MEDICINE

## 2022-08-05 PROCEDURE — 1126F AMNT PAIN NOTED NONE PRSNT: CPT | Mod: CPTII,S$GLB,, | Performed by: NEUROLOGICAL SURGERY

## 2022-08-05 PROCEDURE — 1101F PR PT FALLS ASSESS DOC 0-1 FALLS W/OUT INJ PAST YR: ICD-10-PCS | Mod: CPTII,S$GLB,, | Performed by: FAMILY MEDICINE

## 2022-08-05 PROCEDURE — 3078F DIAST BP <80 MM HG: CPT | Mod: CPTII,S$GLB,, | Performed by: FAMILY MEDICINE

## 2022-08-05 PROCEDURE — 3074F PR MOST RECENT SYSTOLIC BLOOD PRESSURE < 130 MM HG: ICD-10-PCS | Mod: CPTII,S$GLB,, | Performed by: NEUROLOGICAL SURGERY

## 2022-08-05 PROCEDURE — 3074F SYST BP LT 130 MM HG: CPT | Mod: CPTII,S$GLB,, | Performed by: FAMILY MEDICINE

## 2022-08-05 PROCEDURE — 99999 PR PBB SHADOW E&M-EST. PATIENT-LVL III: CPT | Mod: PBBFAC,,, | Performed by: FAMILY MEDICINE

## 2022-08-05 PROCEDURE — 99999 PR PBB SHADOW E&M-EST. PATIENT-LVL III: CPT | Mod: PBBFAC,,, | Performed by: NEUROLOGICAL SURGERY

## 2022-08-05 PROCEDURE — 1159F MED LIST DOCD IN RCRD: CPT | Mod: CPTII,S$GLB,, | Performed by: NEUROLOGICAL SURGERY

## 2022-08-05 PROCEDURE — 1101F PT FALLS ASSESS-DOCD LE1/YR: CPT | Mod: CPTII,S$GLB,, | Performed by: NEUROLOGICAL SURGERY

## 2022-08-05 PROCEDURE — 3074F PR MOST RECENT SYSTOLIC BLOOD PRESSURE < 130 MM HG: ICD-10-PCS | Mod: CPTII,S$GLB,, | Performed by: FAMILY MEDICINE

## 2022-08-05 PROCEDURE — 1101F PR PT FALLS ASSESS DOC 0-1 FALLS W/OUT INJ PAST YR: ICD-10-PCS | Mod: CPTII,S$GLB,, | Performed by: NEUROLOGICAL SURGERY

## 2022-08-05 PROCEDURE — 3078F DIAST BP <80 MM HG: CPT | Mod: CPTII,S$GLB,, | Performed by: NEUROLOGICAL SURGERY

## 2022-08-05 PROCEDURE — 3288F FALL RISK ASSESSMENT DOCD: CPT | Mod: CPTII,S$GLB,, | Performed by: NEUROLOGICAL SURGERY

## 2022-08-05 PROCEDURE — 99999 PR PBB SHADOW E&M-EST. PATIENT-LVL III: ICD-10-PCS | Mod: PBBFAC,,, | Performed by: NEUROLOGICAL SURGERY

## 2022-08-05 PROCEDURE — 3288F PR FALLS RISK ASSESSMENT DOCUMENTED: ICD-10-PCS | Mod: CPTII,S$GLB,, | Performed by: NEUROLOGICAL SURGERY

## 2022-08-05 PROCEDURE — 1126F PR PAIN SEVERITY QUANTIFIED, NO PAIN PRESENT: ICD-10-PCS | Mod: CPTII,S$GLB,, | Performed by: FAMILY MEDICINE

## 2022-08-05 PROCEDURE — 1159F PR MEDICATION LIST DOCUMENTED IN MEDICAL RECORD: ICD-10-PCS | Mod: CPTII,S$GLB,, | Performed by: NEUROLOGICAL SURGERY

## 2022-08-05 PROCEDURE — 3078F PR MOST RECENT DIASTOLIC BLOOD PRESSURE < 80 MM HG: ICD-10-PCS | Mod: CPTII,S$GLB,, | Performed by: FAMILY MEDICINE

## 2022-08-05 PROCEDURE — 3074F SYST BP LT 130 MM HG: CPT | Mod: CPTII,S$GLB,, | Performed by: NEUROLOGICAL SURGERY

## 2022-08-05 PROCEDURE — 3288F FALL RISK ASSESSMENT DOCD: CPT | Mod: CPTII,S$GLB,, | Performed by: FAMILY MEDICINE

## 2022-08-05 PROCEDURE — 1101F PT FALLS ASSESS-DOCD LE1/YR: CPT | Mod: CPTII,S$GLB,, | Performed by: FAMILY MEDICINE

## 2022-08-05 PROCEDURE — 3288F PR FALLS RISK ASSESSMENT DOCUMENTED: ICD-10-PCS | Mod: CPTII,S$GLB,, | Performed by: FAMILY MEDICINE

## 2022-08-05 PROCEDURE — 99213 OFFICE O/P EST LOW 20 MIN: CPT | Mod: S$GLB,,, | Performed by: NEUROLOGICAL SURGERY

## 2022-08-05 PROCEDURE — 1126F AMNT PAIN NOTED NONE PRSNT: CPT | Mod: CPTII,S$GLB,, | Performed by: FAMILY MEDICINE

## 2022-08-05 PROCEDURE — 1126F PR PAIN SEVERITY QUANTIFIED, NO PAIN PRESENT: ICD-10-PCS | Mod: CPTII,S$GLB,, | Performed by: NEUROLOGICAL SURGERY

## 2022-08-05 PROCEDURE — 3078F PR MOST RECENT DIASTOLIC BLOOD PRESSURE < 80 MM HG: ICD-10-PCS | Mod: CPTII,S$GLB,, | Performed by: NEUROLOGICAL SURGERY

## 2022-08-05 RX ORDER — CIPROFLOXACIN 500 MG/1
500 TABLET ORAL 2 TIMES DAILY
Qty: 14 TABLET | Refills: 0 | Status: SHIPPED | OUTPATIENT
Start: 2022-08-05 | End: 2022-08-12

## 2022-08-05 NOTE — PROGRESS NOTES
"HISTORY OF PRESENT ILLNESS:  Reed Torres is a 77 y.o. male who presents to the clinic today for Transitional Care  .     hosptial f/u for the afib  Overall doing better and left side of chest/abdomen still with an issue.        Patient Active Problem List   Diagnosis    Hyperlipidemia    Tuberculosis exposure    BPH (benign prostatic hyperplasia)    Body mass index (BMI) of 23.0-23.9 in adult    Osteoarthritis of lumbar spine    Hyperbilirubinemia, long standing, favor GILBERT    Body water dehydration    Abnormal stress test    Anxiety disorder    SVT (supraventricular tachycardia)    Pulmonary emphysema    Coronary artery disease due to lipid rich plaque    PAF (paroxysmal atrial fibrillation)    Pure hypercholesterolemia    Aortic atherosclerosis    Aneurysm of right common iliac artery    Thrombocytopenia, unspecified           CARE TEAM:  Patient Care Team:  Pj Gillette MD as PCP - General (Family Medicine)  Charlie Walters Jr., MD as Consulting Physician (Urology)  Bryanna Strauss LPN as Care Coordinator         ROS  Per HPI    PHYSICAL EXAM:  /60   Pulse (!) 58   Temp 97.6 °F (36.4 °C) (Oral)   Ht 5' 8" (1.727 m)   Wt 65 kg (143 lb 4.8 oz)   SpO2 97%   BMI 21.79 kg/m²   Wt Readings from Last 5 Encounters:   08/05/22 65 kg (143 lb 4.8 oz)   08/05/22 65.7 kg (144 lb 13.5 oz)   07/06/22 63.9 kg (140 lb 14 oz)   07/02/22 58.9 kg (129 lb 13.6 oz)   06/14/22 63 kg (138 lb 14.2 oz)     BP Readings from Last 5 Encounters:   08/05/22 100/60   08/05/22 117/79   07/06/22 117/70   07/03/22 113/76   06/14/22 (!) 120/90           He appears well, in no apparent distress.  Alert and oriented times three, pleasant and cooperative. Vital signs are as documented in vital signs section.  HR controlled  Irregular        Medication List with Changes/Refills   New Medications    CIPROFLOXACIN HCL (CIPRO) 500 MG TABLET    Take 1 tablet (500 mg total) by mouth 2 (two) times daily. for 7 days "   Current Medications    AMIODARONE (PACERONE) 200 MG TAB    Take 1 tablet (200 mg total) by mouth once daily.    ASPIRIN (ECOTRIN) 81 MG EC TABLET    Take 81 mg by mouth once daily.    ATORVASTATIN (LIPITOR) 40 MG TABLET    TAKE 1 TABLET BY MOUTH EVERY DAY    AZELASTINE (ASTELIN) 137 MCG (0.1 %) NASAL SPRAY    1 spray (137 mcg total) by Nasal route 2 (two) times daily.    FLUTICASONE PROPIONATE (FLONASE) 50 MCG/ACTUATION NASAL SPRAY    1 spray (50 mcg total) by Each Nostril route 2 (two) times daily.    GABAPENTIN (NEURONTIN) 300 MG CAPSULE    TAKE 1-3 CAPSULES BY MOUTH EVERY EVENING    LATANOPROST 0.005 % OPHTHALMIC SOLUTION        PANTOPRAZOLE (PROTONIX) 40 MG TABLET    Take 40 mg by mouth once daily.    PAROXETINE (PAXIL) 20 MG TABLET    Take 1 tablet (20 mg total) by mouth every morning.    POLYETHYLENE GLYCOL (GLYCOLAX) 17 GRAM/DOSE POWDER    Take 17 g by mouth 2 (two) times daily.   Changed and/or Refilled Medications    Modified Medication Previous Medication    APIXABAN (ELIQUIS) 5 MG TAB apixaban (ELIQUIS) 5 mg Tab       Take 1 tablet (5 mg total) by mouth 2 (two) times daily.    Take 1 tablet (5 mg total) by mouth 2 (two) times daily.   Discontinued Medications    QUETIAPINE (SEROQUEL) 25 MG TAB    TAKE 1 TABLET BY MOUTH EVERY EVENING    SUVOREXANT (BELSOMRA) 10 MG TAB    Take 10 mg by mouth every evening.       ASSESSMENT AND PLAN:    Problem List Items Addressed This Visit     PAF (paroxysmal atrial fibrillation) - Primary    Relevant Medications    apixaban (ELIQUIS) 5 mg Tab      Other Visit Diagnoses     Dysuria        Relevant Medications    ciprofloxacin HCl (CIPRO) 500 MG tablet        Has weak stream and more bladder type issues  Potential medication side effects were discussed with the patient; let me know if any occur.    Future Appointments   Date Time Provider Department Center   10/10/2022  9:00 AM Keenan Avila MD [facility] CARDIO [facility]       Follow up in about 6 weeks (around  9/16/2022) for assess treatment plan. or sooner as needed.

## 2022-08-05 NOTE — PROGRESS NOTES
Chief Complaint   Patient presents with    Dizziness        Reed Torres is a 77 y.o. male with a history of multiple medical diagnoses as listed below that presents for evaluation and management of insomnia.  He says that he has been having issues with falling asleep and staying asleep.  He tries to take melatonin to help with sleep initiation which has been intermittently helpful.  He says that after going to bed will take approximately 15-30 minutes room fall asleep.  After the fall asleep he tends to wake after only a few hours.  Sometimes it is very difficult for him to go back to sleep.  He does admit that when he wakes up he has had several things on his mind that makes it difficult for him to relax once again.  When he sleeps he says that he sleeps fairly well and feels very refreshed and restored when he wakes up the next day.    Interval History  08/05/2022  His biggest concern since he was last seen has been dizziness.  He feels that he has been lightheaded and unsteady when he has been trying to move about.  He has not had any falls but feels like he has stumbled several times having to catch himself to prevent a fall..     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 [place]    SHOULDER ARTHROSCOPY W/ ROTATOR CUFF REPAIR      Left shoulder       SOCIAL HISTORY:  Social History     Socioeconomic History    Marital status:    Tobacco Use    Smoking status: Former     Types: Cigarettes     Quit date: 2000      Years since quittin.7    Smokeless tobacco: Never    Tobacco comments:     stopped smoking 20 yrs ago.   Substance and Sexual Activity    Alcohol use: Not Currently     Alcohol/week: 5.0 standard drinks     Types: 6 Standard drinks or equivalent per week    Drug use: No    Sexual activity: Yes     Partners: Female     Birth control/protection: None   Social History Narrative    Still doing lawn service       FAMILY HISTORY:  Family History   Problem Relation Age of Onset    COPD Mother     Diabetes Mother     Hypertension Mother     COPD Father     Heart disease Brother        ALLERGIES AND MEDICATIONS: updated and reviewed.  Review of patient's allergies indicates:  No Known Allergies  Current Outpatient Medications   Medication Sig Dispense Refill    amiodarone (PACERONE) 200 MG Tab Take 1 tablet (200 mg total) by mouth once daily. 90 tablet 3    apixaban (ELIQUIS) 5 mg Tab Take 1 tablet (5 mg total) by mouth 2 (two) times daily. 60 tablet 11    aspirin (ECOTRIN) 81 MG EC tablet Take 81 mg by mouth once daily.      atorvastatin (LIPITOR) 40 MG tablet TAKE 1 TABLET BY MOUTH EVERY DAY 90 tablet 3    azelastine (ASTELIN) 137 mcg (0.1 %) nasal spray 1 spray (137 mcg total) by Nasal route 2 (two) times daily. 30 mL 3    fluticasone propionate (FLONASE) 50 mcg/actuation nasal spray 1 spray (50 mcg total) by Each Nostril route 2 (two) times daily. 18.2 mL 3    gabapentin (NEURONTIN) 300 MG capsule TAKE 1-3 CAPSULES BY MOUTH EVERY EVENING 90 capsule 11    latanoprost 0.005 % ophthalmic solution       methocarbamoL (ROBAXIN) 500 MG Tab Take 1 tablet (500 mg total) by mouth 3 (three) times daily. for 5 days 20 tablet 0    mirtazapine (REMERON) 15 MG tablet Take 1 tablet (15 mg total) by mouth every evening. 30 tablet 11    pantoprazole (PROTONIX) 40 MG tablet Take 40 mg by mouth once daily.      paroxetine (PAXIL) 20 MG tablet Take 1 tablet (20 mg total) by mouth every morning. (Patient not taking: Reported on  8/5/2022) 30 tablet 0    polyethylene glycol (GLYCOLAX) 17 gram/dose powder Take 17 g by mouth 2 (two) times daily. 289 g 2     Current Facility-Administered Medications   Medication Dose Route Frequency Provider Last Rate Last Admin    sodium chloride 0.9% flush 3 mL  3 mL Intravenous Q8H PRN Keenan Avila MD           Review of Systems   Constitutional:  Negative for activity change, fatigue and unexpected weight change.   HENT:  Negative for trouble swallowing and voice change.    Eyes:  Negative for photophobia, pain and visual disturbance.   Respiratory:  Negative for apnea and shortness of breath.    Cardiovascular:  Negative for chest pain and palpitations.   Gastrointestinal:  Negative for constipation, nausea and vomiting.   Genitourinary:  Negative for difficulty urinating.   Musculoskeletal:  Negative for arthralgias, back pain, gait problem, myalgias and neck pain.   Skin:  Negative for color change and rash.   Neurological:  Negative for dizziness, seizures, syncope, speech difficulty, weakness, light-headedness, numbness and headaches.   Psychiatric/Behavioral:  Positive for sleep disturbance. Negative for agitation, behavioral problems and confusion.      Neurologic Exam     Mental Status   Oriented to person, place, and time.   Registration: recalls 3 of 3 objects.   Attention: normal. Concentration: normal.   Speech: speech is normal   Level of consciousness: alert  Knowledge: good.     Cranial Nerves     CN II   Right visual field deficit: none  Left visual field deficit: none     CN III, IV, VI   Pupils are equal, round, and reactive to light.  Extraocular motions are normal.   Right pupil: Size: 3 mm. Shape: regular.   Left pupil: Size: 3 mm. Shape: regular.   CN III: no CN III palsy  CN VI: no CN VI palsy  Nystagmus: none   Diplopia: none  Ophthalmoparesis: none  Upgaze: normal  Downgaze: normal  Conjugate gaze: present    CN VII   Facial expression full, symmetric.   Right facial weakness:  "none  Left facial weakness: none    CN VIII   CN VIII normal.     CN XI   CN XI normal.     CN XII   CN XII normal.   Tongue deviation: none    Motor Exam   Muscle bulk: normal  Overall muscle tone: normal  Right arm tone: normal  Left arm tone: normal  Right leg tone: normal  Left leg tone: normal    Gait, Coordination, and Reflexes     Gait  Gait: normal    Coordination   Finger to nose coordination: normal    Tremor   Resting tremor: absent    Physical Exam  Constitutional:       Appearance: He is well-developed.   HENT:      Head: Normocephalic and atraumatic.   Eyes:      Extraocular Movements: EOM normal.      Pupils: Pupils are equal, round, and reactive to light.   Pulmonary:      Effort: Pulmonary effort is normal. No respiratory distress.   Musculoskeletal:         General: Normal range of motion.   Neurological:      Mental Status: He is alert and oriented to person, place, and time.      Coordination: Finger-Nose-Finger Test normal.      Gait: Gait is intact.   Psychiatric:         Speech: Speech normal.         Behavior: Behavior normal.       Vitals:    08/05/22 0844   BP: 117/79   Pulse: 68   Weight: 65.7 kg (144 lb 13.5 oz)   Height: 5' 5" (1.651 m)       Assessment & Plan:    Problem List Items Addressed This Visit    None  Visit Diagnoses       Gait abnormality    -  Primary              Follow-up: No follow-ups on file.    This note was done with the assistance of voice recognition software. Some errors may be present after proofreading.          "

## 2022-08-12 ENCOUNTER — HOSPITAL ENCOUNTER (OUTPATIENT)
Dept: RADIOLOGY | Facility: HOSPITAL | Age: 77
Discharge: HOME | End: 2022-08-12
Attending: SURGERY
Payer: MEDICARE

## 2022-08-12 DIAGNOSIS — I72.3 ANEURYSM OF RIGHT COMMON ILIAC ARTERY: ICD-10-CM

## 2022-08-12 PROCEDURE — 74176 CT ABDOMEN PELVIS WITHOUT CONTRAST: ICD-10-PCS | Mod: 26,,, | Performed by: STUDENT IN AN ORGANIZED HEALTH CARE EDUCATION/TRAINING PROGRAM

## 2022-08-12 PROCEDURE — 74176 CT ABD & PELVIS W/O CONTRAST: CPT | Mod: 26,,, | Performed by: STUDENT IN AN ORGANIZED HEALTH CARE EDUCATION/TRAINING PROGRAM

## 2022-08-12 PROCEDURE — 74176 CT ABD & PELVIS W/O CONTRAST: CPT | Mod: TC

## 2022-08-18 ENCOUNTER — TELEPHONE (OUTPATIENT)
Dept: FAMILY MEDICINE | Facility: CLINIC | Age: 77
End: 2022-08-18
Payer: MEDICARE

## 2022-08-18 NOTE — TELEPHONE ENCOUNTER
----- Message from Janie Bourgeois sent at 8/18/2022  2:29 PM CDT -----  Regarding: request for  sleep medication  Type: Patient Call Back    Who called:Reed     What is the request in detail: the patient called to request a sleep medication. He stated that he has been struggling for some weeks with lack of sleep. He takes melatonin nightly, but stated that it only gives him 2 hours of rest. Please return call at earliest convenience.    If possible, he would like something sent to: [id number] - [place], [place] - [phone]       Can the clinic reply by [facility]?no     Would the patient rather a call back or a response via [facility]? Call back    Best call back number:[phone]

## 2022-08-21 RX ORDER — MIRTAZAPINE 15 MG/1
15 TABLET, FILM COATED ORAL NIGHTLY
Qty: 30 TABLET | Refills: 11 | Status: SHIPPED | OUTPATIENT
Start: 2022-08-21 | End: 2022-09-28

## 2022-08-25 ENCOUNTER — TELEPHONE (OUTPATIENT)
Dept: FAMILY MEDICINE | Facility: CLINIC | Age: 77
End: 2022-08-25
Payer: MEDICARE

## 2022-08-25 NOTE — TELEPHONE ENCOUNTER
Tell him to give it a week and get that out of his system, and let us know Monday if he is back to himself  Then we can try a different one

## 2022-09-07 ENCOUNTER — TELEPHONE (OUTPATIENT)
Dept: FAMILY MEDICINE | Facility: CLINIC | Age: 77
End: 2022-09-07
Payer: MEDICARE

## 2022-09-07 ENCOUNTER — HOSPITAL ENCOUNTER (EMERGENCY)
Facility: HOSPITAL | Age: 77
Discharge: HOME | End: 2022-09-07
Attending: STUDENT IN AN ORGANIZED HEALTH CARE EDUCATION/TRAINING PROGRAM
Payer: MEDICARE

## 2022-09-07 VITALS
RESPIRATION RATE: 23 BRPM | SYSTOLIC BLOOD PRESSURE: 134 MMHG | HEIGHT: 68 IN | WEIGHT: 127.88 LBS | DIASTOLIC BLOOD PRESSURE: 75 MMHG | TEMPERATURE: 98 F | BODY MASS INDEX: 19.38 KG/M2 | OXYGEN SATURATION: 95 % | HEART RATE: 50 BPM

## 2022-09-07 DIAGNOSIS — M54.9 BACK PAIN, UNSPECIFIED BACK LOCATION, UNSPECIFIED BACK PAIN LATERALITY, UNSPECIFIED CHRONICITY: Primary | ICD-10-CM

## 2022-09-07 DIAGNOSIS — R07.9 CHEST PAIN: ICD-10-CM

## 2022-09-07 DIAGNOSIS — R06.02 SOB (SHORTNESS OF BREATH): ICD-10-CM

## 2022-09-07 LAB
ALBUMIN SERPL BCP-MCNC: 3.7 G/DL (ref 3.5–5.2)
ALP SERPL-CCNC: 55 U/L (ref 55–135)
ALT SERPL W/O P-5'-P-CCNC: 14 U/L (ref 10–44)
ANION GAP SERPL CALC-SCNC: 4 MMOL/L (ref 8–16)
AST SERPL-CCNC: 18 U/L (ref 10–40)
BASOPHILS # BLD AUTO: 0.03 K/UL (ref 0–0.2)
BASOPHILS NFR BLD: 0.7 % (ref 0–1.9)
BILIRUB SERPL-MCNC: 1.1 MG/DL (ref 0.1–1)
BUN SERPL-MCNC: 24 MG/DL (ref 8–23)
CALCIUM SERPL-MCNC: 9.2 MG/DL (ref 8.7–10.5)
CHLORIDE SERPL-SCNC: 111 MMOL/L (ref 95–110)
CO2 SERPL-SCNC: 24 MMOL/L (ref 23–29)
CREAT SERPL-MCNC: 0.9 MG/DL (ref 0.5–1.4)
DIFFERENTIAL METHOD: ABNORMAL
EOSINOPHIL # BLD AUTO: 0.1 K/UL (ref 0–0.5)
EOSINOPHIL NFR BLD: 3.4 % (ref 0–8)
ERYTHROCYTE [DISTWIDTH] IN BLOOD BY AUTOMATED COUNT: 13.8 % (ref 11.5–14.5)
EST. GFR  (NO RACE VARIABLE): >60 ML/MIN/1.73 M^2
GLUCOSE SERPL-MCNC: 92 MG/DL (ref 70–110)
HCT VFR BLD AUTO: 39.2 % (ref 40–54)
HGB BLD-MCNC: 13.4 G/DL (ref 14–18)
IMM GRANULOCYTES # BLD AUTO: 0.01 K/UL (ref 0–0.04)
IMM GRANULOCYTES NFR BLD AUTO: 0.2 % (ref 0–0.5)
LYMPHOCYTES # BLD AUTO: 1.5 K/UL (ref 1–4.8)
LYMPHOCYTES NFR BLD: 36.5 % (ref 18–48)
MCH RBC QN AUTO: 30.8 PG (ref 27–31)
MCHC RBC AUTO-ENTMCNC: 34.2 G/DL (ref 32–36)
MCV RBC AUTO: 90 FL (ref 82–98)
MONOCYTES # BLD AUTO: 0.3 K/UL (ref 0.3–1)
MONOCYTES NFR BLD: 8 % (ref 4–15)
NEUTROPHILS # BLD AUTO: 2.1 K/UL (ref 1.8–7.7)
NEUTROPHILS NFR BLD: 51.2 % (ref 38–73)
NRBC BLD-RTO: 0 /100 WBC
PLATELET # BLD AUTO: 143 K/UL (ref 150–450)
PMV BLD AUTO: 11.1 FL (ref 9.2–12.9)
POTASSIUM SERPL-SCNC: 3.8 MMOL/L (ref 3.5–5.1)
PROT SERPL-MCNC: 6.3 G/DL (ref 6–8.4)
RBC # BLD AUTO: 4.35 M/UL (ref 4.6–6.2)
SODIUM SERPL-SCNC: 139 MMOL/L (ref 136–145)
TROPONIN I SERPL DL<=0.01 NG/ML-MCNC: <0.006 NG/ML (ref 0–0.03)
WBC # BLD AUTO: 4.14 K/UL (ref 3.9–12.7)

## 2022-09-07 PROCEDURE — 93010 ELECTROCARDIOGRAM REPORT: CPT | Mod: ,,, | Performed by: INTERNAL MEDICINE

## 2022-09-07 PROCEDURE — 93005 ELECTROCARDIOGRAM TRACING: CPT

## 2022-09-07 PROCEDURE — 85025 COMPLETE CBC W/AUTO DIFF WBC: CPT | Performed by: PHYSICIAN ASSISTANT

## 2022-09-07 PROCEDURE — 63600175 PHARM REV CODE 636 W HCPCS: Performed by: PHYSICIAN ASSISTANT

## 2022-09-07 PROCEDURE — 99285 EMERGENCY DEPT VISIT HI MDM: CPT | Mod: 25

## 2022-09-07 PROCEDURE — 96374 THER/PROPH/DIAG INJ IV PUSH: CPT

## 2022-09-07 PROCEDURE — 80053 COMPREHEN METABOLIC PANEL: CPT | Performed by: PHYSICIAN ASSISTANT

## 2022-09-07 PROCEDURE — 93010 EKG 12-LEAD: ICD-10-PCS | Mod: ,,, | Performed by: INTERNAL MEDICINE

## 2022-09-07 PROCEDURE — 84484 ASSAY OF TROPONIN QUANT: CPT | Performed by: PHYSICIAN ASSISTANT

## 2022-09-07 RX ORDER — KETOROLAC TROMETHAMINE 30 MG/ML
10 INJECTION, SOLUTION INTRAMUSCULAR; INTRAVENOUS
Status: COMPLETED | OUTPATIENT
Start: 2022-09-07 | End: 2022-09-07

## 2022-09-07 RX ORDER — METHOCARBAMOL 500 MG/1
500 TABLET, FILM COATED ORAL 3 TIMES DAILY
Qty: 20 TABLET | Refills: 0 | Status: SHIPPED | OUTPATIENT
Start: 2022-09-07 | End: 2022-09-12

## 2022-09-07 RX ADMIN — KETOROLAC TROMETHAMINE 10 MG: 30 INJECTION, SOLUTION INTRAMUSCULAR; INTRAVENOUS at 04:09

## 2022-09-07 NOTE — ED PROVIDER NOTES
Encounter Date: 2022       History     Chief Complaint   Patient presents with    Back Pain     Pt comes to the er via pov with c/o mid to lower back pain that started last night. Pt states that the pain feels a sharp pain when he inhales and exhales.      77-year-old to the ER for evaluation of low back pain.  Patient states that he feels the pain more when he breathes in deeply.  Pain began yesterday after working out in the yd.  He denies chest pain or shortness of breath.  Vital signs appear to be normal.  No hemoptysis.  No recent surgery or travel.  He does not appear to be toxic, ill or distressed.  He did not take any medication for the pain.    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 [place]    SHOULDER ARTHROSCOPY W/ ROTATOR CUFF REPAIR      Left shoulder     Family History   Problem Relation Age of Onset    COPD Mother     Diabetes Mother     Hypertension Mother     COPD Father     Heart disease Brother      Social History     Tobacco Use    Smoking status: Former     Types: Cigarettes     Quit date:      Years since quittin.6    Smokeless tobacco: Never    Tobacco comments:     stopped smoking 20 yrs ago.   Substance Use Topics    Alcohol use: Not Currently     Alcohol/week: 5.0 standard drinks     Types: 6 Standard drinks or equivalent per week    Drug use: No     Review of Systems   Constitutional:  Negative for fever.   HENT:  Negative for sore throat.    Respiratory:  Negative for  shortness of breath.    Cardiovascular:  Negative for chest pain.   Gastrointestinal:  Negative for nausea.   Genitourinary:  Negative for dysuria.   Musculoskeletal:  Positive for back pain.   Skin:  Negative for rash.   Neurological:  Negative for weakness.   Hematological:  Does not bruise/bleed easily.     Physical Exam     Initial Vitals [09/07/22 0353]   BP Pulse Resp Temp SpO2   (!) 143/87 (!) 57 16 97.9 °F (36.6 °C) 96 %      MAP       --         Physical Exam    Constitutional: Vital signs are normal. He appears well-developed and well-nourished.   HENT:   Head: Normocephalic and atraumatic.   Right Ear: Hearing normal.   Left Ear: Hearing normal.   Eyes: Conjunctivae are normal.   Cardiovascular:  Normal rate and regular rhythm.           No lower extremity edema  No JVD   Pulmonary/Chest:   Clear on exam, even and unlabored   Abdominal: Abdomen is soft. Bowel sounds are normal.   Musculoskeletal:         General: Normal range of motion.      Comments: Slight tenderness noted to palpation of the paraspinal musculature in the lumbar region worsened with range of motion     Neurological: He is alert and oriented to person, place, and time.   Skin: Skin is warm and intact.   Psychiatric: He has a normal mood and affect. His speech is normal and behavior is normal. Cognition and memory are normal.       ED Course   Procedures  Labs Reviewed   CBC W/ AUTO DIFFERENTIAL - Abnormal; Notable for the following components:       Result Value    RBC 4.35 (*)     Hemoglobin 13.4 (*)     Hematocrit 39.2 (*)     Platelets 143 (*)     All other components within normal limits   TROPONIN I   COMPREHENSIVE METABOLIC PANEL          Imaging Results              X-Ray Chest AP Portable (Final result)  Result time 09/07/22 04:51:46      Final result by Cortney Rubalcava MD (09/07/22 04:51:46)                   Impression:      No acute intrathoracic abnormality identified on this single radiographic view of the  chest.      Electronically signed by: Cortney Rubalcava MD  Date:    09/07/2022  Time:    04:51               Narrative:    EXAMINATION:  XR CHEST AP PORTABLE    CLINICAL HISTORY:  Shortness of breath    TECHNIQUE:  Single frontal view of the chest was performed.    COMPARISON:  07/02/2022    FINDINGS:  The cardiomediastinal silhouette is unchanged in size and configuration and mediastinal structures are midline.  The lungs are symmetrically expanded with diffuse coarse interstitial attenuation, similar to prior examinations.  No confluent airspace consolidation, substantial volume of pleural fluid or pneumothorax identified.                                       Medications   ketorolac injection 9.999 mg (9.999 mg Intravenous Given 9/7/22 0426)     Medical Decision Making:   History:   Old Medical Records: I decided to obtain old medical records.  Old Records Summarized: records from clinic visits.  Initial Assessment:   77-year-old with low back pain  Differential Diagnosis:   Musculoskeletal injury, ACS, cardiac area  Independently Interpreted Test(s):   I have ordered and independently interpreted X-rays - see summary below.       <> Summary of X-Ray Reading(s): Unchanged from prior, no acute findings  I have ordered and independently interpreted EKG Reading(s) - see summary below       <> Summary of EKG Reading(s): Sinus kuldip, no stemi  Clinical Tests:   Lab Tests: Ordered and Reviewed  Radiological Study: Ordered and Reviewed  Medical Tests: Ordered and Reviewed  ED Management:  No acute findings on workup.   Pt given toradol, I suspect his pain is MSK in origin,   EKG and CXR without acute findings, No CP and troponin normal.   Will refer back to PCP, robaxin given for MSK pain. Return precautions given.                     Clinical Impression:   Final diagnoses:  [R06.02] SOB (shortness of breath)  [R07.9] Chest pain  [M54.9] Back pain, unspecified back location, unspecified back pain laterality, unspecified  chronicity (Primary)        ED Disposition Condition    Discharge Stable          ED Prescriptions       Medication Sig Dispense Start Date End Date Auth. Provider    methocarbamoL (ROBAXIN) 500 MG Tab Take 1 tablet (500 mg total) by mouth 3 (three) times daily. for 5 days 20 tablet 9/7/2022 9/12/2022 Shravan Scott PA-C          Follow-up Information    None          Shravan Scott PA-C  09/07/22 9086

## 2022-09-07 NOTE — TELEPHONE ENCOUNTER
----- Message from Marci Graham sent at 9/7/2022 10:30 AM CDT -----  Type: Patient Call Back    Who called: self     What is the request in detail: patient would like to know if it okay to take methocarbamoL (ROBAXIN) 500 MG Tab alone with his other medication. Please call    Can the clinic reply by [facility]? no    Would the patient rather a call back or a response via [facility]? call    Best call back number: .[phone] (home)

## 2022-09-09 LAB
LEFT EYE DM RETINOPATHY: NEGATIVE
RIGHT EYE DM RETINOPATHY: NEGATIVE

## 2022-09-12 ENCOUNTER — PATIENT OUTREACH (OUTPATIENT)
Dept: FAMILY MEDICINE | Facility: CLINIC | Age: 77
End: 2022-09-12
Payer: MEDICARE

## 2022-09-25 ENCOUNTER — HOSPITAL ENCOUNTER (EMERGENCY)
Facility: HOSPITAL | Age: 77
Discharge: HOME | End: 2022-09-25
Attending: EMERGENCY MEDICINE
Payer: MEDICARE

## 2022-09-25 VITALS
BODY MASS INDEX: 24.32 KG/M2 | HEIGHT: 65 IN | TEMPERATURE: 99 F | WEIGHT: 146 LBS | DIASTOLIC BLOOD PRESSURE: 68 MMHG | RESPIRATION RATE: 18 BRPM | HEART RATE: 78 BPM | OXYGEN SATURATION: 100 % | SYSTOLIC BLOOD PRESSURE: 125 MMHG

## 2022-09-25 DIAGNOSIS — E86.0 DEHYDRATION: ICD-10-CM

## 2022-09-25 DIAGNOSIS — A08.4 VIRAL GASTROENTERITIS: ICD-10-CM

## 2022-09-25 DIAGNOSIS — R11.2 NAUSEA, VOMITING, AND DIARRHEA: Primary | ICD-10-CM

## 2022-09-25 DIAGNOSIS — R19.7 NAUSEA, VOMITING, AND DIARRHEA: Primary | ICD-10-CM

## 2022-09-25 LAB
ALBUMIN SERPL BCP-MCNC: 3.6 G/DL (ref 3.5–5.2)
ALP SERPL-CCNC: 56 U/L (ref 55–135)
ALT SERPL W/O P-5'-P-CCNC: 23 U/L (ref 10–44)
AMORPH CRY URNS QL MICRO: NORMAL
ANION GAP SERPL CALC-SCNC: 9 MMOL/L (ref 8–16)
ANISOCYTOSIS BLD QL SMEAR: SLIGHT
AST SERPL-CCNC: 31 U/L (ref 10–40)
BACTERIA #/AREA URNS HPF: NORMAL /HPF
BASOPHILS # BLD AUTO: ABNORMAL K/UL (ref 0–0.2)
BASOPHILS NFR BLD: 0 % (ref 0–1.9)
BILIRUB SERPL-MCNC: 1.6 MG/DL (ref 0.1–1)
BILIRUB UR QL STRIP: NEGATIVE
BUN SERPL-MCNC: 29 MG/DL (ref 8–23)
CALCIUM SERPL-MCNC: 8.3 MG/DL (ref 8.7–10.5)
CHLORIDE SERPL-SCNC: 106 MMOL/L (ref 95–110)
CK SERPL-CCNC: 109 U/L (ref 20–200)
CLARITY UR: CLEAR
CO2 SERPL-SCNC: 21 MMOL/L (ref 23–29)
COLOR UR: YELLOW
CREAT SERPL-MCNC: 1 MG/DL (ref 0.5–1.4)
CTP QC/QA: YES
CTP QC/QA: YES
DACRYOCYTES BLD QL SMEAR: ABNORMAL
DIFFERENTIAL METHOD: ABNORMAL
EOSINOPHIL # BLD AUTO: ABNORMAL K/UL (ref 0–0.5)
EOSINOPHIL NFR BLD: 0 % (ref 0–8)
ERYTHROCYTE [DISTWIDTH] IN BLOOD BY AUTOMATED COUNT: 14.4 % (ref 11.5–14.5)
EST. GFR  (NO RACE VARIABLE): >60 ML/MIN/1.73 M^2
GLUCOSE SERPL-MCNC: 99 MG/DL (ref 70–110)
GLUCOSE UR QL STRIP: NEGATIVE
HCT VFR BLD AUTO: 40.6 % (ref 40–54)
HGB BLD-MCNC: 13.8 G/DL (ref 14–18)
HGB UR QL STRIP: NEGATIVE
HYALINE CASTS #/AREA URNS LPF: 0 /LPF
IMM GRANULOCYTES # BLD AUTO: ABNORMAL K/UL (ref 0–0.04)
IMM GRANULOCYTES NFR BLD AUTO: ABNORMAL % (ref 0–0.5)
KETONES UR QL STRIP: NEGATIVE
LEUKOCYTE ESTERASE UR QL STRIP: NEGATIVE
LIPASE SERPL-CCNC: 5 U/L (ref 4–60)
LYMPHOCYTES # BLD AUTO: ABNORMAL K/UL (ref 1–4.8)
LYMPHOCYTES NFR BLD: 13 % (ref 18–48)
MAGNESIUM SERPL-MCNC: 2.1 MG/DL (ref 1.6–2.6)
MCH RBC QN AUTO: 30.7 PG (ref 27–31)
MCHC RBC AUTO-ENTMCNC: 34 G/DL (ref 32–36)
MCV RBC AUTO: 90 FL (ref 82–98)
METAMYELOCYTES NFR BLD MANUAL: 1 %
MICROSCOPIC COMMENT: NORMAL
MONOCYTES # BLD AUTO: ABNORMAL K/UL (ref 0.3–1)
MONOCYTES NFR BLD: 4 % (ref 4–15)
MYELOCYTES NFR BLD MANUAL: 1 %
NEUTROPHILS NFR BLD: 14 % (ref 38–73)
NEUTS BAND NFR BLD MANUAL: 67 %
NITRITE UR QL STRIP: NEGATIVE
NRBC BLD-RTO: 0 /100 WBC
OVALOCYTES BLD QL SMEAR: ABNORMAL
PH UR STRIP: 6 [PH] (ref 5–8)
PHOSPHATE SERPL-MCNC: 2.7 MG/DL (ref 2.7–4.5)
PLATELET # BLD AUTO: 116 K/UL (ref 150–450)
PLATELET BLD QL SMEAR: ABNORMAL
PMV BLD AUTO: 12.2 FL (ref 9.2–12.9)
POC MOLECULAR INFLUENZA A AGN: NEGATIVE
POC MOLECULAR INFLUENZA B AGN: NEGATIVE
POIKILOCYTOSIS BLD QL SMEAR: SLIGHT
POTASSIUM SERPL-SCNC: 4.2 MMOL/L (ref 3.5–5.1)
PROT SERPL-MCNC: 6.8 G/DL (ref 6–8.4)
PROT UR QL STRIP: ABNORMAL
RBC # BLD AUTO: 4.5 M/UL (ref 4.6–6.2)
RBC #/AREA URNS HPF: 0 /HPF (ref 0–4)
SARS-COV-2 RDRP RESP QL NAA+PROBE: NEGATIVE
SCHISTOCYTES BLD QL SMEAR: PRESENT
SODIUM SERPL-SCNC: 136 MMOL/L (ref 136–145)
SP GR UR STRIP: 1.03 (ref 1–1.03)
URN SPEC COLLECT METH UR: ABNORMAL
UROBILINOGEN UR STRIP-ACNC: NEGATIVE EU/DL
WBC # BLD AUTO: 4.08 K/UL (ref 3.9–12.7)
WBC #/AREA URNS HPF: 1 /HPF (ref 0–5)

## 2022-09-25 PROCEDURE — 85027 COMPLETE CBC AUTOMATED: CPT | Performed by: NURSE PRACTITIONER

## 2022-09-25 PROCEDURE — 96361 HYDRATE IV INFUSION ADD-ON: CPT

## 2022-09-25 PROCEDURE — 87502 INFLUENZA DNA AMP PROBE: CPT

## 2022-09-25 PROCEDURE — 83735 ASSAY OF MAGNESIUM: CPT | Performed by: NURSE PRACTITIONER

## 2022-09-25 PROCEDURE — 82550 ASSAY OF CK (CPK): CPT | Performed by: NURSE PRACTITIONER

## 2022-09-25 PROCEDURE — 93005 ELECTROCARDIOGRAM TRACING: CPT

## 2022-09-25 PROCEDURE — U0002 COVID-19 LAB TEST NON-CDC: HCPCS | Performed by: NURSE PRACTITIONER

## 2022-09-25 PROCEDURE — 96372 THER/PROPH/DIAG INJ SC/IM: CPT | Performed by: NURSE PRACTITIONER

## 2022-09-25 PROCEDURE — 63600175 PHARM REV CODE 636 W HCPCS: Performed by: NURSE PRACTITIONER

## 2022-09-25 PROCEDURE — 85007 BL SMEAR W/DIFF WBC COUNT: CPT | Performed by: NURSE PRACTITIONER

## 2022-09-25 PROCEDURE — 99284 EMERGENCY DEPT VISIT MOD MDM: CPT | Mod: 25

## 2022-09-25 PROCEDURE — 83690 ASSAY OF LIPASE: CPT | Performed by: NURSE PRACTITIONER

## 2022-09-25 PROCEDURE — 81000 URINALYSIS NONAUTO W/SCOPE: CPT | Performed by: NURSE PRACTITIONER

## 2022-09-25 PROCEDURE — 93010 EKG 12-LEAD: ICD-10-PCS | Mod: ,,, | Performed by: INTERNAL MEDICINE

## 2022-09-25 PROCEDURE — 84100 ASSAY OF PHOSPHORUS: CPT | Performed by: NURSE PRACTITIONER

## 2022-09-25 PROCEDURE — 96374 THER/PROPH/DIAG INJ IV PUSH: CPT

## 2022-09-25 PROCEDURE — 93010 ELECTROCARDIOGRAM REPORT: CPT | Mod: ,,, | Performed by: INTERNAL MEDICINE

## 2022-09-25 PROCEDURE — 80053 COMPREHEN METABOLIC PANEL: CPT | Performed by: NURSE PRACTITIONER

## 2022-09-25 RX ORDER — ONDANSETRON 4 MG/1
4 TABLET, ORALLY DISINTEGRATING ORAL EVERY 6 HOURS PRN
Qty: 20 TABLET | Refills: 0 | Status: SHIPPED | OUTPATIENT
Start: 2022-09-25 | End: 2023-01-03

## 2022-09-25 RX ORDER — DICYCLOMINE HYDROCHLORIDE 20 MG/1
20 TABLET ORAL 2 TIMES DAILY
Qty: 20 TABLET | Refills: 0 | Status: SHIPPED | OUTPATIENT
Start: 2022-09-25 | End: 2023-01-03

## 2022-09-25 RX ORDER — ONDANSETRON 2 MG/ML
8 INJECTION INTRAMUSCULAR; INTRAVENOUS
Status: COMPLETED | OUTPATIENT
Start: 2022-09-25 | End: 2022-09-25

## 2022-09-25 RX ORDER — DICYCLOMINE HYDROCHLORIDE 10 MG/ML
20 INJECTION INTRAMUSCULAR
Status: COMPLETED | OUTPATIENT
Start: 2022-09-25 | End: 2022-09-25

## 2022-09-25 RX ADMIN — SODIUM CHLORIDE, SODIUM LACTATE, POTASSIUM CHLORIDE, AND CALCIUM CHLORIDE 1000 ML: .6; .31; .03; .02 INJECTION, SOLUTION INTRAVENOUS at 02:09

## 2022-09-25 RX ADMIN — SODIUM CHLORIDE, SODIUM LACTATE, POTASSIUM CHLORIDE, AND CALCIUM CHLORIDE 1000 ML: .6; .31; .03; .02 INJECTION, SOLUTION INTRAVENOUS at 12:09

## 2022-09-25 RX ADMIN — ONDANSETRON 8 MG: 2 INJECTION INTRAMUSCULAR; INTRAVENOUS at 12:09

## 2022-09-25 RX ADMIN — DICYCLOMINE HYDROCHLORIDE 20 MG: 20 INJECTION INTRAMUSCULAR at 02:09

## 2022-09-25 NOTE — DISCHARGE INSTRUCTIONS

## 2022-09-25 NOTE — ED PROVIDER NOTES
Encounter Date: 9/25/2022    SCRIBE #1 NOTE: I, Miah Blank, am scribing for, and in the presence of,  Mark Caldera NP. I have scribed the following portions of the note - Other sections scribed: HPI, ANKUR.     History     Chief Complaint   Patient presents with    Nausea    Vomiting    Diarrhea     Pt c/o n/v/d since yesterday accompanied by headache and intermittent abdominal pain. Pt denies chest pain, sob, or blurred vision.      Reed Torres is a 77 y. O male with a PMHx of HLD, CAD, and pulmonary emphysema, that comes to the ED complaining of constant epigastric abdominal pain beginning 2 days ago. Patient reports he was cutting grass with his neighbor and drank some beers, after which he endorses complaints of epigastric discomfort, nausea, vomiting, diarrhea, decreased appetite, trouble sleeping, and a headache localized to her superior head. Patient endorses attempting drinking Gatorade and Pedialyte but reports vomiting soon after. No medications taken PTA. No alleviating or exacerbating factors noted. Denies CP, SOB, constipation, or other associated symptoms. No known allergies.    The history is provided by the patient. No  was used.   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 CA    SHOULDER ARTHROSCOPY W/ ROTATOR CUFF REPAIR      Left shoulder     Family History   Problem Relation Age of Onset    COPD Mother     Diabetes Mother     Hypertension Mother     COPD Father      Heart disease Brother      Social History     Tobacco Use    Smoking status: Former     Types: Cigarettes     Quit date: 2000     Years since quittin.7    Smokeless tobacco: Never    Tobacco comments:     stopped smoking 20 yrs ago.   Substance Use Topics    Alcohol use: Not Currently     Alcohol/week: 5.0 standard drinks     Types: 6 Standard drinks or equivalent per week    Drug use: No     Review of Systems   Constitutional:  Positive for activity change (trouble sleeping]) and appetite change (decreased). Negative for chills and fever.   HENT:  Negative for congestion, postnasal drip, rhinorrhea, sinus pressure and sore throat.    Eyes:  Negative for pain and redness.   Respiratory:  Negative for apnea, cough, choking, chest tightness, shortness of breath, wheezing and stridor.    Cardiovascular:  Negative for chest pain, palpitations and leg swelling.   Gastrointestinal:  Positive for abdominal pain (epigastric discomfort), diarrhea, nausea and vomiting. Negative for constipation.   Genitourinary:  Negative for dysuria, flank pain, penile pain and urgency.   Musculoskeletal:  Negative for arthralgias, back pain, gait problem, joint swelling, myalgias, neck pain and neck stiffness.   Skin:  Negative for color change, pallor, rash and wound.   Neurological:  Positive for headaches (superior head). Negative for dizziness, tremors, seizures, syncope and light-headedness.   Psychiatric/Behavioral:  Negative for confusion. The patient is not nervous/anxious.      Physical Exam     Initial Vitals [22 1120]   BP Pulse Resp Temp SpO2   103/61 91 17 98.4 °F (36.9 °C) 98 %      MAP       --         Physical Exam    Nursing note and vitals reviewed.  Constitutional: He appears well-developed and well-nourished. He is not diaphoretic. No distress.   HENT:   Head: Normocephalic and atraumatic.   Right Ear: External ear normal.   Left Ear: External ear normal.   Nose: Nose normal.   Eyes: EOM are normal. Right eye  exhibits no discharge. Left eye exhibits no discharge.   Neck: Neck supple. No tracheal deviation present.   Normal range of motion.  Cardiovascular:  Normal rate.           Pulmonary/Chest: No stridor. No respiratory distress.   Abdominal: Abdomen is soft. He exhibits no distension. There is no abdominal tenderness.   No abdominal tenderness to palpation.  Abdomen is soft.  No rigidity or guarding.  Benign exam   Musculoskeletal:         General: No tenderness. Normal range of motion.      Cervical back: Normal range of motion and neck supple.     Neurological: He is alert and oriented to person, place, and time. He has normal strength. No cranial nerve deficit.   Skin: Skin is warm and dry.   Psychiatric: He has a normal mood and affect. His behavior is normal. Judgment and thought content normal.       ED Course   Procedures  Labs Reviewed   CBC W/ AUTO DIFFERENTIAL - Abnormal; Notable for the following components:       Result Value    RBC 4.50 (*)     Hemoglobin 13.8 (*)     Platelets 116 (*)     Gran % 14.0 (*)     Lymph % 13.0 (*)     All other components within normal limits   COMPREHENSIVE METABOLIC PANEL - Abnormal; Notable for the following components:    CO2 21 (*)     BUN 29 (*)     Calcium 8.3 (*)     Total Bilirubin 1.6 (*)     All other components within normal limits   URINALYSIS, REFLEX TO URINE CULTURE - Abnormal; Notable for the following components:    Protein, UA 1+ (*)     All other components within normal limits    Narrative:     Specimen Source->Urine   LIPASE   CK   MAGNESIUM   PHOSPHORUS   URINALYSIS MICROSCOPIC    Narrative:     Specimen Source->Urine   SARS-COV-2 RDRP GENE    Narrative:     This test utilizes isothermal nucleic acid amplification   technology to detect the SARS-CoV-2 RdRp nucleic acid segment.   The analytical sensitivity (limit of detection) is 125 genome   equivalents/mL.   A POSITIVE result implies infection with the SARS-CoV-2 virus;   the patient is presumed to be  contagious.     A NEGATIVE result means that SARS-CoV-2 nucleic acids are not   present above the limit of detection. A NEGATIVE result should be   treated as presumptive. It does not rule out the possibility of   COVID-19 and should not be the sole basis for treatment decisions.   If COVID-19 is strongly suspected based on clinical and exposure   history, re-testing using an alternate molecular assay should be   considered.   This test is only for use under the Food and Drug   Administration s Emergency Use Authorization (EUA).   Commercial kits are provided by [employer].   Performance characteristics of the EUA have been independently   verified by [facility] Department of   Pathology and Laboratory Medicine.   _________________________________________________________________   The authorized Fact Sheet for Healthcare Providers and the authorized Fact   Sheet for Patients of the ID NOW COVID-19 are available on the FDA   website:     https://www.fda.gov/media/939193/download  https://www.fda.gov/media/672209/download          POCT INFLUENZA A/B MOLECULAR          Imaging Results    None          Medications   lactated ringers bolus 1,000 mL (1,000 mLs Intravenous New Bag 9/25/22 1421)   lactated ringers bolus 1,000 mL (0 mLs Intravenous Stopped 9/25/22 1301)   ondansetron injection 8 mg (8 mg Intravenous Given 9/25/22 1200)   dicyclomine injection 20 mg (20 mg Intramuscular Given 9/25/22 1407)     Medical Decision Making:   History:   Old Medical Records: I decided to obtain old medical records.  Independently Interpreted Test(s):   I have ordered and independently interpreted EKG Reading(s) - see prior notes  Clinical Tests:   Lab Tests: Ordered and Reviewed  Medical Tests: Ordered and Reviewed  ED Management:  HPI and physical exam as above.  Completely benign abdominal exam greatly reducing the likelihood of emergent intra-abdominal pathology.  Labs with some chronic abnormalities which  appear largely unchanged from previous results.  Otherwise unremarkable and reassuring.  Symptoms improved following treatment with fluids and antiemetics in the ED. Patient is overall very well-appearing and is tolerating p.o. without difficulty prior to discharge.  Will discharge with Zofran and Bentyl.  Advised patient to follow-up with his PCP.  ED return precautions given.  Patient expressed understanding of diagnosis and discharge instructions and had no further questions or concerns prior to discharge.        Scribe Attestation:   Scribe #1: I performed the above scribed service and the documentation accurately describes the services I performed. I attest to the accuracy of the note.                   Clinical Impression:   Final diagnoses:  [R11.2, R19.7] Nausea, vomiting, and diarrhea (Primary)  [A08.4] Viral gastroenteritis  [E86.0] Dehydration        ED Disposition Condition    Discharge Stable        I, Mark Caldera NP, personally performed the services described in this documentation. All medical record entries made by the scribe were at my direction and in my presence. I have reviewed the chart and agree that the record reflects my personal performance and is accurate and complete.    ED Prescriptions       Medication Sig Dispense Start Date End Date Auth. Provider    ondansetron (ZOFRAN-ODT) 4 MG TbDL Take 1 tablet (4 mg total) by mouth every 6 (six) hours as needed (Nausea). 20 tablet 9/25/2022 -- Mark Caldera NP    dicyclomine (BENTYL) 20 mg tablet Take 1 tablet (20 mg total) by mouth 2 (two) times daily. 20 tablet 9/25/2022 -- Mark Caldera NP          Follow-up Information       Follow up With Specialties Details Why Contact Info    Pj Gillette MD Family Medicine Schedule an appointment as soon as possible for a visit  For further evaluation [address]  [place] [address]  [phone]      [facility] Emergency Dept Emergency Medicine Go to  If symptoms worsen, As needed  [address] [facility] [address]  [phone]             Mark Caldera, ARNULFO  09/25/22 5085

## 2022-09-25 NOTE — ED TRIAGE NOTES
Pt. Complains of N/V with diarrhea that started yesterday. Pt. Also complains of a headache with abd pain that comes and goes.

## 2022-09-26 NOTE — PROVIDER PROGRESS NOTES - EMERGENCY DEPT.
Encounter Date: 9/25/2022    ED Physician Progress Notes        Physician Note:   Patient's family came back to the emergency department.  States had not received prescriptions.  I reviewed the chart.  Patient was supposed to get prescriptions for Bentyl and ondansetron.  Patient prefers [facility] pharmacy.  I will call these prescriptions in now.

## 2022-09-27 ENCOUNTER — HOSPITAL ENCOUNTER (EMERGENCY)
Facility: HOSPITAL | Age: 77
Discharge: HOME | End: 2022-09-27
Attending: EMERGENCY MEDICINE
Payer: MEDICARE

## 2022-09-27 ENCOUNTER — NURSE TRIAGE (OUTPATIENT)
Dept: ADMINISTRATIVE | Facility: CLINIC | Age: 77
End: 2022-09-27
Payer: MEDICARE

## 2022-09-27 VITALS
HEART RATE: 99 BPM | DIASTOLIC BLOOD PRESSURE: 71 MMHG | BODY MASS INDEX: 22.49 KG/M2 | SYSTOLIC BLOOD PRESSURE: 102 MMHG | RESPIRATION RATE: 19 BRPM | HEIGHT: 65 IN | TEMPERATURE: 98 F | WEIGHT: 135 LBS | OXYGEN SATURATION: 95 %

## 2022-09-27 DIAGNOSIS — I48.11 LONGSTANDING PERSISTENT ATRIAL FIBRILLATION: ICD-10-CM

## 2022-09-27 DIAGNOSIS — R53.1 WEAKNESS: Primary | ICD-10-CM

## 2022-09-27 DIAGNOSIS — R10.13 EPIGASTRIC PAIN: ICD-10-CM

## 2022-09-27 LAB
ALBUMIN SERPL BCP-MCNC: 3 G/DL (ref 3.5–5.2)
ALP SERPL-CCNC: 49 U/L (ref 55–135)
ALT SERPL W/O P-5'-P-CCNC: 21 U/L (ref 10–44)
ANION GAP SERPL CALC-SCNC: 8 MMOL/L (ref 8–16)
AST SERPL-CCNC: 25 U/L (ref 10–40)
BASOPHILS # BLD AUTO: 0.01 K/UL (ref 0–0.2)
BASOPHILS NFR BLD: 0.3 % (ref 0–1.9)
BILIRUB SERPL-MCNC: 0.7 MG/DL (ref 0.1–1)
BILIRUB UR QL STRIP: NEGATIVE
BNP SERPL-MCNC: 76 PG/ML (ref 0–99)
BUN SERPL-MCNC: 11 MG/DL (ref 8–23)
CALCIUM SERPL-MCNC: 8.6 MG/DL (ref 8.7–10.5)
CHLORIDE SERPL-SCNC: 108 MMOL/L (ref 95–110)
CK SERPL-CCNC: 63 U/L (ref 20–200)
CLARITY UR: CLEAR
CO2 SERPL-SCNC: 23 MMOL/L (ref 23–29)
COLOR UR: YELLOW
CREAT SERPL-MCNC: 0.8 MG/DL (ref 0.5–1.4)
CTP QC/QA: YES
CTP QC/QA: YES
DIFFERENTIAL METHOD: ABNORMAL
EOSINOPHIL # BLD AUTO: 0 K/UL (ref 0–0.5)
EOSINOPHIL NFR BLD: 0.3 % (ref 0–8)
ERYTHROCYTE [DISTWIDTH] IN BLOOD BY AUTOMATED COUNT: 13.8 % (ref 11.5–14.5)
EST. GFR  (NO RACE VARIABLE): >60 ML/MIN/1.73 M^2
GLUCOSE SERPL-MCNC: 93 MG/DL (ref 70–110)
GLUCOSE UR QL STRIP: NEGATIVE
HCT VFR BLD AUTO: 40.1 % (ref 40–54)
HGB BLD-MCNC: 13.4 G/DL (ref 14–18)
HGB UR QL STRIP: NEGATIVE
IMM GRANULOCYTES # BLD AUTO: 0.01 K/UL (ref 0–0.04)
IMM GRANULOCYTES NFR BLD AUTO: 0.3 % (ref 0–0.5)
KETONES UR QL STRIP: NEGATIVE
LACTATE SERPL-SCNC: 1.3 MMOL/L (ref 0.5–2.2)
LEUKOCYTE ESTERASE UR QL STRIP: NEGATIVE
LIPASE SERPL-CCNC: 24 U/L (ref 4–60)
LYMPHOCYTES # BLD AUTO: 0.9 K/UL (ref 1–4.8)
LYMPHOCYTES NFR BLD: 30.7 % (ref 18–48)
MCH RBC QN AUTO: 29.9 PG (ref 27–31)
MCHC RBC AUTO-ENTMCNC: 33.4 G/DL (ref 32–36)
MCV RBC AUTO: 90 FL (ref 82–98)
MONOCYTES # BLD AUTO: 0.3 K/UL (ref 0.3–1)
MONOCYTES NFR BLD: 11.1 % (ref 4–15)
NEUTROPHILS # BLD AUTO: 1.7 K/UL (ref 1.8–7.7)
NEUTROPHILS NFR BLD: 57.3 % (ref 38–73)
NITRITE UR QL STRIP: NEGATIVE
NRBC BLD-RTO: 0 /100 WBC
PH UR STRIP: 6 [PH] (ref 5–8)
PLATELET # BLD AUTO: 144 K/UL (ref 150–450)
PMV BLD AUTO: 11.3 FL (ref 9.2–12.9)
POC MOLECULAR INFLUENZA A AGN: NEGATIVE
POC MOLECULAR INFLUENZA B AGN: NEGATIVE
POTASSIUM SERPL-SCNC: 3.2 MMOL/L (ref 3.5–5.1)
PROT SERPL-MCNC: 5.9 G/DL (ref 6–8.4)
PROT UR QL STRIP: ABNORMAL
RBC # BLD AUTO: 4.48 M/UL (ref 4.6–6.2)
SARS-COV-2 RDRP RESP QL NAA+PROBE: NEGATIVE
SODIUM SERPL-SCNC: 139 MMOL/L (ref 136–145)
SP GR UR STRIP: 1.01 (ref 1–1.03)
TROPONIN I SERPL DL<=0.01 NG/ML-MCNC: <0.006 NG/ML (ref 0–0.03)
URN SPEC COLLECT METH UR: ABNORMAL
UROBILINOGEN UR STRIP-ACNC: NEGATIVE EU/DL
WBC # BLD AUTO: 2.96 K/UL (ref 3.9–12.7)

## 2022-09-27 PROCEDURE — 81003 URINALYSIS AUTO W/O SCOPE: CPT

## 2022-09-27 PROCEDURE — 93010 ELECTROCARDIOGRAM REPORT: CPT | Mod: ,,, | Performed by: INTERNAL MEDICINE

## 2022-09-27 PROCEDURE — 93010 EKG 12-LEAD: ICD-10-PCS | Mod: ,,, | Performed by: INTERNAL MEDICINE

## 2022-09-27 PROCEDURE — 84484 ASSAY OF TROPONIN QUANT: CPT | Performed by: EMERGENCY MEDICINE

## 2022-09-27 PROCEDURE — 85025 COMPLETE CBC W/AUTO DIFF WBC: CPT | Performed by: EMERGENCY MEDICINE

## 2022-09-27 PROCEDURE — 83880 ASSAY OF NATRIURETIC PEPTIDE: CPT | Performed by: EMERGENCY MEDICINE

## 2022-09-27 PROCEDURE — 83605 ASSAY OF LACTIC ACID: CPT | Performed by: EMERGENCY MEDICINE

## 2022-09-27 PROCEDURE — 82550 ASSAY OF CK (CPK): CPT | Performed by: EMERGENCY MEDICINE

## 2022-09-27 PROCEDURE — 25000003 PHARM REV CODE 250: Performed by: EMERGENCY MEDICINE

## 2022-09-27 PROCEDURE — 96361 HYDRATE IV INFUSION ADD-ON: CPT

## 2022-09-27 PROCEDURE — 93005 ELECTROCARDIOGRAM TRACING: CPT

## 2022-09-27 PROCEDURE — U0002 COVID-19 LAB TEST NON-CDC: HCPCS

## 2022-09-27 PROCEDURE — 83690 ASSAY OF LIPASE: CPT | Performed by: EMERGENCY MEDICINE

## 2022-09-27 PROCEDURE — 80053 COMPREHEN METABOLIC PANEL: CPT | Performed by: EMERGENCY MEDICINE

## 2022-09-27 PROCEDURE — 87502 INFLUENZA DNA AMP PROBE: CPT

## 2022-09-27 PROCEDURE — 25500020 PHARM REV CODE 255: Performed by: EMERGENCY MEDICINE

## 2022-09-27 PROCEDURE — 99285 EMERGENCY DEPT VISIT HI MDM: CPT | Mod: 25

## 2022-09-27 PROCEDURE — 96374 THER/PROPH/DIAG INJ IV PUSH: CPT | Mod: 59

## 2022-09-27 RX ORDER — SUCRALFATE 1 G/1
1 TABLET ORAL
Qty: 40 TABLET | Refills: 0 | Status: SHIPPED | OUTPATIENT
Start: 2022-09-27 | End: 2022-10-07

## 2022-09-27 RX ORDER — PANTOPRAZOLE SODIUM 40 MG/1
40 TABLET, DELAYED RELEASE ORAL DAILY
Qty: 30 TABLET | Refills: 1 | Status: SHIPPED | OUTPATIENT
Start: 2022-09-27 | End: 2023-09-19 | Stop reason: ALTCHOICE

## 2022-09-27 RX ORDER — POTASSIUM CHLORIDE 750 MG/1
10 TABLET, EXTENDED RELEASE ORAL
Status: COMPLETED | OUTPATIENT
Start: 2022-09-27 | End: 2022-09-27

## 2022-09-27 RX ORDER — DILTIAZEM HYDROCHLORIDE 5 MG/ML
5 INJECTION INTRAVENOUS
Status: COMPLETED | OUTPATIENT
Start: 2022-09-27 | End: 2022-09-27

## 2022-09-27 RX ORDER — MAG HYDROX/ALUMINUM HYD/SIMETH 200-200-20
30 SUSPENSION, ORAL (FINAL DOSE FORM) ORAL ONCE
Status: COMPLETED | OUTPATIENT
Start: 2022-09-27 | End: 2022-09-27

## 2022-09-27 RX ORDER — LIDOCAINE HYDROCHLORIDE 20 MG/ML
15 SOLUTION OROPHARYNGEAL ONCE
Status: COMPLETED | OUTPATIENT
Start: 2022-09-27 | End: 2022-09-27

## 2022-09-27 RX ADMIN — LIDOCAINE HYDROCHLORIDE 15 ML: 20 SOLUTION ORAL; TOPICAL at 06:09

## 2022-09-27 RX ADMIN — DILTIAZEM HYDROCHLORIDE 5 MG: 5 INJECTION INTRAVENOUS at 09:09

## 2022-09-27 RX ADMIN — ALUMINUM HYDROXIDE, MAGNESIUM HYDROXIDE, AND DIMETHICONE 30 ML: 200; 20; 200 SUSPENSION ORAL at 06:09

## 2022-09-27 RX ADMIN — IOHEXOL 70 ML: 350 INJECTION, SOLUTION INTRAVENOUS at 08:09

## 2022-09-27 RX ADMIN — SODIUM CHLORIDE 500 ML: 0.9 INJECTION, SOLUTION INTRAVENOUS at 05:09

## 2022-09-27 RX ADMIN — POTASSIUM CHLORIDE 10 MEQ: 750 TABLET, EXTENDED RELEASE ORAL at 07:09

## 2022-09-27 NOTE — ED TRIAGE NOTES
Pt. Was here for dehydration on Sunday. Pt. States that since then he has been having all kinds of problems. Pt. States he has been experiencing pain in his stomach (on the top and below naval). Pt. Also states that when he lifts his eyes to the ceiling, his eye lids hurt. Pt. Also complains of a pain on the left side of his head located right next to his ear. Pt. Also states that his BP has been elevated. Pt. Rates his pain at a 9/10 on the pain scale.

## 2022-09-27 NOTE — FIRST PROVIDER EVALUATION
Medical screening examination initiated.  I have conducted a focused provider triage encounter, findings are as follows:    Brief history of present illness:  Patient here for fatigue and weakness. Patient here Sunday for same thing; per patient labs were normal that day. Patient complaining of epigastric and suprapubic pain, Nvx2 (resolved since Sunday), loose stools, and feeling off balance. Patient denies fever, URI sx, SOB, CP, and urinary symptoms. No recent ABX use.     There were no vitals filed for this visit.    Pertinent physical exam:  Aox4. No acute distress.    Brief workup plan:  EKG, UA    Preliminary workup initiated; this workup will be continued and followed by the physician or advanced practice provider that is assigned to the patient when roomed.

## 2022-09-27 NOTE — TELEPHONE ENCOUNTER
Transferred from . Since yesterday not feeling well, Seen in ED Sunday, has f/u appt with NP tomorrow but not feeling well today and thinks BP is low. /73 prior to calling.     Repeat BP @ 1515: 106/70 with HR of 77. Typically 140/70. Doesn't take BP meds. Pt also reports abd pain that started yesterday. Pain is intermittent. Current pain is 8/10, above naval. Advised per protocol. Go to ED for eval. VU and agreed.   Reason for Disposition   Abdominal pain    Additional Information   Negative: Systolic BP < 90 and feeling dizzy, lightheaded, or weak   Negative: Started suddenly after an allergic medicine, an allergic food, or bee sting   Negative: Shock suspected (e.g., cold/pale/clammy skin, too weak to stand, low BP, rapid pulse)   Negative: Difficult to awaken or acting confused (e.g., disoriented, slurred speech)   Negative: Fainted   Negative: Chest pain   Negative: Bleeding (e.g., vomiting blood, rectal bleeding or tarry stools, severe vaginal bleeding)   Negative: Extra heart beats or heart is beating fast (i.e., 'palpitations')   Negative: Sounds like a life-threatening emergency to the triager    Protocols used: Blood Pressure - Low-A-OH

## 2022-09-27 NOTE — ED PROVIDER NOTES
Encounter Date: 9/27/2022    SCRIBE #1 NOTE: I, Deepali Howard, am scribing for, and in the presence of,  Abner Owen MD. I have scribed the following portions of the note - the EKG reading. Other sections scribed: HPI, ROS, PE.     History     Chief Complaint   Patient presents with    Abdominal Pain     Patient reports of epigastric abdominal pain radiating to lower abdomen with diarrhea and nausea. Also reports of low BP and feeling weak and dizzy. Was seen here Sunday for same symptoms.      Reed Torres is a 77 y.o. male, with a PMHx of CAD, GERD, A-fib, and HLD, who presents to the ED with epigastric abdominal pain that started today. The patient describes the pain as 10/10 non-radiating pressure that is intermittent and lasts five to ten minutes per painful episode. The patient notes the pain is exacerbated while sitting up. The patient is having regular bowel movements, last one at 11:45 am, and no changes to his appetite. Patients also reports decreased urine output. Patient endorses a past surgical history of an appendectomy years ago. Patient's endorses his GI doctor diagnosed him with a fatty liver. Patient is compliant with anticoagulants (Aspirin and Eliquis), and Atorvastatin. No medications taken PTA. No other exacerbating or alleviating factors. Patient denies nausea, vomiting, fever, black stool, abdominal distension, or other associated symptoms. No known allergies.    The history is provided by the patient. No  was used.   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      Return to the emergency department if you have any further concerns or concerns about your safety.    Continue to take your medications as prescribed.   [facility] CATH LAB;  Service: Cardiology;  Laterality: Left;  RN PRE OP 2020.---COVID NEGATIVE ON-- 2020. [place]    SHOULDER ARTHROSCOPY W/ ROTATOR CUFF REPAIR      Left shoulder     Family History   Problem Relation Age of Onset    COPD Mother     Diabetes Mother     Hypertension Mother     COPD Father     Heart disease Brother      Social History     Tobacco Use    Smoking status: Former     Types: Cigarettes     Quit date:      Years since quittin.7    Smokeless tobacco: Never    Tobacco comments:     stopped smoking 20 yrs ago.   Substance Use Topics    Alcohol use: Not Currently     Alcohol/week: 5.0 standard drinks     Types: 6 Standard drinks or equivalent per week    Drug use: No     Review of Systems   Constitutional:  Negative for fever.   HENT:  Negative for sore throat.    Eyes:  Negative for photophobia.   Respiratory:  Negative for shortness of breath.    Cardiovascular:  Negative for chest pain.   Gastrointestinal:  Positive for abdominal pain (epigastric). Negative for abdominal distention, blood in stool, nausea and vomiting.   Genitourinary:  Positive for decreased urine volume. Negative for dysuria and hematuria.   Musculoskeletal:  Negative for back pain.   Skin:  Negative for color change.   Neurological:  Negative for headaches.     Physical Exam     Initial Vitals [22 1649]   BP Pulse Resp Temp SpO2   91/66 107 18 97.8 °F (36.6 °C) 98 %      MAP       --         Physical Exam    Nursing note and vitals reviewed.  Constitutional: He appears well-developed and well-nourished.   HENT:   Head: Atraumatic.   Mouth/Throat: Oropharynx is clear and moist.   Eyes: Conjunctivae and EOM are normal. Pupils are equal, round, and reactive to light.   Neck: Neck supple. No JVD present.   Normal range of motion.  Cardiovascular:  Normal rate, normal heart sounds and intact distal pulses. An irregular rhythm present.     Exam reveals no gallop and no friction rub.       No murmur heard.  HR:  105   Pulmonary/Chest: Breath sounds normal.   Abdominal: Abdomen is soft. Bowel sounds are normal. There is abdominal tenderness (mild) in the right upper quadrant and epigastric area. There is positive Ni's sign.   Musculoskeletal:         General: Normal range of motion.      Cervical back: Normal range of motion and neck supple.     Lymphadenopathy:     He has no cervical adenopathy.   Neurological: He is alert and oriented to person, place, and time. He has normal strength.   Skin: Skin is warm and dry.   Psychiatric: He has a normal mood and affect. Thought content normal.       ED Course   Procedures  Labs Reviewed   URINALYSIS, REFLEX TO URINE CULTURE - Abnormal; Notable for the following components:       Result Value    Protein, UA Trace (*)     All other components within normal limits    Narrative:     Specimen Source->Urine   CBC W/ AUTO DIFFERENTIAL - Abnormal; Notable for the following components:    WBC 2.96 (*)     RBC 4.48 (*)     Hemoglobin 13.4 (*)     Platelets 144 (*)     Gran # (ANC) 1.7 (*)     Lymph # 0.9 (*)     All other components within normal limits   COMPREHENSIVE METABOLIC PANEL - Abnormal; Notable for the following components:    Potassium 3.2 (*)     Calcium 8.6 (*)     Total Protein 5.9 (*)     Albumin 3.0 (*)     Alkaline Phosphatase 49 (*)     All other components within normal limits   LIPASE   LACTIC ACID, PLASMA   B-TYPE NATRIURETIC PEPTIDE   TROPONIN I   CK   POCT INFLUENZA A/B MOLECULAR   SARS-COV-2 RDRP GENE     EKG Readings: (Independently Interpreted)   Initial Reading: No STEMI. Rhythm: Atrial Fibrillation. Heart Rate: 110. Ectopy: No Ectopy. Conduction: RBBB. ST Segments: Normal ST Segments. T Waves: Normal. Axis: Normal. Clinical Impression: Atrial Fibrillation with RVR with RBBB     Imaging Results              CT Abdomen Pelvis With Contrast (Final result)  Result time 09/27/22 20:43:57      Final result by Leigh Ann Barrera MD (09/27/22 20:43:57)                    Impression:      Bilateral renal cysts.    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    Circumferential wall thickening of the nearly decompressed urinary bladder.  Differential considerations include obstructive uropathy, acute cystitis, adjacent inflammatory condition, history of radiation therapy, or other etiology.    Colonic diverticulosis.      Electronically signed by: Leigh Ann Barrera  Date:    09/27/2022  Time:    20:43               Narrative:    EXAMINATION:  CT OF ABDOMEN PELVIS WITH    CLINICAL HISTORY:  Epigastric abdominal pain radiating to the lower abdomen with diarrhea and nausea.  Low blood pressure and dizzy.    TECHNIQUE:  5 mm enhanced axial images were obtained from the lung bases through the greater trochanters.  Seventy mL of Omnipaque 350 was injected.    COMPARISON:  08/12/2022    FINDINGS:  The liver, spleen, pancreas, and adrenal glands are unremarkable. The gallbladder contains no calcified gallstones.  Bilateral renal cysts are present.    There is no definite evidence for abdominal adenopathy or ascites.    There is aneurysmal dilatation of the right common iliac artery.  When measured in similar plane of scan, it measured 4.1 cm (series 2 axial image 112).  On the prior examination at a similar level, it measured 4 cm.  Mild atherosclerotic change is present.    There are no pelvic masses or adenopathy.  The urinary bladder is nearly decompressed.  There is circumferential urinary bladder wall thickening.  There is a defect in the prostate gland, which may be due to prior TURP procedure.  There is moderate fecal material.    There is no free fluid in the pelvis.    There is mild moderate bibasilar atelectasis or scarring.    Mild levoscoliosis is present.  There is moderate degenerative changes of the spine.  There is grade 1 anterolisthesis of L4 on L5 and retrolisthesis of L5 on S1.                                        US Abdomen Limited (Final result)  Result time 09/27/22 19:18:33      Final result by Leigh Ann Barrera MD (09/27/22 19:18:33)                   Impression:      No definite cholelithiasis.  Contracted gallbladder with associated gallbladder wall thickening.    Prominent pancreatic duct at 2.8 mm.    Right midpole renal cyst.      Electronically signed by: Leigh Ann Barrera  Date:    09/27/2022  Time:    19:18               Narrative:    EXAMINATION:  ULTRASOUND ABDOMEN LIMITED (gallbladder)    CLINICAL HISTORY:  RUQ abd pain;    TECHNIQUE:  Limited ultrasound of the right upper quadrant of the abdomen with attention to the gallbladder was performed.    COMPARISON:  Ultrasound abdomen complete 08/11/2020    FINDINGS:  Gallbladder: Contracted.  No calculi.  7 mm wall thickening.  No pericholecystic fluid.  No sonographic Ni's sign.    Biliary system: The common duct is not dilated, measuring 6.5 mm.  No intrahepatic ductal dilatation.    Miscellaneous: Right midpole renal cyst measuring 3.2 x 2.6 x 3.0 cm.                                       Medications   sodium chloride 0.9% bolus 500 mL (0 mLs Intravenous Stopped 9/27/22 1938)   aluminum-magnesium hydroxide-simethicone 200-200-20 mg/5 mL suspension 30 mL (30 mLs Oral Given 9/27/22 1803)     And   LIDOcaine HCl 2% oral solution 15 mL (15 mLs Oral Given 9/27/22 1803)   sodium chloride 0.9% bolus 500 mL (0 mLs Intravenous Stopped 9/27/22 2044)   potassium chloride SA CR tablet 10 mEq (10 mEq Oral Given 9/27/22 1945)   iohexoL (OMNIPAQUE 350) injection 70 mL (70 mLs Intravenous Given 9/27/22 2015)   diltiaZEM injection 5 mg (5 mg Intravenous Given 9/27/22 2140)     Medical Decision Making:   History:   Old Medical Records: I decided to obtain old medical records.  Initial Assessment:   This is an emergent evaluation of a 77 y.o. male who presents with intermittent non-radiating epigastric abdominal pain and decreased urine. The patient was seen and examined. The  history and physical exam was obtained. Exam shows irregular heart rate at 105 bpm, mild epigastric and RUQ tenderness wih palpation, and positive In's sign. The remainder of the exam is unremarkable. The nursing notes and vital signs were reviewed. Secondary to symptoms and examination findings, I ordered US abdomen, BNP level, troponin I level, CBC, CMP, lipase level, urinalysis, lactic acid level, POCT Influenza A/B, POCT Covid-19, and EKG 12- lead . Will treat with aluminum-magnesium hydroxide-simethicone 200-200-20 mg/5 mL suspension 30 mL, lidocaine, HCl 2% oral solution 15 mL, and sodium chloride bolus 0.9% bolus 500 mL. Will reassess.   Independently Interpreted Test(s):   I have ordered and independently interpreted EKG Reading(s) - see prior notes  Clinical Tests:   Lab Tests: Ordered and Reviewed  Radiological Study: Ordered and Reviewed  Medical Tests: Ordered and Reviewed        Scribe Attestation:   Scribe #1: I performed the above scribed service and the documentation accurately describes the services I performed. I attest to the accuracy of the note.            Etiology of abd pain uncertain. Potential Afib related. Pain controlled. Pt tolerating po. HR controlled less than 110. Stable for discharge. Follow up gi doctor and cardiology. Refilled Protonix to ensure pt can take daily until follow up and with try Carafate for this week.        Clinical Impression:   Final diagnoses:  [R53.1] Weakness (Primary)  [R10.13] Epigastric pain  [I48.11] Longstanding persistent atrial fibrillation        ED Disposition Condition    Discharge Stable        I, angelica braden, personally performed the services described in this documentation. All medical record entries made by the scribe were at my direction and in my presence. I have reviewed the chart and agree that the record reflects my personal performance and is accurate and complete.   ED Prescriptions       Medication Sig Dispense Start Date End Date  Auth. Provider    pantoprazole (PROTONIX) 40 MG tablet Take 1 tablet (40 mg total) by mouth once daily. 30 tablet 9/27/2022 -- Abner Owen MD    sucralfate (CARAFATE) 1 gram tablet Take 1 tablet (1 g total) by mouth 4 (four) times daily before meals and nightly. for 10 days 40 tablet 9/27/2022 10/7/2022 Abner Owen MD          Follow-up Information       Follow up With Specialties Details Why Contact Info    Anuj Mcclendon MD Gastroenterology Schedule an appointment as soon as possible for a visit  for the [address]  [address]  [facility]  [facility] [id number] [phone]      Pj Gillette MD Family Medicine Schedule an appointment as soon as possible for a visit   [address] [facility] [address]  [phone]      [facility] Emergency Medicine  As needed [address] [facility] [address] [phone]    Vivek Choudhary MD Cardiology, Interventional Cardiology Schedule an appointment as soon as possible for a visit  for the afib. [address]  [address]  [facility] [address]  [phone]               Abner Owen MD  09/27/22 7504

## 2022-09-28 ENCOUNTER — OFFICE VISIT (OUTPATIENT)
Dept: FAMILY MEDICINE | Facility: CLINIC | Age: 77
End: 2022-09-28
Payer: MEDICARE

## 2022-09-28 VITALS
TEMPERATURE: 98 F | HEART RATE: 71 BPM | HEIGHT: 65 IN | RESPIRATION RATE: 16 BRPM | DIASTOLIC BLOOD PRESSURE: 60 MMHG | BODY MASS INDEX: 22.59 KG/M2 | WEIGHT: 135.56 LBS | SYSTOLIC BLOOD PRESSURE: 96 MMHG | OXYGEN SATURATION: 99 %

## 2022-09-28 DIAGNOSIS — Z23 NEEDS FLU SHOT: ICD-10-CM

## 2022-09-28 DIAGNOSIS — R10.13 COLICKY EPIGASTRIC PAIN: Primary | ICD-10-CM

## 2022-09-28 DIAGNOSIS — R10.821 RIGHT UPPER QUADRANT ABDOMINAL TENDERNESS WITH REBOUND TENDERNESS: ICD-10-CM

## 2022-09-28 PROCEDURE — 1160F RVW MEDS BY RX/DR IN RCRD: CPT | Mod: CPTII,S$GLB,, | Performed by: NURSE PRACTITIONER

## 2022-09-28 PROCEDURE — 3078F DIAST BP <80 MM HG: CPT | Mod: CPTII,S$GLB,, | Performed by: NURSE PRACTITIONER

## 2022-09-28 PROCEDURE — 3288F FALL RISK ASSESSMENT DOCD: CPT | Mod: CPTII,S$GLB,, | Performed by: NURSE PRACTITIONER

## 2022-09-28 PROCEDURE — 3288F PR FALLS RISK ASSESSMENT DOCUMENTED: ICD-10-PCS | Mod: CPTII,S$GLB,, | Performed by: NURSE PRACTITIONER

## 2022-09-28 PROCEDURE — 1126F PR PAIN SEVERITY QUANTIFIED, NO PAIN PRESENT: ICD-10-PCS | Mod: CPTII,S$GLB,, | Performed by: NURSE PRACTITIONER

## 2022-09-28 PROCEDURE — 1160F PR REVIEW ALL MEDS BY PRESCRIBER/CLIN PHARMACIST DOCUMENTED: ICD-10-PCS | Mod: CPTII,S$GLB,, | Performed by: NURSE PRACTITIONER

## 2022-09-28 PROCEDURE — 1159F MED LIST DOCD IN RCRD: CPT | Mod: CPTII,S$GLB,, | Performed by: NURSE PRACTITIONER

## 2022-09-28 PROCEDURE — 90694 FLU VACCINE - QUADRIVALENT - ADJUVANTED: ICD-10-PCS | Mod: S$GLB,,, | Performed by: NURSE PRACTITIONER

## 2022-09-28 PROCEDURE — 99214 OFFICE O/P EST MOD 30 MIN: CPT | Mod: 25,S$GLB,, | Performed by: NURSE PRACTITIONER

## 2022-09-28 PROCEDURE — 99999 PR PBB SHADOW E&M-EST. PATIENT-LVL V: CPT | Mod: PBBFAC,,, | Performed by: NURSE PRACTITIONER

## 2022-09-28 PROCEDURE — 3078F PR MOST RECENT DIASTOLIC BLOOD PRESSURE < 80 MM HG: ICD-10-PCS | Mod: CPTII,S$GLB,, | Performed by: NURSE PRACTITIONER

## 2022-09-28 PROCEDURE — 3074F SYST BP LT 130 MM HG: CPT | Mod: CPTII,S$GLB,, | Performed by: NURSE PRACTITIONER

## 2022-09-28 PROCEDURE — 1159F PR MEDICATION LIST DOCUMENTED IN MEDICAL RECORD: ICD-10-PCS | Mod: CPTII,S$GLB,, | Performed by: NURSE PRACTITIONER

## 2022-09-28 PROCEDURE — 1101F PT FALLS ASSESS-DOCD LE1/YR: CPT | Mod: CPTII,S$GLB,, | Performed by: NURSE PRACTITIONER

## 2022-09-28 PROCEDURE — G0008 ADMIN INFLUENZA VIRUS VAC: HCPCS | Mod: S$GLB,,, | Performed by: NURSE PRACTITIONER

## 2022-09-28 PROCEDURE — 3074F PR MOST RECENT SYSTOLIC BLOOD PRESSURE < 130 MM HG: ICD-10-PCS | Mod: CPTII,S$GLB,, | Performed by: NURSE PRACTITIONER

## 2022-09-28 PROCEDURE — 99999 PR PBB SHADOW E&M-EST. PATIENT-LVL V: ICD-10-PCS | Mod: PBBFAC,,, | Performed by: NURSE PRACTITIONER

## 2022-09-28 PROCEDURE — 1126F AMNT PAIN NOTED NONE PRSNT: CPT | Mod: CPTII,S$GLB,, | Performed by: NURSE PRACTITIONER

## 2022-09-28 PROCEDURE — 90694 VACC AIIV4 NO PRSRV 0.5ML IM: CPT | Mod: S$GLB,,, | Performed by: NURSE PRACTITIONER

## 2022-09-28 PROCEDURE — G0008 FLU VACCINE - QUADRIVALENT - ADJUVANTED: ICD-10-PCS | Mod: S$GLB,,, | Performed by: NURSE PRACTITIONER

## 2022-09-28 PROCEDURE — 1101F PR PT FALLS ASSESS DOC 0-1 FALLS W/OUT INJ PAST YR: ICD-10-PCS | Mod: CPTII,S$GLB,, | Performed by: NURSE PRACTITIONER

## 2022-09-28 PROCEDURE — 99214 PR OFFICE/OUTPT VISIT, EST, LEVL IV, 30-39 MIN: ICD-10-PCS | Mod: 25,S$GLB,, | Performed by: NURSE PRACTITIONER

## 2022-09-28 NOTE — PROGRESS NOTES
Flu vaccine given, right arm, patient tolerated well and understands to wait 15 minutes before leaving

## 2022-09-28 NOTE — PROGRESS NOTES
Subjective:      Patient ID: Reed Torres is a 77 y.o. male.  Pt presents to clinic for ER f/u, see visit notes below. Began with acute epigastric abdominal pain after drinking beer on 9/25, treated in ER at that time with bentyl and zofran for acute gastritis. Then pain began again shortly after eating on 9/27, seen in ER again and treated with carafate with GI referral. To in clinic reports persistent epigastric pain that is worse after eating and increasing RUQ tenderness. Denies fever and N/V/D has resolved. Continues with decreased appetite and intake due to fear of pain. Has GI f/u scheduled for 10/10/22 with .     ED Course 9/25/22:  Procedures  Labs Reviewed  CBC W/ AUTO DIFFERENTIAL - Abnormal; Notable for the following components:      Result Value     RBC 4.50 (*)      Hemoglobin 13.8 (*)      Platelets 116 (*)      Gran % 14.0 (*)      Lymph % 13.0 (*)      All other components within normal limits  COMPREHENSIVE METABOLIC PANEL - Abnormal; Notable for the following components:    CO2 21 (*)      BUN 29 (*)      Calcium 8.3 (*)      Total Bilirubin 1.6 (*)      All other components within normal limits  URINALYSIS, REFLEX TO URINE CULTURE - Abnormal; Notable for the following components:    Protein, UA 1+ (*)      All other components within normal limits    Narrative:     Specimen Source->Urine  LIPASE  CK  MAGNESIUM  PHOSPHORUS  URINALYSIS MICROSCOPIC    Narrative:     Specimen Source->Urine  SARS-COV-2 RDRP GENE    Narrative:  NEGATIVE       POCT INFLUENZA A/B MOLECULAR             NEGATIVE  ?   Imaging Results   None      Medications  lactated ringers bolus 1,000 mL (1,000 mLs Intravenous New Bag 9/25/22 1421)  lactated ringers bolus 1,000 mL (0 mLs Intravenous Stopped 9/25/22 1301)  ondansetron injection 8 mg (8 mg Intravenous Given 9/25/22 1200)  dicyclomine injection 20 mg (20 mg Intramuscular Given 9/25/22 1407)     Medical Decision Making:   History:   Old Medical Records: I decided to  obtain old medical records.  Independently Interpreted Test(s):   I have ordered and independently interpreted EKG Reading(s) - see prior notes  Clinical Tests:   Lab Tests: Ordered and Reviewed  Medical Tests: Ordered and Reviewed  ED Management:  HPI and physical exam as above.  Completely benign abdominal exam greatly reducing the likelihood of emergent intra-abdominal pathology.  Labs with some chronic abnormalities which appear largely unchanged from previous results.  Otherwise unremarkable and reassuring.  Symptoms improved following treatment with fluids and antiemetics in the ED. Patient is overall very well-appearing and is tolerating p.o. without difficulty prior to discharge.  Will discharge with Zofran and Bentyl.  Advised patient to follow-up with his PCP.  ED return precautions given.  Patient expressed understanding of diagnosis and discharge instructions and had no further questions or concerns prior to discharge.        ED Course 9/27/22  Procedures  Labs Reviewed  URINALYSIS, REFLEX TO URINE CULTURE - Abnormal; Notable for the following components:      Result Value     Protein, UA Trace (*)      All other components within normal limits    Narrative:     Specimen Source->Urine  CBC W/ AUTO DIFFERENTIAL - Abnormal; Notable for the following components:    WBC 2.96 (*)      RBC 4.48 (*)      Hemoglobin 13.4 (*)      Platelets 144 (*)      Gran # (ANC) 1.7 (*)      Lymph # 0.9 (*)      All other components within normal limits  COMPREHENSIVE METABOLIC PANEL - Abnormal; Notable for the following components:    Potassium 3.2 (*)      Calcium 8.6 (*)      Total Protein 5.9 (*)      Albumin 3.0 (*)      Alkaline Phosphatase 49 (*)      All other components within normal limits  LIPASE  LACTIC ACID, PLASMA  B-TYPE NATRIURETIC PEPTIDE  TROPONIN I  CK  POCT INFLUENZA A/B MOLECULAR  SARS-COV-2 RDRP GENE     EKG Readings: (Independently Interpreted)   Initial Reading: No STEMI. Rhythm: Atrial Fibrillation.  Heart Rate: 110. Ectopy: No Ectopy. Conduction: RBBB. ST Segments: Normal ST Segments. T Waves: Normal. Axis: Normal. Clinical Impression: Atrial Fibrillation with RVR with RBBB   ?   Imaging Results        CT Abdomen Pelvis With Contrast (Final result)  Result time 09/27/22 20:43:57   Impression:  Bilateral renal cysts.  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  Circumferential wall thickening of the nearly decompressed urinary bladder.  Differential considerations include obstructive uropathy, acute cystitis, adjacent inflammatory condition, history of radiation therapy, or other etiology.  Colonic diverticulosis.      Narrative:  FINDINGS:  The liver, spleen, pancreas, and adrenal glands are unremarkable. The gallbladder contains no calcified gallstones.  Bilateral renal cysts are present.  There is no definite evidence for abdominal adenopathy or ascites.  There is aneurysmal dilatation of the right common iliac artery.  When measured in similar plane of scan, it measured 4.1 cm (series 2 axial image 112).  On the prior examination at a similar level, it measured 4 cm.  Mild atherosclerotic change is present.  There are no pelvic masses or adenopathy.  The urinary bladder is nearly decompressed.  There is circumferential urinary bladder wall thickening.  There is a defect in the prostate gland, which may be due to prior TURP procedure.  There is moderate fecal material.  There is no free fluid in the pelvis.  There is mild moderate bibasilar atelectasis or scarring.  Mild levoscoliosis is present.  There is moderate degenerative changes of the spine.  There is grade 1 anterolisthesis of L4 on L5 and retrolisthesis of L5 on S1.      US Abdomen Limited (Final result)  Result time 09/27/22 19:18:33  Impression:  No definite cholelithiasis.  Contracted gallbladder with associated gallbladder wall thickening.  Prominent pancreatic duct at 2.8 mm.  Right  midpole renal cyst.    FINDINGS:  Gallbladder: Contracted.  No calculi.  7 mm wall thickening.  No pericholecystic fluid.  No sonographic Ni's sign.  Biliary system: The common duct is not dilated, measuring 6.5 mm.  No intrahepatic ductal dilatation.  Miscellaneous: Right midpole renal cyst measuring 3.2 x 2.6 x 3.0 cm.        Medications  sodium chloride 0.9% bolus 500 mL (0 mLs Intravenous Stopped 9/27/22 1938)  aluminum-magnesium hydroxide-simethicone 200-200-20 mg/5 mL suspension 30 mL (30 mLs Oral Given 9/27/22 1803)    And  LIDOcaine HCl 2% oral solution 15 mL (15 mLs Oral Given 9/27/22 1803)  sodium chloride 0.9% bolus 500 mL (0 mLs Intravenous Stopped 9/27/22 2044)  potassium chloride SA CR tablet 10 mEq (10 mEq Oral Given 9/27/22 1945)  iohexoL (OMNIPAQUE 350) injection 70 mL (70 mLs Intravenous Given 9/27/22 2015)  diltiaZEM injection 5 mg (5 mg Intravenous Given 9/27/22 2140)     Medical Decision Making:   History:   Old Medical Records: I decided to obtain old medical records.  Initial Assessment:   This is an emergent evaluation of a 77 y.o. male who presents with intermittent non-radiating epigastric abdominal pain and decreased urine. The patient was seen and examined. The history and physical exam was obtained. Exam shows irregular heart rate at 105 bpm, mild epigastric and RUQ tenderness wih palpation, and positive Ni's sign. The remainder of the exam is unremarkable. The nursing notes and vital signs were reviewed. Secondary to symptoms and examination findings, I ordered US abdomen, BNP level, troponin I level, CBC, CMP, lipase level, urinalysis, lactic acid level, POCT Influenza A/B, POCT Covid-19, and EKG 12- lead . Will treat with aluminum-magnesium hydroxide-simethicone 200-200-20 mg/5 mL suspension 30 mL, lidocaine, HCl 2% oral solution 15 mL, and sodium chloride bolus 0.9% bolus 500 mL. Will reassess.   Independently Interpreted Test(s):   I have ordered and independently  "interpreted EKG Reading(s) - see prior notes  Clinical Tests:   Lab Tests: Ordered and Reviewed  Radiological Study: Ordered and Reviewed  Medical Tests: Ordered and Reviewed    Etiology of abd pain uncertain. Potential Afib related. Pain controlled. Pt tolerating po. HR controlled less than 110. Stable for discharge. Follow up gi doctor and cardiology. Refilled Protonix to ensure pt can take daily until follow up and with try Carafate for this week.          Review of Systems   HENT:  Negative for nasal congestion, ear pain, postnasal drip, rhinorrhea, sneezing, sore throat and voice change.    Eyes:  Negative for pain and visual disturbance.   Respiratory:  Negative for cough, chest tightness and shortness of breath.    Gastrointestinal:  Positive for reflux. Negative for fecal incontinence.   Endocrine: Negative.    Integumentary:  Negative for rash.   Allergic/Immunologic: Negative.    Hematological: Negative.    Psychiatric/Behavioral: Negative.     All other systems reviewed and are negative.      Objective:     Vitals:    09/28/22 0953   BP: 96/60   Pulse: 71   Resp: 16   Temp: 97.9 °F (36.6 °C)   TempSrc: Oral   SpO2: 99%   Weight: 61.5 kg (135 lb 9.3 oz)   Height: 5' 5" (1.651 m)     Physical Exam  Vitals and nursing note reviewed.   Constitutional:       General: He is not in acute distress.     Appearance: Normal appearance. He is well-developed, well-groomed and normal weight. He is not ill-appearing.   HENT:      Head: Normocephalic and atraumatic.      Right Ear: External ear normal.      Left Ear: External ear normal.      Nose: Nose normal.      Mouth/Throat:      Lips: Pink.      Mouth: Mucous membranes are moist.   Eyes:      General: Lids are normal. Vision grossly intact. Gaze aligned appropriately.      Conjunctiva/sclera: Conjunctivae normal.      Pupils: Pupils are equal, round, and reactive to light.   Neck:      Trachea: Phonation normal.   Cardiovascular:      Rate and Rhythm: Normal rate " and regular rhythm.      Heart sounds: Murmur heard.   Pulmonary:      Effort: Pulmonary effort is normal. No accessory muscle usage or respiratory distress.      Breath sounds: Normal breath sounds and air entry. No wheezing or rales.   Abdominal:      General: Abdomen is flat. Bowel sounds are normal. There is no distension.      Palpations: Abdomen is soft.      Tenderness: There is abdominal tenderness in the right upper quadrant. There is guarding (mild involuntary) and rebound. There is no right CVA tenderness or left CVA tenderness. Positive signs include Ni's sign.   Musculoskeletal:      Cervical back: Neck supple.      Right lower leg: No edema.      Left lower leg: No edema.   Skin:     General: Skin is warm and dry.      Findings: No rash.   Neurological:      General: No focal deficit present.      Mental Status: He is alert and oriented to person, place, and time. Mental status is at baseline.      Sensory: Sensation is intact.      Motor: Motor function is intact.      Coordination: Coordination is intact.      Gait: Gait is intact.   Psychiatric:         Attention and Perception: Attention and perception normal.         Mood and Affect: Mood and affect normal.         Speech: Speech normal.         Behavior: Behavior normal. Behavior is cooperative.         Thought Content: Thought content normal.         Cognition and Memory: Cognition and memory normal.         Judgment: Judgment normal.     Assessment and Plan:     1. Colicky epigastric pain  The diagnosis was discussed with the patient and evaluation and treatment plans outlined.  See orders for lab and imaging studies.  Adhere to simple, bland diet.  Adhere to low fat diet.  Further follow-up plans will be based on outcome of lab/imaging studies; see orders.  - NM Hepatobiliary Scan W Pharm Intervention; Future    2. Right upper quadrant abdominal tenderness with rebound tenderness  Dietary guidelines discussed.  Discussed the diagnosis with  the patient. All questions answered.  PRN antiemetic per medication orders.  - NM Hepatobiliary Scan W Pharm Intervention; Future    3. Needs flu shot  - Influenza (FLUAD) - Quadrivalent (Adjuvanted) *Preferred* (65+) (PF)           CECILY Hughes, FNP-C  Family/Internal Medicine  [facility]

## 2022-10-04 ENCOUNTER — TELEPHONE (OUTPATIENT)
Dept: FAMILY MEDICINE | Facility: CLINIC | Age: 77
End: 2022-10-04
Payer: MEDICARE

## 2022-10-04 ENCOUNTER — HOSPITAL ENCOUNTER (OUTPATIENT)
Dept: RADIOLOGY | Facility: HOSPITAL | Age: 77
Discharge: HOME | End: 2022-10-04
Attending: NURSE PRACTITIONER
Payer: MEDICARE

## 2022-10-04 DIAGNOSIS — R10.821 RIGHT UPPER QUADRANT ABDOMINAL TENDERNESS WITH REBOUND TENDERNESS: ICD-10-CM

## 2022-10-04 DIAGNOSIS — I72.3 ILIAC ARTERY ANEURYSM: Primary | ICD-10-CM

## 2022-10-04 DIAGNOSIS — R10.13 COLICKY EPIGASTRIC PAIN: ICD-10-CM

## 2022-10-04 PROCEDURE — A9537 TC99M MEBROFENIN: HCPCS

## 2022-10-04 PROCEDURE — 78227 HEPATOBIL SYST IMAGE W/DRUG: CPT | Mod: 26,,, | Performed by: RADIOLOGY

## 2022-10-04 PROCEDURE — 78227 NM HEPATOBILIARY(HIDA) WITH PHARM AND EF: ICD-10-PCS | Mod: 26,,, | Performed by: RADIOLOGY

## 2022-10-04 NOTE — TELEPHONE ENCOUNTER
Gallbladder functioning is normal, referral placed to vascular surgery for further evaluation of blood flow in abdomen

## 2022-10-10 ENCOUNTER — OFFICE VISIT (OUTPATIENT)
Dept: CARDIOLOGY | Facility: CLINIC | Age: 77
End: 2022-10-10
Payer: MEDICARE

## 2022-10-10 VITALS
BODY MASS INDEX: 24.77 KG/M2 | WEIGHT: 148.69 LBS | DIASTOLIC BLOOD PRESSURE: 85 MMHG | SYSTOLIC BLOOD PRESSURE: 133 MMHG | HEART RATE: 63 BPM | OXYGEN SATURATION: 97 % | HEIGHT: 65 IN

## 2022-10-10 DIAGNOSIS — I25.83 CORONARY ARTERY DISEASE DUE TO LIPID RICH PLAQUE: Primary | ICD-10-CM

## 2022-10-10 DIAGNOSIS — I47.10 SVT (SUPRAVENTRICULAR TACHYCARDIA): ICD-10-CM

## 2022-10-10 DIAGNOSIS — I72.3 ANEURYSM OF RIGHT COMMON ILIAC ARTERY: ICD-10-CM

## 2022-10-10 DIAGNOSIS — I25.10 CORONARY ARTERY DISEASE DUE TO LIPID RICH PLAQUE: Primary | ICD-10-CM

## 2022-10-10 DIAGNOSIS — I70.0 AORTIC ATHEROSCLEROSIS: ICD-10-CM

## 2022-10-10 DIAGNOSIS — I48.0 PAF (PAROXYSMAL ATRIAL FIBRILLATION): ICD-10-CM

## 2022-10-10 DIAGNOSIS — Z79.01 CHRONIC ANTICOAGULATION: ICD-10-CM

## 2022-10-10 DIAGNOSIS — E78.00 PURE HYPERCHOLESTEROLEMIA: ICD-10-CM

## 2022-10-10 PROCEDURE — 1159F PR MEDICATION LIST DOCUMENTED IN MEDICAL RECORD: ICD-10-PCS | Mod: CPTII,S$GLB,, | Performed by: INTERNAL MEDICINE

## 2022-10-10 PROCEDURE — 1159F MED LIST DOCD IN RCRD: CPT | Mod: CPTII,S$GLB,, | Performed by: INTERNAL MEDICINE

## 2022-10-10 PROCEDURE — 3075F SYST BP GE 130 - 139MM HG: CPT | Mod: CPTII,S$GLB,, | Performed by: INTERNAL MEDICINE

## 2022-10-10 PROCEDURE — 3075F PR MOST RECENT SYSTOLIC BLOOD PRESS GE 130-139MM HG: ICD-10-PCS | Mod: CPTII,S$GLB,, | Performed by: INTERNAL MEDICINE

## 2022-10-10 PROCEDURE — 3079F DIAST BP 80-89 MM HG: CPT | Mod: CPTII,S$GLB,, | Performed by: INTERNAL MEDICINE

## 2022-10-10 PROCEDURE — 1101F PT FALLS ASSESS-DOCD LE1/YR: CPT | Mod: CPTII,S$GLB,, | Performed by: INTERNAL MEDICINE

## 2022-10-10 PROCEDURE — 3079F PR MOST RECENT DIASTOLIC BLOOD PRESSURE 80-89 MM HG: ICD-10-PCS | Mod: CPTII,S$GLB,, | Performed by: INTERNAL MEDICINE

## 2022-10-10 PROCEDURE — 99214 OFFICE O/P EST MOD 30 MIN: CPT | Mod: S$GLB,,, | Performed by: INTERNAL MEDICINE

## 2022-10-10 PROCEDURE — 1101F PR PT FALLS ASSESS DOC 0-1 FALLS W/OUT INJ PAST YR: ICD-10-PCS | Mod: CPTII,S$GLB,, | Performed by: INTERNAL MEDICINE

## 2022-10-10 PROCEDURE — 99999 PR PBB SHADOW E&M-EST. PATIENT-LVL III: ICD-10-PCS | Mod: PBBFAC,,, | Performed by: INTERNAL MEDICINE

## 2022-10-10 PROCEDURE — 3288F FALL RISK ASSESSMENT DOCD: CPT | Mod: CPTII,S$GLB,, | Performed by: INTERNAL MEDICINE

## 2022-10-10 PROCEDURE — 3288F PR FALLS RISK ASSESSMENT DOCUMENTED: ICD-10-PCS | Mod: CPTII,S$GLB,, | Performed by: INTERNAL MEDICINE

## 2022-10-10 PROCEDURE — 99214 PR OFFICE/OUTPT VISIT, EST, LEVL IV, 30-39 MIN: ICD-10-PCS | Mod: S$GLB,,, | Performed by: INTERNAL MEDICINE

## 2022-10-10 PROCEDURE — 99999 PR PBB SHADOW E&M-EST. PATIENT-LVL III: CPT | Mod: PBBFAC,,, | Performed by: INTERNAL MEDICINE

## 2022-10-10 NOTE — PROGRESS NOTES
CARDIOLOGY CLINIC VISIT        HISTORY OF PRESENT ILLNESS:     Reed Torres who presents for continued care.  Last seen 07/08/2022.      3/22/22: Complaints of upper extremity, bilateral numbness/tingling.  No weakness.  Also complaints of gait instability.   Went to the emergency room in December 2021 with complaints of feeling off balance and lightheaded.  CT of the head showed mild cerebral atrophy and chronic small-vessel ischemic changes.Referred to neuro.    Recently went to the emergency room with complaints of bright red blood per rectum.  Patient had an EKG that showed atrial fibrillation with rapid ventricular response.  Spontaneously converted to sinus rhythm.  History of SVT.     Coronary angiography from 07/24/2020 showed mild-to-moderate nonobstructive coronary artery disease.  Holter from 03/17/2020 showed average heart rate of 77 beats per minute.  Occasional PVCs.  Rare PACs.  Nonsustained SVT.  Longest was 7 beats.  Echocardiogram at that time showed ejection fraction of 50%.      5/3/22:  Event monitor pending.  Echocardiogram showed normal function.  Overall doing well.  Has seen Neurology.    07/06/2022:  Recent admission with new onset atrial fibrillation.  Noted irregular heartbeats.  Palpitations.  Placed on amiodarone.  Converted to sinus.  Placed on Eliquis.  Echocardiogram showed normal function.  Event monitor from earlier this year was negative.  EKG today shows sinus bradycardia.    10/10/2022: Seen in ER after drinking beer with his friend. States he felt dehydrated. EKG showed atrial fibrillation. In sinus today. He is without complaints. Blood pressure, heart rate stable.        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 [place]    SHOULDER ARTHROSCOPY W/ ROTATOR CUFF REPAIR      Left shoulder       ALLERGIES AND MEDICATION:   Review of patient's allergies indicates:  No Known Allergies     Medication List            Accurate as of October 10, 2022  9:19 AM. If you have any questions, ask your nurse or doctor.                CONTINUE taking these medications      amiodarone 200 MG Tab  Commonly known as: PACERONE  Take 1 tablet (200 mg total) by mouth once daily.     apixaban 5 mg Tab  Commonly known as: ELIQUIS  Take 1 tablet (5 mg total) by mouth 2 (two) times daily.     aspirin 81 MG EC tablet  Commonly known as: ECOTRIN     atorvastatin 40 MG tablet  Commonly known as: LIPITOR  TAKE 1 TABLET BY MOUTH EVERY DAY     dicyclomine 20 mg tablet  Commonly known as: BENTYL  Take 1 tablet (20 mg total) by mouth 2 (two) times daily.     fluticasone propionate 50 mcg/actuation nasal spray  Commonly known as: FLONASE  1 spray (50 mcg total) by Each Nostril route 2 (two) times daily.     gabapentin 300 MG capsule  Commonly known as: NEURONTIN  TAKE 1-3 CAPSULES BY MOUTH EVERY EVENING     latanoprost 0.005 % ophthalmic solution     ondansetron 4 MG Tbdl  Commonly known as: ZOFRAN-ODT  Take 1 tablet (4 mg total) by mouth every 6 (six) hours as needed (Nausea).     pantoprazole 40 MG tablet  Commonly known as: PROTONIX  Take 1 tablet (40 mg total) by mouth once daily.              SOCIAL HISTORY:     Social History     Socioeconomic History    Marital status:    Tobacco Use    Smoking status: Former     Types: Cigarettes     Quit date: 2000     Years since quittin.7    Smokeless tobacco: Never    Tobacco comments:      "stopped smoking 20 yrs ago.   Substance and Sexual Activity    Alcohol use: Not Currently     Alcohol/week: 5.0 standard drinks     Types: 6 Standard drinks or equivalent per week    Drug use: No    Sexual activity: Yes     Partners: Female     Birth control/protection: None   Social History Narrative    Still doing lawn service       FAMILY HISTORY:     Family History   Problem Relation Age of Onset    COPD Mother     Diabetes Mother     Hypertension Mother     COPD Father     Heart disease Brother        REVIEW OF SYSTEMS:   Review of Systems   Constitutional:  Negative for chills, diaphoresis, fever, malaise/fatigue and weight loss.   Eyes:  Negative for blurred vision and pain.   Respiratory:  Negative for cough, sputum production, shortness of breath and wheezing.    Cardiovascular:  Negative for chest pain, palpitations, orthopnea, claudication, leg swelling and PND.   Gastrointestinal:  Negative for abdominal pain, constipation, diarrhea, heartburn, nausea and vomiting.   Musculoskeletal:  Negative for back pain, falls, joint pain, myalgias and neck pain.   Neurological:  Negative for dizziness, tingling, tremors, speech change, focal weakness, seizures, loss of consciousness, weakness and headaches.   Endo/Heme/Allergies:  Does not bruise/bleed easily.   Psychiatric/Behavioral:  Negative for depression, memory loss and substance abuse. The patient is not nervous/anxious.      PHYSICAL EXAM:     Vitals:    10/10/22 0851   BP: 133/85   Pulse: 63    Body mass index is 24.74 kg/m².  Weight: 67.4 kg (148 lb 11.2 oz)   Height: 5' 5" (165.1 cm)     Physical Exam  Vitals reviewed.   Constitutional:       General: He is not in acute distress.     Appearance: He is well-developed. He is not diaphoretic.   Neck:      Vascular: No carotid bruit or JVD.   Cardiovascular:      Rate and Rhythm: Normal rate and regular rhythm.      Pulses: Normal pulses.      Heart sounds: Murmur heard.   Systolic murmur is present with a " grade of 2/6.   Pulmonary:      Effort: Pulmonary effort is normal.      Breath sounds: Normal breath sounds.   Abdominal:      General: Bowel sounds are normal.      Palpations: Abdomen is soft.      Tenderness: There is no abdominal tenderness.   Musculoskeletal:      Right lower leg: No edema.      Left lower leg: No edema.   Neurological:      Mental Status: He is alert and oriented to person, place, and time.   Psychiatric:         Speech: Speech normal.         Behavior: Behavior normal.       DATA:     Laboratory:  CBC:  Recent Labs   Lab 09/07/22  0417 09/25/22  1202 09/27/22  1754   WBC 4.14 4.08 2.96 L   Hemoglobin 13.4 L 13.8 L 13.4 L   Hematocrit 39.2 L 40.6 40.1   Platelets 143 L 116 L 144 L       CHEMISTRIES:  Recent Labs   Lab 03/20/22  0826 07/02/22  0611 07/03/22  0358 09/07/22  0417 09/25/22  1202 09/27/22  1754   Glucose 95 113 H 88 92 99 93   Sodium 143 143 143 139 136 139   Potassium 4.1 4.2 3.9 3.8 4.2 3.2 L   BUN 15 20 18 24 H 29 H 11   Creatinine 0.7 0.8 0.7 0.9 1.0 0.8   eGFR if African American >60 >60 >60  --   --   --    eGFR if non African American >60 >60 >60  --   --   --    Calcium 8.8 8.9 8.3 L 9.2 8.3 L 8.6 L   Magnesium  --  2.2 2.1  --  2.1  --        CARDIAC BIOMARKERS:  Recent Labs   Lab 07/02/22  0913 09/07/22  0417 09/25/22  1202 09/27/22  1754 09/27/22  2044   CPK  --   --  109  --  63   Troponin I <0.006 <0.006  --  <0.006  --        COAGS:  Recent Labs   Lab 03/07/20  1237 07/20/20  1335   INR 0.9 1.0       LIPIDS/LFTS:  Recent Labs   Lab 03/03/22  1030 03/20/22  0826 05/03/22  0819 07/02/22  0611 09/07/22  0417 09/25/22  1202 09/27/22  1754   Cholesterol 150  --  126  --   --   --   --    Triglycerides 59  --  90  --   --   --   --    HDL 65  --  44  --   --   --   --    LDL Cholesterol 73.2  --  64.0  --   --   --   --    Non-HDL Cholesterol 85  --  82  --   --   --   --    AST 23   < >  --    < > 18 31 25   ALT 30   < >  --    < > 14 23 21    < > = values in this  interval not displayed.       Cardiovascular Testing:    Echocardiogram 07/02/2022:    The left ventricle is normal in size with normal systolic function.  The estimated ejection fraction is 60%.  Normal left ventricular diastolic function.  Normal right ventricular size with normal right ventricular systolic function.    Echocardiogram 3/31/22:    The left ventricle is normal in size with normal systolic function.  The estimated ejection fraction is 65%.  Normal right ventricular size with normal right ventricular systolic function.  The sinuses of Valsalva is mildly dilated, 3.8cm.  The estimated PA systolic pressure is 23 mmHg.    Cardiac event monitor 03/29/2022:    Negative event monitor with no clinical arrhythmias.    Lower extremity arterial ultrasound 02/08/2021:    1. Right lower extremity arterial ultrasound shows no hemodynamically significant stenosis.  Pressures indicate no arterial occlusive disease.  2. Left lower extremity arterial ultrasound shows approximately 50% SFA origin stenosis without hemodynamically significant stenosis.  Pressures indicate no arterial occlusive disease.    Cardiac catheterization 07/24/2020:     1.  Mild-to-moderate nonobstructive coronary artery disease.  2.  Normal left ventricular end-diastolic pressure.  3.  No aortic stenosis.     Exercise MPS 7/2/20:       Abnormal myocardial perfusion study.    There is a moderate sized, moderate intensity, mostly reversible defect with some fixed areas in the inferior wall(s).    Gated perfusion images showed an ejection fraction of 57%    The EKG portion of this study is negative for ischemia.    The patient reported no chest pain during the stress test.    There were no arrhythmias during stress.     Holter 3/17/20:     Sinus rhythm with heart rates varying between 48 and 118 bpm with an average of 77 bpm.  There were occasional PVCs totalling 278 and averaging 11.13 per hour.  There were rare PACs totalling 81 and averaging  "3.24 per hour.  There were occasional runs of non-sustained SVT and lasting for 2 to 7 beats.  Diary events ("headache") did not correspond to any arrhythmic events.     Echo 3/17/20:     Low normal left ventricular systolic function. The estimated ejection fraction is 50%.  No wall motion abnormalities.  Normal right ventricular systolic function.  The sinuses of Valsalva is mildly dilated. 3.9cm.  The estimated PA systolic pressure is 28 mmHg.    ASSESSMENT:     1. Nonobstructive coronary artery disease  2. Paroxysmal atrial fibrillation:  Chads Vasc score of 3  3. Hyperlipidemia:  LDL 64  4. Aortic atherosclerosis  5. Right common iliac artery aneurysm   6. Thrombocytopenia      PLAN:     1. Nonobstructive coronary artery disease: Risk factor modification.   2. PAF:  Continue Eliquis.  Continue amiodarone. Watch EtOH intake.  3. Hyperlipidemia:  Favorable lipid profile  4. Return to clinic 6 months.         Keenan Avila MD, MPH, FACC, [facility] [place]    "

## 2022-10-20 ENCOUNTER — PES CALL (OUTPATIENT)
Dept: ADMINISTRATIVE | Facility: OTHER | Age: 77
End: 2022-10-20
Payer: MEDICARE

## 2022-10-24 ENCOUNTER — OFFICE VISIT (OUTPATIENT)
Dept: VASCULAR SURGERY | Facility: CLINIC | Age: 77
End: 2022-10-24
Payer: MEDICARE

## 2022-10-24 VITALS
SYSTOLIC BLOOD PRESSURE: 122 MMHG | DIASTOLIC BLOOD PRESSURE: 80 MMHG | WEIGHT: 142.88 LBS | BODY MASS INDEX: 23.77 KG/M2

## 2022-10-24 DIAGNOSIS — I72.3 ILIAC ARTERY ANEURYSM: ICD-10-CM

## 2022-10-24 DIAGNOSIS — I72.3 ILIAC ARTERY ANEURYSM: Primary | ICD-10-CM

## 2022-10-24 PROCEDURE — 1101F PT FALLS ASSESS-DOCD LE1/YR: CPT | Mod: CPTII,S$GLB,, | Performed by: SURGERY

## 2022-10-24 PROCEDURE — 3288F FALL RISK ASSESSMENT DOCD: CPT | Mod: CPTII,S$GLB,, | Performed by: SURGERY

## 2022-10-24 PROCEDURE — 99214 OFFICE O/P EST MOD 30 MIN: CPT | Mod: S$GLB,,, | Performed by: SURGERY

## 2022-10-24 PROCEDURE — 99214 PR OFFICE/OUTPT VISIT, EST, LEVL IV, 30-39 MIN: ICD-10-PCS | Mod: S$GLB,,, | Performed by: SURGERY

## 2022-10-24 PROCEDURE — 3079F DIAST BP 80-89 MM HG: CPT | Mod: CPTII,S$GLB,, | Performed by: SURGERY

## 2022-10-24 PROCEDURE — 1160F PR REVIEW ALL MEDS BY PRESCRIBER/CLIN PHARMACIST DOCUMENTED: ICD-10-PCS | Mod: CPTII,S$GLB,, | Performed by: SURGERY

## 2022-10-24 PROCEDURE — 3288F PR FALLS RISK ASSESSMENT DOCUMENTED: ICD-10-PCS | Mod: CPTII,S$GLB,, | Performed by: SURGERY

## 2022-10-24 PROCEDURE — 3079F PR MOST RECENT DIASTOLIC BLOOD PRESSURE 80-89 MM HG: ICD-10-PCS | Mod: CPTII,S$GLB,, | Performed by: SURGERY

## 2022-10-24 PROCEDURE — 1101F PR PT FALLS ASSESS DOC 0-1 FALLS W/OUT INJ PAST YR: ICD-10-PCS | Mod: CPTII,S$GLB,, | Performed by: SURGERY

## 2022-10-24 PROCEDURE — 1159F MED LIST DOCD IN RCRD: CPT | Mod: CPTII,S$GLB,, | Performed by: SURGERY

## 2022-10-24 PROCEDURE — 1126F PR PAIN SEVERITY QUANTIFIED, NO PAIN PRESENT: ICD-10-PCS | Mod: CPTII,S$GLB,, | Performed by: SURGERY

## 2022-10-24 PROCEDURE — 1159F PR MEDICATION LIST DOCUMENTED IN MEDICAL RECORD: ICD-10-PCS | Mod: CPTII,S$GLB,, | Performed by: SURGERY

## 2022-10-24 PROCEDURE — 1160F RVW MEDS BY RX/DR IN RCRD: CPT | Mod: CPTII,S$GLB,, | Performed by: SURGERY

## 2022-10-24 PROCEDURE — 99999 PR PBB SHADOW E&M-EST. PATIENT-LVL III: CPT | Mod: PBBFAC,,, | Performed by: SURGERY

## 2022-10-24 PROCEDURE — 3074F PR MOST RECENT SYSTOLIC BLOOD PRESSURE < 130 MM HG: ICD-10-PCS | Mod: CPTII,S$GLB,, | Performed by: SURGERY

## 2022-10-24 PROCEDURE — 3074F SYST BP LT 130 MM HG: CPT | Mod: CPTII,S$GLB,, | Performed by: SURGERY

## 2022-10-24 PROCEDURE — 1126F AMNT PAIN NOTED NONE PRSNT: CPT | Mod: CPTII,S$GLB,, | Performed by: SURGERY

## 2022-10-24 PROCEDURE — 99999 PR PBB SHADOW E&M-EST. PATIENT-LVL III: ICD-10-PCS | Mod: PBBFAC,,, | Performed by: SURGERY

## 2022-10-24 NOTE — PROGRESS NOTES
Mario Springer MD [facility]                         10/24/2022    HPI:  Reed Torres is a 77 y.o. male with   Patient Active Problem List   Diagnosis    Hyperlipidemia    Tuberculosis exposure    BPH (benign prostatic hyperplasia)    Body mass index (BMI) of 23.0-23.9 in adult    Osteoarthritis of lumbar spine    Hyperbilirubinemia, long standing, favor GILBERT    Body water dehydration    Abnormal stress test    Anxiety disorder    SVT (supraventricular tachycardia)    Pulmonary emphysema    Coronary artery disease due to lipid rich plaque    PAF (paroxysmal atrial fibrillation)    Pure hypercholesterolemia    Aortic atherosclerosis    Aneurysm of right common iliac artery    Thrombocytopenia, unspecified    being managed by PCP and specialists who is here today for evaluation of mesenteric ischemia.  Pt with c/o LUQ and L chest discomfort intermittent without known triggers.  Patient states location is LUQ and L chest under breastbone occurring for a few months.  Associated signs and symptoms include belching.  No food fear or significant weight loss.  Quality is pressure and severity is 3/10.  Symptoms began a few mo ago.  Alleviating factors include activity.  Worsening factors include none.    no MI  no Stroke  Tobacco use: denies    1/2021:  States he has symptoms of belching and bloating.  Denies food fear and weight loss.  Symptoms are worsened when he bends forward.  States he has not seen GI in about 4 months where he was diagnosed with reflux although his dosage of his medication has been decreased.  Denies pelvic pain or abdominal pain or back pain.  No functional limitation or leg pain.    7/2021:  C/o LUQ pain, R thigh intermittent pain, no abd or pelvic pain.  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    1/2022:  No complaints.    10/2022:  no new issues.  No pelvic or abd pain.    Past Medical History:   Diagnosis Date    Coronary artery  disease due to lipid rich plaque 5/3/2022    Hyperlipidemia     Pulmonary emphysema 2021    Tuberculosis exposure     Post treatment     Past Surgical History:   Procedure Laterality Date    APPENDECTOMY      LEFT HEART CATHETERIZATION Left 2020    Procedure: Left heart cath;  Surgeon: Keenan Avila MD;  Location: [facility] CATH LAB;  Service: Cardiology;  Laterality: Left;  RN PRE OP 2020.---COVID NEGATIVE ON-- 2020. [place]    SHOULDER ARTHROSCOPY W/ ROTATOR CUFF REPAIR      Left shoulder     Family History   Problem Relation Age of Onset    COPD Mother     Diabetes Mother     Hypertension Mother     COPD Father     Heart disease Brother      Social History     Socioeconomic History    Marital status:    Tobacco Use    Smoking status: Former     Types: Cigarettes     Quit date:      Years since quittin.8    Smokeless tobacco: Never    Tobacco comments:     stopped smoking 20 yrs ago.   Substance and Sexual Activity    Alcohol use: Not Currently     Alcohol/week: 5.0 standard drinks     Types: 6 Standard drinks or equivalent per week    Drug use: No    Sexual activity: Yes     Partners: Female     Birth control/protection: None   Social History Narrative    Still doing lawn service       Current Outpatient Medications:     amiodarone (PACERONE) 200 MG Tab, Take 1 tablet (200 mg total) by mouth once daily., Disp: 90 tablet, Rfl: 3    apixaban (ELIQUIS) 5 mg Tab, Take 1 tablet (5 mg total) by mouth 2 (two) times daily., Disp: 60 tablet, Rfl: 11    aspirin (ECOTRIN) 81 MG EC tablet, Take 81 mg by mouth once daily., Disp: , Rfl:     atorvastatin (LIPITOR) 40 MG tablet, TAKE 1 TABLET BY MOUTH EVERY DAY, Disp: 90 tablet, Rfl: 3    dicyclomine (BENTYL) 20 mg tablet, Take 1 tablet (20 mg total) by mouth 2 (two) times daily., Disp: 20 tablet, Rfl: 0    fluticasone propionate (FLONASE) 50 mcg/actuation nasal spray, 1 spray (50 mcg total) by Each Nostril route 2 (two) times daily., Disp: 18.2 mL,  Rfl: 3    gabapentin (NEURONTIN) 300 MG capsule, TAKE 1-3 CAPSULES BY MOUTH EVERY EVENING, Disp: 90 capsule, Rfl: 11    latanoprost 0.005 % ophthalmic solution, , Disp: , Rfl:     ondansetron (ZOFRAN-ODT) 4 MG TbDL, Take 1 tablet (4 mg total) by mouth every 6 (six) hours as needed (Nausea)., Disp: 20 tablet, Rfl: 0    pantoprazole (PROTONIX) 40 MG tablet, Take 1 tablet (40 mg total) by mouth once daily., Disp: 30 tablet, Rfl: 1    Current Facility-Administered Medications:     sodium chloride 0.9% flush 3 mL, 3 mL, Intravenous, Q8H PRN, Keenan Avila MD    REVIEW OF SYSTEMS:  General: No fevers or chills; ENT: No sore throat; Allergy and Immunology: no persistent infections; Hematological and Lymphatic: No history of bleeding or easy bruising; Endocrine: negative; Respiratory: no cough, shortness of breath, or wheezing; Cardiovascular: no chest pain or dyspnea on exertion; Gastrointestinal: no abdominal pain/back, change in bowel habits, or bloody stools; Genito-Urinary: no dysuria, trouble voiding, or hematuria; Musculoskeletal: negative; Neurological: no TIA or stroke symptoms; Psychiatric: no nervousness, anxiety or depression.    PHYSICAL EXAM:                General appearance:  Alert, well-appearing, and in no distress.  Oriented to person, place, and time                    Neurological: Normal speech, no focal findings noted; CN II - XII grossly intact. RLE with sensation to light touch, LLE with sensation to light touch.            Musculoskeletal: Digits/nail without cyanosis/clubbing.  Strength 5/5 BLE.                    Neck: Supple, no significant adenopathy, no carotid bruit can be auscultated                  Chest:  Clear to auscultation, no wheezes, rales or rhonchi, symmetric air entry. No use of accessory muscles               Cardiac: Normal rate and regular rhythm, S1 and S2 normal            Abdomen: Soft, min epigastric tenderness, nondistended, no masses or organomegaly, no hernia     No  rebound tenderness noted; bowel sounds normal     Pulsatile aortic mass is non palpable.     No groin adenopathy      Extremities:   2+ R femoral pulse, 2+ L femoral pulse     2+ R popliteal pulse, 2+ L popliteal pulse     2+ R PT pulse, 2+ L PT pulse     2+ R DP pulse, 2+ L DP pulse     no RLE edema, no LLE edema    Skin: RLE without tissue loss; LLE without tissue loss    LAB RESULTS:  No results found for: CBC  Lab Results   Component Value Date    LABPROT 10.7 07/20/2020    INR 1.0 07/20/2020     Lab Results   Component Value Date     09/27/2022    K 3.2 (L) 09/27/2022     09/27/2022    CO2 23 09/27/2022    GLU 93 09/27/2022    BUN 11 09/27/2022    CREATININE 0.8 09/27/2022    CALCIUM 8.6 (L) 09/27/2022    ANIONGAP 8 09/27/2022    EGFRNONAA >60 07/03/2022     Lab Results   Component Value Date    WBC 2.96 (L) 09/27/2022    RBC 4.48 (L) 09/27/2022    HGB 13.4 (L) 09/27/2022    HCT 40.1 09/27/2022    MCV 90 09/27/2022    MCH 29.9 09/27/2022    MCHC 33.4 09/27/2022    RDW 13.8 09/27/2022     (L) 09/27/2022    MPV 11.3 09/27/2022    GRAN 1.7 (L) 09/27/2022    GRAN 57.3 09/27/2022    LYMPH 0.9 (L) 09/27/2022    LYMPH 30.7 09/27/2022    MONO 0.3 09/27/2022    MONO 11.1 09/27/2022    EOS 0.0 09/27/2022    BASO 0.01 09/27/2022    EOSINOPHIL 0.3 09/27/2022    BASOPHIL 0.3 09/27/2022    DIFFMETHOD Automated 09/27/2022     .  Lab Results   Component Value Date    HGBA1C 5.1 03/03/2022       IMAGING:  All pertinent imaging has been reviewed and interpreted independently.    -BLE arterial US negative for popliteal aneurysm    IMP/PLAN:  77 y.o. male with   Patient Active Problem List   Diagnosis    Hyperlipidemia    Tuberculosis exposure    BPH (benign prostatic hyperplasia)    Body mass index (BMI) of 23.0-23.9 in adult    Osteoarthritis of lumbar spine    Hyperbilirubinemia, long standing, favor GILBERT    Body water dehydration    Abnormal stress test    Anxiety disorder    SVT (supraventricular  tachycardia)    Pulmonary emphysema    Coronary artery disease due to lipid rich plaque    PAF (paroxysmal atrial fibrillation)    Pure hypercholesterolemia    Aortic atherosclerosis    Aneurysm of right common iliac artery    Thrombocytopenia, unspecified    being managed by PCP and specialists who is here today for evaluation of R ROJELIO aneurysm.    - Mesenteric US without HD significant stenosis.  Recommend continuing evaluation and management for etiology of left upper quadrant by PCP and GI.    -Repeat evaluation by Cardiology due to moderate reversible defect on stress test in light of possible future aneurysm repair - no further tests or interventions recommended  -right common iliac artery aneurysm 3.9 cm 2021 (3.6 2020), asymptomatic- rec CTA A/P and likely RONAK  -recommend cont daily aspirin, continue blood pressure control heart healthy lifestyle    I spent 12 minutes evaluating this patient and greater than 50% of the time was spent counseling, coordinator care and discussing the plan of care.  All questions were answered and patient stated understanding with agreement with the above treatment plan.    Mario Springer MD [facility]  Vascular and Endovascular Surgery

## 2022-11-02 DIAGNOSIS — I72.3 ILIAC ARTERY ANEURYSM: Primary | ICD-10-CM

## 2022-11-07 ENCOUNTER — HOSPITAL ENCOUNTER (OUTPATIENT)
Dept: RADIOLOGY | Facility: HOSPITAL | Age: 77
Discharge: HOME | End: 2022-11-07
Attending: SURGERY
Payer: MEDICARE

## 2022-11-07 DIAGNOSIS — I72.3 ILIAC ARTERY ANEURYSM: ICD-10-CM

## 2022-11-07 PROCEDURE — 74174 CTA ABD&PLVS W/CONTRAST: CPT | Mod: TC

## 2022-11-07 PROCEDURE — 74174 CTA ABDOMEN AND PELVIS: ICD-10-PCS | Mod: 26,,, | Performed by: STUDENT IN AN ORGANIZED HEALTH CARE EDUCATION/TRAINING PROGRAM

## 2022-11-07 PROCEDURE — 25500020 PHARM REV CODE 255: Performed by: SURGERY

## 2022-11-07 PROCEDURE — 74174 CTA ABD&PLVS W/CONTRAST: CPT | Mod: 26,,, | Performed by: STUDENT IN AN ORGANIZED HEALTH CARE EDUCATION/TRAINING PROGRAM

## 2022-11-07 RX ADMIN — IOHEXOL 100 ML: 350 INJECTION, SOLUTION INTRAVENOUS at 09:11

## 2022-12-14 ENCOUNTER — TELEPHONE (OUTPATIENT)
Dept: FAMILY MEDICINE | Facility: CLINIC | Age: 77
End: 2022-12-14

## 2022-12-14 DIAGNOSIS — I48.0 PAF (PAROXYSMAL ATRIAL FIBRILLATION): ICD-10-CM

## 2022-12-14 NOTE — TELEPHONE ENCOUNTER
Call placed to patient, and informed of  provider response. Patient says that he paid out of pocket, $129 for a 2 month supply. That he didn't want to do without it.

## 2022-12-14 NOTE — TELEPHONE ENCOUNTER
Let him know in January it should get cheaper again!  There is no good alternative except coumadin/warfarin  JR

## 2022-12-14 NOTE — TELEPHONE ENCOUNTER
----- Message from Lexi Conte sent at 12/14/2022  8:13 AM CST -----  Regarding: self .[phone]  .Type: Patient Call Back    Who called: self    What is the request in detail: PT stated the apixaban (ELIQUIS) 5 mg Tab cost too much and would like to get something cheaper     Can the clinic reply by [facility]? Call back    Would the patient rather a call back or a response via [facility]? Call back  Best call back number: .[phone]

## 2022-12-23 LAB
LEFT EYE DM RETINOPATHY: NEGATIVE
RIGHT EYE DM RETINOPATHY: NEGATIVE

## 2022-12-30 ENCOUNTER — TELEPHONE (OUTPATIENT)
Dept: ADMINISTRATIVE | Facility: CLINIC | Age: 77
End: 2022-12-30
Payer: MEDICARE

## 2022-12-30 NOTE — TELEPHONE ENCOUNTER
Called pt, informed pt I was calling to remind pt of his in office EAWV on 1/3/23; clinic location provided to patient; pt confirmed appointment

## 2023-01-03 ENCOUNTER — OFFICE VISIT (OUTPATIENT)
Dept: FAMILY MEDICINE | Facility: CLINIC | Age: 78
End: 2023-01-03
Payer: MEDICARE

## 2023-01-03 ENCOUNTER — PATIENT OUTREACH (OUTPATIENT)
Dept: ADMINISTRATIVE | Facility: HOSPITAL | Age: 78
End: 2023-01-03
Payer: MEDICARE

## 2023-01-03 VITALS
TEMPERATURE: 98 F | DIASTOLIC BLOOD PRESSURE: 80 MMHG | WEIGHT: 144.63 LBS | SYSTOLIC BLOOD PRESSURE: 118 MMHG | HEIGHT: 65 IN | OXYGEN SATURATION: 96 % | BODY MASS INDEX: 24.1 KG/M2 | HEART RATE: 74 BPM | RESPIRATION RATE: 18 BRPM

## 2023-01-03 DIAGNOSIS — I70.0 AORTIC ATHEROSCLEROSIS: ICD-10-CM

## 2023-01-03 DIAGNOSIS — H40.1134 PRIMARY OPEN ANGLE GLAUCOMA (POAG) OF BOTH EYES, INDETERMINATE STAGE: ICD-10-CM

## 2023-01-03 DIAGNOSIS — D69.6 THROMBOCYTOPENIA, UNSPECIFIED: ICD-10-CM

## 2023-01-03 DIAGNOSIS — I48.0 PAF (PAROXYSMAL ATRIAL FIBRILLATION): ICD-10-CM

## 2023-01-03 DIAGNOSIS — K21.9 GASTROESOPHAGEAL REFLUX DISEASE WITHOUT ESOPHAGITIS: ICD-10-CM

## 2023-01-03 DIAGNOSIS — I25.10 CORONARY ARTERY DISEASE DUE TO LIPID RICH PLAQUE: ICD-10-CM

## 2023-01-03 DIAGNOSIS — H91.93 DECREASED HEARING OF BOTH EARS: ICD-10-CM

## 2023-01-03 DIAGNOSIS — I72.3 ANEURYSM OF RIGHT COMMON ILIAC ARTERY: ICD-10-CM

## 2023-01-03 DIAGNOSIS — Z00.00 ENCOUNTER FOR PREVENTIVE HEALTH EXAMINATION: Primary | ICD-10-CM

## 2023-01-03 DIAGNOSIS — I25.83 CORONARY ARTERY DISEASE DUE TO LIPID RICH PLAQUE: ICD-10-CM

## 2023-01-03 DIAGNOSIS — E78.00 PURE HYPERCHOLESTEROLEMIA: Chronic | ICD-10-CM

## 2023-01-03 DIAGNOSIS — I47.10 SVT (SUPRAVENTRICULAR TACHYCARDIA): ICD-10-CM

## 2023-01-03 DIAGNOSIS — G31.9 DEGENERATIVE DISEASE OF NERVOUS SYSTEM, UNSPECIFIED: ICD-10-CM

## 2023-01-03 DIAGNOSIS — J43.9 PULMONARY EMPHYSEMA, UNSPECIFIED EMPHYSEMA TYPE: ICD-10-CM

## 2023-01-03 PROBLEM — H91.90 HEARING DECREASED: Status: ACTIVE | Noted: 2023-01-03

## 2023-01-03 PROBLEM — E86.0 BODY WATER DEHYDRATION: Status: RESOLVED | Noted: 2019-09-18 | Resolved: 2023-01-03

## 2023-01-03 PROBLEM — L40.0 PSORIASIS VULGARIS: Status: ACTIVE | Noted: 2023-01-03

## 2023-01-03 PROCEDURE — 3074F SYST BP LT 130 MM HG: CPT | Mod: CPTII,S$GLB,, | Performed by: NURSE PRACTITIONER

## 2023-01-03 PROCEDURE — 1101F PR PT FALLS ASSESS DOC 0-1 FALLS W/OUT INJ PAST YR: ICD-10-PCS | Mod: CPTII,S$GLB,, | Performed by: NURSE PRACTITIONER

## 2023-01-03 PROCEDURE — 1101F PT FALLS ASSESS-DOCD LE1/YR: CPT | Mod: CPTII,S$GLB,, | Performed by: NURSE PRACTITIONER

## 2023-01-03 PROCEDURE — 3288F PR FALLS RISK ASSESSMENT DOCUMENTED: ICD-10-PCS | Mod: CPTII,S$GLB,, | Performed by: NURSE PRACTITIONER

## 2023-01-03 PROCEDURE — 3079F PR MOST RECENT DIASTOLIC BLOOD PRESSURE 80-89 MM HG: ICD-10-PCS | Mod: CPTII,S$GLB,, | Performed by: NURSE PRACTITIONER

## 2023-01-03 PROCEDURE — 99999 PR PBB SHADOW E&M-EST. PATIENT-LVL V: ICD-10-PCS | Mod: PBBFAC,,, | Performed by: NURSE PRACTITIONER

## 2023-01-03 PROCEDURE — 1159F PR MEDICATION LIST DOCUMENTED IN MEDICAL RECORD: ICD-10-PCS | Mod: CPTII,S$GLB,, | Performed by: NURSE PRACTITIONER

## 2023-01-03 PROCEDURE — 1159F MED LIST DOCD IN RCRD: CPT | Mod: CPTII,S$GLB,, | Performed by: NURSE PRACTITIONER

## 2023-01-03 PROCEDURE — 99499 RISK ADDL DX/OHS AUDIT: ICD-10-PCS | Mod: S$GLB,,, | Performed by: NURSE PRACTITIONER

## 2023-01-03 PROCEDURE — 3074F PR MOST RECENT SYSTOLIC BLOOD PRESSURE < 130 MM HG: ICD-10-PCS | Mod: CPTII,S$GLB,, | Performed by: NURSE PRACTITIONER

## 2023-01-03 PROCEDURE — 3079F DIAST BP 80-89 MM HG: CPT | Mod: CPTII,S$GLB,, | Performed by: NURSE PRACTITIONER

## 2023-01-03 PROCEDURE — 1126F PR PAIN SEVERITY QUANTIFIED, NO PAIN PRESENT: ICD-10-PCS | Mod: CPTII,S$GLB,, | Performed by: NURSE PRACTITIONER

## 2023-01-03 PROCEDURE — 3288F FALL RISK ASSESSMENT DOCD: CPT | Mod: CPTII,S$GLB,, | Performed by: NURSE PRACTITIONER

## 2023-01-03 PROCEDURE — 99999 PR PBB SHADOW E&M-EST. PATIENT-LVL V: CPT | Mod: PBBFAC,,, | Performed by: NURSE PRACTITIONER

## 2023-01-03 PROCEDURE — 99499 UNLISTED E&M SERVICE: CPT | Mod: S$GLB,,, | Performed by: NURSE PRACTITIONER

## 2023-01-03 PROCEDURE — G0439 PPPS, SUBSEQ VISIT: HCPCS | Mod: S$GLB,,, | Performed by: NURSE PRACTITIONER

## 2023-01-03 PROCEDURE — 1126F AMNT PAIN NOTED NONE PRSNT: CPT | Mod: CPTII,S$GLB,, | Performed by: NURSE PRACTITIONER

## 2023-01-03 PROCEDURE — G0439 PR MEDICARE ANNUAL WELLNESS SUBSEQUENT VISIT: ICD-10-PCS | Mod: S$GLB,,, | Performed by: NURSE PRACTITIONER

## 2023-01-03 RX ORDER — NEOMYCIN SULFATE, POLYMYXIN B SULFATE AND DEXAMETHASONE 3.5; 10000; 1 MG/ML; [USP'U]/ML; MG/ML
SUSPENSION/ DROPS OPHTHALMIC
COMMUNITY
Start: 2022-12-23 | End: 2023-01-03

## 2023-01-03 NOTE — PROGRESS NOTES
"  Reed Torres presented for a  Medicare AWV and comprehensive Health Risk Assessment today. The following components were reviewed and updated:    Medical history  Family History  Social history  Allergies and Current Medications  Health Risk Assessment  Health Maintenance  Care Team         ** See Completed Assessments for Annual Wellness Visit within the encounter summary.**         The following assessments were completed:  Living Situation  CAGE  Depression Screening  Timed Get Up and Go  Whisper Test  Cognitive Function Screening  Nutrition Screening  ADL Screening  PAQ Screening        Vitals:    01/03/23 0811   BP: 118/80   BP Location: Left arm   Patient Position: Sitting   BP Method: Small (Manual)   Pulse: 74   Resp: 18   Temp: 98.2 °F (36.8 °C)   TempSrc: Oral   SpO2: 96%   Weight: 65.6 kg (144 lb 10 oz)   Height: 5' 5" (1.651 m)     Body mass index is 24.07 kg/m².  Physical Exam  Vitals and nursing note reviewed.   Constitutional:       General: He is not in acute distress.     Appearance: Normal appearance. He is well-developed, well-groomed and normal weight. He is not ill-appearing.   HENT:      Head: Normocephalic and atraumatic.      Right Ear: External ear normal.      Left Ear: External ear normal.      Nose: Nose normal.      Mouth/Throat:      Lips: Pink.      Mouth: Mucous membranes are moist.   Eyes:      General: Lids are normal. Vision grossly intact. Gaze aligned appropriately. No scleral icterus.        Right eye: No discharge.         Left eye: No discharge.      Conjunctiva/sclera: Conjunctivae normal.   Neck:      Trachea: Phonation normal.   Pulmonary:      Effort: Pulmonary effort is normal. No accessory muscle usage or respiratory distress.   Musculoskeletal:      Cervical back: Neck supple.   Skin:     General: Skin is warm and dry.      Findings: No rash.   Neurological:      General: No focal deficit present.      Mental Status: He is alert and oriented to person, place, and " time. Mental status is at baseline.      Motor: No abnormal muscle tone.      Gait: Gait normal.   Psychiatric:         Attention and Perception: Attention and perception normal.         Mood and Affect: Mood and affect normal.         Speech: Speech normal.         Behavior: Behavior normal. Behavior is cooperative.         Thought Content: Thought content normal.         Cognition and Memory: Cognition and memory normal.         Judgment: Judgment normal.       Diagnoses and health risks identified today and associated recommendations/orders:    1. Encounter for preventive health examination  Health maintenance reviewed and up to date, will f/u with PCP for routine preventative care  Review for Opioid Screening: Pt does not have Rx for Opioids   Review for Substance Use Disorders: Patient does not use substance     2. PAF (paroxysmal atrial fibrillation)  Rate controlled on amiodarone and anticoagulated on eliquis, followed by cards     3. SVT (supraventricular tachycardia)  Rate controlled on amiodarone and anticoagulated on eliquis, followed by cards     4. Aneurysm of right common iliac artery  BP controlled, closely followed by vascular surgeon    5. Aortic atherosclerosis  On statin, BP controlled and anticoagulated, followed by cards     6. Thrombocytopenia, unspecified  Without acute hemorrhage and on eliquis     7. Pulmonary emphysema, unspecified emphysema type  Without acute exacerbation     8. Degenerative disease of nervous system, unspecified  Stable without acute claudication, on gabapentin    9. Pure hypercholesterolemia  Controlled on statin     10. Coronary artery disease due to lipid rich plaque  On statin, BP controlled, asymptomatic, followed by cards     11. Decreased hearing of both ears  - Ambulatory referral/consult to Audiology; Future  - Ambulatory referral/consult to ENT; Future    12. Gastroesophageal reflux disease without esophagitis  Controlled on PPI daily    13. Primary open angle  glaucoma (POAG) of both eyes, indeterminate stage  Without acute vision change, followed by ophthalmology       Provided Reed with a 5-10 year written screening schedule and personal prevention plan. Recommendations were developed using the USPSTF age appropriate recommendations. Education, counseling, and referrals were provided as needed. After Visit Summary printed and given to patient which includes a list of additional screenings\tests needed.    Follow up in about 1 year (around 1/3/2024).    Ely Armas NP  I offered to discuss advanced care planning, including how to pick a person who would make decisions for you if you were unable to make them for yourself, called a health care power of , and what kind of decisions you might make such as use of life sustaining treatments such as ventilators and tube feeding when faced with a life limiting illness recorded on a living will that they will need to know. (How you want to be cared for as you near the end of your natural life)     X Patient is interested in learning more about how to make advanced directives.  I provided them paperwork and offered to discuss this with them.

## 2023-01-03 NOTE — PROGRESS NOTES
Health Maintenance Due   Topic     Hepatitis C Screening  Consult pcp    Shingles Vaccine (1 of 2)  hx chicken pox. Notified pt can get vaccine at pharmacy    COVID-19 Vaccine (4 - Booster for Moderna series)

## 2023-01-03 NOTE — LETTER
AUTHORIZATION FOR RELEASE OF   CONFIDENTIAL INFORMATION    Dear Dr. Lizarraga,    We are seeing Reed Torres, date of birth 1945, in the clinic at [facility]. Pj Gillette MD is the patient's PCP. Reed Torres has an outstanding lab/procedure at the time we reviewed his chart. In order to help keep his health information updated, he has authorized us to request the following medical record(s):        (  )  MAMMOGRAM                                      (  )  COLONOSCOPY      (  )  PAP SMEAR                                          (  )  OUTSIDE LAB RESULTS     (  )  DEXA SCAN                                          ( X)  EYE EXAM            (  )  FOOT EXAM                                          (  )  ENTIRE RECORD     (  )  OUTSIDE IMMUNIZATIONS                 (  )  _______________         Please fax records to [facility], Jake J Rodi, MD, [phone]     If you have any questions, please contact us at [phone].           Patient Name: Reed Torres  : 1945  Patient Phone #: [phone]

## 2023-01-03 NOTE — PATIENT INSTRUCTIONS
Counseling and Referral of Other Preventative  (Italic type indicates deductible and co-insurance are waived)    Patient Name: Reed Torres  Today's Date: 1/3/2023    Health Maintenance       Date Due Completion Date    Hepatitis C Screening Never done ---    Shingles Vaccine (1 of 2) Never done ---    COVID-19 Vaccine (4 - Booster for Moderna series) 01/04/2022 11/9/2021    TETANUS VACCINE 07/05/2023 7/5/2013    Eye Exam 09/09/2023 9/9/2022    Lipid Panel 05/03/2027 5/3/2022        No orders of the defined types were placed in this encounter.      The following information is provided to all patients.  This information is to help you find resources for any of the problems found today that may be affecting your health:                Living healthy guide: www.[facility].[place].gov      Understanding Diabetes: www.diabetes.org      Eating healthy: www.cdc.gov/healthyweight      CDC home safety checklist: www.cdc.gov/steadi/patient.html      Agency on Aging: www.goea.[place].gov      Alcoholics anonymous (AA): www.aa.org      Physical Activity: www.leanna.nih.gov/bc5hyss      Tobacco use: www.quitwithusla.org

## 2023-01-06 LAB
LEFT EYE DM RETINOPATHY: NEGATIVE
RIGHT EYE DM RETINOPATHY: NEGATIVE

## 2023-01-09 DIAGNOSIS — I72.3 ILIAC ARTERY ANEURYSM: Primary | ICD-10-CM

## 2023-01-10 NOTE — PRE ADMISSION SCREENING
Anesthesia Assessment: Preoperative EQUATION    Planned Procedure: Procedure(s) (LRB):  TURP W LASER (N/A)  Requested Anesthesia Type:General  Surgeon: Charlie Walters Jr., MD  Service: Urology  Known or anticipated Date of Surgery:5/19/2017    Surgeon notes: reviewed    Triage considerations:     The patient has no apparent active cardiac condition (No unstable coronary Syndrome such as severe unstable angina or recent [<1 month] myocardial infarction, decompensated CHF, severe valvular   disease or significant arrhythmia)    Previous anesthesia records:Not available    Last PCP note: 3-6 months ago , outside [facility]   Other important co-morbidities:      Tests already available:  No recent tests.            Instructions given. (See in Nurse's note)    Optimization:  Anesthesia Preop Clinic AssessmentNOT  Indicated  Consult      Plan:    Testing:  Hematology Profile, BMP and EKG      Navigation: Tests Scheduled.                         Results will be tracked by Preop Clinic.                         No anesthesia preop clinic visit, or consult required.                   
Patient with one or more new problems requiring additional work-up/treatment.

## 2023-01-24 DIAGNOSIS — R79.1 ABNORMAL COAGULATION PROFILE: ICD-10-CM

## 2023-01-24 DIAGNOSIS — I72.3 ILIAC ARTERY ANEURYSM: Primary | ICD-10-CM

## 2023-01-30 ENCOUNTER — ANESTHESIA EVENT (OUTPATIENT)
Dept: SURGERY | Facility: HOSPITAL | Age: 78
DRG: 272 | End: 2023-01-30
Payer: MEDICARE

## 2023-01-30 NOTE — ANESTHESIA PREPROCEDURE EVALUATION
01/30/2023  [facility]  Anesthesia Pre-Operative Evaluation         Patient Name: Reed Torres  YOB: 1945  MRN: [id number]    SUBJECTIVE:     Pre-operative evaluation for Procedure(s) (LRB):  REPAIR-ANEURYSM-ILIAC BRANCH ENDOPROSTHESIS-ENDOVASCULAR (Bilateral)     01/30/2023    Reed Torres is a 77 y.o. male w/ a significant PMHx of pAfib (on amio and Eliquis), COPD, HLD, CAD, GERD, and right common iliac artery aneurysm.     Patient now presents for the above procedure(s).    TTE:   The left ventricle is normal in size with normal systolic function.   The estimated ejection fraction is 60%.   Normal left ventricular diastolic function.   Normal right ventricular size with normal right ventricular systolic function    Prev airway: None documented.      Patient Active Problem List   Diagnosis    Hyperlipidemia    Tuberculosis exposure    BPH (benign prostatic hyperplasia)    Osteoarthritis of lumbar spine    Hyperbilirubinemia, long standing, favor GILBERT    Abnormal stress test    Anxiety disorder    SVT (supraventricular tachycardia)    Pulmonary emphysema    Coronary artery disease due to lipid rich plaque    PAF (paroxysmal atrial fibrillation)    Aortic atherosclerosis    Aneurysm of right common iliac artery    Thrombocytopenia, unspecified    Hearing decreased    Degenerative disease of nervous system, unspecified    Gastroesophageal reflux disease without esophagitis    Primary open angle glaucoma (POAG) of both eyes, indeterminate stage    Psoriasis vulgaris       Review of patient's allergies indicates:  No Known Allergies    Current Inpatient Medications:      No current facility-administered medications on file prior to encounter.     Current Outpatient Medications on File Prior to Encounter   Medication Sig Dispense Refill    amiodarone  (PACERONE) 200 MG Tab Take 1 tablet (200 mg total) by mouth once daily. (Patient taking differently: Take 200 mg by mouth every morning.) 90 tablet 3    apixaban (ELIQUIS) 5 mg Tab Take 1 tablet (5 mg total) by mouth 2 (two) times daily. 60 tablet 11    aspirin (ECOTRIN) 81 MG EC tablet Take 81 mg by mouth every morning.      atorvastatin (LIPITOR) 40 MG tablet TAKE 1 TABLET BY MOUTH EVERY DAY (Patient taking differently: Take by mouth every morning.) 90 tablet 3    gabapentin (NEURONTIN) 300 MG capsule TAKE 1-3 CAPSULES BY MOUTH EVERY EVENING 90 capsule 11    latanoprost 0.005 % ophthalmic solution Place into both eyes every evening.      pantoprazole (PROTONIX) 40 MG tablet Take 1 tablet (40 mg total) by mouth once daily. (Patient taking differently: Take 40 mg by mouth every morning.) 30 tablet 1    vit A/vit C/vit E/zinc/copper (OCUVITE PRESERVISION ORAL) Take by mouth 2 (two) times a day.      [DISCONTINUED] metoprolol succinate (TOPROL-XL) 25 MG 24 hr tablet Take 1 tablet (25 mg total) by mouth once daily. 90 tablet 3       Past Surgical History:   Procedure Laterality Date    APPENDECTOMY      LEFT HEART CATHETERIZATION Left 7/24/2020    Procedure: Left heart cath;  Surgeon: Keenan Avila MD;  Location: [facility] CATH LAB;  Service: Cardiology;  Laterality: Left;  RN PRE OP 7-.---COVID NEGATIVE ON-- 7-. [place]    SHOULDER ARTHROSCOPY W/ ROTATOR CUFF REPAIR      Left shoulder       OBJECTIVE:     Vital Signs Range (Last 24H):         Significant Labs:  Lab Results   Component Value Date    WBC 2.96 (L) 09/27/2022    HGB 13.4 (L) 09/27/2022    HCT 40.1 09/27/2022     (L) 09/27/2022    CHOL 126 05/03/2022    TRIG 90 05/03/2022    HDL 44 05/03/2022    ALT 21 09/27/2022    AST 25 09/27/2022     09/27/2022    K 3.2 (L) 09/27/2022     09/27/2022    CREATININE 0.8 11/07/2022    BUN 11 09/27/2022    CO2 23 09/27/2022    TSH 0.366 (L) 07/02/2022    PSA 1.70 01/15/2013    INR 1.0  07/20/2020    HGBA1C 5.1 03/03/2022       Diagnostic Studies: No relevant studies.    EKG:   Results for orders placed or performed during the hospital encounter of 09/27/22   EKG 12-lead    Collection Time: 09/27/22  5:09 PM    Narrative    Test Reason : R53.1,    Vent. Rate : 110 BPM     Atrial Rate : 115 BPM     P-R Int : 000 ms          QRS Dur : 100 ms      QT Int : 324 ms       P-R-T Axes : 000 -14 048 degrees     QTc Int : 438 ms    Atrial fibrillation with rapid ventricular response  Incomplete right bundle branch block  Abnormal ECG  When compared with ECG of 25-SEP-2022 11:54,  Significant changes have occurred  Confirmed by Keenan Avila MD ([id number]) on 9/28/2022 1:45:46 PM    Referred By: HILARIO   SELF           Confirmed By:Keenan Avila MD       ASSESSMENT/PLAN:           Pre-op Assessment    I have reviewed the Patient Summary Reports.     I have reviewed the Nursing Notes.    I have reviewed the Medications.     Review of Systems  Anesthesia Hx:  No problems with previous Anesthesia  History of prior surgery of interest to airway management or planning: Denies Family Hx of Anesthesia complications.   Denies Personal Hx of Anesthesia complications.   Social:  Social Alcohol Use, Former Smoker  Tobacco Use:  for 30 years, 1 pack per day, quit smoking >10 years ago Alcohol Use:    Hematology/Oncology:     Oncology Normal     EENT/Dental:EENT/Dental Normal   Cardiovascular:   Exercise tolerance: good CAD  Dysrhythmias atrial fibrillation ECG has been reviewed.    Pulmonary:   COPD Denies Shortness of breath.    Hepatic/GI:   GERD, well controlled    Musculoskeletal:   Arthritis     Neurological:  Neurology Normal    Endocrine:  Endocrine Normal           Anesthesia Plan  Type of Anesthesia, risks & benefits discussed:    Anesthesia Type: Gen ETT  Intra-op Monitoring Plan: Standard ASA Monitors and Art Line  Post Op Pain Control Plan: multimodal analgesia and IV/PO Opioids PRN  Induction:  IV  Airway  Plan: Direct, Post-Induction  ASA Score: 3    Ready For Surgery From Anesthesia Perspective.     .

## 2023-01-31 ENCOUNTER — HOSPITAL ENCOUNTER (INPATIENT)
Facility: HOSPITAL | Age: 78
LOS: 1 days | Discharge: HOME | DRG: 272 | End: 2023-02-01
Attending: SURGERY | Admitting: SURGERY
Payer: MEDICARE

## 2023-01-31 ENCOUNTER — PATIENT OUTREACH (OUTPATIENT)
Dept: ADMINISTRATIVE | Facility: HOSPITAL | Age: 78
End: 2023-01-31
Payer: MEDICARE

## 2023-01-31 ENCOUNTER — ANESTHESIA (OUTPATIENT)
Dept: SURGERY | Facility: HOSPITAL | Age: 78
DRG: 272 | End: 2023-01-31
Payer: MEDICARE

## 2023-01-31 DIAGNOSIS — I71.40 AAA (ABDOMINAL AORTIC ANEURYSM): ICD-10-CM

## 2023-01-31 DIAGNOSIS — I72.3 ANEURYSM OF RIGHT COMMON ILIAC ARTERY: Primary | ICD-10-CM

## 2023-01-31 LAB
ABO + RH BLD: NORMAL
BLD GP AB SCN CELLS X3 SERPL QL: NORMAL
CTP QC/QA: YES
SARS-COV-2 AG RESP QL IA.RAPID: NEGATIVE

## 2023-01-31 PROCEDURE — C1894 INTRO/SHEATH, NON-LASER: HCPCS | Performed by: SURGERY

## 2023-01-31 PROCEDURE — 34707 EVASC RPR ILIO-ILIAC NDGFT: CPT | Mod: 62,RT,, | Performed by: SURGERY

## 2023-01-31 PROCEDURE — 71000015 HC POSTOP RECOV 1ST HR: Performed by: SURGERY

## 2023-01-31 PROCEDURE — 34713 PR ACCESS/CLOSURE, FEM ART, DLVR OF ENDOGRAFT, PERC: ICD-10-PCS | Mod: 62,LT,, | Performed by: SURGERY

## 2023-01-31 PROCEDURE — 36620 ARTERIAL: ICD-10-PCS | Mod: 59,,, | Performed by: ANESTHESIOLOGY

## 2023-01-31 PROCEDURE — 94761 N-INVAS EAR/PLS OXIMETRY MLT: CPT

## 2023-01-31 PROCEDURE — 63600175 PHARM REV CODE 636 W HCPCS: Performed by: STUDENT IN AN ORGANIZED HEALTH CARE EDUCATION/TRAINING PROGRAM

## 2023-01-31 PROCEDURE — 27201423 OPTIME MED/SURG SUP & DEVICES STERILE SUPPLY: Performed by: SURGERY

## 2023-01-31 PROCEDURE — 25000003 PHARM REV CODE 250: Performed by: STUDENT IN AN ORGANIZED HEALTH CARE EDUCATION/TRAINING PROGRAM

## 2023-01-31 PROCEDURE — 27800903 OPTIME MED/SURG SUP & DEVICES OTHER IMPLANTS: Performed by: SURGERY

## 2023-01-31 PROCEDURE — 37000009 HC ANESTHESIA EA ADD 15 MINS: Performed by: SURGERY

## 2023-01-31 PROCEDURE — D9220A PRA ANESTHESIA: Mod: ,,, | Performed by: ANESTHESIOLOGY

## 2023-01-31 PROCEDURE — 36000706: Performed by: SURGERY

## 2023-01-31 PROCEDURE — 86900 BLOOD TYPING SEROLOGIC ABO: CPT | Performed by: STUDENT IN AN ORGANIZED HEALTH CARE EDUCATION/TRAINING PROGRAM

## 2023-01-31 PROCEDURE — 34707 PR REPAIR, ENDOVASC, ILIO-ILIAC ENDOGRAFT: ICD-10-PCS | Mod: 62,RT,, | Performed by: SURGERY

## 2023-01-31 PROCEDURE — 36246 PR INS CATH ABD/L-EXT ART 2ND ORDER: ICD-10-PCS | Mod: 59,51,RT, | Performed by: SURGERY

## 2023-01-31 PROCEDURE — C1725 CATH, TRANSLUMIN NON-LASER: HCPCS | Performed by: SURGERY

## 2023-01-31 PROCEDURE — 37236 PR OPEN/PERQ TRANSCATH PLACE STENT; INITIAL ARTERY: ICD-10-PCS | Mod: 59,51,RT, | Performed by: SURGERY

## 2023-01-31 PROCEDURE — D9220A PRA ANESTHESIA: ICD-10-PCS | Mod: ,,, | Performed by: ANESTHESIOLOGY

## 2023-01-31 PROCEDURE — 36246 INS CATH ABD/L-EXT ART 2ND: CPT | Mod: 59,51,RT, | Performed by: SURGERY

## 2023-01-31 PROCEDURE — 34713 PERQ ACCESS & CLSR FEM ART: CPT | Mod: 62,LT,, | Performed by: SURGERY

## 2023-01-31 PROCEDURE — C1874 STENT, COATED/COV W/DEL SYS: HCPCS | Performed by: SURGERY

## 2023-01-31 PROCEDURE — C1769 GUIDE WIRE: HCPCS | Performed by: SURGERY

## 2023-01-31 PROCEDURE — 27200677 HC TRANSDUCER MONITOR KIT SINGLE: Performed by: ANESTHESIOLOGY

## 2023-01-31 PROCEDURE — 37000008 HC ANESTHESIA 1ST 15 MINUTES: Performed by: SURGERY

## 2023-01-31 PROCEDURE — 71000016 HC POSTOP RECOV ADDL HR: Performed by: SURGERY

## 2023-01-31 PROCEDURE — 25500020 PHARM REV CODE 255: Performed by: SURGERY

## 2023-01-31 PROCEDURE — C1889 IMPLANT/INSERT DEVICE, NOC: HCPCS | Performed by: SURGERY

## 2023-01-31 PROCEDURE — 36000707: Performed by: SURGERY

## 2023-01-31 PROCEDURE — 20600001 HC STEP DOWN PRIVATE ROOM

## 2023-01-31 PROCEDURE — 71000033 HC RECOVERY, INTIAL HOUR: Performed by: SURGERY

## 2023-01-31 PROCEDURE — 27201037 HC PRESSURE MONITORING SET UP

## 2023-01-31 PROCEDURE — 36620 INSERTION CATHETER ARTERY: CPT | Mod: 59,,, | Performed by: ANESTHESIOLOGY

## 2023-01-31 PROCEDURE — C1760 CLOSURE DEV, VASC: HCPCS | Performed by: SURGERY

## 2023-01-31 PROCEDURE — C1887 CATHETER, GUIDING: HCPCS | Performed by: SURGERY

## 2023-01-31 PROCEDURE — 63600175 PHARM REV CODE 636 W HCPCS: Performed by: SURGERY

## 2023-01-31 PROCEDURE — C1773 RET DEV, INSERTABLE: HCPCS | Performed by: SURGERY

## 2023-01-31 PROCEDURE — C1751 CATH, INF, PER/CENT/MIDLINE: HCPCS | Performed by: ANESTHESIOLOGY

## 2023-01-31 PROCEDURE — C2628 CATHETER, OCCLUSION: HCPCS | Performed by: SURGERY

## 2023-01-31 PROCEDURE — 36415 COLL VENOUS BLD VENIPUNCTURE: CPT | Performed by: SURGERY

## 2023-01-31 PROCEDURE — 37236 OPEN/PERQ PLACE STENT 1ST: CPT | Mod: 59,51,RT, | Performed by: SURGERY

## 2023-01-31 DEVICE — IMPLANTABLE DEVICE: Type: IMPLANTABLE DEVICE | Site: ARTERIAL | Status: FUNCTIONAL

## 2023-01-31 RX ORDER — PHENYLEPHRINE HYDROCHLORIDE 10 MG/ML
INJECTION INTRAVENOUS
Status: DISCONTINUED | OUTPATIENT
Start: 2023-01-31 | End: 2023-01-31

## 2023-01-31 RX ORDER — PROPOFOL 10 MG/ML
VIAL (ML) INTRAVENOUS
Status: DISCONTINUED | OUTPATIENT
Start: 2023-01-31 | End: 2023-01-31

## 2023-01-31 RX ORDER — PROTAMINE SULFATE 10 MG/ML
INJECTION, SOLUTION INTRAVENOUS
Status: DISCONTINUED | OUTPATIENT
Start: 2023-01-31 | End: 2023-01-31

## 2023-01-31 RX ORDER — SODIUM CHLORIDE 9 MG/ML
INJECTION, SOLUTION INTRAVENOUS CONTINUOUS PRN
Status: DISCONTINUED | OUTPATIENT
Start: 2023-01-31 | End: 2023-01-31

## 2023-01-31 RX ORDER — SODIUM CHLORIDE 0.9 % (FLUSH) 0.9 %
10 SYRINGE (ML) INJECTION
Status: DISCONTINUED | OUTPATIENT
Start: 2023-01-31 | End: 2023-01-31 | Stop reason: HOSPADM

## 2023-01-31 RX ORDER — EPHEDRINE SULFATE 50 MG/ML
INJECTION, SOLUTION INTRAVENOUS
Status: DISCONTINUED | OUTPATIENT
Start: 2023-01-31 | End: 2023-01-31

## 2023-01-31 RX ORDER — ATORVASTATIN CALCIUM 40 MG/1
40 TABLET, FILM COATED ORAL DAILY
Status: DISCONTINUED | OUTPATIENT
Start: 2023-02-01 | End: 2023-02-01 | Stop reason: HOSPADM

## 2023-01-31 RX ORDER — LIDOCAINE HYDROCHLORIDE 20 MG/ML
INJECTION INTRAVENOUS
Status: DISCONTINUED | OUTPATIENT
Start: 2023-01-31 | End: 2023-01-31

## 2023-01-31 RX ORDER — ROCURONIUM BROMIDE 10 MG/ML
INJECTION, SOLUTION INTRAVENOUS
Status: DISCONTINUED | OUTPATIENT
Start: 2023-01-31 | End: 2023-01-31

## 2023-01-31 RX ORDER — MUPIROCIN 20 MG/G
OINTMENT TOPICAL 2 TIMES DAILY
Status: DISCONTINUED | OUTPATIENT
Start: 2023-01-31 | End: 2023-02-01 | Stop reason: HOSPADM

## 2023-01-31 RX ORDER — MUPIROCIN 20 MG/G
OINTMENT TOPICAL
Status: DISCONTINUED | OUTPATIENT
Start: 2023-01-31 | End: 2023-01-31

## 2023-01-31 RX ORDER — PROCHLORPERAZINE EDISYLATE 5 MG/ML
5 INJECTION INTRAMUSCULAR; INTRAVENOUS EVERY 30 MIN PRN
Status: DISCONTINUED | OUTPATIENT
Start: 2023-01-31 | End: 2023-01-31 | Stop reason: HOSPADM

## 2023-01-31 RX ORDER — ASPIRIN 81 MG/1
81 TABLET ORAL EVERY MORNING
Status: DISCONTINUED | OUTPATIENT
Start: 2023-02-01 | End: 2023-02-01 | Stop reason: HOSPADM

## 2023-01-31 RX ORDER — DEXAMETHASONE SODIUM PHOSPHATE 4 MG/ML
INJECTION, SOLUTION INTRA-ARTICULAR; INTRALESIONAL; INTRAMUSCULAR; INTRAVENOUS; SOFT TISSUE
Status: DISCONTINUED | OUTPATIENT
Start: 2023-01-31 | End: 2023-01-31

## 2023-01-31 RX ORDER — HYDROMORPHONE HYDROCHLORIDE 1 MG/ML
0.2 INJECTION, SOLUTION INTRAMUSCULAR; INTRAVENOUS; SUBCUTANEOUS EVERY 5 MIN PRN
Status: DISCONTINUED | OUTPATIENT
Start: 2023-01-31 | End: 2023-01-31 | Stop reason: HOSPADM

## 2023-01-31 RX ORDER — HEPARIN SODIUM 1000 [USP'U]/ML
INJECTION, SOLUTION INTRAVENOUS; SUBCUTANEOUS
Status: DISCONTINUED | OUTPATIENT
Start: 2023-01-31 | End: 2023-01-31

## 2023-01-31 RX ORDER — GABAPENTIN 300 MG/1
300 CAPSULE ORAL 3 TIMES DAILY
Status: DISCONTINUED | OUTPATIENT
Start: 2023-01-31 | End: 2023-02-01 | Stop reason: HOSPADM

## 2023-01-31 RX ORDER — SODIUM CHLORIDE 0.9 % (FLUSH) 0.9 %
10 SYRINGE (ML) INJECTION
Status: DISCONTINUED | OUTPATIENT
Start: 2023-01-31 | End: 2023-02-01 | Stop reason: HOSPADM

## 2023-01-31 RX ORDER — MUPIROCIN 20 MG/G
OINTMENT TOPICAL
Status: DISPENSED
Start: 2023-01-31 | End: 2023-01-31

## 2023-01-31 RX ORDER — HEPARIN SODIUM 1000 [USP'U]/ML
INJECTION, SOLUTION INTRAVENOUS; SUBCUTANEOUS
Status: DISCONTINUED | OUTPATIENT
Start: 2023-01-31 | End: 2023-01-31 | Stop reason: HOSPADM

## 2023-01-31 RX ORDER — ONDANSETRON 2 MG/ML
INJECTION INTRAMUSCULAR; INTRAVENOUS
Status: DISCONTINUED | OUTPATIENT
Start: 2023-01-31 | End: 2023-01-31

## 2023-01-31 RX ORDER — IODIXANOL 320 MG/ML
INJECTION, SOLUTION INTRAVASCULAR
Status: DISCONTINUED | OUTPATIENT
Start: 2023-01-31 | End: 2023-01-31 | Stop reason: HOSPADM

## 2023-01-31 RX ORDER — HYDROCODONE BITARTRATE AND ACETAMINOPHEN 5; 325 MG/1; MG/1
1 TABLET ORAL EVERY 4 HOURS PRN
Status: DISCONTINUED | OUTPATIENT
Start: 2023-01-31 | End: 2023-02-01 | Stop reason: HOSPADM

## 2023-01-31 RX ORDER — TRAMADOL HYDROCHLORIDE 50 MG/1
50 TABLET ORAL EVERY 4 HOURS PRN
Status: DISCONTINUED | OUTPATIENT
Start: 2023-01-31 | End: 2023-02-01 | Stop reason: HOSPADM

## 2023-01-31 RX ORDER — PANTOPRAZOLE SODIUM 40 MG/1
40 TABLET, DELAYED RELEASE ORAL DAILY
Status: DISCONTINUED | OUTPATIENT
Start: 2023-02-01 | End: 2023-02-01 | Stop reason: HOSPADM

## 2023-01-31 RX ORDER — MORPHINE SULFATE 2 MG/ML
3 INJECTION, SOLUTION INTRAMUSCULAR; INTRAVENOUS
Status: DISCONTINUED | OUTPATIENT
Start: 2023-01-31 | End: 2023-02-01 | Stop reason: HOSPADM

## 2023-01-31 RX ORDER — FENTANYL CITRATE 50 UG/ML
INJECTION, SOLUTION INTRAMUSCULAR; INTRAVENOUS
Status: DISCONTINUED | OUTPATIENT
Start: 2023-01-31 | End: 2023-01-31

## 2023-01-31 RX ORDER — LIDOCAINE HYDROCHLORIDE 10 MG/ML
1 INJECTION, SOLUTION EPIDURAL; INFILTRATION; INTRACAUDAL; PERINEURAL ONCE
Status: COMPLETED | OUTPATIENT
Start: 2023-01-31 | End: 2023-01-31

## 2023-01-31 RX ADMIN — FENTANYL CITRATE 50 MCG: 50 INJECTION, SOLUTION INTRAMUSCULAR; INTRAVENOUS at 12:01

## 2023-01-31 RX ADMIN — PHENYLEPHRINE HYDROCHLORIDE 50 MCG: 10 INJECTION INTRAVENOUS at 03:01

## 2023-01-31 RX ADMIN — PROTAMINE SULFATE 10 MG: 10 INJECTION, SOLUTION INTRAVENOUS at 03:01

## 2023-01-31 RX ADMIN — HEPARIN SODIUM 2000 UNITS: 1000 INJECTION, SOLUTION INTRAVENOUS; SUBCUTANEOUS at 02:01

## 2023-01-31 RX ADMIN — LIDOCAINE HYDROCHLORIDE 100 MG: 20 INJECTION INTRAVENOUS at 12:01

## 2023-01-31 RX ADMIN — PROTAMINE SULFATE 5 MG: 10 INJECTION, SOLUTION INTRAVENOUS at 03:01

## 2023-01-31 RX ADMIN — ONDANSETRON 4 MG: 2 INJECTION INTRAMUSCULAR; INTRAVENOUS at 03:01

## 2023-01-31 RX ADMIN — HEPARIN SODIUM 2000 UNITS: 1000 INJECTION, SOLUTION INTRAVENOUS; SUBCUTANEOUS at 01:01

## 2023-01-31 RX ADMIN — EPHEDRINE SULFATE 5 MG: 50 INJECTION INTRAVENOUS at 01:01

## 2023-01-31 RX ADMIN — SODIUM CHLORIDE: 0.9 INJECTION, SOLUTION INTRAVENOUS at 12:01

## 2023-01-31 RX ADMIN — SODIUM CHLORIDE, SODIUM GLUCONATE, SODIUM ACETATE, POTASSIUM CHLORIDE, MAGNESIUM CHLORIDE, SODIUM PHOSPHATE, DIBASIC, AND POTASSIUM PHOSPHATE: .53; .5; .37; .037; .03; .012; .00082 INJECTION, SOLUTION INTRAVENOUS at 12:01

## 2023-01-31 RX ADMIN — PHENYLEPHRINE HYDROCHLORIDE 50 MCG: 10 INJECTION INTRAVENOUS at 02:01

## 2023-01-31 RX ADMIN — MUPIROCIN: 20 OINTMENT TOPICAL at 11:01

## 2023-01-31 RX ADMIN — PROPOFOL 130 MG: 10 INJECTION, EMULSION INTRAVENOUS at 12:01

## 2023-01-31 RX ADMIN — GABAPENTIN 300 MG: 300 CAPSULE ORAL at 09:01

## 2023-01-31 RX ADMIN — MUPIROCIN: 20 OINTMENT TOPICAL at 09:01

## 2023-01-31 RX ADMIN — EPHEDRINE SULFATE 10 MG: 50 INJECTION INTRAVENOUS at 12:01

## 2023-01-31 RX ADMIN — DEXAMETHASONE SODIUM PHOSPHATE 4 MG: 4 INJECTION, SOLUTION INTRAMUSCULAR; INTRAVENOUS at 12:01

## 2023-01-31 RX ADMIN — GLYCOPYRROLATE 0.2 MG: 0.2 INJECTION INTRAMUSCULAR; INTRAVENOUS at 12:01

## 2023-01-31 RX ADMIN — SUGAMMADEX 150 MG: 100 INJECTION, SOLUTION INTRAVENOUS at 03:01

## 2023-01-31 RX ADMIN — EPHEDRINE SULFATE 5 MG: 50 INJECTION INTRAVENOUS at 12:01

## 2023-01-31 RX ADMIN — ROCURONIUM BROMIDE 50 MG: 10 INJECTION INTRAVENOUS at 12:01

## 2023-01-31 RX ADMIN — LIDOCAINE HYDROCHLORIDE 10 MG: 10 INJECTION, SOLUTION EPIDURAL; INFILTRATION; INTRACAUDAL; PERINEURAL at 11:01

## 2023-01-31 RX ADMIN — ROCURONIUM BROMIDE 10 MG: 10 INJECTION INTRAVENOUS at 02:01

## 2023-01-31 RX ADMIN — ROCURONIUM BROMIDE 20 MG: 10 INJECTION INTRAVENOUS at 01:01

## 2023-01-31 RX ADMIN — PROTAMINE SULFATE 15 MG: 10 INJECTION, SOLUTION INTRAVENOUS at 03:01

## 2023-01-31 RX ADMIN — HEPARIN SODIUM 6000 UNITS: 1000 INJECTION, SOLUTION INTRAVENOUS; SUBCUTANEOUS at 01:01

## 2023-01-31 RX ADMIN — DEXTROSE 2 G: 50 INJECTION, SOLUTION INTRAVENOUS at 12:01

## 2023-01-31 NOTE — OP NOTE
[facility] ([facility])  Operative Note    SUMMARY     Surgery Date: 1/31/2023     Surgeon(s) and Role:     * Mario Springer MD - Primary       * Guillaume Alexis MD - Co Surgeon    Assistant:     * Teddy Thorne MD - Resident     Pre-op Diagnosis:    Right common Iliac artery aneurysm     Post-op Diagnosis:  Post-Op Diagnosis Codes:     * Iliac artery aneurysm [I72.3]    Procedure(s) (LRB):  REPAIR-ANEURYSM-ILIAC BRANCH ENDOPROSTHESIS-ENDOVASCULAR (Right)  Aortic cuff placement 26 x 3.5cm R common iliac artery   Aortic cuff placement 26 x 3.5cm R common iliac artery  Aortogram  R iliac angiogram  Perclose technique bilateral CFA   VBX 11mm x 79 stent R internal iliac artery    Procedure in detail:  Patient was seen preoperatively by anesthesia was deemed stable for surgery.  He is brought to the operating room placed supine on the operating room table.  Nuñez catheter was placed as well as appropriate IVs and arterial line.  The patient was intubated and general anesthesia was induced.  Patient was prepped and draped in sterile fashion a time-out performed.  Ultrasound guidance was used to access the bilateral common femoral arteries with micropuncture and Perclose technique.  Two ProGlides were used for each common femoral artery.  We upsized our sheaths and then performed iliac branch endoprosthesis repair of the right common iliac artery aneurysm by snaring of wire up and over into the right femoral sheath.  We deployed our main body according to our marks.  When we are advancing the 12 [place] sheath from the left to right side the sheath pushed the main body down words into the aneurysm sac.  This distorted our hypogastric gate anteriorly and we maneuvered the main body medially and upward slightly and used a steerable sheath to access the hypogastric artery as well as balloon angioplasty and utilization of a VBX stent in the right hypogastric artery.  We then completed the deployment of our main body.   We placed a 26 mm aortic cuff up to the level of the aortic bifurcation.  There was persistent type 3 endoleak on aortogram despite ballooning requiring another aortic cuff between the 2 devices.  We did not need to balloon further as the endoleak was resolved on final aortogram and iliac angiogram.  Our sheath was removed and our knots were pushed down and secured bilaterally.  We held manual pressure for 10 minutes and reversed the patient with protamine half dose.  We closed the femoral incisions with 4-0 Monocryl and Dermabond.  We placed sterile dressings and the patient had palpable DP pulses the conclusion of the case.  He was transferred to recovery room in stable condition.    Anesthesia: General    Operative Findings: adequate arteries for access, successful treatment of R ROJELIO aneurysm with RONAK, 11mm VBX and two 26mm aortic cuffs (3.5 cm)    Estimated Blood Loss: * No values recorded between 1/31/2023  1:05 PM and 1/31/2023  3:46 PM *    Estimated Blood Loss has been documented.         Specimens:   Specimen (24h ago, onward)      None            [id number]

## 2023-01-31 NOTE — BRIEF OP NOTE
[facility] (2nd Fl)  Brief Operative Note    SUMMARY     Surgery Date: 1/31/2023     Surgeon(s) and Role:     * Mario Springer MD - Primary       * Guillaume Alexis MD - Co Surgeon    Assistant:     * Teddy Thorne MD - Resident     Pre-op Diagnosis:    Right common Iliac artery aneurysm     Post-op Diagnosis:  Post-Op Diagnosis Codes:     * Iliac artery aneurysm [I72.3]    Procedure(s) (LRB):  REPAIR-ANEURYSM-ILIAC BRANCH ENDOPROSTHESIS-ENDOVASCULAR (Bilateral)    Anesthesia: General    Operative Findings: adequate arteries for access, successful treatment of R ROJELIO aneurysm with RONAK, 11mm VBX and two 26mm aortic cuffs (3.5 cm)    Estimated Blood Loss: * No values recorded between 1/31/2023  1:05 PM and 1/31/2023  3:46 PM *    Estimated Blood Loss has been documented.         Specimens:   Specimen (24h ago, onward)      None            [id number]

## 2023-01-31 NOTE — PLAN OF CARE
Chart reviewed. Preop nursing care complete per orders. Safe surgery checklist complete. Upon arrival, pt notified RN that he has had slight cough x1 week and coughing clear sputum. Covid test done today is negative. Wife at bedside and to take belongings. Waiting for surgery consent and anesthesia consent prior to surgery.

## 2023-01-31 NOTE — TRANSFER OF CARE
Anesthesia Transfer of Care Note    Patient: Reed Torres    Procedure(s) Performed: Procedure(s) (LRB):  REPAIR-ANEURYSM-ILIAC BRANCH ENDOPROSTHESIS-ENDOVASCULAR (Bilateral)    Patient location: PACU    Anesthesia Type: general    Transport from OR: Transported from OR on 6-10 L/min O2 by face mask with adequate spontaneous ventilation    Post pain: adequate analgesia    Post assessment: no apparent anesthetic complications    Post vital signs: stable    Level of consciousness: alert, awake and oriented    Nausea/Vomiting: no nausea/vomiting    Complications: none    Transfer of care protocol was followed      Last vitals:   Visit Vitals  BP (!) 146/86 (BP Location: Right arm, Patient Position: Lying)   Pulse 68   Temp 36.6 °C (97.9 °F) (Oral)   Resp 18   Wt 63.5 kg (140 lb)   SpO2 96%   BMI 23.30 kg/m²

## 2023-01-31 NOTE — ANESTHESIA PROCEDURE NOTES
Intubation    Date/Time: 1/31/2023 12:22 PM  Performed by: Gary Marquez MD  Authorized by: Brock Dominguez MD     Intubation:     Induction:  Intravenous    Intubated:  Postinduction    Mask Ventilation:  Easy with oral airway    Attempts:  1    Attempted By:  Resident anesthesiologist    Method of Intubation:  Direct    Blade:  Heredia 2    Laryngeal View Grade: Grade I - full view of cords      Difficult Airway Encountered?: No      Complications:  None    Airway Device:  Oral endotracheal tube    Airway Device Size:  7.5    Style/Cuff Inflation:  Cuffed (inflated to minimal occlusive pressure)    Tube secured:  23    Secured at:  The lips    Placement Verified By:  Capnometry    Complicating Factors:  None    Findings Post-Intubation:  BS equal bilateral

## 2023-01-31 NOTE — HPI
HPI:  Reed Torres is a 77 y.o. male with       Patient Active Problem List   Diagnosis    Hyperlipidemia    Tuberculosis exposure    BPH (benign prostatic hyperplasia)    Body mass index (BMI) of 23.0-23.9 in adult    Osteoarthritis of lumbar spine    Hyperbilirubinemia, long standing, favor GILBERT    Body water dehydration    Abnormal stress test    Anxiety disorder    SVT (supraventricular tachycardia)    Pulmonary emphysema    Coronary artery disease due to lipid rich plaque    PAF (paroxysmal atrial fibrillation)    Pure hypercholesterolemia    Aortic atherosclerosis    Aneurysm of right common iliac artery    Thrombocytopenia, unspecified    being managed by PCP and specialists who is here today for evaluation of mesenteric ischemia.  Pt with c/o LUQ and L chest discomfort intermittent without known triggers.  Patient states location is LUQ and L chest under breastbone occurring for a few months.  Associated signs and symptoms include belching.  No food fear or significant weight loss.  Quality is pressure and severity is 3/10.  Symptoms began a few mo ago.  Alleviating factors include activity.  Worsening factors include none.     no MI  no Stroke  Tobacco use: denies     1/2021:  States he has symptoms of belching and bloating.  Denies food fear and weight loss.  Symptoms are worsened when he bends forward.  States he has not seen GI in about 4 months where he was diagnosed with reflux although his dosage of his medication has been decreased.  Denies pelvic pain or abdominal pain or back pain.  No functional limitation or leg pain.     7/2021:  C/o LUQ pain, R thigh intermittent pain, no abd or pelvic pain.  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     1/2022:  No complaints.     10/2022:  no new issues.  No pelvic or abd pain.          Past Medical History:   Diagnosis Date    Coronary artery disease due to lipid rich plaque 5/3/2022    Hyperlipidemia      Pulmonary emphysema 5/12/2021     Tuberculosis exposure       Post treatment            Past Surgical History:   Procedure Laterality Date    APPENDECTOMY        LEFT HEART CATHETERIZATION Left 2020     Procedure: Left heart cath;  Surgeon: Keenan Avila MD;  Location: [facility] CATH LAB;  Service: Cardiology;  Laterality: Left;  RN PRE OP 2020.---COVID NEGATIVE ON-- 2020. [place]    SHOULDER ARTHROSCOPY W/ ROTATOR CUFF REPAIR         Left shoulder            Family History   Problem Relation Age of Onset    COPD Mother      Diabetes Mother      Hypertension Mother      COPD Father      Heart disease Brother        Social History            Socioeconomic History    Marital status:    Tobacco Use    Smoking status: Former       Types: Cigarettes       Quit date:        Years since quittin.8    Smokeless tobacco: Never    Tobacco comments:       stopped smoking 20 yrs ago.   Substance and Sexual Activity    Alcohol use: Not Currently       Alcohol/week: 5.0 standard drinks       Types: 6 Standard drinks or equivalent per week    Drug use: No    Sexual activity: Yes       Partners: Female       Birth control/protection: None   Social History Narrative     Still doing lawn service         Current Outpatient Medications:     amiodarone (PACERONE) 200 MG Tab, Take 1 tablet (200 mg total) by mouth once daily., Disp: 90 tablet, Rfl: 3    apixaban (ELIQUIS) 5 mg Tab, Take 1 tablet (5 mg total) by mouth 2 (two) times daily., Disp: 60 tablet, Rfl: 11    aspirin (ECOTRIN) 81 MG EC tablet, Take 81 mg by mouth once daily., Disp: , Rfl:     atorvastatin (LIPITOR) 40 MG tablet, TAKE 1 TABLET BY MOUTH EVERY DAY, Disp: 90 tablet, Rfl: 3    dicyclomine (BENTYL) 20 mg tablet, Take 1 tablet (20 mg total) by mouth 2 (two) times daily., Disp: 20 tablet, Rfl: 0    fluticasone propionate (FLONASE) 50 mcg/actuation nasal spray, 1 spray (50 mcg total) by Each Nostril route 2 (two) times daily., Disp: 18.2 mL, Rfl: 3    gabapentin (NEURONTIN) 300 MG  capsule, TAKE 1-3 CAPSULES BY MOUTH EVERY EVENING, Disp: 90 capsule, Rfl: 11    latanoprost 0.005 % ophthalmic solution, , Disp: , Rfl:     ondansetron (ZOFRAN-ODT) 4 MG TbDL, Take 1 tablet (4 mg total) by mouth every 6 (six) hours as needed (Nausea)., Disp: 20 tablet, Rfl: 0    pantoprazole (PROTONIX) 40 MG tablet, Take 1 tablet (40 mg total) by mouth once daily., Disp: 30 tablet, Rfl: 1     Current Facility-Administered Medications:     sodium chloride 0.9% flush 3 mL, 3 mL, Intravenous, Q8H PRN, Keenan Avila MD     REVIEW OF SYSTEMS:  General: No fevers or chills; ENT: No sore throat; Allergy and Immunology: no persistent infections; Hematological and Lymphatic: No history of bleeding or easy bruising; Endocrine: negative; Respiratory: no cough, shortness of breath, or wheezing; Cardiovascular: no chest pain or dyspnea on exertion; Gastrointestinal: no abdominal pain/back, change in bowel habits, or bloody stools; Genito-Urinary: no dysuria, trouble voiding, or hematuria; Musculoskeletal: negative; Neurological: no TIA or stroke symptoms; Psychiatric: no nervousness, anxiety or depression.     PHYSICAL EXAM:        General appearance:      Alert, well-appearing, and in no distress.  Oriented to person, place, and time                    Neurological:      Normal speech, no focal findings noted; CN II - XII grossly intact. RLE with sensation to light touch, LLE with sensation to light touch.            Musculoskeletal:      Digits/nail without cyanosis/clubbing.  Strength 5/5 BLE.                                  Neck:  Supple, no significant adenopathy, no carotid bruit can be auscultated                                Chest:   Clear to auscultation, no wheezes, rales or rhonchi, symmetric air entry. No use of accessory muscles                             Cardiac:   Normal rate and regular rhythm, S1 and S2 normal                          Abdomen:   Soft, min epigastric tenderness, nondistended, no masses or  organomegaly, no hernia                                               No rebound tenderness noted; bowel sounds normal                                               Pulsatile aortic mass is non palpable.                                               No groin adenopathy                    Extremities:      2+ R femoral pulse, 2+ L femoral pulse                                               2+ R popliteal pulse, 2+ L popliteal pulse                                               2+ R PT pulse, 2+ L PT pulse                                               2+ R DP pulse, 2+ L DP pulse                                               no RLE edema, no LLE edema                                Skin:      RLE without tissue loss; LLE without tissue loss     LAB RESULTS:  No results found for: CBC        Lab Results   Component Value Date     LABPROT 10.7 07/20/2020     INR 1.0 07/20/2020            Lab Results   Component Value Date      09/27/2022     K 3.2 (L) 09/27/2022      09/27/2022     CO2 23 09/27/2022     GLU 93 09/27/2022     BUN 11 09/27/2022     CREATININE 0.8 09/27/2022     CALCIUM 8.6 (L) 09/27/2022     ANIONGAP 8 09/27/2022     EGFRNONAA >60 07/03/2022            Lab Results   Component Value Date     WBC 2.96 (L) 09/27/2022     RBC 4.48 (L) 09/27/2022     HGB 13.4 (L) 09/27/2022     HCT 40.1 09/27/2022     MCV 90 09/27/2022     MCH 29.9 09/27/2022     MCHC 33.4 09/27/2022     RDW 13.8 09/27/2022      (L) 09/27/2022     MPV 11.3 09/27/2022     GRAN 1.7 (L) 09/27/2022     GRAN 57.3 09/27/2022     LYMPH 0.9 (L) 09/27/2022     LYMPH 30.7 09/27/2022     MONO 0.3 09/27/2022     MONO 11.1 09/27/2022     EOS 0.0 09/27/2022     BASO 0.01 09/27/2022     EOSINOPHIL 0.3 09/27/2022     BASOPHIL 0.3 09/27/2022     DIFFMETHOD Automated 09/27/2022      .        Lab Results   Component Value Date     HGBA1C 5.1 03/03/2022         IMAGING:  All pertinent imaging has been reviewed and interpreted independently.      -BLE arterial US negative for popliteal aneurysm     IMP/PLAN:  77 y.o. male with       Patient Active Problem List   Diagnosis    Hyperlipidemia    Tuberculosis exposure    BPH (benign prostatic hyperplasia)    Body mass index (BMI) of 23.0-23.9 in adult    Osteoarthritis of lumbar spine    Hyperbilirubinemia, long standing, favor GILBERT    Body water dehydration    Abnormal stress test    Anxiety disorder    SVT (supraventricular tachycardia)    Pulmonary emphysema    Coronary artery disease due to lipid rich plaque    PAF (paroxysmal atrial fibrillation)    Pure hypercholesterolemia    Aortic atherosclerosis    Aneurysm of right common iliac artery    Thrombocytopenia, unspecified    being managed by PCP and specialists who is here today for evaluation of R ROJELIO aneurysm.     - Mesenteric US without HD significant stenosis.  Recommend continuing evaluation and management for etiology of left upper quadrant by PCP and GI.    -Repeat evaluation by Cardiology due to moderate reversible defect on stress test in light of possible future aneurysm repair - no further tests or interventions recommended  -right common iliac artery aneurysm 3.9 cm 2021 (3.6 2020), asymptomatic- rec CTA A/P and likely RONAK  -recommend cont daily aspirin, continue blood pressure control heart healthy lifestyle     I spent 12 minutes evaluating this patient and greater than 50% of the time was spent counseling, coordinator care and discussing the plan of care.  All questions were answered and patient stated understanding with agreement with the above treatment plan.

## 2023-01-31 NOTE — H&P
[facility] - Surgery ([facility])  Vascular Surgery  History and Physical     Patient Name: Reed Torres  MRN: [id number]  Admission Date: 1/31/2023  Code Status: Prior   Attending Physician: MD Racheal  Primary Care Physician: Pj Gillette MD    Subjective:     Chief Complaint/Reason for Admission: Iliac artery aneurysm     HPI:  HPI:  Reed Torres is a 77 y.o. male with       Patient Active Problem List   Diagnosis    Hyperlipidemia    Tuberculosis exposure    BPH (benign prostatic hyperplasia)    Body mass index (BMI) of 23.0-23.9 in adult    Osteoarthritis of lumbar spine    Hyperbilirubinemia, long standing, favor GILBERT    Body water dehydration    Abnormal stress test    Anxiety disorder    SVT (supraventricular tachycardia)    Pulmonary emphysema    Coronary artery disease due to lipid rich plaque    PAF (paroxysmal atrial fibrillation)    Pure hypercholesterolemia    Aortic atherosclerosis    Aneurysm of right common iliac artery    Thrombocytopenia, unspecified    being managed by PCP and specialists who is here today for evaluation of mesenteric ischemia.  Pt with c/o LUQ and L chest discomfort intermittent without known triggers.  Patient states location is LUQ and L chest under breastbone occurring for a few months.  Associated signs and symptoms include belching.  No food fear or significant weight loss.  Quality is pressure and severity is 3/10.  Symptoms began a few mo ago.  Alleviating factors include activity.  Worsening factors include none.     no MI  no Stroke  Tobacco use: denies     1/2021:  States he has symptoms of belching and bloating.  Denies food fear and weight loss.  Symptoms are worsened when he bends forward.  States he has not seen GI in about 4 months where he was diagnosed with reflux although his dosage of his medication has been decreased.  Denies pelvic pain or abdominal pain or back pain.  No functional limitation or leg pain.     7/2021:  C/o LUQ pain, R thigh  intermittent pain, no abd or pelvic pain.  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     2022:  No complaints.     10/2022:  no new issues.  No pelvic or abd pain.          Past Medical History:   Diagnosis Date    Coronary artery disease due to lipid rich plaque 5/3/2022    Hyperlipidemia      Pulmonary emphysema 2021    Tuberculosis exposure       Post treatment            Past Surgical History:   Procedure Laterality Date    APPENDECTOMY        LEFT HEART CATHETERIZATION Left 2020     Procedure: Left heart cath;  Surgeon: Keenan Avila MD;  Location: [facility] CATH LAB;  Service: Cardiology;  Laterality: Left;  RN PRE OP 2020.---COVID NEGATIVE ON-- 2020. [place]    SHOULDER ARTHROSCOPY W/ ROTATOR CUFF REPAIR         Left shoulder            Family History   Problem Relation Age of Onset    COPD Mother      Diabetes Mother      Hypertension Mother      COPD Father      Heart disease Brother        Social History            Socioeconomic History    Marital status:    Tobacco Use    Smoking status: Former       Types: Cigarettes       Quit date:        Years since quittin.8    Smokeless tobacco: Never    Tobacco comments:       stopped smoking 20 yrs ago.   Substance and Sexual Activity    Alcohol use: Not Currently       Alcohol/week: 5.0 standard drinks       Types: 6 Standard drinks or equivalent per week    Drug use: No    Sexual activity: Yes       Partners: Female       Birth control/protection: None   Social History Narrative     Still doing lawn service         Current Outpatient Medications:     amiodarone (PACERONE) 200 MG Tab, Take 1 tablet (200 mg total) by mouth once daily., Disp: 90 tablet, Rfl: 3    apixaban (ELIQUIS) 5 mg Tab, Take 1 tablet (5 mg total) by mouth 2 (two) times daily., Disp: 60 tablet, Rfl: 11    aspirin (ECOTRIN) 81 MG EC tablet, Take 81 mg by mouth once daily., Disp: , Rfl:     atorvastatin (LIPITOR) 40 MG tablet, TAKE 1  TABLET BY MOUTH EVERY DAY, Disp: 90 tablet, Rfl: 3    dicyclomine (BENTYL) 20 mg tablet, Take 1 tablet (20 mg total) by mouth 2 (two) times daily., Disp: 20 tablet, Rfl: 0    fluticasone propionate (FLONASE) 50 mcg/actuation nasal spray, 1 spray (50 mcg total) by Each Nostril route 2 (two) times daily., Disp: 18.2 mL, Rfl: 3    gabapentin (NEURONTIN) 300 MG capsule, TAKE 1-3 CAPSULES BY MOUTH EVERY EVENING, Disp: 90 capsule, Rfl: 11    latanoprost 0.005 % ophthalmic solution, , Disp: , Rfl:     ondansetron (ZOFRAN-ODT) 4 MG TbDL, Take 1 tablet (4 mg total) by mouth every 6 (six) hours as needed (Nausea)., Disp: 20 tablet, Rfl: 0    pantoprazole (PROTONIX) 40 MG tablet, Take 1 tablet (40 mg total) by mouth once daily., Disp: 30 tablet, Rfl: 1     Current Facility-Administered Medications:     sodium chloride 0.9% flush 3 mL, 3 mL, Intravenous, Q8H PRN, Keenan Avila MD     REVIEW OF SYSTEMS:  General: No fevers or chills; ENT: No sore throat; Allergy and Immunology: no persistent infections; Hematological and Lymphatic: No history of bleeding or easy bruising; Endocrine: negative; Respiratory: no cough, shortness of breath, or wheezing; Cardiovascular: no chest pain or dyspnea on exertion; Gastrointestinal: no abdominal pain/back, change in bowel habits, or bloody stools; Genito-Urinary: no dysuria, trouble voiding, or hematuria; Musculoskeletal: negative; Neurological: no TIA or stroke symptoms; Psychiatric: no nervousness, anxiety or depression.     PHYSICAL EXAM:        General appearance:      Alert, well-appearing, and in no distress.  Oriented to person, place, and time                    Neurological:      Normal speech, no focal findings noted; CN II - XII grossly intact. RLE with sensation to light touch, LLE with sensation to light touch.            Musculoskeletal:      Digits/nail without cyanosis/clubbing.  Strength 5/5 BLE.                                  Neck:  Supple, no significant  adenopathy, no carotid bruit can be auscultated                                Chest:   Clear to auscultation, no wheezes, rales or rhonchi, symmetric air entry. No use of accessory muscles                             Cardiac:   Normal rate and regular rhythm, S1 and S2 normal                          Abdomen:   Soft, min epigastric tenderness, nondistended, no masses or organomegaly, no hernia                                               No rebound tenderness noted; bowel sounds normal                                               Pulsatile aortic mass is non palpable.                                               No groin adenopathy                    Extremities:      2+ R femoral pulse, 2+ L femoral pulse                                               2+ R popliteal pulse, 2+ L popliteal pulse                                               2+ R PT pulse, 2+ L PT pulse                                               2+ R DP pulse, 2+ L DP pulse                                               no RLE edema, no LLE edema                                Skin:      RLE without tissue loss; LLE without tissue loss     LAB RESULTS:  No results found for: CBC        Lab Results   Component Value Date     LABPROT 10.7 07/20/2020     INR 1.0 07/20/2020            Lab Results   Component Value Date      09/27/2022     K 3.2 (L) 09/27/2022      09/27/2022     CO2 23 09/27/2022     GLU 93 09/27/2022     BUN 11 09/27/2022     CREATININE 0.8 09/27/2022     CALCIUM 8.6 (L) 09/27/2022     ANIONGAP 8 09/27/2022     EGFRNONAA >60 07/03/2022            Lab Results   Component Value Date     WBC 2.96 (L) 09/27/2022     RBC 4.48 (L) 09/27/2022     HGB 13.4 (L) 09/27/2022     HCT 40.1 09/27/2022     MCV 90 09/27/2022     MCH 29.9 09/27/2022     MCHC 33.4 09/27/2022     RDW 13.8 09/27/2022      (L) 09/27/2022     MPV 11.3 09/27/2022     GRAN 1.7 (L) 09/27/2022     GRAN 57.3 09/27/2022     LYMPH 0.9 (L) 09/27/2022     LYMPH  30.7 09/27/2022     MONO 0.3 09/27/2022     MONO 11.1 09/27/2022     EOS 0.0 09/27/2022     BASO 0.01 09/27/2022     EOSINOPHIL 0.3 09/27/2022     BASOPHIL 0.3 09/27/2022     DIFFMETHOD Automated 09/27/2022      .        Lab Results   Component Value Date     HGBA1C 5.1 03/03/2022         IMAGING:  All pertinent imaging has been reviewed and interpreted independently.     -BLE arterial US negative for popliteal aneurysm     IMP/PLAN:  77 y.o. male with       Patient Active Problem List   Diagnosis    Hyperlipidemia    Tuberculosis exposure    BPH (benign prostatic hyperplasia)    Body mass index (BMI) of 23.0-23.9 in adult    Osteoarthritis of lumbar spine    Hyperbilirubinemia, long standing, favor GILBERT    Body water dehydration    Abnormal stress test    Anxiety disorder    SVT (supraventricular tachycardia)    Pulmonary emphysema    Coronary artery disease due to lipid rich plaque    PAF (paroxysmal atrial fibrillation)    Pure hypercholesterolemia    Aortic atherosclerosis    Aneurysm of right common iliac artery    Thrombocytopenia, unspecified    being managed by PCP and specialists who is here today for evaluation of R ROJELIO aneurysm.     - Mesenteric US without HD significant stenosis.  Recommend continuing evaluation and management for etiology of left upper quadrant by PCP and GI.    -Repeat evaluation by Cardiology due to moderate reversible defect on stress test in light of possible future aneurysm repair - no further tests or interventions recommended  -right common iliac artery aneurysm 3.9 cm 2021 (3.6 2020), asymptomatic- rec CTA A/P and likely RONAK  -recommend cont daily aspirin, continue blood pressure control heart healthy lifestyle     I spent 12 minutes evaluating this patient and greater than 50% of the time was spent counseling, coordinator care and discussing the plan of care.  All questions were answered and patient stated understanding with agreement with the above  treatment plan.           No new subjective & objective note has been filed under this hospital service since the last note was generated.    Assessment and Plan:     No notes have been filed under this hospital service.  Service: Vascular Surgery      Teddy Thorne MD  Vascular Surgery  [facility] - Surgery (2nd Fl)

## 2023-01-31 NOTE — PROGRESS NOTES
Health Maintenance Due   Topic Date Due    Hepatitis C Screening  Never done    Shingles Vaccine (1 of 2) Never done    COVID-19 Vaccine (4 - Booster for Moderna series) 01/04/2022     Chart review done. HM updated. Immunizations reviewed & updated. Care Everywhere updated.

## 2023-02-01 VITALS
HEART RATE: 74 BPM | SYSTOLIC BLOOD PRESSURE: 109 MMHG | HEIGHT: 65 IN | WEIGHT: 146.38 LBS | DIASTOLIC BLOOD PRESSURE: 61 MMHG | BODY MASS INDEX: 24.39 KG/M2 | TEMPERATURE: 97 F | OXYGEN SATURATION: 98 % | RESPIRATION RATE: 17 BRPM

## 2023-02-01 DIAGNOSIS — R10.9 ABDOMINAL PAIN, UNSPECIFIED ABDOMINAL LOCATION: Primary | ICD-10-CM

## 2023-02-01 DIAGNOSIS — I72.3 ILIAC ARTERY ANEURYSM: ICD-10-CM

## 2023-02-01 LAB
ALBUMIN SERPL BCP-MCNC: 3.4 G/DL (ref 3.5–5.2)
ALP SERPL-CCNC: 60 U/L (ref 55–135)
ALT SERPL W/O P-5'-P-CCNC: 23 U/L (ref 10–44)
ANION GAP SERPL CALC-SCNC: 12 MMOL/L (ref 8–16)
AST SERPL-CCNC: 19 U/L (ref 10–40)
BASOPHILS # BLD AUTO: 0.01 K/UL (ref 0–0.2)
BASOPHILS NFR BLD: 0.2 % (ref 0–1.9)
BILIRUB SERPL-MCNC: 1.2 MG/DL (ref 0.1–1)
BUN SERPL-MCNC: 16 MG/DL (ref 8–23)
CALCIUM SERPL-MCNC: 8.5 MG/DL (ref 8.7–10.5)
CHLORIDE SERPL-SCNC: 107 MMOL/L (ref 95–110)
CO2 SERPL-SCNC: 20 MMOL/L (ref 23–29)
CREAT SERPL-MCNC: 0.8 MG/DL (ref 0.5–1.4)
DIFFERENTIAL METHOD: ABNORMAL
EOSINOPHIL # BLD AUTO: 0 K/UL (ref 0–0.5)
EOSINOPHIL NFR BLD: 0 % (ref 0–8)
ERYTHROCYTE [DISTWIDTH] IN BLOOD BY AUTOMATED COUNT: 13.3 % (ref 11.5–14.5)
EST. GFR  (NO RACE VARIABLE): >60 ML/MIN/1.73 M^2
GLUCOSE SERPL-MCNC: 130 MG/DL (ref 70–110)
HCT VFR BLD AUTO: 36.3 % (ref 40–54)
HGB BLD-MCNC: 11.7 G/DL (ref 14–18)
IMM GRANULOCYTES # BLD AUTO: 0.02 K/UL (ref 0–0.04)
IMM GRANULOCYTES NFR BLD AUTO: 0.3 % (ref 0–0.5)
LYMPHOCYTES # BLD AUTO: 0.5 K/UL (ref 1–4.8)
LYMPHOCYTES NFR BLD: 7 % (ref 18–48)
MCH RBC QN AUTO: 29.5 PG (ref 27–31)
MCHC RBC AUTO-ENTMCNC: 32.2 G/DL (ref 32–36)
MCV RBC AUTO: 92 FL (ref 82–98)
MONOCYTES # BLD AUTO: 0.3 K/UL (ref 0.3–1)
MONOCYTES NFR BLD: 4.3 % (ref 4–15)
NEUTROPHILS # BLD AUTO: 5.8 K/UL (ref 1.8–7.7)
NEUTROPHILS NFR BLD: 88.2 % (ref 38–73)
NRBC BLD-RTO: 0 /100 WBC
PLATELET # BLD AUTO: 148 K/UL (ref 150–450)
PMV BLD AUTO: 11.6 FL (ref 9.2–12.9)
POC ACTIVATED CLOTTING TIME K: 149 SEC (ref 74–137)
POC ACTIVATED CLOTTING TIME K: 245 SEC (ref 74–137)
POC ACTIVATED CLOTTING TIME K: 245 SEC (ref 74–137)
POC ACTIVATED CLOTTING TIME K: 251 SEC (ref 74–137)
POC ACTIVATED CLOTTING TIME K: 275 SEC (ref 74–137)
POC ACTIVATED CLOTTING TIME K: 275 SEC (ref 74–137)
POC ACTIVATED CLOTTING TIME K: 281 SEC (ref 74–137)
POTASSIUM SERPL-SCNC: 3.8 MMOL/L (ref 3.5–5.1)
PROT SERPL-MCNC: 5.9 G/DL (ref 6–8.4)
RBC # BLD AUTO: 3.96 M/UL (ref 4.6–6.2)
SAMPLE: ABNORMAL
SODIUM SERPL-SCNC: 139 MMOL/L (ref 136–145)
WBC # BLD AUTO: 6.54 K/UL (ref 3.9–12.7)

## 2023-02-01 PROCEDURE — 94761 N-INVAS EAR/PLS OXIMETRY MLT: CPT

## 2023-02-01 PROCEDURE — 85025 COMPLETE CBC W/AUTO DIFF WBC: CPT | Performed by: STUDENT IN AN ORGANIZED HEALTH CARE EDUCATION/TRAINING PROGRAM

## 2023-02-01 PROCEDURE — 25000003 PHARM REV CODE 250: Performed by: STUDENT IN AN ORGANIZED HEALTH CARE EDUCATION/TRAINING PROGRAM

## 2023-02-01 PROCEDURE — 80053 COMPREHEN METABOLIC PANEL: CPT | Performed by: STUDENT IN AN ORGANIZED HEALTH CARE EDUCATION/TRAINING PROGRAM

## 2023-02-01 PROCEDURE — 36415 COLL VENOUS BLD VENIPUNCTURE: CPT | Performed by: STUDENT IN AN ORGANIZED HEALTH CARE EDUCATION/TRAINING PROGRAM

## 2023-02-01 PROCEDURE — 25000003 PHARM REV CODE 250

## 2023-02-01 RX ORDER — DEXTROMETHORPHAN HYDROBROMIDE, GUAIFENESIN 5; 100 MG/5ML; MG/5ML
650 LIQUID ORAL EVERY 8 HOURS
Qty: 21 TABLET | Refills: 0 | Status: SHIPPED | OUTPATIENT
Start: 2023-02-01 | End: 2023-09-19 | Stop reason: ALTCHOICE

## 2023-02-01 RX ORDER — DIPHENHYDRAMINE HCL 25 MG
25 CAPSULE ORAL EVERY 6 HOURS PRN
Status: DISCONTINUED | OUTPATIENT
Start: 2023-02-01 | End: 2023-02-01 | Stop reason: HOSPADM

## 2023-02-01 RX ADMIN — ATORVASTATIN CALCIUM 40 MG: 40 TABLET, FILM COATED ORAL at 09:02

## 2023-02-01 RX ADMIN — PANTOPRAZOLE SODIUM 40 MG: 40 TABLET, DELAYED RELEASE ORAL at 09:02

## 2023-02-01 RX ADMIN — MUPIROCIN: 20 OINTMENT TOPICAL at 09:02

## 2023-02-01 RX ADMIN — ASPIRIN 81 MG: 81 TABLET, COATED ORAL at 09:02

## 2023-02-01 RX ADMIN — GABAPENTIN 300 MG: 300 CAPSULE ORAL at 09:02

## 2023-02-01 RX ADMIN — DIPHENHYDRAMINE HYDROCHLORIDE 25 MG: 25 CAPSULE ORAL at 12:02

## 2023-02-01 NOTE — DISCHARGE INSTRUCTIONS
VASCULAR SURGERY DISCHARGE INSTRUCTIONS    Woundcare:  - Keep incisions covered in clean gauze and tape.  Shower daily and pad your incision site dry  - Leave sutures in place  - It is normal to notice some bruising at your incision site in the first 1-3 days after surgery    Activity:  - Activity is good for you. Try to walk frequently and increase walking distance every day  - No heavy lifting for 2 weeks  - No baths, swimming in pools, lakes, jacuzzi etc. for 2 weeks     Diet:  -Resume your pre-operative home diet    Follow up:  -Follow up for ultrasound and vascular surgery clinic appointment in ~2 weeks    Call Vascular Surgery Office at [phone] if you experience:  -Increased redness, warmth, tenderness, or draining pus from your incision  -Increased pain/swelling at your incision  -Worsening fevers, chills, nausea/vomiting  -Pain, weakness, coldness, or numbness in your legs  -Uncontrolled pain  -Your call will be returned within 24 hours and further instructions will be provided    Go to ER/Urgent Care if you experience:  -Worsening shortness of breath or chest pain  -Sudden severe pain and swelling at your incision site

## 2023-02-01 NOTE — HOSPITAL COURSE
For details of hospital stay, please refer to daily progress notes. Briefly, this is a 77 y.o. male presented on presented on 1/31/23 for planned surgical intervention for right iliac aneurysm. Patient was taken to OR and underwent right iliac branch endoprosthesis and stent placement. Post-operatively patient was admitted to the floor and had an uncomplicated hospital recovery.     On the day of discharge, the patient was ambulating without difficulty, voiding spontaneously, was tolerating a diet without nausea or vomiting, and pain was well controlled on PO pain medications. Discharge instructions were explained to the patient and appropriate follow-up was arranged.

## 2023-02-01 NOTE — NURSING TRANSFER
Nursing Transfer Note      1/31/2023     Reason patient is being transferred: post anesthesia     Transfer From: PACU to 1054    Transfer via bed    Transfer with n/a    Transported by transport tech    Medicines sent: none    Any special needs or follow-up needed: none    Chart send with patient: Yes    Notified: spouse

## 2023-02-01 NOTE — NURSING
Pt d/c to home. Medication and d/c instructions given to pt and wife at bedside. Verbalized understanding. Ivs out, cath intact. Bilateral groin sites c/d/I. Belongings packed, waiting for transport to take pt to private vehicle outside hospital.

## 2023-02-01 NOTE — DISCHARGE SUMMARY
[facility]  Vascular Surgery  Discharge Summary      Patient Name: Reed Torres  MRN: [id number]  Admission Date: 1/31/2023  Hospital Length of Stay: 1 days  Discharge Date and Time:  02/01/2023 9:28 AM  Attending Physician: Mario Springer MD   Discharging Provider: Hilda Barreto NP  Primary Care Provider: Pj Gillette MD    HPI:   HPI:  Reed Torres is a 77 y.o. male with       Patient Active Problem List   Diagnosis    Hyperlipidemia    Tuberculosis exposure    BPH (benign prostatic hyperplasia)    Body mass index (BMI) of 23.0-23.9 in adult    Osteoarthritis of lumbar spine    Hyperbilirubinemia, long standing, favor GILBERT    Body water dehydration    Abnormal stress test    Anxiety disorder    SVT (supraventricular tachycardia)    Pulmonary emphysema    Coronary artery disease due to lipid rich plaque    PAF (paroxysmal atrial fibrillation)    Pure hypercholesterolemia    Aortic atherosclerosis    Aneurysm of right common iliac artery    Thrombocytopenia, unspecified    being managed by PCP and specialists who is here today for evaluation of mesenteric ischemia.  Pt with c/o LUQ and L chest discomfort intermittent without known triggers.  Patient states location is LUQ and L chest under breastbone occurring for a few months.  Associated signs and symptoms include belching.  No food fear or significant weight loss.  Quality is pressure and severity is 3/10.  Symptoms began a few mo ago.  Alleviating factors include activity.  Worsening factors include none.     no MI  no Stroke  Tobacco use: denies     1/2021:  States he has symptoms of belching and bloating.  Denies food fear and weight loss.  Symptoms are worsened when he bends forward.  States he has not seen GI in about 4 months where he was diagnosed with reflux although his dosage of his medication has been decreased.  Denies pelvic pain or abdominal pain or back pain.  No functional limitation or leg pain.     7/2021:  C/o  LUQ pain, R thigh intermittent pain, no abd or pelvic pain.  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     2022:  No complaints.     10/2022:  no new issues.  No pelvic or abd pain.          Past Medical History:   Diagnosis Date    Coronary artery disease due to lipid rich plaque 5/3/2022    Hyperlipidemia      Pulmonary emphysema 2021    Tuberculosis exposure       Post treatment            Past Surgical History:   Procedure Laterality Date    APPENDECTOMY        LEFT HEART CATHETERIZATION Left 2020     Procedure: Left heart cath;  Surgeon: Keenan Avila MD;  Location: [facility] CATH LAB;  Service: Cardiology;  Laterality: Left;  RN PRE OP 2020.---COVID NEGATIVE ON-- 2020. [place]    SHOULDER ARTHROSCOPY W/ ROTATOR CUFF REPAIR         Left shoulder            Family History   Problem Relation Age of Onset    COPD Mother      Diabetes Mother      Hypertension Mother      COPD Father      Heart disease Brother        Social History            Socioeconomic History    Marital status:    Tobacco Use    Smoking status: Former       Types: Cigarettes       Quit date:        Years since quittin.8    Smokeless tobacco: Never    Tobacco comments:       stopped smoking 20 yrs ago.   Substance and Sexual Activity    Alcohol use: Not Currently       Alcohol/week: 5.0 standard drinks       Types: 6 Standard drinks or equivalent per week    Drug use: No    Sexual activity: Yes       Partners: Female       Birth control/protection: None   Social History Narrative     Still doing lawn service         Current Outpatient Medications:     amiodarone (PACERONE) 200 MG Tab, Take 1 tablet (200 mg total) by mouth once daily., Disp: 90 tablet, Rfl: 3    apixaban (ELIQUIS) 5 mg Tab, Take 1 tablet (5 mg total) by mouth 2 (two) times daily., Disp: 60 tablet, Rfl: 11    aspirin (ECOTRIN) 81 MG EC tablet, Take 81 mg by mouth once daily., Disp: , Rfl:     atorvastatin (LIPITOR) 40 MG  tablet, TAKE 1 TABLET BY MOUTH EVERY DAY, Disp: 90 tablet, Rfl: 3    dicyclomine (BENTYL) 20 mg tablet, Take 1 tablet (20 mg total) by mouth 2 (two) times daily., Disp: 20 tablet, Rfl: 0    fluticasone propionate (FLONASE) 50 mcg/actuation nasal spray, 1 spray (50 mcg total) by Each Nostril route 2 (two) times daily., Disp: 18.2 mL, Rfl: 3    gabapentin (NEURONTIN) 300 MG capsule, TAKE 1-3 CAPSULES BY MOUTH EVERY EVENING, Disp: 90 capsule, Rfl: 11    latanoprost 0.005 % ophthalmic solution, , Disp: , Rfl:     ondansetron (ZOFRAN-ODT) 4 MG TbDL, Take 1 tablet (4 mg total) by mouth every 6 (six) hours as needed (Nausea)., Disp: 20 tablet, Rfl: 0    pantoprazole (PROTONIX) 40 MG tablet, Take 1 tablet (40 mg total) by mouth once daily., Disp: 30 tablet, Rfl: 1     Current Facility-Administered Medications:     sodium chloride 0.9% flush 3 mL, 3 mL, Intravenous, Q8H PRN, Keenan Avila MD     REVIEW OF SYSTEMS:  General: No fevers or chills; ENT: No sore throat; Allergy and Immunology: no persistent infections; Hematological and Lymphatic: No history of bleeding or easy bruising; Endocrine: negative; Respiratory: no cough, shortness of breath, or wheezing; Cardiovascular: no chest pain or dyspnea on exertion; Gastrointestinal: no abdominal pain/back, change in bowel habits, or bloody stools; Genito-Urinary: no dysuria, trouble voiding, or hematuria; Musculoskeletal: negative; Neurological: no TIA or stroke symptoms; Psychiatric: no nervousness, anxiety or depression.     PHYSICAL EXAM:        General appearance:      Alert, well-appearing, and in no distress.  Oriented to person, place, and time                    Neurological:      Normal speech, no focal findings noted; CN II - XII grossly intact. RLE with sensation to light touch, LLE with sensation to light touch.            Musculoskeletal:      Digits/nail without cyanosis/clubbing.  Strength 5/5 BLE.                                  Neck:  Supple, no  significant adenopathy, no carotid bruit can be auscultated                                Chest:   Clear to auscultation, no wheezes, rales or rhonchi, symmetric air entry. No use of accessory muscles                             Cardiac:   Normal rate and regular rhythm, S1 and S2 normal                          Abdomen:   Soft, min epigastric tenderness, nondistended, no masses or organomegaly, no hernia                                               No rebound tenderness noted; bowel sounds normal                                               Pulsatile aortic mass is non palpable.                                               No groin adenopathy                    Extremities:      2+ R femoral pulse, 2+ L femoral pulse                                               2+ R popliteal pulse, 2+ L popliteal pulse                                               2+ R PT pulse, 2+ L PT pulse                                               2+ R DP pulse, 2+ L DP pulse                                               no RLE edema, no LLE edema                                Skin:      RLE without tissue loss; LLE without tissue loss     LAB RESULTS:  No results found for: CBC        Lab Results   Component Value Date     LABPROT 10.7 07/20/2020     INR 1.0 07/20/2020            Lab Results   Component Value Date      09/27/2022     K 3.2 (L) 09/27/2022      09/27/2022     CO2 23 09/27/2022     GLU 93 09/27/2022     BUN 11 09/27/2022     CREATININE 0.8 09/27/2022     CALCIUM 8.6 (L) 09/27/2022     ANIONGAP 8 09/27/2022     EGFRNONAA >60 07/03/2022            Lab Results   Component Value Date     WBC 2.96 (L) 09/27/2022     RBC 4.48 (L) 09/27/2022     HGB 13.4 (L) 09/27/2022     HCT 40.1 09/27/2022     MCV 90 09/27/2022     MCH 29.9 09/27/2022     MCHC 33.4 09/27/2022     RDW 13.8 09/27/2022      (L) 09/27/2022     MPV 11.3 09/27/2022     GRAN 1.7 (L) 09/27/2022     GRAN 57.3 09/27/2022     LYMPH 0.9 (L)  09/27/2022     LYMPH 30.7 09/27/2022     MONO 0.3 09/27/2022     MONO 11.1 09/27/2022     EOS 0.0 09/27/2022     BASO 0.01 09/27/2022     EOSINOPHIL 0.3 09/27/2022     BASOPHIL 0.3 09/27/2022     DIFFMETHOD Automated 09/27/2022      .        Lab Results   Component Value Date     HGBA1C 5.1 03/03/2022         IMAGING:  All pertinent imaging has been reviewed and interpreted independently.     -BLE arterial US negative for popliteal aneurysm     IMP/PLAN:  77 y.o. male with       Patient Active Problem List   Diagnosis    Hyperlipidemia    Tuberculosis exposure    BPH (benign prostatic hyperplasia)    Body mass index (BMI) of 23.0-23.9 in adult    Osteoarthritis of lumbar spine    Hyperbilirubinemia, long standing, favor GILBERT    Body water dehydration    Abnormal stress test    Anxiety disorder    SVT (supraventricular tachycardia)    Pulmonary emphysema    Coronary artery disease due to lipid rich plaque    PAF (paroxysmal atrial fibrillation)    Pure hypercholesterolemia    Aortic atherosclerosis    Aneurysm of right common iliac artery    Thrombocytopenia, unspecified    being managed by PCP and specialists who is here today for evaluation of R ROJELIO aneurysm.     - Mesenteric US without HD significant stenosis.  Recommend continuing evaluation and management for etiology of left upper quadrant by PCP and GI.    -Repeat evaluation by Cardiology due to moderate reversible defect on stress test in light of possible future aneurysm repair - no further tests or interventions recommended  -right common iliac artery aneurysm 3.9 cm 2021 (3.6 2020), asymptomatic- rec CTA A/P and likely RONAK  -recommend cont daily aspirin, continue blood pressure control heart healthy lifestyle     I spent 12 minutes evaluating this patient and greater than 50% of the time was spent counseling, coordinator care and discussing the plan of care.  All questions were answered and patient stated understanding with agreement  with the above treatment plan.           Procedure(s) (LRB):  REPAIR-ANEURYSM-ILIAC BRANCH ENDOPROSTHESIS-ENDOVASCULAR (Bilateral)     Hospital Course: For details of hospital stay, please refer to daily progress notes. Briefly, this is a 77 y.o. male presented on presented on 1/31/23 for planned surgical intervention for right iliac aneurysm. Patient was taken to OR and underwent right iliac branch endoprosthesis and stent placement. Post-operatively patient was admitted to the floor and had an uncomplicated hospital recovery.     On the day of discharge, the patient was ambulating without difficulty, voiding spontaneously, was tolerating a diet without nausea or vomiting, and pain was well controlled on PO pain medications. Discharge instructions were explained to the patient and appropriate follow-up was arranged.           Goals of Care Treatment Preferences:  Code Status: Full Code    PHYSICAL EXAM:        General appearance:      Alert, well-appearing, and in no distress.  Oriented to person, place, and time                    Neurological:      Normal speech, no focal findings noted; CN II - XII grossly intact. RLE with sensation to light touch, LLE with sensation to light touch.            Musculoskeletal:      Digits/nail without cyanosis/clubbing.  Strength 5/5 BLE.                                  Neck:  Supple, no significant adenopathy, no carotid bruit can be auscultated                                Chest:   Clear to auscultation, no wheezes, rales or rhonchi, symmetric air entry. No use of accessory muscles                             Cardiac:   Normal rate and regular rhythm, S1 and S2 normal                          Abdomen:   Soft, min epigastric tenderness, nondistended, no masses or organomegaly, no hernia                                               No rebound tenderness noted; bowel sounds normal                                               Pulsatile aortic mass is non  palpable.                                               No groin adenopathy                    Extremities:      2+ R femoral pulse, 2+ L femoral pulse                                               2+ R popliteal pulse, 2+ L popliteal pulse                                               2+ R PT pulse, 2+ L PT pulse                                               2+ R DP pulse, 2+ L DP pulse                                               no RLE edema, no LLE edema                                Skin:      RLE without tissue loss; LLE without tissue loss                                     Bilateral groin incisions, c/d/i  Consults:     Significant Diagnostic Studies: Labs:   CMP   Recent Labs   Lab 02/01/23  0239      K 3.8      CO2 20*   *   BUN 16   CREATININE 0.8   CALCIUM 8.5*   PROT 5.9*   ALBUMIN 3.4*   BILITOT 1.2*   ALKPHOS 60   AST 19   ALT 23   ANIONGAP 12    and CBC   Recent Labs   Lab 02/01/23  0239   WBC 6.54   HGB 11.7*   HCT 36.3*   *       Pending Diagnostic Studies:     None        Final Active Diagnoses:    Diagnosis Date Noted POA    PRINCIPAL PROBLEM:  Aneurysm of right common iliac artery [I72.3] 05/03/2022 Yes      Problems Resolved During this Admission:      Discharged Condition: good    Disposition: Home or Self Care    Follow Up:   Follow-up Information     Mario Springer MD Follow up in 4 week(s).    Specialties: Vascular Surgery, Surgery  Contact information:  [address]  [address] [place] [id number] [phone]                         Patient Instructions:      Notify your health care provider if you experience any of the following:  temperature >100.4     Notify your health care provider if you experience any of the following:  persistent nausea and vomiting or diarrhea     Notify your health care provider if you experience any of the following:  severe uncontrolled pain     Notify your health care provider if you experience any of the following:   redness, tenderness, or signs of infection (pain, swelling, redness, odor or green/yellow discharge around incision site)     Notify your health care provider if you experience any of the following:  difficulty breathing or increased cough     Notify your health care provider if you experience any of the following:  severe persistent headache     Notify your health care provider if you experience any of the following:     Notify your health care provider if you experience any of the following:  increased confusion or weakness     Notify your health care provider if you experience any of the following:  persistent dizziness, light-headedness, or visual disturbances     Notify your health care provider if you experience any of the following:  worsening rash     Change dressing (specify)   Order Comments: Keep incision sites clean and dry at all times.  Cover with clean gauze and tape.  Shower daily.     Activity as tolerated     Medications:  Reconciled Home Medications:      Medication List      START taking these medications    acetaminophen 650 MG Tbsr  Commonly known as: TYLENOL  Take 1 tablet (650 mg total) by mouth every 8 (eight) hours.        CHANGE how you take these medications    amiodarone 200 MG Tab  Commonly known as: PACERONE  Take 1 tablet (200 mg total) by mouth once daily.  What changed: when to take this     atorvastatin 40 MG tablet  Commonly known as: LIPITOR  TAKE 1 TABLET BY MOUTH EVERY DAY  What changed:   · how much to take  · when to take this     pantoprazole 40 MG tablet  Commonly known as: PROTONIX  Take 1 tablet (40 mg total) by mouth once daily.  What changed: when to take this        CONTINUE taking these medications    apixaban 5 mg Tab  Commonly known as: ELIQUIS  Take 1 tablet (5 mg total) by mouth 2 (two) times daily.     aspirin 81 MG EC tablet  Commonly known as: ECOTRIN  Take 81 mg by mouth every morning.     gabapentin 300 MG capsule  Commonly known as: NEURONTIN  TAKE 1-3  CAPSULES BY MOUTH EVERY EVENING     latanoprost 0.005 % ophthalmic solution  Place into both eyes every evening.     OCUVITE PRESERVISION ORAL  Take by mouth 2 (two) times a day.            Hilda Barreto NP  Vascular Surgery  [facility]

## 2023-02-01 NOTE — ASSESSMENT & PLAN NOTE
Mr. Torres is a 77-year old male with anuersym of right common iliac artery who is now s/p endovascular aneurysm repair on 1/31.    -Ambulate frequently  -Cardiac diet  -Keep incisions clean and dry, covered with gauze  -Shower daily  -Asa/statin  DC: f/u four weeks with CTA abd/ pelvis

## 2023-02-01 NOTE — PROGRESS NOTES
Pt c/o itching to back, patient denies allergies, pt with no s.o.b at this time. Patient arrived to floor from pacu at 2020 and chest was red but no c/o itching at that time. Call placed to on call MD, spoke with Dr. Ricardo, updated on redness to chest and back, and warmth to body, and no s.o.b. orders rec'd for benadryl prn, see mar for admin.

## 2023-02-01 NOTE — NURSING
Bilateral groin incisions gauze with tegaderm c/d/I. No bleeding or hematoma noted. Small area to right groin with noted bruising that was marked to monitor. No issues noted. Neurovascular checks +2 bilateral lower extremities, sensation intact with no numbness or tingling verbalized. Bustos pulled at 0600 with BRAD Rocha night shift. Waiting for pt to void prior to discharge. Urinal provided and educated pt on importance of urinating post anesthesia/bustos removal before going home. Pt DTV by 12pm. Side rails up x2, call light in reach, pt wife at bedside. Will update and continue to monitor.

## 2023-02-01 NOTE — PLAN OF CARE
Problem: Adult Inpatient Plan of Care  Goal: Plan of Care Review  2/1/2023 1019 by Gena Odom RN  Outcome: Met  2/1/2023 1019 by Gena Odom RN  Outcome: Ongoing, Progressing  Goal: Patient-Specific Goal (Individualized)  2/1/2023 1019 by Gena Odom RN  Outcome: Met  2/1/2023 1019 by Gena Odom RN  Outcome: Ongoing, Progressing  Goal: Absence of Hospital-Acquired Illness or Injury  2/1/2023 1019 by Gena Odom RN  Outcome: Met  2/1/2023 1019 by Gena Odom RN  Outcome: Ongoing, Progressing  Goal: Optimal Comfort and Wellbeing  2/1/2023 1019 by Gena Odom RN  Outcome: Met  2/1/2023 1019 by Gena Odom RN  Outcome: Ongoing, Progressing  Goal: Readiness for Transition of Care  2/1/2023 1019 by Gena Odom RN  Outcome: Met  2/1/2023 1019 by Gena Odom RN  Outcome: Ongoing, Progressing

## 2023-02-02 DIAGNOSIS — I72.3 ANEURYSM OF RIGHT COMMON ILIAC ARTERY: Primary | ICD-10-CM

## 2023-02-02 PROCEDURE — 96374 THER/PROPH/DIAG INJ IV PUSH: CPT

## 2023-02-02 PROCEDURE — 96375 TX/PRO/DX INJ NEW DRUG ADDON: CPT

## 2023-02-02 PROCEDURE — 99284 EMERGENCY DEPT VISIT MOD MDM: CPT | Mod: 25

## 2023-02-02 NOTE — OP NOTE
Surgery Date: 1/31/2023      Surgeon(s) and Role:     * Mario Springer MD - Primary        * Guillaume Alexis MD - Co Surgeon     Assistant:     * Teddy Thorne MD - Resident      Pre-op Diagnosis:    Right common Iliac artery aneurysm      Post-op Diagnosis:  Post-Op Diagnosis Codes:     * Iliac artery aneurysm [I72.3]     Procedure(s) (LRB):  REPAIR-ANEURYSM-ILIAC BRANCH ENDOPROSTHESIS-ENDOVASCULAR (Right)  Aortic cuff placement 26 x 3.5cm R common iliac artery   Aortic cuff placement 26 x 3.5cm R common iliac artery  Aortogram  R iliac angiogram  Perclose technique bilateral CFA   VBX 11mm x 79 stent R internal iliac artery     Procedure in detail:  Patient was seen preoperatively by anesthesia was deemed stable for surgery.  He is brought to the operating room placed supine on the operating room table.  Nuñez catheter was placed as well as appropriate IVs and arterial line.  The patient was intubated and general anesthesia was induced.  Patient was prepped and draped in sterile fashion a time-out performed.  Ultrasound guidance was used to access the bilateral common femoral arteries with micropuncture and Perclose technique.  Two ProGlides were used for each common femoral artery.  We upsized our sheaths and then performed iliac branch endoprosthesis repair of the right common iliac artery aneurysm by snaring of wire up and over into the right femoral sheath.  We deployed our main body according to our marks.  When we are advancing the 12 Occitan sheath from the left to right side the sheath pushed the main body down words into the aneurysm sac.  This distorted our hypogastric gate anteriorly and we maneuvered the main body medially and upward slightly and used a steerable sheath to access the hypogastric artery as well as balloon angioplasty and utilization of a VBX stent in the right hypogastric artery.  We then completed the deployment of our main body.  We placed a 26 mm aortic cuff up to the level of  the aortic bifurcation.  There was persistent type 3 endoleak on aortogram despite ballooning requiring another aortic cuff between the 2 devices.  We did not need to balloon further as the endoleak was resolved on final aortogram and iliac angiogram.  Our sheath was removed and our knots were pushed down and secured bilaterally.  We held manual pressure for 10 minutes and reversed the patient with protamine half dose.  We closed the femoral incisions with 4-0 Monocryl and Dermabond.  We placed sterile dressings and the patient had palpable DP pulses the conclusion of the case.  He was transferred to recovery room in stable condition.     Anesthesia: General     Operative Findings: adequate arteries for access, successful treatment of R ROJELIO aneurysm with RONAK, 11mm VBX and two 26mm aortic cuffs (3.5 cm)     Estimated Blood Loss: * No values recorded between 1/31/2023  1:05 PM and 1/31/2023  3:46 PM *     Estimated Blood Loss has been documented.         Specimens:   Specimen (24h ago, onward)        None              Implants    Intra-op Implants    Implant Name: EXCLUDER ILIAC BRANCH 14.5X10 - [id number]  DATE IMPLANTED TIME IMPLANTED IMPLANTED BY   1/31/23  1:39 PM Mario Springer MD        Entry User:  [id number]  CATRACHITO MOYA  [id number]  Implant: [id number]  EXCLUDER ILIAC BRANCH 14.5X10  [id number] Site: Arterial Model No: [id number] Manfacturer: [employer] SMDA?:     Status: Implanted Laterality: Right Serial No: [id number] Supplier: [employer] Size:     Implant Description: Right Iliac   Expiration Date: 10/17/25            Is this implant a tissue?: No                          Intra-op Implants    Implant Name: COMPONENT ILIAC INTERNAL 12X7 - [id number]  Entry User:  [id number]  CATRACHITO MOYA  [id number]  Implant: [id number]  COMPONENT ILIAC INTERNAL 12X7  [id number] Site: Arterial Model No: [id number] Manfacturer: [employer] SMDA?:     Status: Explanted Laterality: Right Serial No: [id number] Supplier: [employer] Size:     Implant Description: Right internal Iliac   Expiration Date: 2/11/24            Is this implant a tissue?: No                    DATE EXPLANTED TIME EXPLANTED EXPLANTED BY   1/31/23  2:35 PM Mario Springer MD              Intra-op Implants    Implant Name: Viabahn Balloon Expandable Endoprosthesis 11mm X 79 mm  DATE IMPLANTED TIME IMPLANTED IMPLANTED BY   1/31/23  2:37 PM Mario Springer MD        Entry User:  [id number]  GAGAN THOMAS  [id number]  Implant:  Site: Arterial Model No: [id number] Manfacturer: [employer] ([facility]) SMDA?:     Status: Implanted Laterality: Right Serial No: [id number] Supplier:  Size:     Implant Description: Right internal iliac   Expiration Date: 3/29/24            Is this implant a tissue?: No                          Intra-op Implants    Implant Name: [place] excluder AAA endoprosthesis  DATE IMPLANTED TIME IMPLANTED IMPLANTED BY   1/31/23  2:51 PM Mario Springer MD        Entry User:  [id number]  GAGAN THOMAS  [id number]  Implant:  Site: Arterial Model No: [id number] Manfacturer:  SMDA?:     Status: Implanted Laterality: Right Serial No: [id number] Supplier:  Size:     Implant Description: Right iliac   Expiration Date: 11/2/25            Is this implant a tissue?: No                          Intra-op Implants    Implant Name: [place] Excluder AAA Endoprosthesis  DATE IMPLANTED TIME IMPLANTED IMPLANTED BY   1/31/23  3:21 PM Mario Springer MD        Entry User:  [id number]  GAGAN THOMAS  [id number]  Implant:  Site: Arterial Model No: [id number] Manfacturer:  SMDA?:     Status: Implanted Laterality: Right Serial No: [id number] Supplier:  Size:     Implant Description: Right iliac   Expiration Date: 11/17/24            Is this implant a tissue?: No                          Intra-Procedure Verification Information    Staff Member Date Time   Kirti Newton RN 1/31/2023 15:52     Surgical Care Improvement Project (SCIP) Data    Is case a clinical trial?: No    Is procedure a  joint revision?: No Was this an emergency case as defined by NHSN?: No   Were all procedures laparoscopic?: No Was this a trauma case as defined by NHSN?: No   Was intentional hypothermia maintained during perioperative period?: No        Teddy Thorne MD PGY7  Vascular Surgery Fellow  [phone]

## 2023-02-02 NOTE — ANESTHESIA POSTPROCEDURE EVALUATION
Anesthesia Post Evaluation    Patient: Reed Torres    Procedure(s) Performed: Procedure(s) (LRB):  REPAIR-ANEURYSM-ILIAC BRANCH ENDOPROSTHESIS-ENDOVASCULAR (Bilateral)    Final Anesthesia Type: general      Patient location during evaluation: PACU  Patient participation: Yes- Able to Participate  Level of consciousness: awake and alert  Post-procedure vital signs: reviewed and stable  Pain management: adequate  Airway patency: patent    PONV status at discharge: No PONV  Anesthetic complications: no      Cardiovascular status: blood pressure returned to baseline  Respiratory status: spontaneous ventilation and room air  Hydration status: euvolemic  Follow-up not needed.          Vitals Value Taken Time   /61 02/01/23 0906   Temp 36.3 °C (97.3 °F) 02/01/23 0906   Pulse 74 02/01/23 0906   Resp 17 02/01/23 0906   SpO2 98 % 02/01/23 0906         Event Time   Out of Recovery 16:10:00         Pain/Nieves Score: No data recorded

## 2023-02-03 ENCOUNTER — TELEPHONE (OUTPATIENT)
Dept: FAMILY MEDICINE | Facility: CLINIC | Age: 78
End: 2023-02-03
Payer: MEDICARE

## 2023-02-03 ENCOUNTER — HOSPITAL ENCOUNTER (EMERGENCY)
Facility: HOSPITAL | Age: 78
Discharge: HOME | End: 2023-02-03
Attending: EMERGENCY MEDICINE
Payer: MEDICARE

## 2023-02-03 VITALS
HEART RATE: 70 BPM | RESPIRATION RATE: 16 BRPM | DIASTOLIC BLOOD PRESSURE: 70 MMHG | OXYGEN SATURATION: 100 % | BODY MASS INDEX: 24.32 KG/M2 | HEIGHT: 65 IN | WEIGHT: 146 LBS | SYSTOLIC BLOOD PRESSURE: 120 MMHG | TEMPERATURE: 98 F

## 2023-02-03 DIAGNOSIS — L29.9 PRURITUS: Primary | ICD-10-CM

## 2023-02-03 LAB
ALBUMIN SERPL BCP-MCNC: 3.4 G/DL (ref 3.5–5.2)
ALP SERPL-CCNC: 53 U/L (ref 55–135)
ALT SERPL W/O P-5'-P-CCNC: 18 U/L (ref 10–44)
ANION GAP SERPL CALC-SCNC: 10 MMOL/L (ref 8–16)
AST SERPL-CCNC: 14 U/L (ref 10–40)
BASOPHILS # BLD AUTO: 0.01 K/UL (ref 0–0.2)
BASOPHILS NFR BLD: 0.2 % (ref 0–1.9)
BILIRUB SERPL-MCNC: 1.6 MG/DL (ref 0.1–1)
BUN SERPL-MCNC: 14 MG/DL (ref 8–23)
CALCIUM SERPL-MCNC: 8.6 MG/DL (ref 8.7–10.5)
CHLORIDE SERPL-SCNC: 108 MMOL/L (ref 95–110)
CO2 SERPL-SCNC: 25 MMOL/L (ref 23–29)
CREAT SERPL-MCNC: 0.8 MG/DL (ref 0.5–1.4)
DIFFERENTIAL METHOD: ABNORMAL
EOSINOPHIL # BLD AUTO: 0.2 K/UL (ref 0–0.5)
EOSINOPHIL NFR BLD: 4.1 % (ref 0–8)
ERYTHROCYTE [DISTWIDTH] IN BLOOD BY AUTOMATED COUNT: 13.6 % (ref 11.5–14.5)
EST. GFR  (NO RACE VARIABLE): >60 ML/MIN/1.73 M^2
GLUCOSE SERPL-MCNC: 94 MG/DL (ref 70–110)
HCT VFR BLD AUTO: 33.2 % (ref 40–54)
HGB BLD-MCNC: 11.2 G/DL (ref 14–18)
IMM GRANULOCYTES # BLD AUTO: 0.01 K/UL (ref 0–0.04)
IMM GRANULOCYTES NFR BLD AUTO: 0.2 % (ref 0–0.5)
LYMPHOCYTES # BLD AUTO: 1.2 K/UL (ref 1–4.8)
LYMPHOCYTES NFR BLD: 20.4 % (ref 18–48)
MCH RBC QN AUTO: 30.4 PG (ref 27–31)
MCHC RBC AUTO-ENTMCNC: 33.7 G/DL (ref 32–36)
MCV RBC AUTO: 90 FL (ref 82–98)
MONOCYTES # BLD AUTO: 0.5 K/UL (ref 0.3–1)
MONOCYTES NFR BLD: 8.4 % (ref 4–15)
NEUTROPHILS # BLD AUTO: 3.9 K/UL (ref 1.8–7.7)
NEUTROPHILS NFR BLD: 66.7 % (ref 38–73)
NRBC BLD-RTO: 0 /100 WBC
PLATELET # BLD AUTO: 138 K/UL (ref 150–450)
PMV BLD AUTO: 11.6 FL (ref 9.2–12.9)
POTASSIUM SERPL-SCNC: 3.7 MMOL/L (ref 3.5–5.1)
PROT SERPL-MCNC: 6.2 G/DL (ref 6–8.4)
RBC # BLD AUTO: 3.68 M/UL (ref 4.6–6.2)
SODIUM SERPL-SCNC: 143 MMOL/L (ref 136–145)
WBC # BLD AUTO: 5.82 K/UL (ref 3.9–12.7)

## 2023-02-03 PROCEDURE — 25000003 PHARM REV CODE 250: Performed by: EMERGENCY MEDICINE

## 2023-02-03 PROCEDURE — 63600175 PHARM REV CODE 636 W HCPCS: Performed by: EMERGENCY MEDICINE

## 2023-02-03 PROCEDURE — 85025 COMPLETE CBC W/AUTO DIFF WBC: CPT | Performed by: EMERGENCY MEDICINE

## 2023-02-03 PROCEDURE — 80053 COMPREHEN METABOLIC PANEL: CPT | Performed by: EMERGENCY MEDICINE

## 2023-02-03 RX ORDER — FAMOTIDINE 20 MG/1
20 TABLET, FILM COATED ORAL 2 TIMES DAILY
Qty: 14 TABLET | Refills: 0 | Status: SHIPPED | OUTPATIENT
Start: 2023-02-03 | End: 2023-02-10

## 2023-02-03 RX ORDER — FAMOTIDINE 20 MG/1
20 TABLET, FILM COATED ORAL
Status: COMPLETED | OUTPATIENT
Start: 2023-02-03 | End: 2023-02-03

## 2023-02-03 RX ORDER — TRIAMCINOLONE ACETONIDE 1 MG/G
OINTMENT TOPICAL 2 TIMES DAILY
Qty: 30 G | Refills: 0 | Status: SHIPPED | OUTPATIENT
Start: 2023-02-03 | End: 2023-09-19 | Stop reason: ALTCHOICE

## 2023-02-03 RX ORDER — DIPHENHYDRAMINE HYDROCHLORIDE 50 MG/ML
25 INJECTION INTRAMUSCULAR; INTRAVENOUS
Status: COMPLETED | OUTPATIENT
Start: 2023-02-03 | End: 2023-02-03

## 2023-02-03 RX ORDER — LORATADINE 10 MG/1
10 TABLET ORAL EVERY MORNING
Qty: 7 TABLET | Refills: 0 | Status: SHIPPED | OUTPATIENT
Start: 2023-02-03 | End: 2023-02-10

## 2023-02-03 RX ORDER — HYDROXYZINE PAMOATE 50 MG/1
50 CAPSULE ORAL NIGHTLY PRN
Qty: 30 CAPSULE | Refills: 0 | Status: SHIPPED | OUTPATIENT
Start: 2023-02-03 | End: 2023-09-19 | Stop reason: ALTCHOICE

## 2023-02-03 RX ORDER — METHYLPREDNISOLONE SOD SUCC 125 MG
125 VIAL (EA) INJECTION
Status: COMPLETED | OUTPATIENT
Start: 2023-02-03 | End: 2023-02-03

## 2023-02-03 RX ORDER — PREDNISONE 20 MG/1
40 TABLET ORAL DAILY
Qty: 10 TABLET | Refills: 0 | Status: SHIPPED | OUTPATIENT
Start: 2023-02-03 | End: 2023-02-08

## 2023-02-03 RX ADMIN — METHYLPREDNISOLONE SODIUM SUCCINATE 125 MG: 125 INJECTION, POWDER, FOR SOLUTION INTRAMUSCULAR; INTRAVENOUS at 01:02

## 2023-02-03 RX ADMIN — FAMOTIDINE 20 MG: 20 TABLET, FILM COATED ORAL at 01:02

## 2023-02-03 RX ADMIN — DIPHENHYDRAMINE HYDROCHLORIDE 25 MG: 50 INJECTION, SOLUTION INTRAMUSCULAR; INTRAVENOUS at 01:02

## 2023-02-03 NOTE — ED PROVIDER NOTES
Encounter Date: 2/2/2023    SCRIBE #1 NOTE: I, Medardo Simons, am scribing for, and in the presence of,  Yamile Kelley MD. I have scribed the following portions of the note - Other sections scribed: HPI, ROS, PE.     History     Chief Complaint   Patient presents with    Itching     All over and flushed since 1/31 after having stents placed. Denies new prescriptions. Using benadryl without relief.      Reed Torres is a 77 y.o. male, with multiple medical problems including CAD, HLD,  GERD, right common iliac artery aneurysm sp stents 01/31/2023, who presents to the ED c/o acute itching and rash of upper back and upper chest that gegan 3 days ago.  Patient states he had just undergone aneurysm repair procedure and was still in the hospital ([facility] - Dr Springer vascular surgeon) when the symptoms began.  Patient states he was given antibiotics amongst other medications during the procedure and was given Benadryl when he complained of itching during that hospital admission. He reports improved symptoms thereafter but states itching recurred within hours of leaving the hospital.  He reports taking OTC Benadryl by mouth with 2 hours of relief. Notes use of Benadryl 2x a day. Patient states Benadryl no longer brings relief, which prompted ED visit. Last dose of Benadryl was 2 pills given at 11 PM.   Denies any significant pain or swelling at the surgical site of recent aneurysm repair and states f/u appointment with Dr Springer is scheduled in March. He also mentions having nasal congestion, and productive cough with white sputum x 2 weeks.  Denies tobacco use.  Denies rhinorrhea, post nasal drip, nausea, vomiting, light headedness, abdominal pain, chest pain, shortness of breath, trouble swallowing, or fever.        Chart review: medications given during procedure on 1/31/23:  Glycopyrrolate, ephedrine, fentanyl, propofol, rocuronium, Ancef, Decadron, protamine, phenylephrine and fentanyl.      The history is  provided by the patient and the spouse.   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     Past Surgical History:   Procedure Laterality Date    ABDOMINAL AORTIC ANEURYSM REPAIR, ENDOVASCULAR Bilateral 2023    Procedure: REPAIR-ANEURYSM-ILIAC BRANCH ENDOPROSTHESIS-ENDOVASCULAR;  Surgeon: Mario Springer MD;  Location: [facility] OR [address] [place] [address];  Service: Vascular;  Laterality: Bilateral;  mGy: 3707.4  Fluro time: 41.4 miutes  contrast volume: 171mg  Gycm: 363.78      APPENDECTOMY      LEFT HEART CATHETERIZATION Left 2020    Procedure: Left heart cath;  Surgeon: Keenan Avila MD;  Location: [facility] CATH LAB;  Service: Cardiology;  Laterality: Left;  RN PRE OP 2020.---COVID NEGATIVE ON-- 2020. [place]    SHOULDER ARTHROSCOPY W/ ROTATOR CUFF REPAIR      Left shoulder     Family History   Problem Relation Age of Onset    COPD Mother     Diabetes Mother     Hypertension Mother     COPD Father     Heart disease Brother      Social History     Tobacco Use    Smoking status: Former     Types: Cigarettes     Quit date:      Years since quittin.1    Smokeless tobacco: Never    Tobacco comments:     stopped smoking 20 yrs ago.   Substance Use Topics    Alcohol use: Not Currently     Alcohol/week: 5.0 standard drinks     Types: 6 Standard drinks or equivalent per week    Drug use: No     Review of Systems   Constitutional:  Negative for fever.   HENT:  Positive for congestion. Negative for drooling, facial swelling, postnasal drip, rhinorrhea, sore throat and trouble swallowing.    Respiratory:  Positive for cough. Negative for shortness of breath and stridor.    Cardiovascular:  Negative for chest pain.   Gastrointestinal:  Negative for abdominal pain, nausea  and vomiting.   Skin:  Positive for color change and rash.   Neurological:  Negative for light-headedness.   All other systems reviewed and are negative.    Physical Exam     Initial Vitals [02/03/23 0001]   BP Pulse Resp Temp SpO2   127/78 63 20 97.4 °F (36.3 °C) 100 %      MAP       --         Physical Exam    Nursing note and vitals reviewed.  Constitutional: He appears well-developed and well-nourished.   HENT:   Head: Normocephalic and atraumatic.   Right Ear: External ear normal.   Left Ear: External ear normal.   Eyes: Conjunctivae are normal.   Neck: Phonation normal. Neck supple.   Normal range of motion.  Cardiovascular:  Normal rate and intact distal pulses.           Pulmonary/Chest: Effort normal and breath sounds normal. No accessory muscle usage or stridor. No tachypnea. No respiratory distress.   Abdominal: There is no abdominal tenderness.   Musculoskeletal:         General: Normal range of motion.      Cervical back: Normal range of motion and neck supple.     Neurological: He is alert and oriented to person, place, and time.   Skin: Skin is warm and dry. Rash noted.   Maculopapular rash to the chest and upper back.   Psychiatric: He has a normal mood and affect. His behavior is normal.       ED Course   Procedures  Labs Reviewed   CBC W/ AUTO DIFFERENTIAL - Abnormal; Notable for the following components:       Result Value    RBC 3.68 (*)     Hemoglobin 11.2 (*)     Hematocrit 33.2 (*)     Platelets 138 (*)     All other components within normal limits   COMPREHENSIVE METABOLIC PANEL - Abnormal; Notable for the following components:    Calcium 8.6 (*)     Albumin 3.4 (*)     Total Bilirubin 1.6 (*)     Alkaline Phosphatase 53 (*)     All other components within normal limits          Imaging Results    None          Medications   methylPREDNISolone sodium succinate injection 125 mg (125 mg Intravenous Given 2/3/23 0123)   diphenhydrAMINE injection 25 mg (25 mg Intravenous Given 2/3/23 0123)    famotidine tablet 20 mg (20 mg Oral Given 2/3/23 0122)     Medical Decision Making:   History:   Old Medical Records: I decided to obtain old medical records.  Initial Assessment:   77 y.o. male with acute pruritus.  Differential Diagnosis:   Urticaria, dermatitis, dermatosis, scabies, liver disease, renal disease, hematopoietic disease, hyper/hypothyroidism, diabetes, others  Chart review from 7/2022 TSH low/FT4 WNL which indicated normal thryroid function  Clinical Tests:   Lab Tests: Ordered and Reviewed  ED Management:  H/H slightly decrease but greater than 10/30. Platelets mild decreased - treatment not indicated. Calcium, albumin, ALP low limit of normal. Patient given symptomatic treatment for itching and referred to dermatology and allergy/immunology clinic.    Labs Reviewed      Admission on 02/03/2023, Discharged on 02/03/2023   Component Date Value Ref Range Status    WBC 02/03/2023 5.82  3.90 - 12.70 K/uL Final    RBC 02/03/2023 3.68 (L)  4.60 - 6.20 M/uL Final    Hemoglobin 02/03/2023 11.2 (L)  14.0 - 18.0 g/dL Final    Hematocrit 02/03/2023 33.2 (L)  40.0 - 54.0 % Final    MCV 02/03/2023 90  82 - 98 fL Final    MCH 02/03/2023 30.4  27.0 - 31.0 pg Final    MCHC 02/03/2023 33.7  32.0 - 36.0 g/dL Final    RDW 02/03/2023 13.6  11.5 - 14.5 % Final    Platelets 02/03/2023 138 (L)  150 - 450 K/uL Final    MPV 02/03/2023 11.6  9.2 - 12.9 fL Final    Immature Granulocytes 02/03/2023 0.2  0.0 - 0.5 % Final    Gran # (ANC) 02/03/2023 3.9  1.8 - 7.7 K/uL Final    Immature Grans (Abs) 02/03/2023 0.01  0.00 - 0.04 K/uL Final    Comment: Mild elevation in immature granulocytes is non specific and   can be seen in a variety of conditions including stress response,   acute inflammation, trauma and pregnancy. Correlation with other   laboratory and clinical findings is essential.      Lymph # 02/03/2023 1.2  1.0 - 4.8 K/uL Final    Mono # 02/03/2023 0.5  0.3 - 1.0 K/uL Final    Eos # 02/03/2023 0.2  0.0 - 0.5 K/uL  Final    Baso # 02/03/2023 0.01  0.00 - 0.20 K/uL Final    nRBC 02/03/2023 0  0 /100 WBC Final    Gran % 02/03/2023 66.7  38.0 - 73.0 % Final    Lymph % 02/03/2023 20.4  18.0 - 48.0 % Final    Mono % 02/03/2023 8.4  4.0 - 15.0 % Final    Eosinophil % 02/03/2023 4.1  0.0 - 8.0 % Final    Basophil % 02/03/2023 0.2  0.0 - 1.9 % Final    Differential Method 02/03/2023 Automated   Final    Sodium 02/03/2023 143  136 - 145 mmol/L Final    Potassium 02/03/2023 3.7  3.5 - 5.1 mmol/L Final    Chloride 02/03/2023 108  95 - 110 mmol/L Final    CO2 02/03/2023 25  23 - 29 mmol/L Final    Glucose 02/03/2023 94  70 - 110 mg/dL Final    BUN 02/03/2023 14  8 - 23 mg/dL Final    Creatinine 02/03/2023 0.8  0.5 - 1.4 mg/dL Final    Calcium 02/03/2023 8.6 (L)  8.7 - 10.5 mg/dL Final    Total Protein 02/03/2023 6.2  6.0 - 8.4 g/dL Final    Albumin 02/03/2023 3.4 (L)  3.5 - 5.2 g/dL Final    Total Bilirubin 02/03/2023 1.6 (H)  0.1 - 1.0 mg/dL Final    Comment: For infants and newborns, interpretation of results should be based  on gestational age, weight and in agreement with clinical  observations.    Premature Infant recommended reference ranges:  Up to 24 hours.............<8.0 mg/dL  Up to 48 hours............<12.0 mg/dL  3-5 days..................<15.0 mg/dL  6-29 days.................<15.0 mg/dL      Alkaline Phosphatase 02/03/2023 53 (L)  55 - 135 U/L Final    AST 02/03/2023 14  10 - 40 U/L Final    ALT 02/03/2023 18  10 - 44 U/L Final    Anion Gap 02/03/2023 10  8 - 16 mmol/L Final    eGFR 02/03/2023 >60  >60 mL/min/1.73 m^2 Final        Imaging Reviewed    Imaging Results    None         Medications given in ED    Medications   methylPREDNISolone sodium succinate injection 125 mg (125 mg Intravenous Given 2/3/23 0123)   diphenhydrAMINE injection 25 mg (25 mg Intravenous Given 2/3/23 0123)   famotidine tablet 20 mg (20 mg Oral Given 2/3/23 0122)         Note was created using voice recognition software. Note may have occasional  typographical errors that may not have been identified and edited despite good jonah initial review prior to signing.    I, Yamile Kelley MD, personally performed the services described in this documentation. All medical record entries made by the scribe were at my direction and in my presence.  I have reviewed the chart and agree that the record reflects my personal performance and is accurate and complete.        Scribe Attestation:   Scribe #1: I performed the above scribed service and the documentation accurately describes the services I performed. I attest to the accuracy of the note.                   Clinical Impression:   Final diagnoses:  [L29.9] Pruritus (Primary)        ED Disposition Condition    Discharge Stable          ED Prescriptions       Medication Sig Dispense Start Date End Date Auth. Provider    predniSONE (DELTASONE) 20 MG tablet () Take 2 tablets (40 mg total) by mouth once daily. for 5 days 10 tablet 2/3/2023 2023 Yamile Kelley MD    triamcinolone acetonide 0.1% (KENALOG) 0.1 % ointment Apply topically 2 (two) times daily. 30 g 2/3/2023 -- Yamile Kelley MD    loratadine (CLARITIN) 10 mg tablet Take 1 tablet (10 mg total) by mouth every morning. for 7 days 7 tablet 2/3/2023 2/10/2023 Yamile Kelley MD    famotidine (PEPCID) 20 MG tablet Take 1 tablet (20 mg total) by mouth 2 (two) times daily. for 7 days 14 tablet 2/3/2023 2/10/2023 Yamile Kelley MD    hydrOXYzine pamoate (VISTARIL) 50 MG Cap Take 1 capsule (50 mg total) by mouth nightly as needed (itching and insomnia). 30 capsule 2/3/2023 -- Yamile Kelley MD          Follow-up Information       Follow up With Specialties Details Why Contact Info Additional Information    Pj Gillette MD Family Medicine Call  As needed, for ongoing care [id number] [place] [id number] [phone]       The nearest emergency department.  Go to  As needed, If symptoms worsen      Mario Springer MD Vascular Surgery, Surgery  Call today to schedule an appointment, for re-evaluation of today's complaint [address]  [address]  [place] [address]  [phone]       [facility] - Allergy/ Immunology Allergy Call today to schedule an appointment, for re-evaluation of today's complaint [address]  [facility] [address] [phone] 2nd floor    [facility] - Dermatology [address] [place] [address] Dermatology Call today to schedule an appointment, for re-evaluation of today's complaint [address]  [facility] [address] [phone] Dermatology - Main Building, Clinic 11th Floor [facility] in [facility]. Use Clinic elevators 12 & 13 to get to the 11th floor             Yamile Kelley MD  02/09/23 3378

## 2023-02-03 NOTE — TELEPHONE ENCOUNTER
Return call to patient, and he states that he was in the ER last night for itching. They gave him medication and sent him home but he is still having he itching. Patient asked if he would like to schedule an appointment. Patient given appointment on 2-, and he states that he will call his Dermatologist for an appointment.

## 2023-02-03 NOTE — TELEPHONE ENCOUNTER
----- Message from Ainsley Rodriguez sent at 2/3/2023  7:53 AM CST -----  Type: Patient Call Back    Who called: Brittany/ Spouse     What is the request in detail: Asking for a call from  as soon as possible     Can the clinic reply by [facility]? No     Would the patient rather a call back or a response via [facility]? Call     Best call back number:[phone]

## 2023-02-16 ENCOUNTER — TELEPHONE (OUTPATIENT)
Dept: VASCULAR SURGERY | Facility: CLINIC | Age: 78
End: 2023-02-16
Payer: MEDICARE

## 2023-02-16 NOTE — TELEPHONE ENCOUNTER
I was informed by Carol that she forwarded this message to Dr. Springer    ----- Message from Johana Parr sent at 2/16/2023  2:44 PM CST -----  .Type: Patient Call Back    Who called:Self    What is the request in detail: Would like to know what are his  lifting limitations after aneurism surgery    Can the clinic reply by [facility]? No    Would the patient rather a call back or a response via [facility]? Call    Best call back number:.[phone] (home)       Additional Information:

## 2023-03-02 NOTE — TELEPHONE ENCOUNTER
March 2, 2023    To Whom It May Concern:         This is confirmation that Mario Ferro attended his scheduled appointment with ARLEEN Morton on 3/02/23.  Please excuse him from school today due to an acute medical condition.         If you have any questions please do not hesitate to call me at the phone number listed below.    Sincerely,          STEVE Morton.  [phone]                   ----- Message from Yamilet Fong sent at 10/22/2019  9:12 AM CDT -----  Contact: Self  Type:  Test Results    Who Called: Self    Name of Test (Lab/Mammo/Etc): LAB/URINE    Date of Test: 10/08/2019    Ordering Provider: Dr. OZZIE Gillette    Where the test was performed: [facility]    Would the patient rather a call back or a response via [facility]? Callback    Best Call Back Number: [phone]    Additional Information:  N/A    For Clinical Team:Has the provider reviewed the results?

## 2023-03-07 ENCOUNTER — OFFICE VISIT (OUTPATIENT)
Dept: VASCULAR SURGERY | Facility: CLINIC | Age: 78
End: 2023-03-07
Payer: MEDICARE

## 2023-03-07 ENCOUNTER — HOSPITAL ENCOUNTER (OUTPATIENT)
Dept: RADIOLOGY | Facility: HOSPITAL | Age: 78
Discharge: HOME | End: 2023-03-07
Attending: SURGERY
Payer: MEDICARE

## 2023-03-07 VITALS
WEIGHT: 147.38 LBS | BODY MASS INDEX: 24.52 KG/M2 | HEART RATE: 55 BPM | DIASTOLIC BLOOD PRESSURE: 78 MMHG | SYSTOLIC BLOOD PRESSURE: 126 MMHG

## 2023-03-07 DIAGNOSIS — Z98.890 OTHER SPECIFIED POSTPROCEDURAL STATES: ICD-10-CM

## 2023-03-07 DIAGNOSIS — I72.3 ILIAC ARTERY ANEURYSM: ICD-10-CM

## 2023-03-07 DIAGNOSIS — G62.9 NEUROPATHY: Primary | ICD-10-CM

## 2023-03-07 PROCEDURE — 3074F PR MOST RECENT SYSTOLIC BLOOD PRESSURE < 130 MM HG: ICD-10-PCS | Mod: CPTII,S$GLB,, | Performed by: SURGERY

## 2023-03-07 PROCEDURE — 3074F SYST BP LT 130 MM HG: CPT | Mod: CPTII,S$GLB,, | Performed by: SURGERY

## 2023-03-07 PROCEDURE — 25500020 PHARM REV CODE 255: Performed by: SURGERY

## 2023-03-07 PROCEDURE — 3288F FALL RISK ASSESSMENT DOCD: CPT | Mod: CPTII,S$GLB,, | Performed by: SURGERY

## 2023-03-07 PROCEDURE — 99024 PR POST-OP FOLLOW-UP VISIT: ICD-10-PCS | Mod: S$GLB,,, | Performed by: SURGERY

## 2023-03-07 PROCEDURE — 74177 CT ABDOMEN PELVIS WITH CONTRAST: ICD-10-PCS | Mod: 26,,, | Performed by: RADIOLOGY

## 2023-03-07 PROCEDURE — 99999 PR PBB SHADOW E&M-EST. PATIENT-LVL III: ICD-10-PCS | Mod: PBBFAC,,, | Performed by: SURGERY

## 2023-03-07 PROCEDURE — 1126F PR PAIN SEVERITY QUANTIFIED, NO PAIN PRESENT: ICD-10-PCS | Mod: CPTII,S$GLB,, | Performed by: SURGERY

## 2023-03-07 PROCEDURE — 1101F PR PT FALLS ASSESS DOC 0-1 FALLS W/OUT INJ PAST YR: ICD-10-PCS | Mod: CPTII,S$GLB,, | Performed by: SURGERY

## 2023-03-07 PROCEDURE — 1101F PT FALLS ASSESS-DOCD LE1/YR: CPT | Mod: CPTII,S$GLB,, | Performed by: SURGERY

## 2023-03-07 PROCEDURE — 3078F DIAST BP <80 MM HG: CPT | Mod: CPTII,S$GLB,, | Performed by: SURGERY

## 2023-03-07 PROCEDURE — 1159F PR MEDICATION LIST DOCUMENTED IN MEDICAL RECORD: ICD-10-PCS | Mod: CPTII,S$GLB,, | Performed by: SURGERY

## 2023-03-07 PROCEDURE — 1159F MED LIST DOCD IN RCRD: CPT | Mod: CPTII,S$GLB,, | Performed by: SURGERY

## 2023-03-07 PROCEDURE — A9698 NON-RAD CONTRAST MATERIALNOC: HCPCS | Performed by: SURGERY

## 2023-03-07 PROCEDURE — 1126F AMNT PAIN NOTED NONE PRSNT: CPT | Mod: CPTII,S$GLB,, | Performed by: SURGERY

## 2023-03-07 PROCEDURE — 74177 CT ABD & PELVIS W/CONTRAST: CPT | Mod: TC

## 2023-03-07 PROCEDURE — 3078F PR MOST RECENT DIASTOLIC BLOOD PRESSURE < 80 MM HG: ICD-10-PCS | Mod: CPTII,S$GLB,, | Performed by: SURGERY

## 2023-03-07 PROCEDURE — 99999 PR PBB SHADOW E&M-EST. PATIENT-LVL III: CPT | Mod: PBBFAC,,, | Performed by: SURGERY

## 2023-03-07 PROCEDURE — 99024 POSTOP FOLLOW-UP VISIT: CPT | Mod: S$GLB,,, | Performed by: SURGERY

## 2023-03-07 PROCEDURE — 3288F PR FALLS RISK ASSESSMENT DOCUMENTED: ICD-10-PCS | Mod: CPTII,S$GLB,, | Performed by: SURGERY

## 2023-03-07 PROCEDURE — 74177 CT ABD & PELVIS W/CONTRAST: CPT | Mod: 26,,, | Performed by: RADIOLOGY

## 2023-03-07 RX ADMIN — BARIUM SULFATE 450 ML: 20 SUSPENSION ORAL at 09:03

## 2023-03-07 RX ADMIN — IOHEXOL 75 ML: 350 INJECTION, SOLUTION INTRAVENOUS at 09:03

## 2023-03-07 NOTE — PROGRESS NOTES
Mario Springer MD [facility]                         03/07/2023    HPI:  Reed Torres is a 78 y.o. male with   Patient Active Problem List   Diagnosis    Hyperlipidemia    Tuberculosis exposure    BPH (benign prostatic hyperplasia)    Osteoarthritis of lumbar spine    Hyperbilirubinemia, long standing, favor GILBERT    Abnormal stress test    Anxiety disorder    SVT (supraventricular tachycardia)    Pulmonary emphysema    Coronary artery disease due to lipid rich plaque    PAF (paroxysmal atrial fibrillation)    Aortic atherosclerosis    Aneurysm of right common iliac artery    Thrombocytopenia, unspecified    Hearing decreased    Degenerative disease of nervous system, unspecified    Gastroesophageal reflux disease without esophagitis    Primary open angle glaucoma (POAG) of both eyes, indeterminate stage    Psoriasis vulgaris    being managed by PCP and specialists who is here today for evaluation of mesenteric ischemia.  Pt with c/o LUQ and L chest discomfort intermittent without known triggers.  Patient states location is LUQ and L chest under breastbone occurring for a few months.  Associated signs and symptoms include belching.  No food fear or significant weight loss.  Quality is pressure and severity is 3/10.  Symptoms began a few mo ago.  Alleviating factors include activity.  Worsening factors include none.    no MI  no Stroke  Tobacco use: denies    1/2021:  States he has symptoms of belching and bloating.  Denies food fear and weight loss.  Symptoms are worsened when he bends forward.  States he has not seen GI in about 4 months where he was diagnosed with reflux although his dosage of his medication has been decreased.  Denies pelvic pain or abdominal pain or back pain.  No functional limitation or leg pain.    7/2021:  C/o LUQ pain, R thigh intermittent pain, no abd or pelvic pain.  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    1/2022:  No  complaints.    10/2022:  no new issues.  No pelvic or abd pain.    3/2023:  no complaints.  S/p R RONAK stand alone 23.     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     Past Surgical History:   Procedure Laterality Date    ABDOMINAL AORTIC ANEURYSM REPAIR, ENDOVASCULAR Bilateral 2023    Procedure: REPAIR-ANEURYSM-ILIAC BRANCH ENDOPROSTHESIS-ENDOVASCULAR;  Surgeon: Mario Springer MD;  Location: [facility] OR [address] [place] [address];  Service: Vascular;  Laterality: Bilateral;  mGy: 3707.4  Fluro time: 41.4 miutes  contrast volume: 171mg  Gycm: 363.78      APPENDECTOMY      LEFT HEART CATHETERIZATION Left 2020    Procedure: Left heart cath;  Surgeon: Keenan Avila MD;  Location: [facility] CATH LAB;  Service: Cardiology;  Laterality: Left;  RN PRE OP 2020.---COVID NEGATIVE ON-- 2020. [place]    SHOULDER ARTHROSCOPY W/ ROTATOR CUFF REPAIR      Left shoulder     Family History   Problem Relation Age of Onset    COPD Mother     Diabetes Mother     Hypertension Mother     COPD Father     Heart disease Brother      Social History     Socioeconomic History    Marital status:    Tobacco Use    Smoking status: Former     Types: Cigarettes     Quit date: 2000     Years since quittin.1    Smokeless tobacco: Never    Tobacco comments:     stopped smoking 20 yrs ago.   Substance and Sexual Activity    Alcohol use: Not Currently     Alcohol/week: 5.0 standard drinks     Types: 6 Standard drinks or equivalent per week    Drug use: No    Sexual activity: Yes     Partners: Female     Birth control/protection: None   Social History Narrative    Still doing lawn service     Social Determinants of Health     Financial Resource Strain: Low Risk     Difficulty of Paying Living Expenses: Not hard at all   Food Insecurity: No Food  Insecurity    Worried About Running Out of Food in the Last Year: Never true    Ran Out of Food in the Last Year: Never true   Transportation Needs: No Transportation Needs    Lack of Transportation (Medical): No    Lack of Transportation (Non-Medical): No   Physical Activity: Sufficiently Active    Days of Exercise per Week: 5 days    Minutes of Exercise per Session: 90 min   Stress: No Stress Concern Present    Feeling of Stress : Only a little   Social Connections: Socially Integrated    Frequency of Communication with Friends and Family: Three times a week    Frequency of Social Gatherings with Friends and Family: Twice a week    Attends Jainism Services: More than 4 times per year    Active Member of Clubs or Organizations: Yes    Attends Club or Organization Meetings: More than 4 times per year    Marital Status:    Housing Stability: Unknown    Unable to Pay for Housing in the Last Year: No    Unstable Housing in the Last Year: No       Current Outpatient Medications:     acetaminophen (TYLENOL) 650 MG TbSR, Take 1 tablet (650 mg total) by mouth every 8 (eight) hours., Disp: 21 tablet, Rfl: 0    amiodarone (PACERONE) 200 MG Tab, Take 1 tablet (200 mg total) by mouth once daily. (Patient taking differently: Take 200 mg by mouth every morning.), Disp: 90 tablet, Rfl: 3    apixaban (ELIQUIS) 5 mg Tab, Take 1 tablet (5 mg total) by mouth 2 (two) times daily., Disp: 60 tablet, Rfl: 11    aspirin (ECOTRIN) 81 MG EC tablet, Take 81 mg by mouth every morning., Disp: , Rfl:     atorvastatin (LIPITOR) 40 MG tablet, TAKE 1 TABLET BY MOUTH EVERY DAY (Patient taking differently: Take by mouth every morning.), Disp: 90 tablet, Rfl: 3    gabapentin (NEURONTIN) 300 MG capsule, TAKE 1-3 CAPSULES BY MOUTH EVERY EVENING, Disp: 90 capsule, Rfl: 11    hydrOXYzine pamoate (VISTARIL) 50 MG Cap, Take 1 capsule (50 mg total) by mouth nightly as needed (itching and insomnia)., Disp: 30 capsule, Rfl: 0    latanoprost 0.005 %  ophthalmic solution, Place into both eyes every evening., Disp: , Rfl:     pantoprazole (PROTONIX) 40 MG tablet, Take 1 tablet (40 mg total) by mouth once daily. (Patient taking differently: Take 40 mg by mouth every morning.), Disp: 30 tablet, Rfl: 1    triamcinolone acetonide 0.1% (KENALOG) 0.1 % ointment, Apply topically 2 (two) times daily., Disp: 30 g, Rfl: 0    vit A/vit C/vit E/zinc/copper (OCUVITE PRESERVISION ORAL), Take by mouth 2 (two) times a day., Disp: , Rfl:     famotidine (PEPCID) 20 MG tablet, Take 1 tablet (20 mg total) by mouth 2 (two) times daily. for 7 days, Disp: 14 tablet, Rfl: 0    loratadine (CLARITIN) 10 mg tablet, Take 1 tablet (10 mg total) by mouth every morning. for 7 days, Disp: 7 tablet, Rfl: 0  No current facility-administered medications for this visit.    REVIEW OF SYSTEMS:  General: No fevers or chills; ENT: No sore throat; Allergy and Immunology: no persistent infections; Hematological and Lymphatic: No history of bleeding or easy bruising; Endocrine: negative; Respiratory: no cough, shortness of breath, or wheezing; Cardiovascular: no chest pain or dyspnea on exertion; Gastrointestinal: no abdominal pain/back, change in bowel habits, or bloody stools; Genito-Urinary: no dysuria, trouble voiding, or hematuria; Musculoskeletal: negative; Neurological: no TIA or stroke symptoms; Psychiatric: no nervousness, anxiety or depression.    PHYSICAL EXAM:      Pulse: (!) 55         General appearance:  Alert, well-appearing, and in no distress.  Oriented to person, place, and time                    Neurological: Normal speech, no focal findings noted; CN II - XII grossly intact. RLE with sensation to light touch, LLE with sensation to light touch.            Musculoskeletal: Digits/nail without cyanosis/clubbing.  Strength 5/5 BLE.                    Neck: Supple, no significant adenopathy, no carotid bruit can be auscultated                  Chest:  Clear to auscultation, no wheezes,  rales or rhonchi, symmetric air entry. No use of accessory muscles               Cardiac: Normal rate and regular rhythm, S1 and S2 normal            Abdomen: Soft, min epigastric tenderness, nondistended, no masses or organomegaly, no hernia     No rebound tenderness noted; bowel sounds normal     Pulsatile aortic mass is non palpable.     No groin adenopathy      Extremities:   2+ R femoral pulse, 2+ L femoral pulse     2+ R popliteal pulse, 2+ L popliteal pulse     2+ R PT pulse, 2+ L PT pulse     2+ R DP pulse, 2+ L DP pulse     no RLE edema, no LLE edema    Skin: RLE without tissue loss; LLE without tissue loss    LAB RESULTS:  No results found for: CBC  Lab Results   Component Value Date    LABPROT 10.7 07/20/2020    INR 1.0 07/20/2020     Lab Results   Component Value Date     02/03/2023    K 3.7 02/03/2023     02/03/2023    CO2 25 02/03/2023    GLU 94 02/03/2023    BUN 14 02/03/2023    CREATININE 0.9 03/07/2023    CALCIUM 8.6 (L) 02/03/2023    ANIONGAP 10 02/03/2023    EGFRNONAA >60 07/03/2022     Lab Results   Component Value Date    WBC 5.82 02/03/2023    RBC 3.68 (L) 02/03/2023    HGB 11.2 (L) 02/03/2023    HCT 33.2 (L) 02/03/2023    MCV 90 02/03/2023    MCH 30.4 02/03/2023    MCHC 33.7 02/03/2023    RDW 13.6 02/03/2023     (L) 02/03/2023    MPV 11.6 02/03/2023    GRAN 3.9 02/03/2023    GRAN 66.7 02/03/2023    LYMPH 1.2 02/03/2023    LYMPH 20.4 02/03/2023    MONO 0.5 02/03/2023    MONO 8.4 02/03/2023    EOS 0.2 02/03/2023    BASO 0.01 02/03/2023    EOSINOPHIL 4.1 02/03/2023    BASOPHIL 0.2 02/03/2023    DIFFMETHOD Automated 02/03/2023     .  Lab Results   Component Value Date    HGBA1C 5.1 03/03/2022       IMAGING:  All pertinent imaging has been reviewed and interpreted independently.    -BLE arterial US negative for popliteal aneurysm    3/2023  CTA with well apposed stents and no endoleak     IMP/PLAN:  78 y.o. male with   Patient Active Problem List   Diagnosis    Hyperlipidemia     Tuberculosis exposure    BPH (benign prostatic hyperplasia)    Osteoarthritis of lumbar spine    Hyperbilirubinemia, long standing, favor CHAVEZ    Abnormal stress test    Anxiety disorder    SVT (supraventricular tachycardia)    Pulmonary emphysema    Coronary artery disease due to lipid rich plaque    PAF (paroxysmal atrial fibrillation)    Aortic atherosclerosis    Aneurysm of right common iliac artery    Thrombocytopenia, unspecified    Hearing decreased    Degenerative disease of nervous system, unspecified    Gastroesophageal reflux disease without esophagitis    Primary open angle glaucoma (POAG) of both eyes, indeterminate stage    Psoriasis vulgaris    being managed by PCP and specialists who is here today for evaluation of R ROJELIO aneurysm.    - Mesenteric US without HD significant stenosis.  Recommend continuing evaluation and management for etiology of left upper quadrant by PCP and GI.    -right common iliac artery aneurysm 3.9 cm 2021 (3.6 2020), asymptomatic s/p RONAK 1/31/23 without endoleak - continue routine surveillance  -recommend cont daily aspirin, continue blood pressure control heart healthy lifestyle  -RTC 6 mo    I spent 12 minutes evaluating this patient and greater than 50% of the time was spent counseling, coordinator care and discussing the plan of care.  All questions were answered and patient stated understanding with agreement with the above treatment plan.    Mario Springer MD [facility]  Vascular and Endovascular Surgery

## 2023-03-21 ENCOUNTER — CLINICAL SUPPORT (OUTPATIENT)
Dept: AUDIOLOGY | Facility: CLINIC | Age: 78
End: 2023-03-21
Payer: MEDICARE

## 2023-03-21 DIAGNOSIS — H92.01 RIGHT EAR PAIN: ICD-10-CM

## 2023-03-21 DIAGNOSIS — H90.3 SENSORINEURAL HEARING LOSS, BILATERAL: Primary | ICD-10-CM

## 2023-03-21 DIAGNOSIS — H93.13 TINNITUS OF BOTH EARS: ICD-10-CM

## 2023-03-21 DIAGNOSIS — H91.93 DECREASED HEARING OF BOTH EARS: ICD-10-CM

## 2023-03-21 PROCEDURE — 92567 TYMPANOMETRY: CPT | Mod: S$GLB,,,

## 2023-03-21 PROCEDURE — 92557 COMPREHENSIVE HEARING TEST: CPT | Mod: S$GLB,,,

## 2023-03-21 PROCEDURE — 92567 PR TYMPA2METRY: ICD-10-PCS | Mod: S$GLB,,,

## 2023-03-21 PROCEDURE — 92557 PR COMPREHENSIVE HEARING TEST: ICD-10-PCS | Mod: S$GLB,,,

## 2023-03-21 PROCEDURE — 99999 PR PBB SHADOW E&M-EST. PATIENT-LVL I: ICD-10-PCS | Mod: PBBFAC,,,

## 2023-03-21 PROCEDURE — 99999 PR PBB SHADOW E&M-EST. PATIENT-LVL I: CPT | Mod: PBBFAC,,,

## 2023-03-21 NOTE — PROGRESS NOTES
Reed Torres, a 78 y.o. male, was seen today in the clinic for an audiologic evaluation.  The patient has a history of a bilateral sensorineural hearing loss (SNHL).  Mr. Torres reported right ear otalgia and a constant humming sound in his right ear and ringing sound in his left ear.  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  There were no reports of aural fullness or dizziness.       Tympanometry revealed Type A in the right ear and Type As in the left ear.  Audiogram results revealed normal hearing sensitivity from 250-2000 Hz sloping to a moderate to moderately-severe SNHL by 8000 Hz in the right ear and normal hearing sensitivity from 250-2000 Hz sloping to a moderately-severe SNHL by 8000 Hz in the left ear.  Speech reception thresholds were noted at 10 dB in the right ear and 20 dB in the left ear.  Speech discrimination scores were 72% in the right ear and 80% in the left ear.    Recommendations:  Otologic evaluation  Wear hearing protection in noise  Hearing aid consultation after medical clearance  Annual audiogram

## 2023-03-22 ENCOUNTER — OFFICE VISIT (OUTPATIENT)
Dept: NEUROLOGY | Facility: CLINIC | Age: 78
End: 2023-03-22
Payer: MEDICARE

## 2023-03-22 VITALS
SYSTOLIC BLOOD PRESSURE: 118 MMHG | HEIGHT: 65 IN | BODY MASS INDEX: 24.94 KG/M2 | WEIGHT: 149.69 LBS | HEART RATE: 76 BPM | DIASTOLIC BLOOD PRESSURE: 81 MMHG

## 2023-03-22 DIAGNOSIS — G62.9 NEUROPATHY: Primary | ICD-10-CM

## 2023-03-22 DIAGNOSIS — M25.519 SHOULDER PAIN, UNSPECIFIED CHRONICITY, UNSPECIFIED LATERALITY: ICD-10-CM

## 2023-03-22 PROCEDURE — 3074F SYST BP LT 130 MM HG: CPT | Mod: CPTII,S$GLB,, | Performed by: NEUROLOGICAL SURGERY

## 2023-03-22 PROCEDURE — 1101F PT FALLS ASSESS-DOCD LE1/YR: CPT | Mod: CPTII,S$GLB,, | Performed by: NEUROLOGICAL SURGERY

## 2023-03-22 PROCEDURE — 3074F PR MOST RECENT SYSTOLIC BLOOD PRESSURE < 130 MM HG: ICD-10-PCS | Mod: CPTII,S$GLB,, | Performed by: NEUROLOGICAL SURGERY

## 2023-03-22 PROCEDURE — 1101F PR PT FALLS ASSESS DOC 0-1 FALLS W/OUT INJ PAST YR: ICD-10-PCS | Mod: CPTII,S$GLB,, | Performed by: NEUROLOGICAL SURGERY

## 2023-03-22 PROCEDURE — 1126F PR PAIN SEVERITY QUANTIFIED, NO PAIN PRESENT: ICD-10-PCS | Mod: CPTII,S$GLB,, | Performed by: NEUROLOGICAL SURGERY

## 2023-03-22 PROCEDURE — 1126F AMNT PAIN NOTED NONE PRSNT: CPT | Mod: CPTII,S$GLB,, | Performed by: NEUROLOGICAL SURGERY

## 2023-03-22 PROCEDURE — 3288F FALL RISK ASSESSMENT DOCD: CPT | Mod: CPTII,S$GLB,, | Performed by: NEUROLOGICAL SURGERY

## 2023-03-22 PROCEDURE — 99999 PR PBB SHADOW E&M-EST. PATIENT-LVL IV: ICD-10-PCS | Mod: PBBFAC,,, | Performed by: NEUROLOGICAL SURGERY

## 2023-03-22 PROCEDURE — 3079F PR MOST RECENT DIASTOLIC BLOOD PRESSURE 80-89 MM HG: ICD-10-PCS | Mod: CPTII,S$GLB,, | Performed by: NEUROLOGICAL SURGERY

## 2023-03-22 PROCEDURE — 99999 PR PBB SHADOW E&M-EST. PATIENT-LVL IV: CPT | Mod: PBBFAC,,, | Performed by: NEUROLOGICAL SURGERY

## 2023-03-22 PROCEDURE — 3079F DIAST BP 80-89 MM HG: CPT | Mod: CPTII,S$GLB,, | Performed by: NEUROLOGICAL SURGERY

## 2023-03-22 PROCEDURE — 99214 PR OFFICE/OUTPT VISIT, EST, LEVL IV, 30-39 MIN: ICD-10-PCS | Mod: S$GLB,,, | Performed by: NEUROLOGICAL SURGERY

## 2023-03-22 PROCEDURE — 3288F PR FALLS RISK ASSESSMENT DOCUMENTED: ICD-10-PCS | Mod: CPTII,S$GLB,, | Performed by: NEUROLOGICAL SURGERY

## 2023-03-22 PROCEDURE — 99214 OFFICE O/P EST MOD 30 MIN: CPT | Mod: S$GLB,,, | Performed by: NEUROLOGICAL SURGERY

## 2023-03-22 NOTE — PROGRESS NOTES
Chief Complaint   Patient presents with    Gait Problem        Reed Torres is a 78 y.o. male with a history of multiple medical diagnoses as listed below that presents for evaluation and management of insomnia.  He says that he has been having issues with falling asleep and staying asleep.  He tries to take melatonin to help with sleep initiation which has been intermittently helpful.  He says that after going to bed will take approximately 15-30 minutes room fall asleep.  After the fall asleep he tends to wake after only a few hours.  Sometimes it is very difficult for him to go back to sleep.  He does admit that when he wakes up he has had several things on his mind that makes it difficult for him to relax once again.  When he sleeps he says that he sleeps fairly well and feels very refreshed and restored when he wakes up the next day.    Interval History  08/05/2022  His biggest concern since he was last seen has been dizziness.  He feels that he has been lightheaded and unsteady when he has been trying to move about.  He has not had any falls but feels like he has stumbled several times having to catch himself to prevent a fall.    03/22/2023  He has continued problems with his level of functiong. Pain and usage of his arms without too much pain contiunes to be and ongoing problem. He has also felt that the pain has been mainly concentrated at the shoulder.    PAST MEDICAL HISTORY:  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     Post treatment       PAST SURGICAL HISTORY:  Past Surgical History:   Procedure Laterality Date    ABDOMINAL AORTIC ANEURYSM REPAIR, ENDOVASCULAR Bilateral 1/31/2023    Procedure: REPAIR-ANEURYSM-ILIAC BRANCH ENDOPROSTHESIS-ENDOVASCULAR;  Surgeon: Mario Springer MD;  Location: [facility] OR [address] [place] [address];  Service:  Vascular;  Laterality: Bilateral;  mGy: 3707.4  Fluro time: 41.4 miutes  contrast volume: 171mg  Gycm: 363.78      APPENDECTOMY      LEFT HEART CATHETERIZATION Left 2020    Procedure: Left heart cath;  Surgeon: Keenan Avila MD;  Location: [facility] CATH LAB;  Service: Cardiology;  Laterality: Left;  RN PRE OP 2020.---COVID NEGATIVE ON-- 2020. [place]    SHOULDER ARTHROSCOPY W/ ROTATOR CUFF REPAIR      Left shoulder       SOCIAL HISTORY:  Social History     Socioeconomic History    Marital status:    Tobacco Use    Smoking status: Former     Types: Cigarettes     Quit date:      Years since quittin.2    Smokeless tobacco: Never    Tobacco comments:     stopped smoking 20 yrs ago.   Substance and Sexual Activity    Alcohol use: Not Currently     Alcohol/week: 5.0 standard drinks     Types: 6 Standard drinks or equivalent per week    Drug use: No    Sexual activity: Yes     Partners: Female     Birth control/protection: None   Social History Narrative    Still doing lawn service     Social Determinants of Health     Financial Resource Strain: Low Risk     Difficulty of Paying Living Expenses: Not hard at all   Food Insecurity: No Food Insecurity    Worried About Running Out of Food in the Last Year: Never true    Ran Out of Food in the Last Year: Never true   Transportation Needs: No Transportation Needs    Lack of Transportation (Medical): No    Lack of Transportation (Non-Medical): No   Physical Activity: Sufficiently Active    Days of Exercise per Week: 5 days    Minutes of Exercise per Session: 90 min   Stress: No Stress Concern Present    Feeling of Stress : Only a little   Social Connections: Socially Integrated    Frequency of Communication with Friends and Family: Three times a week    Frequency of Social Gatherings with Friends and Family: Twice a week    Attends Anabaptism Services: More than 4 times per year    Active Member of Clubs or Organizations: Yes    Attends Club or  Organization Meetings: More than 4 times per year    Marital Status:    Housing Stability: Unknown    Unable to Pay for Housing in the Last Year: No    Unstable Housing in the Last Year: No       FAMILY HISTORY:  Family History   Problem Relation Age of Onset    COPD Mother     Diabetes Mother     Hypertension Mother     COPD Father     Heart disease Brother        ALLERGIES AND MEDICATIONS: updated and reviewed.  Review of patient's allergies indicates:  No Known Allergies  Current Outpatient Medications   Medication Sig Dispense Refill    acetaminophen (TYLENOL) 650 MG TbSR Take 1 tablet (650 mg total) by mouth every 8 (eight) hours. 21 tablet 0    amiodarone (PACERONE) 200 MG Tab Take 1 tablet (200 mg total) by mouth once daily. (Patient taking differently: Take 200 mg by mouth every morning.) 90 tablet 3    apixaban (ELIQUIS) 5 mg Tab Take 1 tablet (5 mg total) by mouth 2 (two) times daily. 60 tablet 11    aspirin (ECOTRIN) 81 MG EC tablet Take 81 mg by mouth every morning.      atorvastatin (LIPITOR) 40 MG tablet TAKE 1 TABLET BY MOUTH EVERY DAY (Patient taking differently: Take by mouth every morning.) 90 tablet 3    famotidine (PEPCID) 20 MG tablet Take 1 tablet (20 mg total) by mouth 2 (two) times daily. for 7 days 14 tablet 0    gabapentin (NEURONTIN) 300 MG capsule TAKE 1-3 CAPSULES BY MOUTH EVERY EVENING 90 capsule 11    hydrOXYzine pamoate (VISTARIL) 50 MG Cap Take 1 capsule (50 mg total) by mouth nightly as needed (itching and insomnia). 30 capsule 0    latanoprost 0.005 % ophthalmic solution Place into both eyes every evening.      loratadine (CLARITIN) 10 mg tablet Take 1 tablet (10 mg total) by mouth every morning. for 7 days 7 tablet 0    pantoprazole (PROTONIX) 40 MG tablet Take 1 tablet (40 mg total) by mouth once daily. (Patient taking differently: Take 40 mg by mouth every morning.) 30 tablet 1    triamcinolone acetonide 0.1% (KENALOG) 0.1 % ointment Apply topically 2 (two) times daily.  30 g 0    vit A/vit C/vit E/zinc/copper (OCUVITE PRESERVISION ORAL) Take by mouth 2 (two) times a day.       No current facility-administered medications for this visit.       Review of Systems   Constitutional:  Negative for activity change, fatigue and unexpected weight change.   HENT:  Negative for trouble swallowing and voice change.    Eyes:  Negative for photophobia, pain and visual disturbance.   Respiratory:  Negative for apnea and shortness of breath.    Cardiovascular:  Negative for chest pain and palpitations.   Gastrointestinal:  Negative for constipation, nausea and vomiting.   Genitourinary:  Negative for difficulty urinating.   Musculoskeletal:  Negative for arthralgias, back pain, gait problem, myalgias and neck pain.   Skin:  Negative for color change and rash.   Neurological:  Negative for dizziness, seizures, syncope, speech difficulty, weakness, light-headedness, numbness and headaches.   Psychiatric/Behavioral:  Positive for sleep disturbance. Negative for agitation, behavioral problems and confusion.      Neurologic Exam     Mental Status   Oriented to person, place, and time.   Registration: recalls 3 of 3 objects.   Attention: normal. Concentration: normal.   Speech: speech is normal   Level of consciousness: alert  Knowledge: good.     Cranial Nerves     CN II   Right visual field deficit: none  Left visual field deficit: none     CN III, IV, VI   Pupils are equal, round, and reactive to light.  Extraocular motions are normal.   Right pupil: Size: 3 mm. Shape: regular.   Left pupil: Size: 3 mm. Shape: regular.   CN III: no CN III palsy  CN VI: no CN VI palsy  Nystagmus: none   Diplopia: none  Ophthalmoparesis: none  Upgaze: normal  Downgaze: normal  Conjugate gaze: present    CN VII   Facial expression full, symmetric.   Right facial weakness: none  Left facial weakness: none    CN VIII   CN VIII normal.     CN XI   CN XI normal.     CN XII   CN XII normal.   Tongue deviation: none    Motor  "Exam   Muscle bulk: normal  Overall muscle tone: normal  Right arm tone: normal  Left arm tone: normal  Right leg tone: normal  Left leg tone: normal    Gait, Coordination, and Reflexes     Gait  Gait: normal    Coordination   Finger to nose coordination: normal    Tremor   Resting tremor: absent    Physical Exam  Constitutional:       Appearance: He is well-developed.   HENT:      Head: Normocephalic and atraumatic.   Eyes:      Extraocular Movements: EOM normal.      Pupils: Pupils are equal, round, and reactive to light.   Pulmonary:      Effort: Pulmonary effort is normal. No respiratory distress.   Musculoskeletal:         General: Normal range of motion.   Neurological:      Mental Status: He is alert and oriented to person, place, and time.      Coordination: Finger-Nose-Finger Test normal.      Gait: Gait is intact.   Psychiatric:         Speech: Speech normal.         Behavior: Behavior normal.       Vitals:    03/22/23 0910   BP: 118/81   Pulse: 76   Weight: 67.9 kg (149 lb 11.1 oz)   Height: 5' 5" (1.651 m)       Assessment & Plan:    Problem List Items Addressed This Visit    None  Visit Diagnoses       Neuropathy    -  Primary    Shoulder pain, unspecified chronicity, unspecified laterality        Relevant Orders    X-ray Shoulder 2 or More Views Right (Completed)    CT Cervical Spine Without Contrast (Completed)              Follow-up: No follow-ups on file.    This note was done with the assistance of voice recognition software. Some errors may be present after proofreading.            "

## 2023-03-23 ENCOUNTER — OFFICE VISIT (OUTPATIENT)
Dept: FAMILY MEDICINE | Facility: CLINIC | Age: 78
End: 2023-03-23
Payer: MEDICARE

## 2023-03-23 VITALS
DIASTOLIC BLOOD PRESSURE: 76 MMHG | TEMPERATURE: 98 F | HEIGHT: 65 IN | OXYGEN SATURATION: 97 % | WEIGHT: 147.69 LBS | SYSTOLIC BLOOD PRESSURE: 112 MMHG | HEART RATE: 70 BPM | BODY MASS INDEX: 24.61 KG/M2

## 2023-03-23 DIAGNOSIS — N40.0 BENIGN PROSTATIC HYPERPLASIA WITHOUT LOWER URINARY TRACT SYMPTOMS: Chronic | ICD-10-CM

## 2023-03-23 DIAGNOSIS — I70.0 AORTIC ATHEROSCLEROSIS: ICD-10-CM

## 2023-03-23 DIAGNOSIS — J43.9 PULMONARY EMPHYSEMA, UNSPECIFIED EMPHYSEMA TYPE: ICD-10-CM

## 2023-03-23 DIAGNOSIS — I48.0 PAF (PAROXYSMAL ATRIAL FIBRILLATION): ICD-10-CM

## 2023-03-23 DIAGNOSIS — Z00.00 ANNUAL PHYSICAL EXAM: Primary | ICD-10-CM

## 2023-03-23 DIAGNOSIS — E78.00 PURE HYPERCHOLESTEROLEMIA: Chronic | ICD-10-CM

## 2023-03-23 PROCEDURE — 3288F PR FALLS RISK ASSESSMENT DOCUMENTED: ICD-10-PCS | Mod: CPTII,S$GLB,, | Performed by: FAMILY MEDICINE

## 2023-03-23 PROCEDURE — 1126F AMNT PAIN NOTED NONE PRSNT: CPT | Mod: CPTII,S$GLB,, | Performed by: FAMILY MEDICINE

## 2023-03-23 PROCEDURE — 3288F FALL RISK ASSESSMENT DOCD: CPT | Mod: CPTII,S$GLB,, | Performed by: FAMILY MEDICINE

## 2023-03-23 PROCEDURE — 1159F PR MEDICATION LIST DOCUMENTED IN MEDICAL RECORD: ICD-10-PCS | Mod: CPTII,S$GLB,, | Performed by: FAMILY MEDICINE

## 2023-03-23 PROCEDURE — 99999 PR PBB SHADOW E&M-EST. PATIENT-LVL III: ICD-10-PCS | Mod: PBBFAC,,, | Performed by: FAMILY MEDICINE

## 2023-03-23 PROCEDURE — 99999 PR PBB SHADOW E&M-EST. PATIENT-LVL III: CPT | Mod: PBBFAC,,, | Performed by: FAMILY MEDICINE

## 2023-03-23 PROCEDURE — 1126F PR PAIN SEVERITY QUANTIFIED, NO PAIN PRESENT: ICD-10-PCS | Mod: CPTII,S$GLB,, | Performed by: FAMILY MEDICINE

## 2023-03-23 PROCEDURE — 99397 PER PM REEVAL EST PAT 65+ YR: CPT | Mod: GZ,S$GLB,, | Performed by: FAMILY MEDICINE

## 2023-03-23 PROCEDURE — 1101F PR PT FALLS ASSESS DOC 0-1 FALLS W/OUT INJ PAST YR: ICD-10-PCS | Mod: CPTII,S$GLB,, | Performed by: FAMILY MEDICINE

## 2023-03-23 PROCEDURE — 3078F PR MOST RECENT DIASTOLIC BLOOD PRESSURE < 80 MM HG: ICD-10-PCS | Mod: CPTII,S$GLB,, | Performed by: FAMILY MEDICINE

## 2023-03-23 PROCEDURE — 3078F DIAST BP <80 MM HG: CPT | Mod: CPTII,S$GLB,, | Performed by: FAMILY MEDICINE

## 2023-03-23 PROCEDURE — 3074F SYST BP LT 130 MM HG: CPT | Mod: CPTII,S$GLB,, | Performed by: FAMILY MEDICINE

## 2023-03-23 PROCEDURE — 99397 PR PREVENTIVE VISIT,EST,65 & OVER: ICD-10-PCS | Mod: GZ,S$GLB,, | Performed by: FAMILY MEDICINE

## 2023-03-23 PROCEDURE — 3074F PR MOST RECENT SYSTOLIC BLOOD PRESSURE < 130 MM HG: ICD-10-PCS | Mod: CPTII,S$GLB,, | Performed by: FAMILY MEDICINE

## 2023-03-23 PROCEDURE — 1159F MED LIST DOCD IN RCRD: CPT | Mod: CPTII,S$GLB,, | Performed by: FAMILY MEDICINE

## 2023-03-23 PROCEDURE — 1101F PT FALLS ASSESS-DOCD LE1/YR: CPT | Mod: CPTII,S$GLB,, | Performed by: FAMILY MEDICINE

## 2023-03-23 NOTE — PROGRESS NOTES
"HISTORY OF PRESENT ILLNESS:  Reed Torres is a 78 y.o. male who presents to the clinic today for Annual Exam  .     Doing well  No new issues other than sleep trouble which is long stadnding      Patient Active Problem List   Diagnosis    Hyperlipidemia    Tuberculosis exposure    BPH (benign prostatic hyperplasia)    Osteoarthritis of lumbar spine    Hyperbilirubinemia, long standing, favor GILBERT    Abnormal stress test    Anxiety disorder    SVT (supraventricular tachycardia)    Pulmonary emphysema    Coronary artery disease due to lipid rich plaque    PAF (paroxysmal atrial fibrillation)    Aortic atherosclerosis    Aneurysm of right common iliac artery    Thrombocytopenia, unspecified    Hearing decreased    Degenerative disease of nervous system, unspecified    Gastroesophageal reflux disease without esophagitis    Primary open angle glaucoma (POAG) of both eyes, indeterminate stage    Psoriasis vulgaris           CARE TEAM:  Patient Care Team:  Pj Gillette MD as PCP - General (Family Medicine)  Charlie Walters Jr., MD as Consulting Physician (Urology)  Keenan Avila MD as Consulting Physician (Cardiovascular Disease)  Daniel Garza OD (Optometry)  Nina Silveira LPN as Care Coordinator         Review of Systems   Constitutional: Negative.    HENT: Negative.     Eyes: Negative.    Respiratory: Negative.     Cardiovascular: Negative.    Gastrointestinal: Negative.    Genitourinary: Negative.    Musculoskeletal: Negative.    Skin: Negative.    Neurological: Negative.    Endo/Heme/Allergies: Negative.    Psychiatric/Behavioral: Negative.           PHYSICAL EXAM:  /76   Pulse 70   Temp 97.7 °F (36.5 °C) (Oral)   Ht 5' 5" (1.651 m)   Wt 67 kg (147 lb 11.3 oz)   SpO2 97%   BMI 24.58 kg/m²   Wt Readings from Last 5 Encounters:   03/23/23 67 kg (147 lb 11.3 oz)   03/22/23 67.9 kg (149 lb 11.1 oz)   03/07/23 66.8 kg (147 lb 6 oz)   02/03/23 66.2 kg (146 lb)   01/31/23 66.4 kg (146 lb 6.2 oz) "     BP Readings from Last 5 Encounters:   03/23/23 112/76   03/22/23 118/81   03/07/23 126/78   02/03/23 120/70   02/01/23 109/61           He appears well, in no apparent distress.  Alert and oriented times three, pleasant and cooperative. Vital signs are as documented in vital signs section.  S1 and S2 normal, no murmurs, clicks, gallops or rubs. Regular rate and rhythm. Chest is clear; no wheezes or rales. No edema or JVD.        Medication List with Changes/Refills   Current Medications    ACETAMINOPHEN (TYLENOL) 650 MG TBSR    Take 1 tablet (650 mg total) by mouth every 8 (eight) hours.    AMIODARONE (PACERONE) 200 MG TAB    Take 1 tablet (200 mg total) by mouth once daily.    APIXABAN (ELIQUIS) 5 MG TAB    Take 1 tablet (5 mg total) by mouth 2 (two) times daily.    ASPIRIN (ECOTRIN) 81 MG EC TABLET    Take 81 mg by mouth every morning.    ATORVASTATIN (LIPITOR) 40 MG TABLET    TAKE 1 TABLET BY MOUTH EVERY DAY    FAMOTIDINE (PEPCID) 20 MG TABLET    Take 1 tablet (20 mg total) by mouth 2 (two) times daily. for 7 days    GABAPENTIN (NEURONTIN) 300 MG CAPSULE    TAKE 1-3 CAPSULES BY MOUTH EVERY EVENING    HYDROXYZINE PAMOATE (VISTARIL) 50 MG CAP    Take 1 capsule (50 mg total) by mouth nightly as needed (itching and insomnia).    LATANOPROST 0.005 % OPHTHALMIC SOLUTION    Place into both eyes every evening.    LORATADINE (CLARITIN) 10 MG TABLET    Take 1 tablet (10 mg total) by mouth every morning. for 7 days    PANTOPRAZOLE (PROTONIX) 40 MG TABLET    Take 1 tablet (40 mg total) by mouth once daily.    TRIAMCINOLONE ACETONIDE 0.1% (KENALOG) 0.1 % OINTMENT    Apply topically 2 (two) times daily.    VIT A/VIT C/VIT E/ZINC/COPPER (OCUVITE PRESERVISION ORAL)    Take by mouth 2 (two) times a day.       ASSESSMENT AND PLAN:    Problem List Items Addressed This Visit       Hyperlipidemia (Chronic)    Current Assessment & Plan     The current medical regimen is effective;  continue present plan and medications.            BPH (benign prostatic hyperplasia) (Chronic)    Current Assessment & Plan     The current medical regimen is effective;  continue present plan and medications.           Pulmonary emphysema    Current Assessment & Plan     The current medical regimen is effective;  continue present plan and medications.           PAF (paroxysmal atrial fibrillation)    Current Assessment & Plan     The current medical regimen is effective;  continue present plan and medications.           Aortic atherosclerosis    Current Assessment & Plan     The current medical regimen is effective;  continue present plan and medications.            Other Visit Diagnoses       Annual physical exam    -  Primary          Counseled on age appropriate medical preventative services, including age appropriate cancer screenings, over all nutritional health, need for a consistent exercise regimen and an over all push towards maintaining a vigorous and active lifestyle.  Counseled on age appropriate vaccines and discussed upcoming health care needs based on age/gender.  Spent time with patient counseling on need for a good patient/doctor relationship moving forward.  Discussed use of common OTC medications and supplements.  Discussed common dietary aids and use of caffeine and the need for good sleep hygiene and stress management.    Future Appointments   Date Time Provider Department Center   3/27/2023 10:30 AM [facility] XR3 WB XRAY [facility]   3/27/2023 11:00 AM [facility] CT1 LIMIT 400 LBS [facility] CT SCAN [facility]   9/13/2023  9:20 AM [facility] CT1 LIMIT 400 LBS [facility] CT SCAN [facility]   9/13/2023 10:30 AM Mario Springer MD [facility] VAS JEANETTE [facility] Cli       No follow-ups on file. or sooner as needed.

## 2023-03-27 ENCOUNTER — HOSPITAL ENCOUNTER (OUTPATIENT)
Dept: RADIOLOGY | Facility: HOSPITAL | Age: 78
Discharge: HOME | End: 2023-03-27
Attending: NEUROLOGICAL SURGERY
Payer: MEDICARE

## 2023-03-27 DIAGNOSIS — M54.12 CERVICAL RADICULOPATHY: Primary | ICD-10-CM

## 2023-03-27 DIAGNOSIS — M25.519 SHOULDER PAIN, UNSPECIFIED CHRONICITY, UNSPECIFIED LATERALITY: ICD-10-CM

## 2023-03-27 PROCEDURE — 72125 CT NECK SPINE W/O DYE: CPT | Mod: 26,,, | Performed by: RADIOLOGY

## 2023-03-27 PROCEDURE — 72125 CT NECK SPINE W/O DYE: CPT | Mod: TC

## 2023-03-27 PROCEDURE — 73030 X-RAY EXAM OF SHOULDER: CPT | Mod: 26,RT,, | Performed by: RADIOLOGY

## 2023-03-27 PROCEDURE — 73030 X-RAY EXAM OF SHOULDER: CPT | Mod: TC,FY,RT

## 2023-03-27 PROCEDURE — 72125 CT CERVICAL SPINE WITHOUT CONTRAST: ICD-10-PCS | Mod: 26,,, | Performed by: RADIOLOGY

## 2023-03-27 PROCEDURE — 73030 XR SHOULDER COMPLETE 2 OR MORE VIEWS RIGHT: ICD-10-PCS | Mod: 26,RT,, | Performed by: RADIOLOGY

## 2023-03-31 DIAGNOSIS — G62.9 NEUROPATHY: ICD-10-CM

## 2023-03-31 NOTE — TELEPHONE ENCOUNTER
Care Due:                  Date            Visit Type   Department     Provider  --------------------------------------------------------------------------------                                [facility] FAMILY                              PRIMARY      MEDICINE/INTERN  Last Visit: 03-      CARE ([facility])   JACINTO Gillette                              [facility]                              PRIMARY      MEDICINE/INTERN  Next Visit: 09-      CARE ([facility])   AL ASAEL Gillette                                                            Last  Test          Frequency    Reason                     Performed    Due Date  --------------------------------------------------------------------------------    Lipid Panel.  12 months..  atorvastatin.............  05- 04-    [facility] Embedded Care Gaps. Reference number: [id number]. 3/31/2023   2:48:29 PM CDT

## 2023-03-31 NOTE — TELEPHONE ENCOUNTER
----- Message from Melissa Cevallos sent at 3/31/2023  2:42 PM CDT -----  Regarding: Refill Request  Type: RX Refill Request      Who Called: Self       Refill or New Rx: refill       RX Name and Strength: gabapentin (NEURONTIN) 300 MG capsule      Preferred Pharmacy with phone number:      [id number] [place] [facility] [address] [facility] [facility]  [address] [place] [address]  Phone: [phone] Fax: [phone]        Would the patient rather a call back or a response via [facility]? Call        Best Call Back Number: [phone]

## 2023-04-02 RX ORDER — GABAPENTIN 300 MG/1
CAPSULE ORAL
Qty: 90 CAPSULE | Refills: 11 | Status: SHIPPED | OUTPATIENT
Start: 2023-04-02

## 2023-04-05 ENCOUNTER — OFFICE VISIT (OUTPATIENT)
Dept: NEUROSURGERY | Facility: CLINIC | Age: 78
End: 2023-04-05
Payer: MEDICARE

## 2023-04-05 VITALS
SYSTOLIC BLOOD PRESSURE: 121 MMHG | HEART RATE: 73 BPM | WEIGHT: 149.06 LBS | DIASTOLIC BLOOD PRESSURE: 74 MMHG | OXYGEN SATURATION: 95 % | HEIGHT: 65 IN | BODY MASS INDEX: 24.83 KG/M2

## 2023-04-05 DIAGNOSIS — M47.812 CERVICAL SPONDYLOSIS: ICD-10-CM

## 2023-04-05 DIAGNOSIS — M50.30 DEGENERATIVE DISC DISEASE, CERVICAL: Primary | ICD-10-CM

## 2023-04-05 PROCEDURE — 99999 PR PBB SHADOW E&M-EST. PATIENT-LVL III: ICD-10-PCS | Mod: PBBFAC,,, | Performed by: NEUROLOGICAL SURGERY

## 2023-04-05 PROCEDURE — 1126F AMNT PAIN NOTED NONE PRSNT: CPT | Mod: CPTII,S$GLB,, | Performed by: NEUROLOGICAL SURGERY

## 2023-04-05 PROCEDURE — 3078F DIAST BP <80 MM HG: CPT | Mod: CPTII,S$GLB,, | Performed by: NEUROLOGICAL SURGERY

## 2023-04-05 PROCEDURE — 1126F PR PAIN SEVERITY QUANTIFIED, NO PAIN PRESENT: ICD-10-PCS | Mod: CPTII,S$GLB,, | Performed by: NEUROLOGICAL SURGERY

## 2023-04-05 PROCEDURE — 3078F PR MOST RECENT DIASTOLIC BLOOD PRESSURE < 80 MM HG: ICD-10-PCS | Mod: CPTII,S$GLB,, | Performed by: NEUROLOGICAL SURGERY

## 2023-04-05 PROCEDURE — 99204 OFFICE O/P NEW MOD 45 MIN: CPT | Mod: S$GLB,,, | Performed by: NEUROLOGICAL SURGERY

## 2023-04-05 PROCEDURE — 1159F PR MEDICATION LIST DOCUMENTED IN MEDICAL RECORD: ICD-10-PCS | Mod: CPTII,S$GLB,, | Performed by: NEUROLOGICAL SURGERY

## 2023-04-05 PROCEDURE — 1101F PT FALLS ASSESS-DOCD LE1/YR: CPT | Mod: CPTII,S$GLB,, | Performed by: NEUROLOGICAL SURGERY

## 2023-04-05 PROCEDURE — 3288F PR FALLS RISK ASSESSMENT DOCUMENTED: ICD-10-PCS | Mod: CPTII,S$GLB,, | Performed by: NEUROLOGICAL SURGERY

## 2023-04-05 PROCEDURE — 1101F PR PT FALLS ASSESS DOC 0-1 FALLS W/OUT INJ PAST YR: ICD-10-PCS | Mod: CPTII,S$GLB,, | Performed by: NEUROLOGICAL SURGERY

## 2023-04-05 PROCEDURE — 99999 PR PBB SHADOW E&M-EST. PATIENT-LVL III: CPT | Mod: PBBFAC,,, | Performed by: NEUROLOGICAL SURGERY

## 2023-04-05 PROCEDURE — 1160F PR REVIEW ALL MEDS BY PRESCRIBER/CLIN PHARMACIST DOCUMENTED: ICD-10-PCS | Mod: CPTII,S$GLB,, | Performed by: NEUROLOGICAL SURGERY

## 2023-04-05 PROCEDURE — 3074F PR MOST RECENT SYSTOLIC BLOOD PRESSURE < 130 MM HG: ICD-10-PCS | Mod: CPTII,S$GLB,, | Performed by: NEUROLOGICAL SURGERY

## 2023-04-05 PROCEDURE — 3074F SYST BP LT 130 MM HG: CPT | Mod: CPTII,S$GLB,, | Performed by: NEUROLOGICAL SURGERY

## 2023-04-05 PROCEDURE — 99204 PR OFFICE/OUTPT VISIT, NEW, LEVL IV, 45-59 MIN: ICD-10-PCS | Mod: S$GLB,,, | Performed by: NEUROLOGICAL SURGERY

## 2023-04-05 PROCEDURE — 3288F FALL RISK ASSESSMENT DOCD: CPT | Mod: CPTII,S$GLB,, | Performed by: NEUROLOGICAL SURGERY

## 2023-04-05 PROCEDURE — 1159F MED LIST DOCD IN RCRD: CPT | Mod: CPTII,S$GLB,, | Performed by: NEUROLOGICAL SURGERY

## 2023-04-05 PROCEDURE — 1160F RVW MEDS BY RX/DR IN RCRD: CPT | Mod: CPTII,S$GLB,, | Performed by: NEUROLOGICAL SURGERY

## 2023-04-05 NOTE — PATIENT INSTRUCTIONS
I have personally reviewed the CT cervical spine with the pt which shows spondylo degenerative change of the cervical spine as detailed above most pronounced at C5/C6 with posterior disc osteophyte complex, uncovertebral joint hypertrophy and facet arthropathy with moderate central canal stenosis and moderate to severe bilateral bony neural foraminal stenosis.    I recommend purchasing a smart watch to monitor your heart rate.    Patient is not a surgical candidate. Pt advised to contact us with any questions, concerns, or if he experiences any new or worsening symptoms.
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
## 2023-05-12 DIAGNOSIS — E78.00 PURE HYPERCHOLESTEROLEMIA: Chronic | ICD-10-CM

## 2023-05-12 RX ORDER — ATORVASTATIN CALCIUM 40 MG/1
TABLET, FILM COATED ORAL
Qty: 90 TABLET | Refills: 3 | Status: SHIPPED | OUTPATIENT
Start: 2023-05-12 | End: 2024-04-01

## 2023-05-12 NOTE — TELEPHONE ENCOUNTER
Refill Routing Note   Medication(s) are not appropriate for processing by [facility] for the following reason(s):      Required labs outdated    [facility] action(s):  Defer None identified          Appointments  past 12m or future 3m with PCP    Date Provider   Last Visit   3/23/2023 Pj Gillette MD   Next Visit   9/19/2023 Pj Gillette MD   ED visits in past 90 days: 0        Note composed:2:26 PM 05/12/2023

## 2023-05-12 NOTE — TELEPHONE ENCOUNTER
No care due was identified.  [facility] Embedded Care Due Messages. Reference number: [id number].   5/12/2023 2:05:04 PM CDT

## 2023-06-05 ENCOUNTER — HOSPITAL ENCOUNTER (EMERGENCY)
Facility: HOSPITAL | Age: 78
Discharge: HOME | End: 2023-06-05
Attending: EMERGENCY MEDICINE
Payer: MEDICARE

## 2023-06-05 ENCOUNTER — TELEPHONE (OUTPATIENT)
Dept: FAMILY MEDICINE | Facility: CLINIC | Age: 78
End: 2023-06-05
Payer: MEDICARE

## 2023-06-05 VITALS
RESPIRATION RATE: 14 BRPM | BODY MASS INDEX: 23.32 KG/M2 | DIASTOLIC BLOOD PRESSURE: 79 MMHG | WEIGHT: 140 LBS | HEART RATE: 71 BPM | TEMPERATURE: 98 F | OXYGEN SATURATION: 96 % | HEIGHT: 65 IN | SYSTOLIC BLOOD PRESSURE: 125 MMHG

## 2023-06-05 DIAGNOSIS — K64.9 HEMORRHOIDS, UNSPECIFIED HEMORRHOID TYPE: Primary | ICD-10-CM

## 2023-06-05 LAB
ABO + RH BLD: NORMAL
ALBUMIN SERPL BCP-MCNC: 3.9 G/DL (ref 3.5–5.2)
ALP SERPL-CCNC: 63 U/L (ref 55–135)
ALT SERPL W/O P-5'-P-CCNC: 21 U/L (ref 10–44)
ANION GAP SERPL CALC-SCNC: 8 MMOL/L (ref 8–16)
APTT PPP: 28.8 SEC (ref 21–32)
AST SERPL-CCNC: 17 U/L (ref 10–40)
BASOPHILS # BLD AUTO: 0.03 K/UL (ref 0–0.2)
BASOPHILS NFR BLD: 0.7 % (ref 0–1.9)
BILIRUB SERPL-MCNC: 0.9 MG/DL (ref 0.1–1)
BLD GP AB SCN CELLS X3 SERPL QL: NORMAL
BUN SERPL-MCNC: 25 MG/DL (ref 8–23)
CALCIUM SERPL-MCNC: 9.3 MG/DL (ref 8.7–10.5)
CHLORIDE SERPL-SCNC: 113 MMOL/L (ref 95–110)
CO2 SERPL-SCNC: 22 MMOL/L (ref 23–29)
CREAT SERPL-MCNC: 0.9 MG/DL (ref 0.5–1.4)
DIFFERENTIAL METHOD: ABNORMAL
EOSINOPHIL # BLD AUTO: 0.1 K/UL (ref 0–0.5)
EOSINOPHIL NFR BLD: 1.1 % (ref 0–8)
ERYTHROCYTE [DISTWIDTH] IN BLOOD BY AUTOMATED COUNT: 14.4 % (ref 11.5–14.5)
EST. GFR  (NO RACE VARIABLE): >60 ML/MIN/1.73 M^2
GLUCOSE SERPL-MCNC: 100 MG/DL (ref 70–110)
HCT VFR BLD AUTO: 42.6 % (ref 40–54)
HGB BLD-MCNC: 13.7 G/DL (ref 14–18)
IMM GRANULOCYTES # BLD AUTO: 0.01 K/UL (ref 0–0.04)
IMM GRANULOCYTES NFR BLD AUTO: 0.2 % (ref 0–0.5)
INR PPP: 1.1 (ref 0.8–1.2)
LYMPHOCYTES # BLD AUTO: 1.4 K/UL (ref 1–4.8)
LYMPHOCYTES NFR BLD: 31.7 % (ref 18–48)
MCH RBC QN AUTO: 28.5 PG (ref 27–31)
MCHC RBC AUTO-ENTMCNC: 32.2 G/DL (ref 32–36)
MCV RBC AUTO: 89 FL (ref 82–98)
MONOCYTES # BLD AUTO: 0.4 K/UL (ref 0.3–1)
MONOCYTES NFR BLD: 7.8 % (ref 4–15)
NEUTROPHILS # BLD AUTO: 2.6 K/UL (ref 1.8–7.7)
NEUTROPHILS NFR BLD: 58.5 % (ref 38–73)
NRBC BLD-RTO: 0 /100 WBC
PLATELET # BLD AUTO: 180 K/UL (ref 150–450)
PMV BLD AUTO: 11.2 FL (ref 9.2–12.9)
POTASSIUM SERPL-SCNC: 3.9 MMOL/L (ref 3.5–5.1)
PROT SERPL-MCNC: 6.8 G/DL (ref 6–8.4)
PROTHROMBIN TIME: 11.3 SEC (ref 9–12.5)
RBC # BLD AUTO: 4.8 M/UL (ref 4.6–6.2)
SODIUM SERPL-SCNC: 143 MMOL/L (ref 136–145)
SPECIMEN OUTDATE: NORMAL
WBC # BLD AUTO: 4.48 K/UL (ref 3.9–12.7)

## 2023-06-05 PROCEDURE — 85610 PROTHROMBIN TIME: CPT | Performed by: EMERGENCY MEDICINE

## 2023-06-05 PROCEDURE — 86900 BLOOD TYPING SEROLOGIC ABO: CPT | Performed by: EMERGENCY MEDICINE

## 2023-06-05 PROCEDURE — 80053 COMPREHEN METABOLIC PANEL: CPT | Performed by: EMERGENCY MEDICINE

## 2023-06-05 PROCEDURE — 99283 EMERGENCY DEPT VISIT LOW MDM: CPT

## 2023-06-05 PROCEDURE — 85025 COMPLETE CBC W/AUTO DIFF WBC: CPT | Performed by: EMERGENCY MEDICINE

## 2023-06-05 PROCEDURE — 85730 THROMBOPLASTIN TIME PARTIAL: CPT | Performed by: EMERGENCY MEDICINE

## 2023-06-05 RX ORDER — HYDROCORTISONE 1 %
CREAM (GRAM) TOPICAL
Qty: 30 G | Refills: 0 | Status: SHIPPED | OUTPATIENT
Start: 2023-06-05 | End: 2023-09-19 | Stop reason: ALTCHOICE

## 2023-06-05 RX ORDER — DOCUSATE SODIUM 100 MG/1
100 CAPSULE, LIQUID FILLED ORAL 2 TIMES DAILY
Qty: 60 CAPSULE | Refills: 0 | Status: SHIPPED | OUTPATIENT
Start: 2023-06-05 | End: 2023-07-02 | Stop reason: SDUPTHER

## 2023-06-05 NOTE — DISCHARGE INSTRUCTIONS

## 2023-06-05 NOTE — ED PROVIDER NOTES
Encounter Date: 6/5/2023       History     Chief Complaint   Patient presents with    Rectal Bleeding     Pt reports rectal bleeding x2 weeks. Pt reports no blood clots, just liquid.  Hx of hemorrhoids with banding and groin aneurysm with stents, Afib. Patient is on blood thinners.      6-year-old male past medical history of hemorrhoids, AAA, hyperlipidemia on Eliquis presenting today secondary to rectal bleeding.  Has had a little bit whenever he is wiped over the last 2 weeks.  States that he was pushing have a bowel movement today when he noticed a larger amount of blood.  He denies any symptoms at this time.  Has not check to see if he has had any hemorrhoids recently.  Had than previously banded.  No abdominal pain.  No weakness.  No shortness of breath.  No recent fevers.      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     Past Surgical History:   Procedure Laterality Date    ABDOMINAL AORTIC ANEURYSM REPAIR, ENDOVASCULAR Bilateral 1/31/2023    Procedure: REPAIR-ANEURYSM-ILIAC BRANCH ENDOPROSTHESIS-ENDOVASCULAR;  Surgeon: Mario Springer MD;  Location: [facility] OR [address] [place] [address];  Service: Vascular;  Laterality: Bilateral;  mGy: 3707.4  Fluro time: 41.4 miutes  contrast volume: 171mg  Gycm: 363.78      APPENDECTOMY      LEFT HEART CATHETERIZATION Left 7/24/2020    Procedure: Left heart cath;  Surgeon: Keenan Avila MD;  Location: [facility] CATH LAB;  Service: Cardiology;  Laterality: Left;  RN PRE OP 7-.---COVID NEGATIVE ON-- 7-. [place]    SHOULDER ARTHROSCOPY W/ ROTATOR CUFF REPAIR      Left shoulder     Family History   Problem Relation Age of Onset    COPD Mother     Diabetes Mother     Hypertension Mother     COPD Father     Heart disease Brother       Social History     Tobacco Use    Smoking status: Former     Types: Cigarettes     Quit date:      Years since quittin.4    Smokeless tobacco: Never    Tobacco comments:     stopped smoking 20 yrs ago.   Substance Use Topics    Alcohol use: Not Currently     Alcohol/week: 5.0 standard drinks     Types: 6 Standard drinks or equivalent per week    Drug use: No     Review of Systems   Constitutional:  Negative for fever.   HENT:  Negative for sore throat.    Respiratory:  Negative for shortness of breath.    Cardiovascular:  Negative for chest pain.   Gastrointestinal:  Positive for blood in stool. Negative for nausea.   Genitourinary:  Negative for dysuria.   Musculoskeletal:  Negative for back pain.   Skin:  Negative for rash.   Neurological:  Negative for weakness.   Hematological:  Does not bruise/bleed easily.     Physical Exam     Initial Vitals [23 1108]   BP Pulse Resp Temp SpO2   124/85 71 14 98.2 °F (36.8 °C) 96 %      MAP       --         Physical Exam    Nursing note and vitals reviewed.  Constitutional: He appears well-developed and well-nourished.   HENT:   Head: Normocephalic and atraumatic.   Mouth/Throat: Oropharynx is clear and moist.   Pink under tongue   Eyes: EOM are normal. Pupils are equal, round, and reactive to light.   Sclera pink   Neck:   Normal range of motion.  Cardiovascular:  Normal rate and regular rhythm.           Pulmonary/Chest: Breath sounds normal. No stridor. No respiratory distress. He has no wheezes.   Abdominal: Abdomen is soft. Bowel sounds are normal. He exhibits no distension. There is no abdominal tenderness.   Genitourinary:    Genitourinary Comments: Escorted by nursing.  Hemorrhoid.  No active bleeding but a blood clot.  Does not appear to be thrombosed.     Musculoskeletal:         General: No tenderness or edema. Normal range of motion.      Cervical back: Normal range of motion.     Neurological: He is alert and oriented to person, place, and time.  He has normal strength. GCS score is 15. GCS eye subscore is 4. GCS verbal subscore is 5. GCS motor subscore is 6.   Skin: Skin is warm and dry. Capillary refill takes less than 2 seconds.   Psychiatric: He has a normal mood and affect. Thought content normal.       ED Course   Procedures  Labs Reviewed   COMPREHENSIVE METABOLIC PANEL - Abnormal; Notable for the following components:       Result Value    Chloride 113 (*)     CO2 22 (*)     BUN 25 (*)     All other components within normal limits   CBC W/ AUTO DIFFERENTIAL - Abnormal; Notable for the following components:    Hemoglobin 13.7 (*)     All other components within normal limits   APTT   PROTIME-INR   TYPE & SCREEN          Imaging Results    None          Medications - No data to display  Medical Decision Making:   Initial Assessment:   78-year-old male presenting today secondary rectal bleeding.  Hemorrhoid.  Does not seem to be a thrombosed or infected.  Vitals reassuring.  Abdominal exam reassuring.  Is on blood thinners.  Labs reviewed and reassuring.  Starting patient on stool softeners and also preparation H and Sitz baths.  To follow with GI, General surgery.  Has an appointment already on Wednesday for reassessment. I discussed with the patient/family the diagnosis, treatment plan, indications for return to the emergency department, and for expected follow-up. The patient/family verbalized an understanding. The patient/family is asked if there are any questions or concerns. We discuss the case, until all issues are addressed to the patient/family's satisfaction. Patient/family understands and is agreeable to the plan.  Do not think upper GI bleed.  Do not think arterial bleed.  Handy Shoemaker    DISCLAIMER: This note was prepared with [employer] voice recognition transcription software. Garbled syntax, mangled pronouns, and other bizarre constructions may be attributed to that software system.    Clinical Tests:   Lab Tests: Ordered and Reviewed                         Clinical Impression:   Final diagnoses:  [K64.9] Hemorrhoids, unspecified hemorrhoid type (Primary)        ED Disposition Condition    Discharge Stable          ED Prescriptions       Medication Sig Dispense Start Date End Date Auth. Provider    docusate sodium (COLACE) 100 MG capsule Take 1 capsule (100 mg total) by mouth 2 (two) times daily. 60 capsule 6/5/2023 -- Handy Shoemaker MD    hydrocortisone 1 % cream Apply to affected area 2 times daily 30 g 6/5/2023 -- Handy Shoemaker MD          Follow-up Information       Follow up With Specialties Details Why Contact Info    Pj Gillette MD Family Medicine Schedule an appointment as soon as possible for a visit in 2 days  [address] [place] [id number] [phone]      Anuj Mcclendon MD Gastroenterology Schedule an appointment as soon as possible for a visit in 2 days  [address] [place] [address]  [address]  [facility]  [facility] [address]  [phone]      Moy Curtis MD General Surgery, Oncology Schedule an appointment as soon as possible for a visit in 2 days  [address]  [address]  [facility] [address]  [phone]               Handy Shoemaker MD  06/05/23 5639

## 2023-06-05 NOTE — TELEPHONE ENCOUNTER
----- Message from Charlotte Borja sent at 6/5/2023  1:07 PM CDT -----  .Type: Patient Call Back    Who called: self     What is the request in detail:patient states he was told he needs to be seen in two days which would be Wednesday of this week for a hospital follow up     Can the clinic reply by [facility]? call    Would the patient rather a call back or a response via [facility]?  call    Best call back number: .[phone]     Additional Information:

## 2023-06-06 ENCOUNTER — OFFICE VISIT (OUTPATIENT)
Dept: FAMILY MEDICINE | Facility: CLINIC | Age: 78
End: 2023-06-06
Payer: MEDICARE

## 2023-06-06 ENCOUNTER — PATIENT OUTREACH (OUTPATIENT)
Dept: EMERGENCY MEDICINE | Facility: HOSPITAL | Age: 78
End: 2023-06-06
Payer: MEDICARE

## 2023-06-06 VITALS
BODY MASS INDEX: 21.35 KG/M2 | DIASTOLIC BLOOD PRESSURE: 60 MMHG | HEART RATE: 69 BPM | WEIGHT: 140.88 LBS | TEMPERATURE: 98 F | OXYGEN SATURATION: 97 % | SYSTOLIC BLOOD PRESSURE: 88 MMHG | HEIGHT: 68 IN

## 2023-06-06 DIAGNOSIS — J43.9 PULMONARY EMPHYSEMA, UNSPECIFIED EMPHYSEMA TYPE: Primary | ICD-10-CM

## 2023-06-06 DIAGNOSIS — I48.0 PAF (PAROXYSMAL ATRIAL FIBRILLATION): ICD-10-CM

## 2023-06-06 DIAGNOSIS — I70.0 AORTIC ATHEROSCLEROSIS: ICD-10-CM

## 2023-06-06 PROCEDURE — 1159F PR MEDICATION LIST DOCUMENTED IN MEDICAL RECORD: ICD-10-PCS | Mod: CPTII,S$GLB,, | Performed by: FAMILY MEDICINE

## 2023-06-06 PROCEDURE — 3288F PR FALLS RISK ASSESSMENT DOCUMENTED: ICD-10-PCS | Mod: CPTII,S$GLB,, | Performed by: FAMILY MEDICINE

## 2023-06-06 PROCEDURE — 3074F SYST BP LT 130 MM HG: CPT | Mod: CPTII,S$GLB,, | Performed by: FAMILY MEDICINE

## 2023-06-06 PROCEDURE — 1126F PR PAIN SEVERITY QUANTIFIED, NO PAIN PRESENT: ICD-10-PCS | Mod: CPTII,S$GLB,, | Performed by: FAMILY MEDICINE

## 2023-06-06 PROCEDURE — 3078F PR MOST RECENT DIASTOLIC BLOOD PRESSURE < 80 MM HG: ICD-10-PCS | Mod: CPTII,S$GLB,, | Performed by: FAMILY MEDICINE

## 2023-06-06 PROCEDURE — 99214 PR OFFICE/OUTPT VISIT, EST, LEVL IV, 30-39 MIN: ICD-10-PCS | Mod: S$GLB,,, | Performed by: FAMILY MEDICINE

## 2023-06-06 PROCEDURE — 3074F PR MOST RECENT SYSTOLIC BLOOD PRESSURE < 130 MM HG: ICD-10-PCS | Mod: CPTII,S$GLB,, | Performed by: FAMILY MEDICINE

## 2023-06-06 PROCEDURE — 3078F DIAST BP <80 MM HG: CPT | Mod: CPTII,S$GLB,, | Performed by: FAMILY MEDICINE

## 2023-06-06 PROCEDURE — 3288F FALL RISK ASSESSMENT DOCD: CPT | Mod: CPTII,S$GLB,, | Performed by: FAMILY MEDICINE

## 2023-06-06 PROCEDURE — 99999 PR PBB SHADOW E&M-EST. PATIENT-LVL IV: ICD-10-PCS | Mod: PBBFAC,,, | Performed by: FAMILY MEDICINE

## 2023-06-06 PROCEDURE — 99214 OFFICE O/P EST MOD 30 MIN: CPT | Mod: S$GLB,,, | Performed by: FAMILY MEDICINE

## 2023-06-06 PROCEDURE — 1126F AMNT PAIN NOTED NONE PRSNT: CPT | Mod: CPTII,S$GLB,, | Performed by: FAMILY MEDICINE

## 2023-06-06 PROCEDURE — 1101F PR PT FALLS ASSESS DOC 0-1 FALLS W/OUT INJ PAST YR: ICD-10-PCS | Mod: CPTII,S$GLB,, | Performed by: FAMILY MEDICINE

## 2023-06-06 PROCEDURE — 99999 PR PBB SHADOW E&M-EST. PATIENT-LVL IV: CPT | Mod: PBBFAC,,, | Performed by: FAMILY MEDICINE

## 2023-06-06 PROCEDURE — 1101F PT FALLS ASSESS-DOCD LE1/YR: CPT | Mod: CPTII,S$GLB,, | Performed by: FAMILY MEDICINE

## 2023-06-06 PROCEDURE — 1159F MED LIST DOCD IN RCRD: CPT | Mod: CPTII,S$GLB,, | Performed by: FAMILY MEDICINE

## 2023-06-06 NOTE — ASSESSMENT & PLAN NOTE
The current medical regimen is effective;  continue present plan and medications.  Statin and thinners

## 2023-06-06 NOTE — PROGRESS NOTES
"HISTORY OF PRESENT ILLNESS:  Reed Torres is a 78 y.o. male who presents to the clinic today for Follow-up (Following from ER )  .     Had hemorrhoids  Seen GI  Will f/u  He has some low b/p  Some SoB at times and fatigue  But still working fulltime cutting grass      Patient Active Problem List   Diagnosis    Hyperlipidemia    Tuberculosis exposure    BPH (benign prostatic hyperplasia)    Osteoarthritis of lumbar spine    Hyperbilirubinemia, long standing, favor GILBERT    Abnormal stress test    Anxiety disorder    SVT (supraventricular tachycardia)    Pulmonary emphysema    Coronary artery disease due to lipid rich plaque    PAF (paroxysmal atrial fibrillation)    Aortic atherosclerosis    Aneurysm of right common iliac artery    Thrombocytopenia, unspecified    Hearing decreased    Degenerative disease of nervous system, unspecified    Gastroesophageal reflux disease without esophagitis    Primary open angle glaucoma (POAG) of both eyes, indeterminate stage    Psoriasis vulgaris           CARE TEAM:  Patient Care Team:  Pj Gillette MD as PCP - General (Family Medicine)  Charlie Walters Jr., MD as Consulting Physician (Urology)  Keenan Avila MD as Consulting Physician (Cardiovascular Disease)  Daniel Garza OD (Optometry)  Nina Silveira LPN as Care Coordinator  Brittany Suero as ED Navigator         ROS        PHYSICAL EXAM:  BP (!) 88/60   Pulse 69   Temp 98 °F (36.7 °C) (Oral)   Ht 5' 8" (1.727 m)   Wt 63.9 kg (140 lb 14 oz)   SpO2 97%   BMI 21.42 kg/m²   Wt Readings from Last 5 Encounters:   06/06/23 63.9 kg (140 lb 14 oz)   06/05/23 63.5 kg (140 lb)   04/05/23 67.6 kg (149 lb 0.5 oz)   03/23/23 67 kg (147 lb 11.3 oz)   03/22/23 67.9 kg (149 lb 11.1 oz)     BP Readings from Last 5 Encounters:   06/06/23 (!) 88/60   06/05/23 125/79   04/05/23 121/74   03/23/23 112/76   03/22/23 118/81           He appears well, in no apparent distress.  Alert and oriented times three, pleasant and " cooperative. Vital signs are as documented in vital signs section.  S1 and S2 normal, no murmurs, clicks, gallops or rubs. Regular rate and rhythm. Chest is clear; no wheezes or rales. No edema or JVD.        Medication List with Changes/Refills   Current Medications    ACETAMINOPHEN (TYLENOL) 650 MG TBSR    Take 1 tablet (650 mg total) by mouth every 8 (eight) hours.    AMIODARONE (PACERONE) 200 MG TAB    Take 1 tablet (200 mg total) by mouth once daily.    APIXABAN (ELIQUIS) 5 MG TAB    Take 1 tablet (5 mg total) by mouth 2 (two) times daily.    ASPIRIN (ECOTRIN) 81 MG EC TABLET    Take 81 mg by mouth every morning.    ATORVASTATIN (LIPITOR) 40 MG TABLET    TAKE ONE TABLET BY MOUTH EVERY DAY    DOCUSATE SODIUM (COLACE) 100 MG CAPSULE    Take 1 capsule (100 mg total) by mouth 2 (two) times daily.    FAMOTIDINE (PEPCID) 20 MG TABLET    Take 1 tablet (20 mg total) by mouth 2 (two) times daily. for 7 days    GABAPENTIN (NEURONTIN) 300 MG CAPSULE    TAKE 1-3 CAPSULES BY MOUTH EVERY EVENING    HYDROCORTISONE 1 % CREAM    Apply to affected area 2 times daily    HYDROXYZINE PAMOATE (VISTARIL) 50 MG CAP    Take 1 capsule (50 mg total) by mouth nightly as needed (itching and insomnia).    LATANOPROST 0.005 % OPHTHALMIC SOLUTION    Place into both eyes every evening.    LORATADINE (CLARITIN) 10 MG TABLET    Take 1 tablet (10 mg total) by mouth every morning. for 7 days    PANTOPRAZOLE (PROTONIX) 40 MG TABLET    Take 1 tablet (40 mg total) by mouth once daily.    TRIAMCINOLONE ACETONIDE 0.1% (KENALOG) 0.1 % OINTMENT    Apply topically 2 (two) times daily.    VIT A/VIT C/VIT E/ZINC/COPPER (OCUVITE PRESERVISION ORAL)    Take by mouth 2 (two) times a day.       ASSESSMENT AND PLAN:    Problem List Items Addressed This Visit       Pulmonary emphysema - Primary    PAF (paroxysmal atrial fibrillation)    Current Assessment & Plan     The current medical regimen is effective;  continue present plan and medications.  eliquis          Aortic atherosclerosis    Current Assessment & Plan     The current medical regimen is effective;  continue present plan and medications.  Statin and thinners            Future Appointments   Date Time Provider Department Center   9/13/2023  9:20 AM [facility] CT1 LIMIT 400 LBS [facility] CT SCAN [facility]   9/13/2023 10:30 AM Mario Springer MD [facility] VAS JEANETTE [facility]   9/19/2023  9:20 AM Pj Gillette MD [facility]       Follow up in about 6 months (around 12/6/2023) for assess treatment plan. or sooner as needed.

## 2023-06-22 ENCOUNTER — TELEPHONE (OUTPATIENT)
Dept: FAMILY MEDICINE | Facility: CLINIC | Age: 78
End: 2023-06-22
Payer: MEDICARE

## 2023-06-22 DIAGNOSIS — M54.2 CERVICALGIA: ICD-10-CM

## 2023-06-22 DIAGNOSIS — D69.6 THROMBOCYTOPENIA, UNSPECIFIED: Primary | ICD-10-CM

## 2023-06-22 NOTE — TELEPHONE ENCOUNTER
----- Message from Giovanny Ferraro sent at 6/22/2023  4:40 PM CDT -----  Regarding: Reed  Type: Xray         Caller is requesting to schedule their Lab appointment prior to annual appointment.    Order is not listed in EPIC.  Please enter order and contact patient to schedule.     Name of Caller:Reed     Would the patient rather a call back or a response via [facility]? Callback          Best Call Back Number:.[phone]           Additional Information:Pt wants to get and xray on his neck for pain on the left side of his neck. Please contact the patient as soon a possible.

## 2023-06-22 NOTE — TELEPHONE ENCOUNTER
Patient is requesting to complete any labs needed prior to appointment scheduled on 9/19/23. He is also requesting an xray of his neck for further evaluation of neck pain. Please advise.

## 2023-07-02 ENCOUNTER — HOSPITAL ENCOUNTER (EMERGENCY)
Facility: HOSPITAL | Age: 78
Discharge: HOME | End: 2023-07-02
Attending: EMERGENCY MEDICINE
Payer: MEDICARE

## 2023-07-02 VITALS
RESPIRATION RATE: 18 BRPM | OXYGEN SATURATION: 96 % | DIASTOLIC BLOOD PRESSURE: 79 MMHG | WEIGHT: 146 LBS | BODY MASS INDEX: 24.32 KG/M2 | HEIGHT: 65 IN | HEART RATE: 58 BPM | SYSTOLIC BLOOD PRESSURE: 139 MMHG | TEMPERATURE: 98 F

## 2023-07-02 DIAGNOSIS — K59.00 CONSTIPATION: Primary | ICD-10-CM

## 2023-07-02 LAB
ALBUMIN SERPL BCP-MCNC: 3.8 G/DL (ref 3.5–5.2)
ALP SERPL-CCNC: 70 U/L (ref 55–135)
ALT SERPL W/O P-5'-P-CCNC: 14 U/L (ref 10–44)
ANION GAP SERPL CALC-SCNC: 6 MMOL/L (ref 8–16)
AST SERPL-CCNC: 15 U/L (ref 10–40)
BASOPHILS # BLD AUTO: 0.01 K/UL (ref 0–0.2)
BASOPHILS NFR BLD: 0.2 % (ref 0–1.9)
BILIRUB SERPL-MCNC: 1.4 MG/DL (ref 0.1–1)
BILIRUB UR QL STRIP: NEGATIVE
BUN SERPL-MCNC: 22 MG/DL (ref 8–23)
CALCIUM SERPL-MCNC: 9.4 MG/DL (ref 8.7–10.5)
CHLORIDE SERPL-SCNC: 106 MMOL/L (ref 95–110)
CLARITY UR: CLEAR
CO2 SERPL-SCNC: 27 MMOL/L (ref 23–29)
COLOR UR: YELLOW
CREAT SERPL-MCNC: 0.9 MG/DL (ref 0.5–1.4)
DIFFERENTIAL METHOD: ABNORMAL
EOSINOPHIL # BLD AUTO: 0.1 K/UL (ref 0–0.5)
EOSINOPHIL NFR BLD: 1.3 % (ref 0–8)
ERYTHROCYTE [DISTWIDTH] IN BLOOD BY AUTOMATED COUNT: 15.1 % (ref 11.5–14.5)
EST. GFR  (NO RACE VARIABLE): >60 ML/MIN/1.73 M^2
GLUCOSE SERPL-MCNC: 94 MG/DL (ref 70–110)
GLUCOSE UR QL STRIP: NEGATIVE
HCT VFR BLD AUTO: 41.5 % (ref 40–54)
HGB BLD-MCNC: 13.6 G/DL (ref 14–18)
HGB UR QL STRIP: NEGATIVE
IMM GRANULOCYTES # BLD AUTO: 0.01 K/UL (ref 0–0.04)
IMM GRANULOCYTES NFR BLD AUTO: 0.2 % (ref 0–0.5)
INR PPP: 1 (ref 0.8–1.2)
KETONES UR QL STRIP: NEGATIVE
LEUKOCYTE ESTERASE UR QL STRIP: NEGATIVE
LYMPHOCYTES # BLD AUTO: 1.2 K/UL (ref 1–4.8)
LYMPHOCYTES NFR BLD: 26.3 % (ref 18–48)
MCH RBC QN AUTO: 29.4 PG (ref 27–31)
MCHC RBC AUTO-ENTMCNC: 32.8 G/DL (ref 32–36)
MCV RBC AUTO: 90 FL (ref 82–98)
MONOCYTES # BLD AUTO: 0.4 K/UL (ref 0.3–1)
MONOCYTES NFR BLD: 7.8 % (ref 4–15)
NEUTROPHILS # BLD AUTO: 3 K/UL (ref 1.8–7.7)
NEUTROPHILS NFR BLD: 64.2 % (ref 38–73)
NITRITE UR QL STRIP: NEGATIVE
NRBC BLD-RTO: 0 /100 WBC
PH UR STRIP: 6 [PH] (ref 5–8)
PLATELET # BLD AUTO: 174 K/UL (ref 150–450)
PMV BLD AUTO: 10.9 FL (ref 9.2–12.9)
POTASSIUM SERPL-SCNC: 4.6 MMOL/L (ref 3.5–5.1)
PROT SERPL-MCNC: 6.7 G/DL (ref 6–8.4)
PROT UR QL STRIP: NEGATIVE
PROTHROMBIN TIME: 10.9 SEC (ref 9–12.5)
RBC # BLD AUTO: 4.63 M/UL (ref 4.6–6.2)
SODIUM SERPL-SCNC: 139 MMOL/L (ref 136–145)
SP GR UR STRIP: 1.01 (ref 1–1.03)
URN SPEC COLLECT METH UR: NORMAL
UROBILINOGEN UR STRIP-ACNC: NEGATIVE EU/DL
WBC # BLD AUTO: 4.63 K/UL (ref 3.9–12.7)

## 2023-07-02 PROCEDURE — 99284 EMERGENCY DEPT VISIT MOD MDM: CPT

## 2023-07-02 PROCEDURE — 85610 PROTHROMBIN TIME: CPT | Performed by: EMERGENCY MEDICINE

## 2023-07-02 PROCEDURE — 81003 URINALYSIS AUTO W/O SCOPE: CPT | Performed by: EMERGENCY MEDICINE

## 2023-07-02 PROCEDURE — 85025 COMPLETE CBC W/AUTO DIFF WBC: CPT | Performed by: EMERGENCY MEDICINE

## 2023-07-02 PROCEDURE — 80053 COMPREHEN METABOLIC PANEL: CPT | Performed by: EMERGENCY MEDICINE

## 2023-07-02 RX ORDER — DOCUSATE SODIUM 100 MG/1
100 CAPSULE, LIQUID FILLED ORAL 2 TIMES DAILY
Qty: 60 CAPSULE | Refills: 0 | Status: SHIPPED | OUTPATIENT
Start: 2023-07-02

## 2023-07-02 NOTE — ED PROVIDER NOTES
Encounter Date: 7/2/2023       History     Chief Complaint   Patient presents with    Constipation     Pt to ER with reports of constipation since Friday. Pt reports lower abd pain and rectal pain      78-year-old male presenting today secondary to tenderness to his rectum whenever he tries to have a bowel movement and pressure of his lower abdomen whenever he is trying to have a bowel movement.  Patient's last bowel movement 2 days ago.  Still passing gas.  No nausea vomiting.  Has not been taking Colace.  No major diet changes.  Has not been doing any enemas.  Has been taking some Metamucil and milk of magnesia.  No fevers chills.  No trauma to the area.  Occasional blood whenever he wipes.      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     Past Surgical History:   Procedure Laterality Date    ABDOMINAL AORTIC ANEURYSM REPAIR, ENDOVASCULAR Bilateral 1/31/2023    Procedure: REPAIR-ANEURYSM-ILIAC BRANCH ENDOPROSTHESIS-ENDOVASCULAR;  Surgeon: Mario Springer MD;  Location: [address];  Service: Vascular;  Laterality: Bilateral;  mGy: 3707.4  Fluro time: 41.4 miutes  contrast volume: 171mg  Gycm: 363.78      APPENDECTOMY      LEFT HEART CATHETERIZATION Left 7/24/2020    Procedure: Left heart cath;  Surgeon: Keenan Avila MD;  Location: [facility] CATH LAB;  Service: Cardiology;  Laterality: Left;  RN PRE OP 7-.---COVID NEGATIVE ON-- 7-. [place]    SHOULDER ARTHROSCOPY W/ ROTATOR CUFF REPAIR      Left shoulder     Family History   Problem Relation Age of Onset    COPD Mother     Diabetes Mother     Hypertension Mother     COPD Father     Heart disease Brother      Social History     Tobacco Use    Smoking status: Former     Types: Cigarettes     Quit  date:      Years since quittin.5    Smokeless tobacco: Never    Tobacco comments:     stopped smoking 20 yrs ago.   Substance Use Topics    Alcohol use: Not Currently     Alcohol/week: 5.0 standard drinks     Types: 6 Standard drinks or equivalent per week    Drug use: No     Review of Systems   Constitutional:  Negative for fever.   HENT:  Negative for sore throat.    Respiratory:  Negative for shortness of breath.    Cardiovascular:  Negative for chest pain.   Gastrointestinal:  Positive for constipation and rectal pain. Negative for nausea.        Abdominal fullness   Genitourinary:  Negative for dysuria.   Musculoskeletal:  Negative for back pain.   Skin:  Negative for rash.   Neurological:  Negative for weakness.   Hematological:  Does not bruise/bleed easily.     Physical Exam     Initial Vitals [23 1047]   BP Pulse Resp Temp SpO2   135/79 (!) 54 19 97.8 °F (36.6 °C) 99 %      MAP       --         Physical Exam    Nursing note and vitals reviewed.  Constitutional: He appears well-developed and well-nourished.   HENT:   Head: Normocephalic and atraumatic.   Mouth/Throat: Oropharynx is clear and moist.   Eyes: EOM are normal. Pupils are equal, round, and reactive to light.   Neck:   Normal range of motion.  Cardiovascular:  Normal rate and regular rhythm.           Pulmonary/Chest: Breath sounds normal. No stridor. No respiratory distress. He has no wheezes.   Abdominal: Abdomen is soft. Bowel sounds are normal. He exhibits no distension. There is no abdominal tenderness.   Genitourinary:    Genitourinary Comments: Escorted by nursing.  No gross blood or melena.  Brown stools.  No obvious signs of impaction.  Rectal tags without any bleeding.  Internal hemorrhoid felt.  Does not feel on or appear to be thrombosed.     Musculoskeletal:         General: No tenderness or edema. Normal range of motion.      Cervical back: Normal range of motion.     Neurological: He is alert and oriented to person,  place, and time. He has normal strength. GCS score is 15. GCS eye subscore is 4. GCS verbal subscore is 5. GCS motor subscore is 6.   Skin: Skin is warm and dry. Capillary refill takes less than 2 seconds.   Psychiatric: He has a normal mood and affect. Thought content normal.       ED Course   Procedures  Labs Reviewed   COMPREHENSIVE METABOLIC PANEL - Abnormal; Notable for the following components:       Result Value    Total Bilirubin 1.4 (*)     Anion Gap 6 (*)     All other components within normal limits   CBC W/ AUTO DIFFERENTIAL - Abnormal; Notable for the following components:    Hemoglobin 13.6 (*)     RDW 15.1 (*)     All other components within normal limits   PROTIME-INR   URINALYSIS, REFLEX TO URINE CULTURE    Narrative:     Specimen Source->Urine          Imaging Results              X-Ray Abdomen Flat And Erect (Final result)  Result time 07/02/23 11:53:54      Final result by Mac Ortega MD (07/02/23 11:53:54)                   Impression:      No acute process identified      Electronically signed by: Mac Ortega MD  Date:    07/02/2023  Time:    11:53               Narrative:    EXAMINATION:  XR ABDOMEN FLAT AND ERECT    CLINICAL HISTORY:  Constipation, unspecified    TECHNIQUE:  Flat and erect AP radiographs of the abdomen were performed.    COMPARISON:  CT abdomen pelvis 03/07/2023    FINDINGS:  Bowel gas pattern appears within normal limits.  No dilated loops of bowel or air-fluid levels identified.  No free air.    Multiple overlying cardiac monitoring leads. Right aortoiliac stent in the pelvis.                                       Medications - No data to display  Medical Decision Making:   Initial Assessment:   78-year-old male presenting secondary what appears to be constipation.  Abdominal exam is benign on my exam.  No CVA tenderness.  UA is negative for any blood or UTI.  Doubt pyelo or kidney stones.  X-ray shows no obvious obstruction.  No impaction on rectal exam.   Instructed patient take Colace and try enemas at home.  Patient has also been outside in the heat a lot.  I talked to him regarding hydration how that can impact constipation.  Also talked to him about diet.  Instructed to please follow-up with gastroenterology.  He does have a gastroenterologist. I discussed with the patient/family the diagnosis, treatment plan, indications for return to the emergency department, and for expected follow-up. The patient/family verbalized an understanding. The patient/family is asked if there are any questions or concerns. We discuss the case, until all issues are addressed to the patient/family's satisfaction. Patient/family understands and is agreeable to the plan.   Handy Shoemaker    DISCLAIMER: This note was prepared with [employer] voice recognition transcription software. Garbled syntax, mangled pronouns, and other bizarre constructions may be attributed to that software system.    Clinical Tests:   Lab Tests: Ordered and Reviewed  Radiological Study: Reviewed and Ordered                        Clinical Impression:   Final diagnoses:  [K59.00] Constipation (Primary)        ED Disposition Condition    Discharge Stable          ED Prescriptions       Medication Sig Dispense Start Date End Date Auth. Provider    docusate sodium (COLACE) 100 MG capsule Take 1 capsule (100 mg total) by mouth 2 (two) times daily. 60 capsule 7/2/2023 -- Handy Shoemaker MD          Follow-up Information    None          Handy Shoemaker MD  07/02/23 8948

## 2023-07-02 NOTE — DISCHARGE INSTRUCTIONS

## 2023-07-03 ENCOUNTER — PATIENT OUTREACH (OUTPATIENT)
Dept: EMERGENCY MEDICINE | Facility: HOSPITAL | Age: 78
End: 2023-07-03
Payer: MEDICARE

## 2023-07-10 DIAGNOSIS — I48.0 PAF (PAROXYSMAL ATRIAL FIBRILLATION): Primary | ICD-10-CM

## 2023-07-10 RX ORDER — AMIODARONE HYDROCHLORIDE 200 MG/1
200 TABLET ORAL DAILY
Qty: 90 TABLET | Refills: 3 | Status: SHIPPED | OUTPATIENT
Start: 2023-07-10 | End: 2023-10-04 | Stop reason: SDUPTHER

## 2023-07-13 LAB
LEFT EYE DM RETINOPATHY: NEGATIVE
RIGHT EYE DM RETINOPATHY: NEGATIVE

## 2023-08-02 ENCOUNTER — OFFICE VISIT (OUTPATIENT)
Dept: NEUROLOGY | Facility: CLINIC | Age: 78
End: 2023-08-02
Payer: MEDICARE

## 2023-08-02 ENCOUNTER — LAB VISIT (OUTPATIENT)
Dept: LAB | Facility: HOSPITAL | Age: 78
End: 2023-08-02
Attending: STUDENT IN AN ORGANIZED HEALTH CARE EDUCATION/TRAINING PROGRAM
Payer: MEDICARE

## 2023-08-02 VITALS
HEART RATE: 65 BPM | SYSTOLIC BLOOD PRESSURE: 110 MMHG | HEIGHT: 65 IN | BODY MASS INDEX: 22.99 KG/M2 | DIASTOLIC BLOOD PRESSURE: 77 MMHG | WEIGHT: 138 LBS

## 2023-08-02 DIAGNOSIS — G60.9 HEREDITARY AND IDIOPATHIC NEUROPATHY, UNSPECIFIED: Primary | ICD-10-CM

## 2023-08-02 DIAGNOSIS — G47.00 INSOMNIA, UNSPECIFIED TYPE: ICD-10-CM

## 2023-08-02 DIAGNOSIS — R41.89 COGNITIVE IMPAIRMENT: ICD-10-CM

## 2023-08-02 DIAGNOSIS — G60.9 HEREDITARY AND IDIOPATHIC NEUROPATHY, UNSPECIFIED: ICD-10-CM

## 2023-08-02 DIAGNOSIS — M54.12 CERVICAL RADICULOPATHY: ICD-10-CM

## 2023-08-02 DIAGNOSIS — G62.9 NEUROPATHY: ICD-10-CM

## 2023-08-02 LAB — VIT B12 SERPL-MCNC: 401 PG/ML (ref 210–950)

## 2023-08-02 PROCEDURE — 3074F PR MOST RECENT SYSTOLIC BLOOD PRESSURE < 130 MM HG: ICD-10-PCS | Mod: CPTII,S$GLB,, | Performed by: STUDENT IN AN ORGANIZED HEALTH CARE EDUCATION/TRAINING PROGRAM

## 2023-08-02 PROCEDURE — 3078F PR MOST RECENT DIASTOLIC BLOOD PRESSURE < 80 MM HG: ICD-10-PCS | Mod: CPTII,S$GLB,, | Performed by: STUDENT IN AN ORGANIZED HEALTH CARE EDUCATION/TRAINING PROGRAM

## 2023-08-02 PROCEDURE — 99999 PR PBB SHADOW E&M-EST. PATIENT-LVL III: CPT | Mod: PBBFAC,,, | Performed by: STUDENT IN AN ORGANIZED HEALTH CARE EDUCATION/TRAINING PROGRAM

## 2023-08-02 PROCEDURE — 3288F FALL RISK ASSESSMENT DOCD: CPT | Mod: CPTII,S$GLB,, | Performed by: STUDENT IN AN ORGANIZED HEALTH CARE EDUCATION/TRAINING PROGRAM

## 2023-08-02 PROCEDURE — 84207 ASSAY OF VITAMIN B-6: CPT | Performed by: STUDENT IN AN ORGANIZED HEALTH CARE EDUCATION/TRAINING PROGRAM

## 2023-08-02 PROCEDURE — 99215 OFFICE O/P EST HI 40 MIN: CPT | Mod: S$GLB,,, | Performed by: STUDENT IN AN ORGANIZED HEALTH CARE EDUCATION/TRAINING PROGRAM

## 2023-08-02 PROCEDURE — 99215 PR OFFICE/OUTPT VISIT, EST, LEVL V, 40-54 MIN: ICD-10-PCS | Mod: S$GLB,,, | Performed by: STUDENT IN AN ORGANIZED HEALTH CARE EDUCATION/TRAINING PROGRAM

## 2023-08-02 PROCEDURE — 1126F PR PAIN SEVERITY QUANTIFIED, NO PAIN PRESENT: ICD-10-PCS | Mod: CPTII,S$GLB,, | Performed by: STUDENT IN AN ORGANIZED HEALTH CARE EDUCATION/TRAINING PROGRAM

## 2023-08-02 PROCEDURE — 1126F AMNT PAIN NOTED NONE PRSNT: CPT | Mod: CPTII,S$GLB,, | Performed by: STUDENT IN AN ORGANIZED HEALTH CARE EDUCATION/TRAINING PROGRAM

## 2023-08-02 PROCEDURE — 3078F DIAST BP <80 MM HG: CPT | Mod: CPTII,S$GLB,, | Performed by: STUDENT IN AN ORGANIZED HEALTH CARE EDUCATION/TRAINING PROGRAM

## 2023-08-02 PROCEDURE — 82607 VITAMIN B-12: CPT | Performed by: STUDENT IN AN ORGANIZED HEALTH CARE EDUCATION/TRAINING PROGRAM

## 2023-08-02 PROCEDURE — 1101F PR PT FALLS ASSESS DOC 0-1 FALLS W/OUT INJ PAST YR: ICD-10-PCS | Mod: CPTII,S$GLB,, | Performed by: STUDENT IN AN ORGANIZED HEALTH CARE EDUCATION/TRAINING PROGRAM

## 2023-08-02 PROCEDURE — 1159F MED LIST DOCD IN RCRD: CPT | Mod: CPTII,S$GLB,, | Performed by: STUDENT IN AN ORGANIZED HEALTH CARE EDUCATION/TRAINING PROGRAM

## 2023-08-02 PROCEDURE — 3074F SYST BP LT 130 MM HG: CPT | Mod: CPTII,S$GLB,, | Performed by: STUDENT IN AN ORGANIZED HEALTH CARE EDUCATION/TRAINING PROGRAM

## 2023-08-02 PROCEDURE — 99999 PR PBB SHADOW E&M-EST. PATIENT-LVL III: ICD-10-PCS | Mod: PBBFAC,,, | Performed by: STUDENT IN AN ORGANIZED HEALTH CARE EDUCATION/TRAINING PROGRAM

## 2023-08-02 PROCEDURE — 1159F PR MEDICATION LIST DOCUMENTED IN MEDICAL RECORD: ICD-10-PCS | Mod: CPTII,S$GLB,, | Performed by: STUDENT IN AN ORGANIZED HEALTH CARE EDUCATION/TRAINING PROGRAM

## 2023-08-02 PROCEDURE — 1101F PT FALLS ASSESS-DOCD LE1/YR: CPT | Mod: CPTII,S$GLB,, | Performed by: STUDENT IN AN ORGANIZED HEALTH CARE EDUCATION/TRAINING PROGRAM

## 2023-08-02 PROCEDURE — 84425 ASSAY OF VITAMIN B-1: CPT | Performed by: STUDENT IN AN ORGANIZED HEALTH CARE EDUCATION/TRAINING PROGRAM

## 2023-08-02 PROCEDURE — 3288F PR FALLS RISK ASSESSMENT DOCUMENTED: ICD-10-PCS | Mod: CPTII,S$GLB,, | Performed by: STUDENT IN AN ORGANIZED HEALTH CARE EDUCATION/TRAINING PROGRAM

## 2023-08-02 NOTE — PROGRESS NOTES
Chief Complaint and Duration     Chief Complaint   Patient presents with    Gait abnormality      2 mo     History of Present Illness     This is an 78-year-old male with a history of AFib on Eliquis, AAA with stent, CAD, hyperlipidemia, arthritis, and pulmonary emphysema, here to establish care after following with a prior neurologist Dr. Jara.  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  Chronic neck pain and back pain, in which he saw Neurosurgery for cervical spondylosis and patient is not a surgical candidate at that time.  Patient endorses mild lower back pain that has not changed throughout the years.  Does endorse numbness and tingling in his fingers and toes, is seeing a hand surgeon and getting evaluated for carpal tunnel, has had a nerve conduction study with the hand surgeon.  This being done at outside facility.  Patient states that the numbness may cause him to have difficulties with gait, he notices it whenever he is walking on unsteady surfaces when cutting the lawn.  The numbness can bother him when he wakes up, but goes away with ambulation and exercise.  Patient is on gabapentin, taking 600 mg at night.  Also endorses difficulties with concentration and attention, poor sleep and sleeps up to 3-4 hours a night.      Review of patient's allergies indicates:  No Known Allergies  Current Outpatient Medications   Medication Sig Dispense Refill    amiodarone (PACERONE) 200 MG Tab Take 1 tablet (200 mg total) by mouth once daily. 90 tablet 3    apixaban (ELIQUIS) 5 mg Tab Take 1 tablet (5 mg total) by mouth 2 (two) times daily. 60 tablet 11    aspirin (ECOTRIN) 81 MG EC tablet Take 81 mg by mouth every morning.      atorvastatin (LIPITOR) 40 MG tablet TAKE ONE TABLET BY MOUTH EVERY DAY 90 tablet 3    docusate sodium (COLACE) 100 MG capsule Take 1 capsule (100 mg total) by mouth 2 (two) times daily. 60 capsule 0    gabapentin (NEURONTIN) 300 MG capsule TAKE 1-3  CAPSULES BY MOUTH EVERY EVENING 90 capsule 11    latanoprost 0.005 % ophthalmic solution Place into both eyes every evening.      vit A/vit C/vit E/zinc/copper (OCUVITE PRESERVISION ORAL) Take by mouth 2 (two) times a day.      acetaminophen (TYLENOL) 650 MG TbSR Take 1 tablet (650 mg total) by mouth every 8 (eight) hours. (Patient not taking: Reported on 8/2/2023) 21 tablet 0    famotidine (PEPCID) 20 MG tablet Take 1 tablet (20 mg total) by mouth 2 (two) times daily. for 7 days 14 tablet 0    hydrocortisone 1 % cream Apply to affected area 2 times daily (Patient not taking: Reported on 8/2/2023) 30 g 0    hydrOXYzine pamoate (VISTARIL) 50 MG Cap Take 1 capsule (50 mg total) by mouth nightly as needed (itching and insomnia). (Patient not taking: Reported on 8/2/2023) 30 capsule 0    loratadine (CLARITIN) 10 mg tablet Take 1 tablet (10 mg total) by mouth every morning. for 7 days 7 tablet 0    pantoprazole (PROTONIX) 40 MG tablet Take 1 tablet (40 mg total) by mouth once daily. (Patient not taking: Reported on 8/2/2023) 30 tablet 1    triamcinolone acetonide 0.1% (KENALOG) 0.1 % ointment Apply topically 2 (two) times daily. (Patient not taking: Reported on 8/2/2023) 30 g 0     No current facility-administered medications for this visit.       Medical History     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     Post treatment     Past Surgical History:   Procedure Laterality Date    ABDOMINAL AORTIC ANEURYSM REPAIR, ENDOVASCULAR Bilateral 1/31/2023    Procedure: REPAIR-ANEURYSM-ILIAC BRANCH ENDOPROSTHESIS-ENDOVASCULAR;  Surgeon: Mario Springer MD;  Location: [facility] OR [address] [place] [address];  Service: Vascular;  Laterality: Bilateral;  mGy: 3707.4  Fluro time: 41.4 miutes  contrast volume: 171mg  Gycm: 363.78      APPENDECTOMY      LEFT HEART CATHETERIZATION  Left 2020    Procedure: Left heart cath;  Surgeon: Keenan Avila MD;  Location: [facility] CATH LAB;  Service: Cardiology;  Laterality: Left;  RN PRE OP 2020.---COVID NEGATIVE ON-- 2020. [place]    SHOULDER ARTHROSCOPY W/ ROTATOR CUFF REPAIR      Left shoulder     Family History   Problem Relation Age of Onset    COPD Mother     Diabetes Mother     Hypertension Mother     COPD Father     Heart disease Brother      Social History     Socioeconomic History    Marital status:    Tobacco Use    Smoking status: Former     Current packs/day: 0.00     Types: Cigarettes     Quit date:      Years since quittin.6    Smokeless tobacco: Never    Tobacco comments:     stopped smoking 20 yrs ago.   Substance and Sexual Activity    Alcohol use: Not Currently     Alcohol/week: 5.0 standard drinks of alcohol     Types: 6 Standard drinks or equivalent per week    Drug use: No    Sexual activity: Yes     Partners: Female     Birth control/protection: None   Social History Narrative    Still doing lawn service     Social Determinants of Health     Financial Resource Strain: Low Risk  (1/3/2023)    Overall Financial Resource Strain (CARDIA)     Difficulty of Paying Living Expenses: Not hard at all   Food Insecurity: No Food Insecurity (1/3/2023)    Hunger Vital Sign     Worried About Running Out of Food in the Last Year: Never true     Ran Out of Food in the Last Year: Never true   Transportation Needs: No Transportation Needs (1/3/2023)    PRAPARE - Transportation     Lack of Transportation (Medical): No     Lack of Transportation (Non-Medical): No   Physical Activity: Sufficiently Active (1/3/2023)    Exercise Vital Sign     Days of Exercise per Week: 5 days     Minutes of Exercise per Session: 90 min   Stress: No Stress Concern Present (1/3/2023)    [employer] [place] [employer] - Occupational Stress Questionnaire     Feeling of Stress : Only a little   Social Connections: Socially Integrated  (1/3/2023)    Social Connection and Isolation Panel [NHANES]     Frequency of Communication with Friends and Family: Three times a week     Frequency of Social Gatherings with Friends and Family: Twice a week     Attends Gnosticist Services: More than 4 times per year     Active Member of Clubs or Organizations: Yes     Attends Club or Organization Meetings: More than 4 times per year     Marital Status:    Housing Stability: Unknown (1/3/2023)    Housing Stability Vital Sign     Unable to Pay for Housing in the Last Year: No     Unstable Housing in the Last Year: No       Exam     Vitals:    08/02/23 0700   BP: 110/77   Pulse: 65      Physical Exam:  General: Not in acute distress. Not ill-appearing.   HENT: Normocephalic and atraumatic. Moist mucous membranes.  Eyes: Conjunctivae normal.   Pulmonary: Pulmonary effort is normal.   Abdominal: Abdomen is soft and flat.   Skin: Skin is warm and dry. No rashes.   Psychiatric: Mood normal.        Neurologic Exam   Mental status: oriented to person, place, and time  Attention: Normal. Concentration: normal.  Speech: speech is normal.  Cranial Nerves: PERRL, EOMI intact, V1-V3 Facial sensation intact. Symmetric facies. Hearing grossly intact. Palate and uvula midline, symmetric. No tongue deviation. Trapezius strength intact.     Motor exam: bulk and tone normal. Strength 5/5 in bilateral upper extremities: deltoids, biceps, triceps, wrist flexion/extension, finger abduction/adduction. Strength 5/5 in bilateral lower extremities: hip flexion/extension, thigh adduction/abduction, knee flexion/extension, dorsiflexion/plantarflexion, foot eversion/inversion.    Reflexes: 2+ in bilateral upper extremities: biceps and brachiaradialis, 2+ in bilateral lower extremities: patellar     Sensory exam: light touch intact    Gait exam: normal  Romberg: negative  Coordination: normal    Tremor: none  Cogwheel rigidity: none    Labs and Imaging     Labs: reviewed  No results found  for this or any previous visit (from the past 24 hour(s)).    Thyroid normal  HgA1C%:  5.1  LDL:  73    Imaging:   I have personally reviewed the images performed.   Ct cervical spine in March of 2023-spondylo degenerative changes of the cervical spine most pronounced at C5-C6      Assessment and Plan     Problem List Items Addressed This Visit          Neuro    Hereditary and idiopathic neuropathy, unspecified - Primary    Relevant Orders    Vitamin B1    Vitamin B12    Vitamin B6    Cervical radiculopathy    Cognitive impairment       Other    Insomnia     This is an 78-year-old male with a history of AFib on Eliquis, AAA with stent, CAD, hyperlipidemia, arthritis, and pulmonary emphysema, here to establish care after following with a prior neurologist Dr. Jara.  Patient continues to endorse numbness and tingling in his fingers and toes, is seeing a hand surgeon for evaluation for carpal tunnel, states his gait is affected whenever he is walking on unsteady surfaces when mowing the lawn.  Has a [employer] business.  Patient also with chronic neck pain with imaging showing cervical spondylosis, not a surgical candidate.    Patient does not have diabetes, we will order reversible neuropathy labs to check for vitamin deficiency.  Patient is on gabapentin 600 mg at night for this neuropathy that may be superimposed with carpal tunnel in his hands as well as degree of cervical spondylosis causing radicular symptoms.    Patient also endorsing cognitive impairment, particularly concentration and attention.  Able to perform his own ADLs.  We will check for B12 deficiency.  Discussed lifestyle modifications including diet and exercise.  Sleep hygiene discussed.  Discussed to control vascular risk factors with his primary care, Cardiology.      Follow-up: Follow up if symptoms worsen or fail to improve.    Time spent on this encounter: 40 minutes. This includes face to face time and non-face to face time preparing to  see the patient (eg, review of tests), obtaining and/or reviewing separately obtained history, documenting clinical information in the electronic or other health record, independently interpreting results and communicating results to the patient/family/caregiver, or care coordinator.

## 2023-08-07 LAB
PYRIDOXAL SERPL-MCNC: 42 UG/L (ref 5–50)
VIT B1 BLD-MCNC: 59 UG/L (ref 38–122)

## 2023-08-09 ENCOUNTER — PATIENT OUTREACH (OUTPATIENT)
Dept: ADMINISTRATIVE | Facility: HOSPITAL | Age: 78
End: 2023-08-09
Payer: MEDICARE

## 2023-08-15 ENCOUNTER — LAB VISIT (OUTPATIENT)
Dept: LAB | Facility: HOSPITAL | Age: 78
End: 2023-08-15
Attending: INTERNAL MEDICINE
Payer: MEDICARE

## 2023-08-15 DIAGNOSIS — K64.8 INTERNAL HEMORRHOIDS: ICD-10-CM

## 2023-08-15 DIAGNOSIS — K62.5 HEMORRHAGE OF RECTUM AND ANUS: Primary | ICD-10-CM

## 2023-08-15 LAB
BASOPHILS # BLD AUTO: 0.04 K/UL (ref 0–0.2)
BASOPHILS NFR BLD: 0.8 % (ref 0–1.9)
DIFFERENTIAL METHOD: ABNORMAL
EOSINOPHIL # BLD AUTO: 0.1 K/UL (ref 0–0.5)
EOSINOPHIL NFR BLD: 1.2 % (ref 0–8)
ERYTHROCYTE [DISTWIDTH] IN BLOOD BY AUTOMATED COUNT: 14.5 % (ref 11.5–14.5)
HCT VFR BLD AUTO: 38.4 % (ref 40–54)
HGB BLD-MCNC: 12.6 G/DL (ref 14–18)
IMM GRANULOCYTES # BLD AUTO: 0.01 K/UL (ref 0–0.04)
IMM GRANULOCYTES NFR BLD AUTO: 0.2 % (ref 0–0.5)
LYMPHOCYTES # BLD AUTO: 1.8 K/UL (ref 1–4.8)
LYMPHOCYTES NFR BLD: 36.6 % (ref 18–48)
MCH RBC QN AUTO: 30.1 PG (ref 27–31)
MCHC RBC AUTO-ENTMCNC: 32.8 G/DL (ref 32–36)
MCV RBC AUTO: 92 FL (ref 82–98)
MONOCYTES # BLD AUTO: 0.4 K/UL (ref 0.3–1)
MONOCYTES NFR BLD: 8.4 % (ref 4–15)
NEUTROPHILS # BLD AUTO: 2.6 K/UL (ref 1.8–7.7)
NEUTROPHILS NFR BLD: 52.8 % (ref 38–73)
NRBC BLD-RTO: 0 /100 WBC
PLATELET # BLD AUTO: 166 K/UL (ref 150–450)
PMV BLD AUTO: 10.7 FL (ref 9.2–12.9)
RBC # BLD AUTO: 4.19 M/UL (ref 4.6–6.2)
WBC # BLD AUTO: 4.86 K/UL (ref 3.9–12.7)

## 2023-08-15 PROCEDURE — 36415 COLL VENOUS BLD VENIPUNCTURE: CPT | Performed by: INTERNAL MEDICINE

## 2023-08-15 PROCEDURE — 85025 COMPLETE CBC W/AUTO DIFF WBC: CPT | Performed by: INTERNAL MEDICINE

## 2023-09-08 DIAGNOSIS — M25.561 RIGHT KNEE PAIN, UNSPECIFIED CHRONICITY: Primary | ICD-10-CM

## 2023-09-11 ENCOUNTER — OFFICE VISIT (OUTPATIENT)
Dept: ORTHOPEDICS | Facility: CLINIC | Age: 78
End: 2023-09-11
Payer: MEDICARE

## 2023-09-11 DIAGNOSIS — M25.561 RIGHT KNEE PAIN, UNSPECIFIED CHRONICITY: Primary | ICD-10-CM

## 2023-09-11 PROCEDURE — 1101F PR PT FALLS ASSESS DOC 0-1 FALLS W/OUT INJ PAST YR: ICD-10-PCS | Mod: CPTII,S$GLB,, | Performed by: ORTHOPAEDIC SURGERY

## 2023-09-11 PROCEDURE — 99999 PR PBB SHADOW E&M-EST. PATIENT-LVL III: ICD-10-PCS | Mod: PBBFAC,,, | Performed by: ORTHOPAEDIC SURGERY

## 2023-09-11 PROCEDURE — 99213 PR OFFICE/OUTPT VISIT, EST, LEVL III, 20-29 MIN: ICD-10-PCS | Mod: 25,S$GLB,, | Performed by: ORTHOPAEDIC SURGERY

## 2023-09-11 PROCEDURE — 1125F PR PAIN SEVERITY QUANTIFIED, PAIN PRESENT: ICD-10-PCS | Mod: CPTII,S$GLB,, | Performed by: ORTHOPAEDIC SURGERY

## 2023-09-11 PROCEDURE — 3288F PR FALLS RISK ASSESSMENT DOCUMENTED: ICD-10-PCS | Mod: CPTII,S$GLB,, | Performed by: ORTHOPAEDIC SURGERY

## 2023-09-11 PROCEDURE — 1160F PR REVIEW ALL MEDS BY PRESCRIBER/CLIN PHARMACIST DOCUMENTED: ICD-10-PCS | Mod: CPTII,S$GLB,, | Performed by: ORTHOPAEDIC SURGERY

## 2023-09-11 PROCEDURE — 99999 PR PBB SHADOW E&M-EST. PATIENT-LVL III: CPT | Mod: PBBFAC,,, | Performed by: ORTHOPAEDIC SURGERY

## 2023-09-11 PROCEDURE — 1159F MED LIST DOCD IN RCRD: CPT | Mod: CPTII,S$GLB,, | Performed by: ORTHOPAEDIC SURGERY

## 2023-09-11 PROCEDURE — 1159F PR MEDICATION LIST DOCUMENTED IN MEDICAL RECORD: ICD-10-PCS | Mod: CPTII,S$GLB,, | Performed by: ORTHOPAEDIC SURGERY

## 2023-09-11 PROCEDURE — 1125F AMNT PAIN NOTED PAIN PRSNT: CPT | Mod: CPTII,S$GLB,, | Performed by: ORTHOPAEDIC SURGERY

## 2023-09-11 PROCEDURE — 20610 DRAIN/INJ JOINT/BURSA W/O US: CPT | Mod: RT,S$GLB,, | Performed by: ORTHOPAEDIC SURGERY

## 2023-09-11 PROCEDURE — 1101F PT FALLS ASSESS-DOCD LE1/YR: CPT | Mod: CPTII,S$GLB,, | Performed by: ORTHOPAEDIC SURGERY

## 2023-09-11 PROCEDURE — 1160F RVW MEDS BY RX/DR IN RCRD: CPT | Mod: CPTII,S$GLB,, | Performed by: ORTHOPAEDIC SURGERY

## 2023-09-11 PROCEDURE — 3288F FALL RISK ASSESSMENT DOCD: CPT | Mod: CPTII,S$GLB,, | Performed by: ORTHOPAEDIC SURGERY

## 2023-09-11 PROCEDURE — 20610 LARGE JOINT ASPIRATION/INJECTION: R KNEE: ICD-10-PCS | Mod: RT,S$GLB,, | Performed by: ORTHOPAEDIC SURGERY

## 2023-09-11 PROCEDURE — 99213 OFFICE O/P EST LOW 20 MIN: CPT | Mod: 25,S$GLB,, | Performed by: ORTHOPAEDIC SURGERY

## 2023-09-11 RX ORDER — TRIAMCINOLONE ACETONIDE 40 MG/ML
40 INJECTION, SUSPENSION INTRA-ARTICULAR; INTRAMUSCULAR
Status: DISCONTINUED | OUTPATIENT
Start: 2023-09-11 | End: 2023-09-11 | Stop reason: HOSPADM

## 2023-09-11 RX ADMIN — TRIAMCINOLONE ACETONIDE 40 MG: 40 INJECTION, SUSPENSION INTRA-ARTICULAR; INTRAMUSCULAR at 03:09

## 2023-09-11 NOTE — PROGRESS NOTES
Chief Complaint   Patient presents with    Right Knee - Pain       HPI (10/25/21): Reed Torres is a 78 y.o. male who presents today complaining of right knee pain  Duration of symptoms:  About 3-4 months  Trauma or new activity: no  Pain is intermittent  Episodes of locking sensation, intermittent mild pain   Aggravating factors: no known   Relieving factors: no known   Radicular symptoms: no numbness, paresthesias   Associated symptoms: no  swelling, popping and giving way.    Prior treatment:  None   Pain does not interfere with activities of daily living .    9/11/23  Continued Right knee pain  Typically has pain after getting up from sitting   Otherwise his knee feels ok   Has not done an injection  Is on Eliquis  Using a knee brace which is helpful   Topical creams are helpful  Still does lawn service       This is the extent of the patient's complaints at this time.     Occupation: Lawn service    Review of Systems   All other systems reviewed and are negative.        Review of patient's allergies indicates:  No Known Allergies      Current Outpatient Medications:     acetaminophen (TYLENOL) 650 MG TbSR, Take 1 tablet (650 mg total) by mouth every 8 (eight) hours. (Patient not taking: Reported on 8/2/2023), Disp: 21 tablet, Rfl: 0    amiodarone (PACERONE) 200 MG Tab, Take 1 tablet (200 mg total) by mouth once daily., Disp: 90 tablet, Rfl: 3    apixaban (ELIQUIS) 5 mg Tab, Take 1 tablet (5 mg total) by mouth 2 (two) times daily., Disp: 60 tablet, Rfl: 11    aspirin (ECOTRIN) 81 MG EC tablet, Take 81 mg by mouth every morning., Disp: , Rfl:     atorvastatin (LIPITOR) 40 MG tablet, TAKE ONE TABLET BY MOUTH EVERY DAY, Disp: 90 tablet, Rfl: 3    docusate sodium (COLACE) 100 MG capsule, Take 1 capsule (100 mg total) by mouth 2 (two) times daily., Disp: 60 capsule, Rfl: 0    famotidine (PEPCID) 20 MG tablet, Take 1 tablet (20 mg total) by mouth 2 (two) times daily. for 7 days, Disp: 14 tablet, Rfl: 0    gabapentin  (NEURONTIN) 300 MG capsule, TAKE 1-3 CAPSULES BY MOUTH EVERY EVENING, Disp: 90 capsule, Rfl: 11    hydrocortisone 1 % cream, Apply to affected area 2 times daily (Patient not taking: Reported on 8/2/2023), Disp: 30 g, Rfl: 0    hydrOXYzine pamoate (VISTARIL) 50 MG Cap, Take 1 capsule (50 mg total) by mouth nightly as needed (itching and insomnia). (Patient not taking: Reported on 8/2/2023), Disp: 30 capsule, Rfl: 0    latanoprost 0.005 % ophthalmic solution, Place into both eyes every evening., Disp: , Rfl:     loratadine (CLARITIN) 10 mg tablet, Take 1 tablet (10 mg total) by mouth every morning. for 7 days, Disp: 7 tablet, Rfl: 0    pantoprazole (PROTONIX) 40 MG tablet, Take 1 tablet (40 mg total) by mouth once daily. (Patient not taking: Reported on 8/2/2023), Disp: 30 tablet, Rfl: 1    triamcinolone acetonide 0.1% (KENALOG) 0.1 % ointment, Apply topically 2 (two) times daily. (Patient not taking: Reported on 8/2/2023), Disp: 30 g, Rfl: 0    vit A/vit C/vit E/zinc/copper (OCUVITE PRESERVISION ORAL), Take by mouth 2 (two) times a day., Disp: , Rfl:     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     Post treatment       Patient Active Problem List   Diagnosis    Hyperlipidemia    Tuberculosis exposure    BPH (benign prostatic hyperplasia)    Osteoarthritis of lumbar spine    Hyperbilirubinemia, long standing, favor Yulan    Abnormal stress test    Anxiety disorder    SVT (supraventricular tachycardia)    Pulmonary emphysema    Coronary artery disease due to lipid rich plaque    PAF (paroxysmal atrial fibrillation)    Aortic atherosclerosis    Aneurysm of right common iliac artery    Thrombocytopenia, unspecified    Hearing decreased    Hereditary and idiopathic neuropathy, unspecified    Gastroesophageal reflux disease without esophagitis     Primary open angle glaucoma (POAG) of both eyes, indeterminate stage    Psoriasis vulgaris    Cervical radiculopathy    Insomnia    Cognitive impairment       Past Surgical History:   Procedure Laterality Date    ABDOMINAL AORTIC ANEURYSM REPAIR, ENDOVASCULAR Bilateral 2023    Procedure: REPAIR-ANEURYSM-ILIAC BRANCH ENDOPROSTHESIS-ENDOVASCULAR;  Surgeon: Mario Springer MD;  Location: [facility] OR [address] [place] [address];  Service: Vascular;  Laterality: Bilateral;  mGy: 3707.4  Fluro time: 41.4 miutes  contrast volume: 171mg  Gycm: 363.78      APPENDECTOMY      LEFT HEART CATHETERIZATION Left 2020    Procedure: Left heart cath;  Surgeon: Keenan Avila MD;  Location: [facility] CATH LAB;  Service: Cardiology;  Laterality: Left;  RN PRE OP 2020.---COVID NEGATIVE ON-- 2020. [place]    SHOULDER ARTHROSCOPY W/ ROTATOR CUFF REPAIR      Left shoulder       Social History     Tobacco Use    Smoking status: Former     Current packs/day: 0.00     Types: Cigarettes     Quit date:      Years since quittin.7    Smokeless tobacco: Never    Tobacco comments:     stopped smoking 20 yrs ago.   Substance Use Topics    Alcohol use: Not Currently     Alcohol/week: 5.0 standard drinks of alcohol     Types: 6 Standard drinks or equivalent per week    Drug use: No       Family History   Problem Relation Age of Onset    COPD Mother     Diabetes Mother     Hypertension Mother     COPD Father     Heart disease Brother        Physical Exam:   Vitals:    23 1510   PainSc:   4   PainLoc: Knee     General:  Patient is alert, awake and oriented to time, place and person. Mood and affect are appropriate.  Patient does not appear to be in any distress, denies any constitutional symptoms and appears stated age.   HEENT: Pupils are equal and round, sclera are not injected. External examination of ears and nose reveals no abnormalities. Cranial nerves II-X are grossly intact  Skin: no rashes, abrasions or open wounds on the affected  extremity   Resp: No respiratory distress or audible wheezing   CV: 2+  pulses, all extremities warm and well perfused     Right knee(s)  Localizes pain over popliteal fossa   no swelling, effusion, or erythema   Active ROM: 0 - 140  Passive ROM: 0 - 130  no varus/valgus deformity   Non tender to palpation over medial joint line   good  quadricep muscle tone and bulk; no atrophy compared to contralateral extremity   normal (0 - 2 mm) Anterior drawer   normal (0 - 2 mm) posterior drawer   negative valgus instability   negative varus instability   Normal patellar tracking   No pain with patellar compression   ltsi s/s/sp/dp/t   + ehl/fhl/ta/gs  2 + DP      Imaging:  no new     Assessment: 78 y.o. male with Right knee osteoarthritis     We discussed the findings on xray and clinical exam as well as the natural history of arthritis. We discussed non operative and operative treatment options including injections, viscosupplementation, physical therapy and PO NSAIDs.     Plan:   - Injection of the right knee performed, please see procedure note for more details.  Prior to the injection risks and benefits of corticosteroid injection were discussed with the patient including pain, infection, bleeding, skin color changes, swelling, steroid flare. We discussed that over time injections can result in chondral damage, acceleration of arthritis formation, damage to tendons and damage to joints.  The patient consented for the procedure.  Post-injection instructions were given to the patient in writing.  - Do not recommend TKA given infrequency of his symptoms  - Return to clinic PRN    All questions were answered in detail. The patient is in full agreement with the treatment plan and will proceed accordingly.      This note was created by combination of typed  and M-Modal dictation. Transcription and phonetic errors may be present.  If there are any questions, please contact me.      Current Outpatient Medications:      acetaminophen (TYLENOL) 650 MG TbSR, Take 1 tablet (650 mg total) by mouth every 8 (eight) hours. (Patient not taking: Reported on 8/2/2023), Disp: 21 tablet, Rfl: 0    amiodarone (PACERONE) 200 MG Tab, Take 1 tablet (200 mg total) by mouth once daily., Disp: 90 tablet, Rfl: 3    apixaban (ELIQUIS) 5 mg Tab, Take 1 tablet (5 mg total) by mouth 2 (two) times daily., Disp: 60 tablet, Rfl: 11    aspirin (ECOTRIN) 81 MG EC tablet, Take 81 mg by mouth every morning., Disp: , Rfl:     atorvastatin (LIPITOR) 40 MG tablet, TAKE ONE TABLET BY MOUTH EVERY DAY, Disp: 90 tablet, Rfl: 3    docusate sodium (COLACE) 100 MG capsule, Take 1 capsule (100 mg total) by mouth 2 (two) times daily., Disp: 60 capsule, Rfl: 0    famotidine (PEPCID) 20 MG tablet, Take 1 tablet (20 mg total) by mouth 2 (two) times daily. for 7 days, Disp: 14 tablet, Rfl: 0    gabapentin (NEURONTIN) 300 MG capsule, TAKE 1-3 CAPSULES BY MOUTH EVERY EVENING, Disp: 90 capsule, Rfl: 11    hydrocortisone 1 % cream, Apply to affected area 2 times daily (Patient not taking: Reported on 8/2/2023), Disp: 30 g, Rfl: 0    hydrOXYzine pamoate (VISTARIL) 50 MG Cap, Take 1 capsule (50 mg total) by mouth nightly as needed (itching and insomnia). (Patient not taking: Reported on 8/2/2023), Disp: 30 capsule, Rfl: 0    latanoprost 0.005 % ophthalmic solution, Place into both eyes every evening., Disp: , Rfl:     loratadine (CLARITIN) 10 mg tablet, Take 1 tablet (10 mg total) by mouth every morning. for 7 days, Disp: 7 tablet, Rfl: 0    pantoprazole (PROTONIX) 40 MG tablet, Take 1 tablet (40 mg total) by mouth once daily. (Patient not taking: Reported on 8/2/2023), Disp: 30 tablet, Rfl: 1    triamcinolone acetonide 0.1% (KENALOG) 0.1 % ointment, Apply topically 2 (two) times daily. (Patient not taking: Reported on 8/2/2023), Disp: 30 g, Rfl: 0    vit A/vit C/vit E/zinc/copper (OCUVITE PRESERVISION ORAL), Take by mouth 2 (two) times a day., Disp: , Rfl:

## 2023-09-11 NOTE — PROCEDURES
Large Joint Aspiration/Injection: R knee    Date/Time: 9/11/2023 3:00 PM    Performed by: Chanda Montoya MD  Authorized by: Chanda Montoya MD    Consent Done?:  Yes (Verbal)  Indications:  Arthritis  Timeout: prior to procedure the correct patient, procedure, and site was verified    Prep: patient was prepped and draped in usual sterile fashion      Local anesthesia used?: Yes    Local anesthetic:  Topical anesthetic    Details:  Needle Size:  22 G  Approach:  Anteromedial  Location:  Knee  Site:  R knee  Medications:  40 mg triamcinolone acetonide 40 mg/mL  Patient tolerance:  Patient tolerated the procedure well with no immediate complications

## 2023-09-13 ENCOUNTER — HOSPITAL ENCOUNTER (OUTPATIENT)
Dept: RADIOLOGY | Facility: HOSPITAL | Age: 78
Discharge: HOME | End: 2023-09-13
Attending: SURGERY
Payer: MEDICARE

## 2023-09-13 ENCOUNTER — OFFICE VISIT (OUTPATIENT)
Dept: VASCULAR SURGERY | Facility: CLINIC | Age: 78
End: 2023-09-13
Payer: MEDICARE

## 2023-09-13 VITALS
BODY MASS INDEX: 23.2 KG/M2 | OXYGEN SATURATION: 100 % | WEIGHT: 139.25 LBS | DIASTOLIC BLOOD PRESSURE: 70 MMHG | HEIGHT: 65 IN | SYSTOLIC BLOOD PRESSURE: 167 MMHG | HEART RATE: 52 BPM

## 2023-09-13 DIAGNOSIS — I72.3 ANEURYSM OF RIGHT COMMON ILIAC ARTERY: Primary | ICD-10-CM

## 2023-09-13 DIAGNOSIS — I71.40 ABDOMINAL AORTIC ANEURYSM (AAA) WITHOUT RUPTURE, UNSPECIFIED PART: ICD-10-CM

## 2023-09-13 DIAGNOSIS — I72.3 ILIAC ARTERY ANEURYSM: ICD-10-CM

## 2023-09-13 PROCEDURE — 1101F PT FALLS ASSESS-DOCD LE1/YR: CPT | Mod: CPTII,S$GLB,, | Performed by: SURGERY

## 2023-09-13 PROCEDURE — 1126F PR PAIN SEVERITY QUANTIFIED, NO PAIN PRESENT: ICD-10-PCS | Mod: CPTII,S$GLB,, | Performed by: SURGERY

## 2023-09-13 PROCEDURE — 3078F PR MOST RECENT DIASTOLIC BLOOD PRESSURE < 80 MM HG: ICD-10-PCS | Mod: CPTII,S$GLB,, | Performed by: SURGERY

## 2023-09-13 PROCEDURE — 99999 PR PBB SHADOW E&M-EST. PATIENT-LVL III: CPT | Mod: PBBFAC,,, | Performed by: SURGERY

## 2023-09-13 PROCEDURE — 99214 OFFICE O/P EST MOD 30 MIN: CPT | Mod: S$GLB,,, | Performed by: SURGERY

## 2023-09-13 PROCEDURE — 3078F DIAST BP <80 MM HG: CPT | Mod: CPTII,S$GLB,, | Performed by: SURGERY

## 2023-09-13 PROCEDURE — 3288F PR FALLS RISK ASSESSMENT DOCUMENTED: ICD-10-PCS | Mod: CPTII,S$GLB,, | Performed by: SURGERY

## 2023-09-13 PROCEDURE — 25500020 PHARM REV CODE 255: Performed by: SURGERY

## 2023-09-13 PROCEDURE — 3077F PR MOST RECENT SYSTOLIC BLOOD PRESSURE >= 140 MM HG: ICD-10-PCS | Mod: CPTII,S$GLB,, | Performed by: SURGERY

## 2023-09-13 PROCEDURE — 3288F FALL RISK ASSESSMENT DOCD: CPT | Mod: CPTII,S$GLB,, | Performed by: SURGERY

## 2023-09-13 PROCEDURE — 74174 CTA ABD&PLVS W/CONTRAST: CPT | Mod: 26,,, | Performed by: RADIOLOGY

## 2023-09-13 PROCEDURE — 74174 CTA ABD&PLVS W/CONTRAST: CPT | Mod: TC

## 2023-09-13 PROCEDURE — 99999 PR PBB SHADOW E&M-EST. PATIENT-LVL III: ICD-10-PCS | Mod: PBBFAC,,, | Performed by: SURGERY

## 2023-09-13 PROCEDURE — 1126F AMNT PAIN NOTED NONE PRSNT: CPT | Mod: CPTII,S$GLB,, | Performed by: SURGERY

## 2023-09-13 PROCEDURE — 99214 PR OFFICE/OUTPT VISIT, EST, LEVL IV, 30-39 MIN: ICD-10-PCS | Mod: S$GLB,,, | Performed by: SURGERY

## 2023-09-13 PROCEDURE — 1101F PR PT FALLS ASSESS DOC 0-1 FALLS W/OUT INJ PAST YR: ICD-10-PCS | Mod: CPTII,S$GLB,, | Performed by: SURGERY

## 2023-09-13 PROCEDURE — 3077F SYST BP >= 140 MM HG: CPT | Mod: CPTII,S$GLB,, | Performed by: SURGERY

## 2023-09-13 PROCEDURE — 1159F PR MEDICATION LIST DOCUMENTED IN MEDICAL RECORD: ICD-10-PCS | Mod: CPTII,S$GLB,, | Performed by: SURGERY

## 2023-09-13 PROCEDURE — 1159F MED LIST DOCD IN RCRD: CPT | Mod: CPTII,S$GLB,, | Performed by: SURGERY

## 2023-09-13 PROCEDURE — 74174 CTA ABDOMEN AND PELVIS: ICD-10-PCS | Mod: 26,,, | Performed by: RADIOLOGY

## 2023-09-13 RX ADMIN — IOHEXOL 75 ML: 350 INJECTION, SOLUTION INTRAVENOUS at 09:09

## 2023-09-13 NOTE — PROGRESS NOTES
Mario Springer MD [facility]                         09/13/2023    HPI:  Reed Torres is a 78 y.o. male with   Patient Active Problem List   Diagnosis    Hyperlipidemia    Tuberculosis exposure    BPH (benign prostatic hyperplasia)    Osteoarthritis of lumbar spine    Hyperbilirubinemia, long standing, favor GILBERT    Abnormal stress test    Anxiety disorder    SVT (supraventricular tachycardia)    Pulmonary emphysema    Coronary artery disease due to lipid rich plaque    PAF (paroxysmal atrial fibrillation)    Aortic atherosclerosis    Aneurysm of right common iliac artery    Thrombocytopenia, unspecified    Hearing decreased    Hereditary and idiopathic neuropathy, unspecified    Gastroesophageal reflux disease without esophagitis    Primary open angle glaucoma (POAG) of both eyes, indeterminate stage    Psoriasis vulgaris    Cervical radiculopathy    Insomnia    Cognitive impairment    being managed by PCP and specialists who is here today for evaluation of mesenteric ischemia.  Pt with c/o LUQ and L chest discomfort intermittent without known triggers.  Patient states location is LUQ and L chest under breastbone occurring for a few months.  Associated signs and symptoms include belching.  No food fear or significant weight loss.  Quality is pressure and severity is 3/10.  Symptoms began a few mo ago.  Alleviating factors include activity.  Worsening factors include none.    no MI  no Stroke  Tobacco use: denies    1/2021:  States he has symptoms of belching and bloating.  Denies food fear and weight loss.  Symptoms are worsened when he bends forward.  States he has not seen GI in about 4 months where he was diagnosed with reflux although his dosage of his medication has been decreased.  Denies pelvic pain or abdominal pain or back pain.  No functional limitation or leg pain.    7/2021:  C/o LUQ pain, R thigh intermittent pain, no abd or pelvic pain.   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    2022:  No complaints.    10/2022:  no new issues.  No pelvic or abd pain.    3/2023:  no complaints.  S/p R RONAK stand alone 23.     2023:  pt doing well.    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     Past Surgical History:   Procedure Laterality Date    ABDOMINAL AORTIC ANEURYSM REPAIR, ENDOVASCULAR Bilateral 2023    Procedure: REPAIR-ANEURYSM-ILIAC BRANCH ENDOPROSTHESIS-ENDOVASCULAR;  Surgeon: Mario Springer MD;  Location: [facility] OR [address] [place] [address];  Service: Vascular;  Laterality: Bilateral;  mGy: 3707.4  Fluro time: 41.4 miutes  contrast volume: 171mg  Gycm: 363.78      APPENDECTOMY      LEFT HEART CATHETERIZATION Left 2020    Procedure: Left heart cath;  Surgeon: Keenan Avila MD;  Location: [facility] CATH LAB;  Service: Cardiology;  Laterality: Left;  RN PRE OP 2020.---COVID NEGATIVE ON-- 2020. [place]    SHOULDER ARTHROSCOPY W/ ROTATOR CUFF REPAIR      Left shoulder     Family History   Problem Relation Age of Onset    COPD Mother     Diabetes Mother     Hypertension Mother     COPD Father     Heart disease Brother      Social History     Socioeconomic History    Marital status:    Tobacco Use    Smoking status: Former     Current packs/day: 0.00     Types: Cigarettes     Quit date:      Years since quittin.7    Smokeless tobacco: Never    Tobacco comments:     stopped smoking 20 yrs ago.   Substance and Sexual Activity    Alcohol use: Not Currently     Alcohol/week: 5.0 standard drinks of alcohol     Types: 6 Standard drinks or equivalent per week    Drug use: No    Sexual activity: Yes     Partners: Female     Birth control/protection: None   Social History Narrative    Still doing lawn service     Social Determinants of Health      Financial Resource Strain: Low Risk  (1/3/2023)    Overall Financial Resource Strain (CARDIA)     Difficulty of Paying Living Expenses: Not hard at all   Food Insecurity: No Food Insecurity (1/3/2023)    Hunger Vital Sign     Worried About Running Out of Food in the Last Year: Never true     Ran Out of Food in the Last Year: Never true   Transportation Needs: No Transportation Needs (1/3/2023)    PRAPARE - Transportation     Lack of Transportation (Medical): No     Lack of Transportation (Non-Medical): No   Physical Activity: Sufficiently Active (1/3/2023)    Exercise Vital Sign     Days of Exercise per Week: 5 days     Minutes of Exercise per Session: 90 min   Stress: No Stress Concern Present (1/3/2023)    [employer] [place] [employer] - Occupational Stress Questionnaire     Feeling of Stress : Only a little   Social Connections: Socially Integrated (1/3/2023)    Social Connection and Isolation Panel [NHANES]     Frequency of Communication with Friends and Family: Three times a week     Frequency of Social Gatherings with Friends and Family: Twice a week     Attends [employer] Services: More than 4 times per year     Active Member of Clubs or Organizations: Yes     Attends Club or Organization Meetings: More than 4 times per year     Marital Status:    Housing Stability: Unknown (1/3/2023)    Housing Stability Vital Sign     Unable to Pay for Housing in the Last Year: No     Unstable Housing in the Last Year: No       Current Outpatient Medications:     acetaminophen (TYLENOL) 650 MG TbSR, Take 1 tablet (650 mg total) by mouth every 8 (eight) hours. (Patient not taking: Reported on 8/2/2023), Disp: 21 tablet, Rfl: 0    amiodarone (PACERONE) 200 MG Tab, Take 1 tablet (200 mg total) by mouth once daily., Disp: 90 tablet, Rfl: 3    apixaban (ELIQUIS) 5 mg Tab, Take 1 tablet (5 mg total) by mouth 2 (two) times daily., Disp: 60 tablet, Rfl: 11    aspirin (ECOTRIN) 81 MG EC tablet, Take 81 mg by  mouth every morning., Disp: , Rfl:     atorvastatin (LIPITOR) 40 MG tablet, TAKE ONE TABLET BY MOUTH EVERY DAY, Disp: 90 tablet, Rfl: 3    docusate sodium (COLACE) 100 MG capsule, Take 1 capsule (100 mg total) by mouth 2 (two) times daily., Disp: 60 capsule, Rfl: 0    famotidine (PEPCID) 20 MG tablet, Take 1 tablet (20 mg total) by mouth 2 (two) times daily. for 7 days, Disp: 14 tablet, Rfl: 0    gabapentin (NEURONTIN) 300 MG capsule, TAKE 1-3 CAPSULES BY MOUTH EVERY EVENING, Disp: 90 capsule, Rfl: 11    hydrocortisone 1 % cream, Apply to affected area 2 times daily (Patient not taking: Reported on 8/2/2023), Disp: 30 g, Rfl: 0    hydrOXYzine pamoate (VISTARIL) 50 MG Cap, Take 1 capsule (50 mg total) by mouth nightly as needed (itching and insomnia). (Patient not taking: Reported on 8/2/2023), Disp: 30 capsule, Rfl: 0    latanoprost 0.005 % ophthalmic solution, Place into both eyes every evening., Disp: , Rfl:     loratadine (CLARITIN) 10 mg tablet, Take 1 tablet (10 mg total) by mouth every morning. for 7 days, Disp: 7 tablet, Rfl: 0    pantoprazole (PROTONIX) 40 MG tablet, Take 1 tablet (40 mg total) by mouth once daily. (Patient not taking: Reported on 8/2/2023), Disp: 30 tablet, Rfl: 1    triamcinolone acetonide 0.1% (KENALOG) 0.1 % ointment, Apply topically 2 (two) times daily. (Patient not taking: Reported on 8/2/2023), Disp: 30 g, Rfl: 0    vit A/vit C/vit E/zinc/copper (OCUVITE PRESERVISION ORAL), Take by mouth 2 (two) times a day., Disp: , Rfl:   No current facility-administered medications for this visit.    REVIEW OF SYSTEMS:  General: No fevers or chills; ENT: No sore throat; Allergy and Immunology: no persistent infections; Hematological and Lymphatic: No history of bleeding or easy bruising; Endocrine: negative; Respiratory: no cough, shortness of breath, or wheezing; Cardiovascular: no chest pain or dyspnea on exertion; Gastrointestinal: no abdominal pain/back, change in bowel habits, or bloody  "stools; Genito-Urinary: no dysuria, trouble voiding, or hematuria; Musculoskeletal: negative; Neurological: no TIA or stroke symptoms; Psychiatric: no nervousness, anxiety or depression.    PHYSICAL EXAM:      Pulse: (!) 52         General appearance:  Alert, well-appearing, and in no distress.  Oriented to person, place, and time                    Neurological: Normal speech, no focal findings noted; CN II - XII grossly intact. RLE with sensation to light touch, LLE with sensation to light touch.            Musculoskeletal: Digits/nail without cyanosis/clubbing.  Strength 5/5 BLE.                    Neck: Supple, no significant adenopathy, no carotid bruit can be auscultated                  Chest:  Clear to auscultation, no wheezes, rales or rhonchi, symmetric air entry. No use of accessory muscles               Cardiac: Normal rate and regular rhythm, S1 and S2 normal            Abdomen: Soft, min epigastric tenderness, nondistended, no masses or organomegaly, no hernia     No rebound tenderness noted; bowel sounds normal     Pulsatile aortic mass is non palpable.     No groin adenopathy      Extremities:   2+ R femoral pulse, 2+ L femoral pulse     2+ R popliteal pulse, 2+ L popliteal pulse     2+ R PT pulse, 2+ L PT pulse     2+ R DP pulse, 2+ L DP pulse     no RLE edema, no LLE edema    Skin: RLE without tissue loss; LLE without tissue loss    LAB RESULTS:  No results found for: "CBC"  Lab Results   Component Value Date    LABPROT 10.9 07/02/2023    INR 1.0 07/02/2023     Lab Results   Component Value Date     09/13/2023    K 4.5 09/13/2023     09/13/2023    CO2 26 09/13/2023    GLU 87 09/13/2023    BUN 22 09/13/2023    CREATININE 0.9 09/13/2023    CALCIUM 9.5 09/13/2023    ANIONGAP 6 (L) 09/13/2023    EGFRNONAA >60 07/03/2022     Lab Results   Component Value Date    WBC 4.86 08/15/2023    RBC 4.19 (L) 08/15/2023    HGB 12.6 (L) 08/15/2023    HCT 38.4 (L) 08/15/2023    MCV 92 08/15/2023    MCH " 30.1 08/15/2023    MCHC 32.8 08/15/2023    RDW 14.5 08/15/2023     08/15/2023    MPV 10.7 08/15/2023    GRAN 2.6 08/15/2023    GRAN 52.8 08/15/2023    LYMPH 1.8 08/15/2023    LYMPH 36.6 08/15/2023    MONO 0.4 08/15/2023    MONO 8.4 08/15/2023    EOS 0.1 08/15/2023    BASO 0.04 08/15/2023    EOSINOPHIL 1.2 08/15/2023    BASOPHIL 0.8 08/15/2023    DIFFMETHOD Automated 08/15/2023     .  Lab Results   Component Value Date    HGBA1C 5.1 03/03/2022       IMAGING:  All pertinent imaging has been reviewed and interpreted independently.    -BLE arterial US negative for popliteal aneurysm    3/2023  CTA with well apposed stents and no endoleak     9/2023:  R ROJELIO 4 cm aneurysm, stent in place, stenosis of hypogastric stent distally with opacification of distal R hypogastric artery    IMP/PLAN:  78 y.o. male with   Patient Active Problem List   Diagnosis    Hyperlipidemia    Tuberculosis exposure    BPH (benign prostatic hyperplasia)    Osteoarthritis of lumbar spine    Hyperbilirubinemia, long standing, favor GILBERT    Abnormal stress test    Anxiety disorder    SVT (supraventricular tachycardia)    Pulmonary emphysema    Coronary artery disease due to lipid rich plaque    PAF (paroxysmal atrial fibrillation)    Aortic atherosclerosis    Aneurysm of right common iliac artery    Thrombocytopenia, unspecified    Hearing decreased    Hereditary and idiopathic neuropathy, unspecified    Gastroesophageal reflux disease without esophagitis    Primary open angle glaucoma (POAG) of both eyes, indeterminate stage    Psoriasis vulgaris    Cervical radiculopathy    Insomnia    Cognitive impairment    being managed by PCP and specialists who is here today for evaluation of R ROJELIO aneurysm.    - Mesenteric US without HD significant stenosis.  Recommend continuing evaluation and management for etiology of left upper quadrant by PCP and GI.    -right common iliac artery aneurysm 4 cm 2021 (3.6 2020), asymptomatic s/p RONAK 1/31/23  without endoleak, 4 cm 9/2023 - continue routine surveillance  -recommend cont daily aspirin, continue blood pressure control heart healthy lifestyle  -RTC 6 mo with CTA A/P     I spent 12 minutes evaluating this patient and greater than 50% of the time was spent counseling, coordinator care and discussing the plan of care.  All questions were answered and patient stated understanding with agreement with the above treatment plan.    Mario Springer MD [facility]  Vascular and Endovascular Surgery

## 2023-09-19 ENCOUNTER — OFFICE VISIT (OUTPATIENT)
Dept: FAMILY MEDICINE | Facility: CLINIC | Age: 78
End: 2023-09-19
Payer: MEDICARE

## 2023-09-19 ENCOUNTER — LAB VISIT (OUTPATIENT)
Dept: LAB | Facility: HOSPITAL | Age: 78
End: 2023-09-19
Attending: FAMILY MEDICINE
Payer: MEDICARE

## 2023-09-19 ENCOUNTER — TELEPHONE (OUTPATIENT)
Dept: CARDIOLOGY | Facility: CLINIC | Age: 78
End: 2023-09-19
Payer: MEDICARE

## 2023-09-19 VITALS
WEIGHT: 138.88 LBS | SYSTOLIC BLOOD PRESSURE: 120 MMHG | HEART RATE: 72 BPM | BODY MASS INDEX: 23.14 KG/M2 | TEMPERATURE: 98 F | OXYGEN SATURATION: 97 % | HEIGHT: 65 IN | DIASTOLIC BLOOD PRESSURE: 66 MMHG

## 2023-09-19 DIAGNOSIS — D64.9 NORMOCYTIC ANEMIA: ICD-10-CM

## 2023-09-19 DIAGNOSIS — I48.0 PAF (PAROXYSMAL ATRIAL FIBRILLATION): ICD-10-CM

## 2023-09-19 DIAGNOSIS — D64.9 NORMOCYTIC ANEMIA: Primary | ICD-10-CM

## 2023-09-19 DIAGNOSIS — E78.00 PURE HYPERCHOLESTEROLEMIA: Chronic | ICD-10-CM

## 2023-09-19 LAB
BASOPHILS # BLD AUTO: 0.02 K/UL (ref 0–0.2)
BASOPHILS NFR BLD: 0.4 % (ref 0–1.9)
DIFFERENTIAL METHOD: ABNORMAL
EOSINOPHIL # BLD AUTO: 0.1 K/UL (ref 0–0.5)
EOSINOPHIL NFR BLD: 1.1 % (ref 0–8)
ERYTHROCYTE [DISTWIDTH] IN BLOOD BY AUTOMATED COUNT: 14 % (ref 11.5–14.5)
HCT VFR BLD AUTO: 40.9 % (ref 40–54)
HGB BLD-MCNC: 13 G/DL (ref 14–18)
IMM GRANULOCYTES # BLD AUTO: 0.02 K/UL (ref 0–0.04)
IMM GRANULOCYTES NFR BLD AUTO: 0.4 % (ref 0–0.5)
LYMPHOCYTES # BLD AUTO: 1.4 K/UL (ref 1–4.8)
LYMPHOCYTES NFR BLD: 27.1 % (ref 18–48)
MCH RBC QN AUTO: 30 PG (ref 27–31)
MCHC RBC AUTO-ENTMCNC: 31.8 G/DL (ref 32–36)
MCV RBC AUTO: 94 FL (ref 82–98)
MONOCYTES # BLD AUTO: 0.5 K/UL (ref 0.3–1)
MONOCYTES NFR BLD: 9.7 % (ref 4–15)
NEUTROPHILS # BLD AUTO: 3.2 K/UL (ref 1.8–7.7)
NEUTROPHILS NFR BLD: 61.3 % (ref 38–73)
NRBC BLD-RTO: 0 /100 WBC
PLATELET # BLD AUTO: 162 K/UL (ref 150–450)
PMV BLD AUTO: 11.5 FL (ref 9.2–12.9)
RBC # BLD AUTO: 4.34 M/UL (ref 4.6–6.2)
WBC # BLD AUTO: 5.28 K/UL (ref 3.9–12.7)

## 2023-09-19 PROCEDURE — 1126F PR PAIN SEVERITY QUANTIFIED, NO PAIN PRESENT: ICD-10-PCS | Mod: CPTII,S$GLB,, | Performed by: FAMILY MEDICINE

## 2023-09-19 PROCEDURE — 3078F PR MOST RECENT DIASTOLIC BLOOD PRESSURE < 80 MM HG: ICD-10-PCS | Mod: CPTII,S$GLB,, | Performed by: FAMILY MEDICINE

## 2023-09-19 PROCEDURE — 3074F SYST BP LT 130 MM HG: CPT | Mod: CPTII,S$GLB,, | Performed by: FAMILY MEDICINE

## 2023-09-19 PROCEDURE — 1101F PT FALLS ASSESS-DOCD LE1/YR: CPT | Mod: CPTII,S$GLB,, | Performed by: FAMILY MEDICINE

## 2023-09-19 PROCEDURE — 85025 COMPLETE CBC W/AUTO DIFF WBC: CPT | Performed by: FAMILY MEDICINE

## 2023-09-19 PROCEDURE — 99999 PR PBB SHADOW E&M-EST. PATIENT-LVL III: ICD-10-PCS | Mod: PBBFAC,,, | Performed by: FAMILY MEDICINE

## 2023-09-19 PROCEDURE — 99214 PR OFFICE/OUTPT VISIT, EST, LEVL IV, 30-39 MIN: ICD-10-PCS | Mod: S$GLB,,, | Performed by: FAMILY MEDICINE

## 2023-09-19 PROCEDURE — 3074F PR MOST RECENT SYSTOLIC BLOOD PRESSURE < 130 MM HG: ICD-10-PCS | Mod: CPTII,S$GLB,, | Performed by: FAMILY MEDICINE

## 2023-09-19 PROCEDURE — 3078F DIAST BP <80 MM HG: CPT | Mod: CPTII,S$GLB,, | Performed by: FAMILY MEDICINE

## 2023-09-19 PROCEDURE — 36415 COLL VENOUS BLD VENIPUNCTURE: CPT | Mod: PO | Performed by: FAMILY MEDICINE

## 2023-09-19 PROCEDURE — 99999 PR PBB SHADOW E&M-EST. PATIENT-LVL III: CPT | Mod: PBBFAC,,, | Performed by: FAMILY MEDICINE

## 2023-09-19 PROCEDURE — 3288F PR FALLS RISK ASSESSMENT DOCUMENTED: ICD-10-PCS | Mod: CPTII,S$GLB,, | Performed by: FAMILY MEDICINE

## 2023-09-19 PROCEDURE — 3288F FALL RISK ASSESSMENT DOCD: CPT | Mod: CPTII,S$GLB,, | Performed by: FAMILY MEDICINE

## 2023-09-19 PROCEDURE — 99214 OFFICE O/P EST MOD 30 MIN: CPT | Mod: S$GLB,,, | Performed by: FAMILY MEDICINE

## 2023-09-19 PROCEDURE — 1126F AMNT PAIN NOTED NONE PRSNT: CPT | Mod: CPTII,S$GLB,, | Performed by: FAMILY MEDICINE

## 2023-09-19 PROCEDURE — 1101F PR PT FALLS ASSESS DOC 0-1 FALLS W/OUT INJ PAST YR: ICD-10-PCS | Mod: CPTII,S$GLB,, | Performed by: FAMILY MEDICINE

## 2023-09-19 NOTE — PROGRESS NOTES
"HISTORY OF PRESENT ILLNESS:  Reed Torres is a 78 y.o. male who presents to the clinic today for Follow-up  .     Medication working  Expensive for eliquis  But he will continue  Overall doing well.        Patient Active Problem List   Diagnosis    Hyperlipidemia    Tuberculosis exposure    BPH (benign prostatic hyperplasia)    Osteoarthritis of lumbar spine    Hyperbilirubinemia, long standing, favor GILBERT    Abnormal stress test    Anxiety disorder    SVT (supraventricular tachycardia)    Pulmonary emphysema    Coronary artery disease due to lipid rich plaque    PAF (paroxysmal atrial fibrillation)    Aortic atherosclerosis    Aneurysm of right common iliac artery    Thrombocytopenia, unspecified    Hearing decreased    Hereditary and idiopathic neuropathy, unspecified    Gastroesophageal reflux disease without esophagitis    Primary open angle glaucoma (POAG) of both eyes, indeterminate stage    Psoriasis vulgaris    Cervical radiculopathy    Insomnia    Cognitive impairment           CARE TEAM:  Patient Care Team:  Pj Gillette MD as PCP - General (Family Medicine)  Charlie Walters Jr., MD as Consulting Physician (Urology)  eKenan Avila MD as Consulting Physician (Cardiovascular Disease)  Daniel Garza OD (Optometry)  Nina Silveira LPN as Care Coordinator  Brittany Suero as ED Navigator         ROS        PHYSICAL EXAM:  /66   Pulse 72   Temp 98.1 °F (36.7 °C) (Oral)   Ht 5' 5" (1.651 m)   Wt 63 kg (138 lb 14.2 oz)   SpO2 97%   BMI 23.11 kg/m²   Wt Readings from Last 5 Encounters:   09/19/23 63 kg (138 lb 14.2 oz)   09/13/23 63.2 kg (139 lb 3.5 oz)   08/02/23 62.6 kg (138 lb 0.1 oz)   07/02/23 66.2 kg (146 lb)   06/06/23 63.9 kg (140 lb 14 oz)     BP Readings from Last 5 Encounters:   09/19/23 120/66   09/13/23 (!) 167/70   08/02/23 110/77   07/02/23 139/79   06/06/23 (!) 88/60           He appears well, in no apparent distress.  Alert and oriented times three, pleasant and " cooperative. Vital signs are as documented in vital signs section.  S1 and S2 normal, no murmurs, clicks, gallops or rubs. Regular rate and rhythm. Chest is clear; no wheezes or rales. No edema or JVD.  No afib      Medication List with Changes/Refills   Current Medications    AMIODARONE (PACERONE) 200 MG TAB    Take 1 tablet (200 mg total) by mouth once daily.    ASPIRIN (ECOTRIN) 81 MG EC TABLET    Take 81 mg by mouth every morning.    ATORVASTATIN (LIPITOR) 40 MG TABLET    TAKE ONE TABLET BY MOUTH EVERY DAY    DOCUSATE SODIUM (COLACE) 100 MG CAPSULE    Take 1 capsule (100 mg total) by mouth 2 (two) times daily.    FAMOTIDINE (PEPCID) 20 MG TABLET    Take 1 tablet (20 mg total) by mouth 2 (two) times daily. for 7 days    GABAPENTIN (NEURONTIN) 300 MG CAPSULE    TAKE 1-3 CAPSULES BY MOUTH EVERY EVENING    LATANOPROST 0.005 % OPHTHALMIC SOLUTION    Place into both eyes every evening.    LORATADINE (CLARITIN) 10 MG TABLET    Take 1 tablet (10 mg total) by mouth every morning. for 7 days    VIT A/VIT C/VIT E/ZINC/COPPER (OCUVITE PRESERVISION ORAL)    Take by mouth 2 (two) times a day.   Changed and/or Refilled Medications    Modified Medication Previous Medication    APIXABAN (ELIQUIS) 5 MG TAB apixaban (ELIQUIS) 5 mg Tab       Take 1 tablet (5 mg total) by mouth 2 (two) times daily.    Take 1 tablet (5 mg total) by mouth 2 (two) times daily.   Discontinued Medications    ACETAMINOPHEN (TYLENOL) 650 MG TBSR    Take 1 tablet (650 mg total) by mouth every 8 (eight) hours.    HYDROCORTISONE 1 % CREAM    Apply to affected area 2 times daily    HYDROXYZINE PAMOATE (VISTARIL) 50 MG CAP    Take 1 capsule (50 mg total) by mouth nightly as needed (itching and insomnia).    PANTOPRAZOLE (PROTONIX) 40 MG TABLET    Take 1 tablet (40 mg total) by mouth once daily.    TRIAMCINOLONE ACETONIDE 0.1% (KENALOG) 0.1 % OINTMENT    Apply topically 2 (two) times daily.       ASSESSMENT AND PLAN:    Problem List Items Addressed This Visit        Hyperlipidemia (Chronic)    Current Assessment & Plan     The current medical regimen is effective;  continue present plan and medications.           PAF (paroxysmal atrial fibrillation)    Relevant Medications    apixaban (ELIQUIS) 5 mg Tab     Other Visit Diagnoses       Normocytic anemia    -  Primary    Relevant Orders    CBC Auto Differential        Potential medication side effects were discussed with the patient; let me know if any occur.  Check for blood loss    Future Appointments   Date Time Provider Department Center   9/25/2023  1:00 PM Guillaume Rojas MD [facility] CARDIO [place]   3/13/2024  8:00 AM [facility] CT1 LIMIT 400 LBS WB CT SCAN [facility]   3/13/2024  9:20 AM WB CT1 LIMIT 400 LBS [facility] CT SCAN [facility]   3/15/2024  9:30 AM Mario Springer MD [facility]       Follow up in about 3 months (around 12/19/2023) for assess treatment plan. or sooner as needed.

## 2023-10-04 DIAGNOSIS — I48.0 PAF (PAROXYSMAL ATRIAL FIBRILLATION): ICD-10-CM

## 2023-10-04 NOTE — TELEPHONE ENCOUNTER
----- Message from Dejuan Trejo sent at 10/4/2023 10:52 AM CDT -----  Regarding: pt called  Can the clinic reply in [facility]: no            Please refill the medication(s) listed below. Please call the patient when the prescription(s) is ready for  at this phone number    Telephone Information:  Mobile          [phone]               Medication #1amiodarone (PACERONE) 200 MG Tab               Preferred Pharmacy:   [facility]

## 2023-10-05 RX ORDER — AMIODARONE HYDROCHLORIDE 200 MG/1
200 TABLET ORAL DAILY
Qty: 90 TABLET | Refills: 3 | Status: SHIPPED | OUTPATIENT
Start: 2023-10-05 | End: 2024-10-04

## 2023-10-17 ENCOUNTER — OFFICE VISIT (OUTPATIENT)
Dept: CARDIOLOGY | Facility: CLINIC | Age: 78
End: 2023-10-17
Payer: MEDICARE

## 2023-10-17 VITALS
BODY MASS INDEX: 21.99 KG/M2 | WEIGHT: 132 LBS | DIASTOLIC BLOOD PRESSURE: 80 MMHG | OXYGEN SATURATION: 95 % | SYSTOLIC BLOOD PRESSURE: 122 MMHG | HEART RATE: 69 BPM | HEIGHT: 65 IN

## 2023-10-17 DIAGNOSIS — I25.83 CORONARY ARTERY DISEASE DUE TO LIPID RICH PLAQUE: ICD-10-CM

## 2023-10-17 DIAGNOSIS — I72.3 ANEURYSM OF RIGHT COMMON ILIAC ARTERY: Primary | ICD-10-CM

## 2023-10-17 DIAGNOSIS — E78.00 PURE HYPERCHOLESTEROLEMIA: Chronic | ICD-10-CM

## 2023-10-17 DIAGNOSIS — I47.10 SVT (SUPRAVENTRICULAR TACHYCARDIA): ICD-10-CM

## 2023-10-17 DIAGNOSIS — I48.0 PAF (PAROXYSMAL ATRIAL FIBRILLATION): ICD-10-CM

## 2023-10-17 DIAGNOSIS — I70.0 AORTIC ATHEROSCLEROSIS: ICD-10-CM

## 2023-10-17 DIAGNOSIS — I25.10 CORONARY ARTERY DISEASE DUE TO LIPID RICH PLAQUE: ICD-10-CM

## 2023-10-17 PROCEDURE — 99999 PR PBB SHADOW E&M-EST. PATIENT-LVL III: ICD-10-PCS | Mod: PBBFAC,,, | Performed by: INTERNAL MEDICINE

## 2023-10-17 PROCEDURE — 99214 OFFICE O/P EST MOD 30 MIN: CPT | Mod: S$GLB,,, | Performed by: INTERNAL MEDICINE

## 2023-10-17 PROCEDURE — 3288F FALL RISK ASSESSMENT DOCD: CPT | Mod: CPTII,S$GLB,, | Performed by: INTERNAL MEDICINE

## 2023-10-17 PROCEDURE — 1159F MED LIST DOCD IN RCRD: CPT | Mod: CPTII,S$GLB,, | Performed by: INTERNAL MEDICINE

## 2023-10-17 PROCEDURE — 1101F PT FALLS ASSESS-DOCD LE1/YR: CPT | Mod: CPTII,S$GLB,, | Performed by: INTERNAL MEDICINE

## 2023-10-17 PROCEDURE — 1126F AMNT PAIN NOTED NONE PRSNT: CPT | Mod: CPTII,S$GLB,, | Performed by: INTERNAL MEDICINE

## 2023-10-17 PROCEDURE — 3074F PR MOST RECENT SYSTOLIC BLOOD PRESSURE < 130 MM HG: ICD-10-PCS | Mod: CPTII,S$GLB,, | Performed by: INTERNAL MEDICINE

## 2023-10-17 PROCEDURE — 1101F PR PT FALLS ASSESS DOC 0-1 FALLS W/OUT INJ PAST YR: ICD-10-PCS | Mod: CPTII,S$GLB,, | Performed by: INTERNAL MEDICINE

## 2023-10-17 PROCEDURE — 93000 EKG 12-LEAD: ICD-10-PCS | Mod: S$GLB,,, | Performed by: INTERNAL MEDICINE

## 2023-10-17 PROCEDURE — 99999 PR PBB SHADOW E&M-EST. PATIENT-LVL III: CPT | Mod: PBBFAC,,, | Performed by: INTERNAL MEDICINE

## 2023-10-17 PROCEDURE — 1159F PR MEDICATION LIST DOCUMENTED IN MEDICAL RECORD: ICD-10-PCS | Mod: CPTII,S$GLB,, | Performed by: INTERNAL MEDICINE

## 2023-10-17 PROCEDURE — 1126F PR PAIN SEVERITY QUANTIFIED, NO PAIN PRESENT: ICD-10-PCS | Mod: CPTII,S$GLB,, | Performed by: INTERNAL MEDICINE

## 2023-10-17 PROCEDURE — 3079F PR MOST RECENT DIASTOLIC BLOOD PRESSURE 80-89 MM HG: ICD-10-PCS | Mod: CPTII,S$GLB,, | Performed by: INTERNAL MEDICINE

## 2023-10-17 PROCEDURE — 93000 ELECTROCARDIOGRAM COMPLETE: CPT | Mod: S$GLB,,, | Performed by: INTERNAL MEDICINE

## 2023-10-17 PROCEDURE — 3288F PR FALLS RISK ASSESSMENT DOCUMENTED: ICD-10-PCS | Mod: CPTII,S$GLB,, | Performed by: INTERNAL MEDICINE

## 2023-10-17 PROCEDURE — 3074F SYST BP LT 130 MM HG: CPT | Mod: CPTII,S$GLB,, | Performed by: INTERNAL MEDICINE

## 2023-10-17 PROCEDURE — 3079F DIAST BP 80-89 MM HG: CPT | Mod: CPTII,S$GLB,, | Performed by: INTERNAL MEDICINE

## 2023-10-17 PROCEDURE — 99214 PR OFFICE/OUTPT VISIT, EST, LEVL IV, 30-39 MIN: ICD-10-PCS | Mod: S$GLB,,, | Performed by: INTERNAL MEDICINE

## 2023-10-17 NOTE — PROGRESS NOTES
Subjective:   Patient ID:  Reed Torres is a 78 y.o. male who presents for follow-up of No chief complaint on file.      HPI    Followed by Dr Avila  1. Nonobstructive coronary artery disease: Risk factor modification.   2. PAF:  Continue Eliquis.  Continue amiodarone. Watch EtOH intake.  3. Hyperlipidemia:  Favorable lipid profile    Echocardiogram 07/02/2022:     The left ventricle is normal in size with normal systolic function.  The estimated ejection fraction is 60%.  Normal left ventricular diastolic function.  Normal right ventricular size with normal right ventricular systolic function.     Echocardiogram 3/31/22:     The left ventricle is normal in size with normal systolic function.  The estimated ejection fraction is 65%.  Normal right ventricular size with normal right ventricular systolic function.  The sinuses of Valsalva is mildly dilated, 3.8cm.  The estimated PA systolic pressure is 23 mmHg.     Cardiac event monitor 03/29/2022:     Negative event monitor with no clinical arrhythmias.     Lower extremity arterial ultrasound 02/08/2021:     1. Right lower extremity arterial ultrasound shows no hemodynamically significant stenosis.  Pressures indicate no arterial occlusive disease.  2. Left lower extremity arterial ultrasound shows approximately 50% SFA origin stenosis without hemodynamically significant stenosis.  Pressures indicate no arterial occlusive disease.     Cardiac catheterization 07/24/2020:     1.  Mild-to-moderate nonobstructive coronary artery disease.  2.  Normal left ventricular end-diastolic pressure.  3.  No aortic stenosis.     Exercise MPS 7/2/20:       Abnormal myocardial perfusion study.    There is a moderate sized, moderate intensity, mostly reversible defect with some fixed areas in the inferior wall(s).    Gated perfusion images showed an ejection fraction of 57%    The EKG portion of this study is negative for ischemia.    The patient reported no chest pain during the  "stress test.    There were no arrhythmias during stress.     Holter 3/17/20:     Sinus rhythm with heart rates varying between 48 and 118 bpm with an average of 77 bpm.  There were occasional PVCs totalling 278 and averaging 11.13 per hour.  There were rare PACs totalling 81 and averaging 3.24 per hour.  There were occasional runs of non-sustained SVT and lasting for 2 to 7 beats.  Diary events ("headache") did not correspond to any arrhythmic events.     Echo 3/17/20:     Low normal left ventricular systolic function. The estimated ejection fraction is 50%.  No wall motion abnormalities.  Normal right ventricular systolic function.  The sinuses of Valsalva is mildly dilated. 3.9cm.  The estimated PA systolic pressure is 28 mmHg.    10/17/23 Denies CP or SOB  BP controlled  EKG NSR LVH    Review of Systems   Constitutional: Negative for decreased appetite.   HENT:  Negative for ear discharge.    Eyes:  Negative for blurred vision.   Endocrine: Negative for polyphagia.   Skin:  Negative for nail changes.   Genitourinary:  Negative for bladder incontinence.   Neurological:  Negative for aphonia.   Psychiatric/Behavioral:  Negative for hallucinations.    Allergic/Immunologic: Negative for hives.       Objective:   Physical Exam  Constitutional:       Appearance: He is well-developed.   HENT:      Head: Normocephalic and atraumatic.   Eyes:      Conjunctiva/sclera: Conjunctivae normal.      Pupils: Pupils are equal, round, and reactive to light.   Cardiovascular:      Rate and Rhythm: Normal rate.      Pulses: Intact distal pulses.      Heart sounds: Normal heart sounds.   Pulmonary:      Effort: Pulmonary effort is normal.      Breath sounds: Normal breath sounds.   Abdominal:      General: Bowel sounds are normal.      Palpations: Abdomen is soft.   Musculoskeletal:         General: Normal range of motion.      Cervical back: Normal range of motion and neck supple.   Skin:     General: Skin is warm and dry. "   Neurological:      Mental Status: He is alert and oriented to person, place, and time.         Assessment:      1. Aneurysm of right common iliac artery    2. Aortic atherosclerosis    3. Coronary artery disease due to lipid rich plaque    4. Pure hypercholesterolemia    5. PAF (paroxysmal atrial fibrillation)    6. SVT (supraventricular tachycardia)        Plan:     Staying in NSR on amiodarone - OV 6 months with echo, CMP, TSH  Continue Rx for PAF, HTN, HLD

## 2023-12-24 ENCOUNTER — HOSPITAL ENCOUNTER (EMERGENCY)
Facility: HOSPITAL | Age: 78
Discharge: HOME | End: 2023-12-24
Attending: EMERGENCY MEDICINE
Payer: MEDICARE

## 2023-12-24 VITALS
RESPIRATION RATE: 18 BRPM | OXYGEN SATURATION: 96 % | DIASTOLIC BLOOD PRESSURE: 90 MMHG | SYSTOLIC BLOOD PRESSURE: 153 MMHG | TEMPERATURE: 98 F | HEART RATE: 66 BPM | BODY MASS INDEX: 24.32 KG/M2 | HEIGHT: 65 IN | WEIGHT: 146 LBS

## 2023-12-24 DIAGNOSIS — H11.31 SUBCONJUNCTIVAL HEMORRHAGE OF RIGHT EYE: Primary | ICD-10-CM

## 2023-12-24 DIAGNOSIS — R51.9 ACUTE INTRACTABLE HEADACHE, UNSPECIFIED HEADACHE TYPE: ICD-10-CM

## 2023-12-24 PROCEDURE — 25000003 PHARM REV CODE 250: Performed by: EMERGENCY MEDICINE

## 2023-12-24 PROCEDURE — 99284 EMERGENCY DEPT VISIT MOD MDM: CPT

## 2023-12-24 RX ORDER — IBUPROFEN 600 MG/1
600 TABLET ORAL EVERY 6 HOURS PRN
Qty: 20 TABLET | Refills: 0 | Status: SHIPPED | OUTPATIENT
Start: 2023-12-24

## 2023-12-24 RX ORDER — BUTALBITAL, ACETAMINOPHEN AND CAFFEINE 50; 325; 40 MG/1; MG/1; MG/1
1 TABLET ORAL EVERY 4 HOURS PRN
Qty: 13 TABLET | Refills: 0 | Status: SHIPPED | OUTPATIENT
Start: 2023-12-24 | End: 2024-01-23

## 2023-12-24 RX ORDER — CETIRIZINE HYDROCHLORIDE 10 MG/1
10 TABLET ORAL DAILY
Qty: 5 TABLET | Refills: 0 | Status: SHIPPED | OUTPATIENT
Start: 2023-12-24 | End: 2023-12-29

## 2023-12-24 RX ORDER — PROPARACAINE HYDROCHLORIDE 5 MG/ML
1 SOLUTION/ DROPS OPHTHALMIC
Status: COMPLETED | OUTPATIENT
Start: 2023-12-24 | End: 2023-12-24

## 2023-12-24 RX ORDER — BUTALBITAL, ACETAMINOPHEN AND CAFFEINE 50; 325; 40 MG/1; MG/1; MG/1
1 TABLET ORAL
Status: COMPLETED | OUTPATIENT
Start: 2023-12-24 | End: 2023-12-24

## 2023-12-24 RX ADMIN — FLUORESCEIN SODIUM 1 EACH: 1 STRIP OPHTHALMIC at 09:12

## 2023-12-24 RX ADMIN — PROPARACAINE HYDROCHLORIDE 1 DROP: 5 SOLUTION/ DROPS OPHTHALMIC at 09:12

## 2023-12-24 RX ADMIN — BUTALBITAL, ACETAMINOPHEN, AND CAFFEINE 1 TABLET: 325; 50; 40 TABLET ORAL at 09:12

## 2023-12-24 NOTE — Clinical Note
"Reed Farrell" Brian was seen and treated in our emergency department on 12/24/2023.  He may return to work on 12/26/2023.       If you have any questions or concerns, please don't hesitate to call.      Handy Shoemaker MD"

## 2023-12-24 NOTE — ED PROVIDER NOTES
"Encounter Date: 12/24/2023    SCRIBE #1 NOTE: I, Guy Cosby Do, am scribing for, and in the presence of,  Handy Shoemaker MD. I have scribed the following portions of the note - Other sections scribed: HPI, ROS, PE.       History     Chief Complaint   Patient presents with    Headache     Pt reports right eye pain, right sided temporal headache and cough since 12/18. Pt reports going to urgent care on 12/19 and diagnosed with flu A. Pt reports they "looked in his eye" and was told he had a "popped blood vessel in his eye". Pt reports not able to get an eye dr appt until end of January but his symptoms have not resolved. Pt denies taking any medications for his pain today.     Eye Pain     Reed Torres is a 78 y.o. male, with a past medical history of AAA, CAD, HLD, who presents to the ED with right-sided eye pain onset 6 days ago. Patient reports associated right-sided headache, cough, and visual disturbance. Patient reports right-sided headache and eye pain as 9/10. Patient reports seeing a round rim when looking at light yesterday evening that resolved. Patient reports going to urgent care on 12/19 for the flu with similar symptoms as today. Patient notes compliance with gabapentin, eliquis, aspirin, amiodarone, and latanoprost. Patient reports attempting treatment with aleve without relief. No other exacerbating or alleviating factors. Patient denies dysuria, constipation, fever, or other associated symptoms. He endorses EtOH. Patient denies smoking and other recreational drug use. 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    Patient reports an Optometrist appointment on 1/2/24.    The history is provided by the patient. No  was used.     Review of patient's allergies indicates:  No Known Allergies  Past Medical History:   Diagnosis Date    AAA (abdominal aortic aneurysm)     Coronary artery disease due to lipid rich plaque 05/03/2022    Hyperlipidemia     Pulmonary emphysema " 2021    Tuberculosis exposure     Post treatment     Past Surgical History:   Procedure Laterality Date    ABDOMINAL AORTIC ANEURYSM REPAIR, ENDOVASCULAR Bilateral 2023    Procedure: REPAIR-ANEURYSM-ILIAC BRANCH ENDOPROSTHESIS-ENDOVASCULAR;  Surgeon: Mario Springer MD;  Location: [facility] OR [address] [place] [address];  Service: Vascular;  Laterality: Bilateral;  mGy: 3707.4  Fluro time: 41.4 miutes  contrast volume: 171mg  Gycm: 363.78      APPENDECTOMY      LEFT HEART CATHETERIZATION Left 2020    Procedure: Left heart cath;  Surgeon: Keenan Avila MD;  Location: [facility] CATH LAB;  Service: Cardiology;  Laterality: Left;  RN PRE OP 2020.---COVID NEGATIVE ON-- 2020. [place]    SHOULDER ARTHROSCOPY W/ ROTATOR CUFF REPAIR      Left shoulder     Family History   Problem Relation Age of Onset    COPD Mother     Diabetes Mother     Hypertension Mother     COPD Father     Heart disease Brother      Social History     Tobacco Use    Smoking status: Former     Current packs/day: 0.00     Types: Cigarettes     Quit date:      Years since quittin.9    Smokeless tobacco: Never    Tobacco comments:     stopped smoking 20 yrs ago.   Substance Use Topics    Alcohol use: Not Currently     Alcohol/week: 5.0 standard drinks of alcohol     Types: 6 Standard drinks or equivalent per week    Drug use: No     Review of Systems   Constitutional:  Negative for chills and fever.   HENT:  Negative for congestion.    Eyes:  Positive for pain (Right) and visual disturbance.   Respiratory:  Positive for cough.    Cardiovascular:  Negative for chest pain.   Gastrointestinal:  Negative for constipation.   Genitourinary:  Negative for dysuria.   Musculoskeletal:  Negative for myalgias.   Skin:  Negative for wound.   Neurological:  Positive for headaches (Right-sided).   Psychiatric/Behavioral:  Negative for suicidal ideas.    All other systems reviewed and are negative.      Physical Exam     Initial Vitals [23 0751]   BP  Pulse Resp Temp SpO2   (!) 153/90 66 18 97.7 °F (36.5 °C) 96 %      MAP       --         Physical Exam    Nursing note and vitals reviewed.  Constitutional: He appears well-developed and well-nourished.   HENT:   Head: Normocephalic and atraumatic.   Mouth/Throat: Mucous membranes are normal.   Patent   Eyes: Conjunctivae and EOM are normal. Pupils are equal, round, and reactive to light. Right conjunctiva is not injected. Left conjunctiva is not injected.   Fluorescein exam reassuring.  Extraocular movements intact.  Pupils equal reactive.  Pressure of 15.  Fluorescein exam does not show any cuts or lacerations sclera.  Visual Acuity Right Eye  Right Visual Status: Glasses   Right Visual Test: 20/30   Left Eye  Left Visual Status: Glasses   Left Visual Test: 20/25   Both Eyes  Both Visual Status: Glasses   Both Visual Test : 20/20     Small subconjunctival hemorrhage.   Neck: Neck supple.    Full passive range of motion without pain.     Cardiovascular:  Regular rhythm, S1 normal, S2 normal and normal heart sounds.     Exam reveals no gallop and no friction rub.       No murmur heard.  Pulses:       Radial pulses are 2+ on the right side and 2+ on the left side.   Pulmonary/Chest: Effort normal and breath sounds normal. No respiratory distress.   Abdominal: Abdomen is soft. He exhibits no distension. There is no abdominal tenderness.   Musculoskeletal:      Cervical back: Full passive range of motion without pain and neck supple.      Comments: Good active ROM of all extremities. No lower extremity edema or cyanosis.     Neurological: He is alert. No cranial nerve deficit or sensory deficit. Gait normal.   A&Ox4, normal speech   Skin: Skin is warm. No ecchymosis and no rash noted.   Psychiatric: He has a normal mood and affect. Thought content normal.         ED Course   Procedures  Labs Reviewed - No data to display       Imaging Results    None          Medications   proparacaine 0.5 % ophthalmic solution 1 drop  (1 drop Left Eye Given 12/24/23 0945)   fluorescein ophthalmic strip 1 each (1 each Left Eye Given 12/24/23 0945)   butalbital-acetaminophen-caffeine -40 mg per tablet 1 tablet (1 tablet Oral Given 12/24/23 0945)     Medical Decision Making  Differential diagnosis include but is not limited to:  Tension headache, migraine, arteritis,    78-year-old male presenting secondary headache ever since being diagnosed with the flu.  Afebrile.  No neck stiffness.  Do not think meningitis.  Eye exam is reassuring other than having a subconjunctival hemorrhage.  Patient was given medications above.  No pain on reassessment.  Vitals reassuring.  Discharged with outpatient follow-up. I discussed with the patient/family the diagnosis, treatment plan, indications for return to the emergency department, and for expected follow-up. The patient/family verbalized an understanding. The patient/family is asked if there are any questions or concerns. We discuss the case, until all issues are addressed to the patient/family's satisfaction. Patient/family understands and is agreeable to the plan.  Patient has ophthalmology appointment in a couple days.  Handy Shoemaker    DISCLAIMER: This note was prepared with Voice Assist voice recognition transcription software. Garbled syntax, mangled pronouns, and other bizarre constructions may be attributed to that software system.      Risk  OTC drugs.  Prescription drug management.            Scribe Attestation:   Scribe #1: I performed the above scribed service and the documentation accurately describes the services I performed. I attest to the accuracy of the note.              I, handy shoemaker, personally performed the services described in this documentation.  All medical record entries made by the scribe were at my direction and in my presence.  I have reviewed the chart and agree that the record reflects my personal performance and is accurate and complete.                   Clinical  Impression:  Final diagnoses:  [H11.31] Subconjunctival hemorrhage of right eye (Primary)  [R51.9] Acute intractable headache, unspecified headache type          ED Disposition Condition    Discharge Stable          ED Prescriptions       Medication Sig Dispense Start Date End Date Auth. Provider    ibuprofen (ADVIL,MOTRIN) 600 MG tablet Take 1 tablet (600 mg total) by mouth every 6 (six) hours as needed for Pain. 20 tablet 12/24/2023 -- Handy Shoemaker MD    butalbital-acetaminophen-caffeine -40 mg (FIORICET, ESGIC) -40 mg per tablet Take 1 tablet by mouth every 4 (four) hours as needed. 13 tablet 12/24/2023 1/23/2024 Handy Shoemaker MD    cetirizine (ZYRTEC) 10 MG tablet Take 1 tablet (10 mg total) by mouth once daily. for 5 days 5 tablet 12/24/2023 12/29/2023 Handy Shoemaker MD          Follow-up Information       Follow up With Specialties Details Why Contact Info    Pj Gillette MD Family Medicine Schedule an appointment as soon as possible for a visit in 2 days  [address] [facility]  [place] [address]  [phone]               Handy Shoemaker MD  12/24/23 3317

## 2023-12-24 NOTE — DISCHARGE INSTRUCTIONS
Please follow-up with your eye appointment on Tuesday    Thank you for coming to our Emergency Department today. It is important to remember that some problems or medical conditions are difficult to diagnose and may not be found or addressed during your Emergency Department visit.  These conditions often start with non-specific symptoms and can only be diagnosed on follow up visits with your primary care physician or specialist when the symptoms continue or change. Please remember that all medical conditions can change, and we cannot predict how you will be feeling tomorrow or the next day. Return to the ER with any questions/concerns, new/concerning symptoms, worsening or failure to improve.       Be sure to follow up with your primary care doctor and review all labs/imaging/tests that were performed during your ER visit with them. It is very common for us to identify non-emergent incidental findings which must be followed up with your primary care physician.  Some labs/imaging/tests may be outside of the normal range, and require non-emergent follow-up and/or further investigation/treatment/procedures/testing to help diagnose/exclude/prevent complications or other potentially serious medical conditions. Some abnormalities may not have been discussed or addressed during your ER visit. Some lab results may not return during your ER visit but can be accessible by downloading the free [facility] My[facility] yariel or by visiting [email or web address] . It is important for you to review all labs/imaging/tests which are outside of the normal range with your physician.    An ER visit does not replace a primary care visit, and many screening tests or follow-up tests cannot be ordered by an ER doctor or performed by the ER. Some tests may even require pre-approval.    If you do not have a primary care doctor, you may contact the one listed on your discharge paperwork or you may also call the [facility] Appointment Desk at  [phone] , or [address] [place] [address] at  [phone] to schedule an appointment, or establish care with a primary care doctor or even a specialist and to obtain information about local resources. It is important to your health that you have a primary care doctor.    Please take all medications as directed. We have done our best to select a medication for you that will treat your condition however, all medications may potentially have side-effects and it is impossible to predict which medications may give you side-effects or what those side-effects (if any) those medications may give you.  If you feel that you are having a negative effect or side-effect of any medication you should stop taking those medications immediately and seek medical attention. If you feel that you are having a life-threatening reaction call 911.        Do not drive, swim, climb to height, take a bath, operate heavy machinery, drink alcohol or take potentially sedating medications, sign any legal documents or make any important decisions for 24 hours if you have received any pain medications, sedatives or mood altering drugs during your ER visit or within 24 hours of taking them if they have been prescribed to you.     You can find additional resources for Dentists, hearing aids, durable medical equipment, low cost pharmacies and other resources at [email or web address]

## 2023-12-24 NOTE — ED TRIAGE NOTES
Pt reports right eye pain and temporal HA since 12/18. Pt reports being told at  that he had a ruptured vessel possibly coughing too much. Was unable to get eye appointment until January. Denies any blurred vision at this time.

## 2023-12-27 ENCOUNTER — PATIENT OUTREACH (OUTPATIENT)
Dept: EMERGENCY MEDICINE | Facility: HOSPITAL | Age: 78
End: 2023-12-27
Payer: MEDICARE

## 2023-12-28 ENCOUNTER — PES CALL (OUTPATIENT)
Dept: HOME HEALTH SERVICES | Facility: CLINIC | Age: 78
End: 2023-12-28
Payer: MEDICARE

## 2023-12-28 ENCOUNTER — TELEPHONE (OUTPATIENT)
Dept: HOME HEALTH SERVICES | Facility: CLINIC | Age: 78
End: 2023-12-28
Payer: MEDICARE

## 2023-12-28 NOTE — TELEPHONE ENCOUNTER
Attempt was made to call patient to schedule ED follow up visit for [facility] Referral.   Unable to reach patient.  LVM with contact information.

## 2023-12-29 ENCOUNTER — PES CALL (OUTPATIENT)
Dept: HOME HEALTH SERVICES | Facility: CLINIC | Age: 78
End: 2023-12-29
Payer: MEDICARE

## 2024-01-02 ENCOUNTER — TELEPHONE (OUTPATIENT)
Dept: HOME HEALTH SERVICES | Facility: CLINIC | Age: 79
End: 2024-01-02
Payer: COMMERCIAL

## 2024-01-02 NOTE — TELEPHONE ENCOUNTER
Multiple attempts were made to call patient and spouse to schedule ED follow up visit for [facility] Referral.   LVM but unable to reach patient.  Referral canceled and ordering provider notified.

## 2024-01-11 DIAGNOSIS — Z00.00 ENCOUNTER FOR MEDICARE ANNUAL WELLNESS EXAM: ICD-10-CM

## 2024-01-26 LAB
LEFT EYE DM RETINOPATHY: NEGATIVE
RIGHT EYE DM RETINOPATHY: NEGATIVE

## 2024-01-31 ENCOUNTER — TELEPHONE (OUTPATIENT)
Dept: ADMINISTRATIVE | Facility: CLINIC | Age: 79
End: 2024-01-31
Payer: MEDICARE

## 2024-01-31 NOTE — TELEPHONE ENCOUNTER
Called pt, no answer; left message informing pt I was calling to remind pt of his in office EAWV on 2/2/24 and to return my call if he had any questions; left my name and number.

## 2024-02-02 ENCOUNTER — OFFICE VISIT (OUTPATIENT)
Dept: FAMILY MEDICINE | Facility: CLINIC | Age: 79
End: 2024-02-02
Payer: MEDICARE

## 2024-02-02 VITALS
SYSTOLIC BLOOD PRESSURE: 128 MMHG | HEART RATE: 50 BPM | BODY MASS INDEX: 24.24 KG/M2 | DIASTOLIC BLOOD PRESSURE: 82 MMHG | HEIGHT: 65 IN | WEIGHT: 145.5 LBS | OXYGEN SATURATION: 97 % | TEMPERATURE: 98 F

## 2024-02-02 DIAGNOSIS — Z00.00 ENCOUNTER FOR MEDICARE ANNUAL WELLNESS EXAM: Primary | ICD-10-CM

## 2024-02-02 DIAGNOSIS — G47.00 INSOMNIA, UNSPECIFIED TYPE: ICD-10-CM

## 2024-02-02 DIAGNOSIS — K21.9 GASTROESOPHAGEAL REFLUX DISEASE WITHOUT ESOPHAGITIS: ICD-10-CM

## 2024-02-02 DIAGNOSIS — I25.83 CORONARY ARTERY DISEASE DUE TO LIPID RICH PLAQUE: ICD-10-CM

## 2024-02-02 DIAGNOSIS — R41.89 COGNITIVE IMPAIRMENT: ICD-10-CM

## 2024-02-02 DIAGNOSIS — R94.39 ABNORMAL STRESS TEST: ICD-10-CM

## 2024-02-02 DIAGNOSIS — I47.10 SVT (SUPRAVENTRICULAR TACHYCARDIA): ICD-10-CM

## 2024-02-02 DIAGNOSIS — M47.816 SPONDYLOSIS OF LUMBAR REGION WITHOUT MYELOPATHY OR RADICULOPATHY: ICD-10-CM

## 2024-02-02 DIAGNOSIS — M54.12 CERVICAL RADICULOPATHY: ICD-10-CM

## 2024-02-02 DIAGNOSIS — Z20.1 TUBERCULOSIS EXPOSURE: ICD-10-CM

## 2024-02-02 DIAGNOSIS — L40.0 PSORIASIS VULGARIS: ICD-10-CM

## 2024-02-02 DIAGNOSIS — I70.0 AORTIC ATHEROSCLEROSIS: ICD-10-CM

## 2024-02-02 DIAGNOSIS — I25.10 CORONARY ARTERY DISEASE DUE TO LIPID RICH PLAQUE: ICD-10-CM

## 2024-02-02 DIAGNOSIS — Z00.00 ENCOUNTER FOR PREVENTIVE HEALTH EXAMINATION: ICD-10-CM

## 2024-02-02 DIAGNOSIS — E78.00 PURE HYPERCHOLESTEROLEMIA: Chronic | ICD-10-CM

## 2024-02-02 DIAGNOSIS — E80.6 HYPERBILIRUBINEMIA: ICD-10-CM

## 2024-02-02 DIAGNOSIS — F41.9 ANXIETY DISORDER, UNSPECIFIED TYPE: ICD-10-CM

## 2024-02-02 DIAGNOSIS — H91.90 DECREASED HEARING, UNSPECIFIED LATERALITY: ICD-10-CM

## 2024-02-02 DIAGNOSIS — G31.9 DEGENERATIVE DISEASE OF NERVOUS SYSTEM, UNSPECIFIED: ICD-10-CM

## 2024-02-02 DIAGNOSIS — I48.0 PAF (PAROXYSMAL ATRIAL FIBRILLATION): ICD-10-CM

## 2024-02-02 DIAGNOSIS — I72.3 ANEURYSM OF RIGHT COMMON ILIAC ARTERY: ICD-10-CM

## 2024-02-02 DIAGNOSIS — G60.9 HEREDITARY AND IDIOPATHIC NEUROPATHY, UNSPECIFIED: ICD-10-CM

## 2024-02-02 DIAGNOSIS — H40.1134 PRIMARY OPEN ANGLE GLAUCOMA (POAG) OF BOTH EYES, INDETERMINATE STAGE: ICD-10-CM

## 2024-02-02 DIAGNOSIS — J43.9 PULMONARY EMPHYSEMA, UNSPECIFIED EMPHYSEMA TYPE: ICD-10-CM

## 2024-02-02 DIAGNOSIS — N40.0 BENIGN PROSTATIC HYPERPLASIA WITHOUT LOWER URINARY TRACT SYMPTOMS: Chronic | ICD-10-CM

## 2024-02-02 PROCEDURE — G0439 PPPS, SUBSEQ VISIT: HCPCS | Mod: S$GLB,,, | Performed by: NURSE PRACTITIONER

## 2024-02-02 PROCEDURE — 99999 PR PBB SHADOW E&M-EST. PATIENT-LVL V: CPT | Mod: PBBFAC,,, | Performed by: NURSE PRACTITIONER

## 2024-02-02 RX ORDER — DORZOLAMIDE HYDROCHLORIDE AND TIMOLOL MALEATE 20; 5 MG/ML; MG/ML
1 SOLUTION/ DROPS OPHTHALMIC 2 TIMES DAILY
COMMUNITY
Start: 2024-01-05

## 2024-02-02 RX ORDER — OSELTAMIVIR PHOSPHATE 75 MG/1
75 CAPSULE ORAL 2 TIMES DAILY
COMMUNITY
Start: 2023-12-19 | End: 2024-04-24

## 2024-02-02 RX ORDER — PANTOPRAZOLE SODIUM 20 MG/1
20 TABLET, DELAYED RELEASE ORAL
COMMUNITY
Start: 2024-01-20

## 2024-02-02 NOTE — PROGRESS NOTES
"  Reed Torres presented for a  Medicare AWV and comprehensive Health Risk Assessment today. The following components were reviewed and updated:    Medical history  Family History  Social history  Allergies and Current Medications  Health Risk Assessment  Health Maintenance  Care Team     See Completed Assessments for Annual Wellness Visit within the encounter summary.     The following assessments were completed:  Living Situation  CAGE  Depression Screening  Timed Get Up and Go  Whisper Test  Cognitive Function Screening  Nutrition Screening  ADL Screening  PAQ Screening        Vitals:    02/02/24 0902   BP: 128/82   Pulse: (!) 50   Temp: 97.6 °F (36.4 °C)   TempSrc: Oral   SpO2: 97%   Weight: 66 kg (145 lb 8.1 oz)   Height: 5' 5" (1.651 m)     Body mass index is 24.21 kg/m².  Physical Exam  Vitals and nursing note reviewed.   Constitutional:       General: He is not in acute distress.     Appearance: Normal appearance.   HENT:      Head: Normocephalic and atraumatic.   Eyes:      Conjunctiva/sclera: Conjunctivae normal.      Pupils: Pupils are equal, round, and reactive to light.   Cardiovascular:      Rate and Rhythm: Regular rhythm. Bradycardia present.      Heart sounds: Normal heart sounds. No murmur heard.  Pulmonary:      Effort: Pulmonary effort is normal. No respiratory distress.      Breath sounds: Normal breath sounds.   Musculoskeletal:      Cervical back: Normal range of motion.   Skin:     General: Skin is warm and dry.   Neurological:      Mental Status: He is alert and oriented to person, place, and time.   Psychiatric:         Mood and Affect: Mood normal.         Behavior: Behavior normal.               Diagnoses and health risks identified today and associated recommendations/orders:    1. Encounter for Medicare annual wellness exam  Screenings performed as noted above.  Personal preventative testing needs reviewed.  Declines immunization.   - Ambulatory Referral/Consult to Enhanced Annual " Wellness Visit (eAWV)    2. Cervical radiculopathy  Stable, The current medical regimen is effective;  continue present plan and medications.  Followed by PCP.     3. Cognitive impairment  Stable, The current medical regimen is effective;  continue present plan and medications.  Followed by PCP.     4. Hereditary and idiopathic neuropathy, unspecified  Stable, The current medical regimen is effective;  continue present plan and medications.  Followed by PCP.     5. Spondylosis of lumbar region without myelopathy or radiculopathy  Stable, The current medical regimen is effective;  continue present plan and medications.  Followed by PCP.     6. Anxiety disorder, unspecified type  Stable, The current medical regimen is effective;  continue present plan and medications.  Followed by PCP.     7. Primary open angle glaucoma (POAG) of both eyes, indeterminate stage  Stable, The current medical regimen is effective;  continue present plan and medications.  Followed by Ophthalmology.     8. Decreased hearing, unspecified laterality  Stable, The current medical regimen is effective;  continue present plan and medications.  Followed by ENT and PCP.     9. Psoriasis vulgaris  Stable, The current medical regimen is effective;  continue present plan and medications.  Followed by PCP.     10. Pulmonary emphysema, unspecified emphysema type  Stable, The current medical regimen is effective;  continue present plan and medications.  Followed by PCP.     11. Abnormal stress test  Stable.   Followed by Cardiology.     12. Aneurysm of right common iliac artery  Stable, The current medical regimen is effective;  continue present plan and medications.  Followed by PCP and Cardiology.     13. Aortic atherosclerosis  Stable, The current medical regimen is effective;  continue present plan and medications.  Followed by PCP and Cardiology.   On statin therapy.     14. Coronary artery disease due to lipid rich plaque  Stable, The current  medical regimen is effective;  continue present plan and medications.  Followed by PCP and Cardiology.     15. Pure hypercholesterolemia  Stable, The current medical regimen is effective;  continue present plan and medications.  Followed by PCP and Cardiology.     16. PAF (paroxysmal atrial fibrillation)  Stable, The current medical regimen is effective;  continue present plan and medications.  Followed by PCP and Cardiology.   On anticoagulation.     17. SVT (supraventricular tachycardia)  Stable, The current medical regimen is effective;  continue present plan and medications.  Followed by PCP and Cardiology.     18. Benign prostatic hyperplasia without lower urinary tract symptoms  Stable, The current medical regimen is effective;  continue present plan and medications.  Followed by PCP.    19. Tuberculosis exposure  Stable, The current medical regimen is effective;  continue present plan and medications.  Followed by PCP.    20. Gastroesophageal reflux disease without esophagitis  Stable, The current medical regimen is effective;  continue present plan and medications.  Followed by PCP.    21. Hyperbilirubinemia, long standing, favor [place]  Stable, The current medical regimen is effective;  continue present plan and medications.  Followed by PCP.    22. Insomnia, unspecified type  Stable, The current medical regimen is effective;  continue present plan and medications.  Followed by PCP.    23. Degenerative disease of nervous system, unspecified  Stable, The current medical regimen is effective;  continue present plan and medications.  Followed by PCP.     24. Encounter for preventive health examination  Stable.     Provided Reed with a 5-10 year written screening schedule and personal prevention plan. Recommendations were developed using the USPSTF age appropriate recommendations. Education, counseling, and referrals were provided as needed. After Visit Summary printed and given to patient which includes a  list of additional screenings\tests needed.    I offered to discuss advanced care planning, including how to pick a person who would make decisions for you if you were unable to make them for yourself, called a health care power of , and what kind of decisions you might make such as use of life sustaining treatments such as ventilators and tube feeding when faced with a life limiting illness recorded on a living will that they will need to know. (How you want to be cared for as you near the end of your natural life)     X Patient is interested in learning more about how to make advanced directives.  I provided them paperwork and offered to discuss this with them.    Review for Opioid Screening: Pt does not have Rx for Opioids.  Review for Substance Use Disorders: Patient does not use substance.      Follow up in about 1 year (around 2/2/2025) for MARTIN Atwood, APRN, FNP-C, for health prevention/screening.    Faviola Atwood DNP

## 2024-02-02 NOTE — PATIENT INSTRUCTIONS
Counseling and Referral of Other Preventative  (Italic type indicates deductible and co-insurance are waived)    Patient Name: Reed Torres  Today's Date: 2/2/2024    Health Maintenance       Date Due Completion Date    Shingles Vaccine (1 of 2) Never done ---    RSV Vaccine (Age 60+ and Pregnant patients) (1 - 1-dose 60+ series) Never done ---    TETANUS VACCINE 07/05/2023 7/5/2013    Influenza Vaccine (1) 09/01/2023 9/28/2022    Override on 9/28/2020: Declined (wrong date)    Override on 9/16/2020: Done    Lipid Panel 05/03/2027 5/3/2022        No orders of the defined types were placed in this encounter.      The following information is provided to all patients.  This information is to help you find resources for any of the problems found today that may be affecting your health:                  Living healthy guide: www.[facility].[place].gov      Understanding Diabetes: www.diabetes.org      Eating healthy: www.cdc.gov/healthyweight      [facility] home safety checklist: www.cdc.gov/steadi/patient.html      Agency on Aging: www.goea.[place].[facility]      Alcoholics anonymous (AA): www.aa.org      Physical Activity: www.leanna.nih.gov/ik7sdkl      Tobacco use: www.quitwithusla.org

## 2024-02-29 ENCOUNTER — TELEPHONE (OUTPATIENT)
Dept: VASCULAR SURGERY | Facility: CLINIC | Age: 79
End: 2024-02-29
Payer: MEDICARE

## 2024-03-04 ENCOUNTER — TELEPHONE (OUTPATIENT)
Dept: VASCULAR SURGERY | Facility: CLINIC | Age: 79
End: 2024-03-04
Payer: MEDICARE

## 2024-03-05 ENCOUNTER — TELEPHONE (OUTPATIENT)
Dept: VASCULAR SURGERY | Facility: CLINIC | Age: 79
End: 2024-03-05
Payer: MEDICARE

## 2024-03-13 ENCOUNTER — HOSPITAL ENCOUNTER (OUTPATIENT)
Dept: RADIOLOGY | Facility: HOSPITAL | Age: 79
Discharge: HOME | End: 2024-03-13
Attending: SURGERY
Payer: MEDICARE

## 2024-03-13 DIAGNOSIS — I71.40 ABDOMINAL AORTIC ANEURYSM (AAA) WITHOUT RUPTURE, UNSPECIFIED PART: ICD-10-CM

## 2024-03-13 PROCEDURE — 25500020 PHARM REV CODE 255: Performed by: SURGERY

## 2024-03-13 PROCEDURE — 74174 CTA ABD&PLVS W/CONTRAST: CPT | Mod: TC

## 2024-03-13 PROCEDURE — 74174 CTA ABD&PLVS W/CONTRAST: CPT | Mod: 26,,, | Performed by: INTERNAL MEDICINE

## 2024-03-13 RX ADMIN — IOHEXOL 75 ML: 350 INJECTION, SOLUTION INTRAVENOUS at 09:03

## 2024-03-15 ENCOUNTER — OFFICE VISIT (OUTPATIENT)
Dept: VASCULAR SURGERY | Facility: CLINIC | Age: 79
End: 2024-03-15
Payer: MEDICARE

## 2024-03-15 VITALS
SYSTOLIC BLOOD PRESSURE: 116 MMHG | DIASTOLIC BLOOD PRESSURE: 83 MMHG | HEIGHT: 65 IN | WEIGHT: 140.75 LBS | BODY MASS INDEX: 23.45 KG/M2 | HEART RATE: 75 BPM

## 2024-03-15 DIAGNOSIS — I72.3 ANEURYSM OF RIGHT COMMON ILIAC ARTERY: ICD-10-CM

## 2024-03-15 DIAGNOSIS — I72.3 ANEURYSM OF RIGHT COMMON ILIAC ARTERY: Primary | ICD-10-CM

## 2024-03-15 DIAGNOSIS — I71.40 ABDOMINAL AORTIC ANEURYSM (AAA) WITHOUT RUPTURE, UNSPECIFIED PART: Primary | ICD-10-CM

## 2024-03-15 PROCEDURE — 99214 OFFICE O/P EST MOD 30 MIN: CPT | Mod: S$GLB,,, | Performed by: SURGERY

## 2024-03-15 PROCEDURE — 99999 PR PBB SHADOW E&M-EST. PATIENT-LVL III: CPT | Mod: PBBFAC,,, | Performed by: SURGERY

## 2024-03-15 NOTE — PROGRESS NOTES
Mario Springer MD [facility]                         03/15/2024    HPI:  Reed Torres is a 79 y.o. male with   Patient Active Problem List   Diagnosis    Hyperlipidemia    Tuberculosis exposure    BPH (benign prostatic hyperplasia)    Osteoarthritis of lumbar spine    Hyperbilirubinemia, long standing, favor [place]    Abnormal stress test    Anxiety disorder    SVT (supraventricular tachycardia)    Pulmonary emphysema    Coronary artery disease due to lipid rich plaque    PAF (paroxysmal atrial fibrillation)    Aortic atherosclerosis    Aneurysm of right common iliac artery    Thrombocytopenia, unspecified    Hearing decreased    Degenerative disease of nervous system, unspecified    Gastroesophageal reflux disease without esophagitis    Primary open angle glaucoma (POAG) of both eyes, indeterminate stage    Psoriasis vulgaris    Cervical radiculopathy    Insomnia    Cognitive impairment    being managed by PCP and specialists who is here today for evaluation of mesenteric ischemia.  Pt with c/o LUQ and L chest discomfort intermittent without known triggers.  Patient states location is LUQ and L chest under breastbone occurring for a few months.  Associated signs and symptoms include belching.  No food fear or significant weight loss.  Quality is pressure and severity is 3/10.  Symptoms began a few mo ago.  Alleviating factors include activity.  Worsening factors include none.    no MI  no Stroke  Tobacco use: denies    1/2021:  States he has symptoms of belching and bloating.  Denies food fear and weight loss.  Symptoms are worsened when he bends forward.  States he has not seen GI in about 4 months where he was diagnosed with reflux although his dosage of his medication has been decreased.  Denies pelvic pain or abdominal pain or back pain.  No functional limitation or leg pain.    7/2021:  C/o LUQ pain, R thigh intermittent pain, no abd or pelvic pain.   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    2022:  No complaints.    10/2022:  no new issues.  No pelvic or abd pain.    3/2023:  no complaints.  S/p R RONAK stand alone 23.     2023:  pt doing well.    3/2024:  no abd pain.  No buttock claudication.    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     Past Surgical History:   Procedure Laterality Date    ABDOMINAL AORTIC ANEURYSM REPAIR, ENDOVASCULAR Bilateral 2023    Procedure: REPAIR-ANEURYSM-ILIAC BRANCH ENDOPROSTHESIS-ENDOVASCULAR;  Surgeon: Mario Springer MD;  Location: [facility] OR [address] [place] [address];  Service: Vascular;  Laterality: Bilateral;  mGy: 3707.4  Fluro time: 41.4 miutes  contrast volume: 171mg  Gycm: 363.78      APPENDECTOMY      LEFT HEART CATHETERIZATION Left 2020    Procedure: Left heart cath;  Surgeon: Keenan Avila MD;  Location: [facility] CATH LAB;  Service: Cardiology;  Laterality: Left;  RN PRE OP 2020.---COVID NEGATIVE ON-- 2020. [place]    SHOULDER ARTHROSCOPY W/ ROTATOR CUFF REPAIR      Left shoulder     Family History   Problem Relation Age of Onset    COPD Mother     Diabetes Mother     Hypertension Mother     COPD Father     Heart disease Brother      Social History     Socioeconomic History    Marital status:    Tobacco Use    Smoking status: Former     Current packs/day: 0.00     Types: Cigarettes     Quit date: 2000     Years since quittin.2    Smokeless tobacco: Never    Tobacco comments:     stopped smoking 20 yrs ago.   Substance and Sexual Activity    Alcohol use: Not Currently     Alcohol/week: 5.0 standard drinks of alcohol     Types: 6 Standard drinks or equivalent per week    Drug use: No    Sexual activity: Yes     Partners: Female     Birth control/protection: None   Social History Narrative    Still doing  lawn service     Social Determinants of Health     Financial Resource Strain: Low Risk  (2/2/2024)    Overall Financial Resource Strain (CARDIA)     Difficulty of Paying Living Expenses: Not hard at all   Food Insecurity: No Food Insecurity (2/2/2024)    Hunger Vital Sign     Worried About Running Out of Food in the Last Year: Never true     Ran Out of Food in the Last Year: Never true   Transportation Needs: No Transportation Needs (2/2/2024)    PRAPARE - Transportation     Lack of Transportation (Medical): No     Lack of Transportation (Non-Medical): No   Physical Activity: Sufficiently Active (2/2/2024)    Exercise Vital Sign     Days of Exercise per Week: 5 days     Minutes of Exercise per Session: 150+ min   Stress: No Stress Concern Present (2/2/2024)    [employer] [place] - Occupational Stress Questionnaire     Feeling of Stress : Only a little   Social Connections: Socially Integrated (2/2/2024)    Social Connection and Isolation Panel [NHANES]     Frequency of Communication with Friends and Family: More than three times a week     Frequency of Social Gatherings with Friends and Family: More than three times a week     Attends [employer] Services: More than 4 times per year     Active Member of Clubs or Organizations: Yes     Attends Club or Organization Meetings: More than 4 times per year     Marital Status:    Housing Stability: Low Risk  (2/2/2024)    Housing Stability Vital Sign     Unable to Pay for Housing in the Last Year: No     Number of Places Lived in the Last Year: 1     Unstable Housing in the Last Year: No       Current Outpatient Medications:     amiodarone (PACERONE) 200 MG Tab, Take 1 tablet (200 mg total) by mouth once daily., Disp: 90 tablet, Rfl: 3    apixaban (ELIQUIS) 5 mg Tab, Take 1 tablet (5 mg total) by mouth 2 (two) times daily., Disp: 60 tablet, Rfl: 11    aspirin (ECOTRIN) 81 MG EC tablet, Take 81 mg by mouth every morning., Disp: , Rfl:      atorvastatin (LIPITOR) 40 MG tablet, TAKE ONE TABLET BY MOUTH EVERY DAY, Disp: 90 tablet, Rfl: 3    docusate sodium (COLACE) 100 MG capsule, Take 1 capsule (100 mg total) by mouth 2 (two) times daily., Disp: 60 capsule, Rfl: 0    dorzolamide-timolol 2-0.5% (COSOPT) 22.3-6.8 mg/mL ophthalmic solution, Place 1 drop into both eyes 2 (two) times daily., Disp: , Rfl:     gabapentin (NEURONTIN) 300 MG capsule, TAKE 1-3 CAPSULES BY MOUTH EVERY EVENING, Disp: 90 capsule, Rfl: 11    latanoprost 0.005 % ophthalmic solution, Place into both eyes every evening., Disp: , Rfl:     oseltamivir (TAMIFLU) 75 MG capsule, Take 75 mg by mouth 2 (two) times daily., Disp: , Rfl:     pantoprazole (PROTONIX) 20 MG tablet, Take 20 mg by mouth., Disp: , Rfl:     vit A/vit C/vit E/zinc/copper (OCUVITE PRESERVISION ORAL), Take by mouth 2 (two) times a day., Disp: , Rfl:     cetirizine (ZYRTEC) 10 MG tablet, Take 1 tablet (10 mg total) by mouth once daily. for 5 days, Disp: 5 tablet, Rfl: 0    famotidine (PEPCID) 20 MG tablet, Take 1 tablet (20 mg total) by mouth 2 (two) times daily. for 7 days, Disp: 14 tablet, Rfl: 0    ibuprofen (ADVIL,MOTRIN) 600 MG tablet, Take 1 tablet (600 mg total) by mouth every 6 (six) hours as needed for Pain., Disp: 20 tablet, Rfl: 0    loratadine (CLARITIN) 10 mg tablet, Take 1 tablet (10 mg total) by mouth every morning. for 7 days, Disp: 7 tablet, Rfl: 0    REVIEW OF SYSTEMS:  General: No fevers or chills; ENT: No sore throat; Allergy and Immunology: no persistent infections; Hematological and Lymphatic: No history of bleeding or easy bruising; Endocrine: negative; Respiratory: no cough, shortness of breath, or wheezing; Cardiovascular: no chest pain or dyspnea on exertion; Gastrointestinal: no abdominal pain/back, change in bowel habits, or bloody stools; Genito-Urinary: no dysuria, trouble voiding, or hematuria; Musculoskeletal: negative; Neurological: no TIA or stroke symptoms; Psychiatric: no nervousness,  "anxiety or depression.    PHYSICAL EXAM:      Pulse: 75         General appearance:  Alert, well-appearing, and in no distress.  Oriented to person, place, and time                    Neurological: Normal speech, no focal findings noted; CN II - XII grossly intact. RLE with sensation to light touch, LLE with sensation to light touch.            Musculoskeletal: Digits/nail without cyanosis/clubbing.  Strength 5/5 BLE.                    Neck: Supple, no significant adenopathy, no carotid bruit can be auscultated                  Chest:  Clear to auscultation, no wheezes, rales or rhonchi, symmetric air entry. No use of accessory muscles               Cardiac: Normal rate and regular rhythm, S1 and S2 normal            Abdomen: Soft, min epigastric tenderness, nondistended, no masses or organomegaly, no hernia     No rebound tenderness noted; bowel sounds normal     Pulsatile aortic mass is non palpable.     No groin adenopathy      Extremities:   2+ R femoral pulse, 2+ L femoral pulse     2+ R popliteal pulse, 2+ L popliteal pulse     2+ R PT pulse, 2+ L PT pulse     2+ R DP pulse, 2+ L DP pulse     no RLE edema, no LLE edema    Skin: RLE without tissue loss; LLE without tissue loss    LAB RESULTS:  No results found for: "CBC"  Lab Results   Component Value Date    LABPROT 10.9 07/02/2023    INR 1.0 07/02/2023     Lab Results   Component Value Date     03/13/2024    K 4.4 03/13/2024     03/13/2024    CO2 25 03/13/2024     03/13/2024    BUN 21 03/13/2024    CREATININE 1.0 03/13/2024    CALCIUM 8.9 03/13/2024    ANIONGAP 9 03/13/2024    EGFRNONAA >60 07/03/2022     Lab Results   Component Value Date    WBC 5.28 09/19/2023    RBC 4.34 (L) 09/19/2023    HGB 13.0 (L) 09/19/2023    HCT 40.9 09/19/2023    MCV 94 09/19/2023    MCH 30.0 09/19/2023    MCHC 31.8 (L) 09/19/2023    RDW 14.0 09/19/2023     09/19/2023    MPV 11.5 09/19/2023    GRAN 3.2 09/19/2023    GRAN 61.3 09/19/2023    LYMPH 1.4 " 09/19/2023    LYMPH 27.1 09/19/2023    MONO 0.5 09/19/2023    MONO 9.7 09/19/2023    EOS 0.1 09/19/2023    BASO 0.02 09/19/2023    EOSINOPHIL 1.1 09/19/2023    BASOPHIL 0.4 09/19/2023    DIFFMETHOD Automated 09/19/2023     .  Lab Results   Component Value Date    HGBA1C 5.1 03/03/2022       IMAGING:  All pertinent imaging has been reviewed and interpreted independently.    -BLE arterial US negative for popliteal aneurysm    3/2023  CTA with well apposed stents and no endoleak     9/2023:  R ROJELIO 4 cm aneurysm, stent in place, stenosis of hypogastric stent distally with opacification of distal R hypogastric artery    3/2024:  R ROJELIO 4cm aneurysm, no endoleak, stenosis of hypogastric stent distally with opacification of distal R hypogastric artery    IMP/PLAN:  79 y.o. male with   Patient Active Problem List   Diagnosis    Hyperlipidemia    Tuberculosis exposure    BPH (benign prostatic hyperplasia)    Osteoarthritis of lumbar spine    Hyperbilirubinemia, long standing, favor [place]    Abnormal stress test    Anxiety disorder    SVT (supraventricular tachycardia)    Pulmonary emphysema    Coronary artery disease due to lipid rich plaque    PAF (paroxysmal atrial fibrillation)    Aortic atherosclerosis    Aneurysm of right common iliac artery    Thrombocytopenia, unspecified    Hearing decreased    Degenerative disease of nervous system, unspecified    Gastroesophageal reflux disease without esophagitis    Primary open angle glaucoma (POAG) of both eyes, indeterminate stage    Psoriasis vulgaris    Cervical radiculopathy    Insomnia    Cognitive impairment    being managed by PCP and specialists who is here today for evaluation of R ROJELIO aneurysm.    - Mesenteric US without HD significant stenosis.  Recommend continuing evaluation and management for etiology of left upper quadrant by PCP and GI.    -right common iliac artery aneurysm 4 cm 2021 (3.6 2020), asymptomatic s/p RONAK 1/31/23 without endoleak, 4 cm 9/2023 and  3/2024- continue routine surveillance  -recommend cont daily aspirin, continue blood pressure control heart healthy lifestyle  -RTC 6 mo with CT A/P non contrast    I spent 12 minutes evaluating this patient and greater than 50% of the time was spent counseling, coordinator care and discussing the plan of care.  All questions were answered and patient stated understanding with agreement with the above treatment plan.    Mario Springer MD [facility]  Vascular and Endovascular Surgery

## 2024-03-20 DIAGNOSIS — M25.561 RIGHT KNEE PAIN, UNSPECIFIED CHRONICITY: Primary | ICD-10-CM

## 2024-03-25 ENCOUNTER — OFFICE VISIT (OUTPATIENT)
Dept: ORTHOPEDICS | Facility: CLINIC | Age: 79
End: 2024-03-25
Payer: MEDICARE

## 2024-03-25 DIAGNOSIS — M17.11 PRIMARY OSTEOARTHRITIS OF RIGHT KNEE: Primary | ICD-10-CM

## 2024-03-25 PROCEDURE — 99999 PR PBB SHADOW E&M-EST. PATIENT-LVL III: CPT | Mod: PBBFAC,,, | Performed by: ORTHOPAEDIC SURGERY

## 2024-03-25 PROCEDURE — 99213 OFFICE O/P EST LOW 20 MIN: CPT | Mod: S$GLB,,, | Performed by: ORTHOPAEDIC SURGERY

## 2024-03-25 NOTE — PROGRESS NOTES
Chief Complaint   Patient presents with    Right Knee - Pain     HPI (10/25/21): Reed Torres is a 79 y.o. male who presents today complaining of right knee pain  Duration of symptoms:  About 3-4 months  Trauma or new activity: no  Pain is intermittent  Episodes of locking sensation, intermittent mild pain   Aggravating factors: no known   Relieving factors: no known   Radicular symptoms: no numbness, paresthesias   Associated symptoms: no  swelling, popping and giving way.    Prior treatment:  None   Pain does not interfere with activities of daily living .    9/11/23  Continued Right knee pain  Typically has pain after getting up from sitting   Otherwise his knee feels ok   Has not done an injection  Is on Eliquis  Using a knee brace which is helpful   Topical creams are helpful  Still does lawn service     3/25/24  Here for follow up of R knee arthritis  Injection last done 9/23  Only got relief for about 1 2- weeks     This is the extent of the patient's complaints at this time.     Occupation: Lawn service         Review of patient's allergies indicates:  No Known Allergies      Current Outpatient Medications:     amiodarone (PACERONE) 200 MG Tab, Take 1 tablet (200 mg total) by mouth once daily., Disp: 90 tablet, Rfl: 3    apixaban (ELIQUIS) 5 mg Tab, Take 1 tablet (5 mg total) by mouth 2 (two) times daily., Disp: 60 tablet, Rfl: 11    aspirin (ECOTRIN) 81 MG EC tablet, Take 81 mg by mouth every morning., Disp: , Rfl:     atorvastatin (LIPITOR) 40 MG tablet, TAKE ONE TABLET BY MOUTH EVERY DAY, Disp: 90 tablet, Rfl: 3    docusate sodium (COLACE) 100 MG capsule, Take 1 capsule (100 mg total) by mouth 2 (two) times daily., Disp: 60 capsule, Rfl: 0    dorzolamide-timolol 2-0.5% (COSOPT) 22.3-6.8 mg/mL ophthalmic solution, Place 1 drop into both eyes 2 (two) times daily., Disp: , Rfl:     gabapentin (NEURONTIN) 300 MG capsule, TAKE 1-3 CAPSULES BY MOUTH EVERY EVENING, Disp: 90 capsule, Rfl: 11    latanoprost 0.005  % ophthalmic solution, Place into both eyes every evening., Disp: , Rfl:     oseltamivir (TAMIFLU) 75 MG capsule, Take 75 mg by mouth 2 (two) times daily., Disp: , Rfl:     pantoprazole (PROTONIX) 20 MG tablet, Take 20 mg by mouth., Disp: , Rfl:     vit A/vit C/vit E/zinc/copper (OCUVITE PRESERVISION ORAL), Take by mouth 2 (two) times a day., Disp: , Rfl:     cetirizine (ZYRTEC) 10 MG tablet, Take 1 tablet (10 mg total) by mouth once daily. for 5 days, Disp: 5 tablet, Rfl: 0    famotidine (PEPCID) 20 MG tablet, Take 1 tablet (20 mg total) by mouth 2 (two) times daily. for 7 days, Disp: 14 tablet, Rfl: 0    ibuprofen (ADVIL,MOTRIN) 600 MG tablet, Take 1 tablet (600 mg total) by mouth every 6 (six) hours as needed for Pain., Disp: 20 tablet, Rfl: 0    loratadine (CLARITIN) 10 mg tablet, Take 1 tablet (10 mg total) by mouth every morning. 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       Patient Active Problem List   Diagnosis    Hyperlipidemia    Tuberculosis exposure    BPH (benign prostatic hyperplasia)    Osteoarthritis of lumbar spine    Hyperbilirubinemia, long standing, favor [place]    Abnormal stress test    Anxiety disorder    SVT (supraventricular tachycardia)    Pulmonary emphysema    Coronary artery disease due to lipid rich plaque    PAF (paroxysmal atrial fibrillation)    Aortic atherosclerosis    Aneurysm of right common iliac artery    Thrombocytopenia, unspecified    Hearing decreased    Degenerative disease of nervous system, unspecified    Gastroesophageal reflux disease without esophagitis    Primary open angle glaucoma (POAG) of both eyes, indeterminate stage    Psoriasis vulgaris    Cervical radiculopathy    Insomnia    Cognitive impairment       Past Surgical History:    Procedure Laterality Date    ABDOMINAL AORTIC ANEURYSM REPAIR, ENDOVASCULAR Bilateral 2023    Procedure: REPAIR-ANEURYSM-ILIAC BRANCH ENDOPROSTHESIS-ENDOVASCULAR;  Surgeon: Mario Springer MD;  Location: [address];  Service: Vascular;  Laterality: Bilateral;  mGy: 3707.4  Fluro time: 41.4 miutes  contrast volume: 171mg  Gycm: 363.78      APPENDECTOMY      LEFT HEART CATHETERIZATION Left 2020    Procedure: Left heart cath;  Surgeon: Keenan Avila MD;  Location: [facility] CATH LAB;  Service: Cardiology;  Laterality: Left;  RN PRE OP 2020.---COVID NEGATIVE ON-- 2020. [place]    SHOULDER ARTHROSCOPY W/ ROTATOR CUFF REPAIR      Left shoulder       Social History     Tobacco Use    Smoking status: Former     Current packs/day: 0.00     Types: Cigarettes     Quit date:      Years since quittin.2    Smokeless tobacco: Never    Tobacco comments:     stopped smoking 20 yrs ago.   Substance Use Topics    Alcohol use: Not Currently     Alcohol/week: 5.0 standard drinks of alcohol     Types: 6 Standard drinks or equivalent per week    Drug use: No       Family History   Problem Relation Age of Onset    COPD Mother     Diabetes Mother     Hypertension Mother     COPD Father     Heart disease Brother        Physical Exam:   Vitals:    24 0917   PainSc:   9   PainLoc: Knee     General:  Patient is alert, awake and oriented to time, place and person. Mood and affect are appropriate.  Patient does not appear to be in any distress, denies any constitutional symptoms and appears stated age.   HEENT: Pupils are equal and round, sclera are not injected. External examination of ears and nose reveals no abnormalities. Cranial nerves II-X are grossly intact  Skin: no rashes, abrasions or open wounds on the affected extremity   Resp: No respiratory distress or audible wheezing   CV: 2+  pulses, all extremities warm and well perfused     Right knee(s)  Localizes pain over popliteal fossa   no  swelling, effusion, or erythema   Active ROM: 0 - 140  Passive ROM: 0 - 130  no varus/valgus deformity   Non tender to palpation over medial joint line   good  quadricep muscle tone and bulk; no atrophy compared to contralateral extremity   normal (0 - 2 mm) Anterior drawer   normal (0 - 2 mm) posterior drawer   negative valgus instability   negative varus instability   Normal patellar tracking   No pain with patellar compression   ltsi s/s/sp/dp/t   + ehl/fhl/ta/gs  2 + DP      Imaging: 3 views right knee:  severe tricompartmental OA with subchondral sclerosis, joint space narrowing, osteophyte formation and loose bodies.  KL4    I personally reviewed and interpreted the patient's imaging obtained today in clinic     Assessment: 79 y.o. male with Right knee osteoarthritis     We discussed the findings on xray and clinical exam as well as the natural history of arthritis. We discussed non operative and operative treatment options including injections, viscosupplementation, physical therapy and PO NSAIDs.     Plan:   - Durolane pre auth submitted   - Return to clinic 3 weeks    MEDICAL NECESSITY FOR VISCOSUPPLEMENTATION:  A thorough evaluation of the patient, have determined that viscosupplementation is medically necessary.  The patient has painful DJD of the knee with failure of conservative therapy including lifestyle modifications and rehabilitation exercises.  Oral analgesics/NSAIDs have not adequately controlled symptoms and there is radiographic evidence of joint space narrowing, subchondral sclerosis, and osteophyte formation - Kellgren Gavin grade 2 or greater.      All questions were answered in detail. The patient is in full agreement with the treatment plan and will proceed accordingly.      This note was created by combination of typed  and M-Modal dictation. Transcription and phonetic errors may be present.  If there are any questions, please contact me.      Current Outpatient Medications:      amiodarone (PACERONE) 200 MG Tab, Take 1 tablet (200 mg total) by mouth once daily., Disp: 90 tablet, Rfl: 3    apixaban (ELIQUIS) 5 mg Tab, Take 1 tablet (5 mg total) by mouth 2 (two) times daily., Disp: 60 tablet, Rfl: 11    aspirin (ECOTRIN) 81 MG EC tablet, Take 81 mg by mouth every morning., Disp: , Rfl:     atorvastatin (LIPITOR) 40 MG tablet, TAKE ONE TABLET BY MOUTH EVERY DAY, Disp: 90 tablet, Rfl: 3    docusate sodium (COLACE) 100 MG capsule, Take 1 capsule (100 mg total) by mouth 2 (two) times daily., Disp: 60 capsule, Rfl: 0    dorzolamide-timolol 2-0.5% (COSOPT) 22.3-6.8 mg/mL ophthalmic solution, Place 1 drop into both eyes 2 (two) times daily., Disp: , Rfl:     gabapentin (NEURONTIN) 300 MG capsule, TAKE 1-3 CAPSULES BY MOUTH EVERY EVENING, Disp: 90 capsule, Rfl: 11    latanoprost 0.005 % ophthalmic solution, Place into both eyes every evening., Disp: , Rfl:     oseltamivir (TAMIFLU) 75 MG capsule, Take 75 mg by mouth 2 (two) times daily., Disp: , Rfl:     pantoprazole (PROTONIX) 20 MG tablet, Take 20 mg by mouth., Disp: , Rfl:     vit A/vit C/vit E/zinc/copper (OCUVITE PRESERVISION ORAL), Take by mouth 2 (two) times a day., Disp: , Rfl:     cetirizine (ZYRTEC) 10 MG tablet, Take 1 tablet (10 mg total) by mouth once daily. for 5 days, Disp: 5 tablet, Rfl: 0    famotidine (PEPCID) 20 MG tablet, Take 1 tablet (20 mg total) by mouth 2 (two) times daily. for 7 days, Disp: 14 tablet, Rfl: 0    ibuprofen (ADVIL,MOTRIN) 600 MG tablet, Take 1 tablet (600 mg total) by mouth every 6 (six) hours as needed for Pain., Disp: 20 tablet, Rfl: 0    loratadine (CLARITIN) 10 mg tablet, Take 1 tablet (10 mg total) by mouth every morning. for 7 days, Disp: 7 tablet, Rfl: 0

## 2024-03-29 DIAGNOSIS — E78.00 PURE HYPERCHOLESTEROLEMIA: Chronic | ICD-10-CM

## 2024-04-01 DIAGNOSIS — E78.00 PURE HYPERCHOLESTEROLEMIA: Chronic | ICD-10-CM

## 2024-04-01 RX ORDER — ATORVASTATIN CALCIUM 40 MG/1
40 TABLET, FILM COATED ORAL NIGHTLY
Qty: 90 TABLET | Refills: 0 | Status: SHIPPED | OUTPATIENT
Start: 2024-04-01 | End: 2024-04-01 | Stop reason: SDUPTHER

## 2024-04-01 RX ORDER — ATORVASTATIN CALCIUM 40 MG/1
40 TABLET, FILM COATED ORAL NIGHTLY
Qty: 90 TABLET | Refills: 0 | Status: SHIPPED | OUTPATIENT
Start: 2024-04-01

## 2024-04-01 NOTE — TELEPHONE ENCOUNTER
Care Due:                  Date            Visit Type   Department     Provider  --------------------------------------------------------------------------------                                [facility] FAMILY                              PRIMARY      MEDICINE/INTERN  Last Visit: 09-      CARE ([facility])   [facility]         Pj [facility]  Next Visit: None Scheduled  None         None Found                                                            Last  Test          Frequency    Reason                     Performed    Due Date  --------------------------------------------------------------------------------    Lipid Panel.  12 months..  atorvastatin.............  05- 04-    [facility] Embedded Care Due Messages. Reference number: [id number].   4/01/2024 11:37:43 AM CDT

## 2024-04-10 NOTE — ANESTHESIA PROCEDURE NOTES
Arterial    Diagnosis: aneursym    Patient location during procedure: done in OR  Procedure start time: 1/31/2023 1:01 PM  Timeout: 1/31/2023 1:00 PM  Procedure end time: 1/31/2023 1:02 PM    Staffing  Authorizing Provider: Brock Dominguez MD  Performing Provider: Gary Marquez MD    Anesthesiologist was present at the time of the procedure.    Preanesthetic Checklist  Completed: patient identified, IV checked, site marked, risks and benefits discussed, surgical consent, monitors and equipment checked, pre-op evaluation, timeout performed and anesthesia consent givenArterial  Skin Prep: chlorhexidine gluconate  Local Infiltration: lidocaine  Orientation: right  Location: radial    Catheter Size: 20 G  Catheter placement by Ultrasound guidance. Heme positive aspiration all ports.   Vessel Caliber: medium, patent, compressibility normal  Vascular Doppler:  not done  Needle advanced into vessel with real time Ultrasound guidance.Insertion Attempts: 1  Assessment  Dressing: secured with tape and tegaderm  Patient: Tolerated well            Writer called pt and LVM canc appt on 7/22/24 as the provider will be on vacation, and to please call the office to jerry.

## 2024-04-12 ENCOUNTER — TELEPHONE (OUTPATIENT)
Dept: FAMILY MEDICINE | Facility: CLINIC | Age: 79
End: 2024-04-12
Payer: MEDICARE

## 2024-04-12 DIAGNOSIS — G62.9 NEUROPATHY: ICD-10-CM

## 2024-04-12 RX ORDER — GABAPENTIN 300 MG/1
CAPSULE ORAL
Qty: 90 CAPSULE | Refills: 11 | Status: SHIPPED | OUTPATIENT
Start: 2024-04-12 | End: 2024-04-16 | Stop reason: SDUPTHER

## 2024-04-12 NOTE — TELEPHONE ENCOUNTER
No care due was identified.  [facility] Embedded Care Due Messages. Reference number: [id number].   4/12/2024 10:38:12 AM CDT

## 2024-04-12 NOTE — TELEPHONE ENCOUNTER
----- Message from Melissa Cevallos sent at 4/12/2024  4:52 PM CDT -----  Regarding: patient call back  Type: Patient Call Back    Who called: Self     What is the request in detail: asked that his refills go to  [id number] - [place], [place] [facility] [address] [facility] because it is closer to his house.     Can the clinic reply by [facility]? No     Would the patient rather a call back or a response via [facility]? Call     Best call back number: .[phone]

## 2024-04-15 ENCOUNTER — OFFICE VISIT (OUTPATIENT)
Dept: ORTHOPEDICS | Facility: CLINIC | Age: 79
End: 2024-04-15
Payer: MEDICARE

## 2024-04-15 DIAGNOSIS — M17.11 PRIMARY OSTEOARTHRITIS OF RIGHT KNEE: Primary | ICD-10-CM

## 2024-04-15 PROCEDURE — 20610 DRAIN/INJ JOINT/BURSA W/O US: CPT | Mod: RT,S$GLB,, | Performed by: ORTHOPAEDIC SURGERY

## 2024-04-15 PROCEDURE — 99499 UNLISTED E&M SERVICE: CPT | Mod: S$GLB,,, | Performed by: ORTHOPAEDIC SURGERY

## 2024-04-15 PROCEDURE — 99999 PR PBB SHADOW E&M-EST. PATIENT-LVL III: CPT | Mod: PBBFAC,,, | Performed by: ORTHOPAEDIC SURGERY

## 2024-04-15 NOTE — PROGRESS NOTES
Follow up visit    History of Present Illness:   Reed Torres comes to the office for follow up evaluation of right knee arthritis   He has failed other treatment modalities including medications,  activity modification and steroid injection. He is here today for viscosupplementation - Durolane        MEDICAL NECESSITY FOR VISCOSUPPLEMENTATION:  A thorough evaluation of the patient, have determined that viscosupplementation is medically necessary.  The patient has painful DJD of the knee with failure of conservative therapy including lifestyle modifications and rehabilitation exercises.  Oral analgesics/NSAIDs have not adequately controlled symptoms and there is radiographic evidence of joint space narrowing, subchondral sclerosis, and osteophyte formation - Kellgren Gavin grade 2 or greater.     ROS:  unremarkable and no change since last visit    Physical Examination:    Vitals:    04/15/24 1035   PainSc:   4   PainLoc: Knee      NAD  right knee  No change in exam     Radiographic imaging: no new imaging    Assessment/Plan:  79 y.o. male with right  knee arthritis     Injection to Right knee  performed, please see procedure note for details.  We discussed the risks and benefits of injection including pain, infection, bleeding, failure to improve pain, temporary increase in pain, synovitis, and damage to surrounding structures including nerves, blood vessels, tendons and muscles.   2.   Return for follow up visit: PRN    All questions were answered in detail. The patient  verbalized the understanding of the treatment plan and is in full agreement with the treatment plan.

## 2024-04-15 NOTE — PROCEDURES
Large Joint Aspiration/Injection: R knee    Date/Time: 4/15/2024 10:15 AM    Performed by: Chanda Montoya MD  Authorized by: Chanda Montoya MD    Consent Done?:  Yes (Verbal)  Indications:  Arthritis  Timeout: prior to procedure the correct patient, procedure, and site was verified    Prep: patient was prepped and draped in usual sterile fashion      Local anesthesia used?: Yes    Local anesthetic:  Topical anesthetic    Details:  Needle Size:  22 G  Approach:  Superior  Location:  Knee  Site:  R knee  Medications:  60 mg hyaluronate sodium, stabilized (DUROLANE) 60 mg/3 mL  Patient tolerance:  Patient tolerated the procedure well with no immediate complications

## 2024-04-16 DIAGNOSIS — G62.9 NEUROPATHY: ICD-10-CM

## 2024-04-16 RX ORDER — GABAPENTIN 300 MG/1
CAPSULE ORAL
Qty: 90 CAPSULE | Refills: 11 | Status: SHIPPED | OUTPATIENT
Start: 2024-04-16

## 2024-04-17 ENCOUNTER — TELEPHONE (OUTPATIENT)
Dept: UROLOGY | Facility: CLINIC | Age: 79
End: 2024-04-17
Payer: MEDICARE

## 2024-04-17 ENCOUNTER — HOSPITAL ENCOUNTER (EMERGENCY)
Facility: HOSPITAL | Age: 79
Discharge: HOME | End: 2024-04-17
Attending: STUDENT IN AN ORGANIZED HEALTH CARE EDUCATION/TRAINING PROGRAM
Payer: MEDICARE

## 2024-04-17 VITALS
RESPIRATION RATE: 18 BRPM | BODY MASS INDEX: 22.62 KG/M2 | SYSTOLIC BLOOD PRESSURE: 123 MMHG | TEMPERATURE: 98 F | DIASTOLIC BLOOD PRESSURE: 80 MMHG | HEIGHT: 66 IN | WEIGHT: 140.75 LBS | OXYGEN SATURATION: 97 % | HEART RATE: 54 BPM

## 2024-04-17 DIAGNOSIS — N30.90 CYSTITIS: ICD-10-CM

## 2024-04-17 DIAGNOSIS — R30.0 DYSURIA: Primary | ICD-10-CM

## 2024-04-17 LAB
ALBUMIN SERPL BCP-MCNC: 3.8 G/DL (ref 3.5–5.2)
ALLENS TEST: ABNORMAL
ALP SERPL-CCNC: 63 U/L (ref 55–135)
ALT SERPL W/O P-5'-P-CCNC: 25 U/L (ref 10–44)
ANION GAP SERPL CALC-SCNC: 19 MMOL/L (ref 8–16)
ANION GAP SERPL CALC-SCNC: 5 MMOL/L (ref 8–16)
AST SERPL-CCNC: 21 U/L (ref 10–40)
BASOPHILS # BLD AUTO: 0.03 K/UL (ref 0–0.2)
BASOPHILS NFR BLD: 0.6 % (ref 0–1.9)
BILIRUB SERPL-MCNC: 1.2 MG/DL (ref 0.1–1)
BILIRUB UR QL STRIP: NEGATIVE
BUN SERPL-MCNC: 18 MG/DL (ref 6–30)
BUN SERPL-MCNC: 18 MG/DL (ref 8–23)
CALCIUM SERPL-MCNC: 9.2 MG/DL (ref 8.7–10.5)
CHLORIDE SERPL-SCNC: 106 MMOL/L (ref 95–110)
CHLORIDE SERPL-SCNC: 112 MMOL/L (ref 95–110)
CLARITY UR: CLEAR
CO2 SERPL-SCNC: 25 MMOL/L (ref 23–29)
COLOR UR: YELLOW
CREAT SERPL-MCNC: 0.9 MG/DL (ref 0.5–1.4)
CREAT SERPL-MCNC: 0.9 MG/DL (ref 0.5–1.4)
DIFFERENTIAL METHOD BLD: ABNORMAL
EOSINOPHIL # BLD AUTO: 0.1 K/UL (ref 0–0.5)
EOSINOPHIL NFR BLD: 2.5 % (ref 0–8)
ERYTHROCYTE [DISTWIDTH] IN BLOOD BY AUTOMATED COUNT: 13.8 % (ref 11.5–14.5)
EST. GFR  (NO RACE VARIABLE): >60 ML/MIN/1.73 M^2
GLUCOSE SERPL-MCNC: 92 MG/DL (ref 70–110)
GLUCOSE SERPL-MCNC: 93 MG/DL (ref 70–110)
GLUCOSE UR QL STRIP: NEGATIVE
HCT VFR BLD AUTO: 41.6 % (ref 40–54)
HCT VFR BLD CALC: 39 %PCV (ref 36–54)
HGB BLD-MCNC: 13.4 G/DL (ref 14–18)
HGB UR QL STRIP: NEGATIVE
HYALINE CASTS #/AREA URNS LPF: 1 /LPF
IMM GRANULOCYTES # BLD AUTO: 0.01 K/UL (ref 0–0.04)
IMM GRANULOCYTES NFR BLD AUTO: 0.2 % (ref 0–0.5)
KETONES UR QL STRIP: NEGATIVE
LEUKOCYTE ESTERASE UR QL STRIP: ABNORMAL
LYMPHOCYTES # BLD AUTO: 1.7 K/UL (ref 1–4.8)
LYMPHOCYTES NFR BLD: 34.9 % (ref 18–48)
MCH RBC QN AUTO: 30.5 PG (ref 27–31)
MCHC RBC AUTO-ENTMCNC: 32.2 G/DL (ref 32–36)
MCV RBC AUTO: 95 FL (ref 82–98)
MICROSCOPIC COMMENT: ABNORMAL
MONOCYTES # BLD AUTO: 0.5 K/UL (ref 0.3–1)
MONOCYTES NFR BLD: 9.4 % (ref 4–15)
NEUTROPHILS # BLD AUTO: 2.5 K/UL (ref 1.8–7.7)
NEUTROPHILS NFR BLD: 52.4 % (ref 38–73)
NITRITE UR QL STRIP: NEGATIVE
NRBC BLD-RTO: 0 /100 WBC
PH UR STRIP: 6 [PH] (ref 5–8)
PLATELET # BLD AUTO: 158 K/UL (ref 150–450)
PMV BLD AUTO: 11.5 FL (ref 9.2–12.9)
POC IONIZED CALCIUM: 1.28 MMOL/L (ref 1.06–1.42)
POC TCO2 (MEASURED): 25 MMOL/L (ref 23–29)
POTASSIUM BLD-SCNC: 3.9 MMOL/L (ref 3.5–5.1)
POTASSIUM SERPL-SCNC: 4.1 MMOL/L (ref 3.5–5.1)
PROT SERPL-MCNC: 6.3 G/DL (ref 6–8.4)
PROT UR QL STRIP: ABNORMAL
RBC # BLD AUTO: 4.39 M/UL (ref 4.6–6.2)
SAMPLE: ABNORMAL
SITE: ABNORMAL
SODIUM BLD-SCNC: 144 MMOL/L (ref 136–145)
SODIUM SERPL-SCNC: 142 MMOL/L (ref 136–145)
SP GR UR STRIP: 1.01 (ref 1–1.03)
SQUAMOUS #/AREA URNS HPF: 1 /HPF
URN SPEC COLLECT METH UR: ABNORMAL
UROBILINOGEN UR STRIP-ACNC: NEGATIVE EU/DL
WBC # BLD AUTO: 4.81 K/UL (ref 3.9–12.7)
WBC #/AREA URNS HPF: 14 /HPF (ref 0–5)

## 2024-04-17 PROCEDURE — 82962 GLUCOSE BLOOD TEST: CPT

## 2024-04-17 PROCEDURE — 25000003 PHARM REV CODE 250

## 2024-04-17 PROCEDURE — 87086 URINE CULTURE/COLONY COUNT: CPT | Performed by: NURSE PRACTITIONER

## 2024-04-17 PROCEDURE — 63600175 PHARM REV CODE 636 W HCPCS

## 2024-04-17 PROCEDURE — 81000 URINALYSIS NONAUTO W/SCOPE: CPT | Performed by: NURSE PRACTITIONER

## 2024-04-17 PROCEDURE — 85025 COMPLETE CBC W/AUTO DIFF WBC: CPT | Performed by: NURSE PRACTITIONER

## 2024-04-17 PROCEDURE — 99285 EMERGENCY DEPT VISIT HI MDM: CPT | Mod: 25

## 2024-04-17 PROCEDURE — 84132 ASSAY OF SERUM POTASSIUM: CPT | Mod: 91

## 2024-04-17 PROCEDURE — 96365 THER/PROPH/DIAG IV INF INIT: CPT

## 2024-04-17 PROCEDURE — 80053 COMPREHEN METABOLIC PANEL: CPT | Performed by: NURSE PRACTITIONER

## 2024-04-17 PROCEDURE — 99900035 HC TECH TIME PER 15 MIN (STAT)

## 2024-04-17 PROCEDURE — 85014 HEMATOCRIT: CPT | Mod: 91

## 2024-04-17 PROCEDURE — 84295 ASSAY OF SERUM SODIUM: CPT

## 2024-04-17 PROCEDURE — 82565 ASSAY OF CREATININE: CPT | Mod: 91

## 2024-04-17 PROCEDURE — 82330 ASSAY OF CALCIUM: CPT

## 2024-04-17 PROCEDURE — 82565 ASSAY OF CREATININE: CPT

## 2024-04-17 RX ORDER — SULFAMETHOXAZOLE AND TRIMETHOPRIM 800; 160 MG/1; MG/1
1 TABLET ORAL 2 TIMES DAILY
Qty: 14 TABLET | Refills: 0 | Status: SHIPPED | OUTPATIENT
Start: 2024-04-17 | End: 2024-04-24

## 2024-04-17 RX ADMIN — CEFTRIAXONE 1 G: 1 INJECTION, POWDER, FOR SOLUTION INTRAMUSCULAR; INTRAVENOUS at 03:04

## 2024-04-17 NOTE — FIRST PROVIDER EVALUATION
"Medical screening examination initiated.  I have conducted a focused provider triage encounter, findings are as follows:    Brief history of present illness:  Right sided flank pain and dysuria for the past 3 days. Denies fevers.    Vitals:    04/17/24 1241   BP: 110/72   Pulse: 62   Resp: 16   Temp: 98.2 °F (36.8 °C)   SpO2: 97%   Weight: 63.8 kg (140 lb 12.2 oz)   Height: 5' 6" (1.676 m)       Pertinent physical exam:  no acute distress    Brief workup plan:  labs, urinalysis    Preliminary workup initiated; this workup will be continued and followed by the physician or advanced practice provider that is assigned to the patient when roomed.  "

## 2024-04-17 NOTE — ED PROVIDER NOTES
"Encounter Date: 4/17/2024    SCRIBE #1 NOTE: I, Raghu Lyons, am scribing for, and in the presence of,  Manuel Bhakta PA-C. I have scribed the following portions of the note - Other sections scribed: HPI, ROS.       History     Chief Complaint   Patient presents with    Dysuria     78 yo male to triage for burning w/ urination, weak stream, and right sided flank pain x 2-3 days. VSS, NAD, AAOx4    Flank Pain     Reed Torres is a 79 y.o. male, with a past medical history of hyperlipidemia, Afib, AAA, who presents to the ED with dysuria and flank pain. Patient reports pain and burning whenever he urinates. Patient additionally reports that when he urinates it "stops and goes." No other exacerbating or alleviating factors.  He denies any associated trauma, fall, head trauma, loss of consciousness.  Patient denies hematuria, penile swelling, testicular pain, testicular swelling, fever, bladder/bowel incontinence, saddle anesthesia, SOB, abdominal pain, vomiting, diarrhea or other associated symptoms. Patient states he takes eliquis.  He is being followed by Dr. Springer for AAA.    The history is provided by the patient. No  was used.     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     Past Surgical History:   Procedure Laterality Date    ABDOMINAL AORTIC ANEURYSM REPAIR, ENDOVASCULAR Bilateral 1/31/2023    Procedure: REPAIR-ANEURYSM-ILIAC BRANCH ENDOPROSTHESIS-ENDOVASCULAR;  Surgeon: Mario Springer MD;  Location: [facility] OR [address] [place] [address];  Service: Vascular;  Laterality: Bilateral;  mGy: 3707.4  Fluro time: 41.4 miutes  contrast volume: 171mg  Gycm: 363.78      APPENDECTOMY      LEFT HEART CATHETERIZATION Left 7/24/2020    Procedure: Left heart cath;  " Surgeon: Keenan Avila MD;  Location: [facility] CATH LAB;  Service: Cardiology;  Laterality: Left;  RN PRE OP 2020.---COVID NEGATIVE ON-- 2020. [place]    SHOULDER ARTHROSCOPY W/ ROTATOR CUFF REPAIR      Left shoulder     Family History   Problem Relation Name Age of Onset    COPD Mother      Diabetes Mother      Hypertension Mother      COPD Father lungs failed     Heart disease Brother       Social History     Tobacco Use    Smoking status: Former     Current packs/day: 0.00     Types: Cigarettes     Quit date:      Years since quittin.3    Smokeless tobacco: Never    Tobacco comments:     stopped smoking 20 yrs ago.   Substance Use Topics    Alcohol use: Not Currently     Alcohol/week: 5.0 standard drinks of alcohol     Types: 6 Standard drinks or equivalent per week    Drug use: No     Review of Systems   Constitutional:  Negative for chills, diaphoresis, fatigue and fever.   HENT:  Negative for congestion, ear discharge, ear pain, rhinorrhea, sore throat and trouble swallowing.    Eyes:  Negative for photophobia, redness and visual disturbance.   Respiratory:  Negative for cough and shortness of breath.    Cardiovascular:  Negative for chest pain, palpitations and leg swelling.   Gastrointestinal:  Negative for abdominal pain, diarrhea, nausea and vomiting.   Genitourinary:  Positive for difficulty urinating, dysuria and flank pain. Negative for frequency, hematuria, penile discharge, penile pain, penile swelling, scrotal swelling, testicular pain and urgency.   Musculoskeletal:  Negative for arthralgias, back pain, myalgias, neck pain and neck stiffness.   Skin:  Negative for pallor and rash.   Neurological:  Negative for dizziness, weakness, light-headedness, numbness and headaches.   Hematological:  Does not bruise/bleed easily.       Physical Exam     Initial Vitals [24 1241]   BP Pulse Resp Temp SpO2   110/72 62 16 98.2 °F (36.8 °C) 97 %      MAP       --         Physical Exam    Nursing  note and vitals reviewed.  Constitutional: He appears well-developed and well-nourished. He is not diaphoretic.  Non-toxic appearance. No distress.   HENT:   Head: Normocephalic and atraumatic.   Right Ear: Hearing, tympanic membrane, external ear and ear canal normal. Tympanic membrane is not perforated, not erythematous and not bulging.   Left Ear: Hearing, tympanic membrane, external ear and ear canal normal. Tympanic membrane is not perforated, not erythematous and not bulging.   Nose: Nose normal.   Mouth/Throat: Uvula is midline and oropharynx is clear and moist.   Eyes: Conjunctivae and EOM are normal.   Neck: Neck supple.   Normal range of motion.   Full passive range of motion without pain.     Cardiovascular:            Pulses:       Radial pulses are 2+ on the right side and 2+ on the left side.   Pulmonary/Chest: Effort normal and breath sounds normal. No respiratory distress. He has no decreased breath sounds.   Abdominal: Abdomen is soft and flat. There is no abdominal tenderness.   No right CVA tenderness.  No left CVA tenderness. There is no rebound and no guarding.   Musculoskeletal:      Cervical back: Full passive range of motion without pain, normal range of motion and neck supple. No rigidity.      Comments: Full ROM of neck. No neck rigidity. No midline tenderness to cervical, thoracic, or lumbar spine.  No bony step-offs.  Full range of motion of bilateral upper and lower extremities.  Strength and sensation intact bilateral upper and lower extremities.  Patient able to ambulate into the room.  No erythema, edema, bruising, rash, or cellulitis patient's back.      Neurological: He is alert. No cranial nerve deficit.   Neuro intact.  Strength and sensation intact to bilateral upper and lower extremities.   Skin: Skin is warm and dry. No rash noted.         ED Course   Procedures  Labs Reviewed   CBC W/ AUTO DIFFERENTIAL - Abnormal; Notable for the following components:       Result Value    RBC  4.39 (*)     Hemoglobin 13.4 (*)     All other components within normal limits   COMPREHENSIVE METABOLIC PANEL - Abnormal; Notable for the following components:    Chloride 112 (*)     Total Bilirubin 1.2 (*)     Anion Gap 5 (*)     All other components within normal limits   URINALYSIS, REFLEX TO URINE CULTURE - Abnormal; Notable for the following components:    Protein, UA Trace (*)     Leukocytes, UA 2+ (*)     All other components within normal limits    Narrative:     Specimen Source->Urine   URINALYSIS MICROSCOPIC - Abnormal; Notable for the following components:    WBC, UA 14 (*)     All other components within normal limits    Narrative:     Specimen Source->Urine   ISTAT PROCEDURE - Abnormal; Notable for the following components:    POC Anion Gap 19 (*)     All other components within normal limits   CULTURE, URINE   ISTAT CHEM8          Imaging Results              CT Renal Stone Study ABD Pelvis WO (Final result)  Result time 04/17/24 14:16:54      Final result by Joya Myaes MD (04/17/24 14:16:54)                   Impression:      Several small calcific densities at the junction of the bladder and prostate, could represent prostatic calcifications or bladder calculi.    Punctate forming stone at the upper pole of the right kidney.    Bilateral renal simple cysts.    Hyperattenuating subcentimeter lesion left kidney, too small to characterize suggestive of a benign hemorrhagic cyst.    Benign right hepatic cyst.    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      Electronically signed by: Joya Mayes MD  Date:    04/17/2024  Time:    14:16               Narrative:    EXAMINATION:  CT RENAL STONE STUDY ABD PELVIS WO    CLINICAL HISTORY:  Flank pain, kidney stone suspected;    TECHNIQUE:  Low dose axial images, sagittal and coronal reformations were obtained from the lung bases to the pubic symphysis.  Contrast was not administered.    COMPARISON:  03/13/2024 CT  abdomen and pelvis    FINDINGS:  The lung bases are clear.  No pleural effusion.  No pericardial effusion.    The noncontrast appearance of the liver appears normal.  There is a small hypoattenuating lesion at the tip of the right hepatic lobe measuring 1.5 cm suggestive of a benign cyst.  It is unchanged.    The gallbladder is nondistended, no radiopaque stones seen within.    The distal esophagus appears normal.  The stomach is mildly distended with food.    The pancreas, spleen and bilateral adrenal glands appear normal.    Punctate stone forming at the upper pole of the right kidney.  3.3 cm hypoattenuating lesion at the midpole of the right kidney is a benign cyst.  No hydronephrosis, the right ureter appears normal.  Hypoattenuating lesion of the left kidney measuring 3.6 cm, a simple cyst.  Subcentimeter hyperattenuating lesion at the lower pole of the left kidney, too small to characterize, likely a small benign hemorrhagic cyst.  No hydronephrosis.  No forming or obstructive renal stone, the left ureter is normal.  The bladder demonstrate no definite abnormalities.  Small calcific densities at the junction of a bladder and prostate, could represent prostatic calcifications or small bladder stones.  Possible prior TURP.    Mild stool retention, no inflammatory changes of the bowel seen, no bowel dilation.  No intra-abdominal free air or free fluid.  No mesenteric inflammatory change.    The abdominal wall is intact, the inguinal regions appear normal.    Tortuous abdominal aorta.  Right iliac artery aneurysm measuring up to 4.4 cm, a stent is seen within the right common, external and internal iliac artery, not significantly changed from the prior study.    The osseous structures demonstrate no osseous lesions.  Significant degree of canal stenosis at L4-5 due to disc bulge and facet joint osseous hypertrophy.                                       Medications   cefTRIAXone (Rocephin) 1 g in dextrose 5 % in  "water (D5W) 100 mL IVPB (MB+) (1 g Intravenous New Bag 4/17/24 2234)     Medical Decision Making  This is a 79 y.o. male, with a past medical history of hyperlipidemia, Afib, who presents to the ED with dysuria and flank pain. Patient reports pain and burning whenever he urinates. Patient additionally reports that when he urinates it "stops and goes." Patient says when he is urinating sometimes he feels pain in his lower back. On physical exam, patient is well-appearing and in no acute distress.  Nontoxic appearing.  Lungs are clear to auscultation bilaterally.  Abdomen is soft and nontender.  No guarding, rigidity, rebound.  2+ radial pulses bilaterally.  Posterior oropharynx is not erythematous.  No edema or exudate.  Uvula midline.  Bilateral tympanic membrane is normal.  No erythema, bulging, or perforations.  Neuro intact.  Strength and sensation intact bilateral upper and lower extremities. Full ROM of neck. No neck rigidity. No midline tenderness to cervical, thoracic, or lumbar spine.  No bony step-offs.  Full range of motion of bilateral upper and lower extremities.  Strength and sensation intact bilateral upper and lower extremities.  Patient able to ambulate into the room.  No erythema, edema, bruising, rash, or cellulitis patient's back.  No CVA tenderness bilaterally.  CBC without evidence of any leukocytosis.  Red blood cells 4.39, hemoglobin 13.4.  CMP revealed chloride 112, total bili 1.2, anion gap 5.  Otherwise no other electrolyte abnormality.  UA revealed 2+ leukocytes, 14 white blood cells.  Rocephin ordered.  Urine culture pending.  CT revealed:   Several small calcific densities at the junction of the bladder and prostate, could represent prostatic calcifications or bladder calculi.    Punctate forming stone at the upper pole of the right kidney.    Bilateral renal simple cysts.    Hyperattenuating subcentimeter lesion left kidney, too small to characterize suggestive of a benign hemorrhagic " cyst.    Benign right hepatic cyst.    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  Tortuous abdominal aorta.  Right iliac artery aneurysm measuring up to 4.4 cm, a stent is seen within the right common, external and internal iliac artery, not significantly changed from the prior study.  He is currently being evaluated by Dr. Springer with vascular surgery for AAA.   Creatinine clearance 58 mL/min.  Postvoid residual is 155.  Will discharge patient on Bactrim.  Urged prompt follow-up with PCP for further evaluation.    Strict return precautions given. I discussed with the patient/family the diagnosis, treatment plan, indications for return to the emergency department, and for expected follow-up. The patient/family verbalized an understanding. The patient/family is asked if there are any questions or concerns. We discuss the case, until all issues are addressed to the patient/family's satisfaction. Patient/family understands and is agreeable to the plan. Patient is stable and ready for discharge.        Amount and/or Complexity of Data Reviewed  Radiology: ordered.    Risk  Prescription drug management.            Scribe Attestation:   Scribe #1: I performed the above scribed service and the documentation accurately describes the services I performed. I attest to the accuracy of the note.                               Clinical Impression:  Final diagnoses:  [R30.0] Dysuria (Primary)  [N30.90] Cystitis          ED Disposition Condition    Discharge Stable          ED Prescriptions       Medication Sig Dispense Start Date End Date Auth. Provider    sulfamethoxazole-trimethoprim 800-160mg (BACTRIM DS) 800-160 mg Tab Take 1 tablet by mouth 2 (two) times daily. for 7 days 14 tablet 4/17/2024 4/24/2024 Manuel Bhakta PA-C          Follow-up Information       Follow up With Specialties Details Why Contact Info    Pj Gillette MD Family Medicine In 2 days for further evaluation [address]  [place] [address]  [phone]      [facility] Emergency Dept Emergency Medicine In 2 days If symptoms worsen [address] [facility] [place] [address] [phone]        I, Manuel Bhakta, personally performed the services described in this documentation. All medical record entries made by the scribe were at my direction and in my presence. I have reviewed the chart and agree that the record reflects my personal performance and is accurate and complete.       Manuel Bhakta PA-C  04/17/24 155       Manuel Bhakta PA-C  04/17/24 1552

## 2024-04-17 NOTE — DISCHARGE INSTRUCTIONS

## 2024-04-17 NOTE — TELEPHONE ENCOUNTER
Pt was contacted, [facility] for pt to seen 4/26  ----- Message from Inna Jacobo sent at 4/17/2024  9:08 AM CDT -----  Regarding: Pt called needs to sche an appt  Contact: [phone]  Name of Who is Calling:SUDHIR MILLS [[id number]]        What is the request in detail:Pt called to sche an appt for today had procedure in 2017 by Dr. Glover in Urology and is no longer Estab. I didn't sche the appt due to the fact its states Pt needs a referral. Pt states he's having trouble urinating and its painful. Please advise if this Pt can be seen on today. Thanks         Can the clinic reply by [facility]:No        What Number to Call Back if not in [facility]: Telephone Information:  Mobile          [phone]

## 2024-04-18 ENCOUNTER — PATIENT OUTREACH (OUTPATIENT)
Dept: EMERGENCY MEDICINE | Facility: HOSPITAL | Age: 79
End: 2024-04-18
Payer: MEDICARE

## 2024-04-19 LAB — BACTERIA UR CULT: NORMAL

## 2024-04-19 NOTE — PROGRESS NOTES
Patient was seen in the ED on 4/17/24. Phoned patient on 2 separate occasions to assist with Post ED Discharge Navigation. Patient was unavailable. Message left on voice mail. Encounter closed.  Damon Henderson

## 2024-04-22 ENCOUNTER — PATIENT OUTREACH (OUTPATIENT)
Dept: EMERGENCY MEDICINE | Facility: HOSPITAL | Age: 79
End: 2024-04-22
Payer: MEDICARE

## 2024-04-22 ENCOUNTER — TELEPHONE (OUTPATIENT)
Dept: FAMILY MEDICINE | Facility: CLINIC | Age: 79
End: 2024-04-22
Payer: MEDICARE

## 2024-04-22 NOTE — PROGRESS NOTES
Patient was seen in the ED on 4/17/24. Phoned patient to assist with Post ED Discharge Navigation. Patient agreed to assistance scheduling a PCP follow-up. In Basket message sent to PCP staff for scheduling assistance.  Damon Henderson

## 2024-04-22 NOTE — TELEPHONE ENCOUNTER
----- Message from Damon Henderson sent at 4/22/2024  2:40 PM CDT -----  Regarding: ED F/U  Good afternoon,  The above named patient was seen in the ED on 4/17/24. He is a part of the [facility] quality work for patients with 2+ chronic conditions for the system. Please assist with scheduling a PCP follow-up by 4/24/24.  Damon Henderson

## 2024-04-22 NOTE — TELEPHONE ENCOUNTER
Patient contacted, hospital follow up scheduled with Faviola Atwood DNP on 4/23/2024 at 10:00 am.

## 2024-04-23 ENCOUNTER — TELEPHONE (OUTPATIENT)
Dept: FAMILY MEDICINE | Facility: CLINIC | Age: 79
End: 2024-04-23
Payer: MEDICARE

## 2024-04-23 ENCOUNTER — OFFICE VISIT (OUTPATIENT)
Dept: FAMILY MEDICINE | Facility: CLINIC | Age: 79
End: 2024-04-23
Payer: MEDICARE

## 2024-04-23 VITALS
SYSTOLIC BLOOD PRESSURE: 112 MMHG | TEMPERATURE: 98 F | HEART RATE: 57 BPM | DIASTOLIC BLOOD PRESSURE: 60 MMHG | OXYGEN SATURATION: 98 % | RESPIRATION RATE: 16 BRPM | BODY MASS INDEX: 22.08 KG/M2 | WEIGHT: 137.38 LBS | HEIGHT: 66 IN

## 2024-04-23 DIAGNOSIS — Z12.5 ENCOUNTER FOR SCREENING FOR MALIGNANT NEOPLASM OF PROSTATE: ICD-10-CM

## 2024-04-23 DIAGNOSIS — N40.1 BENIGN PROSTATIC HYPERPLASIA WITH URINARY FREQUENCY: Primary | ICD-10-CM

## 2024-04-23 DIAGNOSIS — I72.3 ANEURYSM OF RIGHT COMMON ILIAC ARTERY: ICD-10-CM

## 2024-04-23 DIAGNOSIS — N28.1 RENAL CYST: ICD-10-CM

## 2024-04-23 DIAGNOSIS — R35.0 BENIGN PROSTATIC HYPERPLASIA WITH URINARY FREQUENCY: Primary | ICD-10-CM

## 2024-04-23 DIAGNOSIS — N21.0 CALCIUM STONE OF BLADDER: ICD-10-CM

## 2024-04-23 LAB
BILIRUB UR QL STRIP: NEGATIVE
CLARITY UR: CLEAR
COLOR UR: YELLOW
GLUCOSE UR QL STRIP: NEGATIVE
HGB UR QL STRIP: NEGATIVE
HYALINE CASTS #/AREA URNS LPF: 1 /LPF
KETONES UR QL STRIP: NEGATIVE
LEUKOCYTE ESTERASE UR QL STRIP: ABNORMAL
MICROSCOPIC COMMENT: ABNORMAL
NITRITE UR QL STRIP: NEGATIVE
PH UR STRIP: 6 [PH] (ref 5–8)
PROT UR QL STRIP: NEGATIVE
RBC #/AREA URNS HPF: 2 /HPF (ref 0–4)
SP GR UR STRIP: 1.01 (ref 1–1.03)
SQUAMOUS #/AREA URNS HPF: 0 /HPF
URN SPEC COLLECT METH UR: ABNORMAL
UROBILINOGEN UR STRIP-ACNC: NEGATIVE EU/DL
WBC #/AREA URNS HPF: 25 /HPF (ref 0–5)
WBC CLUMPS URNS QL MICRO: ABNORMAL

## 2024-04-23 PROCEDURE — 1101F PT FALLS ASSESS-DOCD LE1/YR: CPT | Mod: CPTII,S$GLB,, | Performed by: NURSE PRACTITIONER

## 2024-04-23 PROCEDURE — 99213 OFFICE O/P EST LOW 20 MIN: CPT | Mod: S$GLB,,, | Performed by: NURSE PRACTITIONER

## 2024-04-23 PROCEDURE — 1160F RVW MEDS BY RX/DR IN RCRD: CPT | Mod: CPTII,S$GLB,, | Performed by: NURSE PRACTITIONER

## 2024-04-23 PROCEDURE — 87086 URINE CULTURE/COLONY COUNT: CPT | Performed by: NURSE PRACTITIONER

## 2024-04-23 PROCEDURE — 3078F DIAST BP <80 MM HG: CPT | Mod: CPTII,S$GLB,, | Performed by: NURSE PRACTITIONER

## 2024-04-23 PROCEDURE — 3288F FALL RISK ASSESSMENT DOCD: CPT | Mod: CPTII,S$GLB,, | Performed by: NURSE PRACTITIONER

## 2024-04-23 PROCEDURE — 3074F SYST BP LT 130 MM HG: CPT | Mod: CPTII,S$GLB,, | Performed by: NURSE PRACTITIONER

## 2024-04-23 PROCEDURE — 99999 PR PBB SHADOW E&M-EST. PATIENT-LVL IV: CPT | Mod: PBBFAC,,, | Performed by: NURSE PRACTITIONER

## 2024-04-23 PROCEDURE — 1126F AMNT PAIN NOTED NONE PRSNT: CPT | Mod: CPTII,S$GLB,, | Performed by: NURSE PRACTITIONER

## 2024-04-23 PROCEDURE — 1159F MED LIST DOCD IN RCRD: CPT | Mod: CPTII,S$GLB,, | Performed by: NURSE PRACTITIONER

## 2024-04-23 PROCEDURE — 81000 URINALYSIS NONAUTO W/SCOPE: CPT | Performed by: NURSE PRACTITIONER

## 2024-04-23 RX ORDER — TAMSULOSIN HYDROCHLORIDE 0.4 MG/1
0.4 CAPSULE ORAL DAILY
Qty: 7 CAPSULE | Refills: 0 | Status: SHIPPED | OUTPATIENT
Start: 2024-04-23 | End: 2025-04-23

## 2024-04-23 NOTE — PROGRESS NOTES
Health Maintenance Due   Topic     RSV Vaccine (Age 60+ and Pregnant patients) (1 - 1-dose 60+ series) Not offered at this facility.

## 2024-04-23 NOTE — PROGRESS NOTES
Subjective:       Patient ID: Reed Torres is a 79 y.o. male.    Chief Complaint: Follow-up    HPI     Reed Torres is a 79 y.o. male patient that presents to clinic for ER follow up. Past medical and surgical history reviewed as listed. PCP is Pj Gillette MD, he is  new  to me. Recently evaluated in emergency room and diagnosed with dysuria and cystitis. ED visit, imaging, and labs reviewed. Discussed with patient at length and answered questions. Prescribed bactrim, which he is still completing. Reports he continues to have frequency.      Urinary Tract Infection   This is a new problem. The current episode started 1 to 4 weeks ago. The problem occurs every urination. The quality of the pain is described as aching (suprapubic pain). Associated symptoms include frequency and hesitancy. Associated symptoms comments: +incontinence and retention. He has tried antibiotics and increased fluids for the symptoms. The treatment provided no relief.     ROS as listed.   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     Post treatment      Past Surgical History:   Procedure Laterality Date    ABDOMINAL AORTIC ANEURYSM REPAIR, ENDOVASCULAR Bilateral 1/31/2023    Procedure: REPAIR-ANEURYSM-ILIAC BRANCH ENDOPROSTHESIS-ENDOVASCULAR;  Surgeon: Mario Springer MD;  Location: [address];  Service: Vascular;  Laterality: Bilateral;  mGy: 3707.4  Fluro time: 41.4 miutes  contrast volume: 171mg  Gycm: 363.78      APPENDECTOMY      LEFT HEART CATHETERIZATION Left 7/24/2020    Procedure: Left heart cath;  Surgeon: Keenan Avila MD;  Location: [facility] CATH LAB;  Service: Cardiology;  Laterality: Left;  RN PRE OP 7-.---COVID NEGATIVE ON-- 7-. [place]    SHOULDER ARTHROSCOPY W/ ROTATOR CUFF REPAIR      Left shoulder      Family History   Problem  "Relation Name Age of Onset    COPD Mother      Diabetes Mother      Hypertension Mother      COPD Father lungs failed     Heart disease Brother        Review of patient's allergies indicates:  No Known Allergies  Review of Systems   Constitutional:  Negative for fatigue and fever.   Genitourinary:  Positive for frequency and hesitancy.       Objective:      Vitals:    04/23/24 0936   BP: 112/60   BP Location: Right arm   Patient Position: Sitting   BP Method: Medium (Manual)   Pulse: (!) 57   Resp: 16   Temp: 97.6 °F (36.4 °C)   TempSrc: Oral   SpO2: 98%   Weight: 62.3 kg (137 lb 5.6 oz)   Height: 5' 6" (1.676 m)      Physical Exam  Vitals and nursing note reviewed.   Constitutional:       General: He is not in acute distress.     Appearance: Normal appearance.   HENT:      Head: Normocephalic and atraumatic.   Eyes:      Conjunctiva/sclera: Conjunctivae normal.      Pupils: Pupils are equal, round, and reactive to light.   Pulmonary:      Effort: Pulmonary effort is normal. No respiratory distress.   Musculoskeletal:      Cervical back: Normal range of motion.   Skin:     General: Skin is warm and dry.   Neurological:      Mental Status: He is alert and oriented to person, place, and time.   Psychiatric:         Mood and Affect: Mood normal.         Behavior: Behavior normal.         Lab Results   Component Value Date    WBC 4.81 04/17/2024    HGB 13.4 (L) 04/17/2024    HCT 39 04/17/2024     04/17/2024    CHOL 126 05/03/2022    TRIG 90 05/03/2022    HDL 44 05/03/2022    ALT 25 04/17/2024    AST 21 04/17/2024     04/17/2024    K 4.1 04/17/2024     (H) 04/17/2024    CREATININE 0.9 04/17/2024    BUN 18 04/17/2024    CO2 25 04/17/2024    TSH 0.366 (L) 07/02/2022    PSA 1.70 01/15/2013    INR 1.0 07/02/2023    HGBA1C 5.1 03/03/2022      Assessment:       1. Benign prostatic hyperplasia with urinary frequency    2. Renal cyst    3. Calcium stone of bladder    4. Aneurysm of right common iliac artery  "   5. Encounter for screening for malignant neoplasm of prostate        Plan:       Benign prostatic hyperplasia with urinary frequency  -     PSA, Screening; Future; Expected date: 04/23/2024  -     tamsulosin (FLOMAX) 0.4 mg Cap; Take 1 capsule (0.4 mg total) by mouth once daily.  Dispense: 7 capsule; Refill: 0  -     Urinalysis, Reflex to Urine Culture Urine, Clean Catch    Renal cyst    Calcium stone of bladder    Aneurysm of right common iliac artery    Encounter for screening for malignant neoplasm of prostate  -     PSA, Screening; Future; Expected date: 04/23/2024    Other orders  -     Urinalysis Microscopic  -     Urine culture      Medication List with Changes/Refills   New Medications    TAMSULOSIN (FLOMAX) 0.4 MG CAP    Take 1 capsule (0.4 mg total) by mouth once daily.   Current Medications    AMIODARONE (PACERONE) 200 MG TAB    Take 1 tablet (200 mg total) by mouth once daily.    APIXABAN (ELIQUIS) 5 MG TAB    Take 1 tablet (5 mg total) by mouth 2 (two) times daily.    ASPIRIN (ECOTRIN) 81 MG EC TABLET    Take 81 mg by mouth every morning.    ATORVASTATIN (LIPITOR) 40 MG TABLET    Take 1 tablet (40 mg total) by mouth every evening.    CETIRIZINE (ZYRTEC) 10 MG TABLET    Take 1 tablet (10 mg total) by mouth once daily. for 5 days    DOCUSATE SODIUM (COLACE) 100 MG CAPSULE    Take 1 capsule (100 mg total) by mouth 2 (two) times daily.    DORZOLAMIDE-TIMOLOL 2-0.5% (COSOPT) 22.3-6.8 MG/ML OPHTHALMIC SOLUTION    Place 1 drop into both eyes 2 (two) times daily.    FAMOTIDINE (PEPCID) 20 MG TABLET    Take 1 tablet (20 mg total) by mouth 2 (two) times daily. for 7 days    GABAPENTIN (NEURONTIN) 300 MG CAPSULE    TAKE 1-3 CAPSULES BY MOUTH EVERY EVENING    IBUPROFEN (ADVIL,MOTRIN) 600 MG TABLET    Take 1 tablet (600 mg total) by mouth every 6 (six) hours as needed for Pain.    LATANOPROST 0.005 % OPHTHALMIC SOLUTION    Place into both eyes every evening.    LORATADINE (CLARITIN) 10 MG TABLET    Take 1 tablet  (10 mg total) by mouth every morning. for 7 days    OSELTAMIVIR (TAMIFLU) 75 MG CAPSULE    Take 75 mg by mouth 2 (two) times daily.    PANTOPRAZOLE (PROTONIX) 20 MG TABLET    Take 20 mg by mouth.    SULFAMETHOXAZOLE-TRIMETHOPRIM 800-160MG (BACTRIM DS) 800-160 MG TAB    Take 1 tablet by mouth 2 (two) times daily. for 7 days    VIT A/VIT C/VIT E/ZINC/COPPER (OCUVITE PRESERVISION ORAL)    Take by mouth 2 (two) times a day.                Faviola Atwood, DNP, APRN, FNP-C  Family Medicine  [facility]

## 2024-04-23 NOTE — TELEPHONE ENCOUNTER
----- Message from Debbie Heredia sent at 4/23/2024  4:46 PM CDT -----  Regarding: self    Type: Patient Call Back     Who called:self     What is the request in detail:pt is calling stating he got his colonoscopy prep kit today and is unsure when the procedure is to begin the prep. He would like a call from the office     Can the clinic reply by [facility]? No     Would the patient rather a call back or a response via [facility]? Call back     Best call back number: [phone]       Additional Information:     Thank you.

## 2024-04-25 LAB — BACTERIA UR CULT: NO GROWTH

## 2024-04-26 ENCOUNTER — OFFICE VISIT (OUTPATIENT)
Dept: UROLOGY | Facility: CLINIC | Age: 79
End: 2024-04-26
Payer: MEDICARE

## 2024-04-26 VITALS — WEIGHT: 138.25 LBS | BODY MASS INDEX: 22.31 KG/M2

## 2024-04-26 DIAGNOSIS — N21.0 BLADDER STONE: Primary | ICD-10-CM

## 2024-04-26 DIAGNOSIS — N39.8 VOIDING DYSFUNCTION: ICD-10-CM

## 2024-04-26 PROCEDURE — 1159F MED LIST DOCD IN RCRD: CPT | Mod: CPTII,S$GLB,, | Performed by: STUDENT IN AN ORGANIZED HEALTH CARE EDUCATION/TRAINING PROGRAM

## 2024-04-26 PROCEDURE — 99999 PR PBB SHADOW E&M-EST. PATIENT-LVL III: CPT | Mod: PBBFAC,,, | Performed by: STUDENT IN AN ORGANIZED HEALTH CARE EDUCATION/TRAINING PROGRAM

## 2024-04-26 PROCEDURE — 1101F PT FALLS ASSESS-DOCD LE1/YR: CPT | Mod: CPTII,S$GLB,, | Performed by: STUDENT IN AN ORGANIZED HEALTH CARE EDUCATION/TRAINING PROGRAM

## 2024-04-26 PROCEDURE — 1160F RVW MEDS BY RX/DR IN RCRD: CPT | Mod: CPTII,S$GLB,, | Performed by: STUDENT IN AN ORGANIZED HEALTH CARE EDUCATION/TRAINING PROGRAM

## 2024-04-26 PROCEDURE — 3288F FALL RISK ASSESSMENT DOCD: CPT | Mod: CPTII,S$GLB,, | Performed by: STUDENT IN AN ORGANIZED HEALTH CARE EDUCATION/TRAINING PROGRAM

## 2024-04-26 PROCEDURE — 99204 OFFICE O/P NEW MOD 45 MIN: CPT | Mod: S$GLB,,, | Performed by: STUDENT IN AN ORGANIZED HEALTH CARE EDUCATION/TRAINING PROGRAM

## 2024-04-26 PROCEDURE — 1126F AMNT PAIN NOTED NONE PRSNT: CPT | Mod: CPTII,S$GLB,, | Performed by: STUDENT IN AN ORGANIZED HEALTH CARE EDUCATION/TRAINING PROGRAM

## 2024-04-26 NOTE — PROGRESS NOTES
Patient ID: Reed Torres is a 79 y.o. male.    Chief Complaint: Annual Exam      HPI  79 y.o. who presents to the Urology clinic for evaluation of voiding dysfunction. Notes while voiding his stream with intermittently stop. He reported to the ED where imaging was performed. No evidence of infection. He has hx of TURP from Dr. Walters in 2017.   Denies gross hematuria.  Medically Necessary ROS documented in HPI    Past Medical History  Active Ambulatory Problems     Diagnosis Date Noted    Hyperlipidemia     Tuberculosis exposure     BPH (benign prostatic hyperplasia) 11/22/2013    Osteoarthritis of lumbar spine 07/21/2014    Hyperbilirubinemia, long standing, favor GILBERT 02/10/2018    Abnormal stress test 07/24/2020    Anxiety disorder 02/09/2021    SVT (supraventricular tachycardia) 05/12/2021    Pulmonary emphysema 05/12/2021    Coronary artery disease due to lipid rich plaque 05/03/2022    PAF (paroxysmal atrial fibrillation) 05/03/2022    Aortic atherosclerosis 05/03/2022    Aneurysm of right common iliac artery 05/03/2022    Thrombocytopenia, unspecified 06/14/2022    Hearing decreased 01/03/2023    Degenerative disease of nervous system, unspecified 01/03/2023    Gastroesophageal reflux disease without esophagitis 01/03/2023    Primary open angle glaucoma (POAG) of both eyes, indeterminate stage 01/03/2023    Psoriasis vulgaris 01/03/2023    Cervical radiculopathy 08/02/2023    Insomnia 08/02/2023    Cognitive impairment 08/02/2023     Resolved Ambulatory Problems     Diagnosis Date Noted    Body mass index (BMI) of 23.0-23.9 in adult 11/22/2013    Lumbago 08/12/2014    BPH with urinary obstruction 05/19/2017    Acute renal failure 07/04/2017    Body water dehydration 09/18/2019    Pure hypercholesterolemia 05/03/2022     Past Medical History:   Diagnosis Date    AAA (abdominal aortic aneurysm)          Past Surgical History  Past Surgical History:   Procedure Laterality Date    ABDOMINAL AORTIC ANEURYSM  REPAIR, ENDOVASCULAR Bilateral 1/31/2023    Procedure: REPAIR-ANEURYSM-ILIAC BRANCH ENDOPROSTHESIS-ENDOVASCULAR;  Surgeon: Mario Springer MD;  Location: [facility] OR [address] [place] [address];  Service: Vascular;  Laterality: Bilateral;  mGy: 3707.4  Fluro time: 41.4 miutes  contrast volume: 171mg  Gycm: 363.78      APPENDECTOMY      LEFT HEART CATHETERIZATION Left 7/24/2020    Procedure: Left heart cath;  Surgeon: Keenan Avila MD;  Location: [facility] CATH LAB;  Service: Cardiology;  Laterality: Left;  RN PRE OP 7-.---COVID NEGATIVE ON-- 7-. CA    SHOULDER ARTHROSCOPY W/ ROTATOR CUFF REPAIR      Left shoulder       Social History  Social Connections: Socially Integrated (2/2/2024)    Social Connection and Isolation Panel [NHANES]     Frequency of Communication with Friends and Family: More than three times a week     Frequency of Social Gatherings with Friends and Family: More than three times a week     Attends Taoist Services: More than 4 times per year     Active Member of Clubs or Organizations: Yes     Attends Club or Organization Meetings: More than 4 times per year     Marital Status:        Medications    Current Outpatient Medications:     amiodarone (PACERONE) 200 MG Tab, Take 1 tablet (200 mg total) by mouth once daily., Disp: 90 tablet, Rfl: 3    apixaban (ELIQUIS) 5 mg Tab, Take 1 tablet (5 mg total) by mouth 2 (two) times daily., Disp: 60 tablet, Rfl: 11    aspirin (ECOTRIN) 81 MG EC tablet, Take 81 mg by mouth every morning., Disp: , Rfl:     atorvastatin (LIPITOR) 40 MG tablet, Take 1 tablet (40 mg total) by mouth every evening., Disp: 90 tablet, Rfl: 0    dorzolamide-timolol 2-0.5% (COSOPT) 22.3-6.8 mg/mL ophthalmic solution, Place 1 drop into both eyes 2 (two) times daily., Disp: , Rfl:     gabapentin (NEURONTIN) 300 MG capsule, TAKE 1-3 CAPSULES BY MOUTH EVERY EVENING, Disp: 90 capsule, Rfl: 11    latanoprost 0.005 % ophthalmic solution, Place into both eyes every evening., Disp: , Rfl:      pantoprazole (PROTONIX) 20 MG tablet, Take 20 mg by mouth., Disp: , Rfl:     tamsulosin (FLOMAX) 0.4 mg Cap, Take 1 capsule (0.4 mg total) by mouth once daily., Disp: 7 capsule, Rfl: 0    vit A/vit C/vit E/zinc/copper (OCUVITE PRESERVISION ORAL), Take by mouth 2 (two) times a day., Disp: , Rfl:     cetirizine (ZYRTEC) 10 MG tablet, Take 1 tablet (10 mg total) by mouth once daily. for 5 days (Patient not taking: Reported on 4/23/2024), Disp: 5 tablet, Rfl: 0    docusate sodium (COLACE) 100 MG capsule, Take 1 capsule (100 mg total) by mouth 2 (two) times daily. (Patient not taking: Reported on 4/23/2024), Disp: 60 capsule, Rfl: 0    famotidine (PEPCID) 20 MG tablet, Take 1 tablet (20 mg total) by mouth 2 (two) times daily. for 7 days (Patient not taking: Reported on 4/23/2024), Disp: 14 tablet, Rfl: 0    Allergies  Review of patient's allergies indicates:  No Known Allergies    Patient's PMH, FH, Social hx, Medications, allergies reviewed and updated as pertinent to today's visit    Objective:      Physical Exam  Constitutional:       General: He is not in acute distress.     Appearance: He is well-developed. He is not ill-appearing, toxic-appearing or diaphoretic.   HENT:      Head: Normocephalic and atraumatic.      Mouth/Throat:      Mouth: Mucous membranes are moist.   Eyes:      Conjunctiva/sclera: Conjunctivae normal.   Pulmonary:      Effort: Pulmonary effort is normal. No respiratory distress.   Abdominal:      General: Abdomen is flat.   Musculoskeletal:         General: No swelling or deformity.      Cervical back: Neck supple.   Skin:     Findings: No rash.   Neurological:      Mental Status: He is alert and oriented to person, place, and time.      Gait: Gait normal.   Psychiatric:         Mood and Affect: Mood normal.         Thought Content: Thought content normal.         Judgment: Judgment normal.           Imaging results: personally reviewed imaging with the following findings  Bladder stone at  junction of bladder neck and prostatic urethra    Assessment:       1. Bladder stone    2. Voiding dysfunction        Plan:       Voiding dysfunction  Continue flomax  Ddx includes obstructing bladder stone, bladder neck contracture, BPH, urethral stricture  Prior TURP in 2017   cc in ED recently  Urine culture negative from 4/23/24  Recommend cystoscpy to evaluate abnormal findings on CT from 4/17  Raises concern for possible bladder neck contracture vs obstructing bladder stone which would be unusual in a post TURP patient.   If stone easily retrievable during cysto will plan for bladder stone removal during awak cysto, if anatomical challenges noted will plan for procedure under anesthesia after medical clearance acheived  Discussed with patient, all questions answered

## 2024-04-29 ENCOUNTER — TELEPHONE (OUTPATIENT)
Dept: CARDIOLOGY | Facility: CLINIC | Age: 79
End: 2024-04-29
Payer: MEDICARE

## 2024-04-29 NOTE — TELEPHONE ENCOUNTER
----- Message from Isaac Madrigal sent at 4/29/2024  4:42 PM CDT -----  Regarding: self  Type: Patient Call Back    Who called:self    What is the request in detail:pt is calling back from a missed call from this office     Can the clinic reply by [facility]?no    Would the patient rather a call back or a response via [facility]? callback    Best call back number:[phone]    Additional Information:

## 2024-04-29 NOTE — TELEPHONE ENCOUNTER
Called patient, being sent straight to voicemail. Left a voicemail letting him know that he has pending tests as per Dr. Rojas's last note and to give us call back to help him schedule them and reschedule his upcoming OV.

## 2024-05-17 ENCOUNTER — HOSPITAL ENCOUNTER (OUTPATIENT)
Dept: CARDIOLOGY | Facility: HOSPITAL | Age: 79
Discharge: HOME | End: 2024-05-17
Attending: INTERNAL MEDICINE
Payer: MEDICARE

## 2024-05-17 DIAGNOSIS — I70.0 AORTIC ATHEROSCLEROSIS: ICD-10-CM

## 2024-05-17 DIAGNOSIS — I25.83 CORONARY ARTERY DISEASE DUE TO LIPID RICH PLAQUE: ICD-10-CM

## 2024-05-17 DIAGNOSIS — I72.3 ANEURYSM OF RIGHT COMMON ILIAC ARTERY: ICD-10-CM

## 2024-05-17 DIAGNOSIS — I47.10 SVT (SUPRAVENTRICULAR TACHYCARDIA): ICD-10-CM

## 2024-05-17 DIAGNOSIS — I48.0 PAF (PAROXYSMAL ATRIAL FIBRILLATION): ICD-10-CM

## 2024-05-17 DIAGNOSIS — E78.00 PURE HYPERCHOLESTEROLEMIA: Chronic | ICD-10-CM

## 2024-05-17 DIAGNOSIS — I25.10 CORONARY ARTERY DISEASE DUE TO LIPID RICH PLAQUE: ICD-10-CM

## 2024-05-17 LAB
ASCENDING AORTA: 3.42 CM
AV INDEX (PROSTH): 0.85
AV MEAN GRADIENT: 3 MMHG
AV PEAK GRADIENT: 6 MMHG
AV VALVE AREA BY VELOCITY RATIO: 2.79 CM²
AV VALVE AREA: 3.07 CM²
AV VELOCITY RATIO: 0.77
CV ECHO LV RWT: 0.41 CM
DOP CALC AO PEAK VEL: 1.26 M/S
DOP CALC AO VTI: 27.4 CM
DOP CALC LVOT AREA: 3.6 CM2
DOP CALC LVOT DIAMETER: 2.15 CM
DOP CALC LVOT PEAK VEL: 0.97 M/S
DOP CALC LVOT STROKE VOLUME: 84.18 CM3
DOP CALC MV VTI: 24.7 CM
DOP CALCLVOT PEAK VEL VTI: 23.2 CM
E WAVE DECELERATION TIME: 200.78 MSEC
E/A RATIO: 0.95
E/E' RATIO: 7.29 M/S
ECHO LV POSTERIOR WALL: 1.06 CM (ref 0.6–1.1)
FRACTIONAL SHORTENING: 38 % (ref 28–44)
INTERVENTRICULAR SEPTUM: 1.11 CM (ref 0.6–1.1)
IVC DIAMETER: 1.86 CM
LA MAJOR: 4.62 CM
LA MINOR: 5.01 CM
LA WIDTH: 3.6 CM
LEFT ATRIUM SIZE: 3.96 CM
LEFT ATRIUM VOLUME: 58.25 CM3
LEFT INTERNAL DIMENSION IN SYSTOLE: 3.25 CM (ref 2.1–4)
LEFT VENTRICLE DIASTOLIC VOLUME: 131.12 ML
LEFT VENTRICLE SYSTOLIC VOLUME: 42.59 ML
LEFT VENTRICULAR INTERNAL DIMENSION IN DIASTOLE: 5.23 CM (ref 3.5–6)
LEFT VENTRICULAR MASS: 218.72 G
LV LATERAL E/E' RATIO: 6.2 M/S
LV SEPTAL E/E' RATIO: 8.86 M/S
LVOT MG: 1.99 MMHG
LVOT MV: 0.67 CM/S
MV MEAN GRADIENT: 1 MMHG
MV PEAK A VEL: 0.65 M/S
MV PEAK E VEL: 0.62 M/S
MV PEAK GRADIENT: 2 MMHG
MV STENOSIS PRESSURE HALF TIME: 58.23 MS
MV VALVE AREA BY CONTINUITY EQUATION: 3.41 CM2
MV VALVE AREA P 1/2 METHOD: 3.78 CM2
OHS CV RV/LV RATIO: 0.6 CM
PISA TR MAX VEL: 2.33 M/S
PULM VEIN S/D RATIO: 1.22
PV PEAK D VEL: 0.45 M/S
PV PEAK GRADIENT: 3 MMHG
PV PEAK S VEL: 0.55 M/S
PV PEAK VELOCITY: 0.85 M/S
RA MAJOR: 4.56 CM
RA PRESSURE ESTIMATED: 8 MMHG
RA WIDTH: 3.6 CM
RIGHT VENTRICULAR END-DIASTOLIC DIMENSION: 3.16 CM
RV TB RVSP: 10 MMHG
RV TISSUE DOPPLER FREE WALL SYSTOLIC VELOCITY 1 (APICAL 4 CHAMBER VIEW): 10.65 CM/S
SINUS: 3.82 CM
STJ: 3.08 CM
TDI LATERAL: 0.1 M/S
TDI SEPTAL: 0.07 M/S
TDI: 0.09 M/S
TR MAX PG: 22 MMHG
TRICUSPID ANNULAR PLANE SYSTOLIC EXCURSION: 1.84 CM
TV REST PULMONARY ARTERY PRESSURE: 30 MMHG

## 2024-05-17 PROCEDURE — 93306 TTE W/DOPPLER COMPLETE: CPT

## 2024-05-17 PROCEDURE — 93306 TTE W/DOPPLER COMPLETE: CPT | Mod: 26,,, | Performed by: INTERNAL MEDICINE

## 2024-05-20 ENCOUNTER — PATIENT OUTREACH (OUTPATIENT)
Dept: ADMINISTRATIVE | Facility: HOSPITAL | Age: 79
End: 2024-05-20
Payer: MEDICARE

## 2024-05-20 ENCOUNTER — OFFICE VISIT (OUTPATIENT)
Dept: CARDIOLOGY | Facility: CLINIC | Age: 79
End: 2024-05-20
Payer: MEDICARE

## 2024-05-20 VITALS
HEIGHT: 65 IN | SYSTOLIC BLOOD PRESSURE: 110 MMHG | DIASTOLIC BLOOD PRESSURE: 70 MMHG | WEIGHT: 139.88 LBS | BODY MASS INDEX: 23.31 KG/M2 | HEART RATE: 54 BPM | OXYGEN SATURATION: 99 %

## 2024-05-20 DIAGNOSIS — I47.10 SVT (SUPRAVENTRICULAR TACHYCARDIA): ICD-10-CM

## 2024-05-20 DIAGNOSIS — E78.00 PURE HYPERCHOLESTEROLEMIA: Chronic | ICD-10-CM

## 2024-05-20 DIAGNOSIS — I25.10 CORONARY ARTERY DISEASE DUE TO LIPID RICH PLAQUE: ICD-10-CM

## 2024-05-20 DIAGNOSIS — I72.3 ANEURYSM OF RIGHT COMMON ILIAC ARTERY: ICD-10-CM

## 2024-05-20 DIAGNOSIS — I25.10 ATHEROSCLEROSIS OF NATIVE CORONARY ARTERY OF NATIVE HEART WITHOUT ANGINA PECTORIS: Primary | ICD-10-CM

## 2024-05-20 DIAGNOSIS — I48.0 PAF (PAROXYSMAL ATRIAL FIBRILLATION): ICD-10-CM

## 2024-05-20 DIAGNOSIS — I25.83 CORONARY ARTERY DISEASE DUE TO LIPID RICH PLAQUE: ICD-10-CM

## 2024-05-20 DIAGNOSIS — I70.0 AORTIC ATHEROSCLEROSIS: ICD-10-CM

## 2024-05-20 PROCEDURE — 3288F FALL RISK ASSESSMENT DOCD: CPT | Mod: CPTII,S$GLB,, | Performed by: INTERNAL MEDICINE

## 2024-05-20 PROCEDURE — 99214 OFFICE O/P EST MOD 30 MIN: CPT | Mod: S$GLB,,, | Performed by: INTERNAL MEDICINE

## 2024-05-20 PROCEDURE — 3074F SYST BP LT 130 MM HG: CPT | Mod: CPTII,S$GLB,, | Performed by: INTERNAL MEDICINE

## 2024-05-20 PROCEDURE — 3078F DIAST BP <80 MM HG: CPT | Mod: CPTII,S$GLB,, | Performed by: INTERNAL MEDICINE

## 2024-05-20 PROCEDURE — 1126F AMNT PAIN NOTED NONE PRSNT: CPT | Mod: CPTII,S$GLB,, | Performed by: INTERNAL MEDICINE

## 2024-05-20 PROCEDURE — 99999 PR PBB SHADOW E&M-EST. PATIENT-LVL IV: CPT | Mod: PBBFAC,,, | Performed by: INTERNAL MEDICINE

## 2024-05-20 PROCEDURE — 93000 ELECTROCARDIOGRAM COMPLETE: CPT | Mod: S$GLB,,, | Performed by: INTERNAL MEDICINE

## 2024-05-20 PROCEDURE — 1124F ACP DISCUSS-NO DSCNMKR DOCD: CPT | Mod: CPTII,S$GLB,, | Performed by: INTERNAL MEDICINE

## 2024-05-20 PROCEDURE — 1101F PT FALLS ASSESS-DOCD LE1/YR: CPT | Mod: CPTII,S$GLB,, | Performed by: INTERNAL MEDICINE

## 2024-05-20 PROCEDURE — 1159F MED LIST DOCD IN RCRD: CPT | Mod: CPTII,S$GLB,, | Performed by: INTERNAL MEDICINE

## 2024-05-20 NOTE — PROGRESS NOTES
Health Maintenance Due   Topic Date Due    RSV Vaccine (Age 60+ and Pregnant patients) (1 - 1-dose 60+ series) Never done   Health Maintenance reviewed, updated and links triggered. HM'S up to date. ([place]) 5/20/24 Eye results uploaded

## 2024-05-21 LAB
OHS QRS DURATION: 96 MS
OHS QTC CALCULATION: 419 MS

## 2024-05-28 ENCOUNTER — TELEPHONE (OUTPATIENT)
Dept: UROLOGY | Facility: CLINIC | Age: 79
End: 2024-05-28
Payer: MEDICARE

## 2024-05-28 NOTE — TELEPHONE ENCOUNTER
Pt was contacted. Pt informed he does not need to fast for his appt tomorrow.  ----- Message from Yesica Perrin sent at 5/28/2024  1:11 PM CDT -----  Regarding: self [phone]  Type: Patient Call Back    Who called: self     What is the request in detail: pt wanted to know if he has to fast for his procedure.     Can the clinic reply by [facility] no    Would the patient rather a call back or a response via [facility]?  Call back     Best call back number: [phone]

## 2024-05-29 ENCOUNTER — PROCEDURE VISIT (OUTPATIENT)
Dept: UROLOGY | Facility: CLINIC | Age: 79
End: 2024-05-29
Payer: MEDICARE

## 2024-05-29 VITALS — WEIGHT: 137.38 LBS | BODY MASS INDEX: 22.86 KG/M2

## 2024-05-29 DIAGNOSIS — N21.0 BLADDER STONE: ICD-10-CM

## 2024-05-29 DIAGNOSIS — N39.8 VOIDING DYSFUNCTION: ICD-10-CM

## 2024-05-29 PROCEDURE — 52000 CYSTOURETHROSCOPY: CPT | Mod: S$GLB,,, | Performed by: STUDENT IN AN ORGANIZED HEALTH CARE EDUCATION/TRAINING PROGRAM

## 2024-05-29 NOTE — PROCEDURES
Cystoscopy    Date/Time: 5/29/2024 11:30 AM    Performed by: Izzy Alexis MD  Authorized by: Izzy Alexis MD    Consent Done?:  Yes (Written)  Timeout: prior to procedure the correct patient, procedure, and site was verified    Prep: patient was prepped and draped in usual sterile fashion    Local anesthesia used?: Yes    Anesthesia:  Intraurethral instillation  Local anesthetic:  Topical anesthetic  Indications: BPH    Position:  Supine  Anesthesia:  Intraurethral instillation  Preparation: Patient was prepped and draped in usual sterile fashion    Scope type:  Flexible cystoscope  Urethra normal: narrow caliber urethra, able to traverse scope.    Prostate normal: TURP defect, apical prostate regrowth noted.    Bladder neck normal: Yes    Bladder normal: Yes (cellule noted in posterior bladder wall)     patient tolerated the procedure well with no immediate complications  Comments:      No erythematous patches, papillary growths, hematuria noted.   No stone in urinary tract  Calcification appears to be submucosal    If patient desiring procedure to improve flow, currently doing fine at the moment compared to last appt, will need to perform video UDS w/ fluoroscopy to evaluate bladder function assess degree of obstruction from apical prostatic regrowth

## 2024-06-01 ENCOUNTER — HOSPITAL ENCOUNTER (EMERGENCY)
Facility: HOSPITAL | Age: 79
Discharge: HOME | End: 2024-06-01
Attending: EMERGENCY MEDICINE
Payer: MEDICARE

## 2024-06-01 VITALS
SYSTOLIC BLOOD PRESSURE: 125 MMHG | BODY MASS INDEX: 23.3 KG/M2 | OXYGEN SATURATION: 98 % | RESPIRATION RATE: 18 BRPM | DIASTOLIC BLOOD PRESSURE: 76 MMHG | WEIGHT: 140 LBS | TEMPERATURE: 98 F | HEART RATE: 53 BPM

## 2024-06-01 DIAGNOSIS — S80.02XA TRAUMATIC HEMATOMA OF KNEE, LEFT, INITIAL ENCOUNTER: Primary | ICD-10-CM

## 2024-06-01 PROCEDURE — 99281 EMR DPT VST MAYX REQ PHY/QHP: CPT

## 2024-06-01 NOTE — ED TRIAGE NOTES
Patient reports trip and fall after cutting grass, has hematoma to left lateral knee, is on blood thinners, denies hitting head. States was able to stand and ambulate after fall.

## 2024-06-01 NOTE — ED PROVIDER NOTES
Encounter Date: 6/1/2024    SCRIBE #1 NOTE: IJaki, am scribing for, and in the presence of,  Abner Owen MD.       History     Chief Complaint   Patient presents with    Fall     Pt to ED reporting fall x 1 hour ago. Pt has bruise to left knee with swelling. Pt denies hitting head. Pt reporting he is on Eliquis.      78 yo M w/ PMHx of CAD, HLD, Afib on eliquis presenting to the ED for L knee pain s/p a fall occurring PTA today. Patient reports he was cutting his neighbor's grass, when he tripped on uneven stepping stones and fell onto the ground against his L side. He reports L knee pain, swelling, and bruising, L shoulder and elbow pain since the event. No head trauma or LOC. No other exacerbating or alleviating factors. Denies headache, neck pain, rib pain, abdominal pain, nausea, vomiting, ambulatory problem, or other associated symptoms. Tdap UTD.     The history is provided by the patient.     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     Past Surgical History:   Procedure Laterality Date    ABDOMINAL AORTIC ANEURYSM REPAIR, ENDOVASCULAR Bilateral 1/31/2023    Procedure: REPAIR-ANEURYSM-ILIAC BRANCH ENDOPROSTHESIS-ENDOVASCULAR;  Surgeon: Mario Springer MD;  Location: [facility] OR [address] [place] [address];  Service: Vascular;  Laterality: Bilateral;  mGy: 3707.4  Fluro time: 41.4 miutes  contrast volume: 171mg  Gycm: 363.78      APPENDECTOMY      LEFT HEART CATHETERIZATION Left 7/24/2020    Procedure: Left heart cath;  Surgeon: Keenan Avila MD;  Location: [facility] CATH LAB;  Service: Cardiology;  Laterality: Left;  RN PRE OP 7-.---COVID NEGATIVE ON-- 7-. CA    SHOULDER ARTHROSCOPY W/ ROTATOR CUFF REPAIR      Left shoulder     Family History    Problem Relation Name Age of Onset    COPD Mother      Diabetes Mother      Hypertension Mother      COPD Father lungs failed     Heart disease Brother       Social History     Tobacco Use    Smoking status: Former     Current packs/day: 0.00     Types: Cigarettes     Quit date:      Years since quittin.4    Smokeless tobacco: Never    Tobacco comments:     stopped smoking 20 yrs ago.   Substance Use Topics    Alcohol use: Not Currently     Alcohol/week: 5.0 standard drinks of alcohol     Types: 6 Standard drinks or equivalent per week    Drug use: No     Review of Systems   Constitutional:  Negative for chills and fever.   HENT:  Negative for congestion, rhinorrhea and sore throat.    Eyes:  Negative for visual disturbance.   Respiratory:  Negative for cough and shortness of breath.    Cardiovascular:  Negative for chest pain.   Gastrointestinal:  Negative for abdominal pain, diarrhea, nausea and vomiting.   Genitourinary:  Negative for dysuria, frequency and hematuria.   Musculoskeletal:  Positive for joint swelling. Negative for back pain and gait problem.        +L knee pain, L shoulder and elbow pain.    Skin:  Negative for rash.   Neurological:  Negative for dizziness, syncope, weakness and headaches.       Physical Exam     Initial Vitals [24 1316]   BP Pulse Resp Temp SpO2   119/76 72 18 97.8 °F (36.6 °C) 98 %      MAP       --         Physical Exam    Nursing note and vitals reviewed.  Constitutional: He appears well-developed and well-nourished.   HENT:   Head: Normocephalic and atraumatic.   Eyes: EOM are normal. Pupils are equal, round, and reactive to light.   Neck: Neck supple. No thyromegaly present. No JVD present.   Normal range of motion.  Cardiovascular:  Normal rate and regular rhythm.     Exam reveals no gallop and no friction rub.       No murmur heard.  Pulmonary/Chest: Breath sounds normal. No respiratory distress.   Abdominal: Abdomen is soft. Bowel sounds are normal. There  is no abdominal tenderness.   Musculoskeletal:         General: No tenderness or edema. Normal range of motion.      Cervical back: Normal range of motion and neck supple.      Comments: L knee hematoma, FROM, no instability. Contusion to the L lateral posterior shoulder, FROM, no bony tenderness. Abrasion to R leg.      Neurological: He is alert and oriented to person, place, and time. He has normal strength. GCS score is 15. GCS eye subscore is 4. GCS verbal subscore is 5. GCS motor subscore is 6.   Skin: Skin is warm. Capillary refill takes less than 2 seconds.   Psychiatric: He has a normal mood and affect. His behavior is normal. Thought content normal.         ED Course   Procedures  Labs Reviewed - No data to display       Imaging Results    None          Medications - No data to display  Medical Decision Making  Emergent evaluation of 78 yo M w/ PMHx of CAD, HLD, Afib on eliquis presenting to the ED for L knee pain s/p a fall occurring PTA today. Pt able to ambulate.   VSS, nursing notes reviewed. Exam as above.   Differential diagnosis include but are not limited to: strain, sprain, contusion, dislocation, fracture, hematoma.     No apparent internal derangement of the knee.  No knee effusion.  No knee instability.  No clinical evidence of DVT.  There is a hematoma to the left medial knee.        Scribe Attestation:   Scribe #1: I performed the above scribed service and the documentation accurately describes the services I performed. I attest to the accuracy of the note.                           I, angelica brown, personally performed the services described in this documentation. All medical record entries made by the scribe were at my direction and in my presence. I have reviewed the chart and agree that the record reflects my personal performance and is accurate and complete.      Clinical Impression:  Final diagnoses:  [S80.02XA] Traumatic hematoma of knee, left, initial encounter (Primary)           ED Disposition Condition    Discharge Stable          ED Prescriptions    None       Follow-up Information       Follow up With Specialties Details Why Contact Info    Pj Gillette MD Family Medicine Schedule an appointment as soon as possible for a visit   [address]  [place] [address]  [phone]      [facility] [place] [place] Emergency Dept Emergency Medicine  As needed, If symptoms worsen or new symptoms develop [address] [place] [address] [facility] [place] [address] [phone]             Abner Owen MD  06/19/24 1922

## 2024-06-04 ENCOUNTER — OFFICE VISIT (OUTPATIENT)
Dept: FAMILY MEDICINE | Facility: CLINIC | Age: 79
End: 2024-06-04
Payer: MEDICARE

## 2024-06-04 VITALS
HEART RATE: 60 BPM | DIASTOLIC BLOOD PRESSURE: 62 MMHG | OXYGEN SATURATION: 98 % | TEMPERATURE: 98 F | WEIGHT: 139.13 LBS | HEIGHT: 65 IN | SYSTOLIC BLOOD PRESSURE: 112 MMHG | BODY MASS INDEX: 23.18 KG/M2 | RESPIRATION RATE: 16 BRPM
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
## 2024-06-04 DIAGNOSIS — S80.02XD TRAUMATIC HEMATOMA OF LEFT KNEE, SUBSEQUENT ENCOUNTER: Primary | ICD-10-CM

## 2024-06-04 DIAGNOSIS — R22.42 LOCALIZED SWELLING OF LEFT LOWER LEG: ICD-10-CM

## 2024-06-04 DIAGNOSIS — Z79.01 CURRENT USE OF LONG TERM ANTICOAGULATION: ICD-10-CM

## 2024-06-04 PROCEDURE — 1160F RVW MEDS BY RX/DR IN RCRD: CPT | Mod: CPTII,S$GLB,, | Performed by: NURSE PRACTITIONER

## 2024-06-04 PROCEDURE — 99213 OFFICE O/P EST LOW 20 MIN: CPT | Mod: S$GLB,,, | Performed by: NURSE PRACTITIONER

## 2024-06-04 PROCEDURE — 3288F FALL RISK ASSESSMENT DOCD: CPT | Mod: CPTII,S$GLB,, | Performed by: NURSE PRACTITIONER

## 2024-06-04 PROCEDURE — 1159F MED LIST DOCD IN RCRD: CPT | Mod: CPTII,S$GLB,, | Performed by: NURSE PRACTITIONER

## 2024-06-04 PROCEDURE — 99999 PR PBB SHADOW E&M-EST. PATIENT-LVL III: CPT | Mod: PBBFAC,,, | Performed by: NURSE PRACTITIONER

## 2024-06-04 PROCEDURE — 3074F SYST BP LT 130 MM HG: CPT | Mod: CPTII,S$GLB,, | Performed by: NURSE PRACTITIONER

## 2024-06-04 PROCEDURE — 1101F PT FALLS ASSESS-DOCD LE1/YR: CPT | Mod: CPTII,S$GLB,, | Performed by: NURSE PRACTITIONER

## 2024-06-04 PROCEDURE — 3078F DIAST BP <80 MM HG: CPT | Mod: CPTII,S$GLB,, | Performed by: NURSE PRACTITIONER

## 2024-06-04 NOTE — PROGRESS NOTES
"Subjective:       Patient ID: Reed Torres is a 79 y.o. male.    Chief Complaint: Fall    HPI     Reed Torres is a 79 y.o. male patient that presents to clinic for ED follow up after fall. Past medical and surgical history reviewed as listed. PCP is Pj Gillette MD , he is  known  to me. Chart review shows patient went to ED on 6/1/2024 after a fall.  Adherent with Eliquis. ED visit reviewed and the following listed from note:  "Patient reports he was cutting his neighbor's grass, when he tripped on uneven stepping stones and fell onto the ground against his L side. He reports L knee pain, swelling, and bruising, L shoulder and elbow pain since the event. No head trauma or LOC."    Fall  The accident occurred 2 days ago. The fall occurred while walking. He landed on Dirt. There was no blood loss. The point of impact was the left knee. The pain is present in the left knee. The pain is moderate. He has tried ice and rest for the symptoms. The treatment provided mild relief.     Stopped flomax. Followed by Urology. Denies any complications with urination.     ROS as listed.   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     Post treatment      Past Surgical History:   Procedure Laterality Date    ABDOMINAL AORTIC ANEURYSM REPAIR, ENDOVASCULAR Bilateral 1/31/2023    Procedure: REPAIR-ANEURYSM-ILIAC BRANCH ENDOPROSTHESIS-ENDOVASCULAR;  Surgeon: Mario Springer MD;  Location: [address];  Service: Vascular;  Laterality: Bilateral;  mGy: 3707.4  Fluro time: 41.4 miutes  contrast volume: 171mg  Gycm: 363.78      APPENDECTOMY      LEFT HEART CATHETERIZATION Left 7/24/2020    Procedure: Left heart cath;  Surgeon: Keenan Avila MD;  Location: [facility] CATH LAB;  Service: Cardiology;  Laterality: Left;  RN PRE OP 7-.---COVID NEGATIVE ON-- " "7-. CA    SHOULDER ARTHROSCOPY W/ ROTATOR CUFF REPAIR      Left shoulder      Family History   Problem Relation Name Age of Onset    COPD Mother      Diabetes Mother      Hypertension Mother      COPD Father lungs failed     Heart disease Brother        Review of patient's allergies indicates:  No Known Allergies  Review of Systems   Hematological:  Bruises/bleeds easily.       Objective:      Vitals:    06/04/24 0859   BP: 112/62   BP Location: Right arm   Patient Position: Sitting   BP Method: Large (Manual)   Pulse: 60   Resp: 16   Temp: 97.8 °F (36.6 °C)   TempSrc: Oral   SpO2: 98%   Weight: 63.1 kg (139 lb 1.8 oz)   Height: 5' 5" (1.651 m)      Physical Exam  Vitals and nursing note reviewed.   Constitutional:       General: He is not in acute distress.     Appearance: Normal appearance.   HENT:      Head: Normocephalic and atraumatic.   Eyes:      Conjunctiva/sclera: Conjunctivae normal.      Pupils: Pupils are equal, round, and reactive to light.   Pulmonary:      Effort: Pulmonary effort is normal. No respiratory distress.   Musculoskeletal:      Cervical back: Normal range of motion.      Left knee: Swelling, erythema and ecchymosis present. Normal range of motion. Tenderness present over the medial joint line.   Skin:     General: Skin is warm and dry.   Neurological:      Mental Status: He is alert and oriented to person, place, and time.   Psychiatric:         Mood and Affect: Mood normal.         Behavior: Behavior normal.         Lab Results   Component Value Date    WBC 4.81 04/17/2024    HGB 13.4 (L) 04/17/2024    HCT 39 04/17/2024     04/17/2024    CHOL 126 05/03/2022    TRIG 90 05/03/2022    HDL 44 05/03/2022    ALT 25 04/17/2024    AST 21 04/17/2024     04/17/2024    K 4.1 04/17/2024     (H) 04/17/2024    CREATININE 0.9 04/17/2024    BUN 18 04/17/2024    CO2 25 04/17/2024    TSH 0.616 05/17/2024    PSA 1.0 04/23/2024    INR 1.0 07/02/2023    HGBA1C 5.1 03/03/2022    "   Assessment:       1. Traumatic hematoma of left knee, subsequent encounter    2. Fall, subsequent encounter    3. Localized swelling of left lower leg    4. Current use of long term anticoagulation        Plan:       Traumatic hematoma of left knee, subsequent encounter  -     X-Ray Foot Complete 3 view Left; Future; Expected date: 06/04/2024    Fall, subsequent encounter    Localized swelling of left lower leg  -     US Lower Extremity Veins Left; Future; Expected date: 06/04/2024    Current use of long term anticoagulation      Medication List with Changes/Refills   Current Medications    AMIODARONE (PACERONE) 200 MG TAB    Take 1 tablet (200 mg total) by mouth once daily.    APIXABAN (ELIQUIS) 5 MG TAB    Take 1 tablet (5 mg total) by mouth 2 (two) times daily.    ASPIRIN (ECOTRIN) 81 MG EC TABLET    Take 81 mg by mouth every morning.    ATORVASTATIN (LIPITOR) 40 MG TABLET    Take 1 tablet (40 mg total) by mouth every evening.    DOCUSATE SODIUM (COLACE) 100 MG CAPSULE    Take 1 capsule (100 mg total) by mouth 2 (two) times daily.    DORZOLAMIDE-TIMOLOL 2-0.5% (COSOPT) 22.3-6.8 MG/ML OPHTHALMIC SOLUTION    Place 1 drop into both eyes 2 (two) times daily.    GABAPENTIN (NEURONTIN) 300 MG CAPSULE    TAKE 1-3 CAPSULES BY MOUTH EVERY EVENING    LATANOPROST 0.005 % OPHTHALMIC SOLUTION    Place into both eyes every evening.    PANTOPRAZOLE (PROTONIX) 20 MG TABLET    Take 20 mg by mouth.    TAMSULOSIN (FLOMAX) 0.4 MG CAP    Take 1 capsule (0.4 mg total) by mouth once daily.    VIT A/VIT C/VIT E/ZINC/COPPER (OCUVITE PRESERVISION ORAL)    Take by mouth 2 (two) times a day.                Faviola Atwood, DNP, APRN, FNP-C  Family Medicine  [facility]

## 2024-06-04 NOTE — PROGRESS NOTES
Health Maintenance Due   Topic     RSV Vaccine (Age 60+ and Pregnant patients) (1 - 1-dose 60+ series) PATIENT DECLINE

## 2024-06-05 ENCOUNTER — TELEPHONE (OUTPATIENT)
Dept: FAMILY MEDICINE | Facility: CLINIC | Age: 79
End: 2024-06-05
Payer: MEDICARE

## 2024-06-05 ENCOUNTER — HOSPITAL ENCOUNTER (OUTPATIENT)
Dept: RADIOLOGY | Facility: HOSPITAL | Age: 79
Discharge: HOME | End: 2024-06-05
Attending: NURSE PRACTITIONER
Payer: MEDICARE

## 2024-06-05 DIAGNOSIS — S80.02XD TRAUMATIC HEMATOMA OF LEFT KNEE, SUBSEQUENT ENCOUNTER: ICD-10-CM

## 2024-06-05 DIAGNOSIS — R22.42 LOCALIZED SWELLING OF LEFT LOWER LEG: ICD-10-CM

## 2024-06-05 PROCEDURE — 93971 EXTREMITY STUDY: CPT | Mod: 26,LT,, | Performed by: RADIOLOGY

## 2024-06-05 PROCEDURE — 73560 X-RAY EXAM OF KNEE 1 OR 2: CPT | Mod: TC,FY,LT

## 2024-06-05 PROCEDURE — 73560 X-RAY EXAM OF KNEE 1 OR 2: CPT | Mod: 26,LT,, | Performed by: RADIOLOGY

## 2024-06-05 PROCEDURE — 93971 EXTREMITY STUDY: CPT | Mod: TC,LT

## 2024-06-06 NOTE — TELEPHONE ENCOUNTER
----- Message from Faviola Atwood DNP sent at 6/5/2024  4:21 PM CDT -----  Please call Dinesh Brian and let him know there is no fracture or blood clot on the knee.  
Called pt to inform him of providers result below. Left VM.   
Patient informed of results message below. He verbalized understanding.   
Abdominal pain

## 2024-06-25 ENCOUNTER — PATIENT OUTREACH (OUTPATIENT)
Dept: EMERGENCY MEDICINE | Facility: HOSPITAL | Age: 79
End: 2024-06-25
Payer: MEDICARE

## 2024-07-12 ENCOUNTER — OFFICE VISIT (OUTPATIENT)
Dept: PODIATRY | Facility: CLINIC | Age: 79
End: 2024-07-12
Payer: MEDICARE

## 2024-07-12 VITALS
SYSTOLIC BLOOD PRESSURE: 149 MMHG | HEART RATE: 48 BPM | WEIGHT: 139.13 LBS | DIASTOLIC BLOOD PRESSURE: 77 MMHG | HEIGHT: 65 IN | BODY MASS INDEX: 23.18 KG/M2

## 2024-07-12 DIAGNOSIS — I73.9 PAD (PERIPHERAL ARTERY DISEASE): ICD-10-CM

## 2024-07-12 DIAGNOSIS — E78.00 PURE HYPERCHOLESTEROLEMIA: Chronic | ICD-10-CM

## 2024-07-12 DIAGNOSIS — Z79.01 LONG TERM CURRENT USE OF ANTICOAGULANT THERAPY: ICD-10-CM

## 2024-07-12 DIAGNOSIS — B35.1 ONYCHOMYCOSIS DUE TO DERMATOPHYTE: Primary | ICD-10-CM

## 2024-07-12 PROCEDURE — 99999 PR PBB SHADOW E&M-EST. PATIENT-LVL III: CPT | Mod: PBBFAC,,, | Performed by: PODIATRIST

## 2024-07-12 RX ORDER — ATORVASTATIN CALCIUM 40 MG/1
40 TABLET, FILM COATED ORAL NIGHTLY
Qty: 90 TABLET | Refills: 0 | Status: SHIPPED | OUTPATIENT
Start: 2024-07-12

## 2024-07-12 RX ORDER — CICLOPIROX 80 MG/ML
SOLUTION TOPICAL NIGHTLY
Qty: 6.6 ML | Refills: 11 | Status: SHIPPED | OUTPATIENT
Start: 2024-07-12

## 2024-07-12 NOTE — PROGRESS NOTES
Subjective:      Patient ID: Reed Torres is a 79 y.o. male.    Chief Complaint: Foot Pain (L foot)    Cc thick discolored misshapen toenails all toes.  Gradual onset, worsening over past several weeks, aggravated by increased weight bearing, shoe gear, pressure.  Topical antifungal and debridement helps some.    Cc2 PAD with iliac stents with left leg pain worsening significantly. - follow with vascular sched for Sept.    Long term eliquis - a-fib.    Chief Complaint   Patient presents with    Foot Pain     L foot       Casual shoes     Review of Systems   Constitutional: Negative for chills, decreased appetite, diaphoresis, fever and malaise/fatigue.   Cardiovascular:  Positive for claudication.   Skin:  Positive for nail changes.   Neurological:  Positive for paresthesias and sensory change. Negative for numbness.         Objective:      Physical Exam  Constitutional:       General: He is not in acute distress.     Appearance: He is well-developed. He is not diaphoretic.   Cardiovascular:      Pulses:           Popliteal pulses are 1+ on the right side and 1+ on the left side.        Dorsalis pedis pulses are 2+ on the right side and 2+ on the left side.        Posterior tibial pulses are 2+ on the right side and 2+ on the left side.      Comments: Capillary refill 3 seconds all toes/distal feet, all toes/both feet warm to touch.      Negative lymphadenopathy bilateral popliteal fossa and tarsal tunnel.      Negavie lower extremity edema bilateral.    Musculoskeletal:      Right ankle: No swelling, deformity, ecchymosis or lacerations. Normal range of motion. Normal pulse.      Right Achilles Tendon: Normal. No defects. Jacobo's test negative.      Comments: Normal angle, base, station of gait. All ten toes without clubbing, cyanosis, or signs of ischemia.  No pain to palpation bilateral lower extremities.  Range of motion, stability, muscle strength, and muscle tone normal bilateral feet and legs.     Lymphadenopathy:      Lower Body: No right inguinal adenopathy. No left inguinal adenopathy.      Comments: Negative lymphadenopathy bilateral popliteal fossa and tarsal tunnel.    Negative lymphangitic streaking bilateral feet/ankles/legs.   Skin:     General: Skin is warm and dry.      Capillary Refill: Capillary refill takes 2 to 3 seconds.      Coloration: Skin is not pale.      Findings: No abrasion, bruising, burn, ecchymosis, erythema, laceration, lesion or rash.      Nails: There is no clubbing.      Comments: Skin thin, shiny, atrophic, with decreased density and distribution of pedal hair bilateral, but without hyperpigmentation, rj discoloration,  ulcers, masses, nodules or cords palpated bilateral feet and legs.    Toenails 1st, 2nd, 3rd, 4th, 5th  bilateral are hypertrophic thickened 2-3 mm, dystrophic, discolored tanish brown with tan, gray crumbly subungual debris.  Tender to distal nail plate pressure, without periungual skin abnormality of each.    Neurological:      Mental Status: He is alert and oriented to person, place, and time.      Sensory: No sensory deficit.      Motor: No tremor, atrophy or abnormal muscle tone.      Gait: Gait normal.      Comments: Paresthesias, and burning bilateral feet with no clearly identified trigger or source.    Psychiatric:         Behavior: Behavior is cooperative.           Assessment:       Encounter Diagnoses   Name Primary?    Onychomycosis due to dermatophyte Yes    PAD (peripheral artery disease)     Long term current use of anticoagulant therapy          Plan:       Reed was seen today for foot pain.    Diagnoses and all orders for this visit:    Onychomycosis due to dermatophyte    PAD (peripheral artery disease)    Long term current use of anticoagulant therapy    Other orders  -     ciclopirox (PENLAC) 8 % Soln; Apply topically nightly.      I counseled the patient on his conditions, their implications and medical management.        The  patient has received literature on basic diabetic foot care.  Patient will inspect feet daily, wear protective shoe gear when ambulatory, and apply moisturizer to skin as needed to maintain elasticity and help prevent ulceration.    Inspect feet multiple times daily for signs of occurrence/recurrence ulceration.    Discussed conservative treatment with shoes of adequate dimensions, material, and style to alleviate symptoms and delay or prevent surgical intervention.    [facility]    With the patient's permission, I debrided all ten toenails with a sterile nipper and curette, removing all offending nail and debris.  Patient tolerated the procedure well and related significant relief.    Move Sept appt with Vascualr surgery earlier as much as possible - worsening claudication symptoms threaten ability to walk/work. Says he will reach out to therm today.          Follow up if symptoms worsen or fail to improve.

## 2024-07-12 NOTE — TELEPHONE ENCOUNTER
----- Message from Marci Graham sent at 7/12/2024  9:54 AM CDT -----  Type: RX Refill Request    Who Called:  self     Have you contacted your pharmacy: no    Refill or New Rx: refill    RX Name and Strength: atorvastatin (LIPITOR) 40 MG tablet      Preferred Pharmacy with phone number: [id number] - [place] - [address] [place] [address] AT [address] OF [address]   Phone: [phone]  Fax: [phone]    Local or Mail Order: local    Would the patient rather a call back or a response via My [facility]?  call    Best Call Back Number: .[phone] (home)      Additional Information:

## 2024-07-12 NOTE — TELEPHONE ENCOUNTER
Care Due:                  Date            Visit Type   Department     Provider  --------------------------------------------------------------------------------                                [facility] FAMILY                              PRIMARY      MEDICINE/INTERN  Last Visit: 09-      CARE ([facility])   [facility]         [facility]  Next Visit: None Scheduled  None         None Found                                                            Last  Test          Frequency    Reason                     Performed    Due Date  --------------------------------------------------------------------------------    Lipid Panel.  12 months..  atorvastatin.............  05- 04-    [facility] Embedded Care Due Messages. Reference number: [id number].   7/12/2024 10:05:45 AM CDT

## 2024-09-18 DIAGNOSIS — I48.0 PAF (PAROXYSMAL ATRIAL FIBRILLATION): ICD-10-CM

## 2024-09-18 RX ORDER — AMIODARONE HYDROCHLORIDE 200 MG/1
200 TABLET ORAL
Qty: 90 TABLET | Refills: 3 | Status: SHIPPED | OUTPATIENT
Start: 2024-09-18

## 2024-09-24 ENCOUNTER — TELEPHONE (OUTPATIENT)
Dept: VASCULAR SURGERY | Facility: CLINIC | Age: 79
End: 2024-09-24
Payer: MEDICARE

## 2024-09-25 ENCOUNTER — HOSPITAL ENCOUNTER (OUTPATIENT)
Dept: RADIOLOGY | Facility: HOSPITAL | Age: 79
Discharge: HOME | End: 2024-09-25
Attending: SURGERY
Payer: MEDICARE

## 2024-09-25 ENCOUNTER — OFFICE VISIT (OUTPATIENT)
Dept: VASCULAR SURGERY | Facility: CLINIC | Age: 79
End: 2024-09-25
Payer: MEDICARE

## 2024-09-25 VITALS
HEIGHT: 65 IN | WEIGHT: 133.25 LBS | SYSTOLIC BLOOD PRESSURE: 104 MMHG | DIASTOLIC BLOOD PRESSURE: 70 MMHG | BODY MASS INDEX: 22.2 KG/M2 | HEART RATE: 59 BPM

## 2024-09-25 DIAGNOSIS — G62.9 NEUROPATHY: ICD-10-CM

## 2024-09-25 DIAGNOSIS — I72.3 ANEURYSM OF RIGHT COMMON ILIAC ARTERY: Primary | ICD-10-CM

## 2024-09-25 DIAGNOSIS — I71.40 ABDOMINAL AORTIC ANEURYSM (AAA) WITHOUT RUPTURE, UNSPECIFIED PART: ICD-10-CM

## 2024-09-25 DIAGNOSIS — I72.3 ANEURYSM OF RIGHT COMMON ILIAC ARTERY: ICD-10-CM

## 2024-09-25 PROCEDURE — 1101F PT FALLS ASSESS-DOCD LE1/YR: CPT | Mod: CPTII,S$GLB,, | Performed by: SURGERY

## 2024-09-25 PROCEDURE — 1159F MED LIST DOCD IN RCRD: CPT | Mod: CPTII,S$GLB,, | Performed by: SURGERY

## 2024-09-25 PROCEDURE — 71250 CT THORAX DX C-: CPT | Mod: 26,,, | Performed by: RADIOLOGY

## 2024-09-25 PROCEDURE — 3078F DIAST BP <80 MM HG: CPT | Mod: CPTII,S$GLB,, | Performed by: SURGERY

## 2024-09-25 PROCEDURE — 3074F SYST BP LT 130 MM HG: CPT | Mod: CPTII,S$GLB,, | Performed by: SURGERY

## 2024-09-25 PROCEDURE — 3288F FALL RISK ASSESSMENT DOCD: CPT | Mod: CPTII,S$GLB,, | Performed by: SURGERY

## 2024-09-25 PROCEDURE — 99214 OFFICE O/P EST MOD 30 MIN: CPT | Mod: S$GLB,,, | Performed by: SURGERY

## 2024-09-25 PROCEDURE — 1125F AMNT PAIN NOTED PAIN PRSNT: CPT | Mod: CPTII,S$GLB,, | Performed by: SURGERY

## 2024-09-25 PROCEDURE — 99999 PR PBB SHADOW E&M-EST. PATIENT-LVL III: CPT | Mod: PBBFAC,,, | Performed by: SURGERY

## 2024-09-25 PROCEDURE — 71250 CT THORAX DX C-: CPT | Mod: TC

## 2024-09-25 NOTE — PROGRESS NOTES
Mario Springer MD [facility]                         09/25/2024    HPI:  Reed Torres is a 79 y.o. male with   Patient Active Problem List   Diagnosis    Hyperlipidemia    Tuberculosis exposure    BPH (benign prostatic hyperplasia)    Osteoarthritis of lumbar spine    Hyperbilirubinemia, long standing, favor GILBERT    Abnormal stress test    Anxiety disorder    SVT (supraventricular tachycardia)    Pulmonary emphysema    Coronary artery disease due to lipid rich plaque    PAF (paroxysmal atrial fibrillation)    Aortic atherosclerosis    Aneurysm of right common iliac artery    Thrombocytopenia, unspecified    Hearing decreased    Degenerative disease of nervous system, unspecified    Gastroesophageal reflux disease without esophagitis    Primary open angle glaucoma (POAG) of both eyes, indeterminate stage    Psoriasis vulgaris    Cervical radiculopathy    Insomnia    Cognitive impairment    being managed by PCP and specialists who is here today for evaluation of mesenteric ischemia.  Pt with c/o LUQ and L chest discomfort intermittent without known triggers.  Patient states location is LUQ and L chest under breastbone occurring for a few months.  Associated signs and symptoms include belching.  No food fear or significant weight loss.  Quality is pressure and severity is 3/10.  Symptoms began a few mo ago.  Alleviating factors include activity.  Worsening factors include none.    no MI  no Stroke  Tobacco use: denies    1/2021:  States he has symptoms of belching and bloating.  Denies food fear and weight loss.  Symptoms are worsened when he bends forward.  States he has not seen GI in about 4 months where he was diagnosed with reflux although his dosage of his medication has been decreased.  Denies pelvic pain or abdominal pain or back pain.  No functional limitation or leg pain.    7/2021:  C/o LUQ pain, R thigh intermittent pain, no abd or pelvic pain.   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    2022:  No complaints.    10/2022:  no new issues.  No pelvic or abd pain.    3/2023:  no complaints.  S/p R RONAK stand alone 23.     2023:  pt doing well.    3/2024:  no abd pain.  No buttock claudication.    2024:  c/o L thigh pain at rest and when ambulating, radiating sharp to knee laterally.    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     Past Surgical History:   Procedure Laterality Date    ABDOMINAL AORTIC ANEURYSM REPAIR, ENDOVASCULAR Bilateral 2023    Procedure: REPAIR-ANEURYSM-ILIAC BRANCH ENDOPROSTHESIS-ENDOVASCULAR;  Surgeon: Mario Springer MD;  Location: [facility] OR [address] [place] [address];  Service: Vascular;  Laterality: Bilateral;  mGy: 3707.4  Fluro time: 41.4 miutes  contrast volume: 171mg  Gycm: 363.78      APPENDECTOMY      LEFT HEART CATHETERIZATION Left 2020    Procedure: Left heart cath;  Surgeon: Keenan Avila MD;  Location: [facility] CATH LAB;  Service: Cardiology;  Laterality: Left;  RN PRE OP 2020.---COVID NEGATIVE ON-- 2020. [place]    SHOULDER ARTHROSCOPY W/ ROTATOR CUFF REPAIR      Left shoulder     Family History   Problem Relation Name Age of Onset    COPD Mother      Diabetes Mother      Hypertension Mother      COPD Father lungs failed     Heart disease Brother       Social History     Socioeconomic History    Marital status:    Tobacco Use    Smoking status: Former     Current packs/day: 0.00     Types: Cigarettes     Quit date:      Years since quittin.7    Smokeless tobacco: Never    Tobacco comments:     stopped smoking 20 yrs ago.   Substance and Sexual Activity    Alcohol use: Not Currently     Alcohol/week: 5.0 standard drinks of alcohol     Types: 6 Standard drinks or equivalent per week    Drug use: No     Sexual activity: Yes     Partners: Female     Birth control/protection: None   Social History Narrative    Still doing lawn service     Social Determinants of Health     Financial Resource Strain: Low Risk  (2/2/2024)    Overall Financial Resource Strain (CARDIA)     Difficulty of Paying Living Expenses: Not hard at all   Food Insecurity: No Food Insecurity (2/2/2024)    Hunger Vital Sign     Worried About Running Out of Food in the Last Year: Never true     Ran Out of Food in the Last Year: Never true   Transportation Needs: No Transportation Needs (2/2/2024)    PRAPARE - Transportation     Lack of Transportation (Medical): No     Lack of Transportation (Non-Medical): No   Physical Activity: Sufficiently Active (2/2/2024)    Exercise Vital Sign     Days of Exercise per Week: 5 days     Minutes of Exercise per Session: 150+ min   Stress: No Stress Concern Present (2/2/2024)    [employer] - Occupational Stress Questionnaire     Feeling of Stress : Only a little   Housing Stability: Low Risk  (2/2/2024)    Housing Stability Vital Sign     Unable to Pay for Housing in the Last Year: No     Number of Places Lived in the Last Year: 1     Unstable Housing in the Last Year: No       Current Outpatient Medications:     amiodarone (PACERONE) 200 MG Tab, TAKE 1 TABLET BY MOUTH EVERY DAY, Disp: 90 tablet, Rfl: 3    apixaban (ELIQUIS) 5 mg Tab, Take 1 tablet (5 mg total) by mouth 2 (two) times daily., Disp: 60 tablet, Rfl: 11    aspirin (ECOTRIN) 81 MG EC tablet, Take 81 mg by mouth every morning., Disp: , Rfl:     atorvastatin (LIPITOR) 40 MG tablet, Take 1 tablet (40 mg total) by mouth every evening., Disp: 90 tablet, Rfl: 0    ciclopirox (PENLAC) 8 % Soln, Apply topically nightly., Disp: 6.6 mL, Rfl: 11    docusate sodium (COLACE) 100 MG capsule, Take 1 capsule (100 mg total) by mouth 2 (two) times daily., Disp: 60 capsule, Rfl: 0    dorzolamide-timolol 2-0.5% (COSOPT) 22.3-6.8 mg/mL ophthalmic  solution, Place 1 drop into both eyes 2 (two) times daily., Disp: , Rfl:     gabapentin (NEURONTIN) 300 MG capsule, TAKE 1-3 CAPSULES BY MOUTH EVERY EVENING, Disp: 90 capsule, Rfl: 11    latanoprost 0.005 % ophthalmic solution, Place into both eyes every evening., Disp: , Rfl:     pantoprazole (PROTONIX) 20 MG tablet, Take 20 mg by mouth., Disp: , Rfl:     vit A/vit C/vit E/zinc/copper (OCUVITE PRESERVISION ORAL), Take by mouth 2 (two) times a day., Disp: , Rfl:     tamsulosin (FLOMAX) 0.4 mg Cap, Take 1 capsule (0.4 mg total) by mouth once daily., Disp: 7 capsule, Rfl: 0    REVIEW OF SYSTEMS:  General: No fevers or chills; ENT: No sore throat; Allergy and Immunology: no persistent infections; Hematological and Lymphatic: No history of bleeding or easy bruising; Endocrine: negative; Respiratory: no cough, shortness of breath, or wheezing; Cardiovascular: no chest pain or dyspnea on exertion; Gastrointestinal: no abdominal pain/back, change in bowel habits, or bloody stools; Genito-Urinary: no dysuria, trouble voiding, or hematuria; Musculoskeletal: negative; Neurological: no TIA or stroke symptoms; Psychiatric: no nervousness, anxiety or depression.    PHYSICAL EXAM:      Pulse: (!) 59         General appearance:  Alert, well-appearing, and in no distress.  Oriented to person, place, and time                    Neurological: Normal speech, no focal findings noted; CN II - XII grossly intact. RLE with sensation to light touch, LLE with sensation to light touch.            Musculoskeletal: Digits/nail without cyanosis/clubbing.  Strength 5/5 BLE.                    Neck: Supple, no significant adenopathy, no carotid bruit can be auscultated                  Chest:  Clear to auscultation, no wheezes, rales or rhonchi, symmetric air entry. No use of accessory muscles               Cardiac: Normal rate and regular rhythm, S1 and S2 normal            Abdomen: Soft, min epigastric tenderness, nondistended, no masses or  "organomegaly, no hernia     No rebound tenderness noted; bowel sounds normal     Pulsatile aortic mass is non palpable.     No groin adenopathy      Extremities:   2+ R femoral pulse, 2+ L femoral pulse     2+ R popliteal pulse, 2+ L popliteal pulse     2+ R PT pulse, 2+ L PT pulse     2+ R DP pulse, 2+ L DP pulse     no RLE edema, no LLE edema    Skin: RLE without tissue loss; LLE without tissue loss    LAB RESULTS:  No results found for: "CBC"  Lab Results   Component Value Date    LABPROT 10.9 07/02/2023    INR 1.0 07/02/2023     Lab Results   Component Value Date     04/17/2024    K 4.1 04/17/2024     (H) 04/17/2024    CO2 25 04/17/2024    GLU 93 04/17/2024    BUN 18 04/17/2024    CREATININE 0.9 04/17/2024    CALCIUM 9.2 04/17/2024    ANIONGAP 5 (L) 04/17/2024    EGFRNONAA >60 07/03/2022     Lab Results   Component Value Date    WBC 4.81 04/17/2024    RBC 4.39 (L) 04/17/2024    HGB 13.4 (L) 04/17/2024    HCT 39 04/17/2024    MCV 95 04/17/2024    MCH 30.5 04/17/2024    MCHC 32.2 04/17/2024    RDW 13.8 04/17/2024     04/17/2024    MPV 11.5 04/17/2024    GRAN 2.5 04/17/2024    GRAN 52.4 04/17/2024    LYMPH 1.7 04/17/2024    LYMPH 34.9 04/17/2024    MONO 0.5 04/17/2024    MONO 9.4 04/17/2024    EOS 0.1 04/17/2024    BASO 0.03 04/17/2024    EOSINOPHIL 2.5 04/17/2024    BASOPHIL 0.6 04/17/2024    DIFFMETHOD Automated 04/17/2024     .  Lab Results   Component Value Date    HGBA1C 5.1 03/03/2022       IMAGING:  All pertinent imaging has been reviewed and interpreted independently.    -BLE arterial US negative for popliteal aneurysm    3/2023  CTA with well apposed stents and no endoleak     9/2023:  R ROJELIO 4 cm aneurysm, stent in place, stenosis of hypogastric stent distally with opacification of distal R hypogastric artery    3/2024:  R ROJELIO 4cm aneurysm, no endoleak, stenosis of hypogastric stent distally with opacification of distal R hypogastric artery    IMP/PLAN:  79 y.o. male with   Patient " Active Problem List   Diagnosis    Hyperlipidemia    Tuberculosis exposure    BPH (benign prostatic hyperplasia)    Osteoarthritis of lumbar spine    Hyperbilirubinemia, long standing, favor CHAVEZ    Abnormal stress test    Anxiety disorder    SVT (supraventricular tachycardia)    Pulmonary emphysema    Coronary artery disease due to lipid rich plaque    PAF (paroxysmal atrial fibrillation)    Aortic atherosclerosis    Aneurysm of right common iliac artery    Thrombocytopenia, unspecified    Hearing decreased    Degenerative disease of nervous system, unspecified    Gastroesophageal reflux disease without esophagitis    Primary open angle glaucoma (POAG) of both eyes, indeterminate stage    Psoriasis vulgaris    Cervical radiculopathy    Insomnia    Cognitive impairment    being managed by PCP and specialists who is here today for evaluation of R ROJELIO aneurysm.    - Mesenteric US without HD significant stenosis.  Recommend continuing evaluation and management for etiology of left upper quadrant by PCP and GI.    -right common iliac artery aneurysm 4 cm 2021 (3.6 2020), asymptomatic s/p RONAK 1/31/23 without endoleak, 4 cm 9/2023 and 3/2024- continue routine surveillance  -recommend cont daily aspirin, continue blood pressure control heart healthy lifestyle  -LLE arterial US and CARLEE  -Neuro referral  -RTC 4 mo with CT A/P non contrast    I spent 12 minutes evaluating this patient and greater than 50% of the time was spent counseling, coordinator care and discussing the plan of care.  All questions were answered and patient stated understanding with agreement with the above treatment plan.    Mario Springer MD [facility]  Vascular and Endovascular Surgery

## 2024-09-26 ENCOUNTER — HOSPITAL ENCOUNTER (EMERGENCY)
Facility: HOSPITAL | Age: 79
Discharge: HOME | End: 2024-09-26
Attending: EMERGENCY MEDICINE
Payer: MEDICARE

## 2024-09-26 VITALS
DIASTOLIC BLOOD PRESSURE: 74 MMHG | WEIGHT: 136 LBS | HEART RATE: 54 BPM | OXYGEN SATURATION: 96 % | TEMPERATURE: 98 F | RESPIRATION RATE: 20 BRPM | BODY MASS INDEX: 22.63 KG/M2 | SYSTOLIC BLOOD PRESSURE: 115 MMHG

## 2024-09-26 DIAGNOSIS — I71.21 ASCENDING AORTIC ANEURYSM, UNSPECIFIED WHETHER RUPTURED: Primary | ICD-10-CM

## 2024-09-26 DIAGNOSIS — M79.605 PAIN OF LEFT LOWER EXTREMITY: Primary | ICD-10-CM

## 2024-09-26 PROCEDURE — 25000003 PHARM REV CODE 250

## 2024-09-26 PROCEDURE — 99283 EMERGENCY DEPT VISIT LOW MDM: CPT

## 2024-09-26 RX ORDER — OXYCODONE HYDROCHLORIDE 5 MG/1
5 TABLET ORAL EVERY 4 HOURS PRN
Qty: 8 TABLET | Refills: 0 | Status: SHIPPED | OUTPATIENT
Start: 2024-09-26

## 2024-09-26 RX ORDER — OXYCODONE HYDROCHLORIDE 5 MG/1
10 TABLET ORAL
Status: COMPLETED | OUTPATIENT
Start: 2024-09-26 | End: 2024-09-26

## 2024-09-26 RX ADMIN — OXYCODONE HYDROCHLORIDE 10 MG: 5 TABLET ORAL at 10:09

## 2024-09-26 NOTE — ED PROVIDER NOTES
Encounter Date: 9/26/2024       History     Chief Complaint   Patient presents with    Leg Pain     Pt c/o LLE pain x 3 weeks. Pt did have a fall 1 month ago. Pt states he has 3 stents placed in his groin area. Seen by Vascular yesterday. Had an ultrasound and CTA of chest yesterday. No abnormalities per patient.      79 y.o. male with a PMH of AAA, mesenteric ischemia, CAD, HLD, emphysema, and 3 stents placed by vascular in his left lower extremity presents to [facility] for evaluation of left lateral thigh pain x3 weeks.  When asked where the pain is patient points along his left IT band.  He denies any groin or inguinal pain and states that the pain does not feel similar to when he had ischemia of his left lower extremity.  Was seen by vascular surgery yesterday in clinic and had no concerns regarding these symptoms.  He additionally has had imaging without any findings to explain this pain.  He states he fell 3 months ago, tripping backward into a flower bed with a backpack on his back but did not land on his left side and did not have pain until 3 weeks ago.  He denies any low back pain.  He denies any other symptoms including fever, chills, cough, congestion, chest pain, dyspnea, abdominal pain, diarrhea, dysuria, skin redness or swelling, lower extremity weakness or numbness, or any other symptoms.        The history is provided by the patient, medical records and the spouse.     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     Past Surgical History:   Procedure Laterality Date    ABDOMINAL AORTIC ANEURYSM REPAIR, ENDOVASCULAR Bilateral 1/31/2023    Procedure: REPAIR-ANEURYSM-ILIAC BRANCH ENDOPROSTHESIS-ENDOVASCULAR;  Surgeon: Mario  PHUONG Springer MD;  Location: [facility] OR [address] [place] [address];  Service: Vascular;  Laterality: Bilateral;  mGy: 3707.4  Fluro time: 41.4 miutes  contrast volume: 171mg  Gycm: 363.78      APPENDECTOMY      LEFT HEART CATHETERIZATION Left 2020    Procedure: Left heart cath;  Surgeon: Keenan Avila MD;  Location: [facility] CATH LAB;  Service: Cardiology;  Laterality: Left;  RN PRE OP 2020.---COVID NEGATIVE ON-- 2020. [place]    SHOULDER ARTHROSCOPY W/ ROTATOR CUFF REPAIR      Left shoulder     Family History   Problem Relation Name Age of Onset    COPD Mother      Diabetes Mother      Hypertension Mother      COPD Father lungs failed     Heart disease Brother       Social History     Tobacco Use    Smoking status: Former     Current packs/day: 0.00     Types: Cigarettes     Quit date:      Years since quittin.7    Smokeless tobacco: Never    Tobacco comments:     stopped smoking 20 yrs ago.   Substance Use Topics    Alcohol use: Not Currently     Alcohol/week: 5.0 standard drinks of alcohol     Types: 6 Standard drinks or equivalent per week    Drug use: No     Review of Systems    Physical Exam     Initial Vitals [24 0914]   BP Pulse Resp Temp SpO2   101/65 (!) 59 18 97.6 °F (36.4 °C) 98 %      MAP       --         Physical Exam    Nursing note and vitals reviewed.  Constitutional: He appears well-developed and well-nourished. No distress.   HENT:   Head: Normocephalic.   Mouth/Throat: Oropharynx is clear and moist.   Eyes: Pupils are equal, round, and reactive to light. No scleral icterus.   Neck: Neck supple.   Cardiovascular:  Normal rate and regular rhythm.           Pulmonary/Chest: Breath sounds normal. No stridor. No respiratory distress.   Abdominal: Abdomen is soft. He exhibits no distension. There is no abdominal tenderness.   Musculoskeletal:         General: Tenderness (Tenderness over left lateral distal IT band.  No overlying skin changes including erythema, induration, or fluctuance.  Full range  of motion of left hip and left knee without pain.  No joint effusion.) present. No edema.      Cervical back: Neck supple.      Comments: 2+ DP pulses palpable through patient's socks bilaterally, no lower extremity edema.  No inguinal tenderness or swelling.     Neurological: He is alert. GCS score is 15. GCS eye subscore is 4. GCS verbal subscore is 5. GCS motor subscore is 6.   Skin: Skin is warm and dry.         ED Course   Procedures  Labs Reviewed - No data to display       Imaging Results    None          Medications   oxyCODONE immediate release tablet 10 mg (10 mg Oral Given 9/26/24 1019)     Medical Decision Making  79-year-old male presents to the ED for evaluation of 3 weeks of left lateral thigh pain.    Patient is afebrile, hemodynamically stable, and in no acute distress on arrival.     Differential diagnoses considered include, but not limited to:  Infection, ischemia, DVT, MSK pain    Do not suspect ischemia or DVT as patient has bounding pulses, has no edema or symptoms of venous thromboembolism, and has had ongoing symptoms for 3 weeks.  Do not suspect infection as he has no overlying skin changes, mild tenderness, and no fever chills.  Tenderness is most significant over distal left I T band, suspect IT band syndrome or musculoskeletal pain.  I discussed with the patient and his wife at bedside that I do not think we need to do any imaging or labs today as his pain has been ongoing for 3 weeks in his most likely due to musculoskeletal pain.  They expressed understanding and are in agreement with the plan for discharge home and follow-up with sports Medicine.  Pain medication given in the ED with resolution of pain.  Prescription sent to patient's pharmacy.  Opioid precautions given including bowel care.  Discussed return precautions, supportive care, follow-up with sports Medicine and PCP, and prescriptions and they expressed understanding and agreement.    Amount and/or Complexity of Data  Reviewed  Independent Historian: spouse    Risk  Prescription drug management.                                      Clinical Impression:  Final diagnoses:  [M79.605] Pain of left lower extremity (Primary)          ED Disposition Condition    Discharge Stable          ED Prescriptions       Medication Sig Dispense Start Date End Date Auth. Provider    oxyCODONE (ROXICODONE) 5 MG immediate release tablet Take 1 tablet (5 mg total) by mouth every 4 (four) hours as needed for Pain. 8 tablet 9/26/2024 -- Amber Melvin MD          Follow-up Information       Follow up With Specialties Details Why Contact Info Additional Information    Pj Gillette MD Family Medicine   [address] [place] [address]  [phone]       [facility] Emergency Dept Emergency Medicine  If symptoms worsen [address] [facility] [place] [address] [phone]     [facility] Sports Med [facility] Sports Medicine   [address] [facility] [address]  [phone] Please park in surface lot or garage and check in on the first floor, Building [address]             Amber Melvin MD  Resident  09/26/24 1590       Amber Melvin MD  Resident  09/26/24 3028

## 2024-09-26 NOTE — ED NOTES
Pt reports having LLE pain for approx 3 weeks that feels achy, also reports lower back pain that resolves with activity. Pt states that the pain does not feel like it did when he had blockages. Pt is on eliquis and aspirin. Pt reports having a fall x3 months ago on his back in a flowerbed while working. Pts pain is localized to the L side of his leg and reports that when he takes aleve and gabapentin it does not go away. Pt states that the pain is at its worse when he is walking. Pt has a hx of AAA, L Cardiac cath.

## 2024-09-27 ENCOUNTER — PATIENT OUTREACH (OUTPATIENT)
Dept: EMERGENCY MEDICINE | Facility: HOSPITAL | Age: 79
End: 2024-09-27
Payer: MEDICARE

## 2024-09-27 NOTE — PROGRESS NOTES
Patient was seen in the ED on 9/26/24. Phoned patient to assist with Post ED Discharge Navigation. Patient was unavailable. Message left on voice mail urging patient to call if additional assistance is needed.  Damon Henderson

## 2024-10-01 ENCOUNTER — OFFICE VISIT (OUTPATIENT)
Dept: SPORTS MEDICINE | Facility: CLINIC | Age: 79
End: 2024-10-01
Payer: MEDICARE

## 2024-10-01 ENCOUNTER — HOSPITAL ENCOUNTER (OUTPATIENT)
Dept: RADIOLOGY | Facility: HOSPITAL | Age: 79
Discharge: HOME | End: 2024-10-01
Attending: ORTHOPAEDIC SURGERY
Payer: MEDICARE

## 2024-10-01 VITALS
HEART RATE: 58 BPM | SYSTOLIC BLOOD PRESSURE: 104 MMHG | WEIGHT: 138.13 LBS | HEIGHT: 65 IN | BODY MASS INDEX: 23.01 KG/M2 | DIASTOLIC BLOOD PRESSURE: 64 MMHG

## 2024-10-01 DIAGNOSIS — M54.16 LUMBAR RADICULOPATHY: Primary | ICD-10-CM

## 2024-10-01 DIAGNOSIS — M79.605 PAIN OF LEFT LOWER EXTREMITY: ICD-10-CM

## 2024-10-01 PROCEDURE — 3074F SYST BP LT 130 MM HG: CPT | Mod: CPTII,S$GLB,, | Performed by: ORTHOPAEDIC SURGERY

## 2024-10-01 PROCEDURE — 1101F PT FALLS ASSESS-DOCD LE1/YR: CPT | Mod: CPTII,S$GLB,, | Performed by: ORTHOPAEDIC SURGERY

## 2024-10-01 PROCEDURE — 1125F AMNT PAIN NOTED PAIN PRSNT: CPT | Mod: CPTII,S$GLB,, | Performed by: ORTHOPAEDIC SURGERY

## 2024-10-01 PROCEDURE — 1159F MED LIST DOCD IN RCRD: CPT | Mod: CPTII,S$GLB,, | Performed by: ORTHOPAEDIC SURGERY

## 2024-10-01 PROCEDURE — 73552 X-RAY EXAM OF FEMUR 2/>: CPT | Mod: TC,LT

## 2024-10-01 PROCEDURE — 73552 X-RAY EXAM OF FEMUR 2/>: CPT | Mod: 26,LT,, | Performed by: RADIOLOGY

## 2024-10-01 PROCEDURE — 3288F FALL RISK ASSESSMENT DOCD: CPT | Mod: CPTII,S$GLB,, | Performed by: ORTHOPAEDIC SURGERY

## 2024-10-01 PROCEDURE — 99204 OFFICE O/P NEW MOD 45 MIN: CPT | Mod: S$GLB,,, | Performed by: ORTHOPAEDIC SURGERY

## 2024-10-01 PROCEDURE — 3078F DIAST BP <80 MM HG: CPT | Mod: CPTII,S$GLB,, | Performed by: ORTHOPAEDIC SURGERY

## 2024-10-01 PROCEDURE — 99999 PR PBB SHADOW E&M-EST. PATIENT-LVL IV: CPT | Mod: PBBFAC,,, | Performed by: ORTHOPAEDIC SURGERY

## 2024-10-01 NOTE — PROGRESS NOTES
"CC: Left thigh and leg pain    79 y.o. Male who presents as a new patient to me. He works as a . Complaint is left thigh and radiating leg pain for about 3 weeks.  Insidious onset.  He reports a fall backwards about 2-3 months ago.  Denies feeling any significant leg/hip or back pain at that time.  Typical left leg pain occurs when on his feet for prolonged periods.  He does have a history of chronic intermittent low back stiffness and mild pain.  This is been present for years.  Nothing worsened recently.  States his left radiating leg pain often goes distal to the knee. Like "hitting a funny bone".  No ipsilateral groin or hip pain. History of left lower extremity stents, recently seen by Dr. Alonso Springer, no vascular issues at this time. Currently 0/10 pain, up to 8/10 initially. He was evaluated at [facility] ED 09/26/24. Treatment thus far has included rest, activity modifications, oral medications and naproxen.  Here today to discuss diagnosis and treatment options.      PMHx notable for AAA, CAD, HLD, emphysema.  Takes Eliquis daily.  Negative for tobacco. Former  Negative for diabetes. Last A1C: 5.1 03/03/22    Pain Score:   7    REVIEW OF SYSTEMS:   Constitution: Negative. Negative for chills, fever and night sweats.    Hematologic/Lymphatic: Negative for bleeding problem. Does not bruise/bleed easily.   Skin: Negative for dry skin, itching and rash.   Musculoskeletal: Negative for falls. Positive for left leg pain and muscle weakness.     All other review of symptoms were reviewed and found to be noncontributory.     PAST MEDICAL HISTORY:   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     Post treatment     PAST SURGICAL HISTORY:   Past Surgical History:   Procedure Laterality Date    ABDOMINAL AORTIC ANEURYSM REPAIR, " ENDOVASCULAR Bilateral 2023    Procedure: REPAIR-ANEURYSM-ILIAC BRANCH ENDOPROSTHESIS-ENDOVASCULAR;  Surgeon: Mario Springer MD;  Location: [facility] OR [address] [place] [address];  Service: Vascular;  Laterality: Bilateral;  mGy: 3707.4  Fluro time: 41.4 miutes  contrast volume: 171mg  Gycm: 363.78      APPENDECTOMY      LEFT HEART CATHETERIZATION Left 2020    Procedure: Left heart cath;  Surgeon: Keenan Avila MD;  Location: [facility] CATH LAB;  Service: Cardiology;  Laterality: Left;  RN PRE OP 2020.---COVID NEGATIVE ON-- 2020. [place]    SHOULDER ARTHROSCOPY W/ ROTATOR CUFF REPAIR      Left shoulder     FAMILY HISTORY:   Family History   Problem Relation Name Age of Onset    COPD Mother      Diabetes Mother      Hypertension Mother      COPD Father lungs failed     Heart disease Brother       SOCIAL HISTORY:   Social History     Socioeconomic History    Marital status:    Tobacco Use    Smoking status: Former     Current packs/day: 0.00     Types: Cigarettes     Quit date:      Years since quittin.7    Smokeless tobacco: Never    Tobacco comments:     stopped smoking 20 yrs ago.   Substance and Sexual Activity    Alcohol use: Not Currently     Alcohol/week: 5.0 standard drinks of alcohol     Types: 6 Standard drinks or equivalent per week    Drug use: No    Sexual activity: Yes     Partners: Female     Birth control/protection: None   Social History Narrative    Still doing lawn service     Social Drivers of Health     Financial Resource Strain: Low Risk  (2024)    Overall Financial Resource Strain (CARDIA)     Difficulty of Paying Living Expenses: Not hard at all   Food Insecurity: No Food Insecurity (2024)    Hunger Vital Sign     Worried About Running Out of Food in the Last Year: Never true     Ran Out of Food in the Last Year: Never true   Transportation Needs: No Transportation Needs (2024)    PRAPARE - Transportation     Lack of Transportation (Medical): No     Lack of  Transportation (Non-Medical): No   Physical Activity: Sufficiently Active (2/2/2024)    Exercise Vital Sign     Days of Exercise per Week: 5 days     Minutes of Exercise per Session: 150+ min   Stress: No Stress Concern Present (2/2/2024)    [employer] [place] [employer] - Occupational Stress Questionnaire     Feeling of Stress : Only a little   Housing Stability: Low Risk  (2/2/2024)    Housing Stability Vital Sign     Unable to Pay for Housing in the Last Year: No     Number of Places Lived in the Last Year: 1     Unstable Housing in the Last Year: No     MEDICATIONS:     Current Outpatient Medications:     amiodarone (PACERONE) 200 MG Tab, TAKE 1 TABLET BY MOUTH EVERY DAY, Disp: 90 tablet, Rfl: 3    apixaban (ELIQUIS) 5 mg Tab, Take 1 tablet (5 mg total) by mouth 2 (two) times daily., Disp: 60 tablet, Rfl: 11    atorvastatin (LIPITOR) 40 MG tablet, Take 1 tablet (40 mg total) by mouth every evening., Disp: 90 tablet, Rfl: 0    dorzolamide-timolol 2-0.5% (COSOPT) 22.3-6.8 mg/mL ophthalmic solution, Place 1 drop into both eyes 2 (two) times daily., Disp: , Rfl:     gabapentin (NEURONTIN) 300 MG capsule, TAKE 1-3 CAPSULES BY MOUTH EVERY EVENING, Disp: 90 capsule, Rfl: 11    latanoprost 0.005 % ophthalmic solution, Place into both eyes every evening., Disp: , Rfl:     pantoprazole (PROTONIX) 20 MG tablet, Take 20 mg by mouth., Disp: , Rfl:     vit A/vit C/vit E/zinc/copper (OCUVITE PRESERVISION ORAL), Take by mouth 2 (two) times a day., Disp: , Rfl:     aspirin (ECOTRIN) 81 MG EC tablet, Take 81 mg by mouth every morning., Disp: , Rfl:     ciclopirox (PENLAC) 8 % Soln, Apply topically nightly. (Patient not taking: Reported on 10/1/2024), Disp: 6.6 mL, Rfl: 11    docusate sodium (COLACE) 100 MG capsule, Take 1 capsule (100 mg total) by mouth 2 (two) times daily. (Patient not taking: Reported on 10/1/2024), Disp: 60 capsule, Rfl: 0    oxyCODONE (ROXICODONE) 5 MG immediate release tablet, Take 1 tablet (5 mg  "total) by mouth every 4 (four) hours as needed for Pain. (Patient not taking: Reported on 10/1/2024), Disp: 8 tablet, Rfl: 0    tamsulosin (FLOMAX) 0.4 mg Cap, Take 1 capsule (0.4 mg total) by mouth once daily., Disp: 7 capsule, Rfl: 0    ALLERGIES:   Review of patient's allergies indicates:  No Known Allergies     PHYSICAL EXAMINATION:  /64   Pulse (!) 58   Ht 5' 5" (1.651 m)   Wt 62.6 kg (138 lb 1.9 oz)   BMI 22.98 kg/m²   General: Well-developed well-nourished 79 y.o. malein no acute distress   Cardiovascular: Regular rhythm by palpation of distal pulse, normal color and temperature, no concerning varicosities on symptomatic side   Lungs: No labored breathing or wheezing appreciated   Neuro: Alert and oriented ×3   Psychiatric: well oriented to person, place and time, demonstrates normal mood and affect   Skin: No rashes, lesions or ulcers, normal temperature, turgor, and texture on involved extremity    Ortho/SPM Exam  Examination of the left thigh and leg demonstrates no signs of trauma.  No areas of tenderness.  Full active and passive range of motion of the left hip.  No pain with passive internal and external rotation.  Negative Stinchfield test.  Nontender over the left knee.  Positive straight leg raise for typical radiating left leg symptoms.  Reduced lumbar forward bend and extension with some stiffness and mild pain.  Nontender over the SI joints bilaterally.  Motor and sensory intact otherwise to the left leg and left foot.  No left knee swelling.  Ligamentously stable.  Tight hamstrings.    IMAGING:  X-rays including AP and lateral left femur views ordered and images reviewed by me show:    Some small areas of heterotopic bone and calcification.  No acute changes.  No significant DJD of the left hip.    ASSESSMENT:      ICD-10-CM ICD-9-CM   1. Lumbar radiculopathy  M54.16 724.4   2. Pain of left lower extremity  M79.605 729.5     PLAN:     The patient presents with chronic intermittent low " back pain with stiffness and now a three-week history of preferred left leg pain.  Findings on exam are most consistent with suspected left lumbar radiculopathy.  Treatment options reviewed.  He feels that he is getting better with simple conservative treatment in time.  I did discuss the potential benefit of physical therapy for core strengthening and hamstring stretching.  I do think the physical therapy would be helpful to allow him to be a bit more active with fewer episodes of exacerbation.  He is in agreement.  PT referral placed.  Unable to take any oral anti-inflammatory medication given his heart and anticoagulation status.  No need for advanced imaging at this time.    Procedures

## 2024-10-03 DIAGNOSIS — I48.0 PAF (PAROXYSMAL ATRIAL FIBRILLATION): ICD-10-CM

## 2024-10-04 DIAGNOSIS — I48.0 PAF (PAROXYSMAL ATRIAL FIBRILLATION): ICD-10-CM

## 2024-10-04 RX ORDER — APIXABAN 5 MG/1
5 TABLET, FILM COATED ORAL 2 TIMES DAILY
Qty: 60 TABLET | Refills: 11 | Status: SHIPPED | OUTPATIENT
Start: 2024-10-04

## 2024-10-04 NOTE — TELEPHONE ENCOUNTER
----- Message from Med Assistant Sander sent at 10/4/2024 10:43 AM CDT -----  Type: Patient Call Back    Who called: Self    What is the request in detail: states he's having issues getting his medication refilled and would like a call please..    Eliquis    [id number] - [place] [facility] [address] [facility] [address]   Phone: [phone]  Fax: [phone]          Can the clinic reply by [facility]?NO    Would the patient rather a call back or a response via [facility]? Yes, call     Best call back number: [phone] (home)

## 2024-10-07 ENCOUNTER — TELEPHONE (OUTPATIENT)
Dept: CARDIOTHORACIC SURGERY | Facility: CLINIC | Age: 79
End: 2024-10-07
Payer: MEDICARE

## 2024-10-07 NOTE — PROGRESS NOTES
Subjective:      Patient ID: Reed Torres is a 79 y.o. male.    Chief Complaint: No chief complaint on file.      HPI:  Reed Torres is a 79 y.o. male who presents for surgical evaluation of TAA. 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 Had a CT scan which showed mild aortic ectasia at 3.8cm.   Patient denies any family history of TAA. Reports that he quit smoking around 30 years ago when he was told that he had pre-emphysema. No prior surgeries on chest. Blood pressure is 'stable.' In clinic today was 159/95 which he reports is higher than normal.     Family and social history reviewed    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     Past Surgical History:   Procedure Laterality Date    ABDOMINAL AORTIC ANEURYSM REPAIR, ENDOVASCULAR Bilateral 1/31/2023    Procedure: REPAIR-ANEURYSM-ILIAC BRANCH ENDOPROSTHESIS-ENDOVASCULAR;  Surgeon: Mario Springer MD;  Location: [facility] OR [address] [place] [address];  Service: Vascular;  Laterality: Bilateral;  mGy: 3707.4  Fluro time: 41.4 miutes  contrast volume: 171mg  Gycm: 363.78      APPENDECTOMY      LEFT HEART CATHETERIZATION Left 7/24/2020    Procedure: Left heart cath;  Surgeon: Keenan Avila MD;  Location: [facility] CATH LAB;  Service: Cardiology;  Laterality: Left;  RN PRE OP 7-.---COVID NEGATIVE ON-- 7-. [place]    SHOULDER ARTHROSCOPY W/ ROTATOR CUFF REPAIR      Left shoulder     Family History       Problem Relation (Age of Onset)    COPD Mother, Father    Diabetes Mother    Heart disease Brother    Hypertension Mother           Social History     Socioeconomic History    Marital status:    Tobacco Use    Smoking status: Former     Current packs/day: 0.00     Types: Cigarettes     Quit date: 2000     Years since quittin.7    Smokeless tobacco: Never    Tobacco comments:     stopped smoking 20 yrs ago.   Substance and Sexual Activity    Alcohol use: Not Currently     Alcohol/week: 5.0 standard drinks of alcohol     Types: 6 Standard drinks or equivalent per week    Drug use: No    Sexual activity: Yes     Partners: Female     Birth control/protection: None   Social History Narrative    Still doing lawn service     Social Drivers of Health     Financial Resource Strain: Low Risk  (2024)    Overall Financial Resource Strain (CARDIA)     Difficulty of Paying Living Expenses: Not hard at all   Food Insecurity: No Food Insecurity (2024)    Hunger Vital Sign     Worried About Running Out of Food in the Last Year: Never true     Ran Out of Food in the Last Year: Never true   Transportation Needs: No Transportation Needs (2024)    PRAPARE - Transportation     Lack of Transportation (Medical): No     Lack of Transportation (Non-Medical): No   Physical Activity: Sufficiently Active (2024)    Exercise Vital Sign     Days of Exercise per Week: 5 days     Minutes of Exercise per Session: 150+ min   Stress: No Stress Concern Present (2024)    [employer] [place] [employer] - Occupational Stress Questionnaire     Feeling of Stress : Only a little   Housing Stability: Low Risk  (2024)    Housing Stability Vital Sign     Unable to Pay for Housing in the Last Year: No     Number of Places Lived in the Last Year: 1     Unstable Housing in the Last Year: No       Current medications Reviewed    Review of Systems   Constitutional:  Negative for activity change.   HENT:  Negative for nosebleeds.    Eyes:  Negative for visual disturbance.   Respiratory:  Negative for shortness of breath.    Cardiovascular:   Negative for chest pain.   Gastrointestinal:  Negative for nausea.   Musculoskeletal:  Negative for gait problem.   Skin:  Negative for color change.   Neurological:  Negative for seizures.   Hematological:  Does not bruise/bleed easily.   Psychiatric/Behavioral:  Negative for sleep disturbance.      Objective:   Physical Exam  Vitals reviewed.   Constitutional:       General: He is not in acute distress.     Appearance: He is well-developed. He is not diaphoretic.   HENT:      Head: Normocephalic and atraumatic.   Eyes:      Pupils: Pupils are equal, round, and reactive to light.   Neck:      Vascular: No JVD.   Cardiovascular:      Rate and Rhythm: Normal rate.   Pulmonary:      Effort: Pulmonary effort is normal. No respiratory distress.   Musculoskeletal:         General: Normal range of motion.      Cervical back: Normal range of motion.   Skin:     Coloration: Skin is not pale.   Neurological:      General: No focal deficit present.      Mental Status: He is alert.   Psychiatric:         Speech: Speech normal.         Behavior: Behavior normal.         Thought Content: Thought content normal.         Judgment: Judgment normal.         Diagnostic Results:   CT 9/25/24  1. Mild fusiform aneurysmal degeneration of the ascending aorta up to 3.8-cm which tapers to normal caliber at the level of the arch.  No imaging evidence of impending rupture.  2. At least 2 hypodense nodules are noted in the bilateral thyroid lobes for which dedicated thyroid ultrasound may be obtained on a nonemergent, outpatient basis if clinically indicated.  3. Subcentimeter hepatic parenchymal hypodensity is too small to accurately characterize.  4. Partially imaged and incompletely evaluated simple appearing renal cortical cystic lesions bilaterally.    TTE 5/17/24    Left Ventricle: The left ventricle is normal in size. Mildly increased wall thickness. There is mild concentric hypertrophy. There is normal systolic function with a  visually estimated ejection fraction of 55 - 60%. Grade I diastolic dysfunction.    Right Ventricle: Normal right ventricular cavity size. Systolic function is normal.    Aorta: Aortic root is mildly dilated measuring 3.82 cm.    Pulmonary Artery: The estimated pulmonary artery systolic pressure is 30 mmHg.      Assessment:   TAA   Plan:   Follow up in 6 months with repeat CT scan     CTS Attending Note:    I have personally taken the history and examined this patient and agree with the SANDHYA's note as stated above.  79-year-old male found to have a 3.8 cm ascending aortic aneurysm.  He does not have prior studies for comparison.  We discussed the importance of blood pressure control.  I will plan to see him back in 6 months with a repeat CT scan.

## 2024-10-07 NOTE — TELEPHONE ENCOUNTER
Attempted to call pt to confirm appt with Dr. Guerin. Matthewm with appt details and department contact number.

## 2024-10-08 ENCOUNTER — OFFICE VISIT (OUTPATIENT)
Dept: CARDIOTHORACIC SURGERY | Facility: CLINIC | Age: 79
End: 2024-10-08
Payer: MEDICARE

## 2024-10-08 VITALS
HEIGHT: 65 IN | SYSTOLIC BLOOD PRESSURE: 159 MMHG | WEIGHT: 138.25 LBS | BODY MASS INDEX: 23.03 KG/M2 | DIASTOLIC BLOOD PRESSURE: 95 MMHG | HEART RATE: 48 BPM | OXYGEN SATURATION: 100 %

## 2024-10-08 DIAGNOSIS — I77.819 AORTIC ECTASIA: Primary | ICD-10-CM

## 2024-10-08 DIAGNOSIS — I71.21 ASCENDING AORTIC ANEURYSM, UNSPECIFIED WHETHER RUPTURED: ICD-10-CM

## 2024-10-08 PROCEDURE — 99999 PR PBB SHADOW E&M-EST. PATIENT-LVL IV: CPT | Mod: PBBFAC,,, | Performed by: THORACIC SURGERY (CARDIOTHORACIC VASCULAR SURGERY)

## 2024-10-08 PROCEDURE — 1126F AMNT PAIN NOTED NONE PRSNT: CPT | Mod: CPTII,S$GLB,, | Performed by: THORACIC SURGERY (CARDIOTHORACIC VASCULAR SURGERY)

## 2024-10-08 PROCEDURE — 1159F MED LIST DOCD IN RCRD: CPT | Mod: CPTII,S$GLB,, | Performed by: THORACIC SURGERY (CARDIOTHORACIC VASCULAR SURGERY)

## 2024-10-08 PROCEDURE — 3080F DIAST BP >= 90 MM HG: CPT | Mod: CPTII,S$GLB,, | Performed by: THORACIC SURGERY (CARDIOTHORACIC VASCULAR SURGERY)

## 2024-10-08 PROCEDURE — 3077F SYST BP >= 140 MM HG: CPT | Mod: CPTII,S$GLB,, | Performed by: THORACIC SURGERY (CARDIOTHORACIC VASCULAR SURGERY)

## 2024-10-08 PROCEDURE — 99204 OFFICE O/P NEW MOD 45 MIN: CPT | Mod: S$GLB,,, | Performed by: THORACIC SURGERY (CARDIOTHORACIC VASCULAR SURGERY)

## 2024-10-16 ENCOUNTER — TELEPHONE (OUTPATIENT)
Dept: FAMILY MEDICINE | Facility: CLINIC | Age: 79
End: 2024-10-16
Payer: MEDICARE

## 2024-10-16 NOTE — TELEPHONE ENCOUNTER
----- Message from Juancho Loco sent at 10/16/2024  4:29 PM CDT -----  Type:  Sooner Appointment Request    Patient is requesting a sooner appointment.  Patient declined first available appointment listed as well as another facility and provider .  Patient will not accept being placed on the waitlist and is requesting a message be sent to doctor.    Name of Caller:  Edilma (Spouse)  When is the first available appointment?  11/1  Symptoms:  back pain/leg pain   Would the patient rather a call back or a response via My [facility]?  Callback   Best Call Back Number:  Telephone Information:  Mobile          [phone]     Additional Information:

## 2024-10-17 ENCOUNTER — TELEPHONE (OUTPATIENT)
Dept: PAIN MEDICINE | Facility: CLINIC | Age: 79
End: 2024-10-17
Payer: MEDICARE

## 2024-10-17 NOTE — TELEPHONE ENCOUNTER
Spoke with pt's wife, an appointment was scheduled for 11/1/24 at 8:00 am. Pt's wife verbalized confirmation.      ----- Message from Rao sent at 10/17/2024 11:05 AM CDT -----  Regarding: appt  Type:Patient Returning Call:Pt        Who Called: MA         Who Left Message for Patient:         Does the patient know what this is regarding? Missed call         Best Call Back Number: [phone]        Additional Information:

## 2024-10-21 DIAGNOSIS — E78.00 PURE HYPERCHOLESTEROLEMIA: Chronic | ICD-10-CM

## 2024-10-21 NOTE — TELEPHONE ENCOUNTER
Care Due:                  Date            Visit Type   Department     Provider  --------------------------------------------------------------------------------                                [facility]                              PRIMARY      MEDICINE/INTERN  Last Visit: 09-      CARE ([facility])   [facility]         [facility]  Next Visit: None Scheduled  None         None Found                                                            Last  Test          Frequency    Reason                     Performed    Due Date  --------------------------------------------------------------------------------    Office Visit  15 months..  atorvastatin.............  09- 12-    Lipid Panel.  12 months..  atorvastatin.............  05- 04-    [facility] Embedded Care Due Messages. Reference number: [id number].   10/21/2024 1:44:25 PM CDT

## 2024-10-22 ENCOUNTER — TELEPHONE (OUTPATIENT)
Dept: VASCULAR SURGERY | Facility: CLINIC | Age: 79
End: 2024-10-22
Payer: MEDICARE

## 2024-10-22 ENCOUNTER — OFFICE VISIT (OUTPATIENT)
Dept: FAMILY MEDICINE | Facility: CLINIC | Age: 79
End: 2024-10-22
Payer: MEDICARE

## 2024-10-22 VITALS
SYSTOLIC BLOOD PRESSURE: 126 MMHG | OXYGEN SATURATION: 95 % | TEMPERATURE: 99 F | HEIGHT: 65 IN | WEIGHT: 140.88 LBS | DIASTOLIC BLOOD PRESSURE: 72 MMHG | HEART RATE: 59 BPM | BODY MASS INDEX: 23.47 KG/M2

## 2024-10-22 DIAGNOSIS — M54.32 SCIATICA, LEFT SIDE: Primary | ICD-10-CM

## 2024-10-22 PROCEDURE — 99214 OFFICE O/P EST MOD 30 MIN: CPT | Mod: S$GLB,,, | Performed by: FAMILY MEDICINE

## 2024-10-22 PROCEDURE — 1125F AMNT PAIN NOTED PAIN PRSNT: CPT | Mod: CPTII,S$GLB,, | Performed by: FAMILY MEDICINE

## 2024-10-22 PROCEDURE — 99999 PR PBB SHADOW E&M-EST. PATIENT-LVL III: CPT | Mod: PBBFAC,,, | Performed by: FAMILY MEDICINE

## 2024-10-22 PROCEDURE — 1101F PT FALLS ASSESS-DOCD LE1/YR: CPT | Mod: CPTII,S$GLB,, | Performed by: FAMILY MEDICINE

## 2024-10-22 PROCEDURE — 3078F DIAST BP <80 MM HG: CPT | Mod: CPTII,S$GLB,, | Performed by: FAMILY MEDICINE

## 2024-10-22 PROCEDURE — 3288F FALL RISK ASSESSMENT DOCD: CPT | Mod: CPTII,S$GLB,, | Performed by: FAMILY MEDICINE

## 2024-10-22 PROCEDURE — 1159F MED LIST DOCD IN RCRD: CPT | Mod: CPTII,S$GLB,, | Performed by: FAMILY MEDICINE

## 2024-10-22 PROCEDURE — 3074F SYST BP LT 130 MM HG: CPT | Mod: CPTII,S$GLB,, | Performed by: FAMILY MEDICINE

## 2024-10-22 RX ORDER — METHYLPREDNISOLONE 4 MG/1
TABLET ORAL
Qty: 1 EACH | Refills: 0 | Status: SHIPPED | OUTPATIENT
Start: 2024-10-22 | End: 2024-11-12

## 2024-10-22 NOTE — TELEPHONE ENCOUNTER
Spoke with pt. States he still having leg pain. He saw sports medicine and took xray and states they told him everything looked okay. Told him his pain could be coming from his back.He has an appt.with his PCP today and appt. scheduled to see PT and pain management. He wanted sooner appt.and declined first available because he will be out of town on a cruise. He wanted something for pain. Aware you do not prescribe pain medication. I told him to go to his PCP and see what she says and if his pain does not improve or worsen to go to the ER. Encourage him to keep appt. with PT and pain management. He verbalized understanding. Declined to schedule follow up at this time. He is due for follow up in January with test prior. Please advise kimberly.

## 2024-10-22 NOTE — TELEPHONE ENCOUNTER
Called pt. No answer. Left message returning call and to call our office.  first available appt. is on 11/25/2024

## 2024-10-22 NOTE — TELEPHONE ENCOUNTER
----- Message from Isaac sent at 10/22/2024  9:11 AM CDT -----  Regarding: self  Type:  Patient Returning Call    Who Called:self    Who Left Message for Patient: Gabbie Chan LPN    Does the patient know what this is regarding?:no    Would the patient rather a call back or a response via My [facility]?callback    Best Call Back Number:[phone]    Additional Information:

## 2024-10-22 NOTE — PROGRESS NOTES
"Subjective     Patient ID: Reed Torres is a 79 y.o. male.    Chief Complaint: Leg Pain    79 year old male presents with left leg pain. He has pain in his buttock. He states it radiates down the back of his leg. He states he was seen in the ED and states he can't take the oxycodone.  He then so vascular  surgery, which cleared him. 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 He can't take NSAIDs due to blood thinners.  He is on gabapentin and taking 2 at night.     He feels fine during the day. It is worse during the day and he can't walk. It is a stabbing pain. I    He was offered physical therapy, but can't go until the 29th of this month.     Leg Pain         History of Present Illness               Review of Systems   Musculoskeletal:  Positive for leg pain.            Objective     Vitals:    10/22/24 1339   BP: 126/72   Pulse: (!) 59   Temp: 98.6 °F (37 °C)   TempSrc: Oral   SpO2: 95%   Weight: 63.9 kg (140 lb 14 oz)   Height: 5' 5" (1.651 m)        Physical Exam  Physical Exam                Assessment and Plan     1. Sciatica, left side  -     methylPREDNISolone (MEDROL DOSEPACK) 4 mg tablet; use as directed  Dispense: 1 each; Refill: 0      Reed was seen today for leg pain.    Diagnoses and all orders for this visit:    Sciatica, left side  -     methylPREDNISolone (MEDROL DOSEPACK) 4 mg tablet; use as directed    Increase gabapentin from 2-3 pills at n ight.     Assessment & Plan                      No follow-ups on file.        This note was generated with the assistance of ambient listening technology. Verbal consent was obtained by the patient and accompanying visitor(s) for the recording of patient appointment to facilitate this note. I attest to having reviewed and edited the generated note for accuracy, though some syntax or spelling errors may persist. Please contact the author of this note for any clarification.    "

## 2024-10-22 NOTE — TELEPHONE ENCOUNTER
----- Message from Tech Marcella sent at 10/22/2024  8:25 AM CDT -----  Regarding: Sooner appointment request  .Type:  Sooner Appointment Request    Patient is requesting a sooner appointment.  Patient declined first available appointment listed as well as another facility and provider .  Patient will not accept being placed on the waitlist and is requesting a message be sent to doctor.    Name of Caller: self     When is the first available appointment? 11/26/2024    Symptoms: leg pain    Would the patient rather a call back or a response via My [facility]? Call     Best Call Back Number: .[phone]      Additional Information:

## 2024-10-23 RX ORDER — ATORVASTATIN CALCIUM 40 MG/1
40 TABLET, FILM COATED ORAL NIGHTLY
Qty: 90 TABLET | Refills: 0 | Status: SHIPPED | OUTPATIENT
Start: 2024-10-23

## 2024-10-29 ENCOUNTER — CLINICAL SUPPORT (OUTPATIENT)
Dept: REHABILITATION | Facility: HOSPITAL | Age: 79
End: 2024-10-29
Attending: ORTHOPAEDIC SURGERY
Payer: MEDICARE

## 2024-10-29 DIAGNOSIS — G89.29 CHRONIC LEFT-SIDED LOW BACK PAIN WITH LEFT-SIDED SCIATICA: Primary | ICD-10-CM

## 2024-10-29 DIAGNOSIS — M54.42 CHRONIC LEFT-SIDED LOW BACK PAIN WITH LEFT-SIDED SCIATICA: Primary | ICD-10-CM

## 2024-10-29 PROCEDURE — 97110 THERAPEUTIC EXERCISES: CPT | Mod: PN

## 2024-10-29 PROCEDURE — 97161 PT EVAL LOW COMPLEX 20 MIN: CPT | Mod: PN

## 2024-10-30 ENCOUNTER — TELEPHONE (OUTPATIENT)
Dept: VASCULAR SURGERY | Facility: CLINIC | Age: 79
End: 2024-10-30
Payer: MEDICARE

## 2024-11-01 ENCOUNTER — OFFICE VISIT (OUTPATIENT)
Dept: PAIN MEDICINE | Facility: CLINIC | Age: 79
End: 2024-11-01
Payer: MEDICARE

## 2024-11-01 VITALS
SYSTOLIC BLOOD PRESSURE: 118 MMHG | DIASTOLIC BLOOD PRESSURE: 84 MMHG | BODY MASS INDEX: 22.19 KG/M2 | RESPIRATION RATE: 18 BRPM | WEIGHT: 133.19 LBS | HEART RATE: 72 BPM | HEIGHT: 65 IN | OXYGEN SATURATION: 97 %

## 2024-11-01 DIAGNOSIS — M47.816 LUMBAR SPONDYLOSIS: ICD-10-CM

## 2024-11-01 DIAGNOSIS — M54.16 LUMBAR RADICULOPATHY: ICD-10-CM

## 2024-11-01 DIAGNOSIS — M51.361 DEGENERATION OF INTERVERTEBRAL DISC OF LUMBAR REGION WITH LOWER EXTREMITY PAIN: Primary | ICD-10-CM

## 2024-11-01 PROCEDURE — 99999 PR PBB SHADOW E&M-EST. PATIENT-LVL IV: CPT | Mod: PBBFAC,,, | Performed by: PAIN MEDICINE

## 2024-11-08 ENCOUNTER — CLINICAL SUPPORT (OUTPATIENT)
Dept: REHABILITATION | Facility: HOSPITAL | Age: 79
End: 2024-11-08
Payer: MEDICARE

## 2024-11-08 DIAGNOSIS — G89.29 CHRONIC LEFT-SIDED LOW BACK PAIN WITH LEFT-SIDED SCIATICA: Primary | ICD-10-CM

## 2024-11-08 DIAGNOSIS — M54.42 CHRONIC LEFT-SIDED LOW BACK PAIN WITH LEFT-SIDED SCIATICA: Primary | ICD-10-CM

## 2024-11-08 PROCEDURE — 97110 THERAPEUTIC EXERCISES: CPT | Mod: PN,CQ

## 2024-11-08 PROCEDURE — 97112 NEUROMUSCULAR REEDUCATION: CPT | Mod: PN,CQ

## 2024-11-08 NOTE — PROGRESS NOTES
"[facility]   Physical Therapy Treatment Note      Name: Reed Torres  Clinic Number: [id number]    Therapy Diagnosis:   Encounter Diagnosis   Name Primary?    Chronic left-sided low back pain with left-sided sciatica Yes     Physician: FRANKLYN Abdi MD    Visit Date: 11/8/2024        Therapy Diagnosis:        Encounter Diagnosis   Name Primary?    Chronic left-sided low back pain with left-sided sciatica Yes        Physician: FRANKLYN Abdi MD     Physician Orders: PT Eval and Treat   Medical Diagnosis from Referral: LBP  Evaluation Date: 10/29/2024  Authorization Period Expiration: 10/1/25  Plan of Care Expiration: 12/31/24  Progress Note Due: 11/29/24  Date of Surgery: na  Visit # / Visits authorized: 1/ 1   FOTO: 1/ 3     Precautions: Standard      Time In: 10:00 am   Time Out: 11:00 am   Total Billable Time: 56 minutes    PTA Visit #: 1/5       Subjective     Patient reports: That first visit went pretty good.  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  If nothing changes after doing therapy, the DR is going to do an MRI to rule anything else out.   He was compliant with home exercise program.  Response to previous treatment: initial eval   Functional change: NA     Pain: not provided/10  Location: bilateral back      Objective      Objective Measures updated at progress report unless specified.     Treatment     Reed received the treatments listed below:      therapeutic exercises to develop strength and ROM for 26 minutes including:  Patient education on activity modification   +Nustep 10' lvl 2  +LTR 2'   DKTC w/  red SB  2'  Seated lumbar flexion (3 ways) 10x10s      neuromuscular re-education activities to improve: Kinesthetic and Proprioception for 30minutes. The following activities were included:  +Supine Hip Abd GTB 3x10   +Supine Hip Add 3x10 5" hold   +PPT 30x 5" hold   +Glute Bridges " 2x10      Patient Education and Home Exercises      Education provided:   - see above      Written Home Exercises Provided: Yes. Exercises were reviewed and Reed was able to demonstrate them prior to the end of the session.  Reed demonstrated good  understanding of the education provided. See EMR under Patient Instructions for exercises provided during therapy sessions.     Assessment    Mr. Mcbride presented to PT today for his first visit following his initial eval, reporting continued LBP with radicular symptoms.  Introduced exercises for improved core/mallory hip strength, as well as lumbar ROM.   He required moderate verbal/tactile cueing to perform with proper form and mm activation.  However, he was a le to carryover fairly well and complete today's session without reports of increased LBP or discomfort.        Patient prognosis is Good.   Patient will benefit from skilled outpatient Physical Therapy to address the deficits stated above and in the chart below, provide patient /family education, and to maximize patientt's level of independence.      Plan of care discussed with patient: Yes  Patient's spiritual, cultural and educational needs considered and patient is agreeable to the plan of care and goals as stated below:      Anticipated Barriers for therapy: chronicity          GOALS: Short Term Goals:  4 weeks  1.Report decreased lumbar pain  < / =  5/10  to increase tolerance for ADLs  2. Increase ROM by 10-20% where limited in order to perform ADLs without difficulty.  3. Increase strength by 1/3 MMT grade in areas of limitation to increase tolerance for ADL and work activities.  4. Pt to tolerate HEP to improve ROM and independence with ADL's     Long Term Goals: 8 weeks  1.Report decreased lumbar pain < / = 2/10  to increase tolerance for ADLs  2.Patient goal: able to walk for 1 hour or greater  3.Increase strength to 4+/5 in  areas of limitation  to increase tolerance for ADL and work activities.  4.  Pt will report at <35%  on FOTO  to demonstrate increase in LE function with every day tasks.      Plan     Continue to progress core and mallory hip strength, as well as lumbar ROM.     Yoana Jack, PTA

## 2024-11-17 NOTE — PROGRESS NOTES
"[facility]   Physical Therapy Treatment Note      Name: Reed Torres  [facility] Number: [id number]    Therapy Diagnosis:   Encounter Diagnosis   Name Primary?    Chronic left-sided low back pain with left-sided sciatica Yes       Physician: FRANKLYN Abdi MD    Visit Date: 11/18/2024        Therapy Diagnosis:        Encounter Diagnosis   Name Primary?    Chronic left-sided low back pain with left-sided sciatica Yes        Physician: FRANKLYN Abdi MD     Physician Orders: PT Eval and Treat   Medical Diagnosis from Referral: LBP  Evaluation Date: 10/29/2024  Authorization Period Expiration: 10/1/25  Plan of Care Expiration: 12/31/24  Progress Note Due: 11/29/24  Date of Surgery: na  Visit # / Visits authorized: 2/ eval +20  FOTO: 1/ 3     Precautions: Standard      Time In: 8:00 am  Time Out: 9:00 am   Total Billable Time: 58 minutes    PTA Visit #: 1/5       Subjective     Patient reports: He was a little sore after the last visit but it didn't last long and it got better.  Some days are better and some days are worse with the back pain.  It maksim comes and goes.     He was compliant with home exercise program.  Response to previous treatment: initial eval   Functional change: NA     Pain: not provided/10  Location: bilateral back      Objective      Objective Measures updated at progress report unless specified.     Treatment     Reed received the treatments listed below:      therapeutic exercises to develop strength and ROM for 30 minutes including:  Patient education on activity modification   Nustep 10' lvl 2  LTR 2'   +Supine nerve glides 2x10   DKTC w/  red SB  30x  Seated lumbar flexion (3 ways) 10x10s     neuromuscular re-education activities to improve: Kinesthetic and Proprioception for 28 minutes. The following activities were included:  Supine Hip Abd GTB 3x10   Supine Hip Add 3x10 5" hold   PPT 30x 5" hold   Glute Bridges +3x10      Patient Education and Home Exercises "      Education provided:   - see above      Written Home Exercises Provided: Yes. Exercises were reviewed and Reed was able to demonstrate them prior to the end of the session.  Reed demonstrated good  understanding of the education provided. See EMR under Patient Instructions for exercises provided during therapy sessions.     Assessment    Mr. Mcbride presented to PT today reporting slight overall mm soreness following the previous visit but without reports of increased LBP.  Continued with focus on improved core and mallory LE strength.  Introduced supine nerve glides for decreased radicular symptoms and pain, which Mr. Mcbride was able to tolerate. He reported increased mm soreness and fatigue  during supine hip abd/add exercises but was able to complete without reports of increased LBP or radicular symptoms.  Will continue to progress as tolerated.          Patient prognosis is Good.   Patient will benefit from skilled outpatient Physical Therapy to address the deficits stated above and in the chart below, provide patient /family education, and to maximize patientt's level of independence.      Plan of care discussed with patient: Yes  Patient's spiritual, cultural and educational needs considered and patient is agreeable to the plan of care and goals as stated below:      Anticipated Barriers for therapy: chronicity          GOALS: Short Term Goals:  4 weeks  1.Report decreased lumbar pain  < / =  5/10  to increase tolerance for ADLs  2. Increase ROM by 10-20% where limited in order to perform ADLs without difficulty.  3. Increase strength by 1/3 MMT grade in areas of limitation to increase tolerance for ADL and work activities.  4. Pt to tolerate HEP to improve ROM and independence with ADL's     Long Term Goals: 8 weeks  1.Report decreased lumbar pain < / = 2/10  to increase tolerance for ADLs  2.Patient goal: able to walk for 1 hour or greater  3.Increase strength to 4+/5 in  areas of limitation  to increase  tolerance for ADL and work activities.  4. Pt will report at <35%  on FOTO  to demonstrate increase in LE function with every day tasks.      Plan     Continue to progress core and mallory hip strength, as well as lumbar ROM.     Yoana Jack, PTA

## 2024-11-18 ENCOUNTER — CLINICAL SUPPORT (OUTPATIENT)
Dept: REHABILITATION | Facility: HOSPITAL | Age: 79
End: 2024-11-18
Payer: MEDICARE

## 2024-11-18 DIAGNOSIS — G89.29 CHRONIC LEFT-SIDED LOW BACK PAIN WITH LEFT-SIDED SCIATICA: Primary | ICD-10-CM

## 2024-11-18 DIAGNOSIS — M54.42 CHRONIC LEFT-SIDED LOW BACK PAIN WITH LEFT-SIDED SCIATICA: Primary | ICD-10-CM

## 2024-11-18 PROCEDURE — 97110 THERAPEUTIC EXERCISES: CPT | Mod: PN,CQ

## 2024-11-18 PROCEDURE — 97112 NEUROMUSCULAR REEDUCATION: CPT | Mod: PN,CQ

## 2024-11-22 DIAGNOSIS — G62.9 NEUROPATHY: ICD-10-CM

## 2024-11-22 RX ORDER — GABAPENTIN 300 MG/1
CAPSULE ORAL
Qty: 90 CAPSULE | Refills: 11 | Status: SHIPPED | OUTPATIENT
Start: 2024-11-22

## 2024-11-22 NOTE — TELEPHONE ENCOUNTER
----- Message from Isaac sent at 11/22/2024  1:47 PM CST -----  Regarding: self  Type: RX Refill Request    Who Called:    Have you contacted your pharmacy:    Refill    RX Name and Strength:gabapentin (NEURONTIN) 300 MG capsule    Preferred Pharmacy with phone number:      [id number] - [place] - [facility] [address]  [facility] [address]  [place] [address]  Phone: [phone] Fax: [phone]        Local or Mail Order:local    Would the patient rather a call back or a response via [facility]? callback    Best Call Back Number:[phone]    Additional Information:

## 2024-11-22 NOTE — TELEPHONE ENCOUNTER
No care due was identified.  [facility] Embedded Care Due Messages. Reference number: [id number].   11/22/2024 1:53:14 PM CST

## 2024-12-03 ENCOUNTER — CLINICAL SUPPORT (OUTPATIENT)
Dept: REHABILITATION | Facility: HOSPITAL | Age: 79
End: 2024-12-03
Payer: MEDICARE

## 2024-12-03 DIAGNOSIS — G89.29 CHRONIC LEFT-SIDED LOW BACK PAIN WITH LEFT-SIDED SCIATICA: Primary | ICD-10-CM

## 2024-12-03 DIAGNOSIS — M54.42 CHRONIC LEFT-SIDED LOW BACK PAIN WITH LEFT-SIDED SCIATICA: Primary | ICD-10-CM

## 2024-12-03 PROCEDURE — 97110 THERAPEUTIC EXERCISES: CPT | Mod: PN

## 2024-12-03 NOTE — PROGRESS NOTES
[facility]   Physical Therapy Treatment Note / D/C      Name: Reed Torres  Clinic Number: [id number]    Therapy Diagnosis:   Encounter Diagnosis   Name Primary?    Chronic left-sided low back pain with left-sided sciatica Yes       Physician: FRANKLYN Abdi MD    Visit Date: 12/3/2024        Therapy Diagnosis:        Encounter Diagnosis   Name Primary?    Chronic left-sided low back pain with left-sided sciatica Yes        Physician: FRANKLYN Abdi MD     Physician Orders: PT Eval and Treat   Medical Diagnosis from Referral: LBP  Evaluation Date: 10/29/2024  Authorization Period Expiration: 10/1/25  Plan of Care Expiration: 12/31/24  Progress Note Due:  Date of Surgery: na  Visit # / Visits authorized: 3/ eval +20  FOTO: 1/ 3     Precautions: Standard      Time In: 8:00 am  Time Out: 9:00 am   Total Billable Time: 58 minutes    PTA Visit #: 1/5       Subjective     Patient reports: He still feels his leg intermittently but less. Notes that he has not been doing his exercises since he has been on a cruise . He was working on his [employer] vehicle and his back is still hurting from this. Other than this, he has been better overall      He was compliant with home exercise program.  Response to previous treatment: initial eval   Functional change: NA     Pain: not provided/10  Location: bilateral back      Objective      Observation: alert and oriented      Posture:  flattened Lspine     Lumbar Range of Motion:     Limitations Pain   Flexion 50%    n         Extension 50%    n         Left Side Bending 50% n         Right Side Bending 50% n               Lower Extremity Strength  Right LE   Left LE     Quadriceps: 5/5 Quadriceps: 5/5   Hamstrings: 5/5 Hamstrings: 5/5   Iliospoas: 4/5 Iliospoas: 4+/5   Hip extension:  3+/5 Hip extension: 3+/5   PGM: 3+/5 PGM: 3+/5   Hip ER:  4+/5 Hip ER: 4+/5   Hip IR: 4+/5 Hip IR: 4+/5   Ankle dorsiflexion: 4+/5 Ankle dorsiflexion: 4+/5   Ankle  plantarflexion: 5/5 Ankle plantarflexion: 5/5      Special Tests:  Bicycle Test of Oleg Samuel: no symptoms reported after 10 mins       Intake Outcome Measure for FOTO low back Survey     Therapist reviewed FOTO scores for Reed Torres on 12/03/2024   FOTO report - see Media section or FOTO account episode details.     Intake Score: see chart %          Treatment     Reed received the treatments listed below:      therapeutic exercises to develop strength and ROM for 30 minutes including:  Reassessment as above   Education on HEP          Patient Education and Home Exercises      Education provided:   - see above      Written Home Exercises Provided: Yes. Exercises were reviewed and Reed was able to demonstrate them prior to the end of the session.  Reed demonstrated good  understanding of the education provided. See EMR under Patient Instructions for exercises provided during therapy sessions.     Assessment   Pt has slight improvements in strength overall and subjective reduction of radicular pain. Again Unable to reproduce the pts pain in the clinic today. Likely has reached a plateau at this time and patient agrees to being d/c at this time.           Patient prognosis is Good.   Patient will benefit from skilled outpatient Physical Therapy to address the deficits stated above and in the chart below, provide patient /family education, and to maximize patientt's level of independence.      Plan of care discussed with patient: Yes  Patient's spiritual, cultural and educational needs considered and patient is agreeable to the plan of care and goals as stated below:      Anticipated Barriers for therapy: chronicity          GOALS: Short Term Goals:  4 weeks MET all   1.Report decreased lumbar pain  < / =  5/10  to increase tolerance for ADLs  2. Increase ROM by 10-20% where limited in order to perform ADLs without difficulty.  3. Increase strength by 1/3 MMT grade in areas of limitation to increase tolerance  for ADL and work activities.  4. Pt to tolerate HEP to improve ROM and independence with ADL's     Long Term Goals: 8 weeks NOT MET  1.Report decreased lumbar pain < / = 2/10  to increase tolerance for ADLs  2.Patient goal: able to walk for 1 hour or greater  3.Increase strength to 4+/5 in  areas of limitation  to increase tolerance for ADL and work activities.  4. Pt will report at <35%  on FOTO  to demonstrate increase in LE function with every day tasks.      Plan     D/c     Skip Miranda, PT

## 2024-12-04 ENCOUNTER — TELEPHONE (OUTPATIENT)
Dept: VASCULAR SURGERY | Facility: CLINIC | Age: 79
End: 2024-12-04
Payer: MEDICARE

## 2024-12-05 ENCOUNTER — TELEPHONE (OUTPATIENT)
Dept: PAIN MEDICINE | Facility: CLINIC | Age: 79
End: 2024-12-05
Payer: MEDICARE

## 2024-12-05 ENCOUNTER — HOSPITAL ENCOUNTER (OUTPATIENT)
Dept: CARDIOLOGY | Facility: HOSPITAL | Age: 79
Discharge: HOME | End: 2024-12-05
Attending: SURGERY
Payer: MEDICARE

## 2024-12-05 ENCOUNTER — TELEPHONE (OUTPATIENT)
Dept: VASCULAR SURGERY | Facility: CLINIC | Age: 79
End: 2024-12-05

## 2024-12-05 ENCOUNTER — TELEPHONE (OUTPATIENT)
Dept: CARDIOLOGY | Facility: CLINIC | Age: 79
End: 2024-12-05
Payer: MEDICARE

## 2024-12-05 ENCOUNTER — OFFICE VISIT (OUTPATIENT)
Dept: VASCULAR SURGERY | Facility: CLINIC | Age: 79
End: 2024-12-05
Attending: SURGERY
Payer: MEDICARE

## 2024-12-05 VITALS
BODY MASS INDEX: 23.73 KG/M2 | DIASTOLIC BLOOD PRESSURE: 86 MMHG | HEART RATE: 66 BPM | HEIGHT: 65 IN | SYSTOLIC BLOOD PRESSURE: 153 MMHG | WEIGHT: 142.44 LBS

## 2024-12-05 DIAGNOSIS — I71.40 ABDOMINAL AORTIC ANEURYSM (AAA) WITHOUT RUPTURE, UNSPECIFIED PART: Primary | ICD-10-CM

## 2024-12-05 DIAGNOSIS — Z01.818 PRE-OP TESTING: Primary | ICD-10-CM

## 2024-12-05 DIAGNOSIS — I73.9 PERIPHERAL VASCULAR DISEASE, UNSPECIFIED: Primary | ICD-10-CM

## 2024-12-05 DIAGNOSIS — I72.3 ANEURYSM OF RIGHT COMMON ILIAC ARTERY: ICD-10-CM

## 2024-12-05 DIAGNOSIS — I50.9 CONGESTIVE HEART FAILURE, UNSPECIFIED HF CHRONICITY, UNSPECIFIED HEART FAILURE TYPE: ICD-10-CM

## 2024-12-05 LAB
LEFT ABI: 1.14
LEFT ANT TIBIAL SYS PSV: 78 CM/S
LEFT ARM BP: 151 MMHG
LEFT CFA PSV: 42 CM/S
LEFT DORSALIS PEDIS: 169 MMHG
LEFT EXTERNAL ILIAC PSV: 65 CM/S
LEFT PERONEAL SYS PSV: 42 CM/S
LEFT POPLITEAL PSV: 30 CM/S
LEFT POST TIBIAL SYS PSV: 53 CM/S
LEFT POSTERIOR TIBIAL: 172 MMHG
LEFT PROFUNDA SYS PSV: 35 CM/S
LEFT SUPER FEMORAL DIST SYS PSV: 42 CM/S
LEFT SUPER FEMORAL MID SYS PSV: 66 CM/S
LEFT SUPER FEMORAL OSTIAL SYS PSV: 71 CM/S
LEFT SUPER FEMORAL PROX SYS PSV: 51 CM/S
LEFT TBI: 0.95
LEFT TIB/PER TRUNK SYS PSV: 49 CM/S
LEFT TOE PRESSURE: 144 MMHG
OHS CV LEFT LOWER EXTREMITY ABI (NO CALC): 1.14
RIGHT ABI: 1.4
RIGHT ARM BP: 143 MMHG
RIGHT DORSALIS PEDIS: 160 MMHG
RIGHT POSTERIOR TIBIAL: 211 MMHG
RIGHT TBI: 0.85
RIGHT TOE PRESSURE: 128 MMHG

## 2024-12-05 PROCEDURE — 3077F SYST BP >= 140 MM HG: CPT | Mod: CPTII,S$GLB,, | Performed by: SURGERY

## 2024-12-05 PROCEDURE — 99214 OFFICE O/P EST MOD 30 MIN: CPT | Mod: S$GLB,,, | Performed by: SURGERY

## 2024-12-05 PROCEDURE — 3079F DIAST BP 80-89 MM HG: CPT | Mod: CPTII,S$GLB,, | Performed by: SURGERY

## 2024-12-05 PROCEDURE — 3288F FALL RISK ASSESSMENT DOCD: CPT | Mod: CPTII,S$GLB,, | Performed by: SURGERY

## 2024-12-05 PROCEDURE — 99999 PR PBB SHADOW E&M-EST. PATIENT-LVL III: CPT | Mod: PBBFAC,,, | Performed by: SURGERY

## 2024-12-05 PROCEDURE — 93926 LOWER EXTREMITY STUDY: CPT | Mod: 26,LT,, | Performed by: SURGERY

## 2024-12-05 PROCEDURE — 93926 LOWER EXTREMITY STUDY: CPT | Mod: LT

## 2024-12-05 PROCEDURE — 93922 UPR/L XTREMITY ART 2 LEVELS: CPT | Mod: 26,,, | Performed by: SURGERY

## 2024-12-05 PROCEDURE — 93922 UPR/L XTREMITY ART 2 LEVELS: CPT

## 2024-12-05 PROCEDURE — 1125F AMNT PAIN NOTED PAIN PRSNT: CPT | Mod: CPTII,S$GLB,, | Performed by: SURGERY

## 2024-12-05 PROCEDURE — 1101F PT FALLS ASSESS-DOCD LE1/YR: CPT | Mod: CPTII,S$GLB,, | Performed by: SURGERY

## 2024-12-05 PROCEDURE — 1159F MED LIST DOCD IN RCRD: CPT | Mod: CPTII,S$GLB,, | Performed by: SURGERY

## 2024-12-05 RX ORDER — CILOSTAZOL 50 MG/1
50 TABLET ORAL 2 TIMES DAILY
Qty: 90 TABLET | Refills: 11 | Status: SHIPPED | OUTPATIENT
Start: 2024-12-05 | End: 2025-12-05

## 2024-12-05 NOTE — TELEPHONE ENCOUNTER
LVM stating I was calling about a message we received to schedule the pt for an evaluation with pain management. I stated the pt is currently scheduled to follow up on 12/13/24, but I was calling to see if the pt would to see if we gout get the pt scheduled for a sooner date. I stated if I don't hear back from the pt we will keep the appointment as originally scheduled.        ----- Message from Vivek Lauren MD sent at 12/5/2024 11:12 AM CST -----  He's scheduled to come back in next week. I can ask my staff to reach out and see if he wants to move his appointment up. Thanks.  ----- Message -----  From: Mario Springer MD  Sent: 12/5/2024  10:32 AM CST  To: Vivek Lauren Jr., MD    Hey!    Could you please get him back in to eval his LLE pain, I am working up for PVD as source but do not see any significant issues at this time    Thanks!    Alonso

## 2024-12-05 NOTE — TELEPHONE ENCOUNTER
----- Message from Charlotte sent at 12/5/2024  1:41 PM CST -----  Who Called: self      Who Left Message for Patient: Miryam      Does the patient know what this is regarding?: does not  know      Would the patient rather a call back or a response via My [facility]? Call Back      Best Call Back Number: .[phone]        Additional Information:

## 2024-12-05 NOTE — PROGRESS NOTES
Mario Springer MD [facility]                         12/05/2024    HPI:  Reed Torres is a 79 y.o. male with   Patient Active Problem List   Diagnosis    Hyperlipidemia    Tuberculosis exposure    BPH (benign prostatic hyperplasia)    Osteoarthritis of lumbar spine    Hyperbilirubinemia, long standing, favor GILBERT    Abnormal stress test    Anxiety disorder    SVT (supraventricular tachycardia)    Pulmonary emphysema    Coronary artery disease due to lipid rich plaque    PAF (paroxysmal atrial fibrillation)    Aortic atherosclerosis    Aneurysm of right common iliac artery    Thrombocytopenia, unspecified    Hearing decreased    Degenerative disease of nervous system, unspecified    Gastroesophageal reflux disease without esophagitis    Primary open angle glaucoma (POAG) of both eyes, indeterminate stage    Psoriasis vulgaris    Cervical radiculopathy    Insomnia    Cognitive impairment    Aortic ectasia    Chronic left-sided low back pain with left-sided sciatica    being managed by PCP and specialists who is here today for evaluation of mesenteric ischemia.  Pt with c/o LUQ and L chest discomfort intermittent without known triggers.  Patient states location is LUQ and L chest under breastbone occurring for a few months.  Associated signs and symptoms include belching.  No food fear or significant weight loss.  Quality is pressure and severity is 3/10.  Symptoms began a few mo ago.  Alleviating factors include activity.  Worsening factors include none.    no MI  no Stroke  Tobacco use: denies    1/2021:  States he has symptoms of belching and bloating.  Denies food fear and weight loss.  Symptoms are worsened when he bends forward.  States he has not seen GI in about 4 months where he was diagnosed with reflux although his dosage of his medication has been decreased.  Denies pelvic pain or abdominal pain or back pain.  No functional limitation or leg  pain.    2021:  C/o LUQ pain, R thigh intermittent pain, no abd or pelvic pain.  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    2022:  No complaints.    10/2022:  no new issues.  No pelvic or abd pain.    3/2023:  no complaints.  S/p R RONAK stand alone 23.     2023:  pt doing well.    3/2024:  no abd pain.  No buttock claudication.    2024:  c/o L thigh pain at rest and when ambulating, radiating sharp to knee laterally.    2024: c/o L thigh pain and buttock pain at rest and when ambulating long days.    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     Past Surgical History:   Procedure Laterality Date    ABDOMINAL AORTIC ANEURYSM REPAIR, ENDOVASCULAR Bilateral 2023    Procedure: REPAIR-ANEURYSM-ILIAC BRANCH ENDOPROSTHESIS-ENDOVASCULAR;  Surgeon: Mario Springer MD;  Location: [address];  Service: Vascular;  Laterality: Bilateral;  mGy: 3707.4  Fluro time: 41.4 miutes  contrast volume: 171mg  Gycm: 363.78      APPENDECTOMY      LEFT HEART CATHETERIZATION Left 2020    Procedure: Left heart cath;  Surgeon: Keenan Avila MD;  Location: [facility] CATH LAB;  Service: Cardiology;  Laterality: Left;  RN PRE OP 2020.---COVID NEGATIVE ON-- 2020. [place]    SHOULDER ARTHROSCOPY W/ ROTATOR CUFF REPAIR      Left shoulder     Family History   Problem Relation Name Age of Onset    COPD Mother      Diabetes Mother      Hypertension Mother      COPD Father lungs failed     Heart disease Brother       Social History     Socioeconomic History    Marital status:    Tobacco Use    Smoking status: Former     Current packs/day: 0.00     Types: Cigarettes     Quit date: 2000     Years since quittin.9    Smokeless tobacco: Never    Tobacco comments:     stopped smoking 20 yrs ago.   Substance and  Sexual Activity    Alcohol use: Not Currently     Alcohol/week: 5.0 standard drinks of alcohol     Types: 6 Standard drinks or equivalent per week    Drug use: No    Sexual activity: Yes     Partners: Female     Birth control/protection: None   Social History Narrative    Still doing lawn service     Social Drivers of Health     Financial Resource Strain: Low Risk  (2/2/2024)    Overall Financial Resource Strain (CARDIA)     Difficulty of Paying Living Expenses: Not hard at all   Food Insecurity: No Food Insecurity (2/2/2024)    Hunger Vital Sign     Worried About Running Out of Food in the Last Year: Never true     Ran Out of Food in the Last Year: Never true   Transportation Needs: No Transportation Needs (2/2/2024)    PRAPARE - Transportation     Lack of Transportation (Medical): No     Lack of Transportation (Non-Medical): No   Physical Activity: Sufficiently Active (2/2/2024)    Exercise Vital Sign     Days of Exercise per Week: 5 days     Minutes of Exercise per Session: 150+ min   Stress: No Stress Concern Present (2/2/2024)    [employer] [place] [employer] - Occupational Stress Questionnaire     Feeling of Stress : Only a little   Housing Stability: Low Risk  (2/2/2024)    Housing Stability Vital Sign     Unable to Pay for Housing in the Last Year: No     Number of Places Lived in the Last Year: 1     Unstable Housing in the Last Year: No       Current Outpatient Medications:     amiodarone (PACERONE) 200 MG Tab, TAKE 1 TABLET BY MOUTH EVERY DAY, Disp: 90 tablet, Rfl: 3    apixaban (ELIQUIS) 5 mg Tab, Take 1 tablet (5 mg total) by mouth 2 (two) times daily., Disp: 60 tablet, Rfl: 11    aspirin (ECOTRIN) 81 MG EC tablet, Take 81 mg by mouth every morning., Disp: , Rfl:     atorvastatin (LIPITOR) 40 MG tablet, TAKE 1 TABLET(40 MG) BY MOUTH EVERY EVENING, Disp: 90 tablet, Rfl: 0    ciclopirox (PENLAC) 8 % Soln, Apply topically nightly., Disp: 6.6 mL, Rfl: 11    docusate sodium (COLACE) 100 MG  capsule, Take 1 capsule (100 mg total) by mouth 2 (two) times daily., Disp: 60 capsule, Rfl: 0    dorzolamide-timolol 2-0.5% (COSOPT) 22.3-6.8 mg/mL ophthalmic solution, Place 1 drop into both eyes 2 (two) times daily., Disp: , Rfl:     ELIQUIS 5 mg Tab, TAKE ONE TABLET BY MOUTH TWICE DAILY, Disp: 60 tablet, Rfl: 11    gabapentin (NEURONTIN) 300 MG capsule, TAKE 1-3 CAPSULES BY MOUTH EVERY EVENING, Disp: 90 capsule, Rfl: 11    latanoprost 0.005 % ophthalmic solution, Place into both eyes every evening., Disp: , Rfl:     oxyCODONE (ROXICODONE) 5 MG immediate release tablet, Take 1 tablet (5 mg total) by mouth every 4 (four) hours as needed for Pain., Disp: 8 tablet, Rfl: 0    pantoprazole (PROTONIX) 20 MG tablet, Take 20 mg by mouth., Disp: , Rfl:     vit A/vit C/vit E/zinc/copper (OCUVITE PRESERVISION ORAL), Take by mouth 2 (two) times a day., Disp: , Rfl:     REVIEW OF SYSTEMS:  General: No fevers or chills; ENT: No sore throat; Allergy and Immunology: no persistent infections; Hematological and Lymphatic: No history of bleeding or easy bruising; Endocrine: negative; Respiratory: no cough, shortness of breath, or wheezing; Cardiovascular: no chest pain or dyspnea on exertion; Gastrointestinal: no abdominal pain/back, change in bowel habits, or bloody stools; Genito-Urinary: no dysuria, trouble voiding, or hematuria; Musculoskeletal: negative; Neurological: no TIA or stroke symptoms; Psychiatric: no nervousness, anxiety or depression.    PHYSICAL EXAM:      Pulse: 66         General appearance:  Alert, well-appearing, and in no distress.  Oriented to person, place, and time                    Neurological: Normal speech, no focal findings noted; CN II - XII grossly intact. RLE with sensation to light touch, LLE with sensation to light touch.            Musculoskeletal: Digits/nail without cyanosis/clubbing.  Strength 5/5 BLE.                    Neck: Supple, no significant adenopathy, no carotid bruit can be  "auscultated                  Chest:  Clear to auscultation, no wheezes, rales or rhonchi, symmetric air entry. No use of accessory muscles               Cardiac: Normal rate and regular rhythm, S1 and S2 normal            Abdomen: Soft, min epigastric tenderness, nondistended, no masses or organomegaly, no hernia     No rebound tenderness noted; bowel sounds normal     Pulsatile aortic mass is non palpable.     No groin adenopathy      Extremities:   2+ R femoral pulse, 2+ L femoral pulse     2+ R popliteal pulse, 2+ L popliteal pulse     2+ R PT pulse, 2+ L PT pulse     2+ R DP pulse, 2+ L DP pulse     no RLE edema, no LLE edema    Skin: RLE without tissue loss; LLE without tissue loss    LAB RESULTS:  No results found for: "CBC"  Lab Results   Component Value Date    LABPROT 10.9 07/02/2023    INR 1.0 07/02/2023     Lab Results   Component Value Date     04/17/2024    K 4.1 04/17/2024     (H) 04/17/2024    CO2 25 04/17/2024    GLU 93 04/17/2024    BUN 18 04/17/2024    CREATININE 0.9 04/17/2024    CALCIUM 9.2 04/17/2024    ANIONGAP 5 (L) 04/17/2024    EGFRNONAA >60 07/03/2022     Lab Results   Component Value Date    WBC 4.81 04/17/2024    RBC 4.39 (L) 04/17/2024    HGB 13.4 (L) 04/17/2024    HCT 39 04/17/2024    MCV 95 04/17/2024    MCH 30.5 04/17/2024    MCHC 32.2 04/17/2024    RDW 13.8 04/17/2024     04/17/2024    MPV 11.5 04/17/2024    GRAN 2.5 04/17/2024    GRAN 52.4 04/17/2024    LYMPH 1.7 04/17/2024    LYMPH 34.9 04/17/2024    MONO 0.5 04/17/2024    MONO 9.4 04/17/2024    EOS 0.1 04/17/2024    BASO 0.03 04/17/2024    EOSINOPHIL 2.5 04/17/2024    BASOPHIL 0.6 04/17/2024    DIFFMETHOD Automated 04/17/2024     .  Lab Results   Component Value Date    HGBA1C 5.1 03/03/2022       IMAGING:  All pertinent imaging has been reviewed and interpreted independently.    -BLE arterial US negative for popliteal aneurysm    3/2023  CTA with well apposed stents and no endoleak     9/2023:  R ROJELIO 4 cm " aneurysm, stent in place, stenosis of hypogastric stent distally with opacification of distal R hypogastric artery    3/2024:  R ROJELIO 4cm aneurysm, no endoleak, stenosis of hypogastric stent distally with opacification of distal R hypogastric artery    12/2024  Left lower extremity arterial ultrasound shows decreased flow in the common femoral artery indicating proximal stenosis, decreased flow in the profunda femoris artery, distal SFA, popliteal arteries. 50-69% stenosis of the AT.     Right lower extremity pressures and waveforms indicate no hemodynamically significant arterial occlusive disease.  Left lower extremity pressures and waveforms indicate no hemodynamically significant arterial occlusive disease.       IMP/PLAN:  79 y.o. male with   Patient Active Problem List   Diagnosis    Hyperlipidemia    Tuberculosis exposure    BPH (benign prostatic hyperplasia)    Osteoarthritis of lumbar spine    Hyperbilirubinemia, long standing, favor GILBERT    Abnormal stress test    Anxiety disorder    SVT (supraventricular tachycardia)    Pulmonary emphysema    Coronary artery disease due to lipid rich plaque    PAF (paroxysmal atrial fibrillation)    Aortic atherosclerosis    Aneurysm of right common iliac artery    Thrombocytopenia, unspecified    Hearing decreased    Degenerative disease of nervous system, unspecified    Gastroesophageal reflux disease without esophagitis    Primary open angle glaucoma (POAG) of both eyes, indeterminate stage    Psoriasis vulgaris    Cervical radiculopathy    Insomnia    Cognitive impairment    Aortic ectasia    Chronic left-sided low back pain with left-sided sciatica    being managed by PCP and specialists who is here today for evaluation of R ROJELIO aneurysm.    - Mesenteric US without HD significant stenosis.  Recommend continuing evaluation and management for etiology of left upper quadrant by PCP and GI.    -right common iliac artery aneurysm 4 cm 2021 (3.6 2020), asymptomatic s/p  RONAK 1/31/23 without endoleak, 4 cm 9/2023 and 3/2024- continue routine surveillance  -recommend cont daily aspirin, continue blood pressure control heart healthy lifestyle  -CTA runoff to eval R iliac stent/aneurysm and LLE vasculature in light of his LLE pain mainly when ambulating in his buttock; no evidence of L iliac stenosis on CTA 3/2024; will also obtain exercise CARLEE  -Echo to eval for CHF  -Pain mgmt recs for LLE pain  -RTC 4-6 weeks    I spent 12 minutes evaluating this patient and greater than 50% of the time was spent counseling, coordinator care and discussing the plan of care.  All questions were answered and patient stated understanding with agreement with the above treatment plan.    Mario Springer MD [facility]  Vascular and Endovascular Surgery

## 2024-12-05 NOTE — TELEPHONE ENCOUNTER
----- Message from Mario Springer MD sent at 12/5/2024  1:00 PM CST -----  Can he safely take Pletal with his diastolic dysfunction?    Thank you    Alonso   non-productive cough

## 2024-12-13 ENCOUNTER — OFFICE VISIT (OUTPATIENT)
Dept: PAIN MEDICINE | Facility: CLINIC | Age: 79
End: 2024-12-13
Payer: MEDICARE

## 2024-12-13 VITALS — HEART RATE: 60 BPM | SYSTOLIC BLOOD PRESSURE: 136 MMHG | OXYGEN SATURATION: 99 % | DIASTOLIC BLOOD PRESSURE: 81 MMHG

## 2024-12-13 DIAGNOSIS — M54.16 LUMBAR RADICULOPATHY: ICD-10-CM

## 2024-12-13 DIAGNOSIS — M47.816 LUMBAR SPONDYLOSIS: ICD-10-CM

## 2024-12-13 DIAGNOSIS — M51.361 DEGENERATION OF INTERVERTEBRAL DISC OF LUMBAR REGION WITH LOWER EXTREMITY PAIN: Primary | ICD-10-CM

## 2024-12-13 PROCEDURE — 99999 PR PBB SHADOW E&M-EST. PATIENT-LVL III: CPT | Mod: PBBFAC,,,

## 2024-12-13 NOTE — PROGRESS NOTES
Subjective:     Patient ID: Reed Torres is a 79 y.o. male    Chief Complaint: Follow-up      Referred by: No ref. provider found you      Interval History PA (12/13/2024):  Patient returns to clinic for follow up of left lower extremity pain.  Patient did initiate physical therapy since previous encounter with some but minimal benefit.  Patient notes attending about 3 sessions after which he was discharged due to improvement in pain.  Per PT notes patient overall doing well and was ready to transition to a home exercise program.  However at today's visit patient noting continued pain unchanged since last encounter.  States his pain is intermittent.  Continues to be located in his left lower lumbar/lumbosacral region into his buttocks and left extremity via the L5 dermatome.  Denies any associated numbness, tingling, weakness, bowel or bladder dysfunction.  His pain comes and goes depending on activity.  Not currently having pain at today's visit however notes increased pain after walking for greater than 30 minutes.  This pain improves with rest.    Initial Encounter (11/1/24):  Reed Torres is a 79 y.o. male who presents today with acute left lower extremity pain.  This pain started roughly 3 weeks ago.  No specific inciting events or injuries noted.  Pain is located the left hip/buttock and radiates to left lower extremity consistent with the L5 dermatome.  Denies any associated numbness, tingling, weakness, bowel bladder dysfunction.  Pain is constant.  Significantly worsened with prolonged standing and walking.  Fairly minimal when sitting or lying down.   This pain is described in detail below.    Physical Therapy:  Yes.  Currently attending    Non-pharmacologic Treatment:  Rest helps         TENS?  No    Pain Medications:         Currently taking:  Gabapentin, Tylenol    Has tried in the past:      Has not tried:  Opioids, NSAIDs, Tylenol, Muscle relaxants, TCAs, SNRIs, anticonvulsants, topical  creams    Blood thinners:  Eliquis, aspirin 81 mg    Interventional Therapies:  None    Relevant Surgeries:  None    Affecting sleep?  Yes    Affecting daily activities? yes    Depressive symptoms? No          SI/HI? No    Work status: Employed    Pain Scores:    Best:       0/10  Worst:     10/10  Usually:   10/10  Today:    0/10    Pain Disability Index  Family/Home Responsibilities:: 4  Recreation:: 2  Social Activity:: 4  Occupation:: 10  Sexual Behavior:: 0  Self Care:: 0  Life-Support Activities:: 0  Pain Disability Index (PDI): 20    Review of Systems   Constitutional:  Negative for activity change, appetite change, chills, fatigue, fever and unexpected weight change.   HENT:  Negative for hearing loss.    Eyes:  Negative for visual disturbance.   Respiratory:  Negative for chest tightness and shortness of breath.    Cardiovascular:  Negative for chest pain.   Gastrointestinal:  Negative for abdominal pain, constipation, diarrhea, nausea and vomiting.   Genitourinary:  Negative for difficulty urinating.   Musculoskeletal:  Positive for back pain, gait problem and myalgias. Negative for neck pain.   Skin:  Negative for rash.   Neurological:  Negative for dizziness, weakness, light-headedness, numbness and headaches.   Psychiatric/Behavioral:  Positive for sleep disturbance. Negative for hallucinations and suicidal ideas. The patient is not nervous/anxious.        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     Post treatment       Past Surgical History:   Procedure Laterality Date    ABDOMINAL AORTIC ANEURYSM REPAIR, ENDOVASCULAR Bilateral 1/31/2023    Procedure: REPAIR-ANEURYSM-ILIAC BRANCH ENDOPROSTHESIS-ENDOVASCULAR;  Surgeon: Mario Springer MD;  Location: [facility] OR [address] [place] [address];  Service: Vascular;  Laterality: Bilateral;  mGy:  3707.4  Fluro time: 41.4 miutes  contrast volume: 171mg  Gycm: 363.78      APPENDECTOMY      LEFT HEART CATHETERIZATION Left 2020    Procedure: Left heart cath;  Surgeon: Keenan Avila MD;  Location: [facility] CATH LAB;  Service: Cardiology;  Laterality: Left;  RN PRE OP 2020.---COVID NEGATIVE ON-- 2020. [place]    SHOULDER ARTHROSCOPY W/ ROTATOR CUFF REPAIR      Left shoulder       Social History     Socioeconomic History    Marital status:    Tobacco Use    Smoking status: Former     Current packs/day: 0.00     Types: Cigarettes     Quit date:      Years since quittin.9    Smokeless tobacco: Never    Tobacco comments:     stopped smoking 20 yrs ago.   Substance and Sexual Activity    Alcohol use: Not Currently     Alcohol/week: 5.0 standard drinks of alcohol     Types: 6 Standard drinks or equivalent per week    Drug use: No    Sexual activity: Yes     Partners: Female     Birth control/protection: None   Social History Narrative    Still doing lawn service     Social Drivers of Health     Financial Resource Strain: Low Risk  (2024)    Overall Financial Resource Strain (CARDIA)     Difficulty of Paying Living Expenses: Not hard at all   Food Insecurity: No Food Insecurity (2024)    Hunger Vital Sign     Worried About Running Out of Food in the Last Year: Never true     Ran Out of Food in the Last Year: Never true   Transportation Needs: No Transportation Needs (2024)    PRAPARE - Transportation     Lack of Transportation (Medical): No     Lack of Transportation (Non-Medical): No   Physical Activity: Sufficiently Active (2024)    Exercise Vital Sign     Days of Exercise per Week: 5 days     Minutes of Exercise per Session: 150+ min   Stress: No Stress Concern Present (2024)    [employer] [facility] - Occupational Stress Questionnaire     Feeling of Stress : Only a little   Housing Stability: Low Risk  (2024)    Housing Stability Vital Sign      Unable to Pay for Housing in the Last Year: No     Number of Places Lived in the Last Year: 1     Unstable Housing in the Last Year: No       Review of patient's allergies indicates:  No Known Allergies    Current Outpatient Medications on File Prior to Visit   Medication Sig Dispense Refill    amiodarone (PACERONE) 200 MG Tab TAKE 1 TABLET BY MOUTH EVERY DAY 90 tablet 3    apixaban (ELIQUIS) 5 mg Tab Take 1 tablet (5 mg total) by mouth 2 (two) times daily. 60 tablet 11    aspirin (ECOTRIN) 81 MG EC tablet Take 81 mg by mouth every morning.      atorvastatin (LIPITOR) 40 MG tablet TAKE 1 TABLET(40 MG) BY MOUTH EVERY EVENING 90 tablet 0    ciclopirox (PENLAC) 8 % Soln Apply topically nightly. 6.6 mL 11    docusate sodium (COLACE) 100 MG capsule Take 1 capsule (100 mg total) by mouth 2 (two) times daily. 60 capsule 0    dorzolamide-timolol 2-0.5% (COSOPT) 22.3-6.8 mg/mL ophthalmic solution Place 1 drop into both eyes 2 (two) times daily.      ELIQUIS 5 mg Tab TAKE ONE TABLET BY MOUTH TWICE DAILY 60 tablet 11    gabapentin (NEURONTIN) 300 MG capsule TAKE 1-3 CAPSULES BY MOUTH EVERY EVENING 90 capsule 11    latanoprost 0.005 % ophthalmic solution Place into both eyes every evening.      oxyCODONE (ROXICODONE) 5 MG immediate release tablet Take 1 tablet (5 mg total) by mouth every 4 (four) hours as needed for Pain. 8 tablet 0    pantoprazole (PROTONIX) 20 MG tablet Take 20 mg by mouth.      vit A/vit C/vit E/zinc/copper (OCUVITE PRESERVISION ORAL) Take by mouth 2 (two) times a day.      cilostazoL (PLETAL) 50 MG Tab Take 1 tablet (50 mg total) by mouth 2 (two) times daily. If patient tolerate medication after 4 weeks, increase dose to 100 mg twice daily. (Patient not taking: Reported on 12/13/2024) 90 tablet 11    [DISCONTINUED] metoprolol succinate (TOPROL-XL) 25 MG 24 hr tablet Take 1 tablet (25 mg total) by mouth once daily. 90 tablet 3     No current facility-administered medications on file prior to visit.        Objective:      /81 (BP Location: Left arm, Patient Position: Sitting)   Pulse 60   SpO2 99%     Exam:  GEN:  Well developed, well nourished.  No acute distress.  Normal pain behavior.  HEENT:  No trauma.  Mucous membranes moist.  Nares patent bilaterally.  PSYCH: Normal affect. Thought content appropriate.  CHEST:  Breathing symmetric.  No audible wheezing.  ABD: Soft, non-distended.  SKIN:  Warm, pink, dry.  No rash on exposed areas.    EXT:  No cyanosis, clubbing, or edema.  No color change or changes in nail or hair growth.  NEURO/MUSCULOSKELETAL:  Fully alert, oriented, and appropriate. Speech normal edwardo. No cranial nerve deficits.   Gait:  Normal.  No trendelenburg sign bilaterally.         Imaging:  Pertinent    Assessment:       Encounter Diagnoses   Name Primary?    Degeneration of intervertebral disc of lumbar region with lower extremity pain Yes    Lumbar radiculopathy     Lumbar spondylosis            Plan:       Reed was seen today for follow-up.    Diagnoses and all orders for this visit:    Degeneration of intervertebral disc of lumbar region with lower extremity pain    Lumbar radiculopathy    Lumbar spondylosis          Reed Torres is a 79 y.o. male with acute left-sided lower extremity.  Appears radicular most consistent with the L5 dermatome.  No overt neurologic deficits on examination.    Prior records reviewed.  Discussed proceeding with the additional physical therapy and home exercise program at this time.  Per PT reports patient was overall doing well and was ready for discharge however at today's visit he continues to report significant pain and difficulty with ADLs.  Discussed the importance of physical therapy and continued home exercise program.  Patient expressed understanding and agreement.  He would like to continue with the additional PT at this time.  We did briefly discuss obtaining lumbar MRI and interventional procedures.  Patient deferred at this time.  May  reconsider at follow up.  Continue gabapentin.    Continue Tylenol.    Avoid NSAIDs.  Return to clinic in 1 month or sooner if needed.  At that time we will discuss efficacy of physical therapy/home exercise program.  May consider lumbar MRI and possibly epidural steroid injection if pain persists or worsens.          This note was created by combination of typed  and M-Modal dictation. Transcription and phonetic errors may be present.  If there are any questions, please contact me.

## 2024-12-31 ENCOUNTER — CLINICAL SUPPORT (OUTPATIENT)
Dept: REHABILITATION | Facility: HOSPITAL | Age: 79
End: 2024-12-31
Payer: MEDICARE

## 2024-12-31 DIAGNOSIS — M54.42 CHRONIC LEFT-SIDED LOW BACK PAIN WITH LEFT-SIDED SCIATICA: Primary | ICD-10-CM

## 2024-12-31 DIAGNOSIS — G89.29 CHRONIC LEFT-SIDED LOW BACK PAIN WITH LEFT-SIDED SCIATICA: Primary | ICD-10-CM

## 2024-12-31 PROCEDURE — 97112 NEUROMUSCULAR REEDUCATION: CPT | Mod: PN

## 2024-12-31 NOTE — PROGRESS NOTES
[facility]   Physical Therapy Treatment Note / D/C      Name: Reed Torres  Clinic Number: [id number]    Therapy Diagnosis:   Encounter Diagnosis   Name Primary?    Chronic left-sided low back pain with left-sided sciatica Yes       Physician: FRANKLYN Abdi MD    Visit Date: 12/31/2024        Therapy Diagnosis:        Encounter Diagnosis   Name Primary?    Chronic left-sided low back pain with left-sided sciatica Yes        Physician: FRANKLYN Abdi MD     Physician Orders: PT Eval and Treat   Medical Diagnosis from Referral: LBP  Evaluation Date: 10/29/2024  Authorization Period Expiration: 10/1/25  Plan of Care Expiration: 12/31/24  Progress Note Due:  Date of Surgery: na  Visit # / Visits authorized: 4/ eval +20  FOTO: 1/ 3     Precautions: Standard      Time In: 9:00 am  Time Out: 10:00 am   Total Billable Time: 60 minutes    PTA Visit #: 1/5       Subjective     Patient reports: still feels pain. He is hoping to get approved for MRI through attending PT    He was compliant with home exercise program.  Response to previous treatment: initial eval   Functional change: NA     Pain: not provided/10  Location: bilateral back      Objective      Observation: alert and oriented      Posture:  flattened Lspine     Lumbar Range of Motion:     Limitations Pain   Flexion 50%    n         Extension 50%    n         Left Side Bending 50% n         Right Side Bending 50% n               Lower Extremity Strength  Right LE   Left LE     Quadriceps: 5/5 Quadriceps: 5/5   Hamstrings: 5/5 Hamstrings: 5/5   Iliospoas: 4/5 Iliospoas: 4+/5   Hip extension:  3+/5 Hip extension: 3+/5   PGM: 3+/5 PGM: 3+/5   Hip ER:  4+/5 Hip ER: 4+/5   Hip IR: 4+/5 Hip IR: 4+/5   Ankle dorsiflexion: 4+/5 Ankle dorsiflexion: 4+/5   Ankle plantarflexion: 5/5 Ankle plantarflexion: 5/5      Special Tests:  Bicycle Test of Oleg Samuel: no symptoms reported after 10 mins       Intake Outcome Measure for FOTO low back  "Survey     Therapist reviewed FOTO scores for Reed Torres on 12/31/2024   FOTO report - see Media section or FOTO account episode details.     Intake Score: see chart %          Treatment     eRed received the treatments listed below:      therapeutic exercises to develop strength and ROM for 30 minutes including:  Patient education on activity modification   Nustep 10' lvl 2  LTR 2'   +Supine nerve glides 2x10   DKTC w/  red SB  30x  Seated lumbar flexion (3 ways) 10x10s      neuromuscular re-education activities to improve: Kinesthetic and Proprioception for 230 minutes. The following activities were included:  Supine Hip Abd GTB 3x10   Supine Hip Add 3x10 5" hold   PPT 30x 5" hold   Glute Bridges +3x10   Deadlifts 20# 3x10   Goblet squats 15# 3x10        Patient Education and Home Exercises      Education provided:   - see above      Written Home Exercises Provided: Yes. Exercises were reviewed and Reed was able to demonstrate them prior to the end of the session.  Reed demonstrated good  understanding of the education provided. See EMR under Patient Instructions for exercises provided during therapy sessions.     Assessment   Patient completed session as noted above. Continues to demo reduced endurance. Progressed functional exercises today with fatigue but good emily. Will continue to progress as tolerated.         Patient prognosis is Good.   Patient will benefit from skilled outpatient Physical Therapy to address the deficits stated above and in the chart below, provide patient /family education, and to maximize patientt's level of independence.      Plan of care discussed with patient: Yes  Patient's spiritual, cultural and educational needs considered and patient is agreeable to the plan of care and goals as stated below:      Anticipated Barriers for therapy: chronicity          GOALS: Short Term Goals:  4 weeks MET all   1.Report decreased lumbar pain  < / =  5/10  to increase tolerance for ADLs  2. " Increase ROM by 10-20% where limited in order to perform ADLs without difficulty.  3. Increase strength by 1/3 MMT grade in areas of limitation to increase tolerance for ADL and work activities.  4. Pt to tolerate HEP to improve ROM and independence with ADL's     Long Term Goals: 8 weeks NOT MET  1.Report decreased lumbar pain < / = 2/10  to increase tolerance for ADLs  2.Patient goal: able to walk for 1 hour or greater  3.Increase strength to 4+/5 in  areas of limitation  to increase tolerance for ADL and work activities.  4. Pt will report at <35%  on FOTO  to demonstrate increase in LE function with every day tasks.      Plan     D/c     Skip Miranda, PT

## 2025-01-07 ENCOUNTER — TELEPHONE (OUTPATIENT)
Dept: FAMILY MEDICINE | Facility: CLINIC | Age: 80
End: 2025-01-07
Payer: MEDICARE

## 2025-01-07 ENCOUNTER — CLINICAL SUPPORT (OUTPATIENT)
Dept: REHABILITATION | Facility: HOSPITAL | Age: 80
End: 2025-01-07
Payer: MEDICARE

## 2025-01-07 DIAGNOSIS — G89.29 CHRONIC LEFT-SIDED LOW BACK PAIN WITH LEFT-SIDED SCIATICA: Primary | ICD-10-CM

## 2025-01-07 DIAGNOSIS — M54.42 CHRONIC LEFT-SIDED LOW BACK PAIN WITH LEFT-SIDED SCIATICA: Primary | ICD-10-CM

## 2025-01-07 PROCEDURE — 97112 NEUROMUSCULAR REEDUCATION: CPT | Mod: PN

## 2025-01-07 NOTE — PROGRESS NOTES
[facility]   Physical Therapy Treatment Note / D/C      Name: Reed Torres  Clinic Number: [id number]    Therapy Diagnosis:   Encounter Diagnosis   Name Primary?    Chronic left-sided low back pain with left-sided sciatica Yes       Physician: FRANKLYN Abdi MD    Visit Date: 1/7/2025        Therapy Diagnosis:        Encounter Diagnosis   Name Primary?    Chronic left-sided low back pain with left-sided sciatica Yes        Physician: FRANKLYN Abdi MD     Physician Orders: PT Eval and Treat   Medical Diagnosis from Referral: LBP  Evaluation Date: 10/29/2024  Authorization Period Expiration: 10/1/25  Plan of Care Expiration: 12/31/24  Progress Note Due:  Date of Surgery: na  Visit # / Visits authorized: 1/10  FOTO: 1/ 3     Precautions: Standard      Time In: 2 pm  Time Out: 2:50 pm   Total Billable Time: 30 minutes    PTA Visit #: 1/5       Subjective     Patient reports: still feels pain. He is hoping to get approved for MRI through attending PT    He was compliant with home exercise program.  Response to previous treatment: initial eval   Functional change: NA     Pain: not provided/10  Location: bilateral back      Objective      Observation: alert and oriented      Posture:  flattened Lspine     Lumbar Range of Motion:     Limitations Pain   Flexion 50%    n         Extension 50%    n         Left Side Bending 50% n         Right Side Bending 50% n               Lower Extremity Strength  Right LE   Left LE     Quadriceps: 5/5 Quadriceps: 5/5   Hamstrings: 5/5 Hamstrings: 5/5   Iliospoas: 4/5 Iliospoas: 4+/5   Hip extension:  3+/5 Hip extension: 3+/5   PGM: 3+/5 PGM: 3+/5   Hip ER:  4+/5 Hip ER: 4+/5   Hip IR: 4+/5 Hip IR: 4+/5   Ankle dorsiflexion: 4+/5 Ankle dorsiflexion: 4+/5   Ankle plantarflexion: 5/5 Ankle plantarflexion: 5/5      Special Tests:  Bicycle Test of Oleg Samuel: no symptoms reported after 10 mins       Intake Outcome Measure for FOTO low back Survey    "  Therapist reviewed FOTO scores for Reed Torres on 01/07/2025   FOTO report - see Media section or FOTO account episode details.     Intake Score: see chart %          Treatment     Reed received the treatments listed below:      therapeutic exercises to develop strength and ROM for 30 minutes including:  Patient education on activity modification   Nustep 10' lvl 2  LTR 2'   +Supine nerve glides 2x10   DKTC w/  red SB  30x  Seated lumbar flexion (3 ways) 10x10s      neuromuscular re-education activities to improve: Kinesthetic and Proprioception for 30 minutes. The following activities were included:  Supine Hip Abd GTB 3x10   Supine Hip Add 3x10 5" hold   PPT 30x 5" hold   Glute Bridges +3x10   Deadlifts 20# 3x10   Goblet squats 15# 3x10        Patient Education and Home Exercises      Education provided:   - see above      Written Home Exercises Provided: Yes. Exercises were reviewed and Reed was able to demonstrate them prior to the end of the session.  Reed demonstrated good  understanding of the education provided. See EMR under Patient Instructions for exercises provided during therapy sessions.     Assessment   Patient completed session as noted above. Continues to demo reduced endurance. Progressed functional exercises today with fatigue but good emily. Will continue to progress as tolerated.         Patient prognosis is Good.   Patient will benefit from skilled outpatient Physical Therapy to address the deficits stated above and in the chart below, provide patient /family education, and to maximize patientt's level of independence.      Plan of care discussed with patient: Yes  Patient's spiritual, cultural and educational needs considered and patient is agreeable to the plan of care and goals as stated below:      Anticipated Barriers for therapy: chronicity          GOALS: Short Term Goals:  4 weeks MET all   1.Report decreased lumbar pain  < / =  5/10  to increase tolerance for ADLs  2. Increase " ROM by 10-20% where limited in order to perform ADLs without difficulty.  3. Increase strength by 1/3 MMT grade in areas of limitation to increase tolerance for ADL and work activities.  4. Pt to tolerate HEP to improve ROM and independence with ADL's     Long Term Goals: 8 weeks NOT MET  1.Report decreased lumbar pain < / = 2/10  to increase tolerance for ADLs  2.Patient goal: able to walk for 1 hour or greater  3.Increase strength to 4+/5 in  areas of limitation  to increase tolerance for ADL and work activities.  4. Pt will report at <35%  on FOTO  to demonstrate increase in LE function with every day tasks.      Plan     Cont per ANITA Miranda PT

## 2025-01-07 NOTE — TELEPHONE ENCOUNTER
----- Message from [employer] sent at 1/7/2025 11:45 AM CST -----  Who Called:self      Refill or New Rx:new       RX Name and Strength:apixaban (ELIQUIS) 5 mg Tab      Is this a 30 day or 90 day RX:      Preferred Pharmacy with phone number:[id number] - [place] - [address] [place] [address] [facility];      Would the patient rather a call back or a response via [facility]?call      Best Call Back Number:.[phone]      Additional Information: pt would like to know if there is something he can afford that can be prescribed as an alternative pt states that he would like to know if there a difference

## 2025-01-09 ENCOUNTER — TELEPHONE (OUTPATIENT)
Dept: VASCULAR SURGERY | Facility: CLINIC | Age: 80
End: 2025-01-09
Payer: MEDICARE

## 2025-01-09 NOTE — TELEPHONE ENCOUNTER
Called pt. and no answer. Left message regarding rescheduling appt.on 1/15/2025 due to provider unavailable.

## 2025-01-09 NOTE — TELEPHONE ENCOUNTER
----- Message from [employer] sent at 1/9/2025 11:40 AM CST -----  Regarding: Self   Type: Patient Returning Call    Who Called: Self    Who Left Message for Patient: Marcella Borja to Reed Torres        Does the patient know what this is regarding?: yes rs his appt     Would the patient rather a call back or a response via [facility]?  Call back     Best Call Back Number: .[phone]      Additional Information:     Thank you.

## 2025-01-13 ENCOUNTER — OFFICE VISIT (OUTPATIENT)
Dept: PAIN MEDICINE | Facility: CLINIC | Age: 80
End: 2025-01-13
Payer: MEDICARE

## 2025-01-13 VITALS — DIASTOLIC BLOOD PRESSURE: 82 MMHG | HEART RATE: 70 BPM | SYSTOLIC BLOOD PRESSURE: 116 MMHG | OXYGEN SATURATION: 92 %

## 2025-01-13 DIAGNOSIS — M47.816 LUMBAR SPONDYLOSIS: ICD-10-CM

## 2025-01-13 DIAGNOSIS — M51.361 DEGENERATION OF INTERVERTEBRAL DISC OF LUMBAR REGION WITH LOWER EXTREMITY PAIN: Primary | ICD-10-CM

## 2025-01-13 DIAGNOSIS — M54.16 LUMBAR RADICULOPATHY: ICD-10-CM

## 2025-01-13 PROCEDURE — 3288F FALL RISK ASSESSMENT DOCD: CPT | Mod: CPTII,S$GLB,,

## 2025-01-13 PROCEDURE — 1160F RVW MEDS BY RX/DR IN RCRD: CPT | Mod: CPTII,S$GLB,,

## 2025-01-13 PROCEDURE — 99999 PR PBB SHADOW E&M-EST. PATIENT-LVL III: CPT | Mod: PBBFAC,,,

## 2025-01-13 PROCEDURE — 1125F AMNT PAIN NOTED PAIN PRSNT: CPT | Mod: CPTII,S$GLB,,

## 2025-01-13 PROCEDURE — 3074F SYST BP LT 130 MM HG: CPT | Mod: CPTII,S$GLB,,

## 2025-01-13 PROCEDURE — 99213 OFFICE O/P EST LOW 20 MIN: CPT | Mod: S$GLB,,,

## 2025-01-13 PROCEDURE — 3079F DIAST BP 80-89 MM HG: CPT | Mod: CPTII,S$GLB,,

## 2025-01-13 PROCEDURE — 1159F MED LIST DOCD IN RCRD: CPT | Mod: CPTII,S$GLB,,

## 2025-01-13 PROCEDURE — 1101F PT FALLS ASSESS-DOCD LE1/YR: CPT | Mod: CPTII,S$GLB,,

## 2025-01-13 NOTE — PROGRESS NOTES
Subjective:     Patient ID: Reed Torres is a 79 y.o. male    Chief Complaint: Follow-up      Referred by: No ref. provider found you      Interval History PA (01/13/2025):  Patient returns to clinic for follow up of left lower extremity pain.  Patient has been attending physical therapy since our last encounter with overall benefit.  He does note some mild-moderate improvement in his pain since starting PT. patient's pain continues to be intermittent.  Located over his left lower lumbar/lumbosacral region into his left lower extremity via L5 dermatome.  Denies associated numbness, tingling, weakness, bowel or bladder dysfunction.  States his pain is intermittent depending on activity.  He would like to continue with the additional physical therapy at this time and hold off on additional treatment.    Interval History PA (12/13/2024):  Patient returns to clinic for follow up of left lower extremity pain.  Patient did initiate physical therapy since previous encounter with some but minimal benefit.  Patient notes attending about 3 sessions after which he was discharged due to improvement in pain.  Per PT notes patient overall doing well and was ready to transition to a home exercise program.  However at today's visit patient noting continued pain unchanged since last encounter.  States his pain is intermittent.  Continues to be located in his left lower lumbar/lumbosacral region into his buttocks and left extremity via the L5 dermatome.  Denies any associated numbness, tingling, weakness, bowel or bladder dysfunction.  His pain comes and goes depending on activity.  Not currently having pain at today's visit however notes increased pain after walking for greater than 30 minutes.  This pain improves with rest.    Initial Encounter (11/1/24):  Reed Torres is a 79 y.o. male who presents today with acute left lower extremity pain.  This pain started roughly 3 weeks ago.  No specific inciting events or injuries noted.   Pain is located the left hip/buttock and radiates to left lower extremity consistent with the L5 dermatome.  Denies any associated numbness, tingling, weakness, bowel bladder dysfunction.  Pain is constant.  Significantly worsened with prolonged standing and walking.  Fairly minimal when sitting or lying down.   This pain is described in detail below.    Physical Therapy:  Yes.  Currently attending    Non-pharmacologic Treatment:  Rest helps         TENS?  No    Pain Medications:         Currently taking:  Gabapentin, Tylenol    Has tried in the past:      Has not tried:  Opioids, NSAIDs, Tylenol, Muscle relaxants, TCAs, SNRIs, anticonvulsants, topical creams    Blood thinners:  Eliquis, aspirin 81 mg    Interventional Therapies:  None    Relevant Surgeries:  None    Affecting sleep?  Yes    Affecting daily activities? yes    Depressive symptoms? No          SI/HI? No    Work status: Employed    Pain Scores:    Best:       3/10  Worst:     10/10  Usually:   3/10  Today:    7/10    Pain Disability Index  Family/Home Responsibilities:: 8  Recreation:: 8  Social Activity:: 8  Occupation:: 8  Sexual Behavior:: 8  Self Care:: 8  Life-Support Activities:: 8  Pain Disability Index (PDI): 56    Review of Systems   Constitutional:  Negative for activity change, appetite change, chills, fatigue, fever and unexpected weight change.   HENT:  Negative for hearing loss.    Eyes:  Negative for visual disturbance.   Respiratory:  Negative for chest tightness and shortness of breath.    Cardiovascular:  Negative for chest pain.   Gastrointestinal:  Negative for abdominal pain, constipation, diarrhea, nausea and vomiting.   Genitourinary:  Negative for difficulty urinating.   Musculoskeletal:  Positive for back pain, gait problem and myalgias. Negative for neck pain.   Skin:  Negative for rash.   Neurological:  Negative for dizziness, weakness, light-headedness, numbness and headaches.   Psychiatric/Behavioral:  Positive for sleep  disturbance. Negative for hallucinations and suicidal ideas. The patient is not nervous/anxious.        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       Past Surgical History:   Procedure Laterality Date    ABDOMINAL AORTIC ANEURYSM REPAIR, ENDOVASCULAR Bilateral 2023    Procedure: REPAIR-ANEURYSM-ILIAC BRANCH ENDOPROSTHESIS-ENDOVASCULAR;  Surgeon: Mario Springer MD;  Location: [facility] OR [address] [place] [address];  Service: Vascular;  Laterality: Bilateral;  mGy: 3707.4  Fluro time: 41.4 miutes  contrast volume: 171mg  Gycm: 363.78      APPENDECTOMY      LEFT HEART CATHETERIZATION Left 2020    Procedure: Left heart cath;  Surgeon: Keenan Avila MD;  Location: [facility] CATH LAB;  Service: Cardiology;  Laterality: Left;  RN PRE OP 2020.---COVID NEGATIVE ON-- 2020. [place]    SHOULDER ARTHROSCOPY W/ ROTATOR CUFF REPAIR      Left shoulder       Social History     Socioeconomic History    Marital status:    Tobacco Use    Smoking status: Former     Current packs/day: 0.00     Types: Cigarettes     Quit date:      Years since quittin.0    Smokeless tobacco: Never    Tobacco comments:     stopped smoking 20 yrs ago.   Substance and Sexual Activity    Alcohol use: Not Currently     Alcohol/week: 5.0 standard drinks of alcohol     Types: 6 Standard drinks or equivalent per week    Drug use: No    Sexual activity: Yes     Partners: Female     Birth control/protection: None   Social History Narrative    Still doing lawn service     Social Drivers of Health     Financial Resource Strain: Low Risk  (2024)    Overall Financial Resource Strain (CARDIA)     Difficulty of Paying Living Expenses: Not hard at all   Food Insecurity: No Food Insecurity (2024)    Hunger Vital Sign     Worried About Running Out of Food in the  Last Year: Never true     Ran Out of Food in the Last Year: Never true   Transportation Needs: No Transportation Needs (2/2/2024)    PRAPARE - Transportation     Lack of Transportation (Medical): No     Lack of Transportation (Non-Medical): No   Physical Activity: Sufficiently Active (2/2/2024)    Exercise Vital Sign     Days of Exercise per Week: 5 days     Minutes of Exercise per Session: 150+ min   Stress: No Stress Concern Present (2/2/2024)    [employer] - Occupational Stress Questionnaire     Feeling of Stress : Only a little   Housing Stability: Low Risk  (2/2/2024)    Housing Stability Vital Sign     Unable to Pay for Housing in the Last Year: No     Number of Places Lived in the Last Year: 1     Unstable Housing in the Last Year: No       Review of patient's allergies indicates:  No Known Allergies    Current Outpatient Medications on File Prior to Visit   Medication Sig Dispense Refill    amiodarone (PACERONE) 200 MG Tab TAKE 1 TABLET BY MOUTH EVERY DAY 90 tablet 3    apixaban (ELIQUIS) 5 mg Tab Take 1 tablet (5 mg total) by mouth 2 (two) times daily. 60 tablet 11    aspirin (ECOTRIN) 81 MG EC tablet Take 81 mg by mouth every morning.      atorvastatin (LIPITOR) 40 MG tablet TAKE 1 TABLET(40 MG) BY MOUTH EVERY EVENING 90 tablet 0    ciclopirox (PENLAC) 8 % Soln Apply topically nightly. 6.6 mL 11    docusate sodium (COLACE) 100 MG capsule Take 1 capsule (100 mg total) by mouth 2 (two) times daily. 60 capsule 0    dorzolamide-timolol 2-0.5% (COSOPT) 22.3-6.8 mg/mL ophthalmic solution Place 1 drop into both eyes 2 (two) times daily.      ELIQUIS 5 mg Tab TAKE ONE TABLET BY MOUTH TWICE DAILY 60 tablet 11    gabapentin (NEURONTIN) 300 MG capsule TAKE 1-3 CAPSULES BY MOUTH EVERY EVENING 90 capsule 11    latanoprost 0.005 % ophthalmic solution Place into both eyes every evening.      oxyCODONE (ROXICODONE) 5 MG immediate release tablet Take 1 tablet (5 mg total) by mouth every 4  (four) hours as needed for Pain. 8 tablet 0    pantoprazole (PROTONIX) 20 MG tablet Take 20 mg by mouth.      vit A/vit C/vit E/zinc/copper (OCUVITE PRESERVISION ORAL) Take by mouth 2 (two) times a day.      cilostazoL (PLETAL) 50 MG Tab Take 1 tablet (50 mg total) by mouth 2 (two) times daily. If patient tolerate medication after 4 weeks, increase dose to 100 mg twice daily. (Patient not taking: Reported on 1/13/2025) 90 tablet 11    [DISCONTINUED] metoprolol succinate (TOPROL-XL) 25 MG 24 hr tablet Take 1 tablet (25 mg total) by mouth once daily. 90 tablet 3     No current facility-administered medications on file prior to visit.       Objective:      /82 (BP Location: Left arm, Patient Position: Sitting)   Pulse 70   SpO2 (!) 92%     Exam:  GEN:  Well developed, well nourished.  No acute distress.  Normal pain behavior.  HEENT:  No trauma.  Mucous membranes moist.  Nares patent bilaterally.  PSYCH: Normal affect. Thought content appropriate.  CHEST:  Breathing symmetric.  No audible wheezing.  ABD: Soft, non-distended.  SKIN:  Warm, pink, dry.  No rash on exposed areas.    EXT:  No cyanosis, clubbing, or edema.  No color change or changes in nail or hair growth.  NEURO/MUSCULOSKELETAL:  Fully alert, oriented, and appropriate. Speech normal edwardo. No cranial nerve deficits.   Gait:  Normal.  No trendelenburg sign bilaterally.         Imaging:  Pertinent    Assessment:       Encounter Diagnoses   Name Primary?    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 male with acute left-sided lower extremity.  Appears radicular most consistent with the L5  dermatome.  No overt neurologic deficits on examination.    Prior records reviewed.  Continue with physical therapy and home exercise program.  Stressed the importance of maintaining regular home exercise program and being mindful of how they use their back throughout the day.  Patient expressed understanding and agreement.   We did briefly discuss obtaining lumbar MRI and interventional procedures.  Patient deferred at this time.  May reconsider at follow up.  Continue gabapentin.    Continue Tylenol.    Avoid NSAIDs.  Return to clinic as needed.          This note was created by combination of typed  and M-Modal dictation. Transcription and phonetic errors may be present.  If there are any questions, please contact me.

## 2025-01-14 ENCOUNTER — HOSPITAL ENCOUNTER (OUTPATIENT)
Dept: RADIOLOGY | Facility: HOSPITAL | Age: 80
Discharge: HOME | End: 2025-01-14
Attending: SURGERY
Payer: MEDICARE

## 2025-01-14 ENCOUNTER — CLINICAL SUPPORT (OUTPATIENT)
Dept: REHABILITATION | Facility: HOSPITAL | Age: 80
End: 2025-01-14
Payer: MEDICARE

## 2025-01-14 ENCOUNTER — HOSPITAL ENCOUNTER (OUTPATIENT)
Dept: CARDIOLOGY | Facility: HOSPITAL | Age: 80
Discharge: HOME | End: 2025-01-14
Attending: SURGERY
Payer: MEDICARE

## 2025-01-14 DIAGNOSIS — I73.9 PERIPHERAL VASCULAR DISEASE, UNSPECIFIED: ICD-10-CM

## 2025-01-14 DIAGNOSIS — M54.42 CHRONIC LEFT-SIDED LOW BACK PAIN WITH LEFT-SIDED SCIATICA: Primary | ICD-10-CM

## 2025-01-14 DIAGNOSIS — G89.29 CHRONIC LEFT-SIDED LOW BACK PAIN WITH LEFT-SIDED SCIATICA: Primary | ICD-10-CM

## 2025-01-14 LAB
ASCENDING AORTA: 3.8 CM
AV INDEX (PROSTH): 0.68
AV MEAN GRADIENT: 2.9 MMHG
AV PEAK GRADIENT: 4.8 MMHG
AV VALVE AREA BY VELOCITY RATIO: 3.4 CM²
AV VALVE AREA: 2.8 CM²
AV VELOCITY RATIO: 0.82
CV ECHO LV RWT: 0.43 CM
DOP CALC AO PEAK VEL: 1.1 M/S
DOP CALC AO VTI: 28.4 CM
DOP CALC LVOT AREA: 4.2 CM2
DOP CALC LVOT DIAMETER: 2.3 CM
DOP CALC LVOT PEAK VEL: 0.9 M/S
DOP CALC LVOT STROKE VOLUME: 79.7 CM3
DOP CALCLVOT PEAK VEL VTI: 19.2 CM
E WAVE DECELERATION TIME: 148.05 MSEC
E/A RATIO: 1.11
E/E' RATIO: 11.14 M/S
ECHO LV POSTERIOR WALL: 1.1 CM (ref 0.6–1.1)
FRACTIONAL SHORTENING: 35.3 % (ref 28–44)
INTERVENTRICULAR SEPTUM: 1.4 CM (ref 0.6–1.1)
IVC DIAMETER: 1.43 CM
LA MAJOR: 4.45 CM
LA MINOR: 4.76 CM
LA WIDTH: 3.7 CM
LEFT ATRIUM SIZE: 4.06 CM
LEFT ATRIUM VOLUME: 58.73 CM3
LEFT INTERNAL DIMENSION IN SYSTOLE: 3.3 CM (ref 2.1–4)
LEFT VENTRICLE DIASTOLIC VOLUME: 124.29 ML
LEFT VENTRICLE SYSTOLIC VOLUME: 42.61 ML
LEFT VENTRICULAR INTERNAL DIMENSION IN DIASTOLE: 5.1 CM (ref 3.5–6)
LEFT VENTRICULAR MASS: 255.5 G
LV LATERAL E/E' RATIO: 8.67 M/S
LV SEPTAL E/E' RATIO: 15.6 M/S
LVED V (TEICH): 124.29 ML
LVES V (TEICH): 42.61 ML
LVOT MG: 1.36 MMHG
LVOT MV: 0.54 CM/S
MV PEAK A VEL: 0.7 M/S
MV PEAK E VEL: 0.78 M/S
MV STENOSIS PRESSURE HALF TIME: 42.94 MS
MV VALVE AREA P 1/2 METHOD: 5.12 CM2
OHS CV RV/LV RATIO: 0.73 CM
PISA TR MAX VEL: 2.34 M/S
PV PEAK GRADIENT: 3 MMHG
PV PEAK VELOCITY: 0.8 M/S
RA MAJOR: 3.51 CM
RA PRESSURE ESTIMATED: 8 MMHG
RA WIDTH: 2.8 CM
RIGHT VENTRICLE DIASTOLIC BASEL DIMENSION: 3.7 CM
RIGHT VENTRICULAR END-DIASTOLIC DIMENSION: 3.66 CM
RV TB RVSP: 10 MMHG
RV TISSUE DOPPLER FREE WALL SYSTOLIC VELOCITY 1 (APICAL 4 CHAMBER VIEW): 12.44 CM/S
SINUS: 3.74 CM
STJ: 3.76 CM
TDI LATERAL: 0.09 M/S
TDI SEPTAL: 0.05 M/S
TDI: 0.07 M/S
TR MAX PG: 22 MMHG
TRICUSPID ANNULAR PLANE SYSTOLIC EXCURSION: 2.33 CM
TV REST PULMONARY ARTERY PRESSURE: 30 MMHG

## 2025-01-14 PROCEDURE — 93306 TTE W/DOPPLER COMPLETE: CPT | Mod: 26,,, | Performed by: INTERNAL MEDICINE

## 2025-01-14 PROCEDURE — 75635 CT ANGIO ABDOMINAL ARTERIES: CPT | Mod: 26,,, | Performed by: RADIOLOGY

## 2025-01-14 PROCEDURE — 97110 THERAPEUTIC EXERCISES: CPT | Mod: PN

## 2025-01-14 PROCEDURE — 25500020 PHARM REV CODE 255: Performed by: SURGERY

## 2025-01-14 PROCEDURE — 97112 NEUROMUSCULAR REEDUCATION: CPT | Mod: PN

## 2025-01-14 PROCEDURE — 93306 TTE W/DOPPLER COMPLETE: CPT

## 2025-01-14 PROCEDURE — 75635 CT ANGIO ABDOMINAL ARTERIES: CPT | Mod: TC

## 2025-01-14 RX ADMIN — IOHEXOL 125 ML: 350 INJECTION, SOLUTION INTRAVENOUS at 08:01

## 2025-01-14 NOTE — PROGRESS NOTES
[facility]   Physical Therapy Treatment Note / D/C      Name: Reed Torres  Clinic Number: [id number]    Therapy Diagnosis:   Encounter Diagnosis   Name Primary?    Chronic left-sided low back pain with left-sided sciatica Yes       Physician: FRANKLYN Abdi MD    Visit Date: 1/14/2025        Therapy Diagnosis:        Encounter Diagnosis   Name Primary?    Chronic left-sided low back pain with left-sided sciatica Yes        Physician: FRANKLYN Abdi MD     Physician Orders: PT Eval and Treat   Medical Diagnosis from Referral: LBP  Evaluation Date: 10/29/2024  Authorization Period Expiration: 10/1/25  Plan of Care Expiration: 12/31/24  Progress Note Due:  Date of Surgery: na  Visit # / Visits authorized: 1/10  FOTO: 1/ 3     Precautions: Standard      Time In: 1 pm  Time Out: 1:50 pm   Total Billable Time: 53 minutes    PTA Visit #: 1/5       Subjective     Patient reports: still feels pain. He is hoping to get approved for MRI through attending PT    He was compliant with home exercise program.  Response to previous treatment: initial eval   Functional change: NA     Pain: not provided/10  Location: bilateral back      Objective      Observation: alert and oriented      Posture:  flattened Lspine     Lumbar Range of Motion:     Limitations Pain   Flexion 50%    n         Extension 50%    n         Left Side Bending 50% n         Right Side Bending 50% n               Lower Extremity Strength  Right LE   Left LE     Quadriceps: 5/5 Quadriceps: 5/5   Hamstrings: 5/5 Hamstrings: 5/5   Iliospoas: 4/5 Iliospoas: 4+/5   Hip extension:  3+/5 Hip extension: 3+/5   PGM: 3+/5 PGM: 3+/5   Hip ER:  4+/5 Hip ER: 4+/5   Hip IR: 4+/5 Hip IR: 4+/5   Ankle dorsiflexion: 4+/5 Ankle dorsiflexion: 4+/5   Ankle plantarflexion: 5/5 Ankle plantarflexion: 5/5      Special Tests:  Bicycle Test of Oleg Samuel: no symptoms reported after 10 mins       Intake Outcome Measure for FOTO low back Survey    "  Therapist reviewed FOTO scores for Reed Torres on 01/14/2025   FOTO report - see Media section or FOTO account episode details.     Intake Score: see chart %          Treatment     Reed received the treatments listed below:      therapeutic exercises to develop strength and ROM for 30 minutes including:  Patient education on activity modification   Nustep 10' lvl 2  LTR 2'   +Supine nerve glides 2x10   DKTC w/  red SB  30x  Seated lumbar flexion (3 ways) 10x10s      neuromuscular re-education activities to improve: Kinesthetic and Proprioception for 30 minutes. The following activities were included:  Supine Hip Abd GTB 3x10   Supine Hip Add 3x10 5" hold   PPT 30x 5" hold   Glute Bridges +3x10   Deadlifts 20# 3x10   Goblet squats 15# 3x10        Patient Education and Home Exercises      Education provided:   - see above      Written Home Exercises Provided: Yes. Exercises were reviewed and Reed was able to demonstrate them prior to the end of the session.  Reed demonstrated good  understanding of the education provided. See EMR under Patient Instructions for exercises provided during therapy sessions.     Assessment   Patient completed session as noted above. Continues to demo reduced endurance. Progressed functional exercises today with fatigue but good emily. Will continue to progress as tolerated.         Patient prognosis is Good.   Patient will benefit from skilled outpatient Physical Therapy to address the deficits stated above and in the chart below, provide patient /family education, and to maximize patientt's level of independence.      Plan of care discussed with patient: Yes  Patient's spiritual, cultural and educational needs considered and patient is agreeable to the plan of care and goals as stated below:      Anticipated Barriers for therapy: chronicity          GOALS: Short Term Goals:  4 weeks MET all   1.Report decreased lumbar pain  < / =  5/10  to increase tolerance for ADLs  2. Increase " ROM by 10-20% where limited in order to perform ADLs without difficulty.  3. Increase strength by 1/3 MMT grade in areas of limitation to increase tolerance for ADL and work activities.  4. Pt to tolerate HEP to improve ROM and independence with ADL's     Long Term Goals: 8 weeks NOT MET  1.Report decreased lumbar pain < / = 2/10  to increase tolerance for ADLs  2.Patient goal: able to walk for 1 hour or greater  3.Increase strength to 4+/5 in  areas of limitation  to increase tolerance for ADL and work activities.  4. Pt will report at <35%  on FOTO  to demonstrate increase in LE function with every day tasks.      Plan     Cont per ANITA Miranda PT

## 2025-01-15 ENCOUNTER — HOSPITAL ENCOUNTER (OUTPATIENT)
Dept: CARDIOLOGY | Facility: HOSPITAL | Age: 80
Discharge: HOME | End: 2025-01-15
Attending: SURGERY
Payer: MEDICARE

## 2025-01-15 DIAGNOSIS — I71.40 ABDOMINAL AORTIC ANEURYSM (AAA) WITHOUT RUPTURE, UNSPECIFIED PART: ICD-10-CM

## 2025-01-15 LAB
IMMEDIATE ARM BP: 139 MMHG
IMMEDIATE LEFT ABI: 1.22
IMMEDIATE LEFT TIBIAL: 170 MMHG
IMMEDIATE RIGHT ABI: 1.25
IMMEDIATE RIGHT TIBIAL: 174 MMHG
LEFT ABI: 1.13
LEFT ARM BP: 152 MMHG
LEFT DORSALIS PEDIS: 169 MMHG
LEFT POSTERIOR TIBIAL: 172 MMHG
LEFT TBI: 0.8
LEFT TOE PRESSURE: 121 MMHG
RIGHT ABI: 1.19
RIGHT ARM BP: 144 MMHG
RIGHT DORSALIS PEDIS: 176 MMHG
RIGHT POSTERIOR TIBIAL: 181 MMHG
RIGHT TBI: 0.91
RIGHT TOE PRESSURE: 139 MMHG
TOE RAISES: 60

## 2025-01-15 PROCEDURE — 93924 LWR XTR VASC STDY BILAT: CPT

## 2025-01-15 PROCEDURE — 93924 LWR XTR VASC STDY BILAT: CPT | Mod: 26,,, | Performed by: SURGERY

## 2025-01-20 DIAGNOSIS — E78.00 PURE HYPERCHOLESTEROLEMIA: Chronic | ICD-10-CM

## 2025-01-23 NOTE — TELEPHONE ENCOUNTER
His only cheap alternative is warfarin, but would have to do coumadin clinic and get regular blood draws to monitor levels

## 2025-01-24 RX ORDER — ATORVASTATIN CALCIUM 40 MG/1
40 TABLET, FILM COATED ORAL NIGHTLY
Qty: 90 TABLET | Refills: 3 | Status: SHIPPED | OUTPATIENT
Start: 2025-01-24

## 2025-01-24 NOTE — TELEPHONE ENCOUNTER
Patient states Lynette told him that Dr Gillette is not covered on his plan. He has to see another PCP until he changes his insurance. He has a 2,000 deductible and the eliquis is $302. He will see what happens with his insurance

## 2025-01-28 ENCOUNTER — OFFICE VISIT (OUTPATIENT)
Dept: VASCULAR SURGERY | Facility: CLINIC | Age: 80
End: 2025-01-28
Payer: MEDICARE

## 2025-01-28 VITALS
HEART RATE: 77 BPM | SYSTOLIC BLOOD PRESSURE: 123 MMHG | WEIGHT: 142 LBS | DIASTOLIC BLOOD PRESSURE: 77 MMHG | HEIGHT: 65 IN | BODY MASS INDEX: 23.66 KG/M2

## 2025-01-28 DIAGNOSIS — I72.3 ILIAC ARTERY ANEURYSM: ICD-10-CM

## 2025-01-28 DIAGNOSIS — I71.21 ASCENDING AORTIC ANEURYSM, UNSPECIFIED WHETHER RUPTURED: Primary | ICD-10-CM

## 2025-01-28 PROCEDURE — 1101F PT FALLS ASSESS-DOCD LE1/YR: CPT | Mod: CPTII,S$GLB,, | Performed by: SURGERY

## 2025-01-28 PROCEDURE — 99999 PR PBB SHADOW E&M-EST. PATIENT-LVL III: CPT | Mod: PBBFAC,,, | Performed by: SURGERY

## 2025-01-28 PROCEDURE — 3078F DIAST BP <80 MM HG: CPT | Mod: CPTII,S$GLB,, | Performed by: SURGERY

## 2025-01-28 PROCEDURE — 1126F AMNT PAIN NOTED NONE PRSNT: CPT | Mod: CPTII,S$GLB,, | Performed by: SURGERY

## 2025-01-28 PROCEDURE — 3074F SYST BP LT 130 MM HG: CPT | Mod: CPTII,S$GLB,, | Performed by: SURGERY

## 2025-01-28 PROCEDURE — 99214 OFFICE O/P EST MOD 30 MIN: CPT | Mod: S$GLB,,, | Performed by: SURGERY

## 2025-01-28 PROCEDURE — 3288F FALL RISK ASSESSMENT DOCD: CPT | Mod: CPTII,S$GLB,, | Performed by: SURGERY

## 2025-01-28 PROCEDURE — 1159F MED LIST DOCD IN RCRD: CPT | Mod: CPTII,S$GLB,, | Performed by: SURGERY

## 2025-01-28 NOTE — PROGRESS NOTES
Mario Springer MD [facility]                         01/28/2025    HPI:  Reed Torres is a 79 y.o. male with   Patient Active Problem List   Diagnosis    Hyperlipidemia    Tuberculosis exposure    BPH (benign prostatic hyperplasia)    Osteoarthritis of lumbar spine    Hyperbilirubinemia, long standing, favor GILBERT    Abnormal stress test    Anxiety disorder    SVT (supraventricular tachycardia)    Pulmonary emphysema    Coronary artery disease due to lipid rich plaque    PAF (paroxysmal atrial fibrillation)    Aortic atherosclerosis    Aneurysm of right common iliac artery    Thrombocytopenia, unspecified    Hearing decreased    Degenerative disease of nervous system, unspecified    Gastroesophageal reflux disease without esophagitis    Primary open angle glaucoma (POAG) of both eyes, indeterminate stage    Psoriasis vulgaris    Cervical radiculopathy    Insomnia    Cognitive impairment    Aortic ectasia    Chronic left-sided low back pain with left-sided sciatica    being managed by PCP and specialists who is here today for evaluation of mesenteric ischemia.  Pt with c/o LUQ and L chest discomfort intermittent without known triggers.  Patient states location is LUQ and L chest under breastbone occurring for a few months.  Associated signs and symptoms include belching.  No food fear or significant weight loss.  Quality is pressure and severity is 3/10.  Symptoms began a few mo ago.  Alleviating factors include activity.  Worsening factors include none.    no MI  no Stroke  Tobacco use: denies    1/2021:  States he has symptoms of belching and bloating.  Denies food fear and weight loss.  Symptoms are worsened when he bends forward.  States he has not seen GI in about 4 months where he was diagnosed with reflux although his dosage of his medication has been decreased.  Denies pelvic pain or abdominal pain or back pain.  No functional limitation or leg  pain.    2021:  C/o LUQ pain, R thigh intermittent pain, no abd or pelvic pain.  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    2022:  No complaints.    10/2022:  no new issues.  No pelvic or abd pain.    3/2023:  no complaints.  S/p R RONAK stand alone 23.     2023:  pt doing well.    3/2024:  no abd pain.  No buttock claudication.    2024:  c/o L thigh pain at rest and when ambulating, radiating sharp to knee laterally.    2024: c/o L thigh pain and buttock pain at rest and when ambulating long days.    2025:  states he feels better.    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     Past Surgical History:   Procedure Laterality Date    ABDOMINAL AORTIC ANEURYSM REPAIR, ENDOVASCULAR Bilateral 2023    Procedure: REPAIR-ANEURYSM-ILIAC BRANCH ENDOPROSTHESIS-ENDOVASCULAR;  Surgeon: Mario Springer MD;  Location: [facility] OR [address] [place] [address];  Service: Vascular;  Laterality: Bilateral;  mGy: 3707.4  Fluro time: 41.4 miutes  contrast volume: 171mg  Gycm: 363.78      APPENDECTOMY      LEFT HEART CATHETERIZATION Left 2020    Procedure: Left heart cath;  Surgeon: Keenan Avila MD;  Location: [facility] CATH LAB;  Service: Cardiology;  Laterality: Left;  RN PRE OP 2020.---COVID NEGATIVE ON-- 2020. [place]    SHOULDER ARTHROSCOPY W/ ROTATOR CUFF REPAIR      Left shoulder     Family History   Problem Relation Name Age of Onset    COPD Mother      Diabetes Mother      Hypertension Mother      COPD Father lungs failed     Heart disease Brother       Social History     Socioeconomic History    Marital status:    Tobacco Use    Smoking status: Former     Current packs/day: 0.00     Types: Cigarettes     Quit date: 2000     Years since quittin.0    Smokeless tobacco: Never    Tobacco comments:     stopped  smoking 20 yrs ago.   Substance and Sexual Activity    Alcohol use: Not Currently     Alcohol/week: 5.0 standard drinks of alcohol     Types: 6 Standard drinks or equivalent per week    Drug use: No    Sexual activity: Yes     Partners: Female     Birth control/protection: None   Social History Narrative    Still doing lawn service     Social Drivers of Health     Financial Resource Strain: Low Risk  (2/2/2024)    Overall Financial Resource Strain (CARDIA)     Difficulty of Paying Living Expenses: Not hard at all   Food Insecurity: No Food Insecurity (2/2/2024)    Hunger Vital Sign     Worried About Running Out of Food in the Last Year: Never true     Ran Out of Food in the Last Year: Never true   Transportation Needs: No Transportation Needs (2/2/2024)    PRAPARE - Transportation     Lack of Transportation (Medical): No     Lack of Transportation (Non-Medical): No   Physical Activity: Sufficiently Active (2/2/2024)    Exercise Vital Sign     Days of Exercise per Week: 5 days     Minutes of Exercise per Session: 150+ min   Stress: No Stress Concern Present (2/2/2024)    [employer] - Occupational Stress Questionnaire     Feeling of Stress : Only a little   Housing Stability: Low Risk  (2/2/2024)    Housing Stability Vital Sign     Unable to Pay for Housing in the Last Year: No     Number of Places Lived in the Last Year: 1     Unstable Housing in the Last Year: No       Current Outpatient Medications:     amiodarone (PACERONE) 200 MG Tab, TAKE 1 TABLET BY MOUTH EVERY DAY, Disp: 90 tablet, Rfl: 3    apixaban (ELIQUIS) 5 mg Tab, Take 1 tablet (5 mg total) by mouth 2 (two) times daily., Disp: 60 tablet, Rfl: 11    aspirin (ECOTRIN) 81 MG EC tablet, Take 81 mg by mouth every morning., Disp: , Rfl:     atorvastatin (LIPITOR) 40 MG tablet, TAKE 1 TABLET BY MOUTH AT BEDTIME, Disp: 90 tablet, Rfl: 3    ciclopirox (PENLAC) 8 % Soln, Apply topically nightly., Disp: 6.6 mL, Rfl: 11    cilostazoL  (PLETAL) 50 MG Tab, Take 1 tablet (50 mg total) by mouth 2 (two) times daily. If patient tolerate medication after 4 weeks, increase dose to 100 mg twice daily., Disp: 90 tablet, Rfl: 11    docusate sodium (COLACE) 100 MG capsule, Take 1 capsule (100 mg total) by mouth 2 (two) times daily., Disp: 60 capsule, Rfl: 0    dorzolamide-timolol 2-0.5% (COSOPT) 22.3-6.8 mg/mL ophthalmic solution, Place 1 drop into both eyes 2 (two) times daily., Disp: , Rfl:     ELIQUIS 5 mg Tab, TAKE ONE TABLET BY MOUTH TWICE DAILY, Disp: 60 tablet, Rfl: 11    gabapentin (NEURONTIN) 300 MG capsule, TAKE 1-3 CAPSULES BY MOUTH EVERY EVENING, Disp: 90 capsule, Rfl: 11    latanoprost 0.005 % ophthalmic solution, Place into both eyes every evening., Disp: , Rfl:     oxyCODONE (ROXICODONE) 5 MG immediate release tablet, Take 1 tablet (5 mg total) by mouth every 4 (four) hours as needed for Pain., Disp: 8 tablet, Rfl: 0    pantoprazole (PROTONIX) 20 MG tablet, Take 20 mg by mouth., Disp: , Rfl:     vit A/vit C/vit E/zinc/copper (OCUVITE PRESERVISION ORAL), Take by mouth 2 (two) times a day., Disp: , Rfl:     REVIEW OF SYSTEMS:  General: No fevers or chills; ENT: No sore throat; Allergy and Immunology: no persistent infections; Hematological and Lymphatic: No history of bleeding or easy bruising; Endocrine: negative; Respiratory: no cough, shortness of breath, or wheezing; Cardiovascular: no chest pain or dyspnea on exertion; Gastrointestinal: no abdominal pain/back, change in bowel habits, or bloody stools; Genito-Urinary: no dysuria, trouble voiding, or hematuria; Musculoskeletal: negative; Neurological: no TIA or stroke symptoms; Psychiatric: no nervousness, anxiety or depression.    PHYSICAL EXAM:      Pulse: 77         General appearance:  Alert, well-appearing, and in no distress.  Oriented to person, place, and time                    Neurological: Normal speech, no focal findings noted; CN II - XII grossly intact. RLE with sensation to  "light touch, LLE with sensation to light touch.            Musculoskeletal: Digits/nail without cyanosis/clubbing.  Strength 5/5 BLE.                    Neck: Supple, no significant adenopathy, no carotid bruit can be auscultated                  Chest:  Clear to auscultation, no wheezes, rales or rhonchi, symmetric air entry. No use of accessory muscles               Cardiac: Normal rate and regular rhythm, S1 and S2 normal            Abdomen: Soft, min epigastric tenderness, nondistended, no masses or organomegaly, no hernia     No rebound tenderness noted; bowel sounds normal     Pulsatile aortic mass is non palpable.     No groin adenopathy      Extremities:   2+ R femoral pulse, 2+ L femoral pulse     2+ R popliteal pulse, 2+ L popliteal pulse     2+ R PT pulse, 2+ L PT pulse     2+ R DP pulse, 2+ L DP pulse     no RLE edema, no LLE edema    Skin: RLE without tissue loss; LLE without tissue loss    LAB RESULTS:  No results found for: "CBC"  Lab Results   Component Value Date    LABPROT 10.9 07/02/2023    INR 1.0 07/02/2023     Lab Results   Component Value Date     04/17/2024    K 4.1 04/17/2024     (H) 04/17/2024    CO2 25 04/17/2024    GLU 93 04/17/2024    BUN 18 04/17/2024    CREATININE 0.8 01/14/2025    CALCIUM 9.2 04/17/2024    ANIONGAP 5 (L) 04/17/2024    EGFRNONAA >60 07/03/2022     Lab Results   Component Value Date    WBC 4.81 04/17/2024    RBC 4.39 (L) 04/17/2024    HGB 13.4 (L) 04/17/2024    HCT 39 04/17/2024    MCV 95 04/17/2024    MCH 30.5 04/17/2024    MCHC 32.2 04/17/2024    RDW 13.8 04/17/2024     04/17/2024    MPV 11.5 04/17/2024    GRAN 2.5 04/17/2024    GRAN 52.4 04/17/2024    LYMPH 1.7 04/17/2024    LYMPH 34.9 04/17/2024    MONO 0.5 04/17/2024    MONO 9.4 04/17/2024    EOS 0.1 04/17/2024    BASO 0.03 04/17/2024    EOSINOPHIL 2.5 04/17/2024    BASOPHIL 0.6 04/17/2024    DIFFMETHOD Automated 04/17/2024     .  Lab Results   Component Value Date    HGBA1C 5.1 03/03/2022 "       IMAGING:  All pertinent imaging has been reviewed and interpreted independently.    -BLE arterial US negative for popliteal aneurysm    3/2023  CTA with well apposed stents and no endoleak     9/2023:  R ROJELIO 4 cm aneurysm, stent in place, stenosis of hypogastric stent distally with opacification of distal R hypogastric artery    3/2024:  R ROJELIO 4cm aneurysm, no endoleak, stenosis of hypogastric stent distally with opacification of distal R hypogastric artery    12/2024  Left lower extremity arterial ultrasound shows decreased flow in the common femoral artery indicating proximal stenosis, decreased flow in the profunda femoris artery, distal SFA, popliteal arteries. 50-69% stenosis of the AT.     Right lower extremity pressures and waveforms indicate no hemodynamically significant arterial occlusive disease.  Left lower extremity pressures and waveforms indicate no hemodynamically significant arterial occlusive disease.    1/2025:  1. Right lower extremity pressures and waveforms indicate no hemodynamically significant arterial occlusive disease at rest.  Exercise CARLEE indicate no hemodynamically significant stenosis.  2. Left lower extremity pressures and waveforms indicate no hemodynamically significant arterial occlusive disease at rest.  Exercise CARLEE indicate no hemodynamically significant stenosis..    IMP/PLAN:  79 y.o. male with   Patient Active Problem List   Diagnosis    Hyperlipidemia    Tuberculosis exposure    BPH (benign prostatic hyperplasia)    Osteoarthritis of lumbar spine    Hyperbilirubinemia, long standing, favor LEXIBERT    Abnormal stress test    Anxiety disorder    SVT (supraventricular tachycardia)    Pulmonary emphysema    Coronary artery disease due to lipid rich plaque    PAF (paroxysmal atrial fibrillation)    Aortic atherosclerosis    Aneurysm of right common iliac artery    Thrombocytopenia, unspecified    Hearing decreased    Degenerative disease of nervous system, unspecified     Gastroesophageal reflux disease without esophagitis    Primary open angle glaucoma (POAG) of both eyes, indeterminate stage    Psoriasis vulgaris    Cervical radiculopathy    Insomnia    Cognitive impairment    Aortic ectasia    Chronic left-sided low back pain with left-sided sciatica    being managed by PCP and specialists who is here today for evaluation of R ROJELIO aneurysm.    -Mesenteric US without HD significant stenosis.  Recommend continuing evaluation and management for etiology of left upper quadrant by PCP and GI.    -right common iliac artery aneurysm 4 cm 2021 (3.6 2020), asymptomatic s/p RONAK 1/31/23 without endoleak, 4 cm 9/2023 and 3/2024 and 1/2025 - continue routine surveillance  -recommend cont daily aspirin, continue blood pressure control heart healthy lifestyle  -CTA runoff to eval R iliac stent/aneurysm and LLE vasculature in light of his LLE pain mainly when ambulating in his buttock; no evidence of L iliac stenosis on CTA 3/2024 - now resolved; normal exercise CARLEE  -Pain mgmt recs for LLE pain  -RTC 6 mo with CT CAP without contrast    I spent 12 minutes evaluating this patient and greater than 50% of the time was spent counseling, coordinator care and discussing the plan of care.  All questions were answered and patient stated understanding with agreement with the above treatment plan.    Mario Springer MD [facility]  Vascular and Endovascular Surgery

## 2025-01-29 ENCOUNTER — TELEPHONE (OUTPATIENT)
Dept: FAMILY MEDICINE | Facility: CLINIC | Age: 80
End: 2025-01-29
Payer: MEDICARE

## 2025-01-29 NOTE — TELEPHONE ENCOUNTER
----- Message from Victorino sent at 1/29/2025  2:39 PM CST -----  Regarding: self    Type: Patient Call Back    Who called:self    What is the request in detail:PATIENT CHECKING ON THE STATUS ON HIS INSURANCE PAPERWORK.     Can the clinic reply by [facility]? No    Would the patient rather a call back or a response via [facility]? Call back    Best call back number:[phone]      Additional Information:    Thank you.

## 2025-01-30 ENCOUNTER — TELEPHONE (OUTPATIENT)
Dept: FAMILY MEDICINE | Facility: CLINIC | Age: 80
End: 2025-01-30
Payer: MEDICARE

## 2025-01-30 NOTE — TELEPHONE ENCOUNTER
----- Message from Johana sent at 1/30/2025 11:17 AM CST -----  .Type: Patient Call Back    Who called: Self    What is the request in detail:PATIENT CHECKING ON THE STATUS ON HIS INSURANCE PAPERWORK. Ask that the nurse give him a call     Can the clinic reply by [facility]? No     Would the patient rather a call back or a response via [facility]? Call Back    Best call back number: [phone] ([place])       Additional Information:

## 2025-01-30 NOTE — TELEPHONE ENCOUNTER
PT dropped off letter from [facility] to office. Waiting on paperwork. Dr Gillette not in the program list. Vascular surgeon could not complete. Must be completed by PCP.

## 2025-01-31 ENCOUNTER — TELEPHONE (OUTPATIENT)
Dept: FAMILY MEDICINE | Facility: CLINIC | Age: 80
End: 2025-01-31
Payer: MEDICARE

## 2025-01-31 NOTE — TELEPHONE ENCOUNTER
Spoke to pt's wife and informed her that he isn't a patient of DR. Carballo and has to est care with him before any paperwork can be fill out. She stated that she has to switch PCP's because of insurance. It was explained to her that if Dr. Carballo can see her current PCP can see her as well. She stated understanding and will come in on Monday to pickup the documents to bring to her PCP.

## 2025-01-31 NOTE — TELEPHONE ENCOUNTER
----- Message from Bobby Carballo MD sent at 1/31/2025  4:46 PM CST -----  Regarding: insurance form  Patient dropped off insurance form but I have never seen him. Please contact patient to schedule ov with me to establish care, I can not complete form without him establishing with me

## 2025-02-03 ENCOUNTER — OFFICE VISIT (OUTPATIENT)
Dept: FAMILY MEDICINE | Facility: CLINIC | Age: 80
End: 2025-02-03
Payer: MEDICARE

## 2025-02-03 VITALS
WEIGHT: 142.19 LBS | DIASTOLIC BLOOD PRESSURE: 70 MMHG | HEART RATE: 78 BPM | RESPIRATION RATE: 18 BRPM | TEMPERATURE: 98 F | HEIGHT: 65 IN | BODY MASS INDEX: 23.69 KG/M2 | OXYGEN SATURATION: 98 % | SYSTOLIC BLOOD PRESSURE: 110 MMHG

## 2025-02-03 DIAGNOSIS — M54.32 SCIATICA, LEFT SIDE: Primary | ICD-10-CM

## 2025-02-03 DIAGNOSIS — I48.0 PAF (PAROXYSMAL ATRIAL FIBRILLATION): ICD-10-CM

## 2025-02-03 DIAGNOSIS — J43.9 PULMONARY EMPHYSEMA, UNSPECIFIED EMPHYSEMA TYPE: ICD-10-CM

## 2025-02-03 PROCEDURE — 3078F DIAST BP <80 MM HG: CPT | Mod: HCNC,CPTII,S$GLB, | Performed by: NURSE PRACTITIONER

## 2025-02-03 PROCEDURE — 1160F RVW MEDS BY RX/DR IN RCRD: CPT | Mod: HCNC,CPTII,S$GLB, | Performed by: NURSE PRACTITIONER

## 2025-02-03 PROCEDURE — 3074F SYST BP LT 130 MM HG: CPT | Mod: HCNC,CPTII,S$GLB, | Performed by: NURSE PRACTITIONER

## 2025-02-03 PROCEDURE — 1159F MED LIST DOCD IN RCRD: CPT | Mod: HCNC,CPTII,S$GLB, | Performed by: NURSE PRACTITIONER

## 2025-02-03 PROCEDURE — 1101F PT FALLS ASSESS-DOCD LE1/YR: CPT | Mod: HCNC,CPTII,S$GLB, | Performed by: NURSE PRACTITIONER

## 2025-02-03 PROCEDURE — 99999 PR PBB SHADOW E&M-EST. PATIENT-LVL V: CPT | Mod: PBBFAC,HCNC,, | Performed by: NURSE PRACTITIONER

## 2025-02-03 PROCEDURE — 99213 OFFICE O/P EST LOW 20 MIN: CPT | Mod: HCNC,S$GLB,, | Performed by: NURSE PRACTITIONER

## 2025-02-03 PROCEDURE — 3288F FALL RISK ASSESSMENT DOCD: CPT | Mod: HCNC,CPTII,S$GLB, | Performed by: NURSE PRACTITIONER

## 2025-02-03 PROCEDURE — 1126F AMNT PAIN NOTED NONE PRSNT: CPT | Mod: HCNC,CPTII,S$GLB, | Performed by: NURSE PRACTITIONER

## 2025-02-03 RX ORDER — TRAMADOL HYDROCHLORIDE 50 MG/1
50 TABLET ORAL EVERY 12 HOURS PRN
Qty: 10 TABLET | Refills: 0 | Status: SHIPPED | OUTPATIENT
Start: 2025-02-03 | End: 2025-02-08

## 2025-02-03 NOTE — PROGRESS NOTES
Subjective:       Patient ID: Reed Torres is a 79 y.o. male.    Chief Complaint: Follow-up    HPI     Reed Torres is a 79 y.o. male patient that presents to clinic with a chief complaint of follow up. Past medical and surgical history reviewed as listed. PCP is Pj Gillette MD , he is  new  to me. Patient needs VCC form completed for insurance company regarding chronic conditions. Patient also reports flare up of left sciatica pain. States gabapentin does help at times. He has to avoid NSAIDs due to Eliquis. States he does not want to use oxycodone for pain. Stays active with [facility] service.     PVD- stable on cilostazol. Followed by PVD.     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 CATH LAB;  Service: Cardiology;  Laterality: Left;  RN PRE OP 7-.---COVID NEGATIVE ON-- 7-. CA    SHOULDER ARTHROSCOPY W/ ROTATOR CUFF REPAIR      Left shoulder      Family History   Problem Relation Name Age of Onset    COPD Mother      Diabetes Mother      Hypertension Mother      COPD Father lungs failed     Heart disease Brother        Review of patient's allergies indicates:  No Known Allergies  Review of Systems    Objective:      Vitals:    02/03/25 0839   BP: 110/70   BP Location: Left arm   Patient  "Position: Sitting   Pulse: 78   Resp: 18   Temp: 97.6 °F (36.4 °C)   TempSrc: Oral   SpO2: 98%   Weight: 64.5 kg (142 lb 3.2 oz)   Height: 5' 5" (1.651 m)      Physical Exam  Vitals and nursing note reviewed.   Constitutional:       General: He is not in acute distress.     Appearance: Normal appearance.   HENT:      Head: Normocephalic and atraumatic.   Eyes:      Conjunctiva/sclera: Conjunctivae normal.      Pupils: Pupils are equal, round, and reactive to light.   Cardiovascular:      Heart sounds: Normal heart sounds.   Pulmonary:      Effort: Pulmonary effort is normal. No respiratory distress.   Musculoskeletal:      Cervical back: Normal range of motion.   Skin:     General: Skin is warm and dry.   Neurological:      Mental Status: He is alert and oriented to person, place, and time.   Psychiatric:         Mood and Affect: Mood normal.         Behavior: Behavior normal.         Lab Results   Component Value Date    WBC 4.81 04/17/2024    HGB 13.4 (L) 04/17/2024    HCT 39 04/17/2024     04/17/2024    CHOL 126 05/03/2022    TRIG 90 05/03/2022    HDL 44 05/03/2022    ALT 25 04/17/2024    AST 21 04/17/2024     04/17/2024    K 4.1 04/17/2024     (H) 04/17/2024    CREATININE 0.8 01/14/2025    BUN 18 04/17/2024    CO2 25 04/17/2024    TSH 0.616 05/17/2024    PSA 1.0 04/23/2024    INR 1.0 07/02/2023    HGBA1C 5.1 03/03/2022      Assessment:       1. Sciatica, left side    2. PAF (paroxysmal atrial fibrillation)    3. Pulmonary emphysema, unspecified emphysema type        Plan:       Sciatica, left side  -     traMADoL (ULTRAM) 50 mg tablet; Take 1 tablet (50 mg total) by mouth every 12 (twelve) hours as needed for Pain.  Dispense: 10 tablet; Refill: 0    PAF (paroxysmal atrial fibrillation)  Stable, The current medical regimen is effective;  continue present plan and medications.  Continue Eliquis.     Pulmonary emphysema, unspecified emphysema type  Stable, The current medical regimen is " effective;  continue present plan and medications.      Medication List with Changes/Refills   New Medications    TRAMADOL (ULTRAM) 50 MG TABLET    Take 1 tablet (50 mg total) by mouth every 12 (twelve) hours as needed for Pain.   Current Medications    AMIODARONE (PACERONE) 200 MG TAB    TAKE 1 TABLET BY MOUTH EVERY DAY    APIXABAN (ELIQUIS) 5 MG TAB    Take 1 tablet (5 mg total) by mouth 2 (two) times daily.    ASPIRIN (ECOTRIN) 81 MG EC TABLET    Take 81 mg by mouth every morning.    ATORVASTATIN (LIPITOR) 40 MG TABLET    TAKE 1 TABLET BY MOUTH AT BEDTIME    CICLOPIROX (PENLAC) 8 % SOLN    Apply topically nightly.    CILOSTAZOL (PLETAL) 50 MG TAB    Take 1 tablet (50 mg total) by mouth 2 (two) times daily. If patient tolerate medication after 4 weeks, increase dose to 100 mg twice daily.    DOCUSATE SODIUM (COLACE) 100 MG CAPSULE    Take 1 capsule (100 mg total) by mouth 2 (two) times daily.    DORZOLAMIDE-TIMOLOL 2-0.5% (COSOPT) 22.3-6.8 MG/ML OPHTHALMIC SOLUTION    Place 1 drop into both eyes 2 (two) times daily.    ELIQUIS 5 MG TAB    TAKE ONE TABLET BY MOUTH TWICE DAILY    GABAPENTIN (NEURONTIN) 300 MG CAPSULE    TAKE 1-3 CAPSULES BY MOUTH EVERY EVENING    LATANOPROST 0.005 % OPHTHALMIC SOLUTION    Place into both eyes every evening.    PANTOPRAZOLE (PROTONIX) 20 MG TABLET    Take 20 mg by mouth.    VIT A/VIT C/VIT E/ZINC/COPPER (OCUVITE PRESERVISION ORAL)    Take by mouth 2 (two) times a day.   Discontinued Medications    OXYCODONE (ROXICODONE) 5 MG IMMEDIATE RELEASE TABLET    Take 1 tablet (5 mg total) by mouth every 4 (four) hours as needed for Pain.                Faviola Atwood, DNP, APRN, FNP-C  [facility]  [facility] Leny Tran  02/03/2025 8:58 AM

## 2025-02-04 ENCOUNTER — CLINICAL SUPPORT (OUTPATIENT)
Dept: REHABILITATION | Facility: HOSPITAL | Age: 80
End: 2025-02-04
Payer: MEDICARE

## 2025-02-04 DIAGNOSIS — G89.29 CHRONIC LEFT-SIDED LOW BACK PAIN WITH LEFT-SIDED SCIATICA: Primary | ICD-10-CM

## 2025-02-04 DIAGNOSIS — M54.42 CHRONIC LEFT-SIDED LOW BACK PAIN WITH LEFT-SIDED SCIATICA: Primary | ICD-10-CM

## 2025-02-04 PROCEDURE — 97110 THERAPEUTIC EXERCISES: CPT | Mod: HCNC,PN

## 2025-02-04 PROCEDURE — 97112 NEUROMUSCULAR REEDUCATION: CPT | Mod: HCNC,PN

## 2025-02-04 NOTE — PROGRESS NOTES
[facility]   Physical Therapy Treatment Note / D/C      Name: Reed Torres  Clinic Number: [id number]    Therapy Diagnosis:   Encounter Diagnosis   Name Primary?    Chronic left-sided low back pain with left-sided sciatica Yes         Physician: FRANKLYN Abdi MD    Visit Date: 2/4/2025        Therapy Diagnosis:        Encounter Diagnosis   Name Primary?    Chronic left-sided low back pain with left-sided sciatica Yes        Physician: FRANKLYN Abdi MD     Physician Orders: PT Eval and Treat   Medical Diagnosis from Referral: LBP  Evaluation Date: 10/29/2024  Authorization Period Expiration: 10/1/25  Plan of Care Expiration: POC update 3/31/25  Progress Note Due: 3/4/25  Date of Surgery: na  Visit # / Visits authorized: 3/10  FOTO: 1/ 3     Precautions: Standard      Time In: 1:30 pm  Time Out: 2:30 pm   Total Billable Time: 60 minutes    PTA Visit #: 1/5       Subjective     Patient reports: only had L LE pain when at an event. Been better overall    He was compliant with home exercise program.  Response to previous treatment: initial eval   Functional change: NA     Pain: not provided/10  Location: bilateral back      Objective      Observation: alert and oriented      Posture:  flattened Lspine     Lumbar Range of Motion:     Limitations Pain   Flexion 50%    n         Extension 50%    n         Left Side Bending 50% n         Right Side Bending 50% n               Lower Extremity Strength  Right LE   Left LE     Quadriceps: 5/5 Quadriceps: 5/5   Hamstrings: 5/5 Hamstrings: 5/5   Iliospoas: 4/5 Iliospoas: 4+/5   Hip extension:  3+/5 Hip extension: 3+/5   PGM: 3+/5 PGM: 3+/5   Hip ER:  4+/5 Hip ER: 4+/5   Hip IR: 4+/5 Hip IR: 4+/5   Ankle dorsiflexion: 4+/5 Ankle dorsiflexion: 4+/5   Ankle plantarflexion: 5/5 Ankle plantarflexion: 5/5      Special Tests:  Bicycle Test of Oleg Samuel: no symptoms reported after 10 mins       Intake Outcome Measure for FOTO low back Survey    "  Therapist reviewed FOTO scores for Reed Torres on 02/04/2025   FOTO report - see Media section or FOTO account episode details.     Intake Score: see chart %          Treatment     Reed received the treatments listed below:      therapeutic exercises to develop strength and ROM for 30 minutes including:  Patient education on activity modification   Nustep 10' lvl 2  LTR 2'   +Supine nerve glides 2x10   DKTC w/  red SB  30x  Seated lumbar flexion (3 ways) 10x10s      neuromuscular re-education activities to improve: Kinesthetic and Proprioception for 30 minutes. The following activities were included:  Supine Hip Abd GTB 3x10   Supine Hip Add 3x10 5" hold   PPT 30x 5" hold   Glute Bridges +3x10   Deadlifts 20# 3x10   Goblet squats 15# 3x10        Patient Education and Home Exercises      Education provided:   - see above      Written Home Exercises Provided: Yes. Exercises were reviewed and Reed was able to demonstrate them prior to the end of the session.  Reed demonstrated good  understanding of the education provided. See EMR under Patient Instructions for exercises provided during therapy sessions.     Assessment   Patient completed session as noted above. Continues to demo reduced endurance. Progressed functional exercises today with fatigue but good emily. Will continue to progress as tolerated.         Patient prognosis is Good.   Patient will benefit from skilled outpatient Physical Therapy to address the deficits stated above and in the chart below, provide patient /family education, and to maximize patientt's level of independence.      Plan of care discussed with patient: Yes  Patient's spiritual, cultural and educational needs considered and patient is agreeable to the plan of care and goals as stated below:      Anticipated Barriers for therapy: chronicity          GOALS: Short Term Goals:  4 weeks MET all   1.Report decreased lumbar pain  < / =  5/10  to increase tolerance for ADLs  2. Increase " ROM by 10-20% where limited in order to perform ADLs without difficulty.  3. Increase strength by 1/3 MMT grade in areas of limitation to increase tolerance for ADL and work activities.  4. Pt to tolerate HEP to improve ROM and independence with ADL's     Long Term Goals: 8 weeks NOT MET  1.Report decreased lumbar pain < / = 2/10  to increase tolerance for ADLs  2.Patient goal: able to walk for 1 hour or greater  3.Increase strength to 4+/5 in  areas of limitation  to increase tolerance for ADL and work activities.  4. Pt will report at <35%  on FOTO  to demonstrate increase in LE function with every day tasks.      Plan     Cont per ANITA Miranda PT

## 2025-02-06 DIAGNOSIS — I71.21 ASCENDING AORTIC ANEURYSM, UNSPECIFIED WHETHER RUPTURED: Primary | ICD-10-CM

## 2025-02-07 ENCOUNTER — TELEPHONE (OUTPATIENT)
Dept: CARDIOTHORACIC SURGERY | Facility: CLINIC | Age: 80
End: 2025-02-07
Payer: MEDICARE

## 2025-02-07 NOTE — TELEPHONE ENCOUNTER
Called pt to schedule 6 mo f/u with Dr. Guerin. Spoke with pt and scheduled pt for f/u and CT scan on 4/10 which pt agreed to. Pt verbalized understanding of appt date, times, and locations. Pt denies any questions.

## 2025-02-11 ENCOUNTER — CLINICAL SUPPORT (OUTPATIENT)
Dept: REHABILITATION | Facility: HOSPITAL | Age: 80
End: 2025-02-11
Payer: MEDICARE

## 2025-02-11 DIAGNOSIS — G89.29 CHRONIC LEFT-SIDED LOW BACK PAIN WITH LEFT-SIDED SCIATICA: Primary | ICD-10-CM

## 2025-02-11 DIAGNOSIS — M54.42 CHRONIC LEFT-SIDED LOW BACK PAIN WITH LEFT-SIDED SCIATICA: Primary | ICD-10-CM

## 2025-02-11 PROCEDURE — 97112 NEUROMUSCULAR REEDUCATION: CPT | Mod: HCNC,PN

## 2025-02-11 NOTE — PROGRESS NOTES
[facility]   Physical Therapy Treatment Note / D/C      Name: Reed Torres  Clinic Number: [id number]    Therapy Diagnosis:   Encounter Diagnosis   Name Primary?    Chronic left-sided low back pain with left-sided sciatica Yes         Physician: FRANKLYN Abdi MD    Visit Date: 2/11/2025        Therapy Diagnosis:        Encounter Diagnosis   Name Primary?    Chronic left-sided low back pain with left-sided sciatica Yes        Physician: FRANKLYN Abdi MD     Physician Orders: PT Eval and Treat   Medical Diagnosis from Referral: LBP  Evaluation Date: 10/29/2024  Authorization Period Expiration: 10/1/25  Plan of Care Expiration: POC update 3/31/25  Progress Note Due: 3/4/25  Date of Surgery: na  Visit # / Visits authorized: 4/10  FOTO: 1/ 3     Precautions: Standard      Time In: 2 pm  Time Out: 3 pm   Total Billable Time: 30 minutes    PTA Visit #: 1/5       Subjective     Patient reports: only had L LE pain when at an event. Been better overall    He was compliant with home exercise program.  Response to previous treatment: initial eval   Functional change: NA     Pain: not provided/10  Location: bilateral back      Objective      Observation: alert and oriented      Posture:  flattened Lspine     Lumbar Range of Motion:     Limitations Pain   Flexion 50%    n         Extension 50%    n         Left Side Bending 50% n         Right Side Bending 50% n               Lower Extremity Strength  Right LE   Left LE     Quadriceps: 5/5 Quadriceps: 5/5   Hamstrings: 5/5 Hamstrings: 5/5   Iliospoas: 4/5 Iliospoas: 4+/5   Hip extension:  3+/5 Hip extension: 3+/5   PGM: 3+/5 PGM: 3+/5   Hip ER:  4+/5 Hip ER: 4+/5   Hip IR: 4+/5 Hip IR: 4+/5   Ankle dorsiflexion: 4+/5 Ankle dorsiflexion: 4+/5   Ankle plantarflexion: 5/5 Ankle plantarflexion: 5/5      Special Tests:  Bicycle Test of Oleg Samuel: no symptoms reported after 10 mins       Intake Outcome Measure for FOTO low back Survey    "  Therapist reviewed FOTO scores for Reed Torres on 02/11/2025   FOTO report - see Media section or FOTO account episode details.     Intake Score: see chart %          Treatment     Reed received the treatments listed below:      therapeutic exercises to develop strength and ROM for 30 minutes including:  Patient education on activity modification   Nustep 10' lvl 2  LTR 2'   +Supine nerve glides 2x10   DKTC w/  red SB  30x  Seated lumbar flexion (3 ways) 10x10s      neuromuscular re-education activities to improve: Kinesthetic and Proprioception for 25 minutes. The following activities were included:  Supine Hip Abd GTB 3x10   Supine Hip Add 3x10 5" hold   PPT 30x 5" hold   Glute Bridges +3x10   Deadlifts 20# 3x10   Goblet squats 15# 3x10        Patient Education and Home Exercises      Education provided:   - see above      Written Home Exercises Provided: Yes. Exercises were reviewed and Reed was able to demonstrate them prior to the end of the session.  Reed demonstrated good  understanding of the education provided. See EMR under Patient Instructions for exercises provided during therapy sessions.     Assessment   Patient completed session as noted above. Continues to demo reduced endurance. Progressed functional exercises today with fatigue but good emily. Will continue to progress as tolerated.         Patient prognosis is Good.   Patient will benefit from skilled outpatient Physical Therapy to address the deficits stated above and in the chart below, provide patient /family education, and to maximize patientt's level of independence.      Plan of care discussed with patient: Yes  Patient's spiritual, cultural and educational needs considered and patient is agreeable to the plan of care and goals as stated below:      Anticipated Barriers for therapy: chronicity          GOALS: Short Term Goals:  4 weeks MET all   1.Report decreased lumbar pain  < / =  5/10  to increase tolerance for ADLs  2. Increase " ROM by 10-20% where limited in order to perform ADLs without difficulty.  3. Increase strength by 1/3 MMT grade in areas of limitation to increase tolerance for ADL and work activities.  4. Pt to tolerate HEP to improve ROM and independence with ADL's     Long Term Goals: 8 weeks NOT MET  1.Report decreased lumbar pain < / = 2/10  to increase tolerance for ADLs  2.Patient goal: able to walk for 1 hour or greater  3.Increase strength to 4+/5 in  areas of limitation  to increase tolerance for ADL and work activities.  4. Pt will report at <35%  on FOTO  to demonstrate increase in LE function with every day tasks.      Plan     Cont per ANITA Miranda PT

## 2025-02-18 ENCOUNTER — CLINICAL SUPPORT (OUTPATIENT)
Dept: REHABILITATION | Facility: HOSPITAL | Age: 80
End: 2025-02-18
Payer: MEDICARE

## 2025-02-18 DIAGNOSIS — M54.42 CHRONIC LEFT-SIDED LOW BACK PAIN WITH LEFT-SIDED SCIATICA: Primary | ICD-10-CM

## 2025-02-18 DIAGNOSIS — G89.29 CHRONIC LEFT-SIDED LOW BACK PAIN WITH LEFT-SIDED SCIATICA: Primary | ICD-10-CM

## 2025-02-18 PROCEDURE — 97110 THERAPEUTIC EXERCISES: CPT | Mod: HCNC,PN

## 2025-02-18 PROCEDURE — 97112 NEUROMUSCULAR REEDUCATION: CPT | Mod: HCNC,PN

## 2025-02-18 NOTE — PROGRESS NOTES
[facility]   Physical Therapy Treatment Note / D/C      Name: Reed Torres  Clinic Number: [id number]    Therapy Diagnosis:   Encounter Diagnosis   Name Primary?    Chronic left-sided low back pain with left-sided sciatica Yes         Physician: FRANKLYN Abdi MD    Visit Date: 2/18/2025        Therapy Diagnosis:        Encounter Diagnosis   Name Primary?    Chronic left-sided low back pain with left-sided sciatica Yes        Physician: FRANKLYN Abdi MD     Physician Orders: PT Eval and Treat   Medical Diagnosis from Referral: LBP  Evaluation Date: 10/29/2024  Authorization Period Expiration: 10/1/25  Plan of Care Expiration: POC update 3/31/25  Progress Note Due: 3/4/25  Date of Surgery: na  Visit # / Visits authorized: 4/10  FOTO: 1/ 3     Precautions: Standard      Time In: 2 pm  Time Out: 3 pm   Total Billable Time: 60 minutes    PTA Visit #: 1/5       Subjective     Patient reports: no complaints since last visit.     He was compliant with home exercise program.  Response to previous treatment: initial eval   Functional change: NA     Pain: not provided/10  Location: bilateral back      Objective      Observation: alert and oriented      Posture:  flattened Lspine     Lumbar Range of Motion:     Limitations Pain   Flexion 50%    n         Extension 50%    n         Left Side Bending 50% n         Right Side Bending 50% n               Lower Extremity Strength  Right LE   Left LE     Quadriceps: 5/5 Quadriceps: 5/5   Hamstrings: 5/5 Hamstrings: 5/5   Iliospoas: 4/5 Iliospoas: 4+/5   Hip extension:  4+/5 Hip extension: 4+/5   PGM: 4+/5 PGM: 4+/5   Hip ER:  4+/5 Hip ER: 4+/5   Hip IR: 4+/5 Hip IR: 4+/5   Ankle dorsiflexion: 4+/5 Ankle dorsiflexion: 4+/5   Ankle plantarflexion: 5/5 Ankle plantarflexion: 5/5      Special Tests:  Bicycle Test of [place]deren: no symptoms reported after 10 mins       Intake Outcome Measure for FOTO low back Survey     Therapist reviewed FOTO scores  "for Reed Torres on 02/18/2025   FOTO report - see Media section or FOTO account episode details.     Intake Score: see chart %          Treatment     Reed received the treatments listed below:      therapeutic exercises to develop strength and ROM for 30 minutes including:  Patient education on activity modification   Nustep 10' lvl 2  LTR 2'   +Supine nerve glides 2x10   DKTC w/  red SB  30x  Seated lumbar flexion (3 ways) 10x10s      neuromuscular re-education activities to improve: Kinesthetic and Proprioception for 30 minutes. The following activities were included:  Supine Hip Abd GTB 3x10   Supine Hip Add 3x10 5" hold   PPT 30x 5" hold   Glute Bridges +3x10   Deadlifts 20# 3x10   Goblet squats 15# 3x10        Patient Education and Home Exercises      Education provided:   - see above      Written Home Exercises Provided: Yes. Exercises were reviewed and Reed was able to demonstrate them prior to the end of the session.  Reed demonstrated good  understanding of the education provided. See EMR under Patient Instructions for exercises provided during therapy sessions.     Assessment   Pts pain is stabilized and he is doing well. All goals met. D/C at this time          Patient prognosis is Good.   Patient will benefit from skilled outpatient Physical Therapy to address the deficits stated above and in the chart below, provide patient /family education, and to maximize patientt's level of independence.      Plan of care discussed with patient: Yes  Patient's spiritual, cultural and educational needs considered and patient is agreeable to the plan of care and goals as stated below:      Anticipated Barriers for therapy: chronicity          GOALS: Short Term Goals:  4 weeks MET all   1.Report decreased lumbar pain  < / =  5/10  to increase tolerance for ADLs  2. Increase ROM by 10-20% where limited in order to perform ADLs without difficulty.  3. Increase strength by 1/3 MMT grade in areas of limitation to " increase tolerance for ADL and work activities.  4. Pt to tolerate HEP to improve ROM and independence with ADL's     Long Term Goals: 8 weeks Met all but FOTO   1.Report decreased lumbar pain < / = 2/10  to increase tolerance for ADLs  2.Patient goal: able to walk for 1 hour or greater  3.Increase strength to 4+/5 in  areas of limitation  to increase tolerance for ADL and work activities.  4. Pt will report at <35%  on FOTO  to demonstrate increase in LE function with every day tasks.      Plan     D/C     Skip Miranda, PT

## 2025-02-23 ENCOUNTER — HOSPITAL ENCOUNTER (EMERGENCY)
Facility: HOSPITAL | Age: 80
Discharge: HOME | End: 2025-02-23
Attending: EMERGENCY MEDICINE
Payer: MEDICARE

## 2025-02-23 VITALS
OXYGEN SATURATION: 95 % | HEART RATE: 64 BPM | HEIGHT: 65 IN | SYSTOLIC BLOOD PRESSURE: 115 MMHG | BODY MASS INDEX: 22.66 KG/M2 | TEMPERATURE: 98 F | RESPIRATION RATE: 18 BRPM | WEIGHT: 136 LBS | DIASTOLIC BLOOD PRESSURE: 80 MMHG

## 2025-02-23 DIAGNOSIS — M79.642 LEFT HAND PAIN: Primary | ICD-10-CM

## 2025-02-23 DIAGNOSIS — G56.02 CARPAL TUNNEL SYNDROME OF LEFT WRIST: ICD-10-CM

## 2025-02-23 PROCEDURE — 99283 EMERGENCY DEPT VISIT LOW MDM: CPT | Mod: HCNC

## 2025-02-23 RX ORDER — HYDROCODONE BITARTRATE AND ACETAMINOPHEN 5; 325 MG/1; MG/1
1 TABLET ORAL EVERY 6 HOURS PRN
Qty: 20 TABLET | Refills: 0 | Status: SHIPPED | OUTPATIENT
Start: 2025-02-23

## 2025-02-23 NOTE — ED PROVIDER NOTES
Encounter Date: 2/23/2025       History     Chief Complaint   Patient presents with    Hand Pain     Pt reports to ED for left hand pain and swelling, dx with carpal tunnel. Scheduled for surgery in April.      HPI  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     Past Surgical History:   Procedure Laterality Date    ABDOMINAL AORTIC ANEURYSM REPAIR, ENDOVASCULAR Bilateral 1/31/2023    Procedure: REPAIR-ANEURYSM-ILIAC BRANCH ENDOPROSTHESIS-ENDOVASCULAR;  Surgeon: Mario Springer MD;  Location: [facility] OR [address] [place] [address];  Service: Vascular;  Laterality: Bilateral;  mGy: 3707.4  Fluro time: 41.4 miutes  contrast volume: 171mg  Gycm: 363.78      APPENDECTOMY      LEFT HEART CATHETERIZATION Left 7/24/2020    Procedure: Left heart cath;  Surgeon: Keenan Avila MD;  Location: [facility] CATH LAB;  Service: Cardiology;  Laterality: Left;  RN PRE OP 7-.---COVID NEGATIVE ON-- 7-. CA    SHOULDER ARTHROSCOPY W/ ROTATOR CUFF REPAIR      Left shoulder     Family History   Problem Relation Name Age of Onset    COPD Mother      Diabetes Mother      Hypertension Mother      COPD Father lungs failed     Heart disease Brother       Social History[1]  Review of Systems    Physical Exam     Initial Vitals [02/23/25 0838]   BP Pulse Resp Temp SpO2   115/80 64 18 97.6 °F (36.4 °C) 95 %      MAP       --         Physical Exam   The patient was examined specifically for the following:   General:No significant distress, Good color, Warm and dry. Head and neck:Scalp atraumatic, Neck supple. Neurological:Appropriate conversation, Gross motor deficits. Eyes:Conjugate gaze, Clear corneas. ENT: No epistaxis. Cardiac: Regular rate and rhythm, Grossly normal heart tones. Pulmonary: Wheezing,  Rales. Gastrointestinal: Abdominal tenderness, Abdominal distention. Musculoskeletal: Extremity deformity, Apparent pain with range of motion of the joints. Skin: Rash.   The findings on examination were normal except for the following: there is no significant swelling or tenderness of the left hand.  The patient has a brisk left radial pulse.  There are some bony prominences to the joints that suggest arthritis.  There is no erythema warmth ecchymosis or edema of the left hand.  ED Course   Procedures  Labs Reviewed - No data to display       Imaging Results    None          Medications - No data to display  Medical Decision Making    Given the above this patient has a history of carpal tunnel syndrome and surgery is scheduled for March.  The patient is having trouble with the left hand pain while trying to sleep at night.  He is already on gabapentin.  Add Tylenol with hydrocodone to help him sleep.  I will have him continue follow up with hand surgery.                                  Clinical Impression:  Final diagnoses:  [M79.642] Left hand pain (Primary)  [G56.02] Carpal tunnel syndrome of left wrist          ED Disposition Condition    Discharge Stable          ED Prescriptions       Medication Sig Dispense Start Date End Date Auth. Provider    HYDROcodone-acetaminophen (NORCO) 5-325 mg per tablet Take 1 tablet by mouth every 6 (six) hours as needed for Pain. 20 tablet 2025 -- Parminder Rey MD          Follow-up Information       Follow up With Specialties Details Why Contact Info    Katharina Clemons III, MD Orthopedic Surgery In 1 week  [address]  [address]  [place] [address]  [phone]                 [1]   Social History  Tobacco Use    Smoking status: Former     Current packs/day: 0.00     Types: Cigarettes     Quit date:      Years since quittin.1    Smokeless tobacco: Never    Tobacco comments:     stopped smoking 20 yrs ago.   Substance Use Topics    Alcohol use: Not  Currently     Alcohol/week: 5.0 standard drinks of alcohol     Types: 6 Standard drinks or equivalent per week    Drug use: No        Parminder Rey MD  02/23/25 0900     yes

## 2025-02-23 NOTE — DISCHARGE INSTRUCTIONS
Tylenol 1000 mg by mouth every 8 hours as needed for pain or Tylenol with hydrocodone as directed, particularly at night before bed.  Return immediately if you get worse or if new problems develop.

## 2025-02-23 NOTE — ED PROVIDER NOTES
Encounter Date: 2/23/2025    SCRIBE #1 NOTE: I, Kimberly Vogel, am scribing for, and in the presence of,  Parminder Rey MD. I have scribed the following portions of the note - Other sections scribed: HPI, ROS.       History     Chief Complaint   Patient presents with    Hand Pain     Pt reports to ED for left hand pain and swelling, dx with carpal tunnel. Scheduled for surgery in April.      Reed Torres is a 79 y.o. 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 Notes his left hand is cold, swollen, and numb with parasthesisas. Notes upcoming carpal tunnel surgery 3/20/2025. 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 Notes adherence to gabapentin, Eliquis, baby aspirin, and amiodarone. No other exacerbating or alleviating factors. Patient denies cough, shortness of breath, chest pain, fever, chills, abdominal pain, nausea, vomiting, diarrhea, dysuria, headaches, congestion, sore throat, arm or leg trouble, eye pain, ear pain, rash, or other associated symptoms.         The history is provided by the patient. No  was used.     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     Past Surgical History:   Procedure Laterality Date    ABDOMINAL AORTIC ANEURYSM REPAIR, ENDOVASCULAR Bilateral 1/31/2023    Procedure: REPAIR-ANEURYSM-ILIAC BRANCH ENDOPROSTHESIS-ENDOVASCULAR;  Surgeon: Mario Springer MD;  Location: [facility] OR [address] [place] [address];  Service: Vascular;  Laterality: Bilateral;  mGy: 3707.4  Fluro  time: 41.4 miutes  contrast volume: 171mg  Gycm: 363.78      APPENDECTOMY      LEFT HEART CATHETERIZATION Left 7/24/2020    Procedure: Left heart cath;  Surgeon: Keenan Avila MD;  Location: [facility] CATH LAB;  Service: Cardiology;  Laterality: Left;  RN PRE OP 7-.---COVID NEGATIVE ON-- 7-. CA    SHOULDER ARTHROSCOPY W/ ROTATOR CUFF REPAIR      Left shoulder     Family History   Problem Relation Name Age of Onset    COPD Mother      Diabetes Mother      Hypertension Mother      COPD Father lungs failed     Heart disease Brother       Social History[1]  Review of Systems   Constitutional:  Negative for chills, diaphoresis and fever.   HENT:  Negative for ear pain and sore throat.    Eyes:  Negative for pain.   Respiratory:  Negative for cough and shortness of breath.    Cardiovascular:  Negative for chest pain.   Gastrointestinal:  Negative for abdominal pain, diarrhea, nausea and vomiting.   Genitourinary:  Negative for dysuria.   Musculoskeletal:  Positive for arthralgias and myalgias. Negative for back pain.        (-) Arm or leg trouble.    Skin:  Negative for rash.   Neurological:  Positive for numbness. Negative for headaches.   Psychiatric/Behavioral:  Negative for confusion.        Physical Exam     Initial Vitals [02/23/25 0838]   BP Pulse Resp Temp SpO2   115/80 64 18 97.6 °F (36.4 °C) 95 %      MAP       --         Physical Exam    ED Course   Procedures  Labs Reviewed - No data to display       Imaging Results    None          Medications - No data to display  Medical Decision Making  Risk  Prescription drug management.            Scribe Attestation:   Scribe #1: I performed the above scribed service and the documentation accurately describes the services I performed. I attest to the accuracy of the note.                           ***    Clinical Impression:  Final diagnoses:  [M79.642] Left hand pain (Primary)  [G56.02] Carpal tunnel syndrome of left wrist          ED Disposition Condition     Discharge Stable          ED Prescriptions       Medication Sig Dispense Start Date End Date Auth. Provider    HYDROcodone-acetaminophen (NORCO) 5-325 mg per tablet Take 1 tablet by mouth every 6 (six) hours as needed for Pain. 20 tablet 2025 -- Parminder Rey MD          Follow-up Information       Follow up With Specialties Details Why Contact Info    Katharina Clemons III, MD Orthopedic Surgery In 1 week  [address] [facility] [address] [facility]  [address]  [place] [address]  [phone]                 [1]   Social History  Tobacco Use    Smoking status: Former     Current packs/day: 0.00     Types: Cigarettes     Quit date:      Years since quittin.    Smokeless tobacco: Never    Tobacco comments:     stopped smoking 20 yrs ago.   Substance Use Topics    Alcohol use: Not Currently     Alcohol/week: 5.0 standard drinks of alcohol     Types: 6 Standard drinks or equivalent per week    Drug use: No

## 2025-02-24 ENCOUNTER — PATIENT OUTREACH (OUTPATIENT)
Facility: OTHER | Age: 80
End: 2025-02-24
Payer: MEDICARE

## 2025-02-24 DIAGNOSIS — Z00.00 ENCOUNTER FOR MEDICARE ANNUAL WELLNESS EXAM: ICD-10-CM

## 2025-04-07 ENCOUNTER — RESULTS FOLLOW-UP (OUTPATIENT)
Dept: FAMILY MEDICINE | Facility: CLINIC | Age: 80
End: 2025-04-07

## 2025-04-07 ENCOUNTER — OFFICE VISIT (OUTPATIENT)
Dept: FAMILY MEDICINE | Facility: CLINIC | Age: 80
End: 2025-04-07
Payer: MEDICARE

## 2025-04-07 ENCOUNTER — TELEPHONE (OUTPATIENT)
Dept: CARDIOTHORACIC SURGERY | Facility: CLINIC | Age: 80
End: 2025-04-07
Payer: MEDICARE

## 2025-04-07 ENCOUNTER — LAB VISIT (OUTPATIENT)
Dept: LAB | Facility: HOSPITAL | Age: 80
End: 2025-04-07
Payer: MEDICARE

## 2025-04-07 VITALS
BODY MASS INDEX: 22.89 KG/M2 | WEIGHT: 137.38 LBS | TEMPERATURE: 98 F | HEART RATE: 56 BPM | RESPIRATION RATE: 16 BRPM | SYSTOLIC BLOOD PRESSURE: 112 MMHG | OXYGEN SATURATION: 99 % | HEIGHT: 65 IN | DIASTOLIC BLOOD PRESSURE: 74 MMHG

## 2025-04-07 DIAGNOSIS — E78.00 PURE HYPERCHOLESTEROLEMIA: Chronic | ICD-10-CM

## 2025-04-07 DIAGNOSIS — I72.3 ANEURYSM OF RIGHT COMMON ILIAC ARTERY: ICD-10-CM

## 2025-04-07 DIAGNOSIS — I70.0 AORTIC ATHEROSCLEROSIS: ICD-10-CM

## 2025-04-07 DIAGNOSIS — R41.89 COGNITIVE IMPAIRMENT: ICD-10-CM

## 2025-04-07 DIAGNOSIS — I25.10 CORONARY ARTERY DISEASE DUE TO LIPID RICH PLAQUE: ICD-10-CM

## 2025-04-07 DIAGNOSIS — K21.9 GASTROESOPHAGEAL REFLUX DISEASE WITHOUT ESOPHAGITIS: ICD-10-CM

## 2025-04-07 DIAGNOSIS — I48.0 PAF (PAROXYSMAL ATRIAL FIBRILLATION): ICD-10-CM

## 2025-04-07 DIAGNOSIS — I25.83 CORONARY ARTERY DISEASE DUE TO LIPID RICH PLAQUE: ICD-10-CM

## 2025-04-07 DIAGNOSIS — Z79.01 CURRENT USE OF LONG TERM ANTICOAGULATION: ICD-10-CM

## 2025-04-07 DIAGNOSIS — N40.0 BENIGN PROSTATIC HYPERPLASIA WITHOUT LOWER URINARY TRACT SYMPTOMS: Chronic | ICD-10-CM

## 2025-04-07 DIAGNOSIS — G47.00 INSOMNIA, UNSPECIFIED TYPE: ICD-10-CM

## 2025-04-07 DIAGNOSIS — I47.10 SVT (SUPRAVENTRICULAR TACHYCARDIA): ICD-10-CM

## 2025-04-07 DIAGNOSIS — Z98.890 S/P CARPAL TUNNEL RELEASE: ICD-10-CM

## 2025-04-07 DIAGNOSIS — M47.816 SPONDYLOSIS OF LUMBAR REGION WITHOUT MYELOPATHY OR RADICULOPATHY: Primary | ICD-10-CM

## 2025-04-07 DIAGNOSIS — Z12.5 ENCOUNTER FOR SCREENING FOR MALIGNANT NEOPLASM OF PROSTATE: ICD-10-CM

## 2025-04-07 DIAGNOSIS — I25.10 ATHEROSCLEROSIS OF NATIVE CORONARY ARTERY OF NATIVE HEART WITHOUT ANGINA PECTORIS: ICD-10-CM

## 2025-04-07 DIAGNOSIS — M54.12 CERVICAL RADICULOPATHY: ICD-10-CM

## 2025-04-07 DIAGNOSIS — I71.21 ANEURYSM OF ASCENDING AORTA WITHOUT RUPTURE: ICD-10-CM

## 2025-04-07 LAB
ABSOLUTE EOSINOPHIL (OHS): 0.06 K/UL
ABSOLUTE MONOCYTE (OHS): 0.38 K/UL (ref 0.3–1)
ABSOLUTE NEUTROPHIL COUNT (OHS): 2.37 K/UL (ref 1.8–7.7)
ALBUMIN SERPL BCP-MCNC: 3.8 G/DL (ref 3.5–5.2)
ALP SERPL-CCNC: 63 UNIT/L (ref 40–150)
ALT SERPL W/O P-5'-P-CCNC: 16 UNIT/L (ref 10–44)
ANION GAP (OHS): 7 MMOL/L (ref 8–16)
AST SERPL-CCNC: 15 UNIT/L (ref 11–45)
BASOPHILS # BLD AUTO: 0.02 K/UL
BASOPHILS NFR BLD AUTO: 0.5 %
BILIRUB SERPL-MCNC: 1 MG/DL (ref 0.1–1)
BUN SERPL-MCNC: 18 MG/DL (ref 8–23)
CALCIUM SERPL-MCNC: 8.8 MG/DL (ref 8.7–10.5)
CHLORIDE SERPL-SCNC: 111 MMOL/L (ref 95–110)
CHOLEST SERPL-MCNC: 136 MG/DL (ref 120–199)
CHOLEST/HDLC SERPL: 3 {RATIO} (ref 2–5)
CO2 SERPL-SCNC: 23 MMOL/L (ref 23–29)
CREAT SERPL-MCNC: 0.8 MG/DL (ref 0.5–1.4)
ERYTHROCYTE [DISTWIDTH] IN BLOOD BY AUTOMATED COUNT: 13.6 % (ref 11.5–14.5)
GFR SERPLBLD CREATININE-BSD FMLA CKD-EPI: >60 ML/MIN/1.73/M2
GLUCOSE SERPL-MCNC: 85 MG/DL (ref 70–110)
HCT VFR BLD AUTO: 41.9 % (ref 40–54)
HDLC SERPL-MCNC: 45 MG/DL (ref 40–75)
HDLC SERPL: 33.1 % (ref 20–50)
HGB BLD-MCNC: 13.5 GM/DL (ref 14–18)
IMM GRANULOCYTES # BLD AUTO: 0.01 K/UL (ref 0–0.04)
IMM GRANULOCYTES NFR BLD AUTO: 0.2 % (ref 0–0.5)
LDLC SERPL CALC-MCNC: 74 MG/DL (ref 63–159)
LYMPHOCYTES # BLD AUTO: 1.38 K/UL (ref 1–4.8)
MCH RBC QN AUTO: 30.7 PG (ref 27–31)
MCHC RBC AUTO-ENTMCNC: 32.2 G/DL (ref 32–36)
MCV RBC AUTO: 95 FL (ref 82–98)
NONHDLC SERPL-MCNC: 91 MG/DL
NUCLEATED RBC (/100WBC) (OHS): 0 /100 WBC
PLATELET # BLD AUTO: 161 K/UL (ref 150–450)
PMV BLD AUTO: 12.3 FL (ref 9.2–12.9)
POTASSIUM SERPL-SCNC: 4.5 MMOL/L (ref 3.5–5.1)
PROT SERPL-MCNC: 7 GM/DL (ref 6–8.4)
PSA SERPL-MCNC: 0.84 NG/ML
RBC # BLD AUTO: 4.4 M/UL (ref 4.6–6.2)
RELATIVE EOSINOPHIL (OHS): 1.4 %
RELATIVE LYMPHOCYTE (OHS): 32.7 % (ref 18–48)
RELATIVE MONOCYTE (OHS): 9 % (ref 4–15)
RELATIVE NEUTROPHIL (OHS): 56.2 % (ref 38–73)
SODIUM SERPL-SCNC: 141 MMOL/L (ref 136–145)
TRIGL SERPL-MCNC: 85 MG/DL (ref 30–150)
TSH SERPL-ACNC: 0.45 UIU/ML (ref 0.4–4)
WBC # BLD AUTO: 4.22 K/UL (ref 3.9–12.7)

## 2025-04-07 PROCEDURE — 82465 ASSAY BLD/SERUM CHOLESTEROL: CPT | Mod: HCNC

## 2025-04-07 PROCEDURE — 84153 ASSAY OF PSA TOTAL: CPT | Mod: HCNC

## 2025-04-07 PROCEDURE — 85025 COMPLETE CBC W/AUTO DIFF WBC: CPT | Mod: HCNC

## 2025-04-07 PROCEDURE — 36415 COLL VENOUS BLD VENIPUNCTURE: CPT | Mod: HCNC,PO

## 2025-04-07 PROCEDURE — 84443 ASSAY THYROID STIM HORMONE: CPT | Mod: HCNC

## 2025-04-07 PROCEDURE — 99999 PR PBB SHADOW E&M-EST. PATIENT-LVL IV: CPT | Mod: PBBFAC,HCNC,, | Performed by: NURSE PRACTITIONER

## 2025-04-07 PROCEDURE — 84450 TRANSFERASE (AST) (SGOT): CPT | Mod: HCNC

## 2025-04-07 NOTE — PROGRESS NOTES
SUBJECTIVE     Chief Complaint   Patient presents with    Annual Exam     HPI  Reed Torres is a 80 y.o. male with multiple medical diagnoses as listed in the medical history and problem list that presents for annual exam. Pt has been doing well since his last visit. He has a good appetite and eats well. He does exercise, has lawn service (very physical). He sleeps for ~7 hours nightly, zzquil. Pt does take OTC supplements, mens MVI. He does have any current stressors. Pt is UTD on age appropriate [place] screening. Dental exam is UTD. Eye exam is UTD, diagnosed with cataract and glaucoma (well controlled/stable).     PAST MEDICAL HISTORY:  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     Post treatment       PAST SURGICAL HISTORY:  Past Surgical History:   Procedure Laterality Date    ABDOMINAL AORTIC ANEURYSM REPAIR, ENDOVASCULAR Bilateral 01/31/2023    Procedure: REPAIR-ANEURYSM-ILIAC BRANCH ENDOPROSTHESIS-ENDOVASCULAR;  Surgeon: Mario Springer MD;  Location: [address];  Service: Vascular;  Laterality: Bilateral;  mGy: 3707.4  Fluro time: 41.4 miutes  contrast volume: 171mg  Gycm: 363.78      APPENDECTOMY      CARPAL TUNNEL RELEASE Left 03/2025    LEFT HEART CATHETERIZATION Left 07/24/2020    Procedure: Left heart cath;  Surgeon: Keenan Avila MD;  Location: [facility] CATH LAB;  Service: Cardiology;  Laterality: Left;  RN PRE OP 7-.---COVID NEGATIVE ON-- 7-. CA    SHOULDER ARTHROSCOPY W/ ROTATOR CUFF REPAIR      Left shoulder       SOCIAL HISTORY:  Social History[1]    FAMILY HISTORY:  Family History   Problem Relation Name Age of Onset    COPD Mother      Diabetes Mother      Hypertension Mother      COPD Father lungs failed     Heart disease Brother         ALLERGIES AND MEDICATIONS: updated and reviewed.  Review of  "patient's allergies indicates:  No Known Allergies  Current Medications[2]    ROS  Review of Systems      OBJECTIVE     Physical Exam  Vitals:    04/07/25 0851   BP: 112/74   Pulse: (!) 56   Resp: 16   Temp: 97.7 °F (36.5 °C)    Body mass index is 22.86 kg/m².  Weight: 62.3 kg (137 lb 5.6 oz)   Height: 5' 5" (165.1 cm)     Physical Exam  Vitals and nursing note reviewed.   Constitutional:       General: He is not in acute distress.     Appearance: Normal appearance.   HENT:      Head: Normocephalic and atraumatic.   Eyes:      Conjunctiva/sclera: Conjunctivae normal.      Pupils: Pupils are equal, round, and reactive to light.   Cardiovascular:      Rate and Rhythm: Normal rate and regular rhythm.      Heart sounds: Normal heart sounds. No murmur heard.  Pulmonary:      Effort: Pulmonary effort is normal. No respiratory distress.      Breath sounds: Normal breath sounds.   Musculoskeletal:      Right hand: Normal range of motion.      Left hand: Swelling present. No tenderness. Decreased range of motion. Normal pulse.      Cervical back: Normal range of motion.   Skin:     General: Skin is warm and dry.   Neurological:      Mental Status: He is alert and oriented to person, place, and time.   Psychiatric:         Mood and Affect: Mood normal.         Behavior: Behavior normal.           Health Maintenance         Date Due Completion Date    Sign Pain Contract Never done ---    Complete Opioid Risk Tool Never done ---    Shingles Vaccine (1 of 2) Never done ---    RSV Vaccine (Age 60+ and Pregnant patients) (1 - 1-dose 75+ series) Never done ---    TETANUS VACCINE 07/05/2023 7/5/2013    Lipid Panel 04/07/2030 4/7/2025              ASSESSMENT     80 y.o. male with     1. Spondylosis of lumbar region without myelopathy or radiculopathy    2. Aneurysm of ascending aorta without rupture    3. Cognitive impairment    4. Cervical radiculopathy    5. S/P carpal tunnel release    6. PAF (paroxysmal atrial fibrillation)    7. " Pure hypercholesterolemia    8. Coronary artery disease due to lipid rich plaque    9. Aortic atherosclerosis    10. Aneurysm of right common iliac artery    11. Benign prostatic hyperplasia without lower urinary tract symptoms    12. Gastroesophageal reflux disease without esophagitis    13. Insomnia, unspecified type    14. Encounter for screening for malignant neoplasm of prostate    15. Current use of long term anticoagulation        PLAN:     1. Spondylosis of lumbar region without myelopathy or radiculopathy  Stable, The current medical regimen is effective;  continue present plan and medications.  Refer to orthopedics if needed.   Tylenol prn.     2. Aneurysm of ascending aorta without rupture  Stable, managed by vascular surgery.   Statin and asa.     3. Cognitive impairment  Stable.     4. Cervical radiculopathy  Stable.   Tylenol prn.     5. S/P carpal tunnel release  Recommendations per Orthopedics.     6. PAF (paroxysmal atrial fibrillation)  Continue amiodarone and Eliquis as prescribed.  - CBC Auto Differential; Future    7. Pure hypercholesterolemia  Continue atorvastatin.  - Lipid Panel; Future    8. Coronary artery disease due to lipid rich plaque  Continue atorvastatin and aspirin.  - Comprehensive Metabolic Panel; Future    9. Aortic atherosclerosis  Continue atorvastatin and aspirin.  - Lipid Panel; Future    10. Aneurysm of right common iliac artery  Continue medications as prescribed.    11. Benign prostatic hyperplasia without lower urinary tract symptoms  Check PSA.  - PSA, Screening; Future    12. Gastroesophageal reflux disease without esophagitis  Diet modifications.    13. Insomnia, unspecified type  Continue home remedies and ZzzQuil PRN.    14. Encounter for screening for malignant neoplasm of prostate    - PSA, Screening; Future    15. Current use of long term anticoagulation  Continue Eliquis as prescribed.        RTC in 6 months.        Faviola Atwood, DNP, APRN, FNP-C  Family  Medicine  [facility]  2025 9:00 AM        Follow up in about 6 months (around 10/7/2025) for Dr. Pj Gillette.         [1]   Social History  Socioeconomic History    Marital status:    Tobacco Use    Smoking status: Former     Current packs/day: 0.00     Types: Cigarettes     Quit date: 2000     Years since quittin.2    Smokeless tobacco: Never    Tobacco comments:     stopped smoking 20 yrs ago.   Substance and Sexual Activity    Alcohol use: Not Currently     Alcohol/week: 5.0 standard drinks of alcohol     Types: 6 Standard drinks or equivalent per week    Drug use: No    Sexual activity: Yes     Partners: Female     Birth control/protection: None   Social History Narrative    Still doing lawn service     Social Drivers of Health     Financial Resource Strain: Low Risk  (2024)    Overall Financial Resource Strain (CARDIA)     Difficulty of Paying Living Expenses: Not hard at all   Food Insecurity: No Food Insecurity (2024)    Hunger Vital Sign     Worried About Running Out of Food in the Last Year: Never true     Ran Out of Food in the Last Year: Never true   Transportation Needs: No Transportation Needs (2024)    PRAPARE - Transportation     Lack of Transportation (Medical): No     Lack of Transportation (Non-Medical): No   Physical Activity: Sufficiently Active (2024)    Exercise Vital Sign     Days of Exercise per Week: 5 days     Minutes of Exercise per Session: 150+ min   Stress: No Stress Concern Present (2024)    [employer] - Occupational Stress Questionnaire     Feeling of Stress : Only a little   Housing Stability: Low Risk  (2024)    Housing Stability Vital Sign     Unable to Pay for Housing in the Last Year: No     Number of Places Lived in the Last Year: 1     Unstable Housing in the Last Year: No   [2]   Current Outpatient Medications   Medication Sig Dispense Refill    amiodarone (PACERONE) 200 MG Tab TAKE 1 TABLET BY  MOUTH EVERY DAY 90 tablet 3    apixaban (ELIQUIS) 5 mg Tab Take 1 tablet (5 mg total) by mouth 2 (two) times daily. 60 tablet 11    aspirin (ECOTRIN) 81 MG EC tablet Take 81 mg by mouth every morning.      atorvastatin (LIPITOR) 40 MG tablet TAKE 1 TABLET BY MOUTH AT BEDTIME 90 tablet 3    ciclopirox (PENLAC) 8 % Soln Apply topically nightly. 6.6 mL 11    cilostazoL (PLETAL) 50 MG Tab Take 1 tablet (50 mg total) by mouth 2 (two) times daily. If patient tolerate medication after 4 weeks, increase dose to 100 mg twice daily. 90 tablet 11    docusate sodium (COLACE) 100 MG capsule Take 1 capsule (100 mg total) by mouth 2 (two) times daily. 60 capsule 0    dorzolamide-timolol 2-0.5% (COSOPT) 22.3-6.8 mg/mL ophthalmic solution Place 1 drop into both eyes 2 (two) times daily.      ELIQUIS 5 mg Tab TAKE ONE TABLET BY MOUTH TWICE DAILY 60 tablet 11    gabapentin (NEURONTIN) 300 MG capsule TAKE 1-3 CAPSULES BY MOUTH EVERY EVENING 90 capsule 11    HYDROcodone-acetaminophen (NORCO) 5-325 mg per tablet Take 1 tablet by mouth every 6 (six) hours as needed for Pain. 20 tablet 0    latanoprost 0.005 % ophthalmic solution Place into both eyes every evening.      vit A/vit C/vit E/zinc/copper (OCUVITE PRESERVISION ORAL) Take by mouth 2 (two) times a day.       No current facility-administered medications for this visit.

## 2025-04-07 NOTE — TELEPHONE ENCOUNTER
Attempted return call to pt. No answer, left VM with request for call back and provided my direct number.       ----- Message from Komal sent at 4/7/2025 11:42 AM CDT -----  Regarding: Reschedule ap-pt  Contact: [phone]  RescheduleCaller:The pt Call back number: [phone] Appt Date: 04/10/25Type of Appt:New pt Provider:Dr. Davies for Rescheduling: Pt unable to come in on that date. Pt would prefer a morning appt. Additional Information:Thank you.

## 2025-04-08 ENCOUNTER — TELEPHONE (OUTPATIENT)
Dept: CARDIOTHORACIC SURGERY | Facility: CLINIC | Age: 80
End: 2025-04-08
Payer: MEDICARE

## 2025-04-08 NOTE — TELEPHONE ENCOUNTER
Returned pt's car about rescheduling 4/10 appt. Rescheduled CT and f/u with Dr. Guerin to 5/13 which pt agreed. Pt verbalized understanding of new appt date, times, and locations. Pt denies any questions at this time. Appt slip mailed.

## 2025-04-16 ENCOUNTER — TELEPHONE (OUTPATIENT)
Dept: VASCULAR SURGERY | Facility: CLINIC | Age: 80
End: 2025-04-16
Payer: MEDICARE

## 2025-04-23 ENCOUNTER — TELEPHONE (OUTPATIENT)
Dept: CARDIOTHORACIC SURGERY | Facility: CLINIC | Age: 80
End: 2025-04-23
Payer: MEDICARE

## 2025-04-23 NOTE — TELEPHONE ENCOUNTER
Pt returned my call about rescheduling 5/13 appt with Dr. Guerin. Pt is rescheduled to 5/27 which pt agreed to. Pt verbalized understanding of new appt date, times, and locations. Appt slip to be mailed.

## 2025-04-23 NOTE — TELEPHONE ENCOUNTER
Called pt to reschedule 5/13 appt with Dr. Guerin as he will be out that day; no answer. Lvm on both numbers listed in chart with reason for call and requested call back. Contact number provided.

## 2025-05-07 DIAGNOSIS — G62.9 NEUROPATHY: ICD-10-CM

## 2025-05-07 NOTE — TELEPHONE ENCOUNTER
----- Message from Marisabel sent at 5/7/2025  3:36 PM CDT -----  Regarding: self  Who Called: selfHave you contacted your pharmacy:  Refill gabapentin (NEURONTIN) 300 MG capsule  RX Name and Strength:  Preferred Pharmacy with phone number: [id number] - [place] [address] AT [address]  [place] [address]  Phone: [phone]   Fax: [phone]  Local or Mail Order: local  Would the patient rather a call back or a response via [facility]?  Best Call Back Number:  [phone]  Additional Information:Thank you.

## 2025-05-07 NOTE — TELEPHONE ENCOUNTER
No care due was identified.  [facility] Embedded Care Due Messages. Reference number: [id number].   5/07/2025 3:41:27 PM CDT

## 2025-05-08 RX ORDER — GABAPENTIN 300 MG/1
CAPSULE ORAL
Qty: 90 CAPSULE | Refills: 11 | Status: SHIPPED | OUTPATIENT
Start: 2025-05-08

## 2025-05-17 ENCOUNTER — HOSPITAL ENCOUNTER (EMERGENCY)
Facility: HOSPITAL | Age: 80
Discharge: HOME | End: 2025-05-17
Attending: STUDENT IN AN ORGANIZED HEALTH CARE EDUCATION/TRAINING PROGRAM
Payer: MEDICARE

## 2025-05-17 VITALS
DIASTOLIC BLOOD PRESSURE: 76 MMHG | WEIGHT: 146 LBS | HEIGHT: 65 IN | BODY MASS INDEX: 24.32 KG/M2 | HEART RATE: 64 BPM | TEMPERATURE: 98 F | SYSTOLIC BLOOD PRESSURE: 122 MMHG | OXYGEN SATURATION: 97 % | RESPIRATION RATE: 16 BRPM

## 2025-05-17 DIAGNOSIS — S32.15XA: Primary | ICD-10-CM

## 2025-05-17 DIAGNOSIS — Z86.79 HISTORY OF ATRIAL FIBRILLATION: ICD-10-CM

## 2025-05-17 PROCEDURE — 25000003 PHARM REV CODE 250: Mod: HCNC

## 2025-05-17 PROCEDURE — 93005 ELECTROCARDIOGRAM TRACING: CPT | Mod: HCNC

## 2025-05-17 PROCEDURE — 99284 EMERGENCY DEPT VISIT MOD MDM: CPT | Mod: 25,HCNC

## 2025-05-17 PROCEDURE — 93010 ELECTROCARDIOGRAM REPORT: CPT | Mod: HCNC,,, | Performed by: INTERNAL MEDICINE

## 2025-05-17 RX ORDER — ACETAMINOPHEN 325 MG/1
650 TABLET ORAL
Status: COMPLETED | OUTPATIENT
Start: 2025-05-17 | End: 2025-05-17

## 2025-05-17 RX ORDER — DEXTROMETHORPHAN HYDROBROMIDE, GUAIFENESIN 5; 100 MG/5ML; MG/5ML
650 LIQUID ORAL EVERY 8 HOURS PRN
Qty: 30 TABLET | Refills: 0 | Status: SHIPPED | OUTPATIENT
Start: 2025-05-17 | End: 2025-05-27

## 2025-05-17 RX ADMIN — ACETAMINOPHEN 650 MG: 325 TABLET ORAL at 09:05

## 2025-05-17 NOTE — DISCHARGE INSTRUCTIONS

## 2025-05-17 NOTE — ED TRIAGE NOTES
Pt fell back yesterday and c/o lower back pain. Denies hitting head. Denies any LOC or vomiting. Pt AAO x4. CELESTINO

## 2025-05-17 NOTE — ED PROVIDER NOTES
Encounter Date: 5/17/2025    SCRIBE #1 NOTE: I, Kaiden Etienne, am scribing for, and in the presence of,  Raghu Muse PA-C. I have scribed the following portions of the note - Other sections scribed: HPI, ROS.       History     Chief Complaint   Patient presents with    Back Pain     Pt to ED reporting lower back pain after falling onto a piece of cement yesterday. Pt unsure if he hit head but denies HA and denies LOC.  Pt has a hx of A-fib.      Reed Torres is a 80 y.o. male, with a PMHx of Hyperlipidemia, CAD, who presents to the ED with lower back pain after falling onto a piece of cement yesterday. 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 Patient reports since the fall, he has experienced pain with ambulation and when sitting. Patient also reports chronic left leg pain that is no worse than baseline. Patient denies any history of IV drug use, prior back surgery, or spinal injections. No other exacerbating or alleviating factors. Patient denies fever, numbness, weakness, saddle anesthesia, bowel or bladder incontinence, or other associated symptoms. NKDA.    The history is provided by the patient. No  was used.     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     Past Surgical History:   Procedure Laterality Date    ABDOMINAL AORTIC ANEURYSM REPAIR, ENDOVASCULAR Bilateral 01/31/2023    Procedure: REPAIR-ANEURYSM-ILIAC BRANCH ENDOPROSTHESIS-ENDOVASCULAR;  Surgeon: Mario Springer MD;  Location: [facility] OR [address] [place] [address];  Service: Vascular;  Laterality: Bilateral;  mGy: 3707.4  Fluro time: 41.4 miutes  contrast volume: 171mg  Gycm: 363.78      APPENDECTOMY      CARPAL TUNNEL  RELEASE Left 03/2025    LEFT HEART CATHETERIZATION Left 07/24/2020    Procedure: Left heart cath;  Surgeon: Keenan Avila MD;  Location: [facility] CATH LAB;  Service: Cardiology;  Laterality: Left;  RN PRE OP 7-.---COVID NEGATIVE ON-- 7-. CA    SHOULDER ARTHROSCOPY W/ ROTATOR CUFF REPAIR      Left shoulder     Family History   Problem Relation Name Age of Onset    COPD Mother      Diabetes Mother      Hypertension Mother      COPD Father lungs failed     Heart disease Brother       Social History[1]  Review of Systems   Constitutional:  Negative for diaphoresis, fatigue and unexpected weight change.   HENT:  Negative for sinus pain and sore throat.    Eyes:  Negative for pain, redness and visual disturbance.   Respiratory:  Negative for cough, chest tightness, shortness of breath and wheezing.    Cardiovascular:  Negative for chest pain and palpitations.   Gastrointestinal:  Negative for abdominal pain, blood in stool, diarrhea, nausea and vomiting.   Endocrine: Negative for polydipsia, polyphagia and polyuria.   Genitourinary:  Negative for dysuria, frequency and urgency.   Musculoskeletal:  Positive for back pain (lower). Negative for arthralgias and myalgias.   Skin:  Negative for rash.   Allergic/Immunologic: Negative for environmental allergies.   Neurological:  Negative for dizziness, syncope and headaches.   Psychiatric/Behavioral:  Negative for suicidal ideas.        Physical Exam     Initial Vitals [05/17/25 0757]   BP Pulse Resp Temp SpO2   114/75 62 18 97.7 °F (36.5 °C) 96 %      MAP       --         Physical Exam    Nursing note and vitals reviewed.  Constitutional: Vital signs are normal. He appears well-developed and well-nourished. He is cooperative. He does not appear ill. No distress.   Well-appearing.  No acute distress.   HENT:   Head: Normocephalic and atraumatic.   Right Ear: External ear normal.   Left Ear: External ear normal.   Nose: Nose normal.   Eyes: Conjunctivae and EOM are  normal.   Neck: Phonation normal.   Normal range of motion.  Cardiovascular:  Normal rate and regular rhythm.           No murmur heard.  Regular rate and rhythm.  Specifically, no irregularly irregular rhythm on my examination.  No murmur or friction rub.   Pulmonary/Chest: Effort normal. No respiratory distress.   Respirations even and unlabored.  No adventitious sounds of breathing.   Abdominal: Abdomen is flat. He exhibits no distension. There is no abdominal tenderness.   Musculoskeletal:      Cervical back: Normal range of motion.      Comments: Spine palpated from occiput to sacrum.  There is midline tenderness over the lower sacrum.  No midline tenderness in the cervical, thoracic, or lumbar spine.  No paraspinal muscular tenderness.  Minimal tenderness in the right upper buttock.  No overlying skin changes.  Strength 5/5 on hip flexion, hip extension, knee flexion, knee extension, ankle dorsiflexion, ankle plantar flexion bilaterally.     Neurological: He is alert and oriented to person, place, and time. GCS eye subscore is 4. GCS verbal subscore is 5. GCS motor subscore is 6.   Skin: Skin is warm and dry. Capillary refill takes less than 2 seconds. No rash noted.         ED Course   Procedures  Labs Reviewed - No data to display  EKG Readings: (Independently Interpreted)   Sinus bradycardia with a ventricular rate of 59 beats per minute.  Intervals within normal limits.   milliseconds.  Normal axis.  No significant ST segment elevations or depressions.  No unexpected T-wave inversions.  No STEMI.  Compared with most recent EKG dated May 20, 2024, heart rate has remained the same.       Imaging Results              CT Sacrum Without Contrast (Final result)  Result time 05/17/25 10:44:43      Final result by Abelino Machado MD (05/17/25 10:44:43)                   Impression:      1. Nondisplaced, non-angulated transversely oriented fracture of the lower sacrum at the level of S5.  2. Multilevel  degenerative changes of the lumbar spine detailed above.  Note made of moderate to severe spinal canal stenosis at L3-L5 and moderate to severe neural foraminal narrowing at L3-S1.  3. Additional findings above.      Electronically signed by: Abelino Machado MD  Date:    05/17/2025  Time:    10:44               Narrative:    EXAMINATION:  CT LUMBAR SPINE WITHOUT CONTRAST; CT SACRUM WITHOUT CONTRAST    CLINICAL HISTORY:  Low back pain, trauma;    TECHNIQUE:  CT of lumbar spine and sacrum performed without contrast.  Coronal and sagittal reformats provided.    COMPARISON:  Radiographs 07/21/2014, CT 01/14/2025, 04/17/2024 and 01/10/2022    FINDINGS:  Alignment: There is levoconvex curvature.  There is grade 1 anterolisthesis of L3-L4 and L4-L5.    Vertebrae: No fracture. No lytic or blastic lesion.    Discs: Multilevel disc height loss, severe at L4-L5 and L5-S1.    Sacrum and sacroiliac joints: There is a nondisplaced transversely oriented fracture of the lower sacrum at the level of S5, new from prior studies.  No angulation.  No lytic or blastic lesion.  Irregularity at the S5-C1 interface, chronic and perhaps reflecting remote injury.  Sacroiliac joints exhibit mild degenerative changes.  No erosion, diastasis, or ankylosis.    Degenerative findings:    T12-L1: Circumferential disc bulge and mild facet arthropathy.  Mild right neural foraminal narrowing.    L1-L2: Circumferential disc bulge and moderate facet arthropathy.  No spinal canal stenosis or neural foraminal narrowing.    L2-L3: Circumferential disc bulge and moderate facet arthropathy result in mild spinal canal stenosis and mild right neural foraminal narrowing.    L3-L4: Circumferential disc bulge and severe facet arthropathy result in moderate spinal canal stenosis and mild left, moderate right neural foraminal narrowing.    L4-L5: Circumferential disc bulge and severe facet arthropathy result in moderate to severe spinal canal stenosis and severe  left, mild right neural foraminal narrowing.    L5-S1: Circumferential disc bulge and moderate facet arthropathy result in moderate bilateral neural foraminal narrowing.    Paraspinal muscles & soft tissues: Note made of right iliac artery aneurysm with stent noted in the right common, external, and internal iliac arteries.  There is a partially visualized left renal cyst and a right renal cyst.  There is a punctate nonobstructing right renal calculus.                                       CT Lumbar Spine Without Contrast (Final result)  Result time 05/17/25 10:44:43      Final result by Abelino Machado MD (05/17/25 10:44:43)                   Impression:      1. Nondisplaced, non-angulated transversely oriented fracture of the lower sacrum at the level of S5.  2. Multilevel degenerative changes of the lumbar spine detailed above.  Note made of moderate to severe spinal canal stenosis at L3-L5 and moderate to severe neural foraminal narrowing at L3-S1.  3. Additional findings above.      Electronically signed by: Abelino Machado MD  Date:    05/17/2025  Time:    10:44               Narrative:    EXAMINATION:  CT LUMBAR SPINE WITHOUT CONTRAST; CT SACRUM WITHOUT CONTRAST    CLINICAL HISTORY:  Low back pain, trauma;    TECHNIQUE:  CT of lumbar spine and sacrum performed without contrast.  Coronal and sagittal reformats provided.    COMPARISON:  Radiographs 07/21/2014, CT 01/14/2025, 04/17/2024 and 01/10/2022    FINDINGS:  Alignment: There is levoconvex curvature.  There is grade 1 anterolisthesis of L3-L4 and L4-L5.    Vertebrae: No fracture. No lytic or blastic lesion.    Discs: Multilevel disc height loss, severe at L4-L5 and L5-S1.    Sacrum and sacroiliac joints: There is a nondisplaced transversely oriented fracture of the lower sacrum at the level of S5, new from prior studies.  No angulation.  No lytic or blastic lesion.  Irregularity at the S5-C1 interface, chronic and perhaps reflecting remote injury.   Sacroiliac joints exhibit mild degenerative changes.  No erosion, diastasis, or ankylosis.    Degenerative findings:    T12-L1: Circumferential disc bulge and mild facet arthropathy.  Mild right neural foraminal narrowing.    L1-L2: Circumferential disc bulge and moderate facet arthropathy.  No spinal canal stenosis or neural foraminal narrowing.    L2-L3: Circumferential disc bulge and moderate facet arthropathy result in mild spinal canal stenosis and mild right neural foraminal narrowing.    L3-L4: Circumferential disc bulge and severe facet arthropathy result in moderate spinal canal stenosis and mild left, moderate right neural foraminal narrowing.    L4-L5: Circumferential disc bulge and severe facet arthropathy result in moderate to severe spinal canal stenosis and severe left, mild right neural foraminal narrowing.    L5-S1: Circumferential disc bulge and moderate facet arthropathy result in moderate bilateral neural foraminal narrowing.    Paraspinal muscles & soft tissues: Note made of right iliac artery aneurysm with stent noted in the right common, external, and internal iliac arteries.  There is a partially visualized left renal cyst and a right renal cyst.  There is a punctate nonobstructing right renal calculus.                                       Medications   acetaminophen tablet 650 mg (650 mg Oral Given 5/17/25 0916)     Medical Decision Making  80-year-old male presenting to the emergency department for evaluation of low back/right buttock pain after falling yesterday.  Fell, landed on a piece of cement.  Since then has had pain in his very low back/upper buttock.  On exam, he was well-appearing and in no acute distress.  Vitals are within normal limits.  Spine palpated from occiput to sacrum.  There is midline tenderness in the sacrum.  No tenderness in the cervical, thoracic, or lumbar back.  Some tenderness to palpation in the right upper buttock.  No overlying skin changes.  Neurovascularly  intact in bilateral lower extremities.      Differential diagnosis includes but is not limited to lumbago with or without sciatica, low back strain, contusion, fracture, compression fracture, dislocation, cauda equina syndrome, spinal epidural abscess, spinal epidural hematoma.     CT of the lumbar spine and sacrum revealed a nondisplaced, nonangulated transverse fracture of the lower sacrum at the level of S5.  There are degenerative changes of the lumbar spine but no acute findings.  Presentation is consistent with type 2 sacrum fracture.  Patient has been ambulatory, weight-bearing with pain well-controlled with Tylenol.  No indication for immobilization.  Recommended prompt follow up with primary care for re-evaluation and discussed possible need for repeat x-rays to ensure healing.  Stable for discharge home to outpatient follow up.  No evidence of spinal cord or neurovascular injury.    Return precautions were discussed, all patient questions were answered, and the patient was agreeable to the plan of care.  He was discharged home in stable condition and will follow up with his primary care provider or return to the emergency department if his symptoms worsen or do not improve.     Amount and/or Complexity of Data Reviewed  Radiology: ordered. Decision-making details documented in ED Course.    Risk  OTC drugs.            Scribe Attestation:   Scribe #1: I performed the above scribed service and the documentation accurately describes the services I performed. I attest to the accuracy of the note.                           I, Raghu Muse PA-C, personally performed the services described in this documentation. All medical record entries made by the scribe were at my direction and in my presence.  I have reviewed the chart and agree that the record reflects my personal performance and is accurate and complete.     Clinical Impression:  Final diagnoses:  [Z86.79] History of atrial fibrillation  [S32.15XA] Type  2 fracture of sacrum, initial encounter for closed fracture (Primary)          ED Disposition Condition    Discharge Stable          ED Prescriptions       Medication Sig Dispense Start Date End Date Auth. Provider    acetaminophen (TYLENOL) 650 MG TbSR Take 1 tablet (650 mg total) by mouth every 8 (eight) hours as needed (pain). 30 tablet 2025 Raghu Muse, KARINA          Follow-up Information       Follow up With Specialties Details Why Contact Info    Pj Gillette MD Family Medicine Schedule an appointment as soon as possible for a visit in 1 week  [address] [facility]  [place] [address]  [phone]                 Raghu Muse, KARINA  25 [id number]         [1]   Social History  Tobacco Use    Smoking status: Former     Current packs/day: 0.00     Types: Cigarettes     Quit date:      Years since quittin.3    Smokeless tobacco: Never    Tobacco comments:     stopped smoking 20 yrs ago.   Substance Use Topics    Alcohol use: Not Currently     Alcohol/week: 5.0 standard drinks of alcohol     Types: 6 Standard drinks or equivalent per week    Drug use: No        Raghu Muse, KARINA  25 [id number]

## 2025-05-18 LAB
OHS QRS DURATION: 94 MS
OHS QTC CALCULATION: 437 MS

## 2025-05-19 ENCOUNTER — PATIENT OUTREACH (OUTPATIENT)
Facility: OTHER | Age: 80
End: 2025-05-19
Payer: MEDICARE

## 2025-05-20 ENCOUNTER — SSC ENCOUNTER (OUTPATIENT)
Dept: ADMINISTRATIVE | Facility: OTHER | Age: 80
End: 2025-05-20
Payer: MEDICARE

## 2025-05-20 NOTE — PROGRESS NOTES
Tanika Kim MA  ED Navigator  Emergency Department    [place] [facility]  Role: Community Health Worker    Date: 2025  Patient Name: Reed Torres  MRN: [id number]  PCP: Pj Gillette MD    Assessment:     Reed Torres is a 80 y.o. male who has presented to ED for back pain and fracture of the sacrum. Patient has visited the ED 2 times in the past 3 months. Patient did not contact PCP.     ED Navigator Initial Assessment    ED Navigator Enrollment Documentation  Consent to Services  Does patient consent to completing the assessment?: Yes  Contact  Method of Initial Contact: Phone  Transportation  Insurance Coverage  Do you have coverage/adequate coverage?: Yes  Specialist Appointment  PCP Follow Up Appointment  Medications  Is patient able to afford medication?: Yes  Psychological  Food  Communication/Education  Other Financial Concerns  Other Social Barriers/Concerns  Primary Barrier         Social History     Socioeconomic History    Marital status:    Tobacco Use    Smoking status: Former     Current packs/day: 0.00     Types: Cigarettes     Quit date: 2000     Years since quittin.4    Smokeless tobacco: Never    Tobacco comments:     stopped smoking 20 yrs ago.   Substance and Sexual Activity    Alcohol use: Not Currently     Alcohol/week: 5.0 standard drinks of alcohol     Types: 6 Standard drinks or equivalent per week    Drug use: No    Sexual activity: Yes     Partners: Female     Birth control/protection: None   Social History Narrative    Still doing lawn service     Social Drivers of Health     Financial Resource Strain: Low Risk  (2024)    Overall Financial Resource Strain (CARDIA)     Difficulty of Paying Living Expenses: Not hard at all   Food Insecurity: No Food Insecurity (2024)    Hunger Vital Sign     Worried About Running Out of Food in the Last Year: Never true     Ran Out of Food in the Last Year: Never true   Transportation Needs: No Transportation Needs  (2/2/2024)    PRAPARE - Transportation     Lack of Transportation (Medical): No     Lack of Transportation (Non-Medical): No   Physical Activity: Sufficiently Active (2/2/2024)    Exercise Vital Sign     Days of Exercise per Week: 5 days     Minutes of Exercise per Session: 150+ min   Stress: No Stress Concern Present (2/2/2024)    [employer] - Occupational Stress Questionnaire     Feeling of Stress : Only a little   Housing Stability: Low Risk  (2/2/2024)    Housing Stability Vital Sign     Unable to Pay for Housing in the Last Year: No     Number of Places Lived in the Last Year: 1     Unstable Housing in the Last Year: No       Plan:     Mr Mcbride returned call and stated he is aware of his follow up on 5/23/25 with NP Ely Armas. Mr Mcbride stated he was upset that is not able to see his PCP Dr Gillette when he needs a follow up and is considering finding another PCP since he would prefer to be seen by [facility]. I advised Mr Mcbride to have this discussion with the provider and staff when he follows up on Friday.

## 2025-05-22 ENCOUNTER — PATIENT OUTREACH (OUTPATIENT)
Facility: OTHER | Age: 80
End: 2025-05-22
Payer: MEDICARE

## 2025-05-22 NOTE — PROGRESS NOTES
ED Navigator reminded the patient of scheduled appointment with ARNULFO Graves on 5/23/25 at 10 am. Patient agreed to appointment date and time. Patient declined additional services. Follow up encounter closed.

## 2025-05-23 ENCOUNTER — OFFICE VISIT (OUTPATIENT)
Dept: FAMILY MEDICINE | Facility: CLINIC | Age: 80
End: 2025-05-23
Payer: MEDICARE

## 2025-05-23 VITALS
OXYGEN SATURATION: 97 % | DIASTOLIC BLOOD PRESSURE: 60 MMHG | SYSTOLIC BLOOD PRESSURE: 100 MMHG | WEIGHT: 138.44 LBS | HEART RATE: 57 BPM | BODY MASS INDEX: 23.07 KG/M2 | TEMPERATURE: 98 F | HEIGHT: 65 IN | RESPIRATION RATE: 16 BRPM

## 2025-05-23 DIAGNOSIS — S32.15XS: Primary | ICD-10-CM

## 2025-05-23 DIAGNOSIS — M48.062 LUMBAR STENOSIS WITH NEUROGENIC CLAUDICATION: ICD-10-CM

## 2025-05-23 PROCEDURE — 99999 PR PBB SHADOW E&M-EST. PATIENT-LVL V: CPT | Mod: PBBFAC,HCNC,, | Performed by: NURSE PRACTITIONER

## 2025-05-23 NOTE — PROGRESS NOTES
Subjective:      Patient ID: Reed Torres is a 80 y.o. male.  Pt returns to clinic for ER f/u, see course below. Evaluated after fall and found to have fractured sacrum which has exacerbated chronic low back and left hip/leg pain though does report normal urination and BM without any other complaint       Encounter Date: 5/17/2025  Patient presents with Back Pain: Pt to ED reporting lower back pain after falling onto a piece of cement yesterday. Pt unsure if he hit head but denies HA and denies LOC.  Pt has a hx of A-fib.   Reed Trores is a 80 y.o. male, with a PMHx of Hyperlipidemia, CAD, who presents to the ED with lower back pain after falling onto a piece of cement yesterday. 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 Patient reports since the fall, he has experienced pain with ambulation and when sitting. Patient also reports chronic left leg pain that is no worse than baseline. Patient denies any history of IV drug use, prior back surgery, or spinal injections. No other exacerbating or alleviating factors. Patient denies fever, numbness, weakness, saddle anesthesia, bowel or bladder incontinence, or other associated symptoms. NKDA.    ED Course  Procedures  Labs Reviewed - No data to display  EKG Readings: (Independently Interpreted)   Sinus bradycardia with a ventricular rate of 59 beats per minute.  Intervals within normal limits.   milliseconds.  Normal axis.  No significant ST segment elevations or depressions.  No unexpected T-wave inversions.  No STEMI.  Compared with most recent EKG dated May 20, 2024, heart rate has remained the same.      Imaging Results   EXAMINATION: CT LUMBAR SPINE WITHOUT CONTRAST; CT SACRUM WITHOUT CONTRAST  FINDINGS:  Alignment: There is levoconvex curvature.  There is grade 1 anterolisthesis of L3-L4 and L4-L5.  Vertebrae: No fracture. No lytic or blastic lesion.  Discs: Multilevel disc height loss, severe  at L4-L5 and L5-S1.  Sacrum and sacroiliac joints: There is a nondisplaced transversely oriented fracture of the lower sacrum at the level of S5, new from prior studies.  No angulation.  No lytic or blastic lesion.  Irregularity at the S5-C1 interface, chronic and perhaps reflecting remote injury.  Sacroiliac joints exhibit mild degenerative changes.  No erosion, diastasis, or ankylosis.  Degenerative findings:  T12-L1: Circumferential disc bulge and mild facet arthropathy.  Mild right neural foraminal narrowing.  L1-L2: Circumferential disc bulge and moderate facet arthropathy.  No spinal canal stenosis or neural foraminal narrowing.  L2-L3: Circumferential disc bulge and moderate facet arthropathy result in mild spinal canal stenosis and mild right neural foraminal narrowing.  L3-L4: Circumferential disc bulge and severe facet arthropathy result in moderate spinal canal stenosis and mild left, moderate right neural foraminal narrowing.  L4-L5: Circumferential disc bulge and severe facet arthropathy result in moderate to severe spinal canal stenosis and severe left, mild right neural foraminal narrowing.  L5-S1: Circumferential disc bulge and moderate facet arthropathy result in moderate bilateral neural foraminal narrowing.  Paraspinal muscles & soft tissues: Note made of right iliac artery aneurysm with stent noted in the right common, external, and internal iliac arteries.  There is a partially visualized left renal cyst and a right renal cyst.  There is a punctate nonobstructing right renal calculus.     1. Nondisplaced, non-angulated transversely oriented fracture of the lower sacrum at the level of S5.  2. Multilevel degenerative changes of the lumbar spine detailed above.  Note made of moderate to severe spinal canal stenosis at L3-L5 and moderate to severe neural foraminal narrowing at L3-S1.  3. Additional findings above.     Medications  acetaminophen tablet 650 mg (650 mg Oral Given 5/17/25 0916)      Medical Decision Making  80-year-old male presenting to the emergency department for evaluation of low back/right buttock pain after falling yesterday.  Fell, landed on a piece of cement.  Since then has had pain in his very low back/upper buttock.  On exam, he was well-appearing and in no acute distress.  Vitals are within normal limits.  Spine palpated from occiput to sacrum.  There is midline tenderness in the sacrum.  No tenderness in the cervical, thoracic, or lumbar back.  Some tenderness to palpation in the right upper buttock.  No overlying skin changes.  Neurovascularly intact in bilateral lower extremities.    Differential diagnosis includes but is not limited to lumbago with or without sciatica, low back strain, contusion, fracture, compression fracture, dislocation, cauda equina syndrome, spinal epidural abscess, spinal epidural hematoma.   CT of the lumbar spine and sacrum revealed a nondisplaced, nonangulated transverse fracture of the lower sacrum at the level of S5.  There are degenerative changes of the lumbar spine but no acute findings.  Presentation is consistent with type 2 sacrum fracture.  Patient has been ambulatory, weight-bearing with pain well-controlled with Tylenol.  No indication for immobilization.  Recommended prompt follow up with primary care for re-evaluation and discussed possible need for repeat x-rays to ensure healing.  Stable for discharge home to outpatient follow up.  No evidence of spinal cord or neurovascular injury.    Clinical Impression:  Final diagnoses:  [Z86.79] History of atrial fibrillation  [S32.15XA] Type 2 fracture of sacrum, initial encounter for closed fracture (Primary)      Review of Systems   Constitutional:  Negative for activity change, appetite change, fever, night sweats and unexpected weight change.   HENT:  Negative for nasal congestion, ear pain, postnasal drip, rhinorrhea, sneezing, sore throat and voice change.    Eyes:  Negative for pain and  "visual disturbance.   Respiratory:  Negative for cough, chest tightness and shortness of breath.    Cardiovascular:  Negative for chest pain, palpitations and leg swelling.   Gastrointestinal:  Negative for abdominal pain, change in bowel habit, constipation, diarrhea, nausea, vomiting and fecal incontinence.   Endocrine: Negative.    Genitourinary:  Negative for bladder incontinence, difficulty urinating and enuresis.   Musculoskeletal:  Positive for arthralgias, back pain, gait problem and leg pain. Negative for joint swelling, myalgias, neck pain, neck stiffness and joint deformity.   Integumentary:  Negative for rash.   Allergic/Immunologic: Negative.    Neurological:  Negative for speech difficulty and headaches.   Hematological: Negative.    Psychiatric/Behavioral: Negative.     All other systems reviewed and are negative.        Objective:     Vitals:    05/23/25 0946   BP: 100/60   Pulse: (!) 57   Resp: 16   Temp: 98.4 °F (36.9 °C)   TempSrc: Oral   SpO2: 97%   Weight: 62.8 kg (138 lb 7.2 oz)   Height: 5' 5" (1.651 m)     Physical Exam  Vitals and nursing note reviewed.   Constitutional:       General: He is not in acute distress.     Appearance: Normal appearance. He is well-developed and well-groomed. He is not ill-appearing.   HENT:      Head: Normocephalic and atraumatic.      Right Ear: External ear normal.      Left Ear: External ear normal.      Nose: Nose normal.      Mouth/Throat:      Lips: Pink.      Mouth: Mucous membranes are moist.   Eyes:      General: Lids are normal. Vision grossly intact. Gaze aligned appropriately.      Conjunctiva/sclera: Conjunctivae normal.      Pupils: Pupils are equal, round, and reactive to light.   Neck:      Trachea: Phonation normal.   Cardiovascular:      Rate and Rhythm: Normal rate and regular rhythm.      Heart sounds: Normal heart sounds.   Pulmonary:      Effort: Pulmonary effort is normal. No accessory muscle usage or respiratory distress.      Breath " sounds: Normal breath sounds and air entry. No wheezing or rales.   Abdominal:      General: Abdomen is flat. Bowel sounds are normal. There is no distension.      Palpations: Abdomen is soft.      Tenderness: There is no abdominal tenderness.   Musculoskeletal:      Cervical back: Neck supple.      Lumbar back: Deformity, signs of trauma, spasms, tenderness and bony tenderness present. No swelling, edema or lacerations. Decreased range of motion. Positive left straight leg raise test. Negative right straight leg raise test. Scoliosis present.      Right lower leg: Normal. No edema.      Left lower leg: Normal. No edema.   Skin:     General: Skin is warm and dry.      Findings: No rash.   Neurological:      General: No focal deficit present.      Mental Status: He is alert and oriented to person, place, and time. Mental status is at baseline.      Motor: Weakness present. No tremor, atrophy or abnormal muscle tone.      Coordination: Coordination is intact.      Gait: Gait abnormal (antalgic limp).   Psychiatric:         Attention and Perception: Attention and perception normal.         Mood and Affect: Mood and affect normal.         Speech: Speech normal.         Behavior: Behavior normal. Behavior is cooperative.         Thought Content: Thought content normal.         Cognition and Memory: Cognition and memory normal.         Judgment: Judgment normal.       Assessment and Plan:     1. Type 2 fracture of sacrum, sequela (Primary)  Natural history and expected course discussed. Questions answered.  Proper lifting, bending technique discussed.  Stretching exercises discussed.  Regular aerobic and trunk strengthening exercises discussed.  Short (2-4 day) period of relative rest recommended until acute symptoms improve.  Ice to affected area as needed for local pain relief.  Heat to affected area as needed for local pain relief.  OTC analgesics as needed.  Neurosurgery referral due to acute trauma.  - Ambulatory  referral/consult to Neurosurgery; Future    2. Lumbar stenosis with neurogenic claudication  Natural history and expected course discussed. Questions answered.  Proper lifting, bending technique discussed.  Stretching exercises discussed.  Regular aerobic and trunk strengthening exercises discussed.  Short (2-4 day) period of relative rest recommended until acute symptoms improve.  Ice to affected area as needed for local pain relief.  Heat to affected area as needed for local pain relief.  OTC analgesics as needed.  Neurosurgery referral due to acute trauma.  - Ambulatory referral/consult to Neurosurgery; Future           CECILY Hughes, FNP-C  Family/Internal Medicine  [facility]

## 2025-05-27 ENCOUNTER — HOSPITAL ENCOUNTER (OUTPATIENT)
Dept: RADIOLOGY | Facility: HOSPITAL | Age: 80
Discharge: HOME | End: 2025-05-27
Attending: THORACIC SURGERY (CARDIOTHORACIC VASCULAR SURGERY)
Payer: MEDICARE

## 2025-05-27 ENCOUNTER — OFFICE VISIT (OUTPATIENT)
Dept: CARDIOTHORACIC SURGERY | Facility: CLINIC | Age: 80
End: 2025-05-27
Payer: MEDICARE

## 2025-05-27 VITALS
SYSTOLIC BLOOD PRESSURE: 102 MMHG | DIASTOLIC BLOOD PRESSURE: 66 MMHG | HEART RATE: 65 BPM | OXYGEN SATURATION: 97 % | BODY MASS INDEX: 22.73 KG/M2 | HEIGHT: 65 IN | WEIGHT: 136.44 LBS

## 2025-05-27 DIAGNOSIS — I77.819 AORTIC ECTASIA: ICD-10-CM

## 2025-05-27 DIAGNOSIS — I71.21 ASCENDING AORTIC ANEURYSM, UNSPECIFIED WHETHER RUPTURED: ICD-10-CM

## 2025-05-27 DIAGNOSIS — I71.21 ASCENDING AORTIC ANEURYSM, UNSPECIFIED WHETHER RUPTURED: Primary | ICD-10-CM

## 2025-05-27 PROCEDURE — 99999 PR PBB SHADOW E&M-EST. PATIENT-LVL III: CPT | Mod: PBBFAC,HCNC,, | Performed by: THORACIC SURGERY (CARDIOTHORACIC VASCULAR SURGERY)

## 2025-05-27 PROCEDURE — 71250 CT THORAX DX C-: CPT | Mod: TC,HCNC

## 2025-05-27 NOTE — PROGRESS NOTES
Subjective:      Patient ID: Reed Torres is a 80 y.o. male.    Chief Complaint: No chief complaint on file.      HPI:  Reed Torres is a 80 y.o. male who presents to annual TAA follow up.. 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 Had a CT scan which showed mild aortic ectasia at 3.8cm.   Patient denies any family history of TAA. Reports that he quit smoking around 30 years ago when he was told that he had pre-emphysema. No prior surgeries on chest. Blood pressure is 'stable.' In clinic today was 159/95 which he reports is higher than normal.     Current medications Reviewed  Medications Ordered Prior to Encounter[1]    Review of Systems   Constitutional:  Negative for activity change, appetite change, fatigue and fever.   HENT:  Negative for nosebleeds.    Respiratory:  Negative for cough and shortness of breath.    Cardiovascular:  Negative for chest pain, palpitations and leg swelling.   Gastrointestinal:  Negative for abdominal distention, abdominal pain and nausea.   Genitourinary:  Negative for frequency.   Musculoskeletal:  Negative for arthralgias and myalgias.   Skin:  Negative for rash.   Neurological:  Negative for dizziness and numbness.   Hematological:  Does not bruise/bleed easily.     Objective:   Physical Exam  Constitutional:       Appearance: Normal appearance.   HENT:      Head: Normocephalic and atraumatic.   Eyes:      Extraocular Movements: Extraocular movements intact.   Cardiovascular:      Rate and Rhythm: Normal rate.   Pulmonary:      Effort: Pulmonary effort is normal.   Abdominal:      General: Abdomen is flat.   Musculoskeletal:         General: Normal range of motion.      Cervical back: Normal range of motion.   Skin:     General: Skin is warm and dry.      Capillary Refill:  Capillary refill takes less than 2 seconds.      Coloration: Skin is not pale.   Neurological:      General: No focal deficit present.      Mental Status: He is alert.         Diagnotic Results: reviewed  CT chest 5/27/25    ECHO  1/2025    Left Ventricle: The left ventricle is normal in size. Mildly increased wall thickness. There is mild concentric hypertrophy. There is normal systolic function with a visually estimated ejection fraction of 55 - 60%. Grade I diastolic dysfunction.    Right Ventricle: Normal right ventricular cavity size. Systolic function is normal.    Aorta: Aortic root is mildly dilated measuring 3.74 cm. Ascending aorta is mildly dilated measuring 3.80 cm.    Pulmonary Artery: The estimated pulmonary artery systolic pressure is 30 mmHg.     CT 9/25/24  1. Mild fusiform aneurysmal degeneration of the ascending aorta up to 3.8-cm which tapers to normal caliber at the level of the arch.  No imaging evidence of impending rupture.  2. At least 2 hypodense nodules are noted in the bilateral thyroid lobes for which dedicated thyroid ultrasound may be obtained on a nonemergent, outpatient basis if clinically indicated.  3. Subcentimeter hepatic parenchymal hypodensity is too small to accurately characterize.  4. Partially imaged and incompletely evaluated simple appearing renal cortical cystic lesions bilaterally.     TTE 5/17/24    Left Ventricle: The left ventricle is normal in size. Mildly increased wall thickness. There is mild concentric hypertrophy. There is normal systolic function with a visually estimated ejection fraction of 55 - 60%. Grade I diastolic dysfunction.    Right Ventricle: Normal right ventricular cavity size. Systolic function is normal.    Aorta: Aortic root is mildly dilated measuring 3.82 cm.    Pulmonary Artery: The estimated pulmonary artery systolic pressure is 30 mmHg.     Assessment:   TAA   Plan:     CTS Attending Note:    I have personally taken the history and examined  this patient and agree with the SANDHYA's note as stated above.  80-year-old male.  His ascending aorta is stable in size at roughly 3.7 cm in maximal diameter.  We will plan for repeat imaging in 1 year.       [1]   Current Outpatient Medications on File Prior to Visit   Medication Sig Dispense Refill    acetaminophen (TYLENOL) 650 MG TbSR Take 1 tablet (650 mg total) by mouth every 8 (eight) hours as needed (pain). 30 tablet 0    amiodarone (PACERONE) 200 MG Tab TAKE 1 TABLET BY MOUTH EVERY DAY 90 tablet 3    apixaban (ELIQUIS) 5 mg Tab Take 1 tablet (5 mg total) by mouth 2 (two) times daily. 60 tablet 11    aspirin (ECOTRIN) 81 MG EC tablet Take 81 mg by mouth every morning.      atorvastatin (LIPITOR) 40 MG tablet TAKE 1 TABLET BY MOUTH AT BEDTIME 90 tablet 3    ciclopirox (PENLAC) 8 % Soln Apply topically nightly. (Patient not taking: Reported on 5/23/2025) 6.6 mL 11    cilostazoL (PLETAL) 50 MG Tab Take 1 tablet (50 mg total) by mouth 2 (two) times daily. If patient tolerate medication after 4 weeks, increase dose to 100 mg twice daily. (Patient not taking: Reported on 5/23/2025) 90 tablet 11    docusate sodium (COLACE) 100 MG capsule Take 1 capsule (100 mg total) by mouth 2 (two) times daily. 60 capsule 0    dorzolamide-timolol 2-0.5% (COSOPT) 22.3-6.8 mg/mL ophthalmic solution Place 1 drop into both eyes 2 (two) times daily. (Patient not taking: Reported on 5/23/2025)      gabapentin (NEURONTIN) 300 MG capsule TAKE 1-3 CAPSULES BY MOUTH EVERY EVENING 90 capsule 11    latanoprost 0.005 % ophthalmic solution Place into both eyes every evening.      vit A/vit C/vit E/zinc/copper (OCUVITE PRESERVISION ORAL) Take by mouth 2 (two) times a day.      [DISCONTINUED] metoprolol succinate (TOPROL-XL) 25 MG 24 hr tablet Take 1 tablet (25 mg total) by mouth once daily. 90 tablet 3     No current facility-administered medications on file prior to visit.

## 2025-05-28 ENCOUNTER — PATIENT OUTREACH (OUTPATIENT)
Facility: OTHER | Age: 80
End: 2025-05-28
Payer: MEDICARE

## 2025-05-28 NOTE — PROGRESS NOTES
Nancy Mcbride stated he did have his post ED follow up on 5/23/25. He stated that x-rays were done to his leg and hip and was told everything was normal, but patient does want and MRI but his insurance would need to approve it first. 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 Patient denies any new needs or needing any additional assistance at this time.

## 2025-05-29 ENCOUNTER — OUTPATIENT CASE MANAGEMENT (OUTPATIENT)
Dept: ADMINISTRATIVE | Facility: OTHER | Age: 80
End: 2025-05-29
Payer: MEDICARE

## 2025-06-03 ENCOUNTER — OUTPATIENT CASE MANAGEMENT (OUTPATIENT)
Dept: ADMINISTRATIVE | Facility: OTHER | Age: 80
End: 2025-06-03
Payer: MEDICARE

## 2025-07-18 ENCOUNTER — OFFICE VISIT (OUTPATIENT)
Dept: FAMILY MEDICINE | Facility: CLINIC | Age: 80
End: 2025-07-18
Payer: MEDICARE

## 2025-07-18 VITALS
HEART RATE: 64 BPM | BODY MASS INDEX: 21.63 KG/M2 | SYSTOLIC BLOOD PRESSURE: 108 MMHG | OXYGEN SATURATION: 95 % | WEIGHT: 137.81 LBS | HEIGHT: 67 IN | DIASTOLIC BLOOD PRESSURE: 64 MMHG | TEMPERATURE: 98 F

## 2025-07-18 DIAGNOSIS — L40.0 PSORIASIS VULGARIS: ICD-10-CM

## 2025-07-18 DIAGNOSIS — I48.0 PAF (PAROXYSMAL ATRIAL FIBRILLATION): ICD-10-CM

## 2025-07-18 DIAGNOSIS — I72.3 ANEURYSM OF RIGHT COMMON ILIAC ARTERY: ICD-10-CM

## 2025-07-18 DIAGNOSIS — Z00.00 ANNUAL PHYSICAL EXAM: Primary | ICD-10-CM

## 2025-07-18 DIAGNOSIS — R79.9 ABNORMAL FINDING OF BLOOD CHEMISTRY: ICD-10-CM

## 2025-07-18 PROCEDURE — 99999 PR PBB SHADOW E&M-EST. PATIENT-LVL III: CPT | Mod: PBBFAC,HCNC,, | Performed by: FAMILY MEDICINE

## 2025-07-18 NOTE — ASSESSMENT & PLAN NOTE
Continue with the eliquis  Potential medication side effects were discussed with the patient; let me know if any occur.

## 2025-07-18 NOTE — PROGRESS NOTES
"Chief Complaint   Patient presents with    Annual Exam       SUBJECTIVE:   Reed Torres is a 80 y.o. male presenting for his annual checkup.   Current Medications[1]  Allergies: Patient has no known allergies.   Patient Active Problem List    Diagnosis Date Noted    Chronic left-sided low back pain with left-sided sciatica 10/29/2024    Aortic ectasia 10/08/2024    Cervical radiculopathy 08/02/2023    Insomnia 08/02/2023    Cognitive impairment 08/02/2023    Hearing decreased 01/03/2023    Degenerative disease of nervous system, unspecified 01/03/2023    Gastroesophageal reflux disease without esophagitis 01/03/2023    Primary open angle glaucoma (POAG) of both eyes, indeterminate stage 01/03/2023    Psoriasis vulgaris 01/03/2023    Thrombocytopenia, unspecified 06/14/2022    Coronary artery disease due to lipid rich plaque 05/03/2022    PAF (paroxysmal atrial fibrillation) 05/03/2022    Aortic atherosclerosis 05/03/2022    Aneurysm of right common iliac artery 05/03/2022    SVT (supraventricular tachycardia) 05/12/2021    Pulmonary emphysema 05/12/2021    Anxiety disorder 02/09/2021    Abnormal stress test 07/24/2020    Hyperbilirubinemia, long standing, favor GILBERT 02/10/2018    Osteoarthritis of lumbar spine 07/21/2014    BPH (benign prostatic hyperplasia) 11/22/2013    Hyperlipidemia     Tuberculosis exposure        ROS:  Feeling well. No dyspnea or chest pain on exertion. No abdominal pain, change in bowel habits, black or bloody stools. No urinary tract or prostatic symptoms. No neurological complaints.    OBJECTIVE:   The patient appears well, alert, oriented x 3, in no distress.   /64 (BP Location: Right arm, Patient Position: Sitting)   Pulse 64   Temp 97.9 °F (36.6 °C) (Oral)   Ht 5' 7" (1.702 m)   Wt 62.5 kg (137 lb 12.6 oz)   SpO2 95%   BMI 21.58 kg/m²   Wt Readings from Last 5 Encounters:   07/18/25 62.5 kg (137 lb 12.6 oz)   05/27/25 61.9 kg (136 lb 7.4 oz)   05/23/25 62.8 kg (138 lb 7.2 " oz)   05/17/25 66.2 kg (146 lb)   04/07/25 62.3 kg (137 lb 5.6 oz)       ENT normal.  Neck supple. No adenopathy or thyromegaly. ELISA. Lungs are clear, good air entry, no wheezes, rhonchi or rales. S1 and S2 normal, no murmurs, regular rate and rhythm. Abdomen is soft without tenderness, guarding, mass or organomegaly.  exam: deferred.  Extremities show no edema, normal peripheral pulses. Neurological is normal without focal findings. Still some pain in the sacrum  Healing  No wounds  Skin is normal today    ASSESSMENT:   1. Annual physical exam    2. Psoriasis vulgaris    3. PAF (paroxysmal atrial fibrillation)    4. Aneurysm of right common iliac artery          PLAN:   Counseled on age appropriate medical preventative services, including age appropriate cancer screenings, over all nutritional health, need for a consistent exercise regimen and an over all push towards maintaining a vigorous and active lifestyle.  Counseled on age appropriate vaccines and discussed upcoming health care needs based on age/gender.  Spent time with patient counseling on need for a good patient/doctor relationship moving forward.  Discussed use of common OTC medications and supplements.  Discussed common dietary aids and use of caffeine and the need for good sleep hygiene and stress management.    Problem List Items Addressed This Visit       PAF (paroxysmal atrial fibrillation)    Current Assessment & Plan   Continue with the eliquis  Potential medication side effects were discussed with the patient; let me know if any occur.           Aneurysm of right common iliac artery    Current Assessment & Plan   -Ambulate frequently  -Cardiac diet  -Keep incisions clean and dry, covered with gauze  -Shower daily  -Asa/statin         Psoriasis vulgaris    Current Assessment & Plan   Stable and not doing well          Other Visit Diagnoses         Annual physical exam    -  Primary          Labs prior to next visit         [1]   Current  Outpatient Medications   Medication Sig Dispense Refill    amiodarone (PACERONE) 200 MG Tab TAKE 1 TABLET BY MOUTH EVERY DAY 90 tablet 3    apixaban (ELIQUIS) 5 mg Tab Take 1 tablet (5 mg total) by mouth 2 (two) times daily. 60 tablet 11    aspirin (ECOTRIN) 81 MG EC tablet Take 81 mg by mouth every morning.      atorvastatin (LIPITOR) 40 MG tablet TAKE 1 TABLET BY MOUTH AT BEDTIME 90 tablet 3    ciclopirox (PENLAC) 8 % Soln Apply topically nightly. 6.6 mL 11    cilostazoL (PLETAL) 50 MG Tab Take 1 tablet (50 mg total) by mouth 2 (two) times daily. If patient tolerate medication after 4 weeks, increase dose to 100 mg twice daily. 90 tablet 11    docusate sodium (COLACE) 100 MG capsule Take 1 capsule (100 mg total) by mouth 2 (two) times daily. 60 capsule 0    dorzolamide-timolol 2-0.5% (COSOPT) 22.3-6.8 mg/mL ophthalmic solution Place 1 drop into both eyes 2 (two) times daily.      gabapentin (NEURONTIN) 300 MG capsule TAKE 1-3 CAPSULES BY MOUTH EVERY EVENING 90 capsule 11    latanoprost 0.005 % ophthalmic solution Place into both eyes every evening.      vit A/vit C/vit E/zinc/copper (OCUVITE PRESERVISION ORAL) Take by mouth 2 (two) times a day.       No current facility-administered medications for this visit.

## 2025-07-18 NOTE — ASSESSMENT & PLAN NOTE
-Ambulate frequently  -Cardiac diet  -Keep incisions clean and dry, covered with gauze  -Shower daily  -Asa/statin

## 2025-07-24 ENCOUNTER — TELEPHONE (OUTPATIENT)
Dept: VASCULAR SURGERY | Facility: CLINIC | Age: 80
End: 2025-07-24
Payer: MEDICARE

## 2025-08-06 ENCOUNTER — PATIENT MESSAGE (OUTPATIENT)
Dept: FAMILY MEDICINE | Facility: CLINIC | Age: 80
End: 2025-08-06
Payer: MEDICARE

## (undated) DEVICE — SHEATH DRYSHEAL 12FR 45CM

## (undated) DEVICE — GUIDEWIRE ADVNTG 035X180CM ANG

## (undated) DEVICE — KIT HAND CONTROL HIGH PRESSUR

## (undated) DEVICE — PAD DEFIB CADENCE ADULT R2

## (undated) DEVICE — CATH TORCOON NB ADV DAV

## (undated) DEVICE — IMPLANTABLE DEVICE
Type: IMPLANTABLE DEVICE | Site: ARTERIAL | Status: NON-FUNCTIONAL
Removed: 2023-01-31

## (undated) DEVICE — CATH ULTRAVERSE 035 6X60X75

## (undated) DEVICE — PACK CYSTO

## (undated) DEVICE — SUT PROLENE 5-0 24 C-1 BL

## (undated) DEVICE — CATH VALVE BALLOON 23X4

## (undated) DEVICE — SYR MARK 7 ARTERION 150ML

## (undated) DEVICE — ENSNARE SYSTEM 6FR 9-15MM

## (undated) DEVICE — DRESSING TEGADERM IV 3.5 X 4.5

## (undated) DEVICE — SUT 2-0 12-18IN SILK

## (undated) DEVICE — Device

## (undated) DEVICE — GUIDEWIRE STF .035X260CM ANG

## (undated) DEVICE — TUBING HIGH PRESSURE

## (undated) DEVICE — PACK CATH LAB

## (undated) DEVICE — TRAY CYSTO BASIN

## (undated) DEVICE — ELECTRODE REM PLYHSV RETURN 9

## (undated) DEVICE — ADHESIVE DERMABOND ADVANCED

## (undated) DEVICE — SOL NS 1000CC

## (undated) DEVICE — DEVICE PERCLOSE SUT CLSR 6FR

## (undated) DEVICE — KIT INTRO MICRO NIT VSI 4FR

## (undated) DEVICE — GUIDEWIRE STF .035X180CM ANG

## (undated) DEVICE — SOL IRR NACL .9% 3000ML

## (undated) DEVICE — SPONGE GAUZE 16PLY 4X4

## (undated) DEVICE — SUT 3-0 12-18IN SILK

## (undated) DEVICE — WIRE GUIDE TEF LUNDRQST 3CM CR

## (undated) DEVICE — KIT MANIFOLD LOW PRESS TUBING

## (undated) DEVICE — CATH GLIDE 5FR 100CM 5/BOX

## (undated) DEVICE — SUT SILK 2-0 SH 18IN BLACK

## (undated) DEVICE — BLLN MOLDG OCCL 10-37X4X90

## (undated) DEVICE — KIT SYR REUSABLE

## (undated) DEVICE — SYR 10CC LUER LOCK

## (undated) DEVICE — PAD RADI FEMORAL

## (undated) DEVICE — SHEATH TOURGUIDE 7FR 9MM 5CM

## (undated) DEVICE — BLLN CAT CODA 32MM

## (undated) DEVICE — SUT MCRYL PLUS 4-0 PS2 27IN

## (undated) DEVICE — CATH OPTITORQUE TIGER 5F 100CM

## (undated) DEVICE — OMNIPAQUE 350MG 150ML VIAL

## (undated) DEVICE — SUT MONOCRYL 2-0 CT-1 VIL

## (undated) DEVICE — PACK UNIVERSAL SPLIT II

## (undated) DEVICE — PENCIL ROCKER SWITCH 10FT CORD

## (undated) DEVICE — SYR ONLY LUER LOCK 20CC

## (undated) DEVICE — GOWN SMARTGOWN LVL4 X-LONG XL

## (undated) DEVICE — KIT GLIDESHEATH SLEND 6FR 10CM

## (undated) DEVICE — INFLATOR ENCORE

## (undated) DEVICE — DRAPE INCISE IOBAN 2 23X33IN

## (undated) DEVICE — SET DECANTER MEDICHOICE

## (undated) DEVICE — SUT 4-0 12-18IN SILK BLACK

## (undated) DEVICE — STOPCOCK 1 WAY PLASTIC

## (undated) DEVICE — CATH EMPULSE ANGLED 5FR PIGTAI

## (undated) DEVICE — SHEATH INTRODUCER 6FR 11CM

## (undated) DEVICE — CATH PIGTAIL

## (undated) DEVICE — SUT MONOCRYL 3-0 SH U/D

## (undated) DEVICE — WIRE GUIDE SAFE-T-J .035 260CM

## (undated) DEVICE — TRAY CATH FOL SIL URIMTR 16FR

## (undated) DEVICE — SYR 50ML CATH TIP

## (undated) DEVICE — INTRODUCER VASC RADPQ 10FRX10C

## (undated) DEVICE — SYR MED RAD 150ML

## (undated) DEVICE — GUIDEWIRE AMPLATZ .035X260 SS

## (undated) DEVICE — COVER INSTR ELASTIC BAND 40X20

## (undated) DEVICE — SET TUR BLADDER IRRIG Y TYPE

## (undated) DEVICE — SEE L#95700

## (undated) DEVICE — TUBING CNTRST INJ ADPT 72IN

## (undated) DEVICE — VISE RADIFOCUS MULTI TORQUE

## (undated) DEVICE — APPLICATOR CHLORAPREP ORN 26ML

## (undated) DEVICE — STAPLER SKIN PROXIMATE WIDE